# Patient Record
Sex: FEMALE | Race: WHITE | NOT HISPANIC OR LATINO | URBAN - METROPOLITAN AREA
[De-identification: names, ages, dates, MRNs, and addresses within clinical notes are randomized per-mention and may not be internally consistent; named-entity substitution may affect disease eponyms.]

---

## 2017-06-07 ENCOUNTER — EMERGENCY (EMERGENCY)
Facility: HOSPITAL | Age: 37
LOS: 0 days | Discharge: LEFT BEFORE DISCHARGE | End: 2017-06-08

## 2017-06-07 DIAGNOSIS — E05.00 THYROTOXICOSIS WITH DIFFUSE GOITER WITHOUT THYROTOXIC CRISIS OR STORM: ICD-10-CM

## 2017-06-07 DIAGNOSIS — F93.0 SEPARATION ANXIETY DISORDER OF CHILDHOOD: ICD-10-CM

## 2017-06-07 DIAGNOSIS — F39 UNSPECIFIED MOOD [AFFECTIVE] DISORDER: ICD-10-CM

## 2017-06-07 DIAGNOSIS — F41.9 ANXIETY DISORDER, UNSPECIFIED: ICD-10-CM

## 2017-06-07 DIAGNOSIS — F10.20 ALCOHOL DEPENDENCE, UNCOMPLICATED: ICD-10-CM

## 2017-06-28 DIAGNOSIS — Z98.890 OTHER SPECIFIED POSTPROCEDURAL STATES: ICD-10-CM

## 2017-06-28 DIAGNOSIS — F41.8 OTHER SPECIFIED ANXIETY DISORDERS: ICD-10-CM
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
## 2017-06-28 DIAGNOSIS — Z79.899 OTHER LONG TERM (CURRENT) DRUG THERAPY: ICD-10-CM

## 2017-06-28 DIAGNOSIS — F10.10 ALCOHOL ABUSE, UNCOMPLICATED: ICD-10-CM

## 2017-09-17 ENCOUNTER — INPATIENT (INPATIENT)
Facility: HOSPITAL | Age: 37
LOS: 2 days | Discharge: HOME | End: 2017-09-20
Attending: INTERNAL MEDICINE

## 2017-09-17 DIAGNOSIS — F39 UNSPECIFIED MOOD [AFFECTIVE] DISORDER: ICD-10-CM

## 2017-09-17 DIAGNOSIS — F93.0 SEPARATION ANXIETY DISORDER OF CHILDHOOD: ICD-10-CM

## 2017-09-17 DIAGNOSIS — F41.9 ANXIETY DISORDER, UNSPECIFIED: ICD-10-CM

## 2017-09-17 DIAGNOSIS — E05.00 THYROTOXICOSIS WITH DIFFUSE GOITER WITHOUT THYROTOXIC CRISIS OR STORM: ICD-10-CM

## 2017-09-17 DIAGNOSIS — F10.20 ALCOHOL DEPENDENCE, UNCOMPLICATED: ICD-10-CM
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
## 2017-09-24 DIAGNOSIS — F32.9 MAJOR DEPRESSIVE DISORDER, SINGLE EPISODE, UNSPECIFIED: ICD-10-CM

## 2017-09-24 DIAGNOSIS — F10.20 ALCOHOL DEPENDENCE, UNCOMPLICATED: ICD-10-CM
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
## 2017-09-24 DIAGNOSIS — E05.00 THYROTOXICOSIS WITH DIFFUSE GOITER WITHOUT THYROTOXIC CRISIS OR STORM: ICD-10-CM

## 2017-09-24 DIAGNOSIS — F41.1 GENERALIZED ANXIETY DISORDER: ICD-10-CM

## 2017-09-24 DIAGNOSIS — F17.210 NICOTINE DEPENDENCE, CIGARETTES, UNCOMPLICATED: ICD-10-CM

## 2017-10-05 ENCOUNTER — TRANSCRIPTION ENCOUNTER (OUTPATIENT)
Age: 37
End: 2017-10-05

## 2017-10-05 PROBLEM — Z00.00 ENCOUNTER FOR PREVENTIVE HEALTH EXAMINATION: Status: ACTIVE | Noted: 2017-10-05

## 2017-11-16 ENCOUNTER — APPOINTMENT (OUTPATIENT)
Dept: CARDIOLOGY | Facility: CLINIC | Age: 37
End: 2017-11-16

## 2017-11-28 ENCOUNTER — INPATIENT (INPATIENT)
Facility: HOSPITAL | Age: 37
LOS: 1 days | Discharge: LEFT BEFORE DISCHARGE | End: 2017-11-30
Attending: INTERNAL MEDICINE | Admitting: INTERNAL MEDICINE

## 2017-11-28 DIAGNOSIS — F41.9 ANXIETY DISORDER, UNSPECIFIED: ICD-10-CM

## 2017-11-28 DIAGNOSIS — F10.20 ALCOHOL DEPENDENCE, UNCOMPLICATED: ICD-10-CM

## 2017-11-28 DIAGNOSIS — F93.0 SEPARATION ANXIETY DISORDER OF CHILDHOOD: ICD-10-CM

## 2017-11-28 DIAGNOSIS — F39 UNSPECIFIED MOOD [AFFECTIVE] DISORDER: ICD-10-CM

## 2017-11-28 DIAGNOSIS — E05.00 THYROTOXICOSIS WITH DIFFUSE GOITER WITHOUT THYROTOXIC CRISIS OR STORM: ICD-10-CM

## 2017-12-04 DIAGNOSIS — F17.210 NICOTINE DEPENDENCE, CIGARETTES, UNCOMPLICATED: ICD-10-CM

## 2017-12-04 DIAGNOSIS — F32.9 MAJOR DEPRESSIVE DISORDER, SINGLE EPISODE, UNSPECIFIED: ICD-10-CM

## 2017-12-04 DIAGNOSIS — F41.1 GENERALIZED ANXIETY DISORDER: ICD-10-CM

## 2017-12-04 DIAGNOSIS — F10.20 ALCOHOL DEPENDENCE, UNCOMPLICATED: ICD-10-CM

## 2017-12-19 ENCOUNTER — EMERGENCY (EMERGENCY)
Facility: HOSPITAL | Age: 37
LOS: 0 days | Discharge: LEFT BEFORE DISCHARGE | End: 2017-12-20

## 2017-12-19 DIAGNOSIS — Z79.899 OTHER LONG TERM (CURRENT) DRUG THERAPY: ICD-10-CM

## 2017-12-19 DIAGNOSIS — F41.9 ANXIETY DISORDER, UNSPECIFIED: ICD-10-CM

## 2017-12-19 DIAGNOSIS — E05.00 THYROTOXICOSIS WITH DIFFUSE GOITER WITHOUT THYROTOXIC CRISIS OR STORM: ICD-10-CM

## 2017-12-19 DIAGNOSIS — F10.20 ALCOHOL DEPENDENCE, UNCOMPLICATED: ICD-10-CM

## 2017-12-19 DIAGNOSIS — F93.0 SEPARATION ANXIETY DISORDER OF CHILDHOOD: ICD-10-CM

## 2017-12-19 DIAGNOSIS — F39 UNSPECIFIED MOOD [AFFECTIVE] DISORDER: ICD-10-CM

## 2017-12-19 DIAGNOSIS — F10.10 ALCOHOL ABUSE, UNCOMPLICATED: ICD-10-CM

## 2017-12-19 DIAGNOSIS — Z98.890 OTHER SPECIFIED POSTPROCEDURAL STATES: ICD-10-CM

## 2017-12-26 ENCOUNTER — INPATIENT (INPATIENT)
Facility: HOSPITAL | Age: 37
LOS: 3 days | Discharge: HOME | End: 2017-12-30
Attending: INTERNAL MEDICINE

## 2017-12-26 DIAGNOSIS — F39 UNSPECIFIED MOOD [AFFECTIVE] DISORDER: ICD-10-CM

## 2017-12-26 DIAGNOSIS — F10.20 ALCOHOL DEPENDENCE, UNCOMPLICATED: ICD-10-CM

## 2017-12-26 DIAGNOSIS — F41.9 ANXIETY DISORDER, UNSPECIFIED: ICD-10-CM

## 2017-12-26 DIAGNOSIS — E05.00 THYROTOXICOSIS WITH DIFFUSE GOITER WITHOUT THYROTOXIC CRISIS OR STORM: ICD-10-CM

## 2017-12-26 DIAGNOSIS — F93.0 SEPARATION ANXIETY DISORDER OF CHILDHOOD: ICD-10-CM

## 2018-01-06 ENCOUNTER — EMERGENCY (EMERGENCY)
Facility: HOSPITAL | Age: 38
LOS: 0 days | Discharge: HOME | End: 2018-01-06
Admitting: INTERNAL MEDICINE

## 2018-01-06 DIAGNOSIS — F41.9 ANXIETY DISORDER, UNSPECIFIED: ICD-10-CM

## 2018-01-06 DIAGNOSIS — F93.0 SEPARATION ANXIETY DISORDER OF CHILDHOOD: ICD-10-CM

## 2018-01-06 DIAGNOSIS — F41.8 OTHER SPECIFIED ANXIETY DISORDERS: ICD-10-CM

## 2018-01-06 DIAGNOSIS — Z98.890 OTHER SPECIFIED POSTPROCEDURAL STATES: ICD-10-CM
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
## 2018-01-06 DIAGNOSIS — Z87.891 PERSONAL HISTORY OF NICOTINE DEPENDENCE: ICD-10-CM

## 2018-01-06 DIAGNOSIS — F10.20 ALCOHOL DEPENDENCE, UNCOMPLICATED: ICD-10-CM

## 2018-01-06 DIAGNOSIS — E05.00 THYROTOXICOSIS WITH DIFFUSE GOITER WITHOUT THYROTOXIC CRISIS OR STORM: ICD-10-CM

## 2018-01-06 DIAGNOSIS — Z79.899 OTHER LONG TERM (CURRENT) DRUG THERAPY: ICD-10-CM
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
## 2018-01-06 DIAGNOSIS — F39 UNSPECIFIED MOOD [AFFECTIVE] DISORDER: ICD-10-CM

## 2018-01-06 DIAGNOSIS — S69.91XD UNSPECIFIED INJURY OF RIGHT WRIST, HAND AND FINGER(S), SUBSEQUENT ENCOUNTER: ICD-10-CM

## 2018-01-06 DIAGNOSIS — S60.211D CONTUSION OF RIGHT WRIST, SUBSEQUENT ENCOUNTER: ICD-10-CM
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
## 2018-01-07 DIAGNOSIS — F41.9 ANXIETY DISORDER, UNSPECIFIED: ICD-10-CM

## 2018-01-07 DIAGNOSIS — F17.210 NICOTINE DEPENDENCE, CIGARETTES, UNCOMPLICATED: ICD-10-CM

## 2018-01-07 DIAGNOSIS — F10.20 ALCOHOL DEPENDENCE, UNCOMPLICATED: ICD-10-CM

## 2018-01-07 DIAGNOSIS — E05.00 THYROTOXICOSIS WITH DIFFUSE GOITER WITHOUT THYROTOXIC CRISIS OR STORM: ICD-10-CM

## 2018-01-07 DIAGNOSIS — F32.9 MAJOR DEPRESSIVE DISORDER, SINGLE EPISODE, UNSPECIFIED: ICD-10-CM

## 2018-01-11 ENCOUNTER — APPOINTMENT (OUTPATIENT)
Dept: CARDIOLOGY | Facility: CLINIC | Age: 38
End: 2018-01-11

## 2018-03-22 ENCOUNTER — APPOINTMENT (OUTPATIENT)
Dept: CARDIOLOGY | Facility: CLINIC | Age: 38
End: 2018-03-22

## 2018-03-22 VITALS — RESPIRATION RATE: 18 BRPM | HEART RATE: 84 BPM

## 2018-03-22 VITALS — SYSTOLIC BLOOD PRESSURE: 110 MMHG | DIASTOLIC BLOOD PRESSURE: 70 MMHG

## 2018-03-22 VITALS — WEIGHT: 127 LBS | BODY MASS INDEX: 23.98 KG/M2 | HEIGHT: 61 IN

## 2018-03-22 DIAGNOSIS — F17.200 NICOTINE DEPENDENCE, UNSPECIFIED, UNCOMPLICATED: ICD-10-CM

## 2018-03-22 DIAGNOSIS — Z82.49 FAMILY HISTORY OF ISCHEMIC HEART DISEASE AND OTHER DISEASES OF THE CIRCULATORY SYSTEM: ICD-10-CM

## 2018-03-22 RX ORDER — CLONAZEPAM 1 MG/1
1 TABLET ORAL
Qty: 90 | Refills: 0 | Status: ACTIVE | COMMUNITY
Start: 2018-03-03

## 2018-03-22 RX ORDER — MIRTAZAPINE 30 MG/1
30 TABLET, FILM COATED ORAL
Qty: 30 | Refills: 0 | Status: ACTIVE | COMMUNITY
Start: 2018-03-03

## 2018-03-28 ENCOUNTER — APPOINTMENT (OUTPATIENT)
Dept: CARDIOLOGY | Facility: CLINIC | Age: 38
End: 2018-03-28

## 2018-04-01 ENCOUNTER — TRANSCRIPTION ENCOUNTER (OUTPATIENT)
Age: 38
End: 2018-04-01

## 2018-04-04 ENCOUNTER — APPOINTMENT (OUTPATIENT)
Dept: CARDIOLOGY | Facility: CLINIC | Age: 38
End: 2018-04-04

## 2018-04-11 ENCOUNTER — APPOINTMENT (OUTPATIENT)
Dept: CARDIOLOGY | Facility: CLINIC | Age: 38
End: 2018-04-11

## 2018-04-26 ENCOUNTER — APPOINTMENT (OUTPATIENT)
Dept: CARDIOLOGY | Facility: CLINIC | Age: 38
End: 2018-04-26

## 2018-05-02 ENCOUNTER — APPOINTMENT (OUTPATIENT)
Dept: CARDIOLOGY | Facility: CLINIC | Age: 38
End: 2018-05-02

## 2018-05-06 ENCOUNTER — TRANSCRIPTION ENCOUNTER (OUTPATIENT)
Age: 38
End: 2018-05-06

## 2018-05-15 ENCOUNTER — TRANSCRIPTION ENCOUNTER (OUTPATIENT)
Age: 38
End: 2018-05-15

## 2018-06-03 ENCOUNTER — TRANSCRIPTION ENCOUNTER (OUTPATIENT)
Age: 38
End: 2018-06-03

## 2018-06-27 ENCOUNTER — APPOINTMENT (OUTPATIENT)
Dept: CARDIOLOGY | Facility: CLINIC | Age: 38
End: 2018-06-27

## 2018-06-28 ENCOUNTER — APPOINTMENT (OUTPATIENT)
Dept: CARDIOLOGY | Facility: CLINIC | Age: 38
End: 2018-06-28

## 2018-06-29 ENCOUNTER — EMERGENCY (EMERGENCY)
Facility: HOSPITAL | Age: 38
LOS: 0 days | Discharge: HOME | End: 2018-06-30
Attending: EMERGENCY MEDICINE | Admitting: EMERGENCY MEDICINE

## 2018-06-29 VITALS
HEART RATE: 119 BPM | SYSTOLIC BLOOD PRESSURE: 165 MMHG | TEMPERATURE: 97 F | RESPIRATION RATE: 20 BRPM | DIASTOLIC BLOOD PRESSURE: 108 MMHG | OXYGEN SATURATION: 97 %

## 2018-06-29 DIAGNOSIS — F41.9 ANXIETY DISORDER, UNSPECIFIED: ICD-10-CM

## 2018-06-29 DIAGNOSIS — F17.200 NICOTINE DEPENDENCE, UNSPECIFIED, UNCOMPLICATED: ICD-10-CM

## 2018-06-29 DIAGNOSIS — R79.9 ABNORMAL FINDING OF BLOOD CHEMISTRY, UNSPECIFIED: ICD-10-CM

## 2018-06-29 DIAGNOSIS — R10.13 EPIGASTRIC PAIN: ICD-10-CM

## 2018-06-29 LAB
ALBUMIN SERPL ELPH-MCNC: 5.1 G/DL — SIGNIFICANT CHANGE UP (ref 3.5–5.2)
ALP SERPL-CCNC: 66 U/L — SIGNIFICANT CHANGE UP (ref 30–115)
ALT FLD-CCNC: 35 U/L — SIGNIFICANT CHANGE UP (ref 0–41)
ANION GAP SERPL CALC-SCNC: 20 MMOL/L — HIGH (ref 7–14)
APPEARANCE UR: CLEAR — SIGNIFICANT CHANGE UP
AST SERPL-CCNC: 88 U/L — HIGH (ref 0–41)
BASOPHILS # BLD AUTO: 0.06 K/UL — SIGNIFICANT CHANGE UP (ref 0–0.2)
BASOPHILS NFR BLD AUTO: 0.5 % — SIGNIFICANT CHANGE UP (ref 0–1)
BILIRUB SERPL-MCNC: 0.5 MG/DL — SIGNIFICANT CHANGE UP (ref 0.2–1.2)
BILIRUB UR-MCNC: NEGATIVE — SIGNIFICANT CHANGE UP
BUN SERPL-MCNC: 14 MG/DL — SIGNIFICANT CHANGE UP (ref 10–20)
CALCIUM SERPL-MCNC: 9.7 MG/DL — SIGNIFICANT CHANGE UP (ref 8.5–10.1)
CHLORIDE SERPL-SCNC: 99 MMOL/L — SIGNIFICANT CHANGE UP (ref 98–110)
CO2 SERPL-SCNC: 20 MMOL/L — SIGNIFICANT CHANGE UP (ref 17–32)
COLOR SPEC: YELLOW — SIGNIFICANT CHANGE UP
CREAT SERPL-MCNC: 0.8 MG/DL — SIGNIFICANT CHANGE UP (ref 0.7–1.5)
DIFF PNL FLD: NEGATIVE — SIGNIFICANT CHANGE UP
EOSINOPHIL # BLD AUTO: 0.07 K/UL — SIGNIFICANT CHANGE UP (ref 0–0.7)
EOSINOPHIL NFR BLD AUTO: 0.6 % — SIGNIFICANT CHANGE UP (ref 0–8)
GLUCOSE SERPL-MCNC: 72 MG/DL — SIGNIFICANT CHANGE UP (ref 70–99)
GLUCOSE UR QL: NEGATIVE MG/DL — SIGNIFICANT CHANGE UP
HCT VFR BLD CALC: 40.8 % — SIGNIFICANT CHANGE UP (ref 37–47)
HGB BLD-MCNC: 14 G/DL — SIGNIFICANT CHANGE UP (ref 12–16)
IMM GRANULOCYTES NFR BLD AUTO: 0.6 % — HIGH (ref 0.1–0.3)
KETONES UR-MCNC: 15
LEUKOCYTE ESTERASE UR-ACNC: NEGATIVE — SIGNIFICANT CHANGE UP
LIDOCAIN IGE QN: 23 U/L — SIGNIFICANT CHANGE UP (ref 7–60)
LYMPHOCYTES # BLD AUTO: 1.92 K/UL — SIGNIFICANT CHANGE UP (ref 1.2–3.4)
LYMPHOCYTES # BLD AUTO: 17.6 % — LOW (ref 20.5–51.1)
MCHC RBC-ENTMCNC: 32 PG — HIGH (ref 27–31)
MCHC RBC-ENTMCNC: 34.3 G/DL — SIGNIFICANT CHANGE UP (ref 32–37)
MCV RBC AUTO: 93.2 FL — SIGNIFICANT CHANGE UP (ref 81–99)
MONOCYTES # BLD AUTO: 1 K/UL — HIGH (ref 0.1–0.6)
MONOCYTES NFR BLD AUTO: 9.2 % — SIGNIFICANT CHANGE UP (ref 1.7–9.3)
NEUTROPHILS # BLD AUTO: 7.8 K/UL — HIGH (ref 1.4–6.5)
NEUTROPHILS NFR BLD AUTO: 71.5 % — SIGNIFICANT CHANGE UP (ref 42.2–75.2)
NITRITE UR-MCNC: NEGATIVE — SIGNIFICANT CHANGE UP
NRBC # BLD: 0 /100 WBCS — SIGNIFICANT CHANGE UP (ref 0–0)
PH UR: 6.5 — SIGNIFICANT CHANGE UP (ref 5–8)
PLATELET # BLD AUTO: 309 K/UL — SIGNIFICANT CHANGE UP (ref 130–400)
POTASSIUM SERPL-MCNC: 4.1 MMOL/L — SIGNIFICANT CHANGE UP (ref 3.5–5)
POTASSIUM SERPL-SCNC: 4.1 MMOL/L — SIGNIFICANT CHANGE UP (ref 3.5–5)
PROT SERPL-MCNC: 8.2 G/DL — HIGH (ref 6–8)
PROT UR-MCNC: NEGATIVE MG/DL — SIGNIFICANT CHANGE UP
RBC # BLD: 4.38 M/UL — SIGNIFICANT CHANGE UP (ref 4.2–5.4)
RBC # FLD: 14.7 % — HIGH (ref 11.5–14.5)
SODIUM SERPL-SCNC: 139 MMOL/L — SIGNIFICANT CHANGE UP (ref 135–146)
SP GR SPEC: <=1.005 — SIGNIFICANT CHANGE UP (ref 1.01–1.03)
UROBILINOGEN FLD QL: 0.2 MG/DL — SIGNIFICANT CHANGE UP (ref 0.2–0.2)
WBC # BLD: 10.92 K/UL — HIGH (ref 4.8–10.8)
WBC # FLD AUTO: 10.92 K/UL — HIGH (ref 4.8–10.8)

## 2018-06-29 RX ORDER — FAMOTIDINE 10 MG/ML
20 INJECTION INTRAVENOUS ONCE
Qty: 0 | Refills: 0 | Status: COMPLETED | OUTPATIENT
Start: 2018-06-29 | End: 2018-06-29

## 2018-06-29 NOTE — ED PROVIDER NOTE - NS ED ROS FT
Constitutional:  See HPI.  Eyes:  No visual changes, eye pain or discharge.  ENMT:  No hearing changes, pain, discharge or infections. No neck pain or stiffness.  Cardiac:  No chest pain, SOB or edema. No chest pain with exertion.  Respiratory:  No cough or respiratory distress. No hemoptysis. No history of asthma or RAD.  GI:  No nausea, vomiting, diarrhea.  :  No dysuria, frequency or burning.  MS:  No myalgia, muscle weakness, joint pain or back pain.  Neuro:  No headache or weakness.  No LOC.  Skin:  No skin rash.   Endocrine: No history of diabetes.  Except as documented in the HPI,  all other systems are negative.

## 2018-06-29 NOTE — ED PROVIDER NOTE - OBJECTIVE STATEMENT
37 Y/O F H/O GRAVES DISEASE DURING HER PREGNANCY (NOT CURRENTLY ON THYROID MEDICATION, LAST THYROID TESTS 6 MONTHS AGO WERE NORMAL), H/O ALCOHOLISM (LAST DRINK 2/2018) ANXIETY, C/O 6 WEEKS OF WEIGHT GAIN AND 1 DAYS OF EPIGASTRIC PAIN AND ABDOMINAL DISTENTION. PT REPORTS EPIGASTRIC PRESSURE WORSENED TODAY AND PT IS UNABLE TO LIE FLAT OR BEND DOWN TODAY. NO N/V. NORMAL BMS. NORMAL URINATION. NO FEVER, CHILLS. NO COUGH, SOB, CP. NO BACK PAIN. NO HAIR LOSS, CHANGE IN BOWEL HABITS. PT HAD RECENT BVLOOD WORK WITH ELEVATED ESR WHICH SHE IS CONCERNED ABOUT AND WOULD LIKE INVESTIGATED. NO HA, DIZZINESS. LMP 5 DAYS AGO. PT DENIES DRUG USE. PT HAD ECHO AND HOLTER WITH DR. WETZEL EARLIER THIS WEEK TO EVALUATE PALPITATIONS.

## 2018-06-29 NOTE — ED ADULT NURSE NOTE - CHPI ED SYMPTOMS NEG
no fever/no blood in stool/no vomiting/no dysuria/no diarrhea/no nausea/no burning urination/no chills/no hematuria

## 2018-06-29 NOTE — ED PROVIDER NOTE - PROGRESS NOTE DETAILS
PT INSISTENT ON HAVONG A TSH DRAWN IN THE ED. PT AWARE RESULTS NOT AVAILABLE FOR AT LEAST 3 DAYS. PT REPORTS SHE WILL GET HER RECODS FROM MEDICAL RECORDS AND FOLLOW UP WITH HER RHEUMATOLOGIST. PT INSISTENT ON HAVING A TSH DRAWN IN THE ED. PT AWARE RESULTS NOT AVAILABLE FOR AT LEAST 3 DAYS. PT REPORTS SHE WILL GET HER RECODS FROM MEDICAL RECORDS AND FOLLOW UP WITH HER RHEUMATOLOGIST. PT SIGNED OUT TO DR. PENDLETON, FOLLOW UP CT SCAN, REASSESS AND DISPO ct neg, labs reassuring.  pt says feels crampy, but no relief with bentyl.  now says feels like gas moving around.  will give simethicone.  pt has f/u with dr. tolbert in 2 days.  will give copy of results to bring to dr. tolbert

## 2018-06-30 VITALS
DIASTOLIC BLOOD PRESSURE: 65 MMHG | RESPIRATION RATE: 18 BRPM | TEMPERATURE: 96 F | HEART RATE: 82 BPM | SYSTOLIC BLOOD PRESSURE: 120 MMHG | OXYGEN SATURATION: 100 %

## 2018-06-30 LAB — TSH SERPL-MCNC: 1.45 UIU/ML — SIGNIFICANT CHANGE UP (ref 0.27–4.2)

## 2018-06-30 RX ORDER — SIMETHICONE 80 MG/1
80 TABLET, CHEWABLE ORAL ONCE
Qty: 0 | Refills: 0 | Status: COMPLETED | OUTPATIENT
Start: 2018-06-30 | End: 2018-06-30

## 2018-06-30 RX ADMIN — FAMOTIDINE 20 MILLIGRAM(S): 10 INJECTION INTRAVENOUS at 00:04

## 2018-06-30 RX ADMIN — SIMETHICONE 80 MILLIGRAM(S): 80 TABLET, CHEWABLE ORAL at 03:12

## 2018-06-30 RX ADMIN — Medication 20 MILLIGRAM(S): at 01:31

## 2018-07-11 ENCOUNTER — APPOINTMENT (OUTPATIENT)
Dept: CARDIOLOGY | Facility: CLINIC | Age: 38
End: 2018-07-11

## 2018-07-11 VITALS — SYSTOLIC BLOOD PRESSURE: 120 MMHG | RESPIRATION RATE: 18 BRPM | HEART RATE: 72 BPM | DIASTOLIC BLOOD PRESSURE: 80 MMHG

## 2018-07-11 VITALS — BODY MASS INDEX: 27.19 KG/M2 | HEIGHT: 61 IN | WEIGHT: 144 LBS

## 2018-07-11 DIAGNOSIS — F41.9 ANXIETY DISORDER, UNSPECIFIED: ICD-10-CM

## 2018-07-11 DIAGNOSIS — I34.1 NONRHEUMATIC MITRAL (VALVE) PROLAPSE: ICD-10-CM

## 2018-07-11 DIAGNOSIS — R07.89 OTHER CHEST PAIN: ICD-10-CM

## 2018-07-11 DIAGNOSIS — R00.2 PALPITATIONS: ICD-10-CM

## 2018-07-11 RX ORDER — GABAPENTIN 600 MG/1
600 TABLET, COATED ORAL
Qty: 90 | Refills: 0 | Status: DISCONTINUED | COMMUNITY
Start: 2017-11-14 | End: 2018-07-11

## 2018-07-27 ENCOUNTER — EMERGENCY (EMERGENCY)
Facility: HOSPITAL | Age: 38
LOS: 0 days | Discharge: LEFT BEFORE DISCHARGE | End: 2018-07-27
Attending: EMERGENCY MEDICINE | Admitting: EMERGENCY MEDICINE

## 2018-07-27 VITALS
SYSTOLIC BLOOD PRESSURE: 142 MMHG | RESPIRATION RATE: 18 BRPM | HEIGHT: 66 IN | HEART RATE: 93 BPM | TEMPERATURE: 97 F | DIASTOLIC BLOOD PRESSURE: 64 MMHG | OXYGEN SATURATION: 97 % | WEIGHT: 149.91 LBS

## 2018-07-27 DIAGNOSIS — Z03.89 ENCOUNTER FOR OBSERVATION FOR OTHER SUSPECTED DISEASES AND CONDITIONS RULED OUT: ICD-10-CM

## 2018-07-27 PROBLEM — E05.00 THYROTOXICOSIS WITH DIFFUSE GOITER WITHOUT THYROTOXIC CRISIS OR STORM: Chronic | Status: ACTIVE | Noted: 2018-06-29

## 2018-07-27 PROBLEM — F10.20 ALCOHOL DEPENDENCE, UNCOMPLICATED: Chronic | Status: ACTIVE | Noted: 2018-06-29

## 2018-07-27 PROBLEM — F41.9 ANXIETY DISORDER, UNSPECIFIED: Chronic | Status: ACTIVE | Noted: 2018-06-29

## 2018-07-27 NOTE — ED ADULT NURSE NOTE - EXPLANATION OF PATIENT'S REASON FOR LEAVING
Patient was expletive she did not want to wait, patient steady on feet and stormed out. Dr Wilkins aware.

## 2018-08-01 ENCOUNTER — EMERGENCY (EMERGENCY)
Facility: HOSPITAL | Age: 38
LOS: 0 days | Discharge: LEFT BEFORE DISCHARGE | End: 2018-08-01
Attending: EMERGENCY MEDICINE | Admitting: EMERGENCY MEDICINE

## 2018-08-01 VITALS
DIASTOLIC BLOOD PRESSURE: 74 MMHG | OXYGEN SATURATION: 96 % | TEMPERATURE: 98 F | SYSTOLIC BLOOD PRESSURE: 119 MMHG | HEART RATE: 105 BPM | RESPIRATION RATE: 18 BRPM

## 2018-08-01 VITALS
HEIGHT: 61 IN | OXYGEN SATURATION: 96 % | DIASTOLIC BLOOD PRESSURE: 85 MMHG | RESPIRATION RATE: 18 BRPM | SYSTOLIC BLOOD PRESSURE: 105 MMHG | TEMPERATURE: 97 F | HEART RATE: 108 BPM | WEIGHT: 145.06 LBS

## 2018-08-01 DIAGNOSIS — R05 COUGH: ICD-10-CM

## 2018-08-01 DIAGNOSIS — F17.200 NICOTINE DEPENDENCE, UNSPECIFIED, UNCOMPLICATED: ICD-10-CM

## 2018-08-01 DIAGNOSIS — R07.89 OTHER CHEST PAIN: ICD-10-CM

## 2018-08-01 DIAGNOSIS — F10.20 ALCOHOL DEPENDENCE, UNCOMPLICATED: ICD-10-CM

## 2018-08-01 RX ORDER — SODIUM CHLORIDE 9 MG/ML
1000 INJECTION INTRAMUSCULAR; INTRAVENOUS; SUBCUTANEOUS ONCE
Qty: 0 | Refills: 0 | Status: DISCONTINUED | OUTPATIENT
Start: 2018-08-01 | End: 2018-08-01

## 2018-08-01 RX ADMIN — Medication 50 MILLIGRAM(S): at 22:43

## 2018-08-01 NOTE — ED ADULT NURSE REASSESSMENT NOTE - NS ED NURSE REASSESS COMMENT FT1
Pt states" My main concern is being the first pt across the street at detox, I am not waiting here till 5am for the blood work" MD Hart made aware will speak to pt.

## 2018-08-01 NOTE — ED ADULT TRIAGE NOTE - CHIEF COMPLAINT QUOTE
"I want Detox. I am withdrawing from alcohol. I am crawling out of my skin and it feel like there is weight on my chest"

## 2018-08-01 NOTE — ED ADULT NURSE NOTE - NSIMPLEMENTINTERV_GEN_ALL_ED
Implemented All Fall Risk Interventions:  [place] to call system. Call bell, personal items and telephone within reach. Instruct patient to call for assistance. Room bathroom lighting operational. Non-slip footwear when patient is off stretcher. Physically safe environment: no spills, clutter or unnecessary equipment. Stretcher in lowest position, wheels locked, appropriate side rails in place. Provide visual cue, wrist band, yellow gown, etc. Monitor gait and stability. Monitor for mental status changes and reorient to person, place, and time. Review medications for side effects contributing to fall risk. Reinforce activity limits and safety measures with patient and family.

## 2018-08-01 NOTE — ED ADULT NURSE NOTE - NS ED NURSE ELOPE COMMENTS
pt did not want to stay refusing blood work and did not want to wait for paper work, despite education regarding both.

## 2018-08-02 ENCOUNTER — INPATIENT (INPATIENT)
Facility: HOSPITAL | Age: 38
LOS: 3 days | Discharge: HOME | End: 2018-08-06
Attending: INTERNAL MEDICINE | Admitting: INTERNAL MEDICINE

## 2018-08-02 VITALS
RESPIRATION RATE: 14 BRPM | HEART RATE: 107 BPM | TEMPERATURE: 97 F | HEIGHT: 61 IN | DIASTOLIC BLOOD PRESSURE: 68 MMHG | SYSTOLIC BLOOD PRESSURE: 118 MMHG | WEIGHT: 141.98 LBS

## 2018-08-02 DIAGNOSIS — Z98.891 HISTORY OF UTERINE SCAR FROM PREVIOUS SURGERY: Chronic | ICD-10-CM

## 2018-08-02 DIAGNOSIS — F41.9 ANXIETY DISORDER, UNSPECIFIED: ICD-10-CM

## 2018-08-02 DIAGNOSIS — F10.20 ALCOHOL DEPENDENCE, UNCOMPLICATED: ICD-10-CM

## 2018-08-02 DIAGNOSIS — F41.1 GENERALIZED ANXIETY DISORDER: ICD-10-CM

## 2018-08-02 DIAGNOSIS — F33.2 MAJOR DEPRESSIVE DISORDER, RECURRENT SEVERE WITHOUT PSYCHOTIC FEATURES: ICD-10-CM

## 2018-08-02 DIAGNOSIS — Z86.39 PERSONAL HISTORY OF OTHER ENDOCRINE, NUTRITIONAL AND METABOLIC DISEASE: ICD-10-CM

## 2018-08-02 DIAGNOSIS — F13.10 SEDATIVE, HYPNOTIC OR ANXIOLYTIC ABUSE, UNCOMPLICATED: ICD-10-CM

## 2018-08-02 DIAGNOSIS — F17.200 NICOTINE DEPENDENCE, UNSPECIFIED, UNCOMPLICATED: ICD-10-CM

## 2018-08-02 DIAGNOSIS — K21.9 GASTRO-ESOPHAGEAL REFLUX DISEASE WITHOUT ESOPHAGITIS: ICD-10-CM

## 2018-08-02 DIAGNOSIS — I34.1 NONRHEUMATIC MITRAL (VALVE) PROLAPSE: ICD-10-CM

## 2018-08-02 LAB
ALBUMIN SERPL ELPH-MCNC: 4.7 G/DL — SIGNIFICANT CHANGE UP (ref 3.5–5.2)
ALP SERPL-CCNC: 62 U/L — SIGNIFICANT CHANGE UP (ref 30–115)
ALT FLD-CCNC: 28 U/L — SIGNIFICANT CHANGE UP (ref 0–41)
AMMONIA BLD-MCNC: 22 UMOL/L — SIGNIFICANT CHANGE UP (ref 11–55)
AMYLASE P1 CFR SERPL: 55 U/L — SIGNIFICANT CHANGE UP (ref 25–115)
ANION GAP SERPL CALC-SCNC: 14 MMOL/L — SIGNIFICANT CHANGE UP (ref 7–14)
APPEARANCE UR: CLEAR — SIGNIFICANT CHANGE UP
APTT BLD: 29.1 SEC — SIGNIFICANT CHANGE UP (ref 27–39.2)
AST SERPL-CCNC: 37 U/L — SIGNIFICANT CHANGE UP (ref 0–41)
BASOPHILS # BLD AUTO: 0.04 K/UL — SIGNIFICANT CHANGE UP (ref 0–0.2)
BASOPHILS NFR BLD AUTO: 0.7 % — SIGNIFICANT CHANGE UP (ref 0–1)
BILIRUB SERPL-MCNC: 0.5 MG/DL — SIGNIFICANT CHANGE UP (ref 0.2–1.2)
BILIRUB UR-MCNC: NEGATIVE — SIGNIFICANT CHANGE UP
BUN SERPL-MCNC: 10 MG/DL — SIGNIFICANT CHANGE UP (ref 10–20)
CALCIUM SERPL-MCNC: 9.1 MG/DL — SIGNIFICANT CHANGE UP (ref 8.5–10.1)
CHLORIDE SERPL-SCNC: 102 MMOL/L — SIGNIFICANT CHANGE UP (ref 98–110)
CHOLEST SERPL-MCNC: 248 MG/DL — HIGH (ref 100–200)
CO2 SERPL-SCNC: 26 MMOL/L — SIGNIFICANT CHANGE UP (ref 17–32)
COLOR SPEC: YELLOW — SIGNIFICANT CHANGE UP
CREAT SERPL-MCNC: 0.5 MG/DL — LOW (ref 0.7–1.5)
DIFF PNL FLD: NEGATIVE — SIGNIFICANT CHANGE UP
EOSINOPHIL # BLD AUTO: 0.14 K/UL — SIGNIFICANT CHANGE UP (ref 0–0.7)
EOSINOPHIL NFR BLD AUTO: 2.6 % — SIGNIFICANT CHANGE UP (ref 0–8)
ETHANOL SERPL-MCNC: 161 MG/DL — HIGH
GGT SERPL-CCNC: 55 U/L — HIGH (ref 1–40)
GLUCOSE SERPL-MCNC: 100 MG/DL — HIGH (ref 70–99)
GLUCOSE UR QL: NEGATIVE MG/DL — SIGNIFICANT CHANGE UP
HCG UR QL: NEGATIVE — SIGNIFICANT CHANGE UP
HCT VFR BLD CALC: 42.4 % — SIGNIFICANT CHANGE UP (ref 37–47)
HDLC SERPL-MCNC: 70 MG/DL — SIGNIFICANT CHANGE UP (ref 40–125)
HGB BLD-MCNC: 14.4 G/DL — SIGNIFICANT CHANGE UP (ref 12–16)
IMM GRANULOCYTES NFR BLD AUTO: 0.2 % — SIGNIFICANT CHANGE UP (ref 0.1–0.3)
INR BLD: 1.08 RATIO — SIGNIFICANT CHANGE UP (ref 0.65–1.3)
KETONES UR-MCNC: NEGATIVE — SIGNIFICANT CHANGE UP
LEUKOCYTE ESTERASE UR-ACNC: NEGATIVE — SIGNIFICANT CHANGE UP
LIPID PNL WITH DIRECT LDL SERPL: 157 MG/DL — HIGH (ref 4–129)
LYMPHOCYTES # BLD AUTO: 2.31 K/UL — SIGNIFICANT CHANGE UP (ref 1.2–3.4)
LYMPHOCYTES # BLD AUTO: 42.5 % — SIGNIFICANT CHANGE UP (ref 20.5–51.1)
MAGNESIUM SERPL-MCNC: 2.2 MG/DL — SIGNIFICANT CHANGE UP (ref 1.8–2.4)
MCHC RBC-ENTMCNC: 31.9 PG — HIGH (ref 27–31)
MCHC RBC-ENTMCNC: 34 G/DL — SIGNIFICANT CHANGE UP (ref 32–37)
MCV RBC AUTO: 94 FL — SIGNIFICANT CHANGE UP (ref 81–99)
MONOCYTES # BLD AUTO: 0.51 K/UL — SIGNIFICANT CHANGE UP (ref 0.1–0.6)
MONOCYTES NFR BLD AUTO: 9.4 % — HIGH (ref 1.7–9.3)
NEUTROPHILS # BLD AUTO: 2.42 K/UL — SIGNIFICANT CHANGE UP (ref 1.4–6.5)
NEUTROPHILS NFR BLD AUTO: 44.6 % — SIGNIFICANT CHANGE UP (ref 42.2–75.2)
NITRITE UR-MCNC: NEGATIVE — SIGNIFICANT CHANGE UP
NRBC # BLD: 0 /100 WBCS — SIGNIFICANT CHANGE UP (ref 0–0)
PH UR: 6.5 — SIGNIFICANT CHANGE UP (ref 5–8)
PLATELET # BLD AUTO: 216 K/UL — SIGNIFICANT CHANGE UP (ref 130–400)
POTASSIUM SERPL-MCNC: 4.5 MMOL/L — SIGNIFICANT CHANGE UP (ref 3.5–5)
POTASSIUM SERPL-SCNC: 4.5 MMOL/L — SIGNIFICANT CHANGE UP (ref 3.5–5)
PROT SERPL-MCNC: 7.6 G/DL — SIGNIFICANT CHANGE UP (ref 6–8)
PROT UR-MCNC: NEGATIVE MG/DL — SIGNIFICANT CHANGE UP
PROTHROM AB SERPL-ACNC: 11.7 SEC — SIGNIFICANT CHANGE UP (ref 9.95–12.87)
RBC # BLD: 4.51 M/UL — SIGNIFICANT CHANGE UP (ref 4.2–5.4)
RBC # FLD: 14.2 % — SIGNIFICANT CHANGE UP (ref 11.5–14.5)
SODIUM SERPL-SCNC: 142 MMOL/L — SIGNIFICANT CHANGE UP (ref 135–146)
SP GR SPEC: <=1.005 — SIGNIFICANT CHANGE UP (ref 1.01–1.03)
TOTAL CHOLESTEROL/HDL RATIO MEASUREMENT: 3.5 RATIO — LOW (ref 4–5.5)
TRIGL SERPL-MCNC: 271 MG/DL — HIGH (ref 10–149)
UROBILINOGEN FLD QL: 0.2 MG/DL — SIGNIFICANT CHANGE UP (ref 0.2–0.2)
WBC # BLD: 5.43 K/UL — SIGNIFICANT CHANGE UP (ref 4.8–10.8)
WBC # FLD AUTO: 5.43 K/UL — SIGNIFICANT CHANGE UP (ref 4.8–10.8)

## 2018-08-02 RX ORDER — THIAMINE MONONITRATE (VIT B1) 100 MG
100 TABLET ORAL DAILY
Qty: 0 | Refills: 0 | Status: COMPLETED | OUTPATIENT
Start: 2018-08-02 | End: 2018-08-04

## 2018-08-02 RX ORDER — LOPERAMIDE HCL 2 MG
4 TABLET ORAL ONCE
Qty: 0 | Refills: 0 | Status: COMPLETED | OUTPATIENT
Start: 2018-08-02 | End: 2018-08-02

## 2018-08-02 RX ORDER — FOLIC ACID 0.8 MG
1 TABLET ORAL DAILY
Qty: 0 | Refills: 0 | Status: DISCONTINUED | OUTPATIENT
Start: 2018-08-02 | End: 2018-08-06

## 2018-08-02 RX ORDER — HYDROXYZINE HCL 10 MG
50 TABLET ORAL EVERY 6 HOURS
Qty: 0 | Refills: 0 | Status: DISCONTINUED | OUTPATIENT
Start: 2018-08-02 | End: 2018-08-06

## 2018-08-02 RX ORDER — PSEUDOEPHEDRINE HCL 30 MG
60 TABLET ORAL EVERY 6 HOURS
Qty: 0 | Refills: 0 | Status: DISCONTINUED | OUTPATIENT
Start: 2018-08-02 | End: 2018-08-06

## 2018-08-02 RX ORDER — LOPERAMIDE HCL 2 MG
2 TABLET ORAL EVERY 6 HOURS
Qty: 0 | Refills: 0 | Status: DISCONTINUED | OUTPATIENT
Start: 2018-08-02 | End: 2018-08-06

## 2018-08-02 RX ORDER — IBUPROFEN 200 MG
600 TABLET ORAL EVERY 8 HOURS
Qty: 0 | Refills: 0 | Status: DISCONTINUED | OUTPATIENT
Start: 2018-08-02 | End: 2018-08-06

## 2018-08-02 RX ORDER — PANTOPRAZOLE SODIUM 20 MG/1
40 TABLET, DELAYED RELEASE ORAL DAILY
Qty: 0 | Refills: 0 | Status: DISCONTINUED | OUTPATIENT
Start: 2018-08-02 | End: 2018-08-06

## 2018-08-02 RX ORDER — METHOCARBAMOL 500 MG/1
500 TABLET, FILM COATED ORAL EVERY 6 HOURS
Qty: 0 | Refills: 0 | Status: DISCONTINUED | OUTPATIENT
Start: 2018-08-02 | End: 2018-08-06

## 2018-08-02 RX ORDER — MULTIVIT-MIN/FERROUS GLUCONATE 9 MG/15 ML
1 LIQUID (ML) ORAL DAILY
Qty: 0 | Refills: 0 | Status: DISCONTINUED | OUTPATIENT
Start: 2018-08-02 | End: 2018-08-06

## 2018-08-02 RX ORDER — HYDROXYZINE HCL 10 MG
100 TABLET ORAL AT BEDTIME
Qty: 0 | Refills: 0 | Status: DISCONTINUED | OUTPATIENT
Start: 2018-08-02 | End: 2018-08-06

## 2018-08-02 RX ORDER — ACETAMINOPHEN 500 MG
650 TABLET ORAL EVERY 4 HOURS
Qty: 0 | Refills: 0 | Status: DISCONTINUED | OUTPATIENT
Start: 2018-08-02 | End: 2018-08-06

## 2018-08-02 RX ORDER — MIRTAZAPINE 45 MG/1
30 TABLET, ORALLY DISINTEGRATING ORAL AT BEDTIME
Qty: 0 | Refills: 0 | Status: DISCONTINUED | OUTPATIENT
Start: 2018-08-02 | End: 2018-08-06

## 2018-08-02 RX ORDER — IBUPROFEN 200 MG
600 TABLET ORAL EVERY 4 HOURS
Qty: 0 | Refills: 0 | Status: DISCONTINUED | OUTPATIENT
Start: 2018-08-02 | End: 2018-08-02

## 2018-08-02 RX ORDER — GABAPENTIN 400 MG/1
600 CAPSULE ORAL THREE TIMES A DAY
Qty: 0 | Refills: 0 | Status: DISCONTINUED | OUTPATIENT
Start: 2018-08-02 | End: 2018-08-06

## 2018-08-02 RX ORDER — MAGNESIUM HYDROXIDE 400 MG/1
30 TABLET, CHEWABLE ORAL ONCE
Qty: 0 | Refills: 0 | Status: DISCONTINUED | OUTPATIENT
Start: 2018-08-02 | End: 2018-08-06

## 2018-08-02 RX ADMIN — Medication 1 MILLIGRAM(S): at 17:41

## 2018-08-02 RX ADMIN — Medication 2 MILLIGRAM(S): at 14:43

## 2018-08-02 RX ADMIN — Medication 100 MILLIGRAM(S): at 17:40

## 2018-08-02 RX ADMIN — PANTOPRAZOLE SODIUM 40 MILLIGRAM(S): 20 TABLET, DELAYED RELEASE ORAL at 17:40

## 2018-08-02 RX ADMIN — Medication 25 MILLIGRAM(S): at 17:41

## 2018-08-02 RX ADMIN — Medication 25 MILLIGRAM(S): at 21:39

## 2018-08-02 RX ADMIN — Medication 0.1 MILLIGRAM(S): at 20:30

## 2018-08-02 RX ADMIN — Medication 25 MILLIGRAM(S): at 15:11

## 2018-08-02 RX ADMIN — MIRTAZAPINE 30 MILLIGRAM(S): 45 TABLET, ORALLY DISINTEGRATING ORAL at 21:40

## 2018-08-02 RX ADMIN — GABAPENTIN 600 MILLIGRAM(S): 400 CAPSULE ORAL at 21:39

## 2018-08-02 RX ADMIN — Medication 200 MILLIGRAM(S): at 14:42

## 2018-08-02 RX ADMIN — Medication 100 MILLIGRAM(S): at 21:55

## 2018-08-02 RX ADMIN — Medication 4 MILLIGRAM(S): at 15:10

## 2018-08-02 RX ADMIN — GABAPENTIN 600 MILLIGRAM(S): 400 CAPSULE ORAL at 15:10

## 2018-08-02 RX ADMIN — Medication 1 TABLET(S): at 17:41

## 2018-08-02 NOTE — H&P ADULT - PMH
Alcoholism    Anxiety    Gastritis    GERD (gastroesophageal reflux disease)    Graves' disease    MVP (mitral valve prolapse)    Nicotine dependence

## 2018-08-02 NOTE — ED PROVIDER NOTE - NS ED ROS FT
Constitutional: See HPI.  Eyes: No visual changes, eye pain or discharge.  ENMT: No hearing changes, pain, discharge or infections. No neck pain or stiffness.  Cardiac: +chest pressure; No SOB or edema. No chest pain with exertion.  Respiratory: No cough or respiratory distress.   GI: No nausea, vomiting, diarrhea or abdominal pain.  : No dysuria, frequency or burning.  MS: No myalgia, muscle weakness, joint pain or back pain.  Neuro: No headache or weakness. No LOC.  Skin: No skin rash.

## 2018-08-02 NOTE — H&P ADULT - ASSESSMENT
38y  Female presents for detox with continuous Alcohol use disorder. Pt reports she has been drinking heavily in the past 3 weeks. Last detox was 7 months ago at [facility]. Pt states she went to ED requested for detox yesterday and she eloped after CXR was done.Pt reports c/o feeling anxious, palpitation, insomnia, nausea, vomiting, poor appetite, tremors and something crawling on her skin.

## 2018-08-02 NOTE — H&P ADULT - ATTENDING COMMENTS
Patient interviewed and examined.    Chart reviewed.    PA's H&P noted and modified, as appropriate.    Case discussed on team rounds    Following is my summary of the case.    Admitted for detox: from ____ED, _x__Intake, ____Med/Surg Floor    Alcohol__x__   Opioid_____  Benzo___ Other_____    Substance amount, duration of use, last usage, and prior attempts at detox or rehabs, are outlined above in the H&P and discussed with patient.    Associated withdrawal symptoms presents.  Comorbid conditions noted. Chronic and Stable.    Past Medical Hx, Psych Hx, family Hx, Social Hx from H&P reviewed and NO changes.    Old medical record and medication Hx. Reviewed    Following items reviewed and addressed:  1. labs  2. EKG  3. Imaging from PACs module    Examination: no change from PA's exam.    Place on following protocol  _____Medically Managed  __X__Medically Supervised    [place]wa_____Librium taper_x___Ativan taper___Methadone taper___ Phenobarb taper____ Suboxone Induction____MMTP____    Narcan Kit Offered    Psych Consult __X__N/A  ___Ordered    Physical Therapy  ___X N/A   ___  Ordered    Aftercare disposition to be addressed by counselors.    Estimated length of stay 3-5 days.

## 2018-08-02 NOTE — H&P ADULT - HISTORY OF PRESENT ILLNESS
38y  Female presents for detox with continuous Alcohol use disorder. Pt reports she has been drinking heavily in the past 3 weeks. Last detox was 7 months ago at [facility]. Pt states she went to ED requested for detox yesterday and she eloped after CXR was done.Pt reports c/o feeling anxious, palpitation, insomnia, nausea, vomiting, poor appetite, tremors and something crawling on her skin. Denies other drugs abuse.  Patient A&Ox3, denies cp, sob, headache, dizziness, bleeding and dysuria. Denies pregnancy, LMP: 7/26/18, last PAP smear normal was done couple months ago.   Patient reports H/O withdrawal:  +MEDARDO  +Tremors  + Tactile Hallucination  - a/v Hallucination  - seizure  - DTs  Patient admits to abusing current substances as follows:  DRUG	AGE OF ONSET	ROUTE	FREQ	AMOUNT	LAST USE	LENGTH OF CURRENT USE	  ETOH	27 yo	Drinking	Daily	5 liter of wine (??)	8/2/18 3 cups	3 weeks	  Denies other drugs abuse							  Klonopin Rx 1mg PO q8h	Since 11/2017	PO	Reports PRN only	1 mg PRN	8/1/18 1mg	On and off PRN, denies abusing	  							  							  Last Detox:	12/2017  Hx of Withdrawal Seizures: No          Psyhx: Anxiety and depression, Denies any S/H Ideation or A/V Hallucination  Pt reports currently taking psy meds for anxiety and insomnia: Klonopin, Neurontin and Remeron   Is patient currently receiving methadone from an MMTP: No  Screening history	Last tested	Result	History of treatment	  HIV	2017	NEG	N/A	  Hepatitis C	2017	NEG	N/A	  PPD test	2017	NEG	N/A	  				    Immunization	Not Received	Unknown	Received	Date Received 	  Influenza		v			  Pneumococcus		v			  Tetanus			v	2012	  Others					  					  I-Stop:   Patient Name:	Dipti Barry	YOB: 1980	  Address:	[address] [place] [address]	Sex:	Female	  Rx Written	Rx Dispensed	Drug	Quantity	Days Supply	Prescriber Name	  07/05/2018	07/24/2018	clonazepam 1 mg tablet	90	30	Luis Acevedo MD	  06/25/2018	06/25/2018	clonazepam 1 mg tablet	75	25	Luis Acevedo MD	  04/03/2018	04/03/2018	clonazepam 1 mg tablet	90	30	Tamiko Dodson NP 38y  Female presents for detox with continuous Alcohol use disorder. Pt reports she has been drinking heavily in the past 3 weeks. Last detox was 7 months ago at [facility]. Pt states she went to ED requested for detox yesterday and she eloped after CXR was done.Pt reports c/o feeling anxious, palpitation, insomnia, nausea, vomiting, diarrhea, poor appetite, tremors and something crawling on her skin. Denies other drugs abuse.  Patient A&Ox3, denies cp, sob, headache, dizziness, bleeding and dysuria. Denies pregnancy, LMP: 7/26/18, last PAP smear normal was done couple months ago.   Patient reports H/O withdrawal:  +MEDARDO  +Tremors  + Tactile Hallucination  - a/v Hallucination  - seizure  - DTs  Patient admits to abusing current substances as follows:  DRUG	AGE OF ONSET	ROUTE	FREQ	AMOUNT	LAST USE	LENGTH OF CURRENT USE	  ETOH	29 yo	Drinking	Daily	5 liter of wine (??)	8/2/18 3 cups	3 weeks	  Denies other drugs abuse							  Klonopin Rx 1mg PO q8h	Since 11/2017	PO	Reports PRN only	1 mg PRN	8/1/18 1mg	On and off PRN, denies abusing	  							  							  Last Detox:	12/2017  Hx of Withdrawal Seizures: No          Psyhx: Anxiety and depression, Denies any S/H Ideation or A/V Hallucination  Pt reports currently taking psy meds for anxiety and insomnia: Klonopin, Neurontin and Remeron   Is patient currently receiving methadone from an MMTP: No  Screening history	Last tested	Result	History of treatment	  HIV	2017	NEG	N/A	  Hepatitis C	2017	NEG	N/A	  PPD test	2017	NEG	N/A	  				    Immunization	Not Received	Unknown	Received	Date Received 	  Influenza		v			  Pneumococcus		v			  Tetanus			v	2012	  Others					  					  I-Stop:   Patient Name:	Dipti Barry	YOB: 1980	  Address:	[address] [place] [address]	Sex:	Female	  Rx Written	Rx Dispensed	Drug	Quantity	Days Supply	Prescriber Name	  07/05/2018	07/24/2018	clonazepam 1 mg tablet	90	30	Luis Acevedo MD	  06/25/2018	06/25/2018	clonazepam 1 mg tablet	75	25	Luis Acevedo MD	  04/03/2018	04/03/2018	clonazepam 1 mg tablet	90	30	Tamiko Dodson NP

## 2018-08-02 NOTE — H&P ADULT - NSHPPHYSICALEXAM_GEN_ALL_CORE
-  Vital Signs:      Temp:97.9       Pulse: 108        RR:14       BP:118/76  Physical Exam:              Constitutional: +Anxious A&Ox3, W/N and W/D.  HEENT: NC/AT, PERRLA, EOM Intact, Nares normal, No Sinus tenderness.  Lips, mucosa and tongue normal; Neck supple, No adenopathy  Respiratory: CTAB, no rales, no rhonchi, no wheezes  Cardiovascular: +S1S2, No M/R/G  Gastrointestinal: +BS, soft, non-tender, not distended, No CVAT  Extremities: Atraumatic, no cyanosis, no edema, no calf tenderness,  Vascular: +dorsal pedis and radial pulses, no extremity cyanosis  Neurological: sensation intact, ROM equal B/L, CN II-XII intact, Gait: steady  Skin: no rashes, no lesions, normal turgor, No track marks  No Decubiti present  No IV lines present  Rectal/Breasts Exam: Deferred

## 2018-08-02 NOTE — ED PROVIDER NOTE - MEDICAL DECISION MAKING DETAILS
pt here for detox from alcohol mentions  chest pain no cocaine use -  cvs tachycardic resp   cta   abd soft nontender no le edema - Plan: cardiac workup  ddimer - no detox beds avaialble

## 2018-08-02 NOTE — ED PROVIDER NOTE - OBJECTIVE STATEMENT
39 y/o female with pmhx of Grave's disease, ETOH abuse, anxiety presents for detox. Patient states she needs detox, last drink was around 4 pm. Patient also states she is feeling chest pressure similar to the symptoms she always feels; denies any new symptoms or changes in quality/severity. Patient has been to Dr. hernandez for stress test 3 months ago, which was negative, holter monitor, and echo which found mitral valve prolapse. Denies fevers/chills, SOB, abdominal pain, calf tenderness/swelling, recent travel, recent surgeries, OCP use. LMP 07/26. Denies other substance abuse

## 2018-08-02 NOTE — H&P ADULT - PROBLEM SELECTOR PLAN 1
Check Urine Toxicology  librium Protocol  Thiamine 100mg PO daily  Folic Acid 1mg PO daily  MVI 1 tablet PO daily  Monitor VS and withdrawal symptoms  PRN Medications

## 2018-08-02 NOTE — ED PROVIDER NOTE - PHYSICAL EXAMINATION
CONSTITUTIONAL: Well-developed; well-nourished; anxious appearing female, not tremulous or diaphoretic; A&ox3  SKIN: warm, dry  HEAD: Normocephalic; atraumatic.  EYES: no conj injection: PERRLA  ENT: No nasal discharge; airway clear.  NECK: Supple; non tender.  CARD: S1, S2 normal; + murmurs; No gallops, or rubs. Regular rate and rhythm.   RESP: +rales b/l upper lobes   ABD: soft ntnd  EXT: Normal ROM.  No clubbing, cyanosis or edema.   NEURO: Alert, oriented, grossly unremarkable  PSYCH: Cooperative, appropriate.

## 2018-08-02 NOTE — H&P ADULT - NSHPLABSRESULTS_GEN_ALL_CORE
pending  < from: Xray Chest 2 Views PA/Lat (18 @ 22:25) >    EXAM:  XR CHEST PA LAT 2V            PROCEDURE DATE:  2018            INTERPRETATION:  Clinical History / Reason for exam: Dyspnea    Comparison : Chest radiograph 7/10/2015.    Technique/Positionin images.    Findings:    Support devices: None.    Cardiac/mediastinum/hilum: Unremarkable.    Lung parenchyma/Pleura: Within normal limits.    Skeleton/soft tissues: Unremarkable.    Impression:      No radiographic evidence of acute cardiopulmonary disease.      < end of copied text >

## 2018-08-02 NOTE — H&P ADULT - PROBLEM SELECTOR PLAN 4
c/w   Home Medications:  Neurontin 600 mg oral tablet: 1 tab(s) orally 3 times a day (02 Aug 2018 12:13)  Remeron 30 mg oral tablet: 1 tab(s) orally once a day (at bedtime) (02 Aug 2018 12:13)  discontinued clonazePAM      observation  Atarax 50mg PO Q6H PRN for anxiety  Atarax 100mg PO QHS PRN for insomnia  psych consult PRN

## 2018-08-02 NOTE — ED PROVIDER NOTE - PROGRESS NOTE DETAILS
ATTENDING NOTE:  I personally evaluated the patient. I reviewed the Resident’s or Physician Assistant’s note (as assigned above), and agree with the findings and plan except as documented in my note.  38yF with hx of ETOH abuse, mitral valve prolapse, (+)smoker presents to ED for detox from ETOH.  Pt having chest pressure in ED, states that she has pain intermittently every day.  Last drink was at 6PM. (+)cough.  No n/v/d.  No fever.  No abd pain.  No SOB.  No LOC.  ON EXAM:  Pt is awake and alert, non toxic.  CVS RRR.  Lungs CTA b/l.  No external signs of trauma.   PLAN:  Will give Librium, check EKG, CXray, labs, and re eval. Patient made aware that there are no female detox beds available; refused blood work for evaluation of chest pressure, aware of risks involved. Patient eloped ED without being reassessed for chest pressure.

## 2018-08-03 LAB
ESTIMATED AVERAGE GLUCOSE: 108 MG/DL — SIGNIFICANT CHANGE UP (ref 68–114)
HAV IGM SER-ACNC: SIGNIFICANT CHANGE UP
HBA1C BLD-MCNC: 5.4 % — SIGNIFICANT CHANGE UP (ref 4–5.6)
HBV CORE IGM SER-ACNC: SIGNIFICANT CHANGE UP
HBV SURFACE AG SER-ACNC: SIGNIFICANT CHANGE UP
HCV AB S/CO SERPL IA: 0.17 S/CO — SIGNIFICANT CHANGE UP
HCV AB SERPL-IMP: SIGNIFICANT CHANGE UP
HIV 1+2 AB+HIV1 P24 AG SERPL QL IA: SIGNIFICANT CHANGE UP
T PALLIDUM AB TITR SER: NEGATIVE — SIGNIFICANT CHANGE UP
TSH SERPL-MCNC: 2.04 UIU/ML — SIGNIFICANT CHANGE UP (ref 0.27–4.2)

## 2018-08-03 RX ADMIN — Medication 50 MILLIGRAM(S): at 21:27

## 2018-08-03 RX ADMIN — Medication 100 MILLIGRAM(S): at 09:22

## 2018-08-03 RX ADMIN — Medication 1 MILLIGRAM(S): at 09:22

## 2018-08-03 RX ADMIN — MIRTAZAPINE 30 MILLIGRAM(S): 45 TABLET, ORALLY DISINTEGRATING ORAL at 23:33

## 2018-08-03 RX ADMIN — GABAPENTIN 600 MILLIGRAM(S): 400 CAPSULE ORAL at 09:22

## 2018-08-03 RX ADMIN — GABAPENTIN 600 MILLIGRAM(S): 400 CAPSULE ORAL at 13:07

## 2018-08-03 RX ADMIN — GABAPENTIN 600 MILLIGRAM(S): 400 CAPSULE ORAL at 21:27

## 2018-08-03 RX ADMIN — Medication 50 MILLIGRAM(S): at 14:44

## 2018-08-03 RX ADMIN — Medication 20 MILLIGRAM(S): at 21:27

## 2018-08-03 RX ADMIN — Medication 0.1 MILLIGRAM(S): at 22:42

## 2018-08-03 RX ADMIN — Medication 1 TABLET(S): at 09:22

## 2018-08-03 RX ADMIN — Medication 25 MILLIGRAM(S): at 06:32

## 2018-08-03 RX ADMIN — Medication 50 MILLIGRAM(S): at 09:24

## 2018-08-03 RX ADMIN — Medication 20 MILLIGRAM(S): at 17:42

## 2018-08-03 RX ADMIN — Medication 20 MILLIGRAM(S): at 13:07

## 2018-08-03 RX ADMIN — PANTOPRAZOLE SODIUM 40 MILLIGRAM(S): 20 TABLET, DELAYED RELEASE ORAL at 09:21

## 2018-08-03 RX ADMIN — Medication 25 MILLIGRAM(S): at 09:23

## 2018-08-03 RX ADMIN — Medication 0.1 MILLIGRAM(S): at 14:44

## 2018-08-03 RX ADMIN — METHOCARBAMOL 500 MILLIGRAM(S): 500 TABLET, FILM COATED ORAL at 06:31

## 2018-08-04 LAB
GAMMA INTERFERON BACKGROUND BLD IA-ACNC: 0.04 IU/ML — SIGNIFICANT CHANGE UP
M TB IFN-G BLD-IMP: NEGATIVE — SIGNIFICANT CHANGE UP
M TB IFN-G CD4+ BCKGRND COR BLD-ACNC: 0.01 IU/ML — SIGNIFICANT CHANGE UP
M TB IFN-G CD4+CD8+ BCKGRND COR BLD-ACNC: 0 IU/ML — SIGNIFICANT CHANGE UP
QUANT TB PLUS MITOGEN MINUS NIL: 9.39 IU/ML — SIGNIFICANT CHANGE UP

## 2018-08-04 RX ADMIN — PANTOPRAZOLE SODIUM 40 MILLIGRAM(S): 20 TABLET, DELAYED RELEASE ORAL at 09:16

## 2018-08-04 RX ADMIN — Medication 1 TABLET(S): at 09:16

## 2018-08-04 RX ADMIN — Medication 1 MILLIGRAM(S): at 09:16

## 2018-08-04 RX ADMIN — MIRTAZAPINE 30 MILLIGRAM(S): 45 TABLET, ORALLY DISINTEGRATING ORAL at 20:45

## 2018-08-04 RX ADMIN — Medication 30 MILLILITER(S): at 14:18

## 2018-08-04 RX ADMIN — GABAPENTIN 600 MILLIGRAM(S): 400 CAPSULE ORAL at 09:16

## 2018-08-04 RX ADMIN — METHOCARBAMOL 500 MILLIGRAM(S): 500 TABLET, FILM COATED ORAL at 19:24

## 2018-08-04 RX ADMIN — Medication 300 MILLIGRAM(S): at 19:24

## 2018-08-04 RX ADMIN — Medication 600 MILLIGRAM(S): at 12:39

## 2018-08-04 RX ADMIN — GABAPENTIN 600 MILLIGRAM(S): 400 CAPSULE ORAL at 20:45

## 2018-08-04 RX ADMIN — GABAPENTIN 600 MILLIGRAM(S): 400 CAPSULE ORAL at 12:38

## 2018-08-04 RX ADMIN — Medication 50 MILLIGRAM(S): at 12:38

## 2018-08-04 RX ADMIN — Medication 50 MILLIGRAM(S): at 19:24

## 2018-08-04 RX ADMIN — METHOCARBAMOL 500 MILLIGRAM(S): 500 TABLET, FILM COATED ORAL at 12:38

## 2018-08-04 RX ADMIN — Medication 100 MILLIGRAM(S): at 21:52

## 2018-08-04 RX ADMIN — Medication 20 MILLIGRAM(S): at 06:40

## 2018-08-04 RX ADMIN — Medication 50 MILLIGRAM(S): at 06:40

## 2018-08-04 RX ADMIN — Medication 100 MILLIGRAM(S): at 09:16

## 2018-08-04 NOTE — CHART NOTE - NSCHARTNOTEFT_GEN_A_CORE
Subsequent Inpatient Encounter                                       Detox Unit    AURELIA BLANCAS   38y   Female      Chief Complaint:    Follow up for Alcohol  Dependency    HPI:     I reviewed previous notes. No Change, except if noted below.             Detail:_    ROS:   I reviewed with patient.  No changes from previous notes except if noted below.             Detail: _    PFSH I reviewed with patient. No changes from previous notes except if noted below.             Detail_    Medication reconciliation performed.    MEDICATIONS  (STANDING):  chlordiazePOXIDE 20 milliGRAM(s) Oral every 4 hours  chlordiazePOXIDE 15 milliGRAM(s) Oral every 4 hours  chlordiazePOXIDE   Oral   folic acid 1 milliGRAM(s) Oral daily  gabapentin 600 milliGRAM(s) Oral three times a day  mirtazapine 30 milliGRAM(s) Oral at bedtime  multivitamin/minerals 1 Tablet(s) Oral daily  pantoprazole    Tablet 40 milliGRAM(s) Oral daily  thiamine 100 milliGRAM(s) Oral daily      MEDICATIONS  (PRN):  acetaminophen   Tablet 650 milliGRAM(s) Oral every 4 hours PRN For Temp greater than 38.5 C (101.3 F)  aluminum hydroxide/magnesium hydroxide/simethicone Suspension 30 milliLiter(s) Oral every 6 hours PRN Heartburn  bismuth subsalicylate Liquid 30 milliLiter(s) Oral every 6 hours PRN Diarrhea  chlordiazePOXIDE 25 milliGRAM(s) Oral every 4 hours PRN Withdrawal  cloNIDine 0.1 milliGRAM(s) Oral every 8 hours PRN Blood Pressure GREATER THAN 140/90 mmHG  guaiFENesin/dextromethorphan  Syrup 5 milliLiter(s) Oral every 4 hours PRN Cough  hydrOXYzine hydrochloride 50 milliGRAM(s) Oral every 6 hours PRN Anxiety  hydrOXYzine hydrochloride 100 milliGRAM(s) Oral at bedtime PRN insomnia  ibuprofen  Tablet 600 milliGRAM(s) Oral every 8 hours PRN pain  loperamide 2 milliGRAM(s) Oral every 6 hours PRN Diarrhea  magnesium hydroxide Suspension 30 milliLiter(s) Oral once PRN Constipation  methocarbamol 500 milliGRAM(s) Oral every 6 hours PRN muscle pain  pseudoephedrine 60 milliGRAM(s) Oral every 6 hours PRN Rhinitis  trimethobenzamide 300 milliGRAM(s) Oral every 6 hours PRN Nausea and/or Vomiting  trimethobenzamide Injectable 200 milliGRAM(s) IntraMuscular every 6 hours PRN Nausea and/or Vomiting      T(C): 35.7 (08-04-18 @ 06:00), Max: 36.5 (08-03-18 @ 18:02)  HR: 68 (08-04-18 @ 06:00) (68 - 85)  BP: 110/61 (08-04-18 @ 06:00) (104/61 - 112/71)  RR: 18 (08-04-18 @ 06:00) (16 - 18)  SpO2: --    PHYSICAL EXAM:      Constitutional: NAD, A&O x3    Eyes: PERRLA, no conjuctivitis    Neck: no lymphadenopathy    Respiratory: +air entry, no rales, no rhonchi, no wheezes    Cardiovascular: +S1 and S2, regular rate and rhythm    Gastrointestinal: +BS, soft, non-tender, not distended    Extremities:  no edema, no calf tenderness    Skin: no rashes, normal turgor                            14.4   5.43  )-----------( 216      ( 02 Aug 2018 20:21 )             42.4   08-02    142  |  102  |  10  ----------------------------<  100<H>  4.5   |  26  |  0.5<L>    Ca    9.1      02 Aug 2018 20:21  Mg     2.2     08-02    TPro  7.6  /  Alb  4.7  /  TBili  0.5  /  DBili  x   /  AST  37  /  ALT  28  /  AlkPhos  62  08-02  PT/INR - ( 02 Aug 2018 20:21 )   PT: 11.70 sec;   INR: 1.08 ratio         PTT - ( 02 Aug 2018 20:21 )  PTT:29.1 sec  Magnesium, Serum: 2.2 mg/dL (08-02-18 @ 20:21)  Ammonia, Serum: 22 umol/L (08-02-18 @ 20:21)  Amylase, Serum Total: 55 U/L (08-02-18 @ 20:21)  Hemoglobin A1C, Whole Blood: 5.4 % (08-02-18 @ 20:21)  Treponema Pallidum Antibody Interpretation: Negative (08-02-18 @ 20:21)  Hepatitis B Surface Antigen: Nonreact (08-02-18 @ 20:21)  Hepatitis C Virus S/CO Ratio: 0.17 S/CO (08-02-18 @ 20:21)    Hepatitis C Virus Interpretation: Nonreact (08-02-18 @ 20:21)      Urinalysis Basic - ( 02 Aug 2018 14:11 )    Color: Yellow / Appearance: Clear / SG: <=1.005 / pH: x  Gluc: x / Ketone: Negative  / Bili: Negative / Urobili: 0.2 mg/dL   Blood: x / Protein: Negative mg/dL / Nitrite: Negative   Leuk Esterase: Negative / RBC: x / WBC x   Sq Epi: x / Non Sq Epi: x / Bacteria: x          Impression and Plan:    Primary Diagnosis:  Alcohol Dependency                                Medication: Librium Protocol    Secondary Diagnosis: Anxiety                                                 Medication: c/w Gabapentin    Tertiary Diagnosis:                                                                       Medication      Continue Detox Protocols. Use of PRNS as needed for withdrawal and comfort.    Adjustments to protocols:    Labs/ Tests reviewed.    Tests ordered:     Likely Disposition: _x__Home       ___Rehab       ___Outpatient Program    ___Self Help     _____Other    Estimated Length of stay:__4__

## 2018-08-05 DIAGNOSIS — F41.9 ANXIETY DISORDER, UNSPECIFIED: ICD-10-CM

## 2018-08-05 RX ORDER — CLONAZEPAM 1 MG
1 TABLET ORAL
Qty: 0 | Refills: 0 | COMMUNITY

## 2018-08-05 RX ORDER — SUCRALFATE 1 G
1 TABLET ORAL
Qty: 0 | Refills: 0 | Status: DISCONTINUED | OUTPATIENT
Start: 2018-08-05 | End: 2018-08-06

## 2018-08-05 RX ADMIN — Medication 1 TABLET(S): at 09:53

## 2018-08-05 RX ADMIN — METHOCARBAMOL 500 MILLIGRAM(S): 500 TABLET, FILM COATED ORAL at 11:59

## 2018-08-05 RX ADMIN — Medication 1 GRAM(S): at 10:13

## 2018-08-05 RX ADMIN — Medication 1 MILLIGRAM(S): at 09:52

## 2018-08-05 RX ADMIN — Medication 600 MILLIGRAM(S): at 15:47

## 2018-08-05 RX ADMIN — Medication 100 MILLIGRAM(S): at 23:20

## 2018-08-05 RX ADMIN — Medication 30 MILLILITER(S): at 06:23

## 2018-08-05 RX ADMIN — GABAPENTIN 600 MILLIGRAM(S): 400 CAPSULE ORAL at 09:53

## 2018-08-05 RX ADMIN — PANTOPRAZOLE SODIUM 40 MILLIGRAM(S): 20 TABLET, DELAYED RELEASE ORAL at 06:23

## 2018-08-05 RX ADMIN — METHOCARBAMOL 500 MILLIGRAM(S): 500 TABLET, FILM COATED ORAL at 06:23

## 2018-08-05 RX ADMIN — MIRTAZAPINE 30 MILLIGRAM(S): 45 TABLET, ORALLY DISINTEGRATING ORAL at 23:36

## 2018-08-05 RX ADMIN — Medication 50 MILLIGRAM(S): at 06:23

## 2018-08-05 RX ADMIN — Medication 50 MILLIGRAM(S): at 17:40

## 2018-08-05 RX ADMIN — METHOCARBAMOL 500 MILLIGRAM(S): 500 TABLET, FILM COATED ORAL at 17:40

## 2018-08-05 RX ADMIN — Medication 1 GRAM(S): at 15:47

## 2018-08-05 RX ADMIN — METHOCARBAMOL 500 MILLIGRAM(S): 500 TABLET, FILM COATED ORAL at 23:54

## 2018-08-05 RX ADMIN — Medication 50 MILLIGRAM(S): at 11:59

## 2018-08-05 RX ADMIN — GABAPENTIN 600 MILLIGRAM(S): 400 CAPSULE ORAL at 12:00

## 2018-08-05 RX ADMIN — GABAPENTIN 600 MILLIGRAM(S): 400 CAPSULE ORAL at 21:34

## 2018-08-05 NOTE — CONSULT NOTE ADULT - PROBLEM SELECTOR RECOMMENDATION 9
Continue current tx. Recommend to d/c Pt with Rx for Vistaril in current dose, up to 2 weeks supply. Recommend psych follow up after the d/c - pt states she will find psychiatrist on her own.
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
## 2018-08-06 ENCOUNTER — TRANSCRIPTION ENCOUNTER (OUTPATIENT)
Age: 38
End: 2018-08-06

## 2018-08-06 VITALS
HEART RATE: 69 BPM | SYSTOLIC BLOOD PRESSURE: 116 MMHG | RESPIRATION RATE: 16 BRPM | DIASTOLIC BLOOD PRESSURE: 57 MMHG | TEMPERATURE: 97 F

## 2018-08-06 RX ADMIN — METHOCARBAMOL 500 MILLIGRAM(S): 500 TABLET, FILM COATED ORAL at 09:23

## 2018-08-06 RX ADMIN — Medication 1 TABLET(S): at 09:23

## 2018-08-06 RX ADMIN — Medication 1 MILLIGRAM(S): at 09:23

## 2018-08-06 RX ADMIN — Medication 600 MILLIGRAM(S): at 06:49

## 2018-08-06 RX ADMIN — PANTOPRAZOLE SODIUM 40 MILLIGRAM(S): 20 TABLET, DELAYED RELEASE ORAL at 06:48

## 2018-08-06 RX ADMIN — GABAPENTIN 600 MILLIGRAM(S): 400 CAPSULE ORAL at 09:23

## 2018-08-06 RX ADMIN — Medication 1 GRAM(S): at 06:48

## 2018-08-06 RX ADMIN — Medication 50 MILLIGRAM(S): at 09:27

## 2018-08-06 NOTE — CHART NOTE - NSCHARTNOTEFT_GEN_A_CORE
The patient was admitted to the inpt detox unit CDU, for   ETOH__x__ Opioid___  Benzo____Polysubstance _____ Dependency.    Pt was admitted from ED____, Intake_x___, Med/surg Floor_______.    Details are present in the preceding History & Physical section and follow up chart notes.  patient was evaluated on daily detox team  rounds.  Withdrawal symptoms and signs were reviewed on a daily basis, and the protocols were adjusted accordingly.    Labs and imaging results were reviewed and discussed with the patient.    All questions from the patient were addressed.  The patient was seen by the Chemical dependency counselors, and different options for after care were discussed.  The patient attended groups, meetings and 1:1 sessions with the counselors.  Narcane Kit was offered and instructions given prior to discharge.    Psychiatry consultation reviewed______, N/A__x____    Physical therapy evaluation reviewed_____, N/A_x___    Pt was given copies of labs and imaging reports, if applicable.    Prescriptions if needed, were sent through [facility] system to the pharmacy amnd are noted in the discharge instruction sheet.    After care was arranged by counselors and pt was discharged to:    Home___, Outpt. Program_x_, Rehab ___, Long term____ [facility] ____ IPP____ SNF____, AMA___, Admin Discharge____    Principal Diagnosis: Alcohol Dependency__x__ Opioid Dependency___ Benzo Dependency____ Polysubstance Dependency____

## 2018-08-10 DIAGNOSIS — F10.20 ALCOHOL DEPENDENCE, UNCOMPLICATED: ICD-10-CM

## 2018-08-10 DIAGNOSIS — K29.70 GASTRITIS, UNSPECIFIED, WITHOUT BLEEDING: ICD-10-CM

## 2018-08-10 DIAGNOSIS — Z81.8 FAMILY HISTORY OF OTHER MENTAL AND BEHAVIORAL DISORDERS: ICD-10-CM

## 2018-08-10 DIAGNOSIS — K21.9 GASTRO-ESOPHAGEAL REFLUX DISEASE WITHOUT ESOPHAGITIS: ICD-10-CM

## 2018-08-10 DIAGNOSIS — E05.00 THYROTOXICOSIS WITH DIFFUSE GOITER WITHOUT THYROTOXIC CRISIS OR STORM: ICD-10-CM

## 2018-08-10 DIAGNOSIS — F17.210 NICOTINE DEPENDENCE, CIGARETTES, UNCOMPLICATED: ICD-10-CM

## 2018-08-10 DIAGNOSIS — G47.00 INSOMNIA, UNSPECIFIED: ICD-10-CM

## 2018-08-10 DIAGNOSIS — I34.1 NONRHEUMATIC MITRAL (VALVE) PROLAPSE: ICD-10-CM

## 2018-08-10 DIAGNOSIS — Z81.1 FAMILY HISTORY OF ALCOHOL ABUSE AND DEPENDENCE: ICD-10-CM
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
## 2018-08-10 DIAGNOSIS — F43.22 ADJUSTMENT DISORDER WITH ANXIETY: ICD-10-CM

## 2018-08-11 ENCOUNTER — TRANSCRIPTION ENCOUNTER (OUTPATIENT)
Age: 38
End: 2018-08-11

## 2018-08-15 ENCOUNTER — EMERGENCY (EMERGENCY)
Facility: HOSPITAL | Age: 38
LOS: 0 days | Discharge: HOME | End: 2018-08-16
Attending: EMERGENCY MEDICINE | Admitting: EMERGENCY MEDICINE

## 2018-08-15 VITALS
OXYGEN SATURATION: 95 % | RESPIRATION RATE: 20 BRPM | TEMPERATURE: 97 F | HEART RATE: 131 BPM | WEIGHT: 147.93 LBS | DIASTOLIC BLOOD PRESSURE: 100 MMHG | SYSTOLIC BLOOD PRESSURE: 135 MMHG

## 2018-08-15 VITALS
SYSTOLIC BLOOD PRESSURE: 123 MMHG | DIASTOLIC BLOOD PRESSURE: 70 MMHG | HEART RATE: 113 BPM | RESPIRATION RATE: 20 BRPM | OXYGEN SATURATION: 95 %

## 2018-08-15 DIAGNOSIS — R41.82 ALTERED MENTAL STATUS, UNSPECIFIED: ICD-10-CM

## 2018-08-15 DIAGNOSIS — F10.20 ALCOHOL DEPENDENCE, UNCOMPLICATED: ICD-10-CM

## 2018-08-15 DIAGNOSIS — K21.9 GASTRO-ESOPHAGEAL REFLUX DISEASE WITHOUT ESOPHAGITIS: ICD-10-CM

## 2018-08-15 DIAGNOSIS — Z88.8 ALLERGY STATUS TO OTHER DRUGS, MEDICAMENTS AND BIOLOGICAL SUBSTANCES: ICD-10-CM

## 2018-08-15 DIAGNOSIS — R00.0 TACHYCARDIA, UNSPECIFIED: ICD-10-CM

## 2018-08-15 DIAGNOSIS — Z98.891 HISTORY OF UTERINE SCAR FROM PREVIOUS SURGERY: Chronic | ICD-10-CM

## 2018-08-15 NOTE — ED ADULT NURSE NOTE - NSIMPLEMENTINTERV_GEN_ALL_ED
Implemented All Fall with Harm Risk Interventions:  [place] to call system. Call bell, personal items and telephone within reach. Instruct patient to call for assistance. Room bathroom lighting operational. Non-slip footwear when patient is off stretcher. Physically safe environment: no spills, clutter or unnecessary equipment. Stretcher in lowest position, wheels locked, appropriate side rails in place. Provide visual cue, wrist band, yellow gown, etc. Monitor gait and stability. Monitor for mental status changes and reorient to person, place, and time. Review medications for side effects contributing to fall risk. Reinforce activity limits and safety measures with patient and family. Provide visual clues: red socks.

## 2018-08-16 PROBLEM — I34.1 NONRHEUMATIC MITRAL (VALVE) PROLAPSE: Chronic | Status: ACTIVE | Noted: 2018-08-02

## 2018-08-16 PROBLEM — K21.9 GASTRO-ESOPHAGEAL REFLUX DISEASE WITHOUT ESOPHAGITIS: Chronic | Status: ACTIVE | Noted: 2018-08-02

## 2018-08-16 PROBLEM — K29.70 GASTRITIS, UNSPECIFIED, WITHOUT BLEEDING: Chronic | Status: ACTIVE | Noted: 2018-08-02

## 2018-08-16 PROBLEM — F17.200 NICOTINE DEPENDENCE, UNSPECIFIED, UNCOMPLICATED: Chronic | Status: ACTIVE | Noted: 2018-08-02

## 2018-08-16 NOTE — ED PROVIDER NOTE - OBJECTIVE STATEMENT
Pt is a 37yo female who is requesting detox admission.  Reports she drank wine a few hours ago.  Typically drinks 2L wine daily.  Was just admitted to detox two weeks ago.  No acute medical complaints.

## 2018-08-16 NOTE — ED PROVIDER NOTE - PHYSICAL EXAMINATION
Vital Signs: I have reviewed the initial vital signs.  Constitutional: well-nourished, appears stated age, no acute distress  Cardiovascular: tachycardia, well-perfused extremities  Respiratory: unlabored respiratory effort, clear to auscultation bilaterally  Neuro: speech slightly slurred, steady gait, no tremor  Psychiatric: appropriate mood, appropriate affect

## 2018-08-27 ENCOUNTER — TRANSCRIPTION ENCOUNTER (OUTPATIENT)
Age: 38
End: 2018-08-27

## 2018-08-30 ENCOUNTER — TRANSCRIPTION ENCOUNTER (OUTPATIENT)
Age: 38
End: 2018-08-30

## 2018-09-01 ENCOUNTER — EMERGENCY (EMERGENCY)
Facility: HOSPITAL | Age: 38
LOS: 0 days | Discharge: HOME | End: 2018-09-01
Attending: EMERGENCY MEDICINE | Admitting: EMERGENCY MEDICINE

## 2018-09-01 VITALS
WEIGHT: 153 LBS | RESPIRATION RATE: 18 BRPM | HEART RATE: 100 BPM | HEIGHT: 61 IN | OXYGEN SATURATION: 96 % | TEMPERATURE: 97 F | DIASTOLIC BLOOD PRESSURE: 79 MMHG | SYSTOLIC BLOOD PRESSURE: 111 MMHG

## 2018-09-01 DIAGNOSIS — Z98.890 OTHER SPECIFIED POSTPROCEDURAL STATES: ICD-10-CM

## 2018-09-01 DIAGNOSIS — Z79.899 OTHER LONG TERM (CURRENT) DRUG THERAPY: ICD-10-CM

## 2018-09-01 DIAGNOSIS — K21.9 GASTRO-ESOPHAGEAL REFLUX DISEASE WITHOUT ESOPHAGITIS: ICD-10-CM

## 2018-09-01 DIAGNOSIS — F10.20 ALCOHOL DEPENDENCE, UNCOMPLICATED: ICD-10-CM

## 2018-09-01 DIAGNOSIS — Z98.891 HISTORY OF UTERINE SCAR FROM PREVIOUS SURGERY: Chronic | ICD-10-CM

## 2018-09-01 DIAGNOSIS — R25.8 OTHER ABNORMAL INVOLUNTARY MOVEMENTS: ICD-10-CM

## 2018-09-01 DIAGNOSIS — Z88.8 ALLERGY STATUS TO OTHER DRUGS, MEDICAMENTS AND BIOLOGICAL SUBSTANCES STATUS: ICD-10-CM

## 2018-09-01 RX ADMIN — Medication 25 MILLIGRAM(S): at 18:28

## 2018-09-01 NOTE — ED PROVIDER NOTE - OBJECTIVE STATEMENT
39 yo F w/pmhx of ETOh abuse reports for withdrawal symptom evaluation, pt want rehab but has to arrange schedule in personal life. As per pt. I will come to rehab tomorrow. Currently denies CP, SOB, palpitations, diaphoresis, and LOC.     I have reviewed available current nursing and previous documentation of past medical, surgical, family, and/or social history.
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
## 2018-09-01 NOTE — ED PROVIDER NOTE - PHYSICAL EXAMINATION
Physical Exam    Vital Signs: I have reviewed the initial vital signs.  Constitutional: well-nourished, appears stated age, no acute distress  ENT: Neck supple with no adenopathy, moist MM, no tongue fasciculations  Cardiovascular: regular rate, regular rhythm, well-perfused extremities  Respiratory: unlabored respiratory effort, clear to auscultation bilaterally  Gastrointestinal: soft, non-tender abdomen, no pulsatile mass  Neurologic: awake, alert, cranial nerves II-XII grossly intact, extremities’ motor and sensory functions grossly intact  Psychiatric: A&Ox3, appropriate mood, appropriate affect

## 2018-09-01 NOTE — ED ADULT NURSE NOTE - NSIMPLEMENTINTERV_GEN_ALL_ED
Implemented All Universal Safety Interventions:  [place] to call system. Call bell, personal items and telephone within reach. Instruct patient to call for assistance. Room bathroom lighting operational. Non-slip footwear when patient is off stretcher. Physically safe environment: no spills, clutter or unnecessary equipment. Stretcher in lowest position, wheels locked, appropriate side rails in place.

## 2018-09-01 NOTE — ED PROVIDER NOTE - NS ED ROS FT
Review of Systems    Constitutional: (-) fever (-) diaphoresis   Eyes: (-) change in vision (-) photophobia (-) eye pain  Cardiovascular: (-) chest pain  (-) palpitations  Respiratory: (-) SOB (-) cough   GI: (-) abdominal pain (-) N/V (-) diarrhea  Neurological:  (-) focal deficit (-) altered mental status

## 2018-09-08 ENCOUNTER — EMERGENCY (EMERGENCY)
Facility: HOSPITAL | Age: 38
LOS: 0 days | Discharge: LEFT BEFORE DISCHARGE | End: 2018-09-08
Attending: EMERGENCY MEDICINE | Admitting: EMERGENCY MEDICINE

## 2018-09-08 VITALS
RESPIRATION RATE: 20 BRPM | TEMPERATURE: 97 F | HEART RATE: 115 BPM | DIASTOLIC BLOOD PRESSURE: 88 MMHG | OXYGEN SATURATION: 99 % | SYSTOLIC BLOOD PRESSURE: 132 MMHG

## 2018-09-08 DIAGNOSIS — Z79.899 OTHER LONG TERM (CURRENT) DRUG THERAPY: ICD-10-CM

## 2018-09-08 DIAGNOSIS — Z98.890 OTHER SPECIFIED POSTPROCEDURAL STATES: ICD-10-CM

## 2018-09-08 DIAGNOSIS — Z09 ENCOUNTER FOR FOLLOW-UP EXAMINATION AFTER COMPLETED TREATMENT FOR CONDITIONS OTHER THAN MALIGNANT NEOPLASM: ICD-10-CM

## 2018-09-08 DIAGNOSIS — Z98.891 HISTORY OF UTERINE SCAR FROM PREVIOUS SURGERY: Chronic | ICD-10-CM

## 2018-09-08 DIAGNOSIS — K21.9 GASTRO-ESOPHAGEAL REFLUX DISEASE WITHOUT ESOPHAGITIS: ICD-10-CM

## 2018-09-08 DIAGNOSIS — Z88.8 ALLERGY STATUS TO OTHER DRUGS, MEDICAMENTS AND BIOLOGICAL SUBSTANCES: ICD-10-CM

## 2018-09-08 NOTE — ED ADULT NURSE REASSESSMENT NOTE - NS ED NURSE REASSESS COMMENT FT1
Patient very combative, speech clear, steady gait screaming and cursing at staff members. Security called. Patient combative with security. Left ED.

## 2018-09-08 NOTE — ED ADULT NURSE NOTE - NSIMPLEMENTINTERV_GEN_ALL_ED
Implemented All Universal Safety Interventions:  [place] to call system. Call bell, personal items and telephone within reach. Instruct patient to call for assistance. Room bathroom lighting operational. Non-slip footwear when patient is off stretcher. Physically safe environment: no spills, clutter or unnecessary equipment. Stretcher in lowest position, wheels locked, appropriate side rails in place.

## 2018-09-10 ENCOUNTER — INPATIENT (INPATIENT)
Facility: HOSPITAL | Age: 38
LOS: 2 days | Discharge: HOME | End: 2018-09-13
Attending: INTERNAL MEDICINE | Admitting: INTERNAL MEDICINE

## 2018-09-10 VITALS
TEMPERATURE: 97 F | WEIGHT: 154.98 LBS | DIASTOLIC BLOOD PRESSURE: 82 MMHG | SYSTOLIC BLOOD PRESSURE: 131 MMHG | HEART RATE: 118 BPM | HEIGHT: 61 IN

## 2018-09-10 DIAGNOSIS — R01.1 CARDIAC MURMUR, UNSPECIFIED: ICD-10-CM

## 2018-09-10 DIAGNOSIS — F17.200 NICOTINE DEPENDENCE, UNSPECIFIED, UNCOMPLICATED: ICD-10-CM

## 2018-09-10 DIAGNOSIS — Z98.891 HISTORY OF UTERINE SCAR FROM PREVIOUS SURGERY: Chronic | ICD-10-CM

## 2018-09-10 DIAGNOSIS — F13.10 SEDATIVE, HYPNOTIC OR ANXIOLYTIC ABUSE, UNCOMPLICATED: ICD-10-CM

## 2018-09-10 DIAGNOSIS — F13.20 SEDATIVE, HYPNOTIC OR ANXIOLYTIC DEPENDENCE, UNCOMPLICATED: ICD-10-CM

## 2018-09-10 DIAGNOSIS — E78.00 PURE HYPERCHOLESTEROLEMIA, UNSPECIFIED: ICD-10-CM

## 2018-09-10 DIAGNOSIS — I34.1 NONRHEUMATIC MITRAL (VALVE) PROLAPSE: ICD-10-CM

## 2018-09-10 DIAGNOSIS — K29.80 DUODENITIS WITHOUT BLEEDING: ICD-10-CM

## 2018-09-10 DIAGNOSIS — K21.9 GASTRO-ESOPHAGEAL REFLUX DISEASE WITHOUT ESOPHAGITIS: ICD-10-CM

## 2018-09-10 DIAGNOSIS — F32.9 MAJOR DEPRESSIVE DISORDER, SINGLE EPISODE, UNSPECIFIED: ICD-10-CM

## 2018-09-10 DIAGNOSIS — F10.20 ALCOHOL DEPENDENCE, UNCOMPLICATED: ICD-10-CM

## 2018-09-10 DIAGNOSIS — K20.9 ESOPHAGITIS, UNSPECIFIED: ICD-10-CM

## 2018-09-10 LAB
ALBUMIN SERPL ELPH-MCNC: 4.2 G/DL — SIGNIFICANT CHANGE UP (ref 3.5–5.2)
ALP SERPL-CCNC: 52 U/L — SIGNIFICANT CHANGE UP (ref 30–115)
ALT FLD-CCNC: 33 U/L — SIGNIFICANT CHANGE UP (ref 0–41)
AMMONIA BLD-MCNC: 21 UMOL/L — SIGNIFICANT CHANGE UP (ref 11–55)
AMYLASE P1 CFR SERPL: 54 U/L — SIGNIFICANT CHANGE UP (ref 25–115)
ANION GAP SERPL CALC-SCNC: 13 MMOL/L — SIGNIFICANT CHANGE UP (ref 7–14)
APPEARANCE UR: CLEAR — SIGNIFICANT CHANGE UP
AST SERPL-CCNC: 43 U/L — HIGH (ref 0–41)
BASOPHILS # BLD AUTO: 0.03 K/UL — SIGNIFICANT CHANGE UP (ref 0–0.2)
BASOPHILS NFR BLD AUTO: 0.5 % — SIGNIFICANT CHANGE UP (ref 0–1)
BILIRUB SERPL-MCNC: <0.2 MG/DL — SIGNIFICANT CHANGE UP (ref 0.2–1.2)
BILIRUB UR-MCNC: NEGATIVE — SIGNIFICANT CHANGE UP
BUN SERPL-MCNC: 10 MG/DL — SIGNIFICANT CHANGE UP (ref 10–20)
CALCIUM SERPL-MCNC: 8.5 MG/DL — SIGNIFICANT CHANGE UP (ref 8.5–10.1)
CHLORIDE SERPL-SCNC: 101 MMOL/L — SIGNIFICANT CHANGE UP (ref 98–110)
CO2 SERPL-SCNC: 26 MMOL/L — SIGNIFICANT CHANGE UP (ref 17–32)
COLOR SPEC: YELLOW — SIGNIFICANT CHANGE UP
CREAT SERPL-MCNC: 0.6 MG/DL — LOW (ref 0.7–1.5)
DIFF PNL FLD: ABNORMAL
EOSINOPHIL # BLD AUTO: 0.11 K/UL — SIGNIFICANT CHANGE UP (ref 0–0.7)
EOSINOPHIL NFR BLD AUTO: 1.9 % — SIGNIFICANT CHANGE UP (ref 0–8)
EPI CELLS # UR: ABNORMAL /HPF
ETHANOL SERPL-MCNC: 145 MG/DL — HIGH
GGT SERPL-CCNC: 64 U/L — HIGH (ref 1–40)
GLUCOSE SERPL-MCNC: 91 MG/DL — SIGNIFICANT CHANGE UP (ref 70–99)
GLUCOSE UR QL: NEGATIVE MG/DL — SIGNIFICANT CHANGE UP
HCG UR QL: NEGATIVE — SIGNIFICANT CHANGE UP
HCT VFR BLD CALC: 35.8 % — LOW (ref 37–47)
HGB BLD-MCNC: 12.2 G/DL — SIGNIFICANT CHANGE UP (ref 12–16)
IMM GRANULOCYTES NFR BLD AUTO: 0.2 % — SIGNIFICANT CHANGE UP (ref 0.1–0.3)
INR BLD: 0.97 RATIO — SIGNIFICANT CHANGE UP (ref 0.65–1.3)
KETONES UR-MCNC: NEGATIVE — SIGNIFICANT CHANGE UP
LEUKOCYTE ESTERASE UR-ACNC: NEGATIVE — SIGNIFICANT CHANGE UP
LYMPHOCYTES # BLD AUTO: 2.05 K/UL — SIGNIFICANT CHANGE UP (ref 1.2–3.4)
LYMPHOCYTES # BLD AUTO: 35.5 % — SIGNIFICANT CHANGE UP (ref 20.5–51.1)
MAGNESIUM SERPL-MCNC: 1.9 MG/DL — SIGNIFICANT CHANGE UP (ref 1.8–2.4)
MCHC RBC-ENTMCNC: 33.1 PG — HIGH (ref 27–31)
MCHC RBC-ENTMCNC: 34.1 G/DL — SIGNIFICANT CHANGE UP (ref 32–37)
MCV RBC AUTO: 97 FL — SIGNIFICANT CHANGE UP (ref 81–99)
MONOCYTES # BLD AUTO: 0.59 K/UL — SIGNIFICANT CHANGE UP (ref 0.1–0.6)
MONOCYTES NFR BLD AUTO: 10.2 % — HIGH (ref 1.7–9.3)
NEUTROPHILS # BLD AUTO: 2.99 K/UL — SIGNIFICANT CHANGE UP (ref 1.4–6.5)
NEUTROPHILS NFR BLD AUTO: 51.7 % — SIGNIFICANT CHANGE UP (ref 42.2–75.2)
NITRITE UR-MCNC: NEGATIVE — SIGNIFICANT CHANGE UP
NRBC # BLD: 0 /100 WBCS — SIGNIFICANT CHANGE UP (ref 0–0)
PH UR: 6 — SIGNIFICANT CHANGE UP (ref 5–8)
PLATELET # BLD AUTO: 206 K/UL — SIGNIFICANT CHANGE UP (ref 130–400)
POTASSIUM SERPL-MCNC: 4.4 MMOL/L — SIGNIFICANT CHANGE UP (ref 3.5–5)
POTASSIUM SERPL-SCNC: 4.4 MMOL/L — SIGNIFICANT CHANGE UP (ref 3.5–5)
PROT SERPL-MCNC: 6.6 G/DL — SIGNIFICANT CHANGE UP (ref 6–8)
PROT UR-MCNC: NEGATIVE MG/DL — SIGNIFICANT CHANGE UP
PROTHROM AB SERPL-ACNC: 10.5 SEC — SIGNIFICANT CHANGE UP (ref 9.95–12.87)
RBC # BLD: 3.69 M/UL — LOW (ref 4.2–5.4)
RBC # FLD: 14.9 % — HIGH (ref 11.5–14.5)
SODIUM SERPL-SCNC: 140 MMOL/L — SIGNIFICANT CHANGE UP (ref 135–146)
SP GR SPEC: 1.01 — SIGNIFICANT CHANGE UP (ref 1.01–1.03)
UROBILINOGEN FLD QL: 0.2 MG/DL — SIGNIFICANT CHANGE UP (ref 0.2–0.2)
WBC # BLD: 5.78 K/UL — SIGNIFICANT CHANGE UP (ref 4.8–10.8)
WBC # FLD AUTO: 5.78 K/UL — SIGNIFICANT CHANGE UP (ref 4.8–10.8)
WBC UR QL: SIGNIFICANT CHANGE UP /HPF

## 2018-09-10 RX ORDER — PANTOPRAZOLE SODIUM 20 MG/1
40 TABLET, DELAYED RELEASE ORAL
Qty: 0 | Refills: 0 | Status: DISCONTINUED | OUTPATIENT
Start: 2018-09-10 | End: 2018-09-13

## 2018-09-10 RX ORDER — HYDROXYZINE HCL 10 MG
50 TABLET ORAL EVERY 6 HOURS
Qty: 0 | Refills: 0 | Status: DISCONTINUED | OUTPATIENT
Start: 2018-09-10 | End: 2018-09-13

## 2018-09-10 RX ORDER — MULTIVIT-MIN/FERROUS GLUCONATE 9 MG/15 ML
1 LIQUID (ML) ORAL DAILY
Qty: 0 | Refills: 0 | Status: DISCONTINUED | OUTPATIENT
Start: 2018-09-10 | End: 2018-09-13

## 2018-09-10 RX ORDER — GABAPENTIN 400 MG/1
600 CAPSULE ORAL THREE TIMES A DAY
Qty: 0 | Refills: 0 | Status: DISCONTINUED | OUTPATIENT
Start: 2018-09-10 | End: 2018-09-13

## 2018-09-10 RX ORDER — THIAMINE MONONITRATE (VIT B1) 100 MG
100 TABLET ORAL DAILY
Qty: 0 | Refills: 0 | Status: COMPLETED | OUTPATIENT
Start: 2018-09-10 | End: 2018-09-13

## 2018-09-10 RX ORDER — IBUPROFEN 200 MG
600 TABLET ORAL EVERY 6 HOURS
Qty: 0 | Refills: 0 | Status: DISCONTINUED | OUTPATIENT
Start: 2018-09-10 | End: 2018-09-11

## 2018-09-10 RX ORDER — FOLIC ACID 0.8 MG
1 TABLET ORAL DAILY
Qty: 0 | Refills: 0 | Status: DISCONTINUED | OUTPATIENT
Start: 2018-09-10 | End: 2018-09-13

## 2018-09-10 RX ORDER — MAGNESIUM HYDROXIDE 400 MG/1
30 TABLET, CHEWABLE ORAL ONCE
Qty: 0 | Refills: 0 | Status: DISCONTINUED | OUTPATIENT
Start: 2018-09-10 | End: 2018-09-13

## 2018-09-10 RX ORDER — BACITRACIN ZINC 500 UNIT/G
1 OINTMENT IN PACKET (EA) TOPICAL
Qty: 0 | Refills: 0 | Status: DISCONTINUED | OUTPATIENT
Start: 2018-09-10 | End: 2018-09-13

## 2018-09-10 RX ORDER — SUCRALFATE 1 G
1 TABLET ORAL EVERY 6 HOURS
Qty: 0 | Refills: 0 | Status: DISCONTINUED | OUTPATIENT
Start: 2018-09-10 | End: 2018-09-13

## 2018-09-10 RX ORDER — MIRTAZAPINE 45 MG/1
45 TABLET, ORALLY DISINTEGRATING ORAL AT BEDTIME
Qty: 0 | Refills: 0 | Status: DISCONTINUED | OUTPATIENT
Start: 2018-09-10 | End: 2018-09-13

## 2018-09-10 RX ORDER — METHOCARBAMOL 500 MG/1
500 TABLET, FILM COATED ORAL EVERY 6 HOURS
Qty: 0 | Refills: 0 | Status: DISCONTINUED | OUTPATIENT
Start: 2018-09-10 | End: 2018-09-13

## 2018-09-10 RX ORDER — PSEUDOEPHEDRINE HCL 30 MG
60 TABLET ORAL EVERY 6 HOURS
Qty: 0 | Refills: 0 | Status: DISCONTINUED | OUTPATIENT
Start: 2018-09-10 | End: 2018-09-13

## 2018-09-10 RX ORDER — ACETAMINOPHEN 500 MG
650 TABLET ORAL EVERY 4 HOURS
Qty: 0 | Refills: 0 | Status: DISCONTINUED | OUTPATIENT
Start: 2018-09-10 | End: 2018-09-13

## 2018-09-10 RX ORDER — HYDROXYZINE HCL 10 MG
100 TABLET ORAL AT BEDTIME
Qty: 0 | Refills: 0 | Status: DISCONTINUED | OUTPATIENT
Start: 2018-09-10 | End: 2018-09-13

## 2018-09-10 RX ORDER — NICOTINE POLACRILEX 2 MG
1 GUM BUCCAL DAILY
Qty: 0 | Refills: 0 | Status: DISCONTINUED | OUTPATIENT
Start: 2018-09-10 | End: 2018-09-13

## 2018-09-10 RX ADMIN — Medication 100 MILLIGRAM(S): at 21:31

## 2018-09-10 RX ADMIN — Medication 1 APPLICATION(S): at 22:38

## 2018-09-10 RX ADMIN — Medication 2 MILLIGRAM(S): at 17:57

## 2018-09-10 RX ADMIN — Medication 1 GRAM(S): at 21:29

## 2018-09-10 RX ADMIN — GABAPENTIN 600 MILLIGRAM(S): 400 CAPSULE ORAL at 21:29

## 2018-09-10 RX ADMIN — Medication 2 MILLIGRAM(S): at 21:29

## 2018-09-10 RX ADMIN — MIRTAZAPINE 45 MILLIGRAM(S): 45 TABLET, ORALLY DISINTEGRATING ORAL at 22:37

## 2018-09-10 NOTE — H&P ADULT - NSHPPHYSICALEXAM_GEN_ALL_CORE
-  Vital Signs:      Temp:   98.0   Pulse:  110     RR:  16     BP:  108/72          BTZ:     0.227           Constitutional: anxious A&Ox3, WD/WN,Intoxicated  Eyes: PERRLA  Respiratory: +air entry, no rales, no rhonchi, no wheezes  Cardiovascular: +S1 and S2,RRR  Gastrointestinal: +BS, soft, non-tender, not distended, No CVAT  Extremities: no cyanosis, no edema, no calf tenderness,   Vascular: +dorsal pedis and radial pulses, no extremity cyanosis  Neurological: sensation intact, ROM equal B/L, CN II-XII intact, Gait: steady  Skin: no rashes, normal turgor, No track marks   No Decubiti present  No IV lines present  Rectal/Breasts Exam: Deferred -  Vital Signs:      Temp:   98.0   Pulse:  110     RR:  16     BP:  108/72          BTZ:     0.227           Constitutional: anxious A&Ox3, WD/WN,Intoxicated  Eyes: PERRLA  Respiratory: +air entry, no rales, no rhonchi, no wheezes  Cardiovascular: +S1 and S2,RRR  Gastrointestinal: +BS, soft, non-tender, not distended, No CVAT  Extremities: no cyanosis, no edema, no calf tenderness,   Vascular: +dorsal pedis and radial pulses, no extremity cyanosis  Neurological: sensation intact, ROM equal B/L, CN II-XII intact, Gait: steady  Skin: no rashes, normal turgor, (+) B/L UE Healing Abrasion  No Decubiti present  No IV lines present  Rectal/Breasts Exam: Deferred

## 2018-09-10 NOTE — H&P ADULT - PROBLEM SELECTOR PLAN 2
Check Urine Toxicology  Ativan taper Protocol  Thiamine 100mg PO daily  Folic Acid 1mg PO daily  MVI 1 tablet PO daily  Monitor VS and withdrawal symptoms  PRN Medications

## 2018-09-10 NOTE — H&P ADULT - ASSESSMENT
39 Y/O White Female with Hx of Continous Alcohol Dependency, on Prescribed Klonopin 1 mg po  tid by Dr. Acevedo, pt is not taking her  Klonopin  as prescribed because she never stopped drinking, pt is undertaking her Prsecribed   Klonopin as 1 mg PRN Occasionally. Hx of Several Detoxes in the past, Last Detox at [facility] 8/02/2018-8/06/2018 , 2 days clean  time after D/C, then relapsed.

## 2018-09-10 NOTE — H&P ADULT - PROBLEM SELECTOR PLAN 10
Observation  Atarax 50 mg po Q6H  PRN anxiety  Atarax 100 mg po QHS PRN Insomnia  Psych. Consult Prn  Continue:  Neurontin 600 mg p TID  Remeron 45 mg po Q Hs  D/C Klonopin 1 mg po TID PRN

## 2018-09-10 NOTE — H&P ADULT - PROBLEM SELECTOR PLAN 1
Check Alcohol Level, Check Urine Toxicology  Ativan taper Protocol  Thiamine 100mg PO daily  Folic Acid 1mg PO daily  MVI 1 tablet PO daily  Monitor VS and withdrawal symptoms  PRN Medications

## 2018-09-10 NOTE — H&P ADULT - PMH
Alcoholism    Anxiety    Benzodiazepine misuse    Duodenitis    Esophagitis    Gastritis    GERD (gastroesophageal reflux disease)    Graves' disease    Heart murmur    History of Graves' disease    Hypercholesterolemia    MVP (mitral valve prolapse)    Nicotine dependence    Obesity

## 2018-09-10 NOTE — H&P ADULT - HISTORY OF PRESENT ILLNESS
37 Y/O White Female with Hx of Continous Alcohol Dependency, on Prescribed Klonopin 1 mg po  tid by Dr. Acevedo, pt is not taking her  Klonopin  as prescribed because she never stopped drinking, pt is undertaking her Prsecribed   Klonopin as 1 mg PRN Occasionally. Hx of Several Detoxes in the past, Last Detox at [facility] 2018-2018 , 2 days clean  time after D/C, then relapsed.    DRUG	AGE OF ONSET	ROUTE	FREQ	AMOUNT	LAST USE	LENGTH OF CURRENT USE	  ALCOHOL	17 Y/O	Po	daily	2 ½  Liter of Wine	9/10/2018 (48-Oz)	1 Month	  KLONOPIN	37 Y/O 	Po	PRN Occasionally	1 mg 	2018 1mg	1 Month	  Pt denied any drugs abuse							  							  							    GYN HX:  LMP: 2018                         G: 3     P: 1       S/P  X 1, and Miscarrage X 1  Last Pap-Smear 2018             Last Mammography : Never Had one    Alcohol W/D:   (+)Tremors,(+)Blackout,(+)Anxiety   (-) DT’s   (-) Hallucination    (-) Seizure  Patient A&Ox3, denies CP, sob,Abdominal pain,blurry Vision, headache, dizziness, bleeding or  dysuria.    Hx x of Withdrawal Seizures: No   Psyhx:  Depression & MAD             Denies any S/H Ideation or A/V Hallucination  Complaint with Neurontin and Remeron, Under taking her Prescribed Klonopin                          Screening history	Last tested	Result	History of treatment	  HIV	2018	NEG	N/A	  Hepatitis C	2018	NEG	N/A	  Quantiferon GOLD TB test	2018	NEG	N/A	    Immunization	Not Received	Unknown	Received	Date Received 	  Influenza		2016			  Pneumococcus		v			  Tetanus				2018	  Others

## 2018-09-10 NOTE — H&P ADULT - ATTENDING COMMENTS
Patient interviewed and examined.    Chart reviewed.    PA's H&P noted and modified, as appropriate.    Case discussed on team rounds    Following is my summary of the case.    Admitted for detox: from ____ED, x___Intake, ____Med/Surg Floor    Alcohol__x__   Opioid_____  Benzo___ Other_____    Substance amount, duration of use, last usage, and prior attempts at detox or rehabs, are outlined above in the H&P and discussed with patient.    Associated withdrawal symptoms presents.  Comorbid conditions noted. Chronic and Stable.    Past Medical Hx, Psych Hx, family Hx, Social Hx from H&P reviewed and NO changes.    Old medical record and medication Hx. Reviewed    Following items reviewed and addressed:  1. labs  2. EKG  3. Imaging from PACs module    Examination: no change from PA's exam.    Place on following protocol  _____Medically Managed  __X__Medically Supervised    Ciwa__x___Librium taper____Ativan taper___Methadone taper___ Phenobarb taper____ Suboxone Induction____MMTP____    Narcan Kit Offered    Psych Consult __X__N/A  ___Ordered    Physical Therapy  ___X N/A   ___  Ordered    Aftercare disposition to be addressed by counselors.    Estimated length of stay 3-5 days.

## 2018-09-11 LAB
AMPHET UR-MCNC: NEGATIVE — SIGNIFICANT CHANGE UP
BARBITURATES UR SCN-MCNC: NEGATIVE — SIGNIFICANT CHANGE UP
BENZODIAZ UR-MCNC: POSITIVE
COCAINE METAB.OTHER UR-MCNC: NEGATIVE — SIGNIFICANT CHANGE UP
METHADONE UR-MCNC: NEGATIVE — SIGNIFICANT CHANGE UP
OPIATES UR-MCNC: NEGATIVE — SIGNIFICANT CHANGE UP
PCP SPEC-MCNC: SIGNIFICANT CHANGE UP
PROPOXYPHENE QUALITATIVE URINE RESULT: NEGATIVE — SIGNIFICANT CHANGE UP
T PALLIDUM AB TITR SER: NEGATIVE — SIGNIFICANT CHANGE UP

## 2018-09-11 RX ADMIN — Medication 1 GRAM(S): at 12:25

## 2018-09-11 RX ADMIN — Medication 2 MILLIGRAM(S): at 06:52

## 2018-09-11 RX ADMIN — Medication 50 MILLIGRAM(S): at 00:28

## 2018-09-11 RX ADMIN — GABAPENTIN 600 MILLIGRAM(S): 400 CAPSULE ORAL at 21:27

## 2018-09-11 RX ADMIN — Medication 25 MILLIGRAM(S): at 20:01

## 2018-09-11 RX ADMIN — Medication 1 MILLIGRAM(S): at 09:38

## 2018-09-11 RX ADMIN — Medication 1 APPLICATION(S): at 21:28

## 2018-09-11 RX ADMIN — GABAPENTIN 600 MILLIGRAM(S): 400 CAPSULE ORAL at 12:25

## 2018-09-11 RX ADMIN — Medication 50 MILLIGRAM(S): at 09:42

## 2018-09-11 RX ADMIN — Medication 100 MILLIGRAM(S): at 22:58

## 2018-09-11 RX ADMIN — PANTOPRAZOLE SODIUM 40 MILLIGRAM(S): 20 TABLET, DELAYED RELEASE ORAL at 06:52

## 2018-09-11 RX ADMIN — Medication 25 MILLIGRAM(S): at 16:39

## 2018-09-11 RX ADMIN — Medication 100 MILLIGRAM(S): at 09:38

## 2018-09-11 RX ADMIN — METHOCARBAMOL 500 MILLIGRAM(S): 500 TABLET, FILM COATED ORAL at 16:39

## 2018-09-11 RX ADMIN — Medication 1 GRAM(S): at 00:27

## 2018-09-11 RX ADMIN — Medication 1 GRAM(S): at 06:52

## 2018-09-11 RX ADMIN — Medication 1 GRAM(S): at 17:18

## 2018-09-11 RX ADMIN — GABAPENTIN 600 MILLIGRAM(S): 400 CAPSULE ORAL at 09:38

## 2018-09-11 RX ADMIN — Medication 30 MILLILITER(S): at 00:27

## 2018-09-11 RX ADMIN — METHOCARBAMOL 500 MILLIGRAM(S): 500 TABLET, FILM COATED ORAL at 22:57

## 2018-09-11 RX ADMIN — Medication 1 APPLICATION(S): at 09:37

## 2018-09-11 RX ADMIN — METHOCARBAMOL 500 MILLIGRAM(S): 500 TABLET, FILM COATED ORAL at 09:42

## 2018-09-11 RX ADMIN — Medication 2 MILLIGRAM(S): at 01:40

## 2018-09-11 RX ADMIN — Medication 1 TABLET(S): at 09:39

## 2018-09-11 RX ADMIN — METHOCARBAMOL 500 MILLIGRAM(S): 500 TABLET, FILM COATED ORAL at 00:28

## 2018-09-12 RX ORDER — NALTREXONE HYDROCHLORIDE 50 MG/1
50 TABLET, FILM COATED ORAL DAILY
Qty: 0 | Refills: 0 | Status: DISCONTINUED | OUTPATIENT
Start: 2018-09-12 | End: 2018-09-13

## 2018-09-12 RX ADMIN — METHOCARBAMOL 500 MILLIGRAM(S): 500 TABLET, FILM COATED ORAL at 23:46

## 2018-09-12 RX ADMIN — Medication 1 APPLICATION(S): at 09:25

## 2018-09-12 RX ADMIN — Medication 1 MILLIGRAM(S): at 09:23

## 2018-09-12 RX ADMIN — GABAPENTIN 600 MILLIGRAM(S): 400 CAPSULE ORAL at 09:23

## 2018-09-12 RX ADMIN — MIRTAZAPINE 45 MILLIGRAM(S): 45 TABLET, ORALLY DISINTEGRATING ORAL at 23:24

## 2018-09-12 RX ADMIN — METHOCARBAMOL 500 MILLIGRAM(S): 500 TABLET, FILM COATED ORAL at 17:30

## 2018-09-12 RX ADMIN — METHOCARBAMOL 500 MILLIGRAM(S): 500 TABLET, FILM COATED ORAL at 06:42

## 2018-09-12 RX ADMIN — GABAPENTIN 600 MILLIGRAM(S): 400 CAPSULE ORAL at 20:53

## 2018-09-12 RX ADMIN — PANTOPRAZOLE SODIUM 40 MILLIGRAM(S): 20 TABLET, DELAYED RELEASE ORAL at 06:38

## 2018-09-12 RX ADMIN — Medication 50 MILLIGRAM(S): at 17:29

## 2018-09-12 RX ADMIN — Medication 100 MILLIGRAM(S): at 09:23

## 2018-09-12 RX ADMIN — MIRTAZAPINE 45 MILLIGRAM(S): 45 TABLET, ORALLY DISINTEGRATING ORAL at 00:07

## 2018-09-12 RX ADMIN — Medication 1 GRAM(S): at 00:07

## 2018-09-12 RX ADMIN — Medication 50 MILLIGRAM(S): at 23:46

## 2018-09-12 RX ADMIN — Medication 50 MILLIGRAM(S): at 12:07

## 2018-09-12 RX ADMIN — Medication 1 APPLICATION(S): at 20:53

## 2018-09-12 RX ADMIN — Medication 1 GRAM(S): at 16:33

## 2018-09-12 RX ADMIN — Medication 1 GRAM(S): at 12:06

## 2018-09-12 RX ADMIN — Medication 50 MILLIGRAM(S): at 06:38

## 2018-09-12 RX ADMIN — GABAPENTIN 600 MILLIGRAM(S): 400 CAPSULE ORAL at 12:06

## 2018-09-12 RX ADMIN — Medication 1 GRAM(S): at 06:38

## 2018-09-12 RX ADMIN — NALTREXONE HYDROCHLORIDE 50 MILLIGRAM(S): 50 TABLET, FILM COATED ORAL at 12:06

## 2018-09-12 RX ADMIN — Medication 1 TABLET(S): at 09:23

## 2018-09-12 NOTE — CHART NOTE - NSCHARTNOTEFT_GEN_A_CORE
Subsequent Inpatient Encounter                                       Detox Unit    AURELIA BLANCAS   38y   Female      Chief Complaint:    Follow up for Alcohol  Dependency    HPI:     I reviewed previous notes. No Change, except if noted below.             Detail:_    ROS:   I reviewed with patient.  No changes from previous notes except if noted below.             Detail: _    PFSH I reviewed with patient. No changes from previous notes except if noted below.             Detail_    Medication reconciliation performed.    MEDICATIONS  (STANDING):  BACItracin   Ointment 1 Application(s) Topical two times a day  folic acid 1 milliGRAM(s) Oral daily  gabapentin 600 milliGRAM(s) Oral three times a day  mirtazapine 45 milliGRAM(s) Oral at bedtime  multivitamin/minerals 1 Tablet(s) Oral daily  nicotine -  14 mG/24Hr(s) Patch 1 Patch Transdermal daily  pantoprazole    Tablet 40 milliGRAM(s) Oral before breakfast  sucralfate 1 Gram(s) Oral every 6 hours  thiamine 100 milliGRAM(s) Oral daily      MEDICATIONS  (PRN):  acetaminophen   Tablet .. 650 milliGRAM(s) Oral every 4 hours PRN Temp greater or equal to 38.5C (101.3F)  aluminum hydroxide/magnesium hydroxide/simethicone Suspension 30 milliLiter(s) Oral every 6 hours PRN Heartburn  bismuth subsalicylate Liquid 30 milliLiter(s) Oral every 6 hours PRN Diarrhea  chlordiazePOXIDE 25 milliGRAM(s) Oral every 2 hours PRN Alcohol Withdrawal Symptoms  chlordiazePOXIDE 50 milliGRAM(s) Oral every 1 hour PRN Alcohol Withdrawal Symptoms  cloNIDine 0.1 milliGRAM(s) Oral every 8 hours PRN Blood Pressure GREATER THAN 140/90 mmHG  guaiFENesin/dextromethorphan  Syrup 5 milliLiter(s) Oral every 4 hours PRN Cough  hydrOXYzine hydrochloride 50 milliGRAM(s) Oral every 6 hours PRN Anxiety  hydrOXYzine hydrochloride 100 milliGRAM(s) Oral at bedtime PRN insomnia  LORazepam     Tablet 2 milliGRAM(s) Oral every 4 hours PRN Alcohol withdrawal symptoms  magnesium hydroxide Suspension 30 milliLiter(s) Oral once PRN Constipation  methocarbamol 500 milliGRAM(s) Oral every 6 hours PRN muscle pain  pseudoephedrine 60 milliGRAM(s) Oral every 6 hours PRN Rhinitis  trimethobenzamide 300 milliGRAM(s) Oral every 6 hours PRN Nausea and/or Vomiting  trimethobenzamide Injectable 200 milliGRAM(s) IntraMuscular every 6 hours PRN Nausea and/or Vomiting      T(C): 36.2 (18 @ 06:00), Max: 36.6 (18 @ 18:00)  HR: 80 (18 @ 06:00) (71 - 85)  BP: 120/74 (18 @ 06:00) (111/64 - 133/74)  RR: 16 (18 @ 06:00) (14 - 16)  SpO2: --    PHYSICAL EXAM:      Constitutional: NAD, A&O x3    Eyes: PERRLA, no conjuctivitis    Neck: no lymphadenopathy    Respiratory: +air entry, no rales, no rhonchi, no wheezes    Cardiovascular: +S1 and S2, regular rate and rhythm    Gastrointestinal: +BS, soft, non-tender, not distended    Extremities:  no edema, no calf tenderness    Skin: no rashes, normal turgor                            12.2   5.78  )-----------( 206      ( 10 Sep 2018 19:36 )             35.8   09-10    140  |  101  |  10  ----------------------------<  91  4.4   |  26  |  0.6<L>    Ca    8.5      10 Sep 2018 19:36  Mg     1.9     09-10    TPro  6.6  /  Alb  4.2  /  TBili  <0.2  /  DBili  x   /  AST  43<H>  /  ALT  33  /  AlkPhos  52  09-10  PT/INR - ( 10 Sep 2018 19:36 )   PT: 10.50 sec;   INR: 0.97 ratio           Magnesium, Serum: 1.9 mg/dL (09-10-18 @ 19:36)  Ammonia, Serum: 21 umol/L (09-10-18 @ 19:36)  Amylase, Serum Total: 54 U/L (09-10-18 @ 19:36)  Treponema Pallidum Antibody Interpretation: Negative (09-10-18 @ 19:36)        Urinalysis Basic - ( 10 Sep 2018 17:10 )    Color: Yellow / Appearance: Clear / S.015 / pH: x  Gluc: x / Ketone: Negative  / Bili: Negative / Urobili: 0.2 mg/dL   Blood: x / Protein: Negative mg/dL / Nitrite: Negative   Leuk Esterase: Negative / RBC: x / WBC 1-2 /HPF   Sq Epi: x / Non Sq Epi: Moderate /HPF / Bacteria: x          Impression and Plan:    Primary Diagnosis:  Alcohol Dependency                                Medication: Librium Protocol/ CIWA Protocol    Secondary Diagnosis: Anxiety                                                   Medication: on meds    Tertiary Diagnosis:     Gastritis                                                    Medication on meds      Continue Detox Protocols. Use of PRNS as needed for withdrawal and comfort.    Adjustments to protocols:    Labs/ Tests reviewed.    Tests ordered:     Likely Disposition: _X__Home       ___Rehab       ___Outpatient Program    ___Self Help     _____Other    Estimated Length of stay:__3__

## 2018-09-12 NOTE — CHART NOTE - NSCHARTNOTEFT_GEN_A_CORE
Allergies:  Risperdal (Urticaria)  Topamax (Urticaria)      Diet: Regular    Activity: as tolerated    Follow up with    1. PMD in 2 weeks    2. Psych in 2 weeks    3.    Follow up for abnormal labs/tests    1.    Extra Instructions:      Flu Vaccine given  Yes_____         No______      Diagnosis:  Chemical Dependency   Maintain sobriety  refrain from all use      Patient Signature___________________________________________  Date_________________      Nurse Signature_____________________________________________Date_________________

## 2018-09-13 VITALS
HEART RATE: 61 BPM | DIASTOLIC BLOOD PRESSURE: 50 MMHG | RESPIRATION RATE: 14 BRPM | TEMPERATURE: 97 F | SYSTOLIC BLOOD PRESSURE: 99 MMHG

## 2018-09-13 RX ORDER — GABAPENTIN 400 MG/1
1 CAPSULE ORAL
Qty: 90 | Refills: 0 | OUTPATIENT
Start: 2018-09-13 | End: 2018-10-12

## 2018-09-13 RX ORDER — NALTREXONE HYDROCHLORIDE 50 MG/1
1 TABLET, FILM COATED ORAL
Qty: 30 | Refills: 0 | OUTPATIENT
Start: 2018-09-13 | End: 2018-10-12

## 2018-09-13 RX ORDER — GABAPENTIN 400 MG/1
1 CAPSULE ORAL
Qty: 0 | Refills: 0 | COMMUNITY

## 2018-09-13 RX ADMIN — Medication 100 MILLIGRAM(S): at 09:16

## 2018-09-13 RX ADMIN — Medication 50 MILLIGRAM(S): at 09:22

## 2018-09-13 RX ADMIN — Medication 1 APPLICATION(S): at 09:21

## 2018-09-13 RX ADMIN — Medication 1 MILLIGRAM(S): at 09:15

## 2018-09-13 RX ADMIN — METHOCARBAMOL 500 MILLIGRAM(S): 500 TABLET, FILM COATED ORAL at 09:22

## 2018-09-13 RX ADMIN — GABAPENTIN 600 MILLIGRAM(S): 400 CAPSULE ORAL at 09:15

## 2018-09-13 RX ADMIN — PANTOPRAZOLE SODIUM 40 MILLIGRAM(S): 20 TABLET, DELAYED RELEASE ORAL at 05:55

## 2018-09-13 RX ADMIN — NALTREXONE HYDROCHLORIDE 50 MILLIGRAM(S): 50 TABLET, FILM COATED ORAL at 09:45

## 2018-09-13 RX ADMIN — Medication 1 GRAM(S): at 05:55

## 2018-09-13 RX ADMIN — Medication 1 TABLET(S): at 09:15

## 2018-09-13 NOTE — CHART NOTE - NSCHARTNOTEFT_GEN_A_CORE
The patient was admitted to the inpt detox unit CDU, for   ETOH__x__ Opioid___  Benzo____Polysubstance _____ Dependency.    Pt was admitted from ED____, Intake_x___, Med/surg Floor_______.    Details are present in the preceding History & Physical section and follow up chart notes.  patient was evaluated on daily detox team  rounds.  Withdrawal symptoms and signs were reviewed on a daily basis, and the protocols were adjusted accordingly.    Labs and imaging results were reviewed and discussed with the patient.    All questions from the patient were addressed.  The patient was seen by the Chemical dependency counselors, and different options for after care were discussed.  The patient attended groups, meetings and 1:1 sessions with the counselors.  Narcane Kit was offered and instructions given prior to discharge.    Psychiatry consultation reviewed______, N/A__x____    Physical therapy evaluation reviewed_____, N/A_x___    Pt was given copies of labs and imaging reports, if applicable.    Prescriptions if needed, were sent through [employer] system to the pharmacy amnd are noted in the discharge instruction sheet.    After care was arranged by counselors and pt was discharged to:    Home___, Outpt. Program_x__, Rehab ___, Long term____ [facility] ____ IPP____ SNF____, AMA___, Admin Discharge____    Principal Diagnosis: Alcohol Dependency__x__ Opioid Dependency___ Benzo Dependency____ Polysubstance Dependency____

## 2018-09-13 NOTE — CHART NOTE - NSCHARTNOTEFT_GEN_A_CORE
Subsequent Inpatient Encounter                                       Detox Unit    AURELIA BLANCAS   38y   Female      Chief Complaint:    Follow up for Alcohol  Dependency    HPI:     I reviewed previous notes. No Change, except if noted below.             Detail:_    ROS:   I reviewed with patient.  No changes from previous notes except if noted below.             Detail: _    PFSH I reviewed with patient. No changes from previous notes except if noted below.             Detail_    Medication reconciliation performed.    MEDICATIONS  (STANDING):  BACItracin   Ointment 1 Application(s) Topical two times a day  folic acid 1 milliGRAM(s) Oral daily  gabapentin 600 milliGRAM(s) Oral three times a day  mirtazapine 45 milliGRAM(s) Oral at bedtime  multivitamin/minerals 1 Tablet(s) Oral daily  naltrexone 50 milliGRAM(s) Oral daily  nicotine -  14 mG/24Hr(s) Patch 1 Patch Transdermal daily  pantoprazole    Tablet 40 milliGRAM(s) Oral before breakfast  sucralfate 1 Gram(s) Oral every 6 hours  thiamine 100 milliGRAM(s) Oral daily      MEDICATIONS  (PRN):  acetaminophen   Tablet .. 650 milliGRAM(s) Oral every 4 hours PRN Temp greater or equal to 38.5C (101.3F)  aluminum hydroxide/magnesium hydroxide/simethicone Suspension 30 milliLiter(s) Oral every 6 hours PRN Heartburn  bismuth subsalicylate Liquid 30 milliLiter(s) Oral every 6 hours PRN Diarrhea  chlordiazePOXIDE 25 milliGRAM(s) Oral every 2 hours PRN Alcohol Withdrawal Symptoms  chlordiazePOXIDE 50 milliGRAM(s) Oral every 1 hour PRN Alcohol Withdrawal Symptoms  cloNIDine 0.1 milliGRAM(s) Oral every 8 hours PRN Blood Pressure GREATER THAN 140/90 mmHG  guaiFENesin/dextromethorphan  Syrup 5 milliLiter(s) Oral every 4 hours PRN Cough  hydrOXYzine hydrochloride 50 milliGRAM(s) Oral every 6 hours PRN Anxiety  hydrOXYzine hydrochloride 100 milliGRAM(s) Oral at bedtime PRN insomnia  LORazepam     Tablet 2 milliGRAM(s) Oral every 4 hours PRN Alcohol withdrawal symptoms  magnesium hydroxide Suspension 30 milliLiter(s) Oral once PRN Constipation  methocarbamol 500 milliGRAM(s) Oral every 6 hours PRN muscle pain  pseudoephedrine 60 milliGRAM(s) Oral every 6 hours PRN Rhinitis  trimethobenzamide 300 milliGRAM(s) Oral every 6 hours PRN Nausea and/or Vomiting  trimethobenzamide Injectable 200 milliGRAM(s) IntraMuscular every 6 hours PRN Nausea and/or Vomiting      T(C): 36.1 (09-13-18 @ 06:04), Max: 36.3 (09-13-18 @ 00:05)  HR: 61 (09-13-18 @ 06:04) (61 - 84)  BP: 99/50 (09-13-18 @ 06:04) (99/50 - 132/89)  RR: 14 (09-13-18 @ 06:04) (14 - 16)  SpO2: --    PHYSICAL EXAM:      Constitutional: NAD, A&O x3    Eyes: PERRLA, no conjuctivitis    Neck: no lymphadenopathy    Respiratory: +air entry, no rales, no rhonchi, no wheezes    Cardiovascular: +S1 and S2, regular rate and rhythm    Gastrointestinal: +BS, soft, non-tender, not distended    Extremities:  no edema, no calf tenderness    Skin: no rashes, normal turgor      Magnesium, Serum: 1.9 mg/dL (09-10-18 @ 19:36)  Ammonia, Serum: 21 umol/L (09-10-18 @ 19:36)  Amylase, Serum Total: 54 U/L (09-10-18 @ 19:36)  Treponema Pallidum Antibody Interpretation: Negative (09-10-18 @ 19:36)      Impression and Plan:    Primary Diagnosis:  Alcohol Dependency                                Medication: Librium CIWA Protocol          Continue Detox Protocols. Use of PRNS as needed for withdrawal and comfort.    Adjustments to protocols: D/C today    Labs/ Tests reviewed.    Tests ordered: none    Likely Disposition: _X__Home       ___Rehab       ___Outpatient Program    ___Self Help     _____Other    Estimated Length of stay:____0 days

## 2018-09-16 DIAGNOSIS — F10.20 ALCOHOL DEPENDENCE, UNCOMPLICATED: ICD-10-CM

## 2018-09-16 DIAGNOSIS — K21.0 GASTRO-ESOPHAGEAL REFLUX DISEASE WITH ESOPHAGITIS: ICD-10-CM

## 2018-09-16 DIAGNOSIS — E66.9 OBESITY, UNSPECIFIED: ICD-10-CM

## 2018-09-16 DIAGNOSIS — F17.210 NICOTINE DEPENDENCE, CIGARETTES, UNCOMPLICATED: ICD-10-CM

## 2018-09-16 DIAGNOSIS — F41.9 ANXIETY DISORDER, UNSPECIFIED: ICD-10-CM

## 2018-09-16 DIAGNOSIS — K29.70 GASTRITIS, UNSPECIFIED, WITHOUT BLEEDING: ICD-10-CM

## 2018-09-16 DIAGNOSIS — K29.80 DUODENITIS WITHOUT BLEEDING: ICD-10-CM

## 2018-09-16 DIAGNOSIS — E05.00 THYROTOXICOSIS WITH DIFFUSE GOITER WITHOUT THYROTOXIC CRISIS OR STORM: ICD-10-CM

## 2018-09-16 DIAGNOSIS — F32.9 MAJOR DEPRESSIVE DISORDER, SINGLE EPISODE, UNSPECIFIED: ICD-10-CM

## 2018-09-16 DIAGNOSIS — F13.20 SEDATIVE, HYPNOTIC OR ANXIOLYTIC DEPENDENCE, UNCOMPLICATED: ICD-10-CM

## 2018-09-16 DIAGNOSIS — I34.1 NONRHEUMATIC MITRAL (VALVE) PROLAPSE: ICD-10-CM

## 2018-11-10 ENCOUNTER — TRANSCRIPTION ENCOUNTER (OUTPATIENT)
Age: 38
End: 2018-11-10

## 2018-11-19 ENCOUNTER — EMERGENCY (EMERGENCY)
Facility: HOSPITAL | Age: 38
LOS: 0 days | Discharge: HOME | End: 2018-11-20
Attending: EMERGENCY MEDICINE | Admitting: EMERGENCY MEDICINE

## 2018-11-19 ENCOUNTER — APPOINTMENT (OUTPATIENT)
Dept: CARDIOLOGY | Facility: CLINIC | Age: 38
End: 2018-11-19

## 2018-11-19 VITALS
HEART RATE: 115 BPM | DIASTOLIC BLOOD PRESSURE: 79 MMHG | OXYGEN SATURATION: 96 % | RESPIRATION RATE: 20 BRPM | WEIGHT: 160.06 LBS | HEIGHT: 61 IN | SYSTOLIC BLOOD PRESSURE: 124 MMHG | TEMPERATURE: 97 F

## 2018-11-19 DIAGNOSIS — F41.9 ANXIETY DISORDER, UNSPECIFIED: ICD-10-CM

## 2018-11-19 DIAGNOSIS — Z87.19 PERSONAL HISTORY OF OTHER DISEASES OF THE DIGESTIVE SYSTEM: ICD-10-CM

## 2018-11-19 DIAGNOSIS — K21.9 GASTRO-ESOPHAGEAL REFLUX DISEASE WITHOUT ESOPHAGITIS: ICD-10-CM

## 2018-11-19 DIAGNOSIS — F10.20 ALCOHOL DEPENDENCE, UNCOMPLICATED: ICD-10-CM

## 2018-11-19 DIAGNOSIS — Z88.8 ALLERGY STATUS TO OTHER DRUGS, MEDICAMENTS AND BIOLOGICAL SUBSTANCES: ICD-10-CM

## 2018-11-19 DIAGNOSIS — E78.00 PURE HYPERCHOLESTEROLEMIA, UNSPECIFIED: ICD-10-CM

## 2018-11-19 DIAGNOSIS — Z79.891 LONG TERM (CURRENT) USE OF OPIATE ANALGESIC: ICD-10-CM

## 2018-11-19 DIAGNOSIS — Z79.899 OTHER LONG TERM (CURRENT) DRUG THERAPY: ICD-10-CM

## 2018-11-19 DIAGNOSIS — Z98.891 HISTORY OF UTERINE SCAR FROM PREVIOUS SURGERY: Chronic | ICD-10-CM

## 2018-11-19 DIAGNOSIS — Z98.890 OTHER SPECIFIED POSTPROCEDURAL STATES: ICD-10-CM

## 2018-11-19 NOTE — ED ADULT NURSE NOTE - PMH
Alcoholism    Anxiety    Benzodiazepine misuse    Depression    Duodenitis    Esophagitis    Gastritis    GERD (gastroesophageal reflux disease)    Graves' disease    Heart murmur    History of Graves' disease    Hypercholesterolemia    MVP (mitral valve prolapse)    Nicotine dependence    Obesity

## 2018-11-19 NOTE — ED ADULT NURSE NOTE - NSIMPLEMENTINTERV_GEN_ALL_ED
Implemented All Fall Risk Interventions:  [place] to call system. Call bell, personal items and telephone within reach. Instruct patient to call for assistance. Room bathroom lighting operational. Non-slip footwear when patient is off stretcher. Physically safe environment: no spills, clutter or unnecessary equipment. Stretcher in lowest position, wheels locked, appropriate side rails in place. Provide visual cue, wrist band, yellow gown, etc. Monitor gait and stability. Monitor for mental status changes and reorient to person, place, and time. Review medications for side effects contributing to fall risk. Reinforce activity limits and safety measures with patient and family.

## 2018-11-19 NOTE — ED PROVIDER NOTE - ATTENDING CONTRIBUTION TO CARE
I was present for and supervised the key and critical aspects of the procedures performed during the care of the patient.  Patient is a 39 yo f who presents for evaluation for detox at this time from alcohol as well as suboxone.  She is not homicidal or suicidal at this time she denies any chest pain, shortness of breath, fevers, chills or vomiting.  she is not tremulous. she last drank just prior to arrival.  On physical exam the patient is nc/at perrla eomi oropharynx clear cta b/l, rrr s1s2 noted abd-soft nt nd bs+ ext from ext from.    A/P- patient presents for detox currently no female deotx beds available she was given outatient detox information.  I will discharge at this time

## 2018-11-20 ENCOUNTER — EMERGENCY (EMERGENCY)
Facility: HOSPITAL | Age: 38
LOS: 1 days | Discharge: HOME | End: 2018-11-20
Attending: EMERGENCY MEDICINE | Admitting: EMERGENCY MEDICINE

## 2018-11-20 VITALS
HEIGHT: 61 IN | DIASTOLIC BLOOD PRESSURE: 81 MMHG | SYSTOLIC BLOOD PRESSURE: 155 MMHG | HEART RATE: 157 BPM | OXYGEN SATURATION: 92 % | WEIGHT: 160.06 LBS | TEMPERATURE: 100 F | RESPIRATION RATE: 20 BRPM

## 2018-11-20 DIAGNOSIS — Z79.899 OTHER LONG TERM (CURRENT) DRUG THERAPY: ICD-10-CM

## 2018-11-20 DIAGNOSIS — Z53.21 PROCEDURE AND TREATMENT NOT CARRIED OUT DUE TO PATIENT LEAVING PRIOR TO BEING SEEN BY HEALTH CARE PROVIDER: ICD-10-CM

## 2018-11-20 DIAGNOSIS — Z88.8 ALLERGY STATUS TO OTHER DRUGS, MEDICAMENTS AND BIOLOGICAL SUBSTANCES: ICD-10-CM

## 2018-11-20 DIAGNOSIS — Z98.891 HISTORY OF UTERINE SCAR FROM PREVIOUS SURGERY: Chronic | ICD-10-CM

## 2018-11-20 PROBLEM — R01.1 CARDIAC MURMUR, UNSPECIFIED: Chronic | Status: ACTIVE | Noted: 2018-09-10

## 2018-11-20 PROBLEM — F13.10 SEDATIVE, HYPNOTIC OR ANXIOLYTIC ABUSE, UNCOMPLICATED: Chronic | Status: ACTIVE | Noted: 2018-09-10

## 2018-11-20 PROBLEM — K20.9 ESOPHAGITIS, UNSPECIFIED: Chronic | Status: ACTIVE | Noted: 2018-09-10

## 2018-11-20 PROBLEM — E78.00 PURE HYPERCHOLESTEROLEMIA, UNSPECIFIED: Chronic | Status: ACTIVE | Noted: 2018-09-10

## 2018-11-20 PROBLEM — Z86.39 PERSONAL HISTORY OF OTHER ENDOCRINE, NUTRITIONAL AND METABOLIC DISEASE: Chronic | Status: ACTIVE | Noted: 2018-09-10

## 2018-11-20 PROBLEM — E66.9 OBESITY, UNSPECIFIED: Chronic | Status: ACTIVE | Noted: 2018-09-10

## 2018-12-09 ENCOUNTER — INPATIENT (INPATIENT)
Facility: HOSPITAL | Age: 38
LOS: 2 days | Discharge: HOME | End: 2018-12-12
Attending: INTERNAL MEDICINE | Admitting: INTERNAL MEDICINE

## 2018-12-09 VITALS
DIASTOLIC BLOOD PRESSURE: 86 MMHG | WEIGHT: 160.06 LBS | HEIGHT: 62 IN | TEMPERATURE: 96 F | HEART RATE: 125 BPM | SYSTOLIC BLOOD PRESSURE: 122 MMHG | RESPIRATION RATE: 18 BRPM

## 2018-12-09 DIAGNOSIS — I34.1 NONRHEUMATIC MITRAL (VALVE) PROLAPSE: ICD-10-CM

## 2018-12-09 DIAGNOSIS — E78.00 PURE HYPERCHOLESTEROLEMIA, UNSPECIFIED: ICD-10-CM

## 2018-12-09 DIAGNOSIS — E05.00 THYROTOXICOSIS WITH DIFFUSE GOITER WITHOUT THYROTOXIC CRISIS OR STORM: ICD-10-CM

## 2018-12-09 DIAGNOSIS — Z98.891 HISTORY OF UTERINE SCAR FROM PREVIOUS SURGERY: Chronic | ICD-10-CM

## 2018-12-09 DIAGNOSIS — Z78.9 OTHER SPECIFIED HEALTH STATUS: ICD-10-CM

## 2018-12-09 DIAGNOSIS — F32.9 MAJOR DEPRESSIVE DISORDER, SINGLE EPISODE, UNSPECIFIED: ICD-10-CM

## 2018-12-09 DIAGNOSIS — K21.9 GASTRO-ESOPHAGEAL REFLUX DISEASE WITHOUT ESOPHAGITIS: ICD-10-CM

## 2018-12-09 LAB
ALBUMIN SERPL ELPH-MCNC: 4.8 G/DL — SIGNIFICANT CHANGE UP (ref 3.5–5.2)
ALP SERPL-CCNC: 84 U/L — SIGNIFICANT CHANGE UP (ref 30–115)
ALT FLD-CCNC: 85 U/L — HIGH (ref 0–41)
AMMONIA BLD-MCNC: 41 UMOL/L — SIGNIFICANT CHANGE UP (ref 11–55)
AMYLASE P1 CFR SERPL: 65 U/L — SIGNIFICANT CHANGE UP (ref 25–115)
ANION GAP SERPL CALC-SCNC: 17 MMOL/L — HIGH (ref 7–14)
APAP SERPL-MCNC: <5 UG/ML — LOW (ref 10–30)
APPEARANCE UR: CLEAR — SIGNIFICANT CHANGE UP
APPEARANCE UR: CLEAR — SIGNIFICANT CHANGE UP
AST SERPL-CCNC: 89 U/L — HIGH (ref 0–41)
BASOPHILS # BLD AUTO: 0.07 K/UL — SIGNIFICANT CHANGE UP (ref 0–0.2)
BASOPHILS NFR BLD AUTO: 1 % — SIGNIFICANT CHANGE UP (ref 0–1)
BILIRUB SERPL-MCNC: 0.2 MG/DL — SIGNIFICANT CHANGE UP (ref 0.2–1.2)
BILIRUB UR-MCNC: NEGATIVE — SIGNIFICANT CHANGE UP
BILIRUB UR-MCNC: NEGATIVE — SIGNIFICANT CHANGE UP
BUN SERPL-MCNC: 9 MG/DL — LOW (ref 10–20)
CALCIUM SERPL-MCNC: 9.1 MG/DL — SIGNIFICANT CHANGE UP (ref 8.5–10.1)
CHLORIDE SERPL-SCNC: 96 MMOL/L — LOW (ref 98–110)
CO2 SERPL-SCNC: 19 MMOL/L — SIGNIFICANT CHANGE UP (ref 17–32)
COLOR SPEC: YELLOW — SIGNIFICANT CHANGE UP
COLOR SPEC: YELLOW — SIGNIFICANT CHANGE UP
CREAT SERPL-MCNC: 0.5 MG/DL — LOW (ref 0.7–1.5)
DIFF PNL FLD: NEGATIVE — SIGNIFICANT CHANGE UP
DIFF PNL FLD: NEGATIVE — SIGNIFICANT CHANGE UP
EOSINOPHIL # BLD AUTO: 0.07 K/UL — SIGNIFICANT CHANGE UP (ref 0–0.7)
EOSINOPHIL NFR BLD AUTO: 1 % — SIGNIFICANT CHANGE UP (ref 0–8)
ETHANOL SERPL-MCNC: 371 MG/DL — HIGH
GLUCOSE SERPL-MCNC: 96 MG/DL — SIGNIFICANT CHANGE UP (ref 70–99)
GLUCOSE UR QL: NEGATIVE MG/DL — SIGNIFICANT CHANGE UP
GLUCOSE UR QL: NEGATIVE MG/DL — SIGNIFICANT CHANGE UP
HCT VFR BLD CALC: 42.3 % — SIGNIFICANT CHANGE UP (ref 37–47)
HGB BLD-MCNC: 14.9 G/DL — SIGNIFICANT CHANGE UP (ref 12–16)
IMM GRANULOCYTES NFR BLD AUTO: 1 % — HIGH (ref 0.1–0.3)
KETONES UR-MCNC: NEGATIVE — SIGNIFICANT CHANGE UP
KETONES UR-MCNC: NEGATIVE — SIGNIFICANT CHANGE UP
LEUKOCYTE ESTERASE UR-ACNC: NEGATIVE — SIGNIFICANT CHANGE UP
LEUKOCYTE ESTERASE UR-ACNC: NEGATIVE — SIGNIFICANT CHANGE UP
LYMPHOCYTES # BLD AUTO: 2.8 K/UL — SIGNIFICANT CHANGE UP (ref 1.2–3.4)
LYMPHOCYTES # BLD AUTO: 39.4 % — SIGNIFICANT CHANGE UP (ref 20.5–51.1)
MCHC RBC-ENTMCNC: 34.2 PG — HIGH (ref 27–31)
MCHC RBC-ENTMCNC: 35.2 G/DL — SIGNIFICANT CHANGE UP (ref 32–37)
MCV RBC AUTO: 97 FL — SIGNIFICANT CHANGE UP (ref 81–99)
MONOCYTES # BLD AUTO: 0.71 K/UL — HIGH (ref 0.1–0.6)
MONOCYTES NFR BLD AUTO: 10 % — HIGH (ref 1.7–9.3)
NEUTROPHILS # BLD AUTO: 3.38 K/UL — SIGNIFICANT CHANGE UP (ref 1.4–6.5)
NEUTROPHILS NFR BLD AUTO: 47.6 % — SIGNIFICANT CHANGE UP (ref 42.2–75.2)
NITRITE UR-MCNC: NEGATIVE — SIGNIFICANT CHANGE UP
NITRITE UR-MCNC: NEGATIVE — SIGNIFICANT CHANGE UP
NRBC # BLD: 0 /100 WBCS — SIGNIFICANT CHANGE UP (ref 0–0)
PH UR: 6 — SIGNIFICANT CHANGE UP (ref 5–8)
PH UR: 6.5 — SIGNIFICANT CHANGE UP (ref 5–8)
PLATELET # BLD AUTO: 248 K/UL — SIGNIFICANT CHANGE UP (ref 130–400)
POTASSIUM SERPL-MCNC: 4.2 MMOL/L — SIGNIFICANT CHANGE UP (ref 3.5–5)
POTASSIUM SERPL-SCNC: 4.2 MMOL/L — SIGNIFICANT CHANGE UP (ref 3.5–5)
PROT SERPL-MCNC: 8.3 G/DL — HIGH (ref 6–8)
PROT UR-MCNC: NEGATIVE MG/DL — SIGNIFICANT CHANGE UP
PROT UR-MCNC: NEGATIVE MG/DL — SIGNIFICANT CHANGE UP
RBC # BLD: 4.36 M/UL — SIGNIFICANT CHANGE UP (ref 4.2–5.4)
RBC # FLD: 13.6 % — SIGNIFICANT CHANGE UP (ref 11.5–14.5)
SALICYLATES SERPL-MCNC: <0.3 MG/DL — LOW (ref 4–30)
SODIUM SERPL-SCNC: 132 MMOL/L — LOW (ref 135–146)
SP GR SPEC: 1.01 — SIGNIFICANT CHANGE UP (ref 1.01–1.03)
SP GR SPEC: <=1.005 — SIGNIFICANT CHANGE UP (ref 1.01–1.03)
UROBILINOGEN FLD QL: 0.2 MG/DL — SIGNIFICANT CHANGE UP (ref 0.2–0.2)
UROBILINOGEN FLD QL: 0.2 MG/DL — SIGNIFICANT CHANGE UP (ref 0.2–0.2)
WBC # BLD: 7.1 K/UL — SIGNIFICANT CHANGE UP (ref 4.8–10.8)
WBC # FLD AUTO: 7.1 K/UL — SIGNIFICANT CHANGE UP (ref 4.8–10.8)

## 2018-12-09 RX ORDER — TUBERCULIN PURIFIED PROTEIN DERIVATIVE 5 [IU]/.1ML
5 INJECTION, SOLUTION INTRADERMAL ONCE
Qty: 0 | Refills: 0 | Status: COMPLETED | OUTPATIENT
Start: 2018-12-09 | End: 2018-12-09

## 2018-12-09 RX ORDER — PSEUDOEPHEDRINE HCL 30 MG
60 TABLET ORAL EVERY 6 HOURS
Qty: 0 | Refills: 0 | Status: DISCONTINUED | OUTPATIENT
Start: 2018-12-09 | End: 2018-12-12

## 2018-12-09 RX ORDER — MULTIVIT-MIN/FERROUS GLUCONATE 9 MG/15 ML
1 LIQUID (ML) ORAL DAILY
Qty: 0 | Refills: 0 | Status: DISCONTINUED | OUTPATIENT
Start: 2018-12-09 | End: 2018-12-12

## 2018-12-09 RX ORDER — HYDROXYZINE HCL 10 MG
50 TABLET ORAL EVERY 6 HOURS
Qty: 0 | Refills: 0 | Status: DISCONTINUED | OUTPATIENT
Start: 2018-12-09 | End: 2018-12-12

## 2018-12-09 RX ORDER — HYDROXYZINE HCL 10 MG
100 TABLET ORAL AT BEDTIME
Qty: 0 | Refills: 0 | Status: DISCONTINUED | OUTPATIENT
Start: 2018-12-09 | End: 2018-12-12

## 2018-12-09 RX ORDER — FOLIC ACID 0.8 MG
1 TABLET ORAL DAILY
Qty: 0 | Refills: 0 | Status: DISCONTINUED | OUTPATIENT
Start: 2018-12-09 | End: 2018-12-12

## 2018-12-09 RX ORDER — NICOTINE POLACRILEX 2 MG
1 GUM BUCCAL DAILY
Qty: 0 | Refills: 0 | Status: DISCONTINUED | OUTPATIENT
Start: 2018-12-09 | End: 2018-12-12

## 2018-12-09 RX ORDER — IBUPROFEN 200 MG
400 TABLET ORAL EVERY 4 HOURS
Qty: 0 | Refills: 0 | Status: DISCONTINUED | OUTPATIENT
Start: 2018-12-09 | End: 2018-12-12

## 2018-12-09 RX ORDER — THIAMINE MONONITRATE (VIT B1) 100 MG
100 TABLET ORAL DAILY
Qty: 0 | Refills: 0 | Status: COMPLETED | OUTPATIENT
Start: 2018-12-09 | End: 2018-12-12

## 2018-12-09 RX ORDER — ACETAMINOPHEN 500 MG
650 TABLET ORAL EVERY 4 HOURS
Qty: 0 | Refills: 0 | Status: DISCONTINUED | OUTPATIENT
Start: 2018-12-09 | End: 2018-12-12

## 2018-12-09 RX ORDER — METHOCARBAMOL 500 MG/1
500 TABLET, FILM COATED ORAL EVERY 6 HOURS
Qty: 0 | Refills: 0 | Status: DISCONTINUED | OUTPATIENT
Start: 2018-12-09 | End: 2018-12-12

## 2018-12-09 RX ORDER — MAGNESIUM HYDROXIDE 400 MG/1
30 TABLET, CHEWABLE ORAL ONCE
Qty: 0 | Refills: 0 | Status: DISCONTINUED | OUTPATIENT
Start: 2018-12-09 | End: 2018-12-12

## 2018-12-09 RX ADMIN — Medication 50 MILLIGRAM(S): at 20:06

## 2018-12-09 RX ADMIN — METHOCARBAMOL 500 MILLIGRAM(S): 500 TABLET, FILM COATED ORAL at 22:23

## 2018-12-09 RX ADMIN — Medication 25 MILLIGRAM(S): at 21:16

## 2018-12-09 RX ADMIN — Medication 25 MILLIGRAM(S): at 22:22

## 2018-12-09 RX ADMIN — Medication 400 MILLIGRAM(S): at 21:55

## 2018-12-09 RX ADMIN — Medication 1 APPLICATION(S): at 22:22

## 2018-12-09 RX ADMIN — Medication 400 MILLIGRAM(S): at 20:07

## 2018-12-09 RX ADMIN — Medication 50 MILLIGRAM(S): at 20:07

## 2018-12-09 NOTE — ED PROVIDER NOTE - NS ED ROS FT
Review of Systems:  	•	CONSTITUTIONAL - no fever, no diaphoresis, no chills  	•	SKIN - no rash  	•	EYES - no eye pain, no blurry vision  	•	ENT - no change in hearing, no sore throat, no ear pain or tinnitus  	•	RESPIRATORY - no shortness of breath, no cough  	•	CARDIAC - no chest pain, no palpitations  	•	GI - no abd pain, no nausea, no vomiting, no diarrhea, no constipation  	•	GENITO-URINARY - no discharge, no dysuria; no hematuria, no increased urinary frequency  	•	MUSCULOSKELETAL - no joint paint, no swelling, no redness  	•	NEUROLOGIC - no weakness, no headache, no paresthesias, no LOC  	•	PSYCH - no anxiety, non suicidal, non homicidal, no hallucination, no depression

## 2018-12-09 NOTE — ED PROVIDER NOTE - PHYSICAL EXAMINATION
CONST: Well appearing in NAD, + AOB  EYES: PERRL, EOMI, Sclera and conjunctiva clear.   ENT: No nasal discharge. TM's clear B/L without drainage. Oropharynx normal appearing, no erythema or exudates. No abscess or swelling. Uvula midline. No temporal artery or mastoid tenderness.  CARD: Normal S1 S2; Normal rate and rhythm  RESP: Equal BS B/L, No wheezes, rhonchi or rales. No distress  GI: Soft, non-tender, non-distended. no cva tenderness. normal BS  MS: Normal ROM in all extremities. No midline spinal tenderness. pulses 2 +. no calf tenderness or swelling  SKIN: Warm, dry, no acute rashes. Good turgor  NEURO: A&Ox3, No focal deficits. Strength 5/5 with no sensory deficits. Steady gait.   PSYCH: Cooperative, appropriate

## 2018-12-09 NOTE — H&P ADULT - ATTENDING COMMENTS
Patient interviewed and examined.    Chart reviewed.    PA's H&P noted and modified, as appropriate.    Case discussed on team rounds    Following is my summary of the case.    Admitted for detox: from __x__ED, ___Intake, ____Med/Surg Floor    Alcohol__x__   Opioid_____  Benzo___ Other_____    Substance amount, duration of use, last usage, and prior attempts at detox or rehabs, are outlined above in the H&P and discussed with patient.    Associated withdrawal symptoms presents.  Comorbid conditions noted. Chronic and Stable.    Past Medical Hx, Psych Hx, family Hx, Social Hx from H&P reviewed and NO changes.    Old medical record and medication Hx. Reviewed    Following items reviewed and addressed:  1. labs  2. EKG  3. Imaging from PACs module    Examination: no change from PA's exam.    Place on following protocol  _____Medically Managed  __X__Medically Supervised    Ciwa__x___Librium taper____Ativan taper___Methadone taper___ Phenobarb taper____ Suboxone Induction____MMTP____    Narcan Kit Offered    Psych Consult __X__N/A  ___Ordered    Physical Therapy  ___X N/A   ___  Ordered    Aftercare disposition to be addressed by counselors.    Estimated length of stay 3-5 days.

## 2018-12-09 NOTE — ED PROVIDER NOTE - ATTENDING CONTRIBUTION TO CARE
38 year old female, requesting detox, patient states she had her last drink 1 hour prior to arrival, no cp/sob, no n/v/d, no loc. NO si or hi, no a.v hallucinations    CONSTITUTIONAL: Well-developed; well-nourished; in no acute distress. Sitting up and providing appropriate history and physical examination  SKIN: skin exam is warm and dry, no acute rash.  HEAD: Normocephalic; atraumatic.  EYES: PERRL, 3 mm bilateral, no nystagmus, EOM intact; conjunctiva and sclera clear.  ENT: No nasal discharge; airway clear.  NECK: Supple; non tender. + full passive ROM in all directions. No JVD  CARD: S1, S2 normal; no murmurs, gallops, or rubs. Regular rate and rhythm. + Symmetric Strong Pulses  RESP: No wheezes, rales or rhonchi. Good air movement bilaterally  ABD: soft; non-distended; non-tender. No Rebound, No Guarding, No signs of peritonitis, No CVA tenderness. No pulsatile abdominal mass. + Strong and Symmetric Pulses  EXT: Normal ROM. No clubbing, cyanosis or edema. Dp and Pt Pulses intact. Cap refill less than 3 seconds  NEURO: CN 2-12 intact, normal finger to nose, normal romberg, stable gait, no sensory or motor deficits, Alert, oriented, grossly unremarkable. No Focal deficits. GCS 15. NIH 0  PSYCH: Cooperative, appropriate.

## 2018-12-09 NOTE — ED ADULT NURSE REASSESSMENT NOTE - NS ED NURSE REASSESS COMMENT FT1
explained to pt she can not go outside to smoke. Offered pt a nicotine patch. Pt refused. Smoking cessation provided. Pt states " I am going outside I have been doing this for 12 years and I have not lost my bed "

## 2018-12-09 NOTE — H&P ADULT - NSHPREVIEWOFSYSTEMS_GEN_ALL_CORE
REVIEW OF SYSTEMS:    Review of Systems:  · Review of Systems: Review of Systems:  		•	CONSTITUTIONAL - no fever, no diaphoresis, no chills  		•	SKIN - no rash  		•	EYES - no eye pain, no blurry vision  		•	ENT - no change in hearing, no sore throat, no ear pain or tinnitus  		•	RESPIRATORY - no shortness of breath, no cough  		•	CARDIAC - no chest pain, no palpitations  		•	GI - no abd pain, no nausea, no vomiting, no diarrhea, no constipation  		•	GENITO-URINARY - no discharge, no dysuria; no hematuria, no increased urinary frequency  		•	MUSCULOSKELETAL - no joint paint, no swelling, no redness  		•	NEUROLOGIC - no weakness, no headache, no paresthesias, no LOC  	•	PSYCH - no anxiety, non suicidal, non homicidal, no hallucination, no depression
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
## 2018-12-09 NOTE — ED ADULT NURSE NOTE - NSIMPLEMENTINTERV_GEN_ALL_ED
Implemented All Universal Safety Interventions:  [place] to call system. Call bell, personal items and telephone within reach. Instruct patient to call for assistance. Room bathroom lighting operational. Non-slip footwear when patient is off stretcher. Physically safe environment: no spills, clutter or unnecessary equipment. Stretcher in lowest position, wheels locked, appropriate side rails in place.

## 2018-12-09 NOTE — ED PROVIDER NOTE - OBJECTIVE STATEMENT
38 year old female with pmhx of ETOH abuse, presents for detox evaluation. Pt admits drinks wine daily, last drink was earlier today. Pt denies SI or HI, drug abuse, or complaints at this time.

## 2018-12-09 NOTE — H&P ADULT - HISTORY OF PRESENT ILLNESS
· HPI Objective Statement: 38 year old female with pmhx of ETOH abuse, presents for detox evaluation. Pt admits drinks  2 liters wine daily, last drink was earlier today. Pt denies SI or HI, drug abuse, or complaints at this time.	    HIV:    HIV Status:  · Offered: Declined	    PAST MEDICAL/SURGICAL/FAMILY/SOCIAL HISTORY:    Tobacco Usage:  · Tobacco Usage	Unknown if ever smoked	    ALLERGIES AND HOME MEDICATIONS:   Allergies:        Allergies:  	Topamax: Drug, Urticaria  	Risperdal: Drug, Urticaria    Home Medications:   * Patient Currently Takes Medications as of 13-Sep-2018 09:53 documented in Structured Notes  · 	naltrexone 50 mg oral tablet: 1 tab(s) orally once a day  · 	Neurontin 600 mg oral tablet: 1 tab(s) orally 3 times a day  · 	sucralfate 1 g oral tablet: 1 tab(s) orally 4 times a day (before meals and at bedtime)  · 	Protonix 40 mg oral delayed release tablet: 1 tab(s) orally once a day  · 	Remeron 30 mg oral tablet: 1 tab(s) orally once a day (at bedtime)      PHYSICAL EXAM:   · Physical Examination: CONST: Well appearing in NAD, + AOB  EYES: PERRL, EOMI, Sclera and conjunctiva clear.   ENT: No nasal discharge. TM's clear B/L without drainage. Oropharynx normal appearing, no erythema or exudates. No abscess or swelling. Uvula midline. No temporal artery or mastoid tenderness.  CARD: Normal S1 S2; Normal rate and rhythm  RESP: Equal BS B/L, No wheezes, rhonchi or rales. No distress  GI: Soft, non-tender, non-distended. no cva tenderness. normal BS  MS: Normal ROM in all extremities. No midline spinal tenderness. pulses 2 +. no calf tenderness or swelling  SKIN: Warm, dry, no acute rashes. Good turgor  NEURO: A&Ox3, No focal deficits. Strength 5/5 with no sensory deficits. Steady gait.  PSYCH: Cooperative, appropriate

## 2018-12-10 LAB
HAV IGM SER-ACNC: SIGNIFICANT CHANGE UP
HBV CORE IGM SER-ACNC: SIGNIFICANT CHANGE UP
HBV SURFACE AG SER-ACNC: SIGNIFICANT CHANGE UP
HCV AB S/CO SERPL IA: 0.1 S/CO — SIGNIFICANT CHANGE UP
HCV AB SERPL-IMP: SIGNIFICANT CHANGE UP
T PALLIDUM AB TITR SER: NEGATIVE — SIGNIFICANT CHANGE UP

## 2018-12-10 RX ORDER — TRAZODONE HCL 50 MG
100 TABLET ORAL AT BEDTIME
Qty: 0 | Refills: 0 | Status: DISCONTINUED | OUTPATIENT
Start: 2018-12-10 | End: 2018-12-12

## 2018-12-10 RX ORDER — GABAPENTIN 400 MG/1
300 CAPSULE ORAL THREE TIMES A DAY
Qty: 0 | Refills: 0 | Status: DISCONTINUED | OUTPATIENT
Start: 2018-12-10 | End: 2018-12-11

## 2018-12-10 RX ADMIN — Medication 1 TABLET(S): at 09:10

## 2018-12-10 RX ADMIN — Medication 1 APPLICATION(S): at 09:10

## 2018-12-10 RX ADMIN — Medication 100 MILLIGRAM(S): at 00:01

## 2018-12-10 RX ADMIN — Medication 50 MILLIGRAM(S): at 12:37

## 2018-12-10 RX ADMIN — Medication 1 MILLIGRAM(S): at 09:10

## 2018-12-10 RX ADMIN — Medication 25 MILLIGRAM(S): at 03:03

## 2018-12-10 RX ADMIN — Medication 100 MILLIGRAM(S): at 09:10

## 2018-12-10 RX ADMIN — Medication 25 MILLIGRAM(S): at 05:53

## 2018-12-10 RX ADMIN — GABAPENTIN 300 MILLIGRAM(S): 400 CAPSULE ORAL at 21:13

## 2018-12-10 RX ADMIN — Medication 50 MILLIGRAM(S): at 18:41

## 2018-12-10 RX ADMIN — METHOCARBAMOL 500 MILLIGRAM(S): 500 TABLET, FILM COATED ORAL at 05:53

## 2018-12-10 RX ADMIN — Medication 25 MILLIGRAM(S): at 12:37

## 2018-12-10 RX ADMIN — Medication 50 MILLIGRAM(S): at 03:04

## 2018-12-10 RX ADMIN — METHOCARBAMOL 500 MILLIGRAM(S): 500 TABLET, FILM COATED ORAL at 12:37

## 2018-12-10 RX ADMIN — METHOCARBAMOL 500 MILLIGRAM(S): 500 TABLET, FILM COATED ORAL at 18:41

## 2018-12-10 RX ADMIN — Medication 50 MILLIGRAM(S): at 00:01

## 2018-12-10 RX ADMIN — GABAPENTIN 300 MILLIGRAM(S): 400 CAPSULE ORAL at 12:38

## 2018-12-10 RX ADMIN — Medication 25 MILLIGRAM(S): at 09:10

## 2018-12-10 RX ADMIN — Medication 25 MILLIGRAM(S): at 18:12

## 2018-12-11 LAB
ANION GAP SERPL CALC-SCNC: 11 MMOL/L — SIGNIFICANT CHANGE UP (ref 7–14)
BUN SERPL-MCNC: 11 MG/DL — SIGNIFICANT CHANGE UP (ref 10–20)
CALCIUM SERPL-MCNC: 9.4 MG/DL — SIGNIFICANT CHANGE UP (ref 8.5–10.1)
CHLORIDE SERPL-SCNC: 101 MMOL/L — SIGNIFICANT CHANGE UP (ref 98–110)
CO2 SERPL-SCNC: 27 MMOL/L — SIGNIFICANT CHANGE UP (ref 17–32)
CREAT SERPL-MCNC: 0.8 MG/DL — SIGNIFICANT CHANGE UP (ref 0.7–1.5)
GLUCOSE SERPL-MCNC: 105 MG/DL — HIGH (ref 70–99)
POTASSIUM SERPL-MCNC: 4 MMOL/L — SIGNIFICANT CHANGE UP (ref 3.5–5)
POTASSIUM SERPL-SCNC: 4 MMOL/L — SIGNIFICANT CHANGE UP (ref 3.5–5)
SODIUM SERPL-SCNC: 139 MMOL/L — SIGNIFICANT CHANGE UP (ref 135–146)

## 2018-12-11 RX ORDER — PANTOPRAZOLE SODIUM 20 MG/1
40 TABLET, DELAYED RELEASE ORAL
Qty: 0 | Refills: 0 | Status: DISCONTINUED | OUTPATIENT
Start: 2018-12-11 | End: 2018-12-12

## 2018-12-11 RX ORDER — GABAPENTIN 400 MG/1
1 CAPSULE ORAL
Qty: 0 | Refills: 0 | COMMUNITY
Start: 2018-12-11

## 2018-12-11 RX ORDER — GABAPENTIN 400 MG/1
600 CAPSULE ORAL THREE TIMES A DAY
Qty: 0 | Refills: 0 | Status: DISCONTINUED | OUTPATIENT
Start: 2018-12-11 | End: 2018-12-12

## 2018-12-11 RX ORDER — TRAZODONE HCL 50 MG
100 TABLET ORAL ONCE
Qty: 0 | Refills: 0 | Status: COMPLETED | OUTPATIENT
Start: 2018-12-11 | End: 2018-12-11

## 2018-12-11 RX ADMIN — Medication 100 MILLIGRAM(S): at 09:04

## 2018-12-11 RX ADMIN — Medication 100 MILLIGRAM(S): at 06:14

## 2018-12-11 RX ADMIN — METHOCARBAMOL 500 MILLIGRAM(S): 500 TABLET, FILM COATED ORAL at 22:19

## 2018-12-11 RX ADMIN — PANTOPRAZOLE SODIUM 40 MILLIGRAM(S): 20 TABLET, DELAYED RELEASE ORAL at 10:14

## 2018-12-11 RX ADMIN — Medication 1 APPLICATION(S): at 09:05

## 2018-12-11 RX ADMIN — Medication 1 TABLET(S): at 09:05

## 2018-12-11 RX ADMIN — GABAPENTIN 600 MILLIGRAM(S): 400 CAPSULE ORAL at 13:51

## 2018-12-11 RX ADMIN — Medication 50 MILLIGRAM(S): at 22:19

## 2018-12-11 RX ADMIN — Medication 25 MILLIGRAM(S): at 02:23

## 2018-12-11 RX ADMIN — GABAPENTIN 600 MILLIGRAM(S): 400 CAPSULE ORAL at 20:37

## 2018-12-11 RX ADMIN — Medication 1 MILLIGRAM(S): at 09:04

## 2018-12-11 RX ADMIN — Medication 50 MILLIGRAM(S): at 09:09

## 2018-12-11 RX ADMIN — METHOCARBAMOL 500 MILLIGRAM(S): 500 TABLET, FILM COATED ORAL at 15:29

## 2018-12-11 RX ADMIN — METHOCARBAMOL 500 MILLIGRAM(S): 500 TABLET, FILM COATED ORAL at 09:08

## 2018-12-11 RX ADMIN — Medication 50 MILLIGRAM(S): at 15:29

## 2018-12-11 RX ADMIN — Medication 25 MILLIGRAM(S): at 00:04

## 2018-12-11 RX ADMIN — Medication 400 MILLIGRAM(S): at 09:08

## 2018-12-11 RX ADMIN — Medication 25 MILLIGRAM(S): at 10:14

## 2018-12-11 RX ADMIN — GABAPENTIN 600 MILLIGRAM(S): 400 CAPSULE ORAL at 09:07

## 2018-12-11 NOTE — CHART NOTE - NSCHARTNOTEFT_GEN_A_CORE
Subsequent Inpatient Encounter                                       Detox Unit    AURELIA BLANCAS   38y   Female      Chief Complaint:    Follow up for Alcohol  Dependency    HPI:     I reviewed previous notes. No Change, except if noted below.             Detail:_    ROS:   I reviewed with patient.  No changes from previous notes except if noted below.             Detail: _    PFSH I reviewed with patient. No changes from previous notes except if noted below.             Detail_    Medication reconciliation performed.    MEDICATIONS  (STANDING):  clotrimazole 1% Cream 1 Application(s) Topical every 12 hours  folic acid 1 milliGRAM(s) Oral daily  gabapentin 300 milliGRAM(s) Oral three times a day  multivitamin/minerals 1 Tablet(s) Oral daily  nicotine - 21 mG/24Hr(s) Patch 1 Patch Transdermal daily  thiamine 100 milliGRAM(s) Oral daily  traZODone 100 milliGRAM(s) Oral at bedtime      MEDICATIONS  (PRN):  acetaminophen   Tablet .. 650 milliGRAM(s) Oral every 4 hours PRN Temp greater or equal to 38.5C (101.3F), Moderate Pain (4 - 6)  aluminum hydroxide/magnesium hydroxide/simethicone Suspension 30 milliLiter(s) Oral every 6 hours PRN Heartburn  bismuth subsalicylate Liquid 30 milliLiter(s) Oral every 6 hours PRN Diarrhea  chlordiazePOXIDE 25 milliGRAM(s) Oral every 2 hours PRN CIWA-Ar score  8 - 15  chlordiazePOXIDE 50 milliGRAM(s) Oral every 1 hour PRN CIWA-Ar score GREATER THAN 15  cloNIDine 0.1 milliGRAM(s) Oral every 8 hours PRN Blood Pressure GREATER THAN 140/90 mmHG  guaiFENesin/dextromethorphan  Syrup 5 milliLiter(s) Oral every 4 hours PRN Cough  hydrOXYzine hydrochloride 50 milliGRAM(s) Oral every 6 hours PRN Anxiety  hydrOXYzine hydrochloride 100 milliGRAM(s) Oral at bedtime PRN insomnia  ibuprofen  Tablet. 400 milliGRAM(s) Oral every 4 hours PRN Mild Pain (1 - 3)  magnesium hydroxide Suspension 30 milliLiter(s) Oral once PRN Constipation  methocarbamol 500 milliGRAM(s) Oral every 6 hours PRN muscle pain  pseudoephedrine 60 milliGRAM(s) Oral every 6 hours PRN Rhinitis  trimethobenzamide 300 milliGRAM(s) Oral every 6 hours PRN Nausea and/or Vomiting  trimethobenzamide Injectable 200 milliGRAM(s) IntraMuscular every 6 hours PRN Nausea and/or Vomiting      T(C): 35.6 (12-11-18 @ 06:00), Max: 36.9 (12-11-18 @ 00:00)  HR: 73 (12-11-18 @ 06:00) (71 - 111)  BP: 116/57 (12-11-18 @ 06:00) (116/57 - 145/85)  RR: 16 (12-11-18 @ 06:00) (14 - 17)  SpO2: --    PHYSICAL EXAM:      Constitutional: NAD, A&O x3    Eyes: PERRLA, no conjuctivitis    Neck: no lymphadenopathy    Respiratory: +air entry, no rales, no rhonchi, no wheezes    Cardiovascular: +S1 and S2, regular rate and rhythm    Gastrointestinal: +BS, soft, non-tender, not distended    Extremities:  no edema, no calf tenderness    Skin: no rashes, normal turgor                            14.9   7.10  )-----------( 248      ( 09 Dec 2018 18:30 )             42.3   12-09    132<L>  |  96<L>  |  9<L>  ----------------------------<  96  4.2   |  19  |  0.5<L>    Ca    9.1      09 Dec 2018 18:30    TPro  8.3<H>  /  Alb  4.8  /  TBili  0.2  /  DBili  x   /  AST  89<H>  /  ALT  85<H>  /  AlkPhos  84  12-09    Ammonia, Serum: 41 umol/L (12-09-18 @ 18:30)  Amylase, Serum Total: 65 U/L (12-09-18 @ 18:30)  Treponema Pallidum Antibody Interpretation: Negative (12-09-18 @ 18:30)  Hepatitis B Surface Antigen: Nonreact (12-09-18 @ 18:30)  Hepatitis C Virus S/CO Ratio: 0.10 S/CO (12-09-18 @ 18:30)    Hepatitis C Virus Interpretation: Nonreact (12-09-18 @ 18:30)      Urinalysis Basic - ( 09 Dec 2018 21:23 )    Color: Yellow / Appearance: Clear / SG: <=1.005 / pH: x  Gluc: x / Ketone: Negative  / Bili: Negative / Urobili: 0.2 mg/dL   Blood: x / Protein: Negative mg/dL / Nitrite: Negative   Leuk Esterase: Negative / RBC: x / WBC x   Sq Epi: x / Non Sq Epi: x / Bacteria: x    Drug Screen 1, Urine Result: Done (12-09-18 @ 21:23)  Drug Screen 1, Urine Result: Done (12-09-18 @ 18:18)        Impression and Plan:    Primary Diagnosis:  Alcohol Dependency                                Medication: Librium Protocol/ CIWA Protocol    Secondary Diagnosis:    Anxiety                                                              Medication: on gabino    Tertiary Diagnosis:                                                                       Medication      Continue Detox Protocols. Use of PRNS as needed for withdrawal and comfort.    Adjustments to protocols:    Labs/ Tests reviewed.    Tests ordered:     Likely Disposition: X___Home       ___Rehab       ___Outpatient Program    ___Self Help     _____Other    Estimated Length of stay:_4___

## 2018-12-12 VITALS
SYSTOLIC BLOOD PRESSURE: 115 MMHG | RESPIRATION RATE: 16 BRPM | TEMPERATURE: 97 F | HEART RATE: 79 BPM | DIASTOLIC BLOOD PRESSURE: 52 MMHG

## 2018-12-12 RX ORDER — NALTREXONE HYDROCHLORIDE 50 MG/1
1 TABLET, FILM COATED ORAL
Qty: 14 | Refills: 0 | OUTPATIENT
Start: 2018-12-12 | End: 2018-12-25

## 2018-12-12 RX ORDER — GABAPENTIN 400 MG/1
2 CAPSULE ORAL
Qty: 180 | Refills: 0 | OUTPATIENT
Start: 2018-12-12 | End: 2019-01-10

## 2018-12-12 RX ADMIN — PANTOPRAZOLE SODIUM 40 MILLIGRAM(S): 20 TABLET, DELAYED RELEASE ORAL at 08:54

## 2018-12-12 RX ADMIN — Medication 100 MILLIGRAM(S): at 00:18

## 2018-12-12 RX ADMIN — Medication 1 APPLICATION(S): at 08:55

## 2018-12-12 RX ADMIN — Medication 100 MILLIGRAM(S): at 08:54

## 2018-12-12 RX ADMIN — Medication 1 TABLET(S): at 08:54

## 2018-12-12 RX ADMIN — Medication 50 MILLIGRAM(S): at 08:54

## 2018-12-12 RX ADMIN — GABAPENTIN 600 MILLIGRAM(S): 400 CAPSULE ORAL at 08:54

## 2018-12-12 RX ADMIN — Medication 1 MILLIGRAM(S): at 08:54

## 2018-12-12 NOTE — CHART NOTE - NSCHARTNOTEFT_GEN_A_CORE
The patient was admitted to the inpt detox unit CDU, for   ETOH__x__ Opioid___  Benzo____Polysubstance _____ Dependency.    Pt was admitted from ED__x__, Intake____, Med/surg Floor_______.    Details are present in the preceding History & Physical section and follow up chart notes.  patient was evaluated on daily detox team  rounds.  Withdrawal symptoms and signs were reviewed on a daily basis, and the protocols were adjusted accordingly.    Labs and imaging results were reviewed and discussed with the patient.    All questions from the patient were addressed.  The patient was seen by the Chemical dependency counselors, and different options for after care were discussed.  The patient attended groups, meetings and 1:1 sessions with the counselors.  Narcane Kit was offered and instructions given prior to discharge.    Psychiatry consultation reviewed______, N/A__x__    Physical therapy evaluation reviewed_____, N/A__x__    Pt was given copies of labs and imaging reports, if applicable.    Prescriptions if needed, were sent through [email or web address] system to the pharmacy amnd are noted in the discharge instruction sheet.    After care was arranged by counselors and pt was discharged to:    Home_x__, Outpt. Program_x__, Rehab ___, Long term____ [facility] ____ I[facility]____ SNF____, AMA___, Admin Discharge____    Principal Diagnosis: Alcohol Dependency__x__ Opioid Dependency___ Benzo Dependency____ Polysubstance Dependency____
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
## 2018-12-14 DIAGNOSIS — F32.9 MAJOR DEPRESSIVE DISORDER, SINGLE EPISODE, UNSPECIFIED: ICD-10-CM
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
## 2018-12-14 DIAGNOSIS — E05.00 THYROTOXICOSIS WITH DIFFUSE GOITER WITHOUT THYROTOXIC CRISIS OR STORM: ICD-10-CM

## 2018-12-14 DIAGNOSIS — F17.210 NICOTINE DEPENDENCE, CIGARETTES, UNCOMPLICATED: ICD-10-CM

## 2018-12-14 DIAGNOSIS — I34.1 NONRHEUMATIC MITRAL (VALVE) PROLAPSE: ICD-10-CM

## 2018-12-14 DIAGNOSIS — E78.5 HYPERLIPIDEMIA, UNSPECIFIED: ICD-10-CM

## 2018-12-14 DIAGNOSIS — F10.20 ALCOHOL DEPENDENCE, UNCOMPLICATED: ICD-10-CM

## 2018-12-14 DIAGNOSIS — F41.9 ANXIETY DISORDER, UNSPECIFIED: ICD-10-CM

## 2019-02-01 ENCOUNTER — OUTPATIENT (OUTPATIENT)
Dept: OUTPATIENT SERVICES | Facility: HOSPITAL | Age: 39
LOS: 1 days | End: 2019-02-01
Payer: MEDICAID

## 2019-02-01 DIAGNOSIS — Z98.891 HISTORY OF UTERINE SCAR FROM PREVIOUS SURGERY: Chronic | ICD-10-CM

## 2019-02-10 ENCOUNTER — EMERGENCY (EMERGENCY)
Facility: HOSPITAL | Age: 39
LOS: 0 days | Discharge: HOME | End: 2019-02-11
Attending: EMERGENCY MEDICINE | Admitting: EMERGENCY MEDICINE

## 2019-02-10 VITALS
SYSTOLIC BLOOD PRESSURE: 160 MMHG | DIASTOLIC BLOOD PRESSURE: 100 MMHG | TEMPERATURE: 97 F | OXYGEN SATURATION: 95 % | HEART RATE: 150 BPM | RESPIRATION RATE: 16 BRPM

## 2019-02-10 DIAGNOSIS — R45.1 RESTLESSNESS AND AGITATION: ICD-10-CM

## 2019-02-10 DIAGNOSIS — Z88.8 ALLERGY STATUS TO OTHER DRUGS, MEDICAMENTS AND BIOLOGICAL SUBSTANCES: ICD-10-CM

## 2019-02-10 DIAGNOSIS — Z98.891 HISTORY OF UTERINE SCAR FROM PREVIOUS SURGERY: Chronic | ICD-10-CM

## 2019-02-10 DIAGNOSIS — R45.6 VIOLENT BEHAVIOR: ICD-10-CM

## 2019-02-10 DIAGNOSIS — F17.200 NICOTINE DEPENDENCE, UNSPECIFIED, UNCOMPLICATED: ICD-10-CM

## 2019-02-10 DIAGNOSIS — F41.8 OTHER SPECIFIED ANXIETY DISORDERS: ICD-10-CM

## 2019-02-10 DIAGNOSIS — E78.00 PURE HYPERCHOLESTEROLEMIA, UNSPECIFIED: ICD-10-CM

## 2019-02-10 DIAGNOSIS — Z79.891 LONG TERM (CURRENT) USE OF OPIATE ANALGESIC: ICD-10-CM

## 2019-02-10 DIAGNOSIS — F10.20 ALCOHOL DEPENDENCE, UNCOMPLICATED: ICD-10-CM

## 2019-02-10 DIAGNOSIS — R00.0 TACHYCARDIA, UNSPECIFIED: ICD-10-CM

## 2019-02-10 DIAGNOSIS — K21.9 GASTRO-ESOPHAGEAL REFLUX DISEASE WITHOUT ESOPHAGITIS: ICD-10-CM

## 2019-02-10 DIAGNOSIS — Z79.899 OTHER LONG TERM (CURRENT) DRUG THERAPY: ICD-10-CM

## 2019-02-10 DIAGNOSIS — Z87.19 PERSONAL HISTORY OF OTHER DISEASES OF THE DIGESTIVE SYSTEM: ICD-10-CM

## 2019-02-10 RX ORDER — SODIUM CHLORIDE 9 MG/ML
2000 INJECTION, SOLUTION INTRAVENOUS ONCE
Qty: 0 | Refills: 0 | Status: COMPLETED | OUTPATIENT
Start: 2019-02-10 | End: 2019-02-10

## 2019-02-10 NOTE — ED ADULT NURSE NOTE - NSIMPLEMENTINTERV_GEN_ALL_ED
Implemented All Universal Safety Interventions:  [place] to call system. Call bell, personal items and telephone within reach. Instruct patient to call for assistance. Room bathroom lighting operational. Non-slip footwear when patient is off stretcher. Physically safe environment: no spills, clutter or unnecessary equipment. Stretcher in lowest position, wheels locked, appropriate side rails in place.

## 2019-02-10 NOTE — ED PROVIDER NOTE - MEDICAL DECISION MAKING DETAILS
Labs reviewed. HR improved with Ativan and fluids. Will clear for detox. Patient extremely agitated in ER, threatened staff. Psych consulted and cleared for discharge. Patient later refused detox. Will D/C home and refer to outpatient detox. Patient extremely agitated in ER, threatened staff. Psych consulted and cleared for discharge. Patient later changed her mind and refused detox. Will D/C home and refer to outpatient detox.

## 2019-02-10 NOTE — ED PROVIDER NOTE - NSFOLLOWUPCLINICS_GEN_ALL_ED_FT
[facility]  Detox Mgmt  [address] [place] [address]  Phone: [phone]  Fax:   Follow Up Time:

## 2019-02-10 NOTE — ED PROVIDER NOTE - ATTENDING CONTRIBUTION TO CARE
I personally evaluated the patient. I reviewed the Resident’s or Physician Assistant’s note (as assigned above), and agree with the findings and plan except as documented in my note.  Chart reviewed. Requesting detox from alcohol. Denies SI/HI. Exam unremarkable except for tachycardia. Will order detox protocol, IVF and Ativan.
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
## 2019-02-10 NOTE — ED PROVIDER NOTE - PHYSICAL EXAMINATION
VITAL SIGNS: I have reviewed nursing notes and confirm.  CONSTITUTIONAL: Well-developed; well-nourished; in no acute distress.  SKIN: Skin exam is warm and dry, no acute rash.  HEAD: Normocephalic; atraumatic.  EYES: PERRL, EOM intact; conjunctiva and sclera clear.  ENT: No nasal discharge; airway clear. TMs clear.  NECK: Supple; non tender.  CARD: S1, S2 normal; no murmurs, gallops, or rubs. Regular rate and rhythm.  RESP: No wheezes, rales or rhonchi.  ABD: Normal bowel sounds; soft; non-distended; non-tender; no hepatosplenomegaly.  EXT: Normal ROM. No clubbing, cyanosis or edema.  LYMPH: No acute cervical adenopathy.  NEURO: Alert, oriented. Grossly unremarkable. No focal deficits.  PSYCH: Cooperative, appropriate. VITAL SIGNS: I have reviewed nursing notes and confirm.  CONSTITUTIONAL: Well-developed; well-nourished; in no acute distress.  SKIN: Skin exam is warm and dry, no acute rash.  HEAD: Normocephalic; atraumatic.  EYES: PERRL, EOM intact; conjunctiva and sclera clear.  ENT: No nasal discharge; airway clear.   NECK: Supple; non tender.  CARD: S1, S2 normal; no murmurs, gallops, or rubs. Tachycardic, regular.   RESP: No wheezes, rales or rhonchi.  ABD: Normal bowel sounds; soft; non-distended; non-tender; no hepatosplenomegaly.  EXT: Normal ROM. No clubbing, cyanosis or edema.  NEURO: Alert, oriented. Grossly unremarkable. No focal deficits.

## 2019-02-10 NOTE — ED PROVIDER NOTE - OBJECTIVE STATEMENT
37yo F with ah/o EToH abuse, anxiety and depression, on Klonopin presents requesting detox from alcohol. Pt states she drinks a 5liter box of wine daily and her last drink was today. Pt denies any CP, SOB, fever, chills, nausea, vomiting, SI/HI/AVH.

## 2019-02-11 VITALS
RESPIRATION RATE: 18 BRPM | HEART RATE: 118 BPM | OXYGEN SATURATION: 95 % | SYSTOLIC BLOOD PRESSURE: 125 MMHG | DIASTOLIC BLOOD PRESSURE: 80 MMHG

## 2019-02-11 DIAGNOSIS — F10.220 ALCOHOL DEPENDENCE WITH INTOXICATION, UNCOMPLICATED: ICD-10-CM

## 2019-02-11 DIAGNOSIS — F10.920 ALCOHOL USE, UNSPECIFIED WITH INTOXICATION, UNCOMPLICATED: ICD-10-CM

## 2019-02-11 LAB
ALBUMIN SERPL ELPH-MCNC: 4.3 G/DL — SIGNIFICANT CHANGE UP (ref 3.5–5.2)
ALP SERPL-CCNC: 98 U/L — SIGNIFICANT CHANGE UP (ref 30–115)
ALT FLD-CCNC: 106 U/L — HIGH (ref 0–41)
AMMONIA BLD-MCNC: 37 UMOL/L — SIGNIFICANT CHANGE UP (ref 11–55)
ANION GAP SERPL CALC-SCNC: 16 MMOL/L — HIGH (ref 7–14)
APAP SERPL-MCNC: <5 UG/ML — LOW (ref 10–30)
APPEARANCE UR: CLEAR — SIGNIFICANT CHANGE UP
AST SERPL-CCNC: 116 U/L — HIGH (ref 0–41)
BASOPHILS # BLD AUTO: 0.06 K/UL — SIGNIFICANT CHANGE UP (ref 0–0.2)
BASOPHILS NFR BLD AUTO: 0.8 % — SIGNIFICANT CHANGE UP (ref 0–1)
BILIRUB DIRECT SERPL-MCNC: <0.2 MG/DL — SIGNIFICANT CHANGE UP (ref 0–0.2)
BILIRUB INDIRECT FLD-MCNC: >0.3 MG/DL — SIGNIFICANT CHANGE UP (ref 0.2–1.2)
BILIRUB SERPL-MCNC: 0.5 MG/DL — SIGNIFICANT CHANGE UP (ref 0.2–1.2)
BILIRUB UR-MCNC: NEGATIVE — SIGNIFICANT CHANGE UP
BUN SERPL-MCNC: 14 MG/DL — SIGNIFICANT CHANGE UP (ref 10–20)
CALCIUM SERPL-MCNC: 9.1 MG/DL — SIGNIFICANT CHANGE UP (ref 8.5–10.1)
CHLORIDE SERPL-SCNC: 100 MMOL/L — SIGNIFICANT CHANGE UP (ref 98–110)
CO2 SERPL-SCNC: 22 MMOL/L — SIGNIFICANT CHANGE UP (ref 17–32)
COLOR SPEC: YELLOW — SIGNIFICANT CHANGE UP
CREAT SERPL-MCNC: 0.6 MG/DL — LOW (ref 0.7–1.5)
DIFF PNL FLD: NEGATIVE — SIGNIFICANT CHANGE UP
EOSINOPHIL # BLD AUTO: 0.08 K/UL — SIGNIFICANT CHANGE UP (ref 0–0.7)
EOSINOPHIL NFR BLD AUTO: 1 % — SIGNIFICANT CHANGE UP (ref 0–8)
ETHANOL SERPL-MCNC: 237 MG/DL — HIGH
GLUCOSE SERPL-MCNC: 117 MG/DL — HIGH (ref 70–99)
GLUCOSE UR QL: NEGATIVE MG/DL — SIGNIFICANT CHANGE UP
HCT VFR BLD CALC: 42.4 % — SIGNIFICANT CHANGE UP (ref 37–47)
HGB BLD-MCNC: 14.2 G/DL — SIGNIFICANT CHANGE UP (ref 12–16)
HIV 1 & 2 AB SERPL IA.RAPID: SIGNIFICANT CHANGE UP
IMM GRANULOCYTES NFR BLD AUTO: 0.7 % — HIGH (ref 0.1–0.3)
KETONES UR-MCNC: NEGATIVE — SIGNIFICANT CHANGE UP
LEUKOCYTE ESTERASE UR-ACNC: NEGATIVE — SIGNIFICANT CHANGE UP
LIDOCAIN IGE QN: 38 U/L — SIGNIFICANT CHANGE UP (ref 7–60)
LYMPHOCYTES # BLD AUTO: 3.67 K/UL — HIGH (ref 1.2–3.4)
LYMPHOCYTES # BLD AUTO: 48 % — SIGNIFICANT CHANGE UP (ref 20.5–51.1)
MCHC RBC-ENTMCNC: 33.5 G/DL — SIGNIFICANT CHANGE UP (ref 32–37)
MCHC RBC-ENTMCNC: 33.9 PG — HIGH (ref 27–31)
MCV RBC AUTO: 101.2 FL — HIGH (ref 81–99)
MONOCYTES # BLD AUTO: 0.86 K/UL — HIGH (ref 0.1–0.6)
MONOCYTES NFR BLD AUTO: 11.3 % — HIGH (ref 1.7–9.3)
NEUTROPHILS # BLD AUTO: 2.92 K/UL — SIGNIFICANT CHANGE UP (ref 1.4–6.5)
NEUTROPHILS NFR BLD AUTO: 38.2 % — LOW (ref 42.2–75.2)
NITRITE UR-MCNC: NEGATIVE — SIGNIFICANT CHANGE UP
NRBC # BLD: 0 /100 WBCS — SIGNIFICANT CHANGE UP (ref 0–0)
PH UR: 7 — SIGNIFICANT CHANGE UP (ref 5–8)
PLATELET # BLD AUTO: 239 K/UL — SIGNIFICANT CHANGE UP (ref 130–400)
POTASSIUM SERPL-MCNC: 4.4 MMOL/L — SIGNIFICANT CHANGE UP (ref 3.5–5)
POTASSIUM SERPL-SCNC: 4.4 MMOL/L — SIGNIFICANT CHANGE UP (ref 3.5–5)
PROT SERPL-MCNC: 8.1 G/DL — HIGH (ref 6–8)
PROT UR-MCNC: NEGATIVE MG/DL — SIGNIFICANT CHANGE UP
RBC # BLD: 4.19 M/UL — LOW (ref 4.2–5.4)
RBC # FLD: 14.1 % — SIGNIFICANT CHANGE UP (ref 11.5–14.5)
SALICYLATES SERPL-MCNC: <0.3 MG/DL — LOW (ref 4–30)
SODIUM SERPL-SCNC: 138 MMOL/L — SIGNIFICANT CHANGE UP (ref 135–146)
SP GR SPEC: 1.01 — SIGNIFICANT CHANGE UP (ref 1.01–1.03)
UROBILINOGEN FLD QL: 0.2 MG/DL — SIGNIFICANT CHANGE UP (ref 0.2–0.2)
WBC # BLD: 7.64 K/UL — SIGNIFICANT CHANGE UP (ref 4.8–10.8)
WBC # FLD AUTO: 7.64 K/UL — SIGNIFICANT CHANGE UP (ref 4.8–10.8)

## 2019-02-11 RX ORDER — HALOPERIDOL DECANOATE 100 MG/ML
5 INJECTION INTRAMUSCULAR ONCE
Qty: 0 | Refills: 0 | Status: COMPLETED | OUTPATIENT
Start: 2019-02-11 | End: 2019-02-11

## 2019-02-11 RX ADMIN — Medication 2 MILLIGRAM(S): at 03:00

## 2019-02-11 RX ADMIN — Medication 25 MILLIGRAM(S): at 01:50

## 2019-02-11 RX ADMIN — Medication 2 MILLIGRAM(S): at 00:23

## 2019-02-11 RX ADMIN — HALOPERIDOL DECANOATE 5 MILLIGRAM(S): 100 INJECTION INTRAMUSCULAR at 03:00

## 2019-02-11 RX ADMIN — SODIUM CHLORIDE 2000 MILLILITER(S): 9 INJECTION, SOLUTION INTRAVENOUS at 00:24

## 2019-02-11 RX ADMIN — Medication 50 MILLIGRAM(S): at 01:15

## 2019-02-11 NOTE — ED ADULT NURSE REASSESSMENT NOTE - NS ED NURSE REASSESS COMMENT FT1
Pt was cleared for detox. HR decreased to 98 and report was given. Pt HR increased to 139.  MD and PA made aware and 50mg librium rx'd and given as ordered.  HR did not decrease after 30min and 25mg librium ordered and given.  Will continue to monitor.
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
## 2019-02-11 NOTE — ED ADULT NURSE REASSESSMENT NOTE - NS ED NURSE REASSESS COMMENT FT1
Pt medicated with Ativan at 0011. At 0020, pt became irate that ativan IV wasn't working for her.  Pt demanded Trazadone to sleep.  Pt was informed that we will not give her any medication in the ed while being evaluated.  Pt pulled out her IV and began yelling at the staff.  RN and PA tried to calm the patient and inform her that she would be admitted as soon as the blood tests were resulted.  Will continue to monitor

## 2019-02-11 NOTE — BEHAVIORAL HEALTH ASSESSMENT NOTE - HPI (INCLUDE ILLNESS QUALITY, SEVERITY, DURATION, TIMING, CONTEXT, MODIFYING FACTORS, ASSOCIATED SIGNS AND SYMPTOMS)
Patient is a 39 y/o White female single never  with one 3 y/o son who lives with the father, currently unemployed and lives alone by herself who reported that she called the ambulance to come to ER due to "I wanted to go to detox."  Patient reported that she has history of anxiety treated by her PCP with Gabapentin and denies any history of psychiatric hospitalization as well as denies any history of any suicidal or homicidal risk behavior.  Pt reported that she has been drinking alcohol since age 28 y/o, a bottle of wine almost everyday until about  2 months ago she went to detox and inpatient rehab but immediately relapsed upon discharged and has been drinking since then which reported to have increased within the last 2 weeks.  Pt reported that she was drinking wine all day yesterday and decided to come to ER while intoxicated for detox late last night and while being evaluated became severely agitated,, aggressive, "Threatening physical harm towards staff/others" required restraints for safety and Haloperidol 5 mg IM STAT together with lorazepam 2 mg IM STAT for control of dangerous behavior.  Pt 's alcohol level revealed a value of 237.  Pt became sober and upon medical clearance was referred for psychiatric consultation.  On examination, pt was seen and evaluated sitting-up on bed wearing hospital gown with ER nurse present currently sober, awake, alert, conscious and cooperative.  Speech is coherent normal rate and tone.  Affect appears appropriate to situation and ideation.  Mood currently calm, denies any depression.  Pt states that she does not remember about what happened and is "Ashamed." thinking is relevant with no overt thought disorder.  No overt delusion. Denies any hallucination, Pt denies any suicidal or homicidal ideation, plan or intent to die at present time. She states that she will follow-up with her PCP and go to [facility] for detox and rehab.  Pt does not pose any substantial risk of harm to self/others at this time and is psychiatrically cleared for discharge with recommendation to continue follow-up care  with PCP and to go for alcohol detox and rehab.

## 2019-02-11 NOTE — BEHAVIORAL HEALTH ASSESSMENT NOTE - NSBHREFERDETAILS_PSY_A_CORE_FT
Patient came to ER intoxicated with alcohol requesting detox and became "Agitated, aggressive and threatening physical harm towards staff."

## 2019-02-11 NOTE — BEHAVIORAL HEALTH ASSESSMENT NOTE - SUMMARY
Patient is a 39 y/o White female single never  with one 5 y/o son who lives with the father, currently unemployed and lives alone by herself who reported that she called the ambulance to come to ER due to "I wanted to go to detox."  Patient presents with history consistent with anxiety and alcohol use disorder with no history of any psychiatric hospitalization and no history of any suicidal or homicidal risk behavior currently complaint with follow-up care with PCP.   Pt presented to ER with a clinical picture consisting of symptoms manifested by agitation, verbally aggressive behavior, mood lability, impaired judgment with impairment in attention and memory that developed after recent alcohol ingestion with blood alcohol level 237,  Pt has history of alcohol cravings and tolerance unable to stop and relapsed from drinking with recurrent alcohol use resulting in failure to fulfill major role obligation. Pt meets the criteria for diagnosis of Alcohol Intoxication and Alcohol; Use Disorder.  Non-compliant with alcohol use disorder treatment, continuous alcohol use and poor support system significantly has contributed to pt's current illness. Pt does not pose any substantial risk of harm to self/others at this time and is psychiatrically cleared for discharge with recommendation to continue follow-up care  with PCP and to go for alcohol detox and rehab.

## 2019-02-11 NOTE — BEHAVIORAL HEALTH ASSESSMENT NOTE - NSBHSUICPROTECTFACT_PSY_A_CORE
Identifies reasons for living/Other/Supportive social network or family/Responsibility to family and others/Future oriented
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
## 2019-02-12 LAB
HAV IGM SER-ACNC: SIGNIFICANT CHANGE UP
HBV CORE IGM SER-ACNC: SIGNIFICANT CHANGE UP
HBV SURFACE AG SER-ACNC: SIGNIFICANT CHANGE UP
HCV AB S/CO SERPL IA: 0.09 S/CO — SIGNIFICANT CHANGE UP
HCV AB SERPL-IMP: SIGNIFICANT CHANGE UP
T PALLIDUM AB TITR SER: NEGATIVE — SIGNIFICANT CHANGE UP

## 2019-02-15 ENCOUNTER — INPATIENT (INPATIENT)
Facility: HOSPITAL | Age: 39
LOS: 3 days | Discharge: NURSING, REHAB OR LONG-TERM CARE | End: 2019-02-19
Attending: INTERNAL MEDICINE | Admitting: INTERNAL MEDICINE

## 2019-02-15 VITALS
HEART RATE: 136 BPM | DIASTOLIC BLOOD PRESSURE: 82 MMHG | TEMPERATURE: 96 F | RESPIRATION RATE: 20 BRPM | OXYGEN SATURATION: 97 % | SYSTOLIC BLOOD PRESSURE: 138 MMHG | WEIGHT: 190.04 LBS

## 2019-02-15 DIAGNOSIS — F41.9 ANXIETY DISORDER, UNSPECIFIED: ICD-10-CM

## 2019-02-15 DIAGNOSIS — Z98.891 HISTORY OF UTERINE SCAR FROM PREVIOUS SURGERY: Chronic | ICD-10-CM

## 2019-02-15 DIAGNOSIS — Z98.890 OTHER SPECIFIED POSTPROCEDURAL STATES: Chronic | ICD-10-CM

## 2019-02-15 DIAGNOSIS — F10.20 ALCOHOL DEPENDENCE, UNCOMPLICATED: ICD-10-CM

## 2019-02-15 DIAGNOSIS — Z71.89 OTHER SPECIFIED COUNSELING: ICD-10-CM

## 2019-02-15 LAB
ALBUMIN SERPL ELPH-MCNC: 4.1 G/DL — SIGNIFICANT CHANGE UP (ref 3.5–5.2)
ALP SERPL-CCNC: 89 U/L — SIGNIFICANT CHANGE UP (ref 30–115)
ALT FLD-CCNC: 105 U/L — HIGH (ref 0–41)
AMMONIA BLD-MCNC: 47 UMOL/L — SIGNIFICANT CHANGE UP (ref 11–55)
AMPHET UR-MCNC: NEGATIVE — SIGNIFICANT CHANGE UP
AMYLASE P1 CFR SERPL: 55 U/L — SIGNIFICANT CHANGE UP (ref 25–115)
ANION GAP SERPL CALC-SCNC: 16 MMOL/L — HIGH (ref 7–14)
APAP SERPL-MCNC: <5 UG/ML — LOW (ref 10–30)
APPEARANCE UR: CLEAR — SIGNIFICANT CHANGE UP
AST SERPL-CCNC: 136 U/L — HIGH (ref 0–41)
BARBITURATES UR SCN-MCNC: NEGATIVE — SIGNIFICANT CHANGE UP
BASOPHILS # BLD AUTO: 0.04 K/UL — SIGNIFICANT CHANGE UP (ref 0–0.2)
BASOPHILS NFR BLD AUTO: 0.6 % — SIGNIFICANT CHANGE UP (ref 0–1)
BENZODIAZ UR-MCNC: POSITIVE
BILIRUB SERPL-MCNC: 0.4 MG/DL — SIGNIFICANT CHANGE UP (ref 0.2–1.2)
BILIRUB UR-MCNC: NEGATIVE — SIGNIFICANT CHANGE UP
BUN SERPL-MCNC: 14 MG/DL — SIGNIFICANT CHANGE UP (ref 10–20)
CALCIUM SERPL-MCNC: 9 MG/DL — SIGNIFICANT CHANGE UP (ref 8.5–10.1)
CHLORIDE SERPL-SCNC: 102 MMOL/L — SIGNIFICANT CHANGE UP (ref 98–110)
CO2 SERPL-SCNC: 21 MMOL/L — SIGNIFICANT CHANGE UP (ref 17–32)
COCAINE METAB.OTHER UR-MCNC: NEGATIVE — SIGNIFICANT CHANGE UP
COLOR SPEC: YELLOW — SIGNIFICANT CHANGE UP
CREAT SERPL-MCNC: <0.5 MG/DL — LOW (ref 0.7–1.5)
DIFF PNL FLD: NEGATIVE — SIGNIFICANT CHANGE UP
EOSINOPHIL # BLD AUTO: 0.06 K/UL — SIGNIFICANT CHANGE UP (ref 0–0.7)
EOSINOPHIL NFR BLD AUTO: 0.8 % — SIGNIFICANT CHANGE UP (ref 0–8)
ETHANOL SERPL-MCNC: 130 MG/DL — HIGH
GLUCOSE SERPL-MCNC: 115 MG/DL — HIGH (ref 70–99)
GLUCOSE UR QL: 100 MG/DL
HCT VFR BLD CALC: 38 % — SIGNIFICANT CHANGE UP (ref 37–47)
HGB BLD-MCNC: 12.7 G/DL — SIGNIFICANT CHANGE UP (ref 12–16)
IMM GRANULOCYTES NFR BLD AUTO: 1.3 % — HIGH (ref 0.1–0.3)
KETONES UR-MCNC: NEGATIVE — SIGNIFICANT CHANGE UP
LEUKOCYTE ESTERASE UR-ACNC: NEGATIVE — SIGNIFICANT CHANGE UP
LYMPHOCYTES # BLD AUTO: 2.35 K/UL — SIGNIFICANT CHANGE UP (ref 1.2–3.4)
LYMPHOCYTES # BLD AUTO: 32.8 % — SIGNIFICANT CHANGE UP (ref 20.5–51.1)
MCHC RBC-ENTMCNC: 33.4 G/DL — SIGNIFICANT CHANGE UP (ref 32–37)
MCHC RBC-ENTMCNC: 33.4 PG — HIGH (ref 27–31)
MCV RBC AUTO: 100 FL — HIGH (ref 81–99)
METHADONE UR-MCNC: NEGATIVE — SIGNIFICANT CHANGE UP
MONOCYTES # BLD AUTO: 0.67 K/UL — HIGH (ref 0.1–0.6)
MONOCYTES NFR BLD AUTO: 9.4 % — HIGH (ref 1.7–9.3)
NEUTROPHILS # BLD AUTO: 3.95 K/UL — SIGNIFICANT CHANGE UP (ref 1.4–6.5)
NEUTROPHILS NFR BLD AUTO: 55.1 % — SIGNIFICANT CHANGE UP (ref 42.2–75.2)
NITRITE UR-MCNC: NEGATIVE — SIGNIFICANT CHANGE UP
NRBC # BLD: 0 /100 WBCS — SIGNIFICANT CHANGE UP (ref 0–0)
OPIATES UR-MCNC: NEGATIVE — SIGNIFICANT CHANGE UP
PCP SPEC-MCNC: SIGNIFICANT CHANGE UP
PH UR: 5.5 — SIGNIFICANT CHANGE UP (ref 5–8)
PLATELET # BLD AUTO: 216 K/UL — SIGNIFICANT CHANGE UP (ref 130–400)
POTASSIUM SERPL-MCNC: 4.4 MMOL/L — SIGNIFICANT CHANGE UP (ref 3.5–5)
POTASSIUM SERPL-SCNC: 4.4 MMOL/L — SIGNIFICANT CHANGE UP (ref 3.5–5)
PROPOXYPHENE QUALITATIVE URINE RESULT: NEGATIVE — SIGNIFICANT CHANGE UP
PROT SERPL-MCNC: 7 G/DL — SIGNIFICANT CHANGE UP (ref 6–8)
PROT UR-MCNC: NEGATIVE MG/DL — SIGNIFICANT CHANGE UP
RBC # BLD: 3.8 M/UL — LOW (ref 4.2–5.4)
RBC # FLD: 14.3 % — SIGNIFICANT CHANGE UP (ref 11.5–14.5)
SALICYLATES SERPL-MCNC: <0.3 MG/DL — LOW (ref 4–30)
SODIUM SERPL-SCNC: 139 MMOL/L — SIGNIFICANT CHANGE UP (ref 135–146)
SP GR SPEC: 1.02 — SIGNIFICANT CHANGE UP (ref 1.01–1.03)
TROPONIN T SERPL-MCNC: <0.01 NG/ML — SIGNIFICANT CHANGE UP
UROBILINOGEN FLD QL: 0.2 MG/DL — SIGNIFICANT CHANGE UP (ref 0.2–0.2)
WBC # BLD: 7.16 K/UL — SIGNIFICANT CHANGE UP (ref 4.8–10.8)
WBC # FLD AUTO: 7.16 K/UL — SIGNIFICANT CHANGE UP (ref 4.8–10.8)

## 2019-02-15 RX ORDER — SODIUM CHLORIDE 9 MG/ML
2000 INJECTION INTRAMUSCULAR; INTRAVENOUS; SUBCUTANEOUS ONCE
Qty: 0 | Refills: 0 | Status: COMPLETED | OUTPATIENT
Start: 2019-02-15 | End: 2019-02-15

## 2019-02-15 RX ORDER — PANTOPRAZOLE SODIUM 20 MG/1
40 TABLET, DELAYED RELEASE ORAL
Qty: 0 | Refills: 0 | Status: DISCONTINUED | OUTPATIENT
Start: 2019-02-15 | End: 2019-02-19

## 2019-02-15 RX ORDER — PHENOBARBITAL 60 MG
32.4 TABLET ORAL EVERY 6 HOURS
Qty: 0 | Refills: 0 | Status: DISCONTINUED | OUTPATIENT
Start: 2019-02-17 | End: 2019-02-17

## 2019-02-15 RX ORDER — PANTOPRAZOLE SODIUM 20 MG/1
40 TABLET, DELAYED RELEASE ORAL ONCE
Qty: 0 | Refills: 0 | Status: COMPLETED | OUTPATIENT
Start: 2019-02-15 | End: 2019-02-16

## 2019-02-15 RX ORDER — IBUPROFEN 200 MG
400 TABLET ORAL EVERY 8 HOURS
Qty: 0 | Refills: 0 | Status: DISCONTINUED | OUTPATIENT
Start: 2019-02-15 | End: 2019-02-19

## 2019-02-15 RX ORDER — HYDROXYZINE HCL 10 MG
50 TABLET ORAL EVERY 6 HOURS
Qty: 0 | Refills: 0 | Status: DISCONTINUED | OUTPATIENT
Start: 2019-02-15 | End: 2019-02-19

## 2019-02-15 RX ORDER — TUBERCULIN PURIFIED PROTEIN DERIVATIVE 5 [IU]/.1ML
5 INJECTION, SOLUTION INTRADERMAL ONCE
Qty: 0 | Refills: 0 | Status: COMPLETED | OUTPATIENT
Start: 2019-02-15 | End: 2019-02-16

## 2019-02-15 RX ORDER — PHENOBARBITAL 60 MG
TABLET ORAL
Qty: 0 | Refills: 0 | Status: COMPLETED | OUTPATIENT
Start: 2019-02-15 | End: 2019-02-19

## 2019-02-15 RX ORDER — MULTIVIT-MIN/FERROUS GLUCONATE 9 MG/15 ML
1 LIQUID (ML) ORAL DAILY
Qty: 0 | Refills: 0 | Status: DISCONTINUED | OUTPATIENT
Start: 2019-02-15 | End: 2019-02-19

## 2019-02-15 RX ORDER — PHENOBARBITAL 60 MG
32.4 TABLET ORAL EVERY 12 HOURS
Qty: 0 | Refills: 0 | Status: DISCONTINUED | OUTPATIENT
Start: 2019-02-18 | End: 2019-02-19

## 2019-02-15 RX ORDER — GABAPENTIN 400 MG/1
800 CAPSULE ORAL THREE TIMES A DAY
Qty: 0 | Refills: 0 | Status: DISCONTINUED | OUTPATIENT
Start: 2019-02-15 | End: 2019-02-19

## 2019-02-15 RX ORDER — THIAMINE MONONITRATE (VIT B1) 100 MG
100 TABLET ORAL DAILY
Qty: 0 | Refills: 0 | Status: COMPLETED | OUTPATIENT
Start: 2019-02-15 | End: 2019-02-18

## 2019-02-15 RX ORDER — PHENOBARBITAL 60 MG
32.4 TABLET ORAL EVERY 6 HOURS
Qty: 0 | Refills: 0 | Status: DISCONTINUED | OUTPATIENT
Start: 2019-02-15 | End: 2019-02-17

## 2019-02-15 RX ORDER — HYDROXYZINE HCL 10 MG
100 TABLET ORAL AT BEDTIME
Qty: 0 | Refills: 0 | Status: DISCONTINUED | OUTPATIENT
Start: 2019-02-15 | End: 2019-02-16

## 2019-02-15 RX ORDER — MAGNESIUM HYDROXIDE 400 MG/1
30 TABLET, CHEWABLE ORAL ONCE
Qty: 0 | Refills: 0 | Status: DISCONTINUED | OUTPATIENT
Start: 2019-02-15 | End: 2019-02-19

## 2019-02-15 RX ORDER — TRAZODONE HCL 50 MG
0 TABLET ORAL
Qty: 0 | Refills: 0 | COMMUNITY

## 2019-02-15 RX ORDER — TRAZODONE HCL 50 MG
100 TABLET ORAL AT BEDTIME
Qty: 0 | Refills: 0 | Status: DISCONTINUED | OUTPATIENT
Start: 2019-02-15 | End: 2019-02-19

## 2019-02-15 RX ORDER — METHOCARBAMOL 500 MG/1
500 TABLET, FILM COATED ORAL EVERY 6 HOURS
Qty: 0 | Refills: 0 | Status: DISCONTINUED | OUTPATIENT
Start: 2019-02-15 | End: 2019-02-19

## 2019-02-15 RX ADMIN — SODIUM CHLORIDE 2000 MILLILITER(S): 9 INJECTION INTRAMUSCULAR; INTRAVENOUS; SUBCUTANEOUS at 11:22

## 2019-02-15 RX ADMIN — Medication 100 MILLIGRAM(S): at 23:54

## 2019-02-15 RX ADMIN — Medication 0.1 MILLIGRAM(S): at 17:56

## 2019-02-15 RX ADMIN — METHOCARBAMOL 500 MILLIGRAM(S): 500 TABLET, FILM COATED ORAL at 17:56

## 2019-02-15 RX ADMIN — GABAPENTIN 800 MILLIGRAM(S): 400 CAPSULE ORAL at 21:54

## 2019-02-15 RX ADMIN — Medication 32.4 MILLIGRAM(S): at 17:57

## 2019-02-15 RX ADMIN — Medication 400 MILLIGRAM(S): at 17:56

## 2019-02-15 RX ADMIN — Medication 1 MILLIGRAM(S): at 11:21

## 2019-02-15 RX ADMIN — Medication 32.4 MILLIGRAM(S): at 23:54

## 2019-02-15 RX ADMIN — Medication 25 MILLIGRAM(S): at 11:21

## 2019-02-15 NOTE — H&P ADULT - NSHPLABSRESULTS_GEN_ALL_CORE
12.7   7.16  )-----------( 216      ( 15 Feb 2019 11:20 )             38.0       02-15    139  |  102  |  14  ----------------------------<  115<H>  4.4   |  21  |  <0.5<L>    Ca    9.0      15 Feb 2019 11:20    TPro  7.0  /  Alb  4.1  /  TBili  0.4  /  DBili  x   /  AST  136<H>  /  ALT  105<H>  /  AlkPhos  89  02-15              Urinalysis Basic - ( 15 Feb 2019 11:20 )    Color: Yellow / Appearance: Clear / S.020 / pH: x  Gluc: x / Ketone: Negative  / Bili: Negative / Urobili: 0.2 mg/dL   Blood: x / Protein: Negative mg/dL / Nitrite: Negative   Leuk Esterase: Negative / RBC: x / WBC x   Sq Epi: x / Non Sq Epi: x / Bacteria: x    CARDIAC MARKERS ( 15 Feb 2019 11:20 )  x     / <0.01 ng/mL / x     / x     / x      < from: CT Abdomen and Pelvis w/ IV Cont (02.15.19 @ 12:31) >    IMPRESSION: Unremarkable CT of the abdomen and pelvis. No significant   changes.     Xray Chest 2 Views PA/Lat (02.15.19 @ 10:12) >      Impression:      No radiographic evidence of acute cardiopulmonary disease.

## 2019-02-15 NOTE — ED PROVIDER NOTE - PROGRESS NOTE DETAILS
patient resting comfortably in ED.  on the monitor. Baseline tachycardia as per patient, will admit to detox

## 2019-02-15 NOTE — H&P ADULT - NSHPPHYSICALEXAM_GEN_ALL_CORE
PHYSICAL EXAM:    Vital Signs Last 24 Hrs    T(F): 96.4 (02-15-19 @ 15:45), Max: 96.4 (02-15-19 @ 15:45)  HR: 111 (02-15-19 @ 15:45) (110 - 136)  BP: 137/82 (02-15-19 @ 15:45)  RR: 16 (02-15-19 @ 15:45) (16 - 20)  SpO2: 97% (02-15-19 @ 13:47) (97% - 97%)    Constitutional: NAD, A&O x3    Eyes: PERRLA    Respiratory: +air entry, no rales, no rhonchi, no wheezes    Cardiovascular: +S1 and S2, regular rate and rhythm    Gastrointestinal: ++ Protuberant abdomen, +BS, firm but not tense, non-tender    Extremities:  mild pedal  edema, no calf tenderness    Vascular: +dorsal pedis and radial pulses, no extremity cyanosis    Neurological: sensation intact, ROM equal B/L, CN II-XII intact    Skin: no rashes, normal turgor

## 2019-02-15 NOTE — H&P ADULT - ATTENDING COMMENTS
Patient interviewed and examined.    Chart reviewed.    PA's H&P noted and modified, as appropriate.    Case discussed on team rounds    Following is my summary of the case.    Admitted for detox: from ____ED, _x__Intake, ____Med/Surg Floor    Alcohol__x__   Opioid_____  Benzo___ Other_____    Substance amount, duration of use, last usage, and prior attempts at detox or rehabs, are outlined above in the H&P and discussed with patient.    Associated withdrawal symptoms presents.  Comorbid conditions noted. Chronic and Stable.    Past Medical Hx, Psych Hx, family Hx, Social Hx from H&P reviewed and NO changes.    Old medical record and medication Hx. Reviewed    Following items reviewed and addressed:  1. labs  2. EKG  3. Imaging from PACs module    Examination: no change from PA's exam.  Obesity (++)  Place on following protocol  _____Medically Managed  __X__Medically Supervised    Ciwa__x___Librium taper____Ativan taper___Methadone taper___ Phenobarb taper____ Suboxone Induction____MMTP____    Narcan Kit Offered    Psych Consult ____N/A  _x__Ordered    Physical Therapy  ___X n/a   ___  Ordered    Aftercare disposition to be addressed by counselors.    Estimated length of stay 3-5 days.

## 2019-02-15 NOTE — ED PROVIDER NOTE - PHYSICAL EXAMINATION
VITALS:  I have reviewed the initial vital signs.  GENERAL: Well-developed, well-nourished, in no acute distress.   HEENT: Sclera clear. EOMI, PERRLA. Mucous membranes moist, oropharynx nonerythematous without exudate.  NECK: supple w FROM. No cervical adenopathy.  CARDIO: tachycardic, nl S1 and S2. No murmurs, rubs, or gallops. No edema. 2+ radial and DP pulses bilaterally. No chest wall tenderness.  PULM: Normal effort. CTA b/l without wheezes, rales, or rhonchi.  MSK: Steady, normal gait.  GI: Normal bowel sounds. Abdomen soft and non-distended. Nontender in all four quadrants without rebound or guarding.  : No CVA tenderness b/l.  SKIN: Warm, dry. No pallor or rashes. Capillary refill <2 seconds.  NEURO: A&Ox3. Speech clear. Moving all four extremities equally throughout. No tremor. No tongue fasciculations.   PSYCH: normal affect. answering questions appropriately, follows commands. good eye contact.

## 2019-02-15 NOTE — H&P ADULT - HISTORY OF PRESENT ILLNESS
39 y/o female admitted for Alcohol Dependency.  -drinking X 18 yrs, drinks all day long  -AVG intake  4: liters wine/day  Tremors: no,  Blackouts: +,  Hallucinations: No, Withdrawal Seizures: No    Drugs: No  Tobacco Yes: X 5 yrs, 4 cigarettes/day  Many attempts at Detox

## 2019-02-15 NOTE — ED PROVIDER NOTE - NS ED ROS FT
CONSTITUTIONAL: (-) fevers, (-) chills, (-) fatigue, (-) diaphoresis  EYES: (-) vision changes, (-) blurry vision, (-) double vision  ENT: (-) congestion, (-) rhinorrhea  NECK: (-) neck pain, (-) neck stiffness  CARDIO: see HPI  PULM: (-) cough, (-) sputum, (+) shortness of breath, (-) wheezing, (-) hemoptysis  GI: (-) nausea, (-) vomiting, (-) diarrhea, (-) abdominal pain, (-) melena, (-) hematochezia  : (-) dysuria, (-) hematuria, (-) frequency, (-) urgency, (-) urinary retention, (-) flank pain  MSK: (-) back pain, (-) myalgias, (-) gait difficulty  SKIN: (-) rashes, (-) pallor, (-) ecchymosis  NEURO: (-) headache, (-) dizziness, (-) lightheadedness, (-) syncope, (-) weakness

## 2019-02-15 NOTE — ED PROVIDER NOTE - ATTENDING CONTRIBUTION TO CARE
I was present for and supervised the key and critical aspects of the procedures performed during the care of the patient. Patient presents for evaluation of palpitations after drinking she drank several hours prior to evaluation and notes that this is typical for her withdrawal symptoms she is not homicidal or suicidal at this time she denies any chest pain but expresses concerns for worsening abdominal pain and bloating that is chronic over a period of months. She denies any dysuria, hesitancy or frequency she denies any other back pain.   On physical exam she is nc/at perrla eomi oropharynx clear cta b/l, rrr s1s2 sinus tachycardia, cta b/l, abd- nt nd bs + no guarding no rebound ext from with no focal deficits   A/P- we obtained iv fluids, given iv benzos her rate improved we obtained labs as well as ct which are negative at this time I will admit to detox for further management

## 2019-02-15 NOTE — ED PROVIDER NOTE - CLINICAL SUMMARY MEDICAL DECISION MAKING FREE TEXT BOX
Patient presents for evaluation of potential withdrawal from alcohol he denies any other co-ingestions at this time she is not homicidal or suicidal, patient presents for palpitations she notes that theses are typical for her withdrawal symptoms.

## 2019-02-15 NOTE — ED PROVIDER NOTE - CARE PLAN
Principal Discharge DX:	Continuous uncomplicated alcohol intoxication  Secondary Diagnosis:	Generalized abdominal pain

## 2019-02-15 NOTE — H&P ADULT - PMH
Alcoholism    Anxiety    Benzodiazepine misuse    Depression    Duodenitis    Esophagitis    Gastritis    GERD (gastroesophageal reflux disease)    Graves disease  now in remission  Graves' disease    Heart murmur    History of Graves' disease    Hypercholesterolemia    Hyperlipidemia, unspecified hyperlipidemia type    MVP (mitral valve prolapse)    MVP (mitral valve prolapse)    Nicotine dependence    Obesity

## 2019-02-16 LAB
HAV IGM SER-ACNC: SIGNIFICANT CHANGE UP
HBV CORE IGM SER-ACNC: SIGNIFICANT CHANGE UP
HBV SURFACE AG SER-ACNC: SIGNIFICANT CHANGE UP
HCV AB S/CO SERPL IA: 0.13 S/CO — SIGNIFICANT CHANGE UP
HCV AB SERPL-IMP: SIGNIFICANT CHANGE UP
T PALLIDUM AB TITR SER: NEGATIVE — SIGNIFICANT CHANGE UP

## 2019-02-16 RX ADMIN — PANTOPRAZOLE SODIUM 40 MILLIGRAM(S): 20 TABLET, DELAYED RELEASE ORAL at 09:34

## 2019-02-16 RX ADMIN — Medication 400 MILLIGRAM(S): at 11:05

## 2019-02-16 RX ADMIN — Medication 1 TABLET(S): at 09:35

## 2019-02-16 RX ADMIN — Medication 32.4 MILLIGRAM(S): at 18:19

## 2019-02-16 RX ADMIN — METHOCARBAMOL 500 MILLIGRAM(S): 500 TABLET, FILM COATED ORAL at 16:08

## 2019-02-16 RX ADMIN — Medication 50 MILLIGRAM(S): at 16:08

## 2019-02-16 RX ADMIN — GABAPENTIN 800 MILLIGRAM(S): 400 CAPSULE ORAL at 21:07

## 2019-02-16 RX ADMIN — Medication 32.4 MILLIGRAM(S): at 12:26

## 2019-02-16 RX ADMIN — Medication 50 MILLIGRAM(S): at 22:06

## 2019-02-16 RX ADMIN — GABAPENTIN 800 MILLIGRAM(S): 400 CAPSULE ORAL at 12:26

## 2019-02-16 RX ADMIN — Medication 400 MILLIGRAM(S): at 22:01

## 2019-02-16 RX ADMIN — METHOCARBAMOL 500 MILLIGRAM(S): 500 TABLET, FILM COATED ORAL at 09:41

## 2019-02-16 RX ADMIN — METHOCARBAMOL 500 MILLIGRAM(S): 500 TABLET, FILM COATED ORAL at 22:02

## 2019-02-16 RX ADMIN — TUBERCULIN PURIFIED PROTEIN DERIVATIVE 5 UNIT(S): 5 INJECTION, SOLUTION INTRADERMAL at 09:22

## 2019-02-16 RX ADMIN — Medication 32.4 MILLIGRAM(S): at 06:22

## 2019-02-16 RX ADMIN — Medication 50 MILLIGRAM(S): at 09:41

## 2019-02-16 RX ADMIN — PANTOPRAZOLE SODIUM 40 MILLIGRAM(S): 20 TABLET, DELAYED RELEASE ORAL at 06:23

## 2019-02-16 RX ADMIN — Medication 30 MILLILITER(S): at 19:45

## 2019-02-16 RX ADMIN — GABAPENTIN 800 MILLIGRAM(S): 400 CAPSULE ORAL at 09:35

## 2019-02-16 RX ADMIN — Medication 400 MILLIGRAM(S): at 10:05

## 2019-02-16 RX ADMIN — Medication 100 MILLIGRAM(S): at 09:36

## 2019-02-17 RX ADMIN — GABAPENTIN 800 MILLIGRAM(S): 400 CAPSULE ORAL at 12:21

## 2019-02-17 RX ADMIN — Medication 400 MILLIGRAM(S): at 12:48

## 2019-02-17 RX ADMIN — Medication 100 MILLIGRAM(S): at 21:12

## 2019-02-17 RX ADMIN — Medication 32.4 MILLIGRAM(S): at 00:13

## 2019-02-17 RX ADMIN — Medication 32.4 MILLIGRAM(S): at 05:41

## 2019-02-17 RX ADMIN — GABAPENTIN 800 MILLIGRAM(S): 400 CAPSULE ORAL at 21:12

## 2019-02-17 RX ADMIN — METHOCARBAMOL 500 MILLIGRAM(S): 500 TABLET, FILM COATED ORAL at 11:33

## 2019-02-17 RX ADMIN — Medication 32.4 MILLIGRAM(S): at 12:21

## 2019-02-17 RX ADMIN — Medication 32.4 MILLIGRAM(S): at 18:24

## 2019-02-17 RX ADMIN — Medication 50 MILLIGRAM(S): at 11:34

## 2019-02-17 RX ADMIN — Medication 400 MILLIGRAM(S): at 11:33

## 2019-02-17 RX ADMIN — Medication 1 TABLET(S): at 09:55

## 2019-02-17 RX ADMIN — Medication 30 MILLILITER(S): at 04:49

## 2019-02-17 RX ADMIN — Medication 50 MILLIGRAM(S): at 17:40

## 2019-02-17 RX ADMIN — METHOCARBAMOL 500 MILLIGRAM(S): 500 TABLET, FILM COATED ORAL at 17:40

## 2019-02-17 RX ADMIN — Medication 100 MILLIGRAM(S): at 09:55

## 2019-02-17 RX ADMIN — Medication 100 MILLIGRAM(S): at 00:13

## 2019-02-17 RX ADMIN — PANTOPRAZOLE SODIUM 40 MILLIGRAM(S): 20 TABLET, DELAYED RELEASE ORAL at 05:41

## 2019-02-17 RX ADMIN — GABAPENTIN 800 MILLIGRAM(S): 400 CAPSULE ORAL at 09:55

## 2019-02-17 NOTE — CHART NOTE - NSCHARTNOTEFT_GEN_A_CORE
Subsequent Inpatient Encounter                                       Detox Unit    AURELIA NUR   38y   Female      Chief Complaint:    Follow up for Alcohol  Dependency    HPI:     I reviewed previous notes. No Change, except if noted below.             Detail:_    ROS:   I reviewed with patient.  No changes from previous notes except if noted below.             Detail: _    PFSH I reviewed with patient. No changes from previous notes except if noted below.             Detail_    Medication reconciliation performed.    MEDICATIONS  (STANDING):  gabapentin 800 milliGRAM(s) Oral three times a day  multivitamin/minerals 1 Tablet(s) Oral daily  pantoprazole    Tablet 40 milliGRAM(s) Oral before breakfast  PHENobarbital   Oral   PHENobarbital 32.4 milliGRAM(s) Oral every 6 hours  thiamine 100 milliGRAM(s) Oral daily  traZODone 100 milliGRAM(s) Oral at bedtime      MEDICATIONS  (PRN):  aluminum hydroxide/magnesium hydroxide/simethicone Suspension 30 milliLiter(s) Oral every 6 hours PRN Heartburn  bismuth subsalicylate Liquid 30 milliLiter(s) Oral every 6 hours PRN Diarrhea  cloNIDine 0.1 milliGRAM(s) Oral every 8 hours PRN Blood Pressure GREATER THAN 140/90 mmHG  guaiFENesin/dextromethorphan  Syrup 5 milliLiter(s) Oral every 4 hours PRN Cough  hydrOXYzine hydrochloride 50 milliGRAM(s) Oral every 6 hours PRN Anxiety  ibuprofen  Tablet. 400 milliGRAM(s) Oral every 8 hours PRN Mild Pain (1 - 3)  magnesium hydroxide Suspension 30 milliLiter(s) Oral once PRN Constipation  methocarbamol 500 milliGRAM(s) Oral every 6 hours PRN muscle pain  trimethobenzamide 300 milliGRAM(s) Oral every 6 hours PRN Nausea and/or Vomiting  trimethobenzamide Injectable 200 milliGRAM(s) IntraMuscular every 6 hours PRN Nausea and/or Vomiting      T(C): 35.9 (19 @ 06:16), Max: 35.9 (19 @ 06:16)  HR: 112 (19 @ 06:16) (92 - 124)  BP: 106/58 (19 @ 06:16) (103/49 - 140/73)  RR: 16 (19 @ 06:16) (16 - 17)  SpO2: --    PHYSICAL EXAM:      Constitutional: NAD, A&O x3    Eyes: PERRLA, no conjuctivitis    Neck: no lymphadenopathy    Respiratory: +air entry, no rales, no rhonchi, no wheezes    Cardiovascular: +S1 and S2, regular rate and rhythm    Gastrointestinal: +BS, soft, non-tender, not distended    Extremities:  no edema, no calf tenderness    Skin: no rashes, normal turgor                            12.7   7.16  )-----------( 216      ( 15 Feb 2019 11:20 )             38.0   02-15    139  |  102  |  14  ----------------------------<  115<H>  4.4   |  21  |  <0.5<L>    Ca    9.0      15 Feb 2019 11:20    TPro  7.0  /  Alb  4.1  /  TBili  0.4  /  DBili  x   /  AST  136<H>  /  ALT  105<H>  /  AlkPhos  89  02-15    Ammonia, Serum: 47 umol/L (02-15-19 @ 11:20)  Amylase, Serum Total: 55 U/L (02-15-19 @ 11:20)  Treponema Pallidum Antibody Interpretation: Negative (02-15-19 @ 11:20)  Hepatitis B Surface Antigen: Nonreact (02-15-19 @ 11:20)  Hepatitis C Virus S/CO Ratio: 0.13 S/CO (02-15-19 @ 11:20)    Hepatitis C Virus Interpretation: Nonreact (02-15-19 @ 11:20)      Urinalysis Basic - ( 15 Feb 2019 11:20 )    Color: Yellow / Appearance: Clear / S.020 / pH: x  Gluc: x / Ketone: Negative  / Bili: Negative / Urobili: 0.2 mg/dL   Blood: x / Protein: Negative mg/dL / Nitrite: Negative   Leuk Esterase: Negative / RBC: x / WBC x   Sq Epi: x / Non Sq Epi: x / Bacteria: x          Impression and Plan:    Primary Diagnosis:  Alcohol Dependency/Benzo use                                Medication: Pheno Protocol    Secondary Diagnosis: TAI                                                                  Medication: cont gabapentin, Trazadone    Tertiary Diagnosis:                                                                       Medication      Continue Detox Protocols. Use of PRNS as needed for withdrawal and comfort.    Adjustments to protocols:    Labs/ Tests reviewed.    Tests ordered:     Likely Disposition: _x__Home       ___Rehab       ___Outpatient Program    ___Self Help     _____Other    Estimated Length of stay:__4__

## 2019-02-18 RX ORDER — DIPHENHYDRAMINE HCL 50 MG
100 CAPSULE ORAL
Qty: 0 | Refills: 0 | COMMUNITY

## 2019-02-18 RX ORDER — TRAZODONE HCL 50 MG
1 TABLET ORAL
Qty: 0 | Refills: 0 | COMMUNITY
Start: 2019-02-18

## 2019-02-18 RX ORDER — GABAPENTIN 400 MG/1
1 CAPSULE ORAL
Qty: 0 | Refills: 0 | COMMUNITY

## 2019-02-18 RX ORDER — GABAPENTIN 400 MG/1
1 CAPSULE ORAL
Qty: 0 | Refills: 0 | COMMUNITY
Start: 2019-02-18

## 2019-02-18 RX ORDER — TRAZODONE HCL 50 MG
1 TABLET ORAL
Qty: 0 | Refills: 0 | COMMUNITY

## 2019-02-18 RX ORDER — CLONAZEPAM 1 MG
0 TABLET ORAL
Qty: 0 | Refills: 0 | COMMUNITY

## 2019-02-18 RX ADMIN — METHOCARBAMOL 500 MILLIGRAM(S): 500 TABLET, FILM COATED ORAL at 17:23

## 2019-02-18 RX ADMIN — Medication 50 MILLIGRAM(S): at 10:19

## 2019-02-18 RX ADMIN — Medication 400 MILLIGRAM(S): at 00:18

## 2019-02-18 RX ADMIN — GABAPENTIN 800 MILLIGRAM(S): 400 CAPSULE ORAL at 12:50

## 2019-02-18 RX ADMIN — Medication 32.4 MILLIGRAM(S): at 06:26

## 2019-02-18 RX ADMIN — PANTOPRAZOLE SODIUM 40 MILLIGRAM(S): 20 TABLET, DELAYED RELEASE ORAL at 06:26

## 2019-02-18 RX ADMIN — Medication 50 MILLIGRAM(S): at 17:23

## 2019-02-18 RX ADMIN — Medication 1 TABLET(S): at 09:48

## 2019-02-18 RX ADMIN — GABAPENTIN 800 MILLIGRAM(S): 400 CAPSULE ORAL at 20:38

## 2019-02-18 RX ADMIN — METHOCARBAMOL 500 MILLIGRAM(S): 500 TABLET, FILM COATED ORAL at 10:18

## 2019-02-18 RX ADMIN — Medication 400 MILLIGRAM(S): at 01:20

## 2019-02-18 RX ADMIN — METHOCARBAMOL 500 MILLIGRAM(S): 500 TABLET, FILM COATED ORAL at 00:18

## 2019-02-18 RX ADMIN — Medication 400 MILLIGRAM(S): at 11:04

## 2019-02-18 RX ADMIN — Medication 32.4 MILLIGRAM(S): at 20:23

## 2019-02-18 RX ADMIN — Medication 100 MILLIGRAM(S): at 09:48

## 2019-02-18 RX ADMIN — Medication 50 MILLIGRAM(S): at 00:18

## 2019-02-18 RX ADMIN — GABAPENTIN 800 MILLIGRAM(S): 400 CAPSULE ORAL at 09:48

## 2019-02-18 RX ADMIN — Medication 30 MILLILITER(S): at 00:18

## 2019-02-18 RX ADMIN — Medication 400 MILLIGRAM(S): at 10:19

## 2019-02-18 NOTE — CHART NOTE - NSCHARTNOTEFT_GEN_A_CORE
Allergies:      Diet: Regular    Activity: as tolerated    Follow up with    1. PMD in 2 weeks    2. Psych in 2 weeks    3.    Follow up for abnormal labs/tests    1.    Extra Instructions:      Flu Vaccine given  Yes_____         No______      Diagnosis:  Chemical Dependency   Maintain sobriety  refrain from all use      Patient Signature___________________________________________  Date_________________      Nurse Signature_____________________________________________Date_________________

## 2019-02-19 VITALS
DIASTOLIC BLOOD PRESSURE: 59 MMHG | HEART RATE: 97 BPM | RESPIRATION RATE: 14 BRPM | TEMPERATURE: 98 F | SYSTOLIC BLOOD PRESSURE: 105 MMHG

## 2019-02-19 RX ADMIN — METHOCARBAMOL 500 MILLIGRAM(S): 500 TABLET, FILM COATED ORAL at 05:54

## 2019-02-19 RX ADMIN — Medication 50 MILLIGRAM(S): at 00:00

## 2019-02-19 RX ADMIN — Medication 50 MILLIGRAM(S): at 08:53

## 2019-02-19 RX ADMIN — PANTOPRAZOLE SODIUM 40 MILLIGRAM(S): 20 TABLET, DELAYED RELEASE ORAL at 05:53

## 2019-02-19 RX ADMIN — METHOCARBAMOL 500 MILLIGRAM(S): 500 TABLET, FILM COATED ORAL at 00:02

## 2019-02-19 RX ADMIN — Medication 100 MILLIGRAM(S): at 00:00

## 2019-02-19 RX ADMIN — Medication 32.4 MILLIGRAM(S): at 05:53

## 2019-02-19 RX ADMIN — GABAPENTIN 800 MILLIGRAM(S): 400 CAPSULE ORAL at 08:52

## 2019-02-19 RX ADMIN — Medication 1 TABLET(S): at 08:52

## 2019-02-19 NOTE — CHART NOTE - NSCHARTNOTEFT_GEN_A_CORE
Subsequent Inpatient Encounter                                       Detox Unit    AURELIA NUR   38y   Female      Chief Complaint:    Follow up for Polysubstance  Dependency    HPI:     I reviewed previous notes. No Change, except if noted below.             Detail:_    ROS:   I reviewed with patient.  No changes from previous notes except if noted below.             Detail: _    PFSH I reviewed with patient. No changes from previous notes except if noted below.             Detail_    Medication reconciliation performed.    MEDICATIONS  (STANDING):  gabapentin 800 milliGRAM(s) Oral three times a day  multivitamin/minerals 1 Tablet(s) Oral daily  pantoprazole    Tablet 40 milliGRAM(s) Oral before breakfast  PHENobarbital   Oral   PHENobarbital 32.4 milliGRAM(s) Oral every 12 hours  thiamine 100 milliGRAM(s) Oral daily  traZODone 100 milliGRAM(s) Oral at bedtime      MEDICATIONS  (PRN):  aluminum hydroxide/magnesium hydroxide/simethicone Suspension 30 milliLiter(s) Oral every 6 hours PRN Heartburn  bismuth subsalicylate Liquid 30 milliLiter(s) Oral every 6 hours PRN Diarrhea  cloNIDine 0.1 milliGRAM(s) Oral every 8 hours PRN Blood Pressure GREATER THAN 140/90 mmHG  guaiFENesin/dextromethorphan  Syrup 5 milliLiter(s) Oral every 4 hours PRN Cough  hydrOXYzine hydrochloride 50 milliGRAM(s) Oral every 6 hours PRN Anxiety  ibuprofen  Tablet. 400 milliGRAM(s) Oral every 8 hours PRN Mild Pain (1 - 3)  magnesium hydroxide Suspension 30 milliLiter(s) Oral once PRN Constipation  methocarbamol 500 milliGRAM(s) Oral every 6 hours PRN muscle pain  trimethobenzamide 300 milliGRAM(s) Oral every 6 hours PRN Nausea and/or Vomiting  trimethobenzamide Injectable 200 milliGRAM(s) IntraMuscular every 6 hours PRN Nausea and/or Vomiting      T(C): 35.8 (02-18-19 @ 06:15), Max: 36.7 (02-18-19 @ 00:08)  HR: 99 (02-18-19 @ 06:15) (99 - 112)  BP: 118/60 (02-18-19 @ 06:15) (113/57 - 123/65)  RR: 16 (02-18-19 @ 06:15) (16 - 16)  SpO2: --    PHYSICAL EXAM:      Constitutional: NAD, A&O x3    Eyes: PERRLA, no conjuctivitis    Neck: no lymphadenopathy    Respiratory: +air entry, no rales, no rhonchi, no wheezes    Cardiovascular: +S1 and S2, regular rate and rhythm    Gastrointestinal: +BS, soft, non-tender, not distended    Extremities:  no edema, no calf tenderness    Skin: no rashes, normal turgor            Ammonia, Serum: 47 umol/L (02-15-19 @ 11:20)  Amylase, Serum Total: 55 U/L (02-15-19 @ 11:20)  Treponema Pallidum Antibody Interpretation: Negative (02-15-19 @ 11:20)  Hepatitis B Surface Antigen: Nonreact (02-15-19 @ 11:20)  Hepatitis C Virus S/CO Ratio: 0.13 S/CO (02-15-19 @ 11:20)    Hepatitis C Virus Interpretation: Nonreact (02-15-19 @ 11:20)            Impression and Plan:      Primary Diagnosis:  Alcohol Dependency/Benzo use                                Medication: Pheno Protocol    Secondary Diagnosis: TAI                                                                  Medication: cont gabapentin, Trazadone    Tertiary Diagnosis:                                                                       Medication      Continue Detox Protocols. Use of PRNS as needed for withdrawal and comfort.    Adjustments to protocols: none    Labs/ Tests reviewed. yes    Tests ordered: ppd pending    Likely Disposition: _x__Home       ___Rehab       ___Outpatient Program    ___Self Help     _____Other    Estimated Length of stay:___4 day_

## 2019-02-20 ENCOUNTER — INPATIENT (INPATIENT)
Facility: HOSPITAL | Age: 39
LOS: 13 days | Discharge: HOME | End: 2019-03-06
Attending: INTERNAL MEDICINE | Admitting: INTERNAL MEDICINE

## 2019-02-20 VITALS
WEIGHT: 207.9 LBS | RESPIRATION RATE: 18 BRPM | HEIGHT: 61 IN | DIASTOLIC BLOOD PRESSURE: 65 MMHG | SYSTOLIC BLOOD PRESSURE: 141 MMHG | TEMPERATURE: 96 F | HEART RATE: 113 BPM

## 2019-02-20 DIAGNOSIS — F41.9 ANXIETY DISORDER, UNSPECIFIED: ICD-10-CM

## 2019-02-20 DIAGNOSIS — K20.9 ESOPHAGITIS, UNSPECIFIED: ICD-10-CM

## 2019-02-20 DIAGNOSIS — Z98.891 HISTORY OF UTERINE SCAR FROM PREVIOUS SURGERY: Chronic | ICD-10-CM

## 2019-02-20 DIAGNOSIS — F13.20 SEDATIVE, HYPNOTIC OR ANXIOLYTIC DEPENDENCE, UNCOMPLICATED: ICD-10-CM

## 2019-02-20 DIAGNOSIS — F10.20 ALCOHOL DEPENDENCE, UNCOMPLICATED: ICD-10-CM

## 2019-02-20 DIAGNOSIS — Z98.890 OTHER SPECIFIED POSTPROCEDURAL STATES: Chronic | ICD-10-CM

## 2019-02-20 DIAGNOSIS — I34.1 NONRHEUMATIC MITRAL (VALVE) PROLAPSE: ICD-10-CM

## 2019-02-20 DIAGNOSIS — E78.00 PURE HYPERCHOLESTEROLEMIA, UNSPECIFIED: ICD-10-CM

## 2019-02-20 DIAGNOSIS — Z86.39 PERSONAL HISTORY OF OTHER ENDOCRINE, NUTRITIONAL AND METABOLIC DISEASE: ICD-10-CM

## 2019-02-20 DIAGNOSIS — F17.200 NICOTINE DEPENDENCE, UNSPECIFIED, UNCOMPLICATED: ICD-10-CM

## 2019-02-20 PROBLEM — E78.5 HYPERLIPIDEMIA, UNSPECIFIED: Chronic | Status: ACTIVE | Noted: 2019-02-15

## 2019-02-20 RX ORDER — HYDROXYZINE HCL 10 MG
50 TABLET ORAL EVERY 6 HOURS
Qty: 0 | Refills: 0 | Status: DISCONTINUED | OUTPATIENT
Start: 2019-02-20 | End: 2019-02-26

## 2019-02-20 RX ORDER — METHOCARBAMOL 500 MG/1
500 TABLET, FILM COATED ORAL EVERY 6 HOURS
Qty: 0 | Refills: 0 | Status: DISCONTINUED | OUTPATIENT
Start: 2019-02-20 | End: 2019-03-06

## 2019-02-20 RX ORDER — IBUPROFEN 200 MG
400 TABLET ORAL EVERY 6 HOURS
Qty: 0 | Refills: 0 | Status: DISCONTINUED | OUTPATIENT
Start: 2019-02-20 | End: 2019-03-06

## 2019-02-20 RX ORDER — TRAZODONE HCL 50 MG
100 TABLET ORAL AT BEDTIME
Qty: 0 | Refills: 0 | Status: DISCONTINUED | OUTPATIENT
Start: 2019-02-20 | End: 2019-02-25

## 2019-02-20 RX ORDER — MAGNESIUM HYDROXIDE 400 MG/1
30 TABLET, CHEWABLE ORAL ONCE
Qty: 0 | Refills: 0 | Status: DISCONTINUED | OUTPATIENT
Start: 2019-02-20 | End: 2019-03-06

## 2019-02-20 RX ORDER — PSEUDOEPHEDRINE HCL 30 MG
60 TABLET ORAL EVERY 6 HOURS
Qty: 0 | Refills: 0 | Status: DISCONTINUED | OUTPATIENT
Start: 2019-02-20 | End: 2019-03-06

## 2019-02-20 RX ORDER — ACETAMINOPHEN 500 MG
650 TABLET ORAL EVERY 4 HOURS
Qty: 0 | Refills: 0 | Status: DISCONTINUED | OUTPATIENT
Start: 2019-02-20 | End: 2019-03-06

## 2019-02-20 RX ORDER — MULTIVIT-MIN/FERROUS GLUCONATE 9 MG/15 ML
1 LIQUID (ML) ORAL DAILY
Qty: 0 | Refills: 0 | Status: DISCONTINUED | OUTPATIENT
Start: 2019-02-20 | End: 2019-03-06

## 2019-02-20 RX ORDER — PANTOPRAZOLE SODIUM 20 MG/1
40 TABLET, DELAYED RELEASE ORAL
Qty: 0 | Refills: 0 | Status: DISCONTINUED | OUTPATIENT
Start: 2019-02-20 | End: 2019-03-06

## 2019-02-20 RX ORDER — GABAPENTIN 400 MG/1
800 CAPSULE ORAL THREE TIMES A DAY
Qty: 0 | Refills: 0 | Status: DISCONTINUED | OUTPATIENT
Start: 2019-02-20 | End: 2019-03-06

## 2019-02-20 RX ADMIN — GABAPENTIN 800 MILLIGRAM(S): 400 CAPSULE ORAL at 16:26

## 2019-02-20 RX ADMIN — Medication 50 MILLIGRAM(S): at 22:49

## 2019-02-20 RX ADMIN — Medication 50 MILLIGRAM(S): at 16:17

## 2019-02-20 RX ADMIN — Medication 400 MILLIGRAM(S): at 16:28

## 2019-02-20 RX ADMIN — Medication 100 MILLIGRAM(S): at 21:02

## 2019-02-20 RX ADMIN — GABAPENTIN 800 MILLIGRAM(S): 400 CAPSULE ORAL at 21:01

## 2019-02-20 NOTE — H&P ADULT - PROBLEM SELECTOR PLAN 7
observation  c/w: Home Medications:  gabapentin 800 mg oral tablet: 1 tab(s) orally 3 times a day (20 Feb 2019 12:11)  traZODone 100 mg oral tablet: 1 tab(s) orally once a day (at bedtime) (20 Feb 2019 12:11)  Atarax 50mg PO Q6H PRN for anxiety  psych consult PRN

## 2019-02-20 NOTE — H&P ADULT - NSHPPHYSICALEXAM_GEN_ALL_CORE
-  Vital Signs:      Temp: 98.5      Pulse:  99       RR: 14-16       BP: 122/90     Physical Exam:              Constitutional: +Anxious A&Ox3, W/N and W/D.  HEENT: NC/AT, PERRLA, EOM Intact, Nares normal, No Sinus tenderness.  Lips, mucosa and tongue normal; Neck supple, No adenopathy  Respiratory: CTAB, no rales, no rhonchi, no wheezes  Cardiovascular: +S1S2, No M/R/G  Gastrointestinal: +BS, soft, non-tender, not distended, No CVAT  Extremities: Atraumatic, no cyanosis, no edema, no calf tenderness,  Vascular: +dorsal pedis and radial pulses, no extremity cyanosis  Neurological: sensation intact, ROM equal B/L, CN II-XII intact, Gait: steady  Skin: no rashes, no lesions, normal turgor, No track marks  No Decubiti present  No IV lines present  Rectal/Breasts Exam: Deferred

## 2019-02-20 NOTE — H&P ADULT - NSHPLABSRESULTS_GEN_ALL_CORE
12.7   7.16  )-----------( 216      ( 15 Feb 2019 11:20 )             38.0   02-15    139  |  102  |  14  ----------------------------<  115<H>  4.4   |  21  |  <0.5<L>    Ca    9.0      15 Feb 2019 11:20    TPro  7.0  /  Alb  4.1  /  TBili  0.4  /  DBili  x   /  AST  136<H>  /  ALT  105<H>  /  AlkPhos  89  02-15    < from: Xray Chest 2 Views PA/Lat (02.15.19 @ 10:12) >      EXAM:  XR CHEST PA LAT 2V            PROCEDURE DATE:  02/15/2019      INTERPRETATION:  Clinical History / Reason for exam: Pain    Comparison : Chest radiograph 2018.    Technique/Positioning: PA and lateral.    Findings:    Supportdevices: Telemetry leads.    Cardiac/mediastinum/hilum: Unremarkable.    Lung parenchyma/Pleura: Within normal limits.    Skeleton/soft tissues: Unremarkable.    Impression:      No radiographic evidence of acute cardiopulmonary disease.    < end of copied text >    Acute Hepatitis Panel (02.15.19 @ 11:20)    Hepatitis C Virus Interpretation: Nonreact: Hepatitis C AB    Hepatitis C Virus S/CO Ratio: 0.13 S/CO    Hepatitis B Core IgM Antibody: Nonreact    Hepatitis B Surface Antigen: Nonreact    Hepatitis A IgM Antibody: Nonreact    Urinalysis Basic - ( 15 Feb 2019 11:20 )    Color: Yellow / Appearance: Clear / S.020 / pH: x  Gluc: x / Ketone: Negative  / Bili: Negative / Urobili: 0.2 mg/dL   Blood: x / Protein: Negative mg/dL / Nitrite: Negative   Leuk Esterase: Negative / RBC: x / WBC x   Sq Epi: x / Non Sq Epi: x / Bacteria: x

## 2019-02-20 NOTE — H&P ADULT - PMH
Alcoholism    Anxiety    Benzodiazepine misuse    Esophagitis    Gastritis    GERD (gastroesophageal reflux disease)    Graves' disease    Heart murmur    History of Graves' disease    Hypercholesterolemia    Hyperlipidemia, unspecified hyperlipidemia type    MVP (mitral valve prolapse)    Nicotine dependence    Obesity

## 2019-02-20 NOTE — H&P ADULT - ASSESSMENT
38y Female presents for rehab with continuous Alcohol use disorder, s/p detox at [facility] 2/15/19-2/19/19.  Pt states she looks forward to getting into rehab. Patient denies feeling any withdrawal except chronic anxiety.   Denies H/O seizure or AVH. Patient A&Ox3, denies cp, sob, headache, dizziness, bleeding and dysuria.

## 2019-02-20 NOTE — H&P ADULT - HISTORY OF PRESENT ILLNESS
38y Female presents for rehab with continuous Alcohol use disorder, s/p detox at [facility] 2/15/19-19.  Pt states she looks forward to getting into rehab. Patient denies feeling any withdrawal except chronic anxiety.   Denies H/O seizure or AVH. Patient A&Ox3, denies cp, sob, headache, dizziness, bleeding and dysuria.  LMP: 19,   x 1, Miscarriage x 1. Last Papsmear: 2018. Mammography: Never  Patient admits to abusing current substances as follows:    DRUG	AGE OF ONSET	ROUTE	FREQ	AMOUNT	LAST USE	LENGTH OF CURRENT USE	   ETOH	27	PO	Daily	3-4 liters of wine	2/15/19	2 months	  Denies other drugs abuse	 	 	 	 	 	 	   Rx Klonopin	37	PO	PRN	1 mg	19	1 year	    							  							  I-Stop:       Was prescriber informed by Intake Clinician? N/A    Patient Name:	Dipti Barry	YOB: 1980	  Address:	[address] [place] [address]	Sex:	Female	  Rx Written	Rx Dispensed	Drug	Quantity	Days Supply	Prescriber Name	  2019	clonazepam 1 mg tablet	60	30	Julio Cesar Puckett DO	  2018	clonazepam 1 mg tablet	60	30	Luis Acevedo MD	  Last Detox: 2/15/19-19 at [facility]  Last Rehab: 2 years ago at [facility]  Hx of Withdrawal Seizures: No   Psyhx: Anxiety  Denies any S/H Ideation or A/V Hallucination  Is patient currently receiving methadone from an MMTP: No  Screening history	Last tested	Result	History of treatment	  HIV	 19	NEG	N/A	  Hepatitis C	2/15/19	NEG	N/A	  Quantiferon GOLD TB test	 18	NEG	N/A	    Immunization	Not Received	Unknown	Received	Date Received 	  Influenza		v			  Pneumococcus		v			  Tetanus			v	2018	  Others

## 2019-02-20 NOTE — H&P ADULT - PROBLEM SELECTOR PLAN 1
Admit to rehab  supportive care with prn meds  Check Urine Toxicology  Learning coping skill and encouraging long term sobriety

## 2019-02-21 PROBLEM — I34.1 NONRHEUMATIC MITRAL (VALVE) PROLAPSE: Chronic | Status: INACTIVE | Noted: 2019-02-15 | Resolved: 2019-02-20

## 2019-02-21 PROBLEM — K29.80 DUODENITIS WITHOUT BLEEDING: Chronic | Status: INACTIVE | Noted: 2018-09-10 | Resolved: 2019-02-20

## 2019-02-21 PROBLEM — F32.9 MAJOR DEPRESSIVE DISORDER, SINGLE EPISODE, UNSPECIFIED: Chronic | Status: INACTIVE | Noted: 2018-09-10 | Resolved: 2019-02-20

## 2019-02-21 PROBLEM — E05.00 THYROTOXICOSIS WITH DIFFUSE GOITER WITHOUT THYROTOXIC CRISIS OR STORM: Chronic | Status: INACTIVE | Noted: 2019-02-15 | Resolved: 2019-02-20

## 2019-02-21 RX ORDER — HYDROCHLOROTHIAZIDE 25 MG
25 TABLET ORAL DAILY
Qty: 0 | Refills: 0 | Status: DISCONTINUED | OUTPATIENT
Start: 2019-02-21 | End: 2019-02-26

## 2019-02-21 RX ADMIN — Medication 50 MILLIGRAM(S): at 16:13

## 2019-02-21 RX ADMIN — GABAPENTIN 800 MILLIGRAM(S): 400 CAPSULE ORAL at 13:02

## 2019-02-21 RX ADMIN — Medication 400 MILLIGRAM(S): at 08:38

## 2019-02-21 RX ADMIN — Medication 100 MILLIGRAM(S): at 20:41

## 2019-02-21 RX ADMIN — Medication 50 MILLIGRAM(S): at 22:21

## 2019-02-21 RX ADMIN — GABAPENTIN 800 MILLIGRAM(S): 400 CAPSULE ORAL at 08:36

## 2019-02-21 RX ADMIN — Medication 1 TABLET(S): at 08:37

## 2019-02-21 RX ADMIN — PANTOPRAZOLE SODIUM 40 MILLIGRAM(S): 20 TABLET, DELAYED RELEASE ORAL at 08:36

## 2019-02-21 RX ADMIN — Medication 25 MILLIGRAM(S): at 14:38

## 2019-02-21 RX ADMIN — Medication 400 MILLIGRAM(S): at 22:21

## 2019-02-21 RX ADMIN — GABAPENTIN 800 MILLIGRAM(S): 400 CAPSULE ORAL at 20:40

## 2019-02-21 RX ADMIN — Medication 50 MILLIGRAM(S): at 08:38

## 2019-02-22 DIAGNOSIS — F13.99 SEDATIVE, HYPNOTIC OR ANXIOLYTIC USE, UNSPECIFIED WITH UNSPECIFIED SEDATIVE, HYPNOTIC OR ANXIOLYTIC-INDUCED DISORDER: ICD-10-CM

## 2019-02-22 DIAGNOSIS — F10.20 ALCOHOL DEPENDENCE, UNCOMPLICATED: ICD-10-CM
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
## 2019-02-22 DIAGNOSIS — F41.1 GENERALIZED ANXIETY DISORDER: ICD-10-CM
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
## 2019-02-22 LAB
AMPHET UR-MCNC: NEGATIVE — SIGNIFICANT CHANGE UP
BARBITURATES UR SCN-MCNC: POSITIVE
BENZODIAZ UR-MCNC: NEGATIVE — SIGNIFICANT CHANGE UP
COCAINE METAB.OTHER UR-MCNC: NEGATIVE — SIGNIFICANT CHANGE UP
DRUG SCREEN 1, URINE RESULT: SIGNIFICANT CHANGE UP
HCG UR QL: NEGATIVE — SIGNIFICANT CHANGE UP
METHADONE UR-MCNC: NEGATIVE — SIGNIFICANT CHANGE UP
OPIATES UR-MCNC: NEGATIVE — SIGNIFICANT CHANGE UP
PCP UR-MCNC: NEGATIVE — SIGNIFICANT CHANGE UP
PROPOXYPHENE QUALITATIVE URINE RESULT: NEGATIVE — SIGNIFICANT CHANGE UP
THC UR QL: NEGATIVE — SIGNIFICANT CHANGE UP

## 2019-02-22 RX ADMIN — Medication 1 TABLET(S): at 09:04

## 2019-02-22 RX ADMIN — METHOCARBAMOL 500 MILLIGRAM(S): 500 TABLET, FILM COATED ORAL at 21:22

## 2019-02-22 RX ADMIN — GABAPENTIN 800 MILLIGRAM(S): 400 CAPSULE ORAL at 13:17

## 2019-02-22 RX ADMIN — Medication 50 MILLIGRAM(S): at 15:17

## 2019-02-22 RX ADMIN — Medication 50 MILLIGRAM(S): at 09:06

## 2019-02-22 RX ADMIN — Medication 50 MILLIGRAM(S): at 22:41

## 2019-02-22 RX ADMIN — Medication 400 MILLIGRAM(S): at 21:23

## 2019-02-22 RX ADMIN — PANTOPRAZOLE SODIUM 40 MILLIGRAM(S): 20 TABLET, DELAYED RELEASE ORAL at 09:05

## 2019-02-22 RX ADMIN — Medication 300 MILLIGRAM(S): at 16:14

## 2019-02-22 RX ADMIN — GABAPENTIN 800 MILLIGRAM(S): 400 CAPSULE ORAL at 09:05

## 2019-02-22 RX ADMIN — Medication 25 MILLIGRAM(S): at 09:05

## 2019-02-22 RX ADMIN — GABAPENTIN 800 MILLIGRAM(S): 400 CAPSULE ORAL at 21:23

## 2019-02-22 RX ADMIN — METHOCARBAMOL 500 MILLIGRAM(S): 500 TABLET, FILM COATED ORAL at 14:32

## 2019-02-22 RX ADMIN — Medication 100 MILLIGRAM(S): at 21:24

## 2019-02-23 RX ORDER — SALICYLIC ACID 0.5 %
1 CLEANSER (GRAM) TOPICAL THREE TIMES A DAY
Qty: 0 | Refills: 0 | Status: DISCONTINUED | OUTPATIENT
Start: 2019-02-23 | End: 2019-03-06

## 2019-02-23 RX ADMIN — Medication 50 MILLIGRAM(S): at 14:19

## 2019-02-23 RX ADMIN — Medication 1 TABLET(S): at 08:52

## 2019-02-23 RX ADMIN — Medication 400 MILLIGRAM(S): at 08:52

## 2019-02-23 RX ADMIN — Medication 50 MILLIGRAM(S): at 22:36

## 2019-02-23 RX ADMIN — METHOCARBAMOL 500 MILLIGRAM(S): 500 TABLET, FILM COATED ORAL at 14:18

## 2019-02-23 RX ADMIN — METHOCARBAMOL 500 MILLIGRAM(S): 500 TABLET, FILM COATED ORAL at 20:47

## 2019-02-23 RX ADMIN — Medication 650 MILLIGRAM(S): at 23:16

## 2019-02-23 RX ADMIN — PANTOPRAZOLE SODIUM 40 MILLIGRAM(S): 20 TABLET, DELAYED RELEASE ORAL at 08:52

## 2019-02-23 RX ADMIN — METHOCARBAMOL 500 MILLIGRAM(S): 500 TABLET, FILM COATED ORAL at 08:53

## 2019-02-23 RX ADMIN — Medication 100 MILLIGRAM(S): at 22:36

## 2019-02-23 RX ADMIN — GABAPENTIN 800 MILLIGRAM(S): 400 CAPSULE ORAL at 08:52

## 2019-02-23 RX ADMIN — Medication 25 MILLIGRAM(S): at 08:52

## 2019-02-23 RX ADMIN — GABAPENTIN 800 MILLIGRAM(S): 400 CAPSULE ORAL at 14:17

## 2019-02-23 RX ADMIN — GABAPENTIN 800 MILLIGRAM(S): 400 CAPSULE ORAL at 20:45

## 2019-02-24 RX ADMIN — Medication 50 MILLIGRAM(S): at 14:21

## 2019-02-24 RX ADMIN — GABAPENTIN 800 MILLIGRAM(S): 400 CAPSULE ORAL at 14:20

## 2019-02-24 RX ADMIN — PANTOPRAZOLE SODIUM 40 MILLIGRAM(S): 20 TABLET, DELAYED RELEASE ORAL at 08:27

## 2019-02-24 RX ADMIN — Medication 30 MILLILITER(S): at 20:51

## 2019-02-24 RX ADMIN — Medication 50 MILLIGRAM(S): at 22:37

## 2019-02-24 RX ADMIN — METHOCARBAMOL 500 MILLIGRAM(S): 500 TABLET, FILM COATED ORAL at 08:27

## 2019-02-24 RX ADMIN — GABAPENTIN 800 MILLIGRAM(S): 400 CAPSULE ORAL at 20:50

## 2019-02-24 RX ADMIN — GABAPENTIN 800 MILLIGRAM(S): 400 CAPSULE ORAL at 08:28

## 2019-02-24 RX ADMIN — Medication 25 MILLIGRAM(S): at 08:27

## 2019-02-24 RX ADMIN — Medication 1 TABLET(S): at 08:27

## 2019-02-24 RX ADMIN — METHOCARBAMOL 500 MILLIGRAM(S): 500 TABLET, FILM COATED ORAL at 20:59

## 2019-02-24 RX ADMIN — Medication 100 MILLIGRAM(S): at 22:36

## 2019-02-24 RX ADMIN — Medication 50 MILLIGRAM(S): at 08:27

## 2019-02-25 RX ORDER — MIRTAZAPINE 45 MG/1
30 TABLET, ORALLY DISINTEGRATING ORAL AT BEDTIME
Qty: 0 | Refills: 0 | Status: DISCONTINUED | OUTPATIENT
Start: 2019-03-01 | End: 2019-03-06

## 2019-02-25 RX ORDER — MIRTAZAPINE 45 MG/1
15 TABLET, ORALLY DISINTEGRATING ORAL AT BEDTIME
Qty: 0 | Refills: 0 | Status: COMPLETED | OUTPATIENT
Start: 2019-02-25 | End: 2019-02-28

## 2019-02-25 RX ORDER — TRAZODONE HCL 50 MG
50 TABLET ORAL AT BEDTIME
Qty: 0 | Refills: 0 | Status: DISCONTINUED | OUTPATIENT
Start: 2019-02-25 | End: 2019-03-06

## 2019-02-25 RX ORDER — LANOLIN ALCOHOL/MO/W.PET/CERES
10 CREAM (GRAM) TOPICAL AT BEDTIME
Qty: 0 | Refills: 0 | Status: DISCONTINUED | OUTPATIENT
Start: 2019-02-25 | End: 2019-03-06

## 2019-02-25 RX ADMIN — METHOCARBAMOL 500 MILLIGRAM(S): 500 TABLET, FILM COATED ORAL at 08:42

## 2019-02-25 RX ADMIN — MIRTAZAPINE 15 MILLIGRAM(S): 45 TABLET, ORALLY DISINTEGRATING ORAL at 21:15

## 2019-02-25 RX ADMIN — Medication 1 TABLET(S): at 08:41

## 2019-02-25 RX ADMIN — Medication 50 MILLIGRAM(S): at 16:15

## 2019-02-25 RX ADMIN — GABAPENTIN 800 MILLIGRAM(S): 400 CAPSULE ORAL at 21:11

## 2019-02-25 RX ADMIN — GABAPENTIN 800 MILLIGRAM(S): 400 CAPSULE ORAL at 12:50

## 2019-02-25 RX ADMIN — Medication 50 MILLIGRAM(S): at 22:01

## 2019-02-25 RX ADMIN — Medication 25 MILLIGRAM(S): at 08:41

## 2019-02-25 RX ADMIN — Medication 50 MILLIGRAM(S): at 08:43

## 2019-02-25 RX ADMIN — GABAPENTIN 800 MILLIGRAM(S): 400 CAPSULE ORAL at 08:41

## 2019-02-25 RX ADMIN — Medication 10 MILLIGRAM(S): at 21:15

## 2019-02-25 RX ADMIN — PANTOPRAZOLE SODIUM 40 MILLIGRAM(S): 20 TABLET, DELAYED RELEASE ORAL at 08:41

## 2019-02-25 RX ADMIN — METHOCARBAMOL 500 MILLIGRAM(S): 500 TABLET, FILM COATED ORAL at 16:15

## 2019-02-26 LAB
ALBUMIN SERPL ELPH-MCNC: 4.4 G/DL — SIGNIFICANT CHANGE UP (ref 3.5–5.2)
ALP SERPL-CCNC: 86 U/L — SIGNIFICANT CHANGE UP (ref 30–115)
ALT FLD-CCNC: 89 U/L — HIGH (ref 0–41)
ANION GAP SERPL CALC-SCNC: 13 MMOL/L — SIGNIFICANT CHANGE UP (ref 7–14)
AST SERPL-CCNC: 64 U/L — HIGH (ref 0–41)
BILIRUB SERPL-MCNC: 0.4 MG/DL — SIGNIFICANT CHANGE UP (ref 0.2–1.2)
BUN SERPL-MCNC: 14 MG/DL — SIGNIFICANT CHANGE UP (ref 10–20)
CALCIUM SERPL-MCNC: 9.3 MG/DL — SIGNIFICANT CHANGE UP (ref 8.5–10.1)
CHLORIDE SERPL-SCNC: 97 MMOL/L — LOW (ref 98–110)
CHOLEST SERPL-MCNC: 348 MG/DL — HIGH (ref 100–200)
CO2 SERPL-SCNC: 27 MMOL/L — SIGNIFICANT CHANGE UP (ref 17–32)
CREAT SERPL-MCNC: 0.8 MG/DL — SIGNIFICANT CHANGE UP (ref 0.7–1.5)
GLUCOSE SERPL-MCNC: 159 MG/DL — HIGH (ref 70–99)
HDLC SERPL-MCNC: 54 MG/DL — SIGNIFICANT CHANGE UP
LIPID PNL WITH DIRECT LDL SERPL: 281 MG/DL — HIGH (ref 4–129)
POTASSIUM SERPL-MCNC: 3.2 MMOL/L — LOW (ref 3.5–5)
POTASSIUM SERPL-SCNC: 3.2 MMOL/L — LOW (ref 3.5–5)
PROT SERPL-MCNC: 7.4 G/DL — SIGNIFICANT CHANGE UP (ref 6–8)
SODIUM SERPL-SCNC: 137 MMOL/L — SIGNIFICANT CHANGE UP (ref 135–146)
TOTAL CHOLESTEROL/HDL RATIO MEASUREMENT: 6.4 RATIO — HIGH (ref 4–5.5)
TRIGL SERPL-MCNC: 120 MG/DL — SIGNIFICANT CHANGE UP (ref 10–149)

## 2019-02-26 RX ORDER — HYDROCORTISONE 1 %
1 OINTMENT (GRAM) TOPICAL
Qty: 0 | Refills: 0 | Status: DISCONTINUED | OUTPATIENT
Start: 2019-02-26 | End: 2019-03-06

## 2019-02-26 RX ORDER — POTASSIUM CHLORIDE 20 MEQ
20 PACKET (EA) ORAL
Qty: 0 | Refills: 0 | Status: COMPLETED | OUTPATIENT
Start: 2019-02-26 | End: 2019-02-26

## 2019-02-26 RX ADMIN — PANTOPRAZOLE SODIUM 40 MILLIGRAM(S): 20 TABLET, DELAYED RELEASE ORAL at 08:54

## 2019-02-26 RX ADMIN — MIRTAZAPINE 15 MILLIGRAM(S): 45 TABLET, ORALLY DISINTEGRATING ORAL at 21:11

## 2019-02-26 RX ADMIN — GABAPENTIN 800 MILLIGRAM(S): 400 CAPSULE ORAL at 08:54

## 2019-02-26 RX ADMIN — Medication 25 MILLIGRAM(S): at 08:54

## 2019-02-26 RX ADMIN — Medication 20 MILLIEQUIVALENT(S): at 17:14

## 2019-02-26 RX ADMIN — GABAPENTIN 800 MILLIGRAM(S): 400 CAPSULE ORAL at 21:08

## 2019-02-26 RX ADMIN — Medication 1 TABLET(S): at 08:54

## 2019-02-26 RX ADMIN — Medication 20 MILLIEQUIVALENT(S): at 19:15

## 2019-02-26 RX ADMIN — Medication 10 MILLIGRAM(S): at 21:11

## 2019-02-26 RX ADMIN — Medication 1 APPLICATION(S): at 21:08

## 2019-02-26 NOTE — CHART NOTE - NSCHARTNOTEFT_GEN_A_CORE
Yuri called by RN regarding patient complaining about b/l hand rash.     Erythematous rash to b/l dorsal surface of hands to the wrist. Does not include palmar surface. Pt states rash began 3 days ago after being admitted her. Attributes it to the hand soap she uses here.   Denies pruritus, pain.   Rash is blanchable.     Will prescribe Hydrocortisone cream. Was called by RN regarding patient complaining about b/l hand rash.     Erythematous rash to b/l dorsal surface of hands to the wrist. Does not include palmar surface. Pt states rash began 3 days ago after being admitted her. Attributes it to the hand soap she uses here.   Denies pruritus, pain.   Rash is blanchable.     Will prescribe Hydrocortisone cream.

## 2019-02-27 LAB
ALBUMIN SERPL ELPH-MCNC: 4.4 G/DL — SIGNIFICANT CHANGE UP (ref 3.5–5.2)
ALP SERPL-CCNC: 81 U/L — SIGNIFICANT CHANGE UP (ref 30–115)
ALT FLD-CCNC: 86 U/L — HIGH (ref 0–41)
ANION GAP SERPL CALC-SCNC: 13 MMOL/L — SIGNIFICANT CHANGE UP (ref 7–14)
AST SERPL-CCNC: 64 U/L — HIGH (ref 0–41)
BILIRUB SERPL-MCNC: 0.3 MG/DL — SIGNIFICANT CHANGE UP (ref 0.2–1.2)
BUN SERPL-MCNC: 12 MG/DL — SIGNIFICANT CHANGE UP (ref 10–20)
CALCIUM SERPL-MCNC: 9.4 MG/DL — SIGNIFICANT CHANGE UP (ref 8.5–10.1)
CHLORIDE SERPL-SCNC: 98 MMOL/L — SIGNIFICANT CHANGE UP (ref 98–110)
CO2 SERPL-SCNC: 27 MMOL/L — SIGNIFICANT CHANGE UP (ref 17–32)
CREAT SERPL-MCNC: 0.8 MG/DL — SIGNIFICANT CHANGE UP (ref 0.7–1.5)
GLUCOSE SERPL-MCNC: 146 MG/DL — HIGH (ref 70–99)
POTASSIUM SERPL-MCNC: 3.7 MMOL/L — SIGNIFICANT CHANGE UP (ref 3.5–5)
POTASSIUM SERPL-SCNC: 3.7 MMOL/L — SIGNIFICANT CHANGE UP (ref 3.5–5)
PROT SERPL-MCNC: 7 G/DL — SIGNIFICANT CHANGE UP (ref 6–8)
SODIUM SERPL-SCNC: 138 MMOL/L — SIGNIFICANT CHANGE UP (ref 135–146)

## 2019-02-27 RX ADMIN — PANTOPRAZOLE SODIUM 40 MILLIGRAM(S): 20 TABLET, DELAYED RELEASE ORAL at 08:20

## 2019-02-27 RX ADMIN — GABAPENTIN 800 MILLIGRAM(S): 400 CAPSULE ORAL at 08:20

## 2019-02-27 RX ADMIN — GABAPENTIN 800 MILLIGRAM(S): 400 CAPSULE ORAL at 14:48

## 2019-02-27 RX ADMIN — Medication 10 MILLIGRAM(S): at 21:10

## 2019-02-27 RX ADMIN — GABAPENTIN 800 MILLIGRAM(S): 400 CAPSULE ORAL at 21:10

## 2019-02-27 RX ADMIN — Medication 400 MILLIGRAM(S): at 16:49

## 2019-02-27 RX ADMIN — Medication 30 MILLILITER(S): at 14:49

## 2019-02-27 RX ADMIN — MIRTAZAPINE 15 MILLIGRAM(S): 45 TABLET, ORALLY DISINTEGRATING ORAL at 21:10

## 2019-02-27 RX ADMIN — Medication 1 APPLICATION(S): at 08:19

## 2019-02-27 RX ADMIN — Medication 1 TABLET(S): at 08:20

## 2019-02-27 NOTE — CHART NOTE - NSCHARTNOTEFT_GEN_A_CORE
Patient is a 39yo F c/o above. Patient reports similar sx for whole life. Patient describes sx as an intermittent spasm to left chest wall and occasional palpitation. patient reports having a dry cough. Patient denies any wheezing or SOB.     Vital Signs Last 24 Hrs  T(C): 35.6 (27 Feb 2019 15:40), Max: 35.6 (27 Feb 2019 15:40)  T(F): 96.1 (27 Feb 2019 15:40), Max: 96.1 (27 Feb 2019 15:40)  HR: 79 (27 Feb 2019 15:40) (79 - 99)  BP: 119/59 (27 Feb 2019 15:40) (119/59 - 135/71)    PHYSICAL EXAM:      Constitutional: A&Ox4  Respiratory: cta b/l  Cardiovascular: s1 s2 rrr  Gastrointestinal: soft nt  nd + bs no rebound or guarding  Genitourinary: no cva tenderness  Extremities: normal rom, no edema, calf tenderness  Neurological:no focal deficits  Skin: no rash  chest wall is nontender    1.chest pain which is chronic and not cardiac In nature, possible pleuritic considering cough recently vs musculoskeletal vs anxiety  suggest ibuprofen prn  no further inpt workup  c/w anxiety meds  recall prn Patient is a 39yo F c/o above. Patient reports similar sx for whole life. Patient describes sx as an intermittent spasm to left chest wall and occasional palpitation. patient reports having a dry cough. Patient denies any wheezing or SOB. Pt has had 2 normal cxr and ct scans of abd since June. Also multiple ekgs with no evidence of ischemia.     Vital Signs Last 24 Hrs  T(C): 35.6 (27 Feb 2019 15:40), Max: 35.6 (27 Feb 2019 15:40)  T(F): 96.1 (27 Feb 2019 15:40), Max: 96.1 (27 Feb 2019 15:40)  HR: 79 (27 Feb 2019 15:40) (79 - 99)  BP: 119/59 (27 Feb 2019 15:40) (119/59 - 135/71)    PHYSICAL EXAM:      Constitutional: A&Ox4  Respiratory: cta b/l  Cardiovascular: s1 s2 rrr  Gastrointestinal: soft nt  nd + bs no rebound or guarding  Genitourinary: no cva tenderness  Extremities: normal rom, no edema, calf tenderness  Neurological:no focal deficits  Skin: no rash  chest wall is nontender    1.chest pain which is chronic and not cardiac In nature, possible pleuritic considering cough recently vs musculoskeletal vs more likely anxiety related.  suggest ibuprofen prn  no further inpt workup  c/w anxiety meds  recall prn

## 2019-02-28 RX ORDER — KETOCONAZOLE 20 MG/G
1 AEROSOL, FOAM TOPICAL
Qty: 0 | Refills: 0 | Status: DISCONTINUED | OUTPATIENT
Start: 2019-02-28 | End: 2019-03-06

## 2019-02-28 RX ADMIN — GABAPENTIN 800 MILLIGRAM(S): 400 CAPSULE ORAL at 09:55

## 2019-02-28 RX ADMIN — GABAPENTIN 800 MILLIGRAM(S): 400 CAPSULE ORAL at 21:26

## 2019-02-28 RX ADMIN — GABAPENTIN 800 MILLIGRAM(S): 400 CAPSULE ORAL at 14:26

## 2019-02-28 RX ADMIN — Medication 30 MILLILITER(S): at 17:04

## 2019-02-28 RX ADMIN — Medication 1 TABLET(S): at 09:55

## 2019-02-28 RX ADMIN — Medication 10 MILLIGRAM(S): at 21:27

## 2019-02-28 RX ADMIN — PANTOPRAZOLE SODIUM 40 MILLIGRAM(S): 20 TABLET, DELAYED RELEASE ORAL at 09:55

## 2019-02-28 RX ADMIN — MIRTAZAPINE 15 MILLIGRAM(S): 45 TABLET, ORALLY DISINTEGRATING ORAL at 21:27

## 2019-02-28 RX ADMIN — Medication 400 MILLIGRAM(S): at 17:04

## 2019-03-01 DIAGNOSIS — F33.2 MAJOR DEPRESSIVE DISORDER, RECURRENT SEVERE WITHOUT PSYCHOTIC FEATURES: ICD-10-CM

## 2019-03-01 DIAGNOSIS — F10.20 ALCOHOL DEPENDENCE, UNCOMPLICATED: ICD-10-CM

## 2019-03-01 RX ADMIN — GABAPENTIN 800 MILLIGRAM(S): 400 CAPSULE ORAL at 08:56

## 2019-03-01 RX ADMIN — MIRTAZAPINE 30 MILLIGRAM(S): 45 TABLET, ORALLY DISINTEGRATING ORAL at 22:38

## 2019-03-01 RX ADMIN — GABAPENTIN 800 MILLIGRAM(S): 400 CAPSULE ORAL at 20:59

## 2019-03-01 RX ADMIN — Medication 10 MILLIGRAM(S): at 22:38

## 2019-03-01 RX ADMIN — GABAPENTIN 800 MILLIGRAM(S): 400 CAPSULE ORAL at 14:09

## 2019-03-01 RX ADMIN — PANTOPRAZOLE SODIUM 40 MILLIGRAM(S): 20 TABLET, DELAYED RELEASE ORAL at 08:56

## 2019-03-01 RX ADMIN — Medication 1 TABLET(S): at 08:56

## 2019-03-02 RX ADMIN — MIRTAZAPINE 30 MILLIGRAM(S): 45 TABLET, ORALLY DISINTEGRATING ORAL at 20:51

## 2019-03-02 RX ADMIN — PANTOPRAZOLE SODIUM 40 MILLIGRAM(S): 20 TABLET, DELAYED RELEASE ORAL at 09:57

## 2019-03-02 RX ADMIN — GABAPENTIN 800 MILLIGRAM(S): 400 CAPSULE ORAL at 12:57

## 2019-03-02 RX ADMIN — GABAPENTIN 800 MILLIGRAM(S): 400 CAPSULE ORAL at 09:56

## 2019-03-02 RX ADMIN — GABAPENTIN 800 MILLIGRAM(S): 400 CAPSULE ORAL at 20:51

## 2019-03-02 RX ADMIN — Medication 1 TABLET(S): at 09:57

## 2019-03-02 RX ADMIN — Medication 10 MILLIGRAM(S): at 20:51

## 2019-03-03 RX ADMIN — GABAPENTIN 800 MILLIGRAM(S): 400 CAPSULE ORAL at 08:13

## 2019-03-03 RX ADMIN — Medication 1 TABLET(S): at 08:13

## 2019-03-03 RX ADMIN — MIRTAZAPINE 30 MILLIGRAM(S): 45 TABLET, ORALLY DISINTEGRATING ORAL at 21:08

## 2019-03-03 RX ADMIN — GABAPENTIN 800 MILLIGRAM(S): 400 CAPSULE ORAL at 21:08

## 2019-03-03 RX ADMIN — PANTOPRAZOLE SODIUM 40 MILLIGRAM(S): 20 TABLET, DELAYED RELEASE ORAL at 08:13

## 2019-03-03 RX ADMIN — GABAPENTIN 800 MILLIGRAM(S): 400 CAPSULE ORAL at 12:33

## 2019-03-03 RX ADMIN — Medication 50 MILLIGRAM(S): at 21:46

## 2019-03-03 RX ADMIN — Medication 10 MILLIGRAM(S): at 21:08

## 2019-03-04 RX ADMIN — GABAPENTIN 800 MILLIGRAM(S): 400 CAPSULE ORAL at 22:09

## 2019-03-04 RX ADMIN — GABAPENTIN 800 MILLIGRAM(S): 400 CAPSULE ORAL at 09:48

## 2019-03-04 RX ADMIN — MIRTAZAPINE 30 MILLIGRAM(S): 45 TABLET, ORALLY DISINTEGRATING ORAL at 22:09

## 2019-03-04 RX ADMIN — Medication 50 MILLIGRAM(S): at 22:09

## 2019-03-04 RX ADMIN — Medication 10 MILLIGRAM(S): at 22:09

## 2019-03-04 RX ADMIN — GABAPENTIN 800 MILLIGRAM(S): 400 CAPSULE ORAL at 14:21

## 2019-03-04 RX ADMIN — KETOCONAZOLE 1 APPLICATION(S): 20 AEROSOL, FOAM TOPICAL at 09:51

## 2019-03-04 RX ADMIN — Medication 1 TABLET(S): at 09:47

## 2019-03-04 RX ADMIN — PANTOPRAZOLE SODIUM 40 MILLIGRAM(S): 20 TABLET, DELAYED RELEASE ORAL at 09:47

## 2019-03-05 VITALS
HEART RATE: 86 BPM | TEMPERATURE: 97 F | RESPIRATION RATE: 20 BRPM | DIASTOLIC BLOOD PRESSURE: 72 MMHG | SYSTOLIC BLOOD PRESSURE: 119 MMHG

## 2019-03-05 RX ORDER — TRAZODONE HCL 50 MG
1 TABLET ORAL
Qty: 14 | Refills: 0 | OUTPATIENT
Start: 2019-03-05 | End: 2019-03-18

## 2019-03-05 RX ORDER — GABAPENTIN 400 MG/1
1 CAPSULE ORAL
Qty: 42 | Refills: 0 | OUTPATIENT
Start: 2019-03-05 | End: 2019-03-18

## 2019-03-05 RX ORDER — MIRTAZAPINE 45 MG/1
1 TABLET, ORALLY DISINTEGRATING ORAL
Qty: 0 | Refills: 0 | COMMUNITY

## 2019-03-05 RX ORDER — MIRTAZAPINE 45 MG/1
1 TABLET, ORALLY DISINTEGRATING ORAL
Qty: 14 | Refills: 0 | OUTPATIENT
Start: 2019-03-05 | End: 2019-03-18

## 2019-03-05 RX ADMIN — Medication 1 TABLET(S): at 10:29

## 2019-03-05 RX ADMIN — GABAPENTIN 800 MILLIGRAM(S): 400 CAPSULE ORAL at 10:30

## 2019-03-05 RX ADMIN — GABAPENTIN 800 MILLIGRAM(S): 400 CAPSULE ORAL at 12:30

## 2019-03-05 RX ADMIN — MIRTAZAPINE 30 MILLIGRAM(S): 45 TABLET, ORALLY DISINTEGRATING ORAL at 21:58

## 2019-03-05 RX ADMIN — Medication 10 MILLIGRAM(S): at 21:58

## 2019-03-05 RX ADMIN — Medication 50 MILLIGRAM(S): at 21:58

## 2019-03-05 RX ADMIN — GABAPENTIN 800 MILLIGRAM(S): 400 CAPSULE ORAL at 21:58

## 2019-03-05 RX ADMIN — PANTOPRAZOLE SODIUM 40 MILLIGRAM(S): 20 TABLET, DELAYED RELEASE ORAL at 10:30

## 2019-03-06 ENCOUNTER — OUTPATIENT (OUTPATIENT)
Dept: OUTPATIENT SERVICES | Facility: HOSPITAL | Age: 39
LOS: 1 days | Discharge: HOME | End: 2019-03-06

## 2019-03-06 DIAGNOSIS — F10.10 ALCOHOL ABUSE, UNCOMPLICATED: ICD-10-CM

## 2019-03-06 DIAGNOSIS — Z98.891 HISTORY OF UTERINE SCAR FROM PREVIOUS SURGERY: Chronic | ICD-10-CM

## 2019-03-06 DIAGNOSIS — Z98.890 OTHER SPECIFIED POSTPROCEDURAL STATES: Chronic | ICD-10-CM

## 2019-03-06 RX ORDER — GABAPENTIN 400 MG/1
1 CAPSULE ORAL
Qty: 0 | Refills: 0 | COMMUNITY
Start: 2019-03-06

## 2019-03-06 RX ORDER — MIRTAZAPINE 45 MG/1
1 TABLET, ORALLY DISINTEGRATING ORAL
Qty: 0 | Refills: 0 | COMMUNITY
Start: 2019-03-06

## 2019-03-06 RX ORDER — GABAPENTIN 400 MG/1
1 CAPSULE ORAL
Qty: 0 | Refills: 0 | DISCHARGE
Start: 2019-03-06

## 2019-03-06 RX ORDER — TRAZODONE HCL 50 MG
1 TABLET ORAL
Qty: 0 | Refills: 0 | DISCHARGE
Start: 2019-03-06

## 2019-03-06 RX ORDER — TRAZODONE HCL 50 MG
1 TABLET ORAL
Qty: 0 | Refills: 0 | COMMUNITY
Start: 2019-03-06

## 2019-03-06 RX ADMIN — Medication 1 TABLET(S): at 10:34

## 2019-03-06 RX ADMIN — GABAPENTIN 800 MILLIGRAM(S): 400 CAPSULE ORAL at 10:34

## 2019-03-06 RX ADMIN — PANTOPRAZOLE SODIUM 40 MILLIGRAM(S): 20 TABLET, DELAYED RELEASE ORAL at 10:34

## 2019-03-12 DIAGNOSIS — I34.1 NONRHEUMATIC MITRAL (VALVE) PROLAPSE: ICD-10-CM

## 2019-03-12 DIAGNOSIS — F41.9 ANXIETY DISORDER, UNSPECIFIED: ICD-10-CM

## 2019-03-12 DIAGNOSIS — F33.2 MAJOR DEPRESSIVE DISORDER, RECURRENT SEVERE WITHOUT PSYCHOTIC FEATURES: ICD-10-CM

## 2019-03-12 DIAGNOSIS — F10.20 ALCOHOL DEPENDENCE, UNCOMPLICATED: ICD-10-CM

## 2019-03-12 DIAGNOSIS — L21.9 SEBORRHEIC DERMATITIS, UNSPECIFIED: ICD-10-CM

## 2019-03-12 DIAGNOSIS — E78.5 HYPERLIPIDEMIA, UNSPECIFIED: ICD-10-CM

## 2019-03-12 DIAGNOSIS — F17.210 NICOTINE DEPENDENCE, CIGARETTES, UNCOMPLICATED: ICD-10-CM

## 2019-03-12 DIAGNOSIS — E66.9 OBESITY, UNSPECIFIED: ICD-10-CM

## 2019-03-26 ENCOUNTER — EMERGENCY (EMERGENCY)
Facility: HOSPITAL | Age: 39
LOS: 0 days | Discharge: HOME | End: 2019-03-27
Attending: EMERGENCY MEDICINE | Admitting: EMERGENCY MEDICINE

## 2019-03-26 VITALS
OXYGEN SATURATION: 99 % | RESPIRATION RATE: 20 BRPM | SYSTOLIC BLOOD PRESSURE: 131 MMHG | HEART RATE: 145 BPM | WEIGHT: 199.96 LBS | TEMPERATURE: 98 F | DIASTOLIC BLOOD PRESSURE: 80 MMHG | HEIGHT: 62 IN

## 2019-03-26 DIAGNOSIS — Z98.891 HISTORY OF UTERINE SCAR FROM PREVIOUS SURGERY: Chronic | ICD-10-CM

## 2019-03-26 DIAGNOSIS — Z79.899 OTHER LONG TERM (CURRENT) DRUG THERAPY: ICD-10-CM

## 2019-03-26 DIAGNOSIS — K21.9 GASTRO-ESOPHAGEAL REFLUX DISEASE WITHOUT ESOPHAGITIS: ICD-10-CM

## 2019-03-26 DIAGNOSIS — Z98.890 OTHER SPECIFIED POSTPROCEDURAL STATES: ICD-10-CM

## 2019-03-26 DIAGNOSIS — E78.00 PURE HYPERCHOLESTEROLEMIA, UNSPECIFIED: ICD-10-CM

## 2019-03-26 DIAGNOSIS — F41.9 ANXIETY DISORDER, UNSPECIFIED: ICD-10-CM

## 2019-03-26 DIAGNOSIS — E78.5 HYPERLIPIDEMIA, UNSPECIFIED: ICD-10-CM

## 2019-03-26 DIAGNOSIS — F10.10 ALCOHOL ABUSE, UNCOMPLICATED: ICD-10-CM

## 2019-03-26 DIAGNOSIS — F10.20 ALCOHOL DEPENDENCE, UNCOMPLICATED: ICD-10-CM

## 2019-03-26 DIAGNOSIS — Z98.890 OTHER SPECIFIED POSTPROCEDURAL STATES: Chronic | ICD-10-CM

## 2019-03-26 DIAGNOSIS — Z87.19 PERSONAL HISTORY OF OTHER DISEASES OF THE DIGESTIVE SYSTEM: ICD-10-CM

## 2019-03-26 DIAGNOSIS — Z88.8 ALLERGY STATUS TO OTHER DRUGS, MEDICAMENTS AND BIOLOGICAL SUBSTANCES: ICD-10-CM

## 2019-03-26 DIAGNOSIS — Z98.891 HISTORY OF UTERINE SCAR FROM PREVIOUS SURGERY: ICD-10-CM

## 2019-03-27 RX ORDER — SODIUM CHLORIDE 9 MG/ML
2000 INJECTION INTRAMUSCULAR; INTRAVENOUS; SUBCUTANEOUS ONCE
Qty: 0 | Refills: 0 | Status: DISCONTINUED | OUTPATIENT
Start: 2019-03-27 | End: 2019-03-27

## 2019-04-01 ENCOUNTER — OUTPATIENT (OUTPATIENT)
Dept: OUTPATIENT SERVICES | Facility: HOSPITAL | Age: 39
LOS: 1 days | End: 2019-04-01

## 2019-04-01 DIAGNOSIS — Z98.891 HISTORY OF UTERINE SCAR FROM PREVIOUS SURGERY: Chronic | ICD-10-CM

## 2019-04-01 DIAGNOSIS — Z98.890 OTHER SPECIFIED POSTPROCEDURAL STATES: Chronic | ICD-10-CM

## 2019-04-26 ENCOUNTER — INPATIENT (INPATIENT)
Facility: HOSPITAL | Age: 39
LOS: 4 days | Discharge: HOME | End: 2019-05-01
Attending: INTERNAL MEDICINE | Admitting: INTERNAL MEDICINE
Payer: MEDICAID

## 2019-04-26 VITALS
TEMPERATURE: 99 F | SYSTOLIC BLOOD PRESSURE: 137 MMHG | HEART RATE: 148 BPM | DIASTOLIC BLOOD PRESSURE: 85 MMHG | OXYGEN SATURATION: 98 % | RESPIRATION RATE: 18 BRPM

## 2019-04-26 DIAGNOSIS — Z98.891 HISTORY OF UTERINE SCAR FROM PREVIOUS SURGERY: Chronic | ICD-10-CM

## 2019-04-26 DIAGNOSIS — Z98.890 OTHER SPECIFIED POSTPROCEDURAL STATES: Chronic | ICD-10-CM

## 2019-04-26 LAB
ALBUMIN SERPL ELPH-MCNC: 4.5 G/DL — SIGNIFICANT CHANGE UP (ref 3.5–5.2)
ALP SERPL-CCNC: 148 U/L — HIGH (ref 30–115)
ALT FLD-CCNC: 244 U/L — HIGH (ref 0–41)
ANION GAP SERPL CALC-SCNC: 20 MMOL/L — HIGH (ref 7–14)
APAP SERPL-MCNC: <5 UG/ML — LOW (ref 10–30)
AST SERPL-CCNC: 392 U/L — HIGH (ref 0–41)
BASOPHILS # BLD AUTO: 0.04 K/UL — SIGNIFICANT CHANGE UP (ref 0–0.2)
BASOPHILS NFR BLD AUTO: 1.2 % — HIGH (ref 0–1)
BILIRUB SERPL-MCNC: 0.6 MG/DL — SIGNIFICANT CHANGE UP (ref 0.2–1.2)
BUN SERPL-MCNC: 6 MG/DL — LOW (ref 10–20)
CALCIUM SERPL-MCNC: 9 MG/DL — SIGNIFICANT CHANGE UP (ref 8.5–10.1)
CHLORIDE SERPL-SCNC: 101 MMOL/L — SIGNIFICANT CHANGE UP (ref 98–110)
CHOLEST SERPL-MCNC: 327 MG/DL — HIGH (ref 100–200)
CO2 SERPL-SCNC: 17 MMOL/L — SIGNIFICANT CHANGE UP (ref 17–32)
CREAT SERPL-MCNC: 0.7 MG/DL — SIGNIFICANT CHANGE UP (ref 0.7–1.5)
EOSINOPHIL # BLD AUTO: 0.03 K/UL — SIGNIFICANT CHANGE UP (ref 0–0.7)
EOSINOPHIL NFR BLD AUTO: 0.9 % — SIGNIFICANT CHANGE UP (ref 0–8)
ETHANOL SERPL-MCNC: 322 MG/DL — HIGH
GLUCOSE SERPL-MCNC: 118 MG/DL — HIGH (ref 70–99)
HCG SERPL QL: NEGATIVE — SIGNIFICANT CHANGE UP
HCT VFR BLD CALC: 44.5 % — SIGNIFICANT CHANGE UP (ref 37–47)
HDLC SERPL-MCNC: 63 MG/DL — SIGNIFICANT CHANGE UP
HGB BLD-MCNC: 15.2 G/DL — SIGNIFICANT CHANGE UP (ref 12–16)
IMM GRANULOCYTES NFR BLD AUTO: 0.6 % — HIGH (ref 0.1–0.3)
LACTATE SERPL-SCNC: 4.3 MMOL/L — CRITICAL HIGH (ref 0.5–2.2)
LIDOCAIN IGE QN: 27 U/L — SIGNIFICANT CHANGE UP (ref 7–60)
LIPID PNL WITH DIRECT LDL SERPL: 206 MG/DL — HIGH (ref 4–129)
LYMPHOCYTES # BLD AUTO: 1.36 K/UL — SIGNIFICANT CHANGE UP (ref 1.2–3.4)
LYMPHOCYTES # BLD AUTO: 40.1 % — SIGNIFICANT CHANGE UP (ref 20.5–51.1)
MAGNESIUM SERPL-MCNC: 2.1 MG/DL — SIGNIFICANT CHANGE UP (ref 1.8–2.4)
MCHC RBC-ENTMCNC: 32.7 PG — HIGH (ref 27–31)
MCHC RBC-ENTMCNC: 34.2 G/DL — SIGNIFICANT CHANGE UP (ref 32–37)
MCV RBC AUTO: 95.7 FL — SIGNIFICANT CHANGE UP (ref 81–99)
MONOCYTES # BLD AUTO: 0.33 K/UL — SIGNIFICANT CHANGE UP (ref 0.1–0.6)
MONOCYTES NFR BLD AUTO: 9.7 % — HIGH (ref 1.7–9.3)
NEUTROPHILS # BLD AUTO: 1.61 K/UL — SIGNIFICANT CHANGE UP (ref 1.4–6.5)
NEUTROPHILS NFR BLD AUTO: 47.5 % — SIGNIFICANT CHANGE UP (ref 42.2–75.2)
NRBC # BLD: 0 /100 WBCS — SIGNIFICANT CHANGE UP (ref 0–0)
PHOSPHATE SERPL-MCNC: 4.2 MG/DL — SIGNIFICANT CHANGE UP (ref 2.1–4.9)
PLATELET # BLD AUTO: 171 K/UL — SIGNIFICANT CHANGE UP (ref 130–400)
POTASSIUM SERPL-MCNC: 5.3 MMOL/L — HIGH (ref 3.5–5)
POTASSIUM SERPL-SCNC: 5.3 MMOL/L — HIGH (ref 3.5–5)
PROT SERPL-MCNC: 8.6 G/DL — HIGH (ref 6–8)
RBC # BLD: 4.65 M/UL — SIGNIFICANT CHANGE UP (ref 4.2–5.4)
RBC # FLD: 14.1 % — SIGNIFICANT CHANGE UP (ref 11.5–14.5)
SALICYLATES SERPL-MCNC: <0.3 MG/DL — LOW (ref 4–30)
SODIUM SERPL-SCNC: 138 MMOL/L — SIGNIFICANT CHANGE UP (ref 135–146)
TOTAL CHOLESTEROL/HDL RATIO MEASUREMENT: 5.2 RATIO — SIGNIFICANT CHANGE UP (ref 4–5.5)
TRIGL SERPL-MCNC: 688 MG/DL — HIGH (ref 10–149)
WBC # BLD: 3.39 K/UL — LOW (ref 4.8–10.8)
WBC # FLD AUTO: 3.39 K/UL — LOW (ref 4.8–10.8)

## 2019-04-26 PROCEDURE — 99285 EMERGENCY DEPT VISIT HI MDM: CPT | Mod: 25

## 2019-04-26 RX ORDER — THIAMINE MONONITRATE (VIT B1) 100 MG
100 TABLET ORAL ONCE
Qty: 0 | Refills: 0 | Status: COMPLETED | OUTPATIENT
Start: 2019-04-26 | End: 2019-04-26

## 2019-04-26 RX ORDER — SODIUM CHLORIDE 9 MG/ML
2000 INJECTION INTRAMUSCULAR; INTRAVENOUS; SUBCUTANEOUS ONCE
Qty: 0 | Refills: 0 | Status: COMPLETED | OUTPATIENT
Start: 2019-04-26 | End: 2019-04-26

## 2019-04-26 RX ORDER — SODIUM CHLORIDE 9 MG/ML
1000 INJECTION, SOLUTION INTRAVENOUS
Qty: 0 | Refills: 0 | Status: DISCONTINUED | OUTPATIENT
Start: 2019-04-26 | End: 2019-04-27

## 2019-04-26 RX ORDER — ONDANSETRON 8 MG/1
4 TABLET, FILM COATED ORAL ONCE
Qty: 0 | Refills: 0 | Status: COMPLETED | OUTPATIENT
Start: 2019-04-26 | End: 2019-04-26

## 2019-04-26 RX ADMIN — Medication 2 MILLIGRAM(S): at 23:32

## 2019-04-26 RX ADMIN — Medication 100 MILLIGRAM(S): at 23:34

## 2019-04-26 RX ADMIN — SODIUM CHLORIDE 4000 MILLILITER(S): 9 INJECTION INTRAMUSCULAR; INTRAVENOUS; SUBCUTANEOUS at 23:10

## 2019-04-26 NOTE — ED ADULT NURSE NOTE - NSIMPLEMENTINTERV_GEN_ALL_ED
Implemented All Universal Safety Interventions:  [place] to call system. Call bell, personal items and telephone within reach. Instruct patient to call for assistance. Room bathroom lighting operational. Non-slip footwear when patient is off stretcher. Physically safe environment: no spills, clutter or unnecessary equipment. Stretcher in lowest position, wheels locked, appropriate side rails in place.

## 2019-04-26 NOTE — ED PROVIDER NOTE - PLAN OF CARE
38f w mild EtOH withdrawal also w vomit/diarrhea & dehydration requesting detox. nontoxic appearing, n/v intact. no trauma. --Labs, EKG, CT abd, IV fluids, thiamine/dextrose, benzodiazepines as needed for withdrawal symptoms, admit to Detox vs Medicine

## 2019-04-26 NOTE — ED PROVIDER NOTE - NS ED ROS FT
Review of Systems  Constitutional:  No fever or chills.   Eyes:  Negative.   ENMT:  No nasal congestion, discharge, or throat pain.   Cardiac:  No chest pain, palpitations, syncope, or edema.  Respiratory:  No dyspnea, wheezing, or cough.   GI:  No vomiting, diarrhea, or abdominal pain. No melena or hematochezia.  :  No dysuria or hematuria.   Musculoskeletal:  No gait abnormality, joint swelling, joint pain, or back pain.  Skin:  No skin rash, jaundice, or lesions.  Neuro:  No headache, loss of sensation, or focal weakness. +Tremors, no AV hallucinations. Review of Systems  Constitutional:  No fever or chills.   Eyes:  Negative.   ENMT:  No nasal congestion, discharge, or throat pain.   Cardiac:  No chest pain, palpitations, syncope, or edema.  Respiratory:  No dyspnea, wheezing, or cough.   GI:  See HPI  :  No dysuria or hematuria.   Musculoskeletal:  No gait abnormality, joint swelling, joint pain, or back pain.  Skin:  No skin rash, jaundice, or lesions.  Neuro:  No headache, loss of sensation, or focal weakness. +Tremors, no AV hallucinations.

## 2019-04-26 NOTE — ED PROVIDER NOTE - CARE PLAN
Principal Discharge DX:	Alcohol abuse  Assessment and plan of treatment:	38f w mild EtOH withdrawal also w vomit/diarrhea & dehydration requesting detox. nontoxic appearing, n/v intact. no trauma. --Labs, EKG, CT abd, IV fluids, thiamine/dextrose, benzodiazepines as needed for withdrawal symptoms, admit to Detox vs Medicine  Secondary Diagnosis:	Alcohol withdrawal  Secondary Diagnosis:	Dehydration  Secondary Diagnosis:	Vomiting and diarrhea Principal Discharge DX:	Alcohol abuse  Assessment and plan of treatment:	38f w mild EtOH withdrawal also w vomit/diarrhea & dehydration requesting detox. nontoxic appearing, n/v intact. no trauma. --Labs, EKG, CT abd, IV fluids, thiamine/dextrose, benzodiazepines as needed for withdrawal symptoms, admit to Detox vs Medicine  Secondary Diagnosis:	Alcohol withdrawal  Secondary Diagnosis:	Dehydration  Secondary Diagnosis:	Vomiting and diarrhea  Secondary Diagnosis:	Lactic acidosis

## 2019-04-26 NOTE — ED PROVIDER NOTE - OBJECTIVE STATEMENT
38f w a hx of HLD, anxiety, GERD, Grave's, EtOH abuse, & benzodiazepine misuse. Pt presents reporting symptoms of withdrawal and requesting detox. Pt admits to drinking heavily daily but has been having vomit/diarrhea for the past few days and not able to drink enough. Pt denies any fall/trauma. Patient denies SI/HI, denies any self-harm attempt or OD, denies any other substance abuse/misuse, +tremors, and denies AV hallucinations. Pt reports no other symptoms at this time.

## 2019-04-26 NOTE — ED PROVIDER NOTE - PROGRESS NOTE DETAILS
Detox unit called and bed reserved Signed out to Dr Hollingsworth. f/u repeat lactate and dispo. Signed out to Dr Hollingsworth. f/u repeat lactate, CXR, and dispo.

## 2019-04-26 NOTE — ED ADULT NURSE NOTE - OBJECTIVE STATEMENT
37 y/o F pt w/ pmh of HLD, grave's disease, alcoholism, anxiety, GERD, gastritis, MVP, esophagitis, to ED for alcohol and benzo' s withdrawal, pt report drinking 3L of wine everyday and takes 1mg of Klonopin twice a day, pt report nausea, vomiting and diarrhea, last drink was this morning which was a glass of wine, has not been able to take Klonopin due to nausea and vomiting, on exam fine tremors to hand, pt nauseous, neg tongue fasciculation, pt tachycardic hr 140's, abd soft, tenderness to RUQ, ND, + BS in all 4 quadrant, neg CVA tenderness, denies fever, chills, dysuria, hematuria, melena, seen and eval by yari DELONG placed, labs drawn and sent, started on IV fluids

## 2019-04-26 NOTE — ED PROVIDER NOTE - PHYSICAL EXAMINATION
Physical Exam  General: Awake, alert, NAD, WDWN, NCAT, no skull/facial tender, no step-offs, no raccoon/khan, non-toxic appearing  Eyes: PERRL, EOMI, no icterus, lids and conjunctivae are normal  ENT: External inspection normal, pink/dry membranes, pharynx normal  CV: S1S2, tachycardia, regular rhythm, no murmur/gallops/rubs, no JVD, 2+ pulses b/l, no edema/cords/homans, warm/well-perfused  Respiratory: Normal respiratory rate/effort, no respiratory distress, normal voice, speaking full sentences, lungs clear to auscultation b/l, no wheezing/rales/rhonchi, no retractions, no stridor  Abdomen: Soft abdomen, mild diffuse discomfort, no distended/guarding/rebound, no CVA tender  Musculoskeletal: FROM all 4 extremities, N/V intact, pelvis stable, no TLS spinal tender/deform/step-offs, no salma tender/deform, normal motor tone  Neck: FROM neck, supple, no meningismus, trachea midline, no JVD, no cspine tender/step-offs  Integumentary: Color normal for race, warm and dry, no rash  Neuro: Oriented x3, CN 2-12 intact, normal motor, normal sensory, normal gait, normal cerebellar, mild tremors, no clonus/rigidity/hyper-reflexia.  Psych: Oriented x3, mood normal, affect normal. No SI/HI, no AV hallucinations.

## 2019-04-26 NOTE — ED ADULT TRIAGE NOTE - CHIEF COMPLAINT QUOTE
As per patient "I have alcohol and benzo withdrawal, I have vomiting, diarrhea, and palpations". Pt states I drank today wine, I took one mg of klonopin today

## 2019-04-27 DIAGNOSIS — Z72.0 TOBACCO USE: ICD-10-CM

## 2019-04-27 DIAGNOSIS — K29.70 GASTRITIS, UNSPECIFIED, WITHOUT BLEEDING: ICD-10-CM

## 2019-04-27 DIAGNOSIS — F41.9 ANXIETY DISORDER, UNSPECIFIED: ICD-10-CM

## 2019-04-27 DIAGNOSIS — E66.9 OBESITY, UNSPECIFIED: ICD-10-CM

## 2019-04-27 DIAGNOSIS — F10.10 ALCOHOL ABUSE, UNCOMPLICATED: ICD-10-CM

## 2019-04-27 DIAGNOSIS — K21.9 GASTRO-ESOPHAGEAL REFLUX DISEASE WITHOUT ESOPHAGITIS: ICD-10-CM

## 2019-04-27 LAB
AMPHET UR-MCNC: NEGATIVE — SIGNIFICANT CHANGE UP
APPEARANCE UR: CLEAR — SIGNIFICANT CHANGE UP
APTT BLD: 31.4 SEC — SIGNIFICANT CHANGE UP (ref 27–39.2)
BACTERIA # UR AUTO: ABNORMAL
BARBITURATES UR SCN-MCNC: NEGATIVE — SIGNIFICANT CHANGE UP
BASE EXCESS BLDV CALC-SCNC: -6.4 MMOL/L — LOW (ref -2–2)
BENZODIAZ UR-MCNC: NEGATIVE — SIGNIFICANT CHANGE UP
BILIRUB UR-MCNC: NEGATIVE — SIGNIFICANT CHANGE UP
CA-I SERPL-SCNC: 1.04 MMOL/L — LOW (ref 1.12–1.3)
COCAINE METAB.OTHER UR-MCNC: NEGATIVE — SIGNIFICANT CHANGE UP
COD CRY URNS QL: NEGATIVE — SIGNIFICANT CHANGE UP
COLOR SPEC: YELLOW — SIGNIFICANT CHANGE UP
DIFF PNL FLD: ABNORMAL
DRUG SCREEN 1, URINE RESULT: SIGNIFICANT CHANGE UP
EPI CELLS # UR: ABNORMAL /HPF
GAS PNL BLDV: 143 MMOL/L — SIGNIFICANT CHANGE UP (ref 136–145)
GAS PNL BLDV: SIGNIFICANT CHANGE UP
GLUCOSE UR QL: NEGATIVE MG/DL — SIGNIFICANT CHANGE UP
GRAN CASTS # UR COMP ASSIST: ABNORMAL /LPF
HAV IGM SER-ACNC: SIGNIFICANT CHANGE UP
HBV CORE IGM SER-ACNC: SIGNIFICANT CHANGE UP
HBV SURFACE AG SER-ACNC: SIGNIFICANT CHANGE UP
HCO3 BLDV-SCNC: 20 MMOL/L — LOW (ref 22–29)
HCT VFR BLDA CALC: 33.9 % — LOW (ref 34–44)
HCV AB S/CO SERPL IA: 0.09 S/CO — SIGNIFICANT CHANGE UP (ref 0–0.99)
HCV AB SERPL-IMP: SIGNIFICANT CHANGE UP
HGB BLD CALC-MCNC: 11.1 G/DL — LOW (ref 14–18)
HOROWITZ INDEX BLDV+IHG-RTO: 21 — SIGNIFICANT CHANGE UP
HYALINE CASTS # UR AUTO: NEGATIVE — SIGNIFICANT CHANGE UP
INR BLD: 1.18 RATIO — SIGNIFICANT CHANGE UP (ref 0.65–1.3)
KETONES UR-MCNC: NEGATIVE — SIGNIFICANT CHANGE UP
LACTATE BLDV-MCNC: 2.7 MMOL/L — HIGH (ref 0.5–1.6)
LACTATE SERPL-SCNC: 2.5 MMOL/L — HIGH (ref 0.5–2.2)
LACTATE SERPL-SCNC: 3 MMOL/L — HIGH (ref 0.5–2.2)
LEUKOCYTE ESTERASE UR-ACNC: NEGATIVE — SIGNIFICANT CHANGE UP
METHADONE UR-MCNC: NEGATIVE — SIGNIFICANT CHANGE UP
NITRITE UR-MCNC: NEGATIVE — SIGNIFICANT CHANGE UP
OPIATES UR-MCNC: NEGATIVE — SIGNIFICANT CHANGE UP
PCO2 BLDV: 42 MMHG — SIGNIFICANT CHANGE UP (ref 41–51)
PCP UR-MCNC: NEGATIVE — SIGNIFICANT CHANGE UP
PH BLDV: 7.28 — SIGNIFICANT CHANGE UP (ref 7.26–7.43)
PH UR: 5.5 — SIGNIFICANT CHANGE UP (ref 5–8)
PO2 BLDV: 53 MMHG — HIGH (ref 20–40)
POTASSIUM BLDV-SCNC: 3.1 MMOL/L — LOW (ref 3.3–5.6)
PROPOXYPHENE QUALITATIVE URINE RESULT: NEGATIVE — SIGNIFICANT CHANGE UP
PROT UR-MCNC: 30 MG/DL
PROTHROM AB SERPL-ACNC: 13.6 SEC — HIGH (ref 9.95–12.87)
RBC CASTS # UR COMP ASSIST: ABNORMAL /HPF
SAO2 % BLDV: 80 % — SIGNIFICANT CHANGE UP
SP GR SPEC: 1.01 — SIGNIFICANT CHANGE UP (ref 1.01–1.03)
T PALLIDUM AB TITR SER: NEGATIVE — SIGNIFICANT CHANGE UP
THC UR QL: NEGATIVE — SIGNIFICANT CHANGE UP
TRI-PHOS CRY UR QL COMP ASSIST: NEGATIVE — SIGNIFICANT CHANGE UP
TSH SERPL-MCNC: 1.83 UIU/ML — SIGNIFICANT CHANGE UP (ref 0.27–4.2)
URATE CRY FLD QL MICRO: NEGATIVE — SIGNIFICANT CHANGE UP
UROBILINOGEN FLD QL: 0.2 MG/DL — SIGNIFICANT CHANGE UP (ref 0.2–0.2)
WBC UR QL: SIGNIFICANT CHANGE UP /HPF

## 2019-04-27 PROCEDURE — 93970 EXTREMITY STUDY: CPT | Mod: 26

## 2019-04-27 PROCEDURE — 74177 CT ABD & PELVIS W/CONTRAST: CPT | Mod: 26

## 2019-04-27 PROCEDURE — 71045 X-RAY EXAM CHEST 1 VIEW: CPT | Mod: 26

## 2019-04-27 RX ORDER — PANTOPRAZOLE SODIUM 20 MG/1
40 TABLET, DELAYED RELEASE ORAL
Qty: 0 | Refills: 0 | Status: DISCONTINUED | OUTPATIENT
Start: 2019-04-27 | End: 2019-05-01

## 2019-04-27 RX ORDER — FOLIC ACID 0.8 MG
1 TABLET ORAL DAILY
Qty: 0 | Refills: 0 | Status: DISCONTINUED | OUTPATIENT
Start: 2019-04-27 | End: 2019-04-27

## 2019-04-27 RX ORDER — MIRTAZAPINE 45 MG/1
30 TABLET, ORALLY DISINTEGRATING ORAL AT BEDTIME
Qty: 0 | Refills: 0 | Status: DISCONTINUED | OUTPATIENT
Start: 2019-04-27 | End: 2019-05-01

## 2019-04-27 RX ORDER — SODIUM CHLORIDE 9 MG/ML
1000 INJECTION, SOLUTION INTRAVENOUS
Qty: 0 | Refills: 0 | Status: DISCONTINUED | OUTPATIENT
Start: 2019-04-27 | End: 2019-04-27

## 2019-04-27 RX ORDER — ONDANSETRON 8 MG/1
4 TABLET, FILM COATED ORAL ONCE
Qty: 0 | Refills: 0 | Status: COMPLETED | OUTPATIENT
Start: 2019-04-27 | End: 2019-04-27

## 2019-04-27 RX ORDER — ATORVASTATIN CALCIUM 80 MG/1
40 TABLET, FILM COATED ORAL AT BEDTIME
Qty: 0 | Refills: 0 | Status: DISCONTINUED | OUTPATIENT
Start: 2019-04-27 | End: 2019-05-01

## 2019-04-27 RX ORDER — FOLIC ACID 0.8 MG
1 TABLET ORAL DAILY
Qty: 0 | Refills: 0 | Status: DISCONTINUED | OUTPATIENT
Start: 2019-04-27 | End: 2019-05-01

## 2019-04-27 RX ORDER — SODIUM CHLORIDE 9 MG/ML
1000 INJECTION, SOLUTION INTRAVENOUS
Qty: 0 | Refills: 0 | Status: DISCONTINUED | OUTPATIENT
Start: 2019-04-27 | End: 2019-04-29

## 2019-04-27 RX ORDER — GABAPENTIN 400 MG/1
800 CAPSULE ORAL THREE TIMES A DAY
Qty: 0 | Refills: 0 | Status: DISCONTINUED | OUTPATIENT
Start: 2019-04-27 | End: 2019-05-01

## 2019-04-27 RX ORDER — CLONAZEPAM 1 MG
1 TABLET ORAL
Qty: 0 | Refills: 0 | Status: DISCONTINUED | OUTPATIENT
Start: 2019-04-27 | End: 2019-05-01

## 2019-04-27 RX ORDER — TRAZODONE HCL 50 MG
50 TABLET ORAL AT BEDTIME
Qty: 0 | Refills: 0 | Status: DISCONTINUED | OUTPATIENT
Start: 2019-04-27 | End: 2019-05-01

## 2019-04-27 RX ORDER — THIAMINE MONONITRATE (VIT B1) 100 MG
100 TABLET ORAL DAILY
Qty: 0 | Refills: 0 | Status: DISCONTINUED | OUTPATIENT
Start: 2019-04-27 | End: 2019-05-01

## 2019-04-27 RX ORDER — SODIUM CHLORIDE 9 MG/ML
1000 INJECTION INTRAMUSCULAR; INTRAVENOUS; SUBCUTANEOUS
Qty: 0 | Refills: 0 | Status: DISCONTINUED | OUTPATIENT
Start: 2019-04-27 | End: 2019-04-27

## 2019-04-27 RX ORDER — SODIUM CHLORIDE 9 MG/ML
1000 INJECTION, SOLUTION INTRAVENOUS ONCE
Qty: 0 | Refills: 0 | Status: COMPLETED | OUTPATIENT
Start: 2019-04-27 | End: 2019-04-27

## 2019-04-27 RX ADMIN — PANTOPRAZOLE SODIUM 40 MILLIGRAM(S): 20 TABLET, DELAYED RELEASE ORAL at 10:58

## 2019-04-27 RX ADMIN — ONDANSETRON 4 MILLIGRAM(S): 8 TABLET, FILM COATED ORAL at 04:31

## 2019-04-27 RX ADMIN — Medication 2 MILLIGRAM(S): at 00:06

## 2019-04-27 RX ADMIN — SODIUM CHLORIDE 2000 MILLILITER(S): 9 INJECTION INTRAMUSCULAR; INTRAVENOUS; SUBCUTANEOUS at 01:00

## 2019-04-27 RX ADMIN — Medication 50 MILLIGRAM(S): at 03:14

## 2019-04-27 RX ADMIN — Medication 2 MILLIGRAM(S): at 03:15

## 2019-04-27 RX ADMIN — Medication 2 MILLIGRAM(S): at 14:51

## 2019-04-27 RX ADMIN — ONDANSETRON 4 MILLIGRAM(S): 8 TABLET, FILM COATED ORAL at 00:08

## 2019-04-27 RX ADMIN — Medication 50 MILLIGRAM(S): at 23:25

## 2019-04-27 RX ADMIN — Medication 2 MILLIGRAM(S): at 08:08

## 2019-04-27 RX ADMIN — Medication 1 MILLIGRAM(S): at 11:01

## 2019-04-27 RX ADMIN — SODIUM CHLORIDE 100 MILLILITER(S): 9 INJECTION, SOLUTION INTRAVENOUS at 09:43

## 2019-04-27 RX ADMIN — Medication 100 MILLIGRAM(S): at 11:01

## 2019-04-27 RX ADMIN — GABAPENTIN 800 MILLIGRAM(S): 400 CAPSULE ORAL at 14:51

## 2019-04-27 RX ADMIN — GABAPENTIN 800 MILLIGRAM(S): 400 CAPSULE ORAL at 21:19

## 2019-04-27 RX ADMIN — Medication 2 MILLIGRAM(S): at 20:17

## 2019-04-27 RX ADMIN — SODIUM CHLORIDE 1000 MILLILITER(S): 9 INJECTION, SOLUTION INTRAVENOUS at 03:04

## 2019-04-27 RX ADMIN — Medication 1 TABLET(S): at 11:01

## 2019-04-27 RX ADMIN — SODIUM CHLORIDE 1000 MILLILITER(S): 9 INJECTION, SOLUTION INTRAVENOUS at 04:31

## 2019-04-27 RX ADMIN — SODIUM CHLORIDE 2000 MILLILITER(S): 9 INJECTION, SOLUTION INTRAVENOUS at 03:03

## 2019-04-27 RX ADMIN — Medication 2 MILLIGRAM(S): at 10:59

## 2019-04-27 RX ADMIN — Medication 2 MILLIGRAM(S): at 05:45

## 2019-04-27 RX ADMIN — ATORVASTATIN CALCIUM 40 MILLIGRAM(S): 80 TABLET, FILM COATED ORAL at 22:50

## 2019-04-27 RX ADMIN — SODIUM CHLORIDE 1000 MILLILITER(S): 9 INJECTION, SOLUTION INTRAVENOUS at 02:03

## 2019-04-27 RX ADMIN — Medication 2 MILLIGRAM(S): at 04:30

## 2019-04-27 RX ADMIN — MIRTAZAPINE 30 MILLIGRAM(S): 45 TABLET, ORALLY DISINTEGRATING ORAL at 21:19

## 2019-04-27 NOTE — PROGRESS NOTE ADULT - ASSESSMENT
38f w a hx of HLD, anxiety, GERD, Grave's, EtOH abuse, active smoker & benzodiazepine abuse, initially presented to ED requesting detox. Upon further questioning, having nausea, vominiting and loose BM since easter.    Tachycardia due to dehydration due to vomiting/diarrhea, complicated by ETOH withdrawal  dc tele  start /hr, check AM lactic acid  No pathology on CT A/P  zofran PRN, advance diet as tolerated  start ativan taper, monitor CIWA  detox eval pending  monitor electrolytes    Suspected thiamine/Folate deficiency due to ETOH  c/w thiamine and folic acid supplementation    GERD  c/w Protonix    Nicotine dependence  smoking sessation counseling provided    Graves disease  check TSH    #Progress Note Handoff  Pending (specify):  Consults and clinical improvement  Family discussion: plan of care discussed with patient, agreeable  Disposition: Home versus detox, anticipate 24-48h

## 2019-04-27 NOTE — PROGRESS NOTE ADULT - SUBJECTIVE AND OBJECTIVE BOX
AURELIA BLANCAS  38y Female    CHIEF COMPLAINT:    Patient is a 38y old  Female who presents with a chief complaint of     INTERVAL HPI/OVERNIGHT EVENTS:    Patient seen and examined. No acute events overnight. Resting in bed, slightly tremulous    ROS: All other systems are negative.    Vital Signs:    T(F): 97.7 (04-27-19 @ 05:32), Max: 98.7 (04-26-19 @ 22:17)  HR: 120 (04-27-19 @ 05:32) (107 - 148)  BP: 147/93 (04-27-19 @ 05:32) (116/63 - 147/93)  RR: 20 (04-27-19 @ 05:32) (16 - 20)  SpO2: 95% (04-27-19 @ 05:32) (95% - 98%)  I&O's Summary    PHYSICAL EXAM:    GENERAL:  NAD  SKIN: No rashes or lesions  HEENT: Atraumatic. Normocephalic.   NECK: Supple, No JVD. No lymphadenopathy.  PULMONARY: CTA B/L. No wheezing. No rales  CVS: Normal S1, S2. Rate and Rhythm are regular. No murmurs.  ABDOMEN/GI: Soft, Nontender, Nondistended; BS present  MSK:  No edema B/L LE. No clubbing or cyanosis  NEUROLOGIC:  No motor or sensory deficit. Slightly tremulous  PSYCH: Alert & oriented x 3, normal affect    LABS:                        15.2   3.39  )-----------( 171      ( 26 Apr 2019 22:55 )             44.5     04-26    138  |  101  |  6<L>  ----------------------------<  118<H>  5.3<H>   |  17  |  0.7    Ca    9.0      26 Apr 2019 22:55  Phos  4.2     04-26  Mg     2.1     04-26    TPro  8.6<H>  /  Alb  4.5  /  TBili  0.6  /  DBili  x   /  AST  392<H>  /  ALT  244<H>  /  AlkPhos  148<H>  04-26    PT/INR - ( 27 Apr 2019 02:10 )   PT: 13.60 sec;   INR: 1.18 ratio    PTT - ( 27 Apr 2019 02:10 )  PTT:31.4 sec    RADIOLOGY & ADDITIONAL TESTS:      Medications:  Standing  folic acid 1 milliGRAM(s) Oral daily  gabapentin 800 milliGRAM(s) Oral three times a day  lactated ringers. 1000 milliLiter(s) IV Continuous <Continuous>  LORazepam     Tablet 2 milliGRAM(s) Oral every 4 hours  LORazepam     Tablet   Oral   mirtazapine 30 milliGRAM(s) Oral at bedtime  multivitamin 1 Tablet(s) Oral daily  pantoprazole    Tablet 40 milliGRAM(s) Oral before breakfast  thiamine 100 milliGRAM(s) Oral daily    PRN Meds  clonazePAM Tablet 1 milliGRAM(s) Oral two times a day PRN  LORazepam     Tablet 2 milliGRAM(s) Oral every 4 hours PRN  LORazepam     Tablet 2 milliGRAM(s) Oral every 4 hours PRN  traZODone 50 milliGRAM(s) Oral at bedtime PRN      Davina Ferro MD  s. [phone]

## 2019-04-27 NOTE — CHART NOTE - NSCHARTNOTEFT_GEN_A_CORE
Patient reports left sided chest pain and adds that she has been on lopressor in the past due to rapid heart beat with MVP (she is waiting to see her cardiologist to schedule a new ECHO). Pulse ox now 91% and she reports that she has calf pain. Stat EKG is totally normal with HR of 61 (will not order lopressor but will order her usual Lipitor dose) Will order venous duplex and D-Dimer. Patient reports left sided chest pain and adds that she has been on lopressor in the past due to rapid heart beat with MVP (she is waiting to see her cardiologist to schedule a new ECHO). Pulse ox now 91% and she reports that she has calf pain. Stat EKG is totally normal with HR of 61 (will not order lopressor but will order her usual Lipitor dose). Admission CXR also reported as normal. Will order venous duplex and D-Dimer. Patient reports left sided chest pain and adds that she has been on lopressor in the past due to rapid heart beat with MVP (she is waiting to see her cardiologist to schedule a new ECHO). Pulse ox now 91% and she reports that she has calf pain. Stat EKG is totally normal with HR of 61 (will not order lopressor but will order her usual Lipitor dose). Admission CXR also reported as no acute cardiopulmonary disease.. Will order venous duplex and D-Dimer.

## 2019-04-27 NOTE — H&P ADULT - NSHPREVIEWOFSYSTEMS_GEN_ALL_CORE
eview of Systems: Review of Systems  	Constitutional:  No fever or chills.   	Eyes:  Negative.   	ENMT:  No nasal congestion, discharge, or throat pain.   	Cardiac:  No chest pain, palpitations, syncope, or edema.  	Respiratory:  No dyspnea, wheezing, or cough.   	GI:  See HPI  	:  No dysuria or hematuria.   	Musculoskeletal:  No gait abnormality, joint swelling, joint pain, or back pain.  	Skin:  No skin rash, jaundice, or lesions.  Neuro:  No headache, loss of sensation, or focal weakness. +Tremors, no AV hallucinations.

## 2019-04-27 NOTE — H&P ADULT - NSHPLABSRESULTS_GEN_ALL_CORE
15.2   3.39  )-----------( 171      ( 2019 22:55 )             44.5         138  |  101  |  6<L>  ----------------------------<  118<H>  5.3<H>   |  17  |  0.7    Ca    9.0      2019 22:55  Phos  4.2       Mg     2.1         TPro  8.6<H>  /  Alb  4.5  /  TBili  0.6  /  DBili  x   /  AST  392<H>  /  ALT  244<H>  /  AlkPhos  148<H>            Urinalysis Basic - ( 2019 01:30 )    Color: Yellow / Appearance: Clear / S.010 / pH: x  Gluc: x / Ketone: Negative  / Bili: Negative / Urobili: 0.2 mg/dL   Blood: x / Protein: 30 mg/dL / Nitrite: Negative   Leuk Esterase: Negative / RBC: 3-5 /HPF / WBC 1-2 /HPF   Sq Epi: x / Non Sq Epi: Moderate /HPF / Bacteria: Few      PT/INR - ( 2019 02:10 )   PT: 13.60 sec;   INR: 1.18 ratio         PTT - ( 2019 02:10 )  PTT:31.4 sec  Lactate Trend   @ 04:10 Lactate:2.5    @ 02:10 Lactate:3.0    @ 22:55 Lactate:4.3         CAPILLARY BLOOD GLUCOSE

## 2019-04-27 NOTE — H&P ADULT - HISTORY OF PRESENT ILLNESS
· HPI Objective Statement: 38f w a hx of HLD, anxiety, GERD, Grave's, EtOH abuse, & benzodiazepine misuse. Pt presents reporting symptoms of withdrawal and requesting detox. Pt admits to drinking heavily daily but has been having vomit/diarrhea for the past few days and not able to drink enough. Pt denies any fall/trauma. Patient denies SI/HI, denies any self-harm attempt or OD, denies any other substance abuse/misuse, +tremors, and denies AV hallucinations. Pt reports no other symptoms at this time.	  · Timing: constant	  · Duration: day(s)

## 2019-04-27 NOTE — H&P ADULT - NSICDXPASTMEDICALHX_GEN_ALL_CORE_FT
PAST MEDICAL HISTORY:  Alcoholism     Anxiety     Benzodiazepine misuse     Esophagitis     Gastritis     GERD (gastroesophageal reflux disease)     Graves' disease     Heart murmur     History of Graves' disease     Hypercholesterolemia     Hyperlipidemia, unspecified hyperlipidemia type     MVP (mitral valve prolapse)     Nicotine dependence     Obesity

## 2019-04-27 NOTE — CHART NOTE - NSCHARTNOTEFT_GEN_A_CORE
Venous duplex is preliminarily negative. Await D-Dimer but suspect element of atelectasis as noted on CXR

## 2019-04-27 NOTE — ED ADULT NURSE REASSESSMENT NOTE - NS ED NURSE REASSESS COMMENT FT1
pt report chest heaviness and palpation, states she feel shaky pos hand tremor, MD Hollingsworth notified, vital done, pt tachy hr 119, EKG done and given to MD SINDI at bedside

## 2019-04-27 NOTE — H&P ADULT - ASSESSMENT
Patient is a 38y old  Female who presents with a chief complaint of  alcolism                                                                                                                                                                                                                                                                                    HEALTH ISSUES - PROBLEM Dx:alcoholism/  anxiety/depression

## 2019-04-28 LAB
ANION GAP SERPL CALC-SCNC: 12 MMOL/L — SIGNIFICANT CHANGE UP (ref 7–14)
BASOPHILS # BLD AUTO: 0.02 K/UL — SIGNIFICANT CHANGE UP (ref 0–0.2)
BASOPHILS NFR BLD AUTO: 0.7 % — SIGNIFICANT CHANGE UP (ref 0–1)
BUN SERPL-MCNC: 3 MG/DL — LOW (ref 10–20)
CALCIUM SERPL-MCNC: 8.6 MG/DL — SIGNIFICANT CHANGE UP (ref 8.5–10.1)
CHLORIDE SERPL-SCNC: 105 MMOL/L — SIGNIFICANT CHANGE UP (ref 98–110)
CO2 SERPL-SCNC: 25 MMOL/L — SIGNIFICANT CHANGE UP (ref 17–32)
CREAT SERPL-MCNC: 0.6 MG/DL — LOW (ref 0.7–1.5)
D DIMER BLD IA.RAPID-MCNC: 87 NG/ML DDU — SIGNIFICANT CHANGE UP (ref 0–230)
EOSINOPHIL # BLD AUTO: 0.08 K/UL — SIGNIFICANT CHANGE UP (ref 0–0.7)
EOSINOPHIL NFR BLD AUTO: 2.8 % — SIGNIFICANT CHANGE UP (ref 0–8)
GLUCOSE SERPL-MCNC: 84 MG/DL — SIGNIFICANT CHANGE UP (ref 70–99)
HCT VFR BLD CALC: 35.5 % — LOW (ref 37–47)
HGB BLD-MCNC: 11.9 G/DL — LOW (ref 12–16)
IMM GRANULOCYTES NFR BLD AUTO: 0.7 % — HIGH (ref 0.1–0.3)
LACTATE SERPL-SCNC: 0.6 MMOL/L — SIGNIFICANT CHANGE UP (ref 0.5–2.2)
LYMPHOCYTES # BLD AUTO: 1.24 K/UL — SIGNIFICANT CHANGE UP (ref 1.2–3.4)
LYMPHOCYTES # BLD AUTO: 44 % — SIGNIFICANT CHANGE UP (ref 20.5–51.1)
MAGNESIUM SERPL-MCNC: 1.6 MG/DL — LOW (ref 1.8–2.4)
MCHC RBC-ENTMCNC: 32.5 PG — HIGH (ref 27–31)
MCHC RBC-ENTMCNC: 33.5 G/DL — SIGNIFICANT CHANGE UP (ref 32–37)
MCV RBC AUTO: 97 FL — SIGNIFICANT CHANGE UP (ref 81–99)
MONOCYTES # BLD AUTO: 0.23 K/UL — SIGNIFICANT CHANGE UP (ref 0.1–0.6)
MONOCYTES NFR BLD AUTO: 8.2 % — SIGNIFICANT CHANGE UP (ref 1.7–9.3)
NEUTROPHILS # BLD AUTO: 1.23 K/UL — LOW (ref 1.4–6.5)
NEUTROPHILS NFR BLD AUTO: 43.6 % — SIGNIFICANT CHANGE UP (ref 42.2–75.2)
NRBC # BLD: 0 /100 WBCS — SIGNIFICANT CHANGE UP (ref 0–0)
PLATELET # BLD AUTO: 105 K/UL — LOW (ref 130–400)
POTASSIUM SERPL-MCNC: 3.6 MMOL/L — SIGNIFICANT CHANGE UP (ref 3.5–5)
POTASSIUM SERPL-SCNC: 3.6 MMOL/L — SIGNIFICANT CHANGE UP (ref 3.5–5)
RBC # BLD: 3.66 M/UL — LOW (ref 4.2–5.4)
RBC # FLD: 14 % — SIGNIFICANT CHANGE UP (ref 11.5–14.5)
SODIUM SERPL-SCNC: 142 MMOL/L — SIGNIFICANT CHANGE UP (ref 135–146)
WBC # BLD: 2.82 K/UL — LOW (ref 4.8–10.8)
WBC # FLD AUTO: 2.82 K/UL — LOW (ref 4.8–10.8)

## 2019-04-28 RX ORDER — IBUPROFEN 200 MG
400 TABLET ORAL ONCE
Qty: 0 | Refills: 0 | Status: COMPLETED | OUTPATIENT
Start: 2019-04-28 | End: 2019-04-28

## 2019-04-28 RX ORDER — MAGNESIUM SULFATE 500 MG/ML
2 VIAL (ML) INJECTION ONCE
Qty: 0 | Refills: 0 | Status: COMPLETED | OUTPATIENT
Start: 2019-04-28 | End: 2019-04-28

## 2019-04-28 RX ADMIN — Medication 50 MILLIGRAM(S): at 23:19

## 2019-04-28 RX ADMIN — Medication 1.5 MILLIGRAM(S): at 21:50

## 2019-04-28 RX ADMIN — PANTOPRAZOLE SODIUM 40 MILLIGRAM(S): 20 TABLET, DELAYED RELEASE ORAL at 05:20

## 2019-04-28 RX ADMIN — Medication 1.5 MILLIGRAM(S): at 17:24

## 2019-04-28 RX ADMIN — MIRTAZAPINE 30 MILLIGRAM(S): 45 TABLET, ORALLY DISINTEGRATING ORAL at 21:50

## 2019-04-28 RX ADMIN — Medication 100 MILLIGRAM(S): at 11:25

## 2019-04-28 RX ADMIN — Medication 2 MILLIGRAM(S): at 05:20

## 2019-04-28 RX ADMIN — Medication 1 MILLIGRAM(S): at 11:25

## 2019-04-28 RX ADMIN — ATORVASTATIN CALCIUM 40 MILLIGRAM(S): 80 TABLET, FILM COATED ORAL at 21:50

## 2019-04-28 RX ADMIN — Medication 50 GRAM(S): at 11:28

## 2019-04-28 RX ADMIN — Medication 400 MILLIGRAM(S): at 14:12

## 2019-04-28 RX ADMIN — GABAPENTIN 800 MILLIGRAM(S): 400 CAPSULE ORAL at 05:20

## 2019-04-28 RX ADMIN — Medication 400 MILLIGRAM(S): at 13:46

## 2019-04-28 RX ADMIN — Medication 1.5 MILLIGRAM(S): at 09:45

## 2019-04-28 RX ADMIN — GABAPENTIN 800 MILLIGRAM(S): 400 CAPSULE ORAL at 13:46

## 2019-04-28 RX ADMIN — GABAPENTIN 800 MILLIGRAM(S): 400 CAPSULE ORAL at 21:50

## 2019-04-28 RX ADMIN — Medication 1 TABLET(S): at 11:25

## 2019-04-28 RX ADMIN — Medication 1.5 MILLIGRAM(S): at 13:46

## 2019-04-28 NOTE — PROGRESS NOTE ADULT - SUBJECTIVE AND OBJECTIVE BOX
AURELIA BLANCAS  38y Female    CHIEF COMPLAINT:    Patient is a 38y old  Female who presents with a chief complaint of vomiting/diarrhea (27 Apr 2019 09:25)      INTERVAL HPI/OVERNIGHT EVENTS:    Patient seen and examined. No acute events overnight.     ROS: All other systems are negative.    Vital Signs:    T(F): 97.7 (04-28-19 @ 05:13), Max: 98.2 (04-27-19 @ 14:00)  HR: 72 (04-28-19 @ 07:46) (72 - 99)  BP: 106/64 (04-28-19 @ 05:13) (106/64 - 147/87)  RR: 16 (04-28-19 @ 07:46) (16 - 18)  SpO2: 96% (04-28-19 @ 07:46) (91% - 96%)    PHYSICAL EXAM:    GENERAL:  NAD  SKIN: No rashes or lesions  HEENT: Atraumatic. Normocephalic.   NECK: Supple, No JVD. No lymphadenopathy.  PULMONARY: CTA B/L. No wheezing. No rales  CVS: Normal S1, S2. Rate and Rhythm are regular. No murmurs.  ABDOMEN/GI: Soft, Nontender, Nondistended; BS present  MSK:  No edema B/L LE. No clubbing or cyanosis  NEUROLOGIC:  No motor or sensory deficit.  PSYCH: Alert & oriented x 3, normal affect      LABS:                        11.9   2.82  )-----------( 105      ( 28 Apr 2019 07:09 )             35.5     04-28    142  |  105  |  3<L>  ----------------------------<  84  3.6   |  25  |  0.6<L>    Ca    8.6      28 Apr 2019 07:09  Phos  4.2     04-26  Mg     1.6     04-28    TPro  8.6<H>  /  Alb  4.5  /  TBili  0.6  /  DBili  x   /  AST  392<H>  /  ALT  244<H>  /  AlkPhos  148<H>  04-26    PT/INR - ( 27 Apr 2019 02:10 )   PT: 13.60 sec;   INR: 1.18 ratio      PTT - ( 27 Apr 2019 02:10 )  PTT:31.4 sec    RADIOLOGY & ADDITIONAL TESTS:      Imaging or report Personally Reviewed:  [x] YES  [ ] NO  EKG reviewed: [x] YES  [ ] NO    Medications:  Standing  atorvastatin 40 milliGRAM(s) Oral at bedtime  folic acid 1 milliGRAM(s) Oral daily  gabapentin 800 milliGRAM(s) Oral three times a day  lactated ringers. 1000 milliLiter(s) IV Continuous <Continuous>  LORazepam     Tablet   Oral   LORazepam     Tablet 1.5 milliGRAM(s) Oral every 4 hours  magnesium sulfate  IVPB 2 Gram(s) IV Intermittent once  mirtazapine 30 milliGRAM(s) Oral at bedtime  multivitamin 1 Tablet(s) Oral daily  pantoprazole    Tablet 40 milliGRAM(s) Oral before breakfast  thiamine 100 milliGRAM(s) Oral daily    PRN Meds  clonazePAM Tablet 1 milliGRAM(s) Oral two times a day PRN  LORazepam     Tablet 2 milliGRAM(s) Oral every 4 hours PRN  LORazepam     Tablet 2 milliGRAM(s) Oral every 4 hours PRN  traZODone 50 milliGRAM(s) Oral at bedtime PRN      Davina Ferro MD  s. [phone]

## 2019-04-28 NOTE — PROGRESS NOTE ADULT - ASSESSMENT
38f w a hx of HLD, anxiety, GERD, Grave's, EtOH abuse, active smoker & benzodiazepine abuse, initially presented to ED requesting detox. Upon further questioning, having nausea, vominiting and loose BM since easter.    Tachycardia due to dehydration due to vomiting/diarrhea, complicated by ETOH withdrawal  resolved, likely 2/2 dehydration, lactic acidosis resolved  LE duplex negative prelim, DDimer negative  Patient previously on metoprolol at home, will hold for now as no tachycardic at this time  c/w /hr until tolerating regular diet  No pathology on CT A/P  zofran PRN, advance diet as tolerated  c/w ativan taper, monitor CIWA  detox eval pending    Hypomagnesemia  replete with 2g IVPB    Suspected thiamine/Folate deficiency due to ETOH  c/w thiamine and folic acid supplementation    GERD  c/w Protonix    Nicotine dependence  smoking sessation counseling provided    Graves disease  TSH normal    #Progress Note Handoff  Pending (specify):  Consults and clinical improvement  Family discussion: plan of care discussed with patient, agreeable  Disposition: Home versus detox, anticipate 24-48h

## 2019-04-29 DIAGNOSIS — Z71.89 OTHER SPECIFIED COUNSELING: ICD-10-CM

## 2019-04-29 RX ORDER — IPRATROPIUM/ALBUTEROL SULFATE 18-103MCG
3 AEROSOL WITH ADAPTER (GRAM) INHALATION EVERY 6 HOURS
Qty: 0 | Refills: 0 | Status: DISCONTINUED | OUTPATIENT
Start: 2019-04-29 | End: 2019-05-01

## 2019-04-29 RX ADMIN — PANTOPRAZOLE SODIUM 40 MILLIGRAM(S): 20 TABLET, DELAYED RELEASE ORAL at 06:30

## 2019-04-29 RX ADMIN — Medication 50 MILLIGRAM(S): at 21:22

## 2019-04-29 RX ADMIN — Medication 2 MILLIGRAM(S): at 22:44

## 2019-04-29 RX ADMIN — ATORVASTATIN CALCIUM 40 MILLIGRAM(S): 80 TABLET, FILM COATED ORAL at 21:22

## 2019-04-29 RX ADMIN — Medication 1 MILLIGRAM(S): at 17:41

## 2019-04-29 RX ADMIN — GABAPENTIN 800 MILLIGRAM(S): 400 CAPSULE ORAL at 21:22

## 2019-04-29 RX ADMIN — Medication 1 MILLIGRAM(S): at 21:23

## 2019-04-29 RX ADMIN — Medication 1 TABLET(S): at 11:37

## 2019-04-29 RX ADMIN — Medication 1 MILLIGRAM(S): at 11:37

## 2019-04-29 RX ADMIN — Medication 1.5 MILLIGRAM(S): at 03:40

## 2019-04-29 RX ADMIN — Medication 2 MILLIGRAM(S): at 11:37

## 2019-04-29 RX ADMIN — GABAPENTIN 800 MILLIGRAM(S): 400 CAPSULE ORAL at 06:31

## 2019-04-29 RX ADMIN — Medication 1 MILLIGRAM(S): at 15:52

## 2019-04-29 RX ADMIN — MIRTAZAPINE 30 MILLIGRAM(S): 45 TABLET, ORALLY DISINTEGRATING ORAL at 21:22

## 2019-04-29 RX ADMIN — Medication 100 MILLIGRAM(S): at 11:37

## 2019-04-29 RX ADMIN — Medication 40 MILLIGRAM(S): at 15:51

## 2019-04-29 RX ADMIN — GABAPENTIN 800 MILLIGRAM(S): 400 CAPSULE ORAL at 15:51

## 2019-04-29 RX ADMIN — Medication 1.5 MILLIGRAM(S): at 06:30

## 2019-04-29 NOTE — PROGRESS NOTE ADULT - SUBJECTIVE AND OBJECTIVE BOX
AURELIA BLANCAS  38y Female    CHIEF COMPLAINT:    Patient is a 38y old  Female who presents with a chief complaint of etoh (29 Apr 2019 09:54)      INTERVAL HPI/OVERNIGHT EVENTS:    Patient seen and examined. No acute events overnight.     ROS: All other systems are negative.    Vital Signs:    T(F): 96.4 (04-29-19 @ 05:32), Max: 97.3 (04-28-19 @ 22:07)  HR: 76 (04-29-19 @ 05:32) (73 - 77)  BP: 140/76 (04-29-19 @ 05:32) (104/60 - 143/80)  RR: 18 (04-29-19 @ 05:32) (16 - 18)  SpO2: 98% (04-28-19 @ 22:07) (98% - 98%)    PHYSICAL EXAM:    GENERAL:  NAD  SKIN: No rashes or lesions  HEENT: Atraumatic. Normocephalic. .  NECK: Supple, No JVD. No lymphadenopathy.  PULMONARY: Mild bilateral expiratory wheezing No rales  CVS: Normal S1, S2. Rate and Rhythm are regular. No murmurs.  ABDOMEN/GI: Soft, Nontender, Nondistended; BS present  MSK:  No edema B/L LE. No clubbing or cyanosis  NEUROLOGIC:  No motor or sensory deficit.  PSYCH: Alert & oriented x 3, normal affect    Consultant(s) Notes Reviewed:  [x ] YES  [ ] NO  Care Discussed with Consultants/Other Providers [ x] YES  [ ] NO    LABS:                        11.9   2.82  )-----------( 105      ( 28 Apr 2019 07:09 )             35.5     04-28    142  |  105  |  3<L>  ----------------------------<  84  3.6   |  25  |  0.6<L>    Ca    8.6      28 Apr 2019 07:09  Mg     1.6     04-28    RADIOLOGY & ADDITIONAL TESTS:  Imaging or report Personally Reviewed:  [x] YES  [ ] NO  EKG reviewed: [x] YES  [ ] NO    Medications:  Standing  atorvastatin 40 milliGRAM(s) Oral at bedtime  folic acid 1 milliGRAM(s) Oral daily  gabapentin 800 milliGRAM(s) Oral three times a day  lactated ringers. 1000 milliLiter(s) IV Continuous <Continuous>  LORazepam     Tablet   Oral   LORazepam     Tablet 1 milliGRAM(s) Oral every 4 hours  mirtazapine 30 milliGRAM(s) Oral at bedtime  multivitamin 1 Tablet(s) Oral daily  pantoprazole    Tablet 40 milliGRAM(s) Oral before breakfast  thiamine 100 milliGRAM(s) Oral daily    PRN Meds  clonazePAM Tablet 1 milliGRAM(s) Oral two times a day PRN  LORazepam     Tablet 2 milliGRAM(s) Oral every 4 hours PRN  LORazepam     Tablet 2 milliGRAM(s) Oral every 4 hours PRN  traZODone 50 milliGRAM(s) Oral at bedtime PRN      Davina Ferro MD  s. [phone]

## 2019-04-29 NOTE — PROGRESS NOTE ADULT - ASSESSMENT
38f w a hx of HLD, anxiety, GERD, Grave's, EtOH abuse, active smoker & benzodiazepine abuse, initially presented to ED requesting detox. Upon further questioning, having nausea, vominiting and loose BM since easter.    Tachycardia due to dehydration due to vomiting/diarrhea, complicated by ETOH withdrawal  resolved, likely 2/2 dehydration, lactic acidosis resolved  LE duplex negative, DDimer negative  Patient previously on metoprolol at home, will hold for now as no tachycardic at this time  dc IVF, tolerating regular diet  No pathology on CT A/P, 2d echo prelim mild AI, MI  zofran PRN, advance diet as tolerated  c/w ativan taper, monitor CIWA  patient refused inpt detox, will follow up rehab as outpatient once she finishes taper    Expiratory wheezing, suspect reactive airway disease  start prednisone, duonebs PRN  Wean off o2    Hypomagnesemia  repleted    Suspected thiamine/Folate deficiency due to ETOH  c/w thiamine and folic acid supplementation    GERD  c/w Protonix    Nicotine dependence  smoking sessation counseling provided    Graves disease  TSH normal    #Progress Note Handoff  Pending (specify): ativan taper  Family discussion: plan of care discussed with patient, agreeable to discharge plan 5/1 post ativan taper  Disposition: Home, anticipate wednesday

## 2019-04-29 NOTE — CONSULT NOTE ADULT - ASSESSMENT
Pt off floor via wheelchair with staff assistance. No s/s of distress noted.    ETOH w/d syndrome  ETOH dependency        counselling done  Inpt detox recommended, but pt declined. She recently did etoh rehab and detox  She wants to restart  the oupt program at intake

## 2019-04-30 ENCOUNTER — TRANSCRIPTION ENCOUNTER (OUTPATIENT)
Age: 39
End: 2019-04-30

## 2019-04-30 LAB
ANION GAP SERPL CALC-SCNC: 14 MMOL/L — SIGNIFICANT CHANGE UP (ref 7–14)
BUN SERPL-MCNC: 9 MG/DL — LOW (ref 10–20)
CALCIUM SERPL-MCNC: 9.7 MG/DL — SIGNIFICANT CHANGE UP (ref 8.5–10.1)
CHLORIDE SERPL-SCNC: 101 MMOL/L — SIGNIFICANT CHANGE UP (ref 98–110)
CO2 SERPL-SCNC: 26 MMOL/L — SIGNIFICANT CHANGE UP (ref 17–32)
CREAT SERPL-MCNC: 0.7 MG/DL — SIGNIFICANT CHANGE UP (ref 0.7–1.5)
GLUCOSE SERPL-MCNC: 138 MG/DL — HIGH (ref 70–99)
MAGNESIUM SERPL-MCNC: 2 MG/DL — SIGNIFICANT CHANGE UP (ref 1.8–2.4)
POTASSIUM SERPL-MCNC: 3.8 MMOL/L — SIGNIFICANT CHANGE UP (ref 3.5–5)
POTASSIUM SERPL-SCNC: 3.8 MMOL/L — SIGNIFICANT CHANGE UP (ref 3.5–5)
SODIUM SERPL-SCNC: 141 MMOL/L — SIGNIFICANT CHANGE UP (ref 135–146)

## 2019-04-30 RX ORDER — FOLIC ACID 0.8 MG
1 TABLET ORAL
Qty: 30 | Refills: 0 | OUTPATIENT
Start: 2019-04-30 | End: 2019-05-29

## 2019-04-30 RX ORDER — THIAMINE MONONITRATE (VIT B1) 100 MG
1 TABLET ORAL
Qty: 30 | Refills: 0 | OUTPATIENT
Start: 2019-04-30 | End: 2019-05-29

## 2019-04-30 RX ORDER — ATORVASTATIN CALCIUM 80 MG/1
1 TABLET, FILM COATED ORAL
Qty: 0 | Refills: 0 | DISCHARGE
Start: 2019-04-30

## 2019-04-30 RX ORDER — ATORVASTATIN CALCIUM 80 MG/1
1 TABLET, FILM COATED ORAL
Qty: 0 | Refills: 0 | COMMUNITY
Start: 2019-04-30

## 2019-04-30 RX ADMIN — MIRTAZAPINE 30 MILLIGRAM(S): 45 TABLET, ORALLY DISINTEGRATING ORAL at 21:08

## 2019-04-30 RX ADMIN — GABAPENTIN 800 MILLIGRAM(S): 400 CAPSULE ORAL at 06:46

## 2019-04-30 RX ADMIN — ATORVASTATIN CALCIUM 40 MILLIGRAM(S): 80 TABLET, FILM COATED ORAL at 21:08

## 2019-04-30 RX ADMIN — Medication 0.5 MILLIGRAM(S): at 11:10

## 2019-04-30 RX ADMIN — Medication 100 MILLIGRAM(S): at 11:11

## 2019-04-30 RX ADMIN — Medication 1 MILLIGRAM(S): at 22:05

## 2019-04-30 RX ADMIN — Medication 40 MILLIGRAM(S): at 06:47

## 2019-04-30 RX ADMIN — Medication 1 MILLIGRAM(S): at 14:05

## 2019-04-30 RX ADMIN — Medication 1 MILLIGRAM(S): at 02:20

## 2019-04-30 RX ADMIN — Medication 0.5 MILLIGRAM(S): at 13:18

## 2019-04-30 RX ADMIN — Medication 1 MILLIGRAM(S): at 11:11

## 2019-04-30 RX ADMIN — Medication 50 MILLIGRAM(S): at 23:26

## 2019-04-30 RX ADMIN — GABAPENTIN 800 MILLIGRAM(S): 400 CAPSULE ORAL at 13:18

## 2019-04-30 RX ADMIN — Medication 0.5 MILLIGRAM(S): at 21:08

## 2019-04-30 RX ADMIN — GABAPENTIN 800 MILLIGRAM(S): 400 CAPSULE ORAL at 21:08

## 2019-04-30 RX ADMIN — Medication 1 TABLET(S): at 11:11

## 2019-04-30 RX ADMIN — Medication 1 MILLIGRAM(S): at 06:46

## 2019-04-30 RX ADMIN — PANTOPRAZOLE SODIUM 40 MILLIGRAM(S): 20 TABLET, DELAYED RELEASE ORAL at 06:46

## 2019-04-30 RX ADMIN — Medication 0.5 MILLIGRAM(S): at 17:37

## 2019-04-30 NOTE — PROGRESS NOTE ADULT - ASSESSMENT
38f w a hx of HLD, anxiety, GERD, Grave's, EtOH abuse, active smoker & benzodiazepine abuse, initially presented to ED requesting detox. Upon further questioning, having nausea, vominiting and loose BM since easter.    Tachycardia due to dehydration due to vomiting/diarrhea, complicated by ETOH withdrawal  resolved, likely 2/2 dehydration, lactic acidosis resolved  LE duplex negative, DDimer negative  Patient previously on metoprolol at home, will hold for now as no tachycardic at this time  tolerating regular diet  No pathology on CT A/P, 2d echo prelim mild AI, MI  zofran PRN, advance diet as tolerated  c/w ativan taper, monitor CIWA - to finish taper tomorrow AM  patient refused inpt detox     Expiratory wheezing, suspect reactive airway disease - now resolved  c/w Prednisone for 4 more days  Patient saturating 95% on RA, uses NC for comfort    Hypomagnesemia  resolved    Suspected thiamine/Folate deficiency due to ETOH  c/w thiamine and folic acid supplementation    GERD  c/w Protonix    Nicotine dependence  smoking sessation counseling provided    Graves disease  TSH normal    #Progress Note Handoff  Pending (specify): ativan taper  Family discussion: plan of care discussed with patient, agreeable to discharge plan 5/1 post ativan taper  Disposition: Home likely, anticipate tomorrow

## 2019-04-30 NOTE — PROGRESS NOTE ADULT - SUBJECTIVE AND OBJECTIVE BOX
AURELIA BLANCAS  38y Female    CHIEF COMPLAINT:    Patient is a 38y old  Female who presents with a chief complaint of detox (29 Apr 2019 12:42)      INTERVAL HPI/OVERNIGHT EVENTS:    Patient seen and examined. No acute events overnight. Feels better today    ROS: All other systems are negative.    Vital Signs:    T(F): 96.8 (04-30-19 @ 05:29), Max: 98.9 (04-29-19 @ 13:57)  HR: 72 (04-30-19 @ 05:29) (72 - 107)  BP: 109/57 (04-30-19 @ 05:29) (109/57 - 133/86)  RR: 16 (04-30-19 @ 05:29) (16 - 16)  SpO2: 97% (04-29-19 @ 22:00) (97% - 97%)    PHYSICAL EXAM:    GENERAL:  NAD  SKIN: No rashes or lesions  HEENT: Atraumatic. Normocephalic.   NECK: Supple, No JVD.  PULMONARY: CTA B/L. No wheezing. No rales  CVS: Normal S1, S2. Rate and Rhythm are regular. No murmurs.  ABDOMEN/GI: Soft, Nontender, Nondistended; BS present  MSK:  No edema B/L LE. No clubbing or cyanosis  NEUROLOGIC:  No motor or sensory deficit.  PSYCH: Alert & oriented x 3, normal affect    LABS:    04-30    141  |  101  |  9<L>  ----------------------------<  138<H>  3.8   |  26  |  0.7    Ca    9.7      30 Apr 2019 06:10  Mg     2.0     04-30    RADIOLOGY & ADDITIONAL TESTS:    Medications:  Standing  atorvastatin 40 milliGRAM(s) Oral at bedtime  folic acid 1 milliGRAM(s) Oral daily  gabapentin 800 milliGRAM(s) Oral three times a day  LORazepam     Tablet   Oral   LORazepam     Tablet 0.5 milliGRAM(s) Oral every 4 hours  mirtazapine 30 milliGRAM(s) Oral at bedtime  multivitamin 1 Tablet(s) Oral daily  pantoprazole    Tablet 40 milliGRAM(s) Oral before breakfast  predniSONE   Tablet 40 milliGRAM(s) Oral daily  thiamine 100 milliGRAM(s) Oral daily    PRN Meds  ALBUTerol/ipratropium for Nebulization 3 milliLiter(s) Nebulizer every 6 hours PRN  clonazePAM Tablet 1 milliGRAM(s) Oral two times a day PRN  LORazepam     Tablet 2 milliGRAM(s) Oral every 4 hours PRN  LORazepam     Tablet 2 milliGRAM(s) Oral every 4 hours PRN  traZODone 50 milliGRAM(s) Oral at bedtime PRN      Davina Ferro MD  s. [phone]

## 2019-04-30 NOTE — DISCHARGE NOTE PROVIDER - CARE PROVIDER_API CALL
Julio Cesar Puckett (DO)  Internal Medicine  [address] [place] [address]  Phone: [phone]  Fax: [phone]  Follow Up Time:

## 2019-04-30 NOTE — DISCHARGE NOTE PROVIDER - NSDCCPCAREPLAN_GEN_ALL_CORE_FT
PRINCIPAL DISCHARGE DIAGNOSIS  Diagnosis: Alcohol abuse  Assessment and Plan of Treatment: please follow up with your rehab facility to abstain from alcohol      SECONDARY DISCHARGE DIAGNOSES  Diagnosis: Lactic acidosis  Assessment and Plan of Treatment: resolved    Diagnosis: Vomiting and diarrhea  Assessment and Plan of Treatment: resolved    Diagnosis: Dehydration  Assessment and Plan of Treatment: resolved    Diagnosis: Alcohol withdrawal  Assessment and Plan of Treatment:

## 2019-05-01 ENCOUNTER — EMERGENCY (EMERGENCY)
Facility: HOSPITAL | Age: 39
LOS: 0 days | Discharge: HOME | End: 2019-05-02
Attending: EMERGENCY MEDICINE | Admitting: EMERGENCY MEDICINE
Payer: MEDICAID

## 2019-05-01 ENCOUNTER — TRANSCRIPTION ENCOUNTER (OUTPATIENT)
Age: 39
End: 2019-05-01

## 2019-05-01 VITALS
HEART RATE: 85 BPM | DIASTOLIC BLOOD PRESSURE: 56 MMHG | TEMPERATURE: 97 F | SYSTOLIC BLOOD PRESSURE: 102 MMHG | RESPIRATION RATE: 16 BRPM

## 2019-05-01 VITALS
HEART RATE: 108 BPM | DIASTOLIC BLOOD PRESSURE: 89 MMHG | WEIGHT: 207.9 LBS | RESPIRATION RATE: 22 BRPM | OXYGEN SATURATION: 97 % | SYSTOLIC BLOOD PRESSURE: 168 MMHG | HEIGHT: 61 IN | TEMPERATURE: 96 F

## 2019-05-01 DIAGNOSIS — E78.5 HYPERLIPIDEMIA, UNSPECIFIED: ICD-10-CM

## 2019-05-01 DIAGNOSIS — Z98.890 OTHER SPECIFIED POSTPROCEDURAL STATES: ICD-10-CM

## 2019-05-01 DIAGNOSIS — Z88.8 ALLERGY STATUS TO OTHER DRUGS, MEDICAMENTS AND BIOLOGICAL SUBSTANCES STATUS: ICD-10-CM

## 2019-05-01 DIAGNOSIS — R06.02 SHORTNESS OF BREATH: ICD-10-CM

## 2019-05-01 DIAGNOSIS — E78.00 PURE HYPERCHOLESTEROLEMIA, UNSPECIFIED: ICD-10-CM

## 2019-05-01 DIAGNOSIS — Z87.19 PERSONAL HISTORY OF OTHER DISEASES OF THE DIGESTIVE SYSTEM: ICD-10-CM

## 2019-05-01 DIAGNOSIS — F17.200 NICOTINE DEPENDENCE, UNSPECIFIED, UNCOMPLICATED: ICD-10-CM

## 2019-05-01 DIAGNOSIS — R07.89 OTHER CHEST PAIN: ICD-10-CM

## 2019-05-01 DIAGNOSIS — Z79.899 OTHER LONG TERM (CURRENT) DRUG THERAPY: ICD-10-CM

## 2019-05-01 DIAGNOSIS — Z98.890 OTHER SPECIFIED POSTPROCEDURAL STATES: Chronic | ICD-10-CM

## 2019-05-01 DIAGNOSIS — Z98.891 HISTORY OF UTERINE SCAR FROM PREVIOUS SURGERY: Chronic | ICD-10-CM

## 2019-05-01 DIAGNOSIS — F41.9 ANXIETY DISORDER, UNSPECIFIED: ICD-10-CM

## 2019-05-01 DIAGNOSIS — K21.9 GASTRO-ESOPHAGEAL REFLUX DISEASE WITHOUT ESOPHAGITIS: ICD-10-CM

## 2019-05-01 DIAGNOSIS — E66.9 OBESITY, UNSPECIFIED: ICD-10-CM

## 2019-05-01 PROCEDURE — 99285 EMERGENCY DEPT VISIT HI MDM: CPT | Mod: 25

## 2019-05-01 RX ORDER — CLONAZEPAM 1 MG
1 TABLET ORAL
Qty: 14 | Refills: 0
Start: 2019-05-01 | End: 2019-05-07

## 2019-05-01 RX ORDER — THIAMINE MONONITRATE (VIT B1) 100 MG
1 TABLET ORAL
Qty: 30 | Refills: 0 | OUTPATIENT
Start: 2019-05-01 | End: 2019-05-30

## 2019-05-01 RX ORDER — FOLIC ACID 0.8 MG
1 TABLET ORAL
Qty: 30 | Refills: 0
Start: 2019-05-01 | End: 2019-05-30

## 2019-05-01 RX ORDER — CLONAZEPAM 1 MG
0 TABLET ORAL
Qty: 0 | Refills: 0 | COMMUNITY

## 2019-05-01 RX ORDER — MIRTAZAPINE 45 MG/1
1 TABLET, ORALLY DISINTEGRATING ORAL
Qty: 30 | Refills: 0
Start: 2019-05-01 | End: 2019-05-30

## 2019-05-01 RX ORDER — THIAMINE MONONITRATE (VIT B1) 100 MG
1 TABLET ORAL
Qty: 30 | Refills: 0
Start: 2019-05-01 | End: 2019-05-30

## 2019-05-01 RX ADMIN — Medication 40 MILLIGRAM(S): at 05:19

## 2019-05-01 RX ADMIN — Medication 1 TABLET(S): at 11:33

## 2019-05-01 RX ADMIN — Medication 0.5 MILLIGRAM(S): at 03:00

## 2019-05-01 RX ADMIN — Medication 100 MILLIGRAM(S): at 11:33

## 2019-05-01 RX ADMIN — Medication 0.5 MILLIGRAM(S): at 05:19

## 2019-05-01 RX ADMIN — PANTOPRAZOLE SODIUM 40 MILLIGRAM(S): 20 TABLET, DELAYED RELEASE ORAL at 05:19

## 2019-05-01 RX ADMIN — Medication 1 MILLIGRAM(S): at 11:33

## 2019-05-01 RX ADMIN — Medication 1 MILLIGRAM(S): at 10:05

## 2019-05-01 RX ADMIN — GABAPENTIN 800 MILLIGRAM(S): 400 CAPSULE ORAL at 05:19

## 2019-05-01 NOTE — DISCHARGE NOTE NURSING/CASE MANAGEMENT/SOCIAL WORK - NSDCDPATPORTLINK_GEN_ALL_CORE
You can access the [facility] Portal, offered by [facility], by registering with the following website: [email or web address]

## 2019-05-01 NOTE — PROGRESS NOTE ADULT - SUBJECTIVE AND OBJECTIVE BOX
AURELIA BLANCAS  38y Female    CHIEF COMPLAINT:    Patient is a 38y old  Female who presents with a chief complaint of detox (29 Apr 2019 12:42)      INTERVAL HPI/OVERNIGHT EVENTS:    NO complaints today.  Feels ready to go home.      ROS: All other systems are negative.    Vital Signs:  Vital Signs Last 24 Hrs  T(C): 36.2 (01 May 2019 05:46), Max: 36.2 (01 May 2019 05:46)  T(F): 97.1 (01 May 2019 05:46), Max: 97.1 (01 May 2019 05:46)  HR: 85 (01 May 2019 05:46) (68 - 86)  BP: 102/56 (01 May 2019 05:46) (102/56 - 131/63)  BP(mean): --  RR: 16 (01 May 2019 05:46) (16 - 16)  SpO2: --    PHYSICAL EXAM:    GENERAL:  NAD  SKIN: No rashes or lesions  HEENT: Atraumatic. Normocephalic.   NECK: Supple, No JVD.  PULMONARY: CTA B/L. No wheezing. No rales  CVS: Normal S1, S2. Rate and Rhythm are regular. No murmurs.  ABDOMEN/GI: Soft, Nontender, Nondistended; BS present  MSK:  No edema B/L LE. No clubbing or cyanosis  NEUROLOGIC:  No motor or sensory deficit.  PSYCH: Alert & oriented x 3, normal affect

## 2019-05-01 NOTE — ED ADULT TRIAGE NOTE - NSWEIGHTCALCTOOLDRUG_GEN_A_CORE
Form from [facility]-Annual Oxygen Order. Orders were signed, faxed and sent to scanning.    [facility]       used

## 2019-05-01 NOTE — ED ADULT TRIAGE NOTE - CHIEF COMPLAINT QUOTE
c/o sob that started at 10:50pm. Pt stated she took Klonopin 0.5 around 11pm. Discharged from the hospital today.

## 2019-05-02 VITALS — HEART RATE: 87 BPM | SYSTOLIC BLOOD PRESSURE: 117 MMHG | DIASTOLIC BLOOD PRESSURE: 71 MMHG

## 2019-05-02 LAB
ALBUMIN SERPL ELPH-MCNC: 4.1 G/DL — SIGNIFICANT CHANGE UP (ref 3.5–5.2)
ALP SERPL-CCNC: 90 U/L — SIGNIFICANT CHANGE UP (ref 30–115)
ALT FLD-CCNC: 108 U/L — HIGH (ref 0–41)
ANION GAP SERPL CALC-SCNC: 15 MMOL/L — HIGH (ref 7–14)
AST SERPL-CCNC: 114 U/L — HIGH (ref 0–41)
BASOPHILS # BLD AUTO: 0.06 K/UL — SIGNIFICANT CHANGE UP (ref 0–0.2)
BASOPHILS NFR BLD AUTO: 0.5 % — SIGNIFICANT CHANGE UP (ref 0–1)
BILIRUB SERPL-MCNC: 0.8 MG/DL — SIGNIFICANT CHANGE UP (ref 0.2–1.2)
BUN SERPL-MCNC: 16 MG/DL — SIGNIFICANT CHANGE UP (ref 10–20)
CALCIUM SERPL-MCNC: 9.9 MG/DL — SIGNIFICANT CHANGE UP (ref 8.5–10.1)
CHLORIDE SERPL-SCNC: 99 MMOL/L — SIGNIFICANT CHANGE UP (ref 98–110)
CO2 SERPL-SCNC: 24 MMOL/L — SIGNIFICANT CHANGE UP (ref 17–32)
CREAT SERPL-MCNC: 0.7 MG/DL — SIGNIFICANT CHANGE UP (ref 0.7–1.5)
EOSINOPHIL # BLD AUTO: 0.06 K/UL — SIGNIFICANT CHANGE UP (ref 0–0.7)
EOSINOPHIL NFR BLD AUTO: 0.5 % — SIGNIFICANT CHANGE UP (ref 0–8)
GLUCOSE SERPL-MCNC: 94 MG/DL — SIGNIFICANT CHANGE UP (ref 70–99)
HCT VFR BLD CALC: 39.7 % — SIGNIFICANT CHANGE UP (ref 37–47)
HGB BLD-MCNC: 13.1 G/DL — SIGNIFICANT CHANGE UP (ref 12–16)
IMM GRANULOCYTES NFR BLD AUTO: 0.9 % — HIGH (ref 0.1–0.3)
LYMPHOCYTES # BLD AUTO: 28.5 % — SIGNIFICANT CHANGE UP (ref 20.5–51.1)
LYMPHOCYTES # BLD AUTO: 3.14 K/UL — SIGNIFICANT CHANGE UP (ref 1.2–3.4)
MAGNESIUM SERPL-MCNC: 1.7 MG/DL — LOW (ref 1.8–2.4)
MCHC RBC-ENTMCNC: 32.4 PG — HIGH (ref 27–31)
MCHC RBC-ENTMCNC: 33 G/DL — SIGNIFICANT CHANGE UP (ref 32–37)
MCV RBC AUTO: 98.3 FL — SIGNIFICANT CHANGE UP (ref 81–99)
MONOCYTES # BLD AUTO: 1.39 K/UL — HIGH (ref 0.1–0.6)
MONOCYTES NFR BLD AUTO: 12.6 % — HIGH (ref 1.7–9.3)
NEUTROPHILS # BLD AUTO: 6.26 K/UL — SIGNIFICANT CHANGE UP (ref 1.4–6.5)
NEUTROPHILS NFR BLD AUTO: 57 % — SIGNIFICANT CHANGE UP (ref 42.2–75.2)
NRBC # BLD: 0 /100 WBCS — SIGNIFICANT CHANGE UP (ref 0–0)
NT-PROBNP SERPL-SCNC: 24 PG/ML — SIGNIFICANT CHANGE UP (ref 0–300)
PLATELET # BLD AUTO: 165 K/UL — SIGNIFICANT CHANGE UP (ref 130–400)
POTASSIUM SERPL-MCNC: 4 MMOL/L — SIGNIFICANT CHANGE UP (ref 3.5–5)
POTASSIUM SERPL-SCNC: 4 MMOL/L — SIGNIFICANT CHANGE UP (ref 3.5–5)
PROT SERPL-MCNC: 7.3 G/DL — SIGNIFICANT CHANGE UP (ref 6–8)
RBC # BLD: 4.04 M/UL — LOW (ref 4.2–5.4)
RBC # FLD: 14.4 % — SIGNIFICANT CHANGE UP (ref 11.5–14.5)
SODIUM SERPL-SCNC: 138 MMOL/L — SIGNIFICANT CHANGE UP (ref 135–146)
TROPONIN T SERPL-MCNC: <0.01 NG/ML — SIGNIFICANT CHANGE UP
WBC # BLD: 11.01 K/UL — HIGH (ref 4.8–10.8)
WBC # FLD AUTO: 11.01 K/UL — HIGH (ref 4.8–10.8)

## 2019-05-02 PROCEDURE — 71046 X-RAY EXAM CHEST 2 VIEWS: CPT | Mod: 26

## 2019-05-02 RX ORDER — MAGNESIUM SULFATE 500 MG/ML
2 VIAL (ML) INJECTION ONCE
Qty: 0 | Refills: 0 | Status: COMPLETED | OUTPATIENT
Start: 2019-05-02 | End: 2019-05-02

## 2019-05-02 RX ORDER — HYDROXYZINE HCL 10 MG
1 TABLET ORAL
Qty: 15 | Refills: 0
Start: 2019-05-02 | End: 2019-05-06

## 2019-05-02 RX ORDER — HYDROXYZINE HCL 10 MG
50 TABLET ORAL ONCE
Qty: 0 | Refills: 0 | Status: COMPLETED | OUTPATIENT
Start: 2019-05-02 | End: 2019-05-02

## 2019-05-02 RX ORDER — ASPIRIN/CALCIUM CARB/MAGNESIUM 324 MG
324 TABLET ORAL ONCE
Qty: 0 | Refills: 0 | Status: COMPLETED | OUTPATIENT
Start: 2019-05-02 | End: 2019-05-02

## 2019-05-02 RX ADMIN — Medication 324 MILLIGRAM(S): at 00:44

## 2019-05-02 RX ADMIN — Medication 1 MILLIGRAM(S): at 05:13

## 2019-05-02 RX ADMIN — Medication 202 MILLIGRAM(S): at 02:15

## 2019-05-02 RX ADMIN — Medication 2 GRAM(S): at 05:01

## 2019-05-02 RX ADMIN — Medication 50 GRAM(S): at 03:24

## 2019-05-02 RX ADMIN — Medication 50 MILLIGRAM(S): at 05:20

## 2019-05-02 NOTE — ED PROVIDER NOTE - NS ED ROS FT
Constitutional:  See HPI.   Eyes:  No visual changes, eye pain or discharge.  ENMT:  No hearing changes, pain, discharge or infections. No neck pain or stiffness.  Cardiac:  No  edema. No chest pain with exertion.  Respiratory:  No cough or respiratory distress. No hemoptysis.  GI:  No nausea, vomiting, diarrhea, abdominal pain.  :  No dysuria, frequency, hematuria  MS:  No joint pain or back pain.  Neuro:  No LOC. No headache or weakness.    Skin:  No skin rash.  Except as in HPI, all other review of systems is negative

## 2019-05-02 NOTE — ED PROVIDER NOTE - NSFOLLOWUPINSTRUCTIONS_ED_ALL_ED_FT
Shortness of breath    Shortness of breath means you have trouble breathing and could indicate a medical problem. Causes include lung diseases, heart disease, low amount of red blood cells (anemia), poor physical fitness, being overweight, smoking, etc. Your health care provider may not be able to find a cause for your shortness of breath after your exam. In this case, it is important to have a follow-up exam with your primary care physician as instructed. If medicines were prescribed, take them as directed for the full length of time directed. Refrain from tobacco products.    SEEK IMMEDIATE MEDICAL CARE IF YOU HAVE THE FOLLOWING SYMPTOMS: worsening shortness of breath, chest pain, back pain, abdominal pain, fever, coughing up blood, lightheadedness/dizziness.     Chest Pain    Chest pain can be caused by many different conditions which may or may not be dangerous. Causes include heartburn, lung infections, heart attack, blood clot in lungs, skin infections, strain or damage to muscle, cartilage, or bones, etc. Lab tests or other studies including an electrocardiogram (EKG) may have been performed to find the cause of your pain. Make sure to follow up with a cardiologist or as instructed by your health care professional.    SEEK IMMEDIATE MEDICAL CARE IF YOU HAVE THE FOLLOWING SYMPTOMS: worsening chest pain, coughing up blood, unexplained back/neck/jaw pain, severe abdominal pain, dizziness or lightheadedness, shortness of breath, sweaty or clammy skin, vomiting, or racing heart beat. These symptoms may represent a serious problem that is an emergency. Do not wait to see if the symptoms will go away. Get medical help right away. Call your local emergency services (911 in the [place] Do not drive yourself to the hospital.

## 2019-05-02 NOTE — ED PROVIDER NOTE - PHYSICAL EXAMINATION
Constitutional: Anxious appearing. Non toxic.   Eyes: PERRLA. Extraocular movements intact, no entrapment. Conjunctiva normal.   ENT: No nasal discharge. Moist mucus membranes.  Neck: Supple, non tender, full range of motion.  CV: RRR no murmurs, rubs, or gallops. +S1S2.   Pulm: Clear to auscultation bilaterally. Normal work of breathing.  Abd: soft NT ND +BS.   Ext: Warm and well perfused x4, moving all extremities, no edema.   Psy: Cooperative, appropriate.   Skin: Warm, dry, no rash  Neuro: CN2-12 grossly intact no sensory or motor deficits throughout, no drift, no ataxia

## 2019-05-02 NOTE — ED PROVIDER NOTE - OBJECTIVE STATEMENT
37yo F history of ETOH abuse, anxiety, benzodiazepine dependence, DL, MVP, gastritis, presenting with SOB/CP. Pt recently admitted for ETOH withdrawal, discharged today to home in afternoon. This evening while sitting watching television, felt sudden onset shortness of breath, chest pressure, feeling of impending doom which she says is typical of her anxiety attacks- took half a 1mg Klonopin with minimal relief, here for further eval. No history of ischemic heart disease, follows with Dr. De La Torre, last stress 1yr ago- while inpatient had b/l LE duplex, neg. Chest pressure b/l chest, non radiating dull, constant, not worse with exertion, mild, no other exacerbating or relieving factors

## 2019-05-02 NOTE — ED PROVIDER NOTE - CARE PROVIDER_API CALL
Aron De La Torre)  Cardiovascular Disease; Internal Medicine  [address], [address]  [place] [address]  Phone: [phone]  Fax: [phone]  Follow Up Time:     Chang Aguilar)  Critical Care Medicine; Internal Medicine; Pulmonary Disease; Sleep Medicine  [address]  [place] [address]  Phone: [phone]  Fax: [phone]  Follow Up Time:     Mikaela Louis)  Internal Medicine  [address] [place] [address]  Phone: [phone]  Fax: [phone]  Follow Up Time:

## 2019-05-02 NOTE — ED PROVIDER NOTE - PROGRESS NOTE DETAILS
Patient  aware of all results, given a copy of all results, comfortable with discharge and follow-up outpatient, strict return precautions given. Patient endorses understanding of all of this and aware that they can return at any time for new or concerning symptoms. No further questions or concerns at this time

## 2019-05-02 NOTE — ED PROVIDER NOTE - CARE PROVIDERS DIRECT ADDRESSES
[email or web address],DirectAddress_Unknown,DirectAddress_Unknown

## 2019-05-02 NOTE — ED PROVIDER NOTE - NSFOLLOWUPCLINICS_GEN_ALL_ED_FT
[facility]  OP [facility]  [address] [place] [id number]  Phone: [phone]  Fax:   Follow Up Time:

## 2019-05-02 NOTE — ED PROVIDER NOTE - PROVIDER TOKENS
PROVIDER:[TOKEN:[[id number]]],PROVIDER:[TOKEN:[[id number]]],PROVIDER:[TOKEN:[[id number]]]

## 2019-05-02 NOTE — ED PROVIDER NOTE - CLINICAL SUMMARY MEDICAL DECISION MAKING FREE TEXT BOX
pt  pw  cp palpitations  sense of impending soon labs  in ED wnl -  including electroltes hgb and troponin  cxr and ekg wnl  no arrythmia no   -  pt dcd in stable condition with cardiology and PMD follow up and return to ED instructions  Patient to be discharged from ED. Any available test results were discussed with and printed  for patient.  Verbal instructions given, including instructions to return to ED immediately for any new, worsening, or concerning symptoms. Patient endorsed understanding. Written discharge instructions additionally given, including follow-up plan.

## 2019-05-02 NOTE — ED ADULT TRIAGE NOTE - ESI TRIAGE ACUITY LEVEL, MLM
POST-OP VISIT    SUBJECTIVE:  The patient is 6 weeks out from right total knee replacement.  Overall, the patient is doing okay. He is progressing in physical therapy, but still complains of thigh soreness and tightness. In addition, he is having trouble sleeping at night and mentions for the past two weeks he has been waking up due to a muscle like spasm, kicking his right knee.    PHYSICAL EXAMINATION:  General:  The patient is alert and oriented times 3.  Extremities: On physical examination of the right knee, incisions are clean and dry.  Gentle passive range of motion is well-tolerated. No significant redness, mild warmth consistent with postoperative status.. Sensory and motor exams are intact. Distal pulses are palpable.  Into motion is from -10° of extension to flexion of about 80°.    DATA:  X-rays demonstrate good postoperative alignment.    IMPRESSION:  Satisfactory progress following right total knee replacement. I am a little bit concerned about his lack of progress with range of motion. It should be noted that he had a very significant restriction of range of motion prior to surgery.    PLAN:  We discussed treatment alternatives today. I recommend a closed manipulation under anesthesia. The pros and cons, risks and benefits of this procedure were discussed. He understands, accepts, and wishes to proceed with right knee manipulation and injection under anesthesia. I will see him back for follow-up after the procedure.                   3

## 2019-05-02 NOTE — ED ADULT NURSE NOTE - CARDIO WDL
Normal rate, regular rhythm, normal S1, S2 heart sounds heard. c/o chest pain x 3 hours w/ diff bretahing/sob

## 2019-05-06 ENCOUNTER — OUTPATIENT (OUTPATIENT)
Dept: OUTPATIENT SERVICES | Facility: HOSPITAL | Age: 39
LOS: 1 days | Discharge: HOME | End: 2019-05-06

## 2019-05-06 DIAGNOSIS — F10.20 ALCOHOL DEPENDENCE, UNCOMPLICATED: ICD-10-CM

## 2019-05-06 DIAGNOSIS — Z98.891 HISTORY OF UTERINE SCAR FROM PREVIOUS SURGERY: Chronic | ICD-10-CM

## 2019-05-06 DIAGNOSIS — Z98.890 OTHER SPECIFIED POSTPROCEDURAL STATES: Chronic | ICD-10-CM

## 2019-05-07 DIAGNOSIS — F17.210 NICOTINE DEPENDENCE, CIGARETTES, UNCOMPLICATED: ICD-10-CM

## 2019-05-07 DIAGNOSIS — F10.239 ALCOHOL DEPENDENCE WITH WITHDRAWAL, UNSPECIFIED: ICD-10-CM

## 2019-05-07 DIAGNOSIS — E78.5 HYPERLIPIDEMIA, UNSPECIFIED: ICD-10-CM

## 2019-05-07 DIAGNOSIS — I34.1 NONRHEUMATIC MITRAL (VALVE) PROLAPSE: ICD-10-CM

## 2019-05-07 DIAGNOSIS — E83.42 HYPOMAGNESEMIA: ICD-10-CM

## 2019-05-07 DIAGNOSIS — E05.00 THYROTOXICOSIS WITH DIFFUSE GOITER WITHOUT THYROTOXIC CRISIS OR STORM: ICD-10-CM

## 2019-05-07 DIAGNOSIS — K21.9 GASTRO-ESOPHAGEAL REFLUX DISEASE WITHOUT ESOPHAGITIS: ICD-10-CM

## 2019-05-07 DIAGNOSIS — F41.9 ANXIETY DISORDER, UNSPECIFIED: ICD-10-CM

## 2019-05-07 DIAGNOSIS — F13.10 SEDATIVE, HYPNOTIC OR ANXIOLYTIC ABUSE, UNCOMPLICATED: ICD-10-CM

## 2019-05-07 DIAGNOSIS — E53.8 DEFICIENCY OF OTHER SPECIFIED B GROUP VITAMINS: ICD-10-CM

## 2019-05-07 DIAGNOSIS — E87.2 ACIDOSIS: ICD-10-CM

## 2019-05-07 DIAGNOSIS — E66.9 OBESITY, UNSPECIFIED: ICD-10-CM

## 2019-05-07 DIAGNOSIS — Z88.8 ALLERGY STATUS TO OTHER DRUGS, MEDICAMENTS AND BIOLOGICAL SUBSTANCES: ICD-10-CM

## 2019-05-07 DIAGNOSIS — K29.70 GASTRITIS, UNSPECIFIED, WITHOUT BLEEDING: ICD-10-CM

## 2019-05-07 DIAGNOSIS — E86.0 DEHYDRATION: ICD-10-CM

## 2019-05-07 DIAGNOSIS — J45.909 UNSPECIFIED ASTHMA, UNCOMPLICATED: ICD-10-CM

## 2019-05-07 DIAGNOSIS — R00.2 PALPITATIONS: ICD-10-CM

## 2019-05-07 DIAGNOSIS — E51.9 THIAMINE DEFICIENCY, UNSPECIFIED: ICD-10-CM

## 2019-05-09 ENCOUNTER — OUTPATIENT (OUTPATIENT)
Dept: OUTPATIENT SERVICES | Facility: HOSPITAL | Age: 39
LOS: 1 days | Discharge: HOME | End: 2019-05-09

## 2019-05-09 DIAGNOSIS — F11.20 OPIOID DEPENDENCE, UNCOMPLICATED: ICD-10-CM

## 2019-05-09 DIAGNOSIS — Z98.891 HISTORY OF UTERINE SCAR FROM PREVIOUS SURGERY: Chronic | ICD-10-CM

## 2019-05-09 DIAGNOSIS — Z98.890 OTHER SPECIFIED POSTPROCEDURAL STATES: Chronic | ICD-10-CM

## 2019-05-13 ENCOUNTER — OUTPATIENT (OUTPATIENT)
Dept: OUTPATIENT SERVICES | Facility: HOSPITAL | Age: 39
LOS: 1 days | Discharge: HOME | End: 2019-05-13

## 2019-05-13 DIAGNOSIS — Z98.890 OTHER SPECIFIED POSTPROCEDURAL STATES: Chronic | ICD-10-CM

## 2019-05-13 DIAGNOSIS — F11.20 OPIOID DEPENDENCE, UNCOMPLICATED: ICD-10-CM

## 2019-05-13 DIAGNOSIS — Z98.891 HISTORY OF UTERINE SCAR FROM PREVIOUS SURGERY: Chronic | ICD-10-CM

## 2019-05-14 DIAGNOSIS — Z76.89 PERSONS ENCOUNTERING HEALTH SERVICES IN OTHER SPECIFIED CIRCUMSTANCES: ICD-10-CM

## 2019-05-16 ENCOUNTER — OUTPATIENT (OUTPATIENT)
Dept: OUTPATIENT SERVICES | Facility: HOSPITAL | Age: 39
LOS: 1 days | Discharge: HOME | End: 2019-05-16

## 2019-05-16 DIAGNOSIS — Z98.890 OTHER SPECIFIED POSTPROCEDURAL STATES: Chronic | ICD-10-CM

## 2019-05-16 DIAGNOSIS — Z98.891 HISTORY OF UTERINE SCAR FROM PREVIOUS SURGERY: Chronic | ICD-10-CM

## 2019-05-16 DIAGNOSIS — F11.20 OPIOID DEPENDENCE, UNCOMPLICATED: ICD-10-CM

## 2019-05-20 ENCOUNTER — OUTPATIENT (OUTPATIENT)
Dept: OUTPATIENT SERVICES | Facility: HOSPITAL | Age: 39
LOS: 1 days | Discharge: HOME | End: 2019-05-20

## 2019-05-20 DIAGNOSIS — Z98.890 OTHER SPECIFIED POSTPROCEDURAL STATES: Chronic | ICD-10-CM

## 2019-05-20 DIAGNOSIS — F11.20 OPIOID DEPENDENCE, UNCOMPLICATED: ICD-10-CM

## 2019-05-20 DIAGNOSIS — Z98.891 HISTORY OF UTERINE SCAR FROM PREVIOUS SURGERY: Chronic | ICD-10-CM

## 2019-05-23 ENCOUNTER — OUTPATIENT (OUTPATIENT)
Dept: OUTPATIENT SERVICES | Facility: HOSPITAL | Age: 39
LOS: 1 days | Discharge: HOME | End: 2019-05-23

## 2019-05-23 DIAGNOSIS — Z98.890 OTHER SPECIFIED POSTPROCEDURAL STATES: Chronic | ICD-10-CM

## 2019-05-23 DIAGNOSIS — Z98.891 HISTORY OF UTERINE SCAR FROM PREVIOUS SURGERY: Chronic | ICD-10-CM

## 2019-05-23 DIAGNOSIS — F11.20 OPIOID DEPENDENCE, UNCOMPLICATED: ICD-10-CM

## 2019-06-03 ENCOUNTER — OUTPATIENT (OUTPATIENT)
Dept: OUTPATIENT SERVICES | Facility: HOSPITAL | Age: 39
LOS: 1 days | Discharge: HOME | End: 2019-06-03

## 2019-06-03 DIAGNOSIS — F11.20 OPIOID DEPENDENCE, UNCOMPLICATED: ICD-10-CM

## 2019-06-03 DIAGNOSIS — Z98.891 HISTORY OF UTERINE SCAR FROM PREVIOUS SURGERY: Chronic | ICD-10-CM

## 2019-06-03 DIAGNOSIS — Z98.890 OTHER SPECIFIED POSTPROCEDURAL STATES: Chronic | ICD-10-CM

## 2019-06-06 ENCOUNTER — OUTPATIENT (OUTPATIENT)
Dept: OUTPATIENT SERVICES | Facility: HOSPITAL | Age: 39
LOS: 1 days | Discharge: HOME | End: 2019-06-06

## 2019-06-06 DIAGNOSIS — F11.20 OPIOID DEPENDENCE, UNCOMPLICATED: ICD-10-CM

## 2019-06-06 DIAGNOSIS — Z98.891 HISTORY OF UTERINE SCAR FROM PREVIOUS SURGERY: Chronic | ICD-10-CM

## 2019-06-06 DIAGNOSIS — Z98.890 OTHER SPECIFIED POSTPROCEDURAL STATES: Chronic | ICD-10-CM

## 2019-06-11 ENCOUNTER — OUTPATIENT (OUTPATIENT)
Dept: OUTPATIENT SERVICES | Facility: HOSPITAL | Age: 39
LOS: 1 days | Discharge: HOME | End: 2019-06-11

## 2019-06-11 DIAGNOSIS — F11.20 OPIOID DEPENDENCE, UNCOMPLICATED: ICD-10-CM

## 2019-06-11 DIAGNOSIS — Z98.891 HISTORY OF UTERINE SCAR FROM PREVIOUS SURGERY: Chronic | ICD-10-CM

## 2019-06-11 DIAGNOSIS — Z98.890 OTHER SPECIFIED POSTPROCEDURAL STATES: Chronic | ICD-10-CM

## 2019-06-17 ENCOUNTER — OUTPATIENT (OUTPATIENT)
Dept: OUTPATIENT SERVICES | Facility: HOSPITAL | Age: 39
LOS: 1 days | Discharge: HOME | End: 2019-06-17

## 2019-06-17 DIAGNOSIS — Z98.891 HISTORY OF UTERINE SCAR FROM PREVIOUS SURGERY: Chronic | ICD-10-CM

## 2019-06-17 DIAGNOSIS — F11.20 OPIOID DEPENDENCE, UNCOMPLICATED: ICD-10-CM

## 2019-06-17 DIAGNOSIS — Z98.890 OTHER SPECIFIED POSTPROCEDURAL STATES: Chronic | ICD-10-CM

## 2019-06-21 ENCOUNTER — OUTPATIENT (OUTPATIENT)
Dept: OUTPATIENT SERVICES | Facility: HOSPITAL | Age: 39
LOS: 1 days | Discharge: HOME | End: 2019-06-21

## 2019-06-21 DIAGNOSIS — F11.20 OPIOID DEPENDENCE, UNCOMPLICATED: ICD-10-CM

## 2019-06-21 DIAGNOSIS — Z98.890 OTHER SPECIFIED POSTPROCEDURAL STATES: Chronic | ICD-10-CM

## 2019-06-21 DIAGNOSIS — Z98.891 HISTORY OF UTERINE SCAR FROM PREVIOUS SURGERY: Chronic | ICD-10-CM

## 2019-06-27 ENCOUNTER — OUTPATIENT (OUTPATIENT)
Dept: OUTPATIENT SERVICES | Facility: HOSPITAL | Age: 39
LOS: 1 days | Discharge: HOME | End: 2019-06-27

## 2019-06-27 DIAGNOSIS — Z98.891 HISTORY OF UTERINE SCAR FROM PREVIOUS SURGERY: Chronic | ICD-10-CM

## 2019-06-27 DIAGNOSIS — F11.20 OPIOID DEPENDENCE, UNCOMPLICATED: ICD-10-CM

## 2019-06-27 DIAGNOSIS — Z98.890 OTHER SPECIFIED POSTPROCEDURAL STATES: Chronic | ICD-10-CM

## 2019-07-01 ENCOUNTER — OUTPATIENT (OUTPATIENT)
Dept: OUTPATIENT SERVICES | Facility: HOSPITAL | Age: 39
LOS: 1 days | Discharge: HOME | End: 2019-07-01

## 2019-07-01 DIAGNOSIS — F11.20 OPIOID DEPENDENCE, UNCOMPLICATED: ICD-10-CM

## 2019-07-01 DIAGNOSIS — Z98.891 HISTORY OF UTERINE SCAR FROM PREVIOUS SURGERY: Chronic | ICD-10-CM

## 2019-07-01 DIAGNOSIS — Z98.890 OTHER SPECIFIED POSTPROCEDURAL STATES: Chronic | ICD-10-CM

## 2019-07-02 ENCOUNTER — OUTPATIENT (OUTPATIENT)
Dept: OUTPATIENT SERVICES | Facility: HOSPITAL | Age: 39
LOS: 1 days | Discharge: HOME | End: 2019-07-02

## 2019-07-02 ENCOUNTER — TRANSCRIPTION ENCOUNTER (OUTPATIENT)
Age: 39
End: 2019-07-02

## 2019-07-02 ENCOUNTER — APPOINTMENT (OUTPATIENT)
Dept: HEMATOLOGY ONCOLOGY | Facility: CLINIC | Age: 39
End: 2019-07-02
Payer: MEDICAID

## 2019-07-02 VITALS
HEIGHT: 61 IN | RESPIRATION RATE: 14 BRPM | TEMPERATURE: 97.9 F | DIASTOLIC BLOOD PRESSURE: 67 MMHG | WEIGHT: 174 LBS | BODY MASS INDEX: 32.85 KG/M2 | HEART RATE: 74 BPM | SYSTOLIC BLOOD PRESSURE: 124 MMHG

## 2019-07-02 DIAGNOSIS — Z80.0 FAMILY HISTORY OF MALIGNANT NEOPLASM OF DIGESTIVE ORGANS: ICD-10-CM

## 2019-07-02 DIAGNOSIS — Z98.891 HISTORY OF UTERINE SCAR FROM PREVIOUS SURGERY: Chronic | ICD-10-CM

## 2019-07-02 DIAGNOSIS — R79.89 OTHER SPECIFIED ABNORMAL FINDINGS OF BLOOD CHEMISTRY: ICD-10-CM

## 2019-07-02 DIAGNOSIS — Z82.69 FAMILY HISTORY OF OTHER DISEASES OF THE MUSCULOSKELETAL SYSTEM AND CONNECTIVE TISSUE: ICD-10-CM

## 2019-07-02 DIAGNOSIS — Z98.890 OTHER SPECIFIED POSTPROCEDURAL STATES: Chronic | ICD-10-CM

## 2019-07-02 DIAGNOSIS — K76.0 FATTY (CHANGE OF) LIVER, NOT ELSEWHERE CLASSIFIED: ICD-10-CM

## 2019-07-02 DIAGNOSIS — Z87.891 PERSONAL HISTORY OF NICOTINE DEPENDENCE: ICD-10-CM

## 2019-07-02 DIAGNOSIS — Z78.9 OTHER SPECIFIED HEALTH STATUS: ICD-10-CM

## 2019-07-02 DIAGNOSIS — Z82.49 FAMILY HISTORY OF ISCHEMIC HEART DISEASE AND OTHER DISEASES OF THE CIRCULATORY SYSTEM: ICD-10-CM

## 2019-07-02 DIAGNOSIS — Z86.39 PERSONAL HISTORY OF OTHER ENDOCRINE, NUTRITIONAL AND METABOLIC DISEASE: ICD-10-CM

## 2019-07-02 PROCEDURE — 99204 OFFICE O/P NEW MOD 45 MIN: CPT

## 2019-07-04 PROBLEM — Z87.891 FORMER SMOKER: Status: ACTIVE | Noted: 2019-07-02

## 2019-07-04 PROBLEM — Z82.49 FAMILY HISTORY OF CORONARY ARTERY DISEASE: Status: ACTIVE | Noted: 2019-07-02

## 2019-07-04 PROBLEM — Z80.0 FAMILY HISTORY OF MALIGNANT NEOPLASM OF COLON: Status: ACTIVE | Noted: 2019-07-02

## 2019-07-04 PROBLEM — Z86.39 HISTORY OF HYPERCHOLESTEROLEMIA: Status: RESOLVED | Noted: 2019-07-02 | Resolved: 2019-07-04

## 2019-07-04 PROBLEM — Z82.69 FAMILY HISTORY OF SYSTEMIC LUPUS ERYTHEMATOSUS: Status: ACTIVE | Noted: 2019-07-02

## 2019-07-04 PROBLEM — Z78.9 CAFFEINE USE: Status: ACTIVE | Noted: 2018-03-22

## 2019-07-04 NOTE — HISTORY OF PRESENT ILLNESS
[de-identified] : CC: I have high iron.\par \par 39 y o f with pmh of h/o hypercholesterolemia and alcohol abuse presented today for initial evaluation . Pt was referred to us from her from her dermatologist for evaluation of her elevated ferritin levels that were found on blood work. Pt was seen by the dermatologist for patchy hair loss and had biopsy done . She also  had blood work done that was significant  for AST / ALT of 244/498 6/5/19 initially . Pt was referred to gastroenterologist and had repeat blood work after 1 wk to f/u on liver enzymes plus she also ordered blood work to r/u autoimmune disorders including juan miguel, ferritin level and iron studies plus  ct scan of abdomen.Repeat blood work showed AST/ALT  of 48/107 and Ferritin of 266 with iron saturation of 24% . Ct scan od abd showed fatty liver. Pt was then referred to us for evaluation elevated ferritin. She says that she stopped drinking completely and ever since she was told about the elevated liver enzymes she stopped taking Lipitor as well that was started a year ago. \par \par She now complaints that she has diffuse body aches , a rash on her body , abdominal fullness , had a repeat ct last wk by her gastroenterologist , results are pending.\par Pt also had w/u for CLD , that was unremarkable. \par In April of this year pt was admitted to alcohol detox and at that time her blood alcohol level were > 300,  she has been sober since then. She has family history of SLE and RA , mother also had colon cancer. She reports that she lost 20 lbs but that was intentional . She has regular menstrual cycles and follows up with gyn.

## 2019-07-04 NOTE — PHYSICAL EXAM
[Normal] : normoactive bowel sounds, soft and nontender, no hepatosplenomegaly or masses appreciated [Fully active, able to carry on all pre-disease performance without restriction] : Status 0 - Fully active, able to carry on all pre-disease performance without restriction

## 2019-07-04 NOTE — CONSULT LETTER
[Dear  ___] : Dear  [unfilled], [Consult Letter:] : I had the pleasure of evaluating your patient, [unfilled]. [Please see my note below.] : Please see my note below. [Consult Closing:] : Thank you very much for allowing me to participate in the care of this patient.  If you have any questions, please do not hesitate to contact me. [Sincerely,] : Sincerely, [DrGary  ___] : Dr. ESPINOZA [___] : [unfilled] [DrGary ___] : Dr. ESPINOZA [( Thank you for referring [unfilled] for consultation for _____ )] : Thank you for referring [unfilled] for consultation for [unfilled] [FreeTextEntry3] : Leonel

## 2019-07-04 NOTE — ASSESSMENT
[FreeTextEntry1] : 39 y o f with pmh of h/o hypercholesterolemia and alcohol abuse presented today for initial evaluation . Pt was referred to us from her from her dermatologist for evaluation of her elevated ferritin levels that were found on blood work.\par \par # ELEVATED FERRITIN   in a female patient  one can consider work up for HFE since  as cutoff is 200 but :\par \par -Elevated serum ferritin is a sensitive test for iron overload, but it is not very specific, because numerous conditions other than iron overload can lead to elevations in serum ferritin.  We Doubt hemochromatosis since her saturation was normal and this pints to liver inflammation, due to  alcohol abuse. But will get Hfe gene mutation. If anything pt could be a carrier for hemochromatosis.\par \par -Elevated LFTs are due to alcohol abuse and possible  Fatty liver . Pt recommended to avoid alcohol in future.\par - Pt recommended to f/u with rheumatologist for evaluation of rash and to be tested for SLE.\par  \par We will call her with HFE testing resutls if negative she does not need to follow up with use. If she is a carrier will suggest to recheck Ferritin with Iron sat in 6 months to see if abstinence from alcohol will lead to decrease inflammation and fall in her levels. She is still menstruating.  \par \par Thank You

## 2019-07-04 NOTE — REVIEW OF SYSTEMS
[Joint Pain] : joint pain [Muscle Pain] : muscle pain [Negative] : Integumentary [Joint Stiffness] : no joint stiffness [FreeTextEntry7] : c/o abdominal fullness .

## 2019-07-05 ENCOUNTER — OUTPATIENT (OUTPATIENT)
Dept: OUTPATIENT SERVICES | Facility: HOSPITAL | Age: 39
LOS: 1 days | Discharge: HOME | End: 2019-07-05

## 2019-07-05 DIAGNOSIS — Z98.891 HISTORY OF UTERINE SCAR FROM PREVIOUS SURGERY: Chronic | ICD-10-CM

## 2019-07-05 DIAGNOSIS — F11.20 OPIOID DEPENDENCE, UNCOMPLICATED: ICD-10-CM

## 2019-07-05 DIAGNOSIS — Z98.890 OTHER SPECIFIED POSTPROCEDURAL STATES: Chronic | ICD-10-CM

## 2019-07-05 DIAGNOSIS — R79.89 OTHER SPECIFIED ABNORMAL FINDINGS OF BLOOD CHEMISTRY: ICD-10-CM

## 2019-07-11 ENCOUNTER — OUTPATIENT (OUTPATIENT)
Dept: OUTPATIENT SERVICES | Facility: HOSPITAL | Age: 39
LOS: 1 days | Discharge: HOME | End: 2019-07-11

## 2019-07-11 DIAGNOSIS — F11.20 OPIOID DEPENDENCE, UNCOMPLICATED: ICD-10-CM

## 2019-07-11 DIAGNOSIS — Z98.891 HISTORY OF UTERINE SCAR FROM PREVIOUS SURGERY: Chronic | ICD-10-CM

## 2019-07-11 DIAGNOSIS — Z98.890 OTHER SPECIFIED POSTPROCEDURAL STATES: Chronic | ICD-10-CM

## 2019-07-11 LAB — TM INTERPRETATION: NORMAL

## 2019-07-12 ENCOUNTER — APPOINTMENT (OUTPATIENT)
Dept: SURGERY | Facility: CLINIC | Age: 39
End: 2019-07-12
Payer: MEDICAID

## 2019-07-12 VITALS
DIASTOLIC BLOOD PRESSURE: 74 MMHG | BODY MASS INDEX: 32.1 KG/M2 | WEIGHT: 170 LBS | SYSTOLIC BLOOD PRESSURE: 122 MMHG | HEIGHT: 61 IN

## 2019-07-12 DIAGNOSIS — Z86.39 PERSONAL HISTORY OF OTHER ENDOCRINE, NUTRITIONAL AND METABOLIC DISEASE: ICD-10-CM

## 2019-07-12 DIAGNOSIS — R10.9 UNSPECIFIED ABDOMINAL PAIN: ICD-10-CM

## 2019-07-12 DIAGNOSIS — Z14.1 CYSTIC FIBROSIS CARRIER: ICD-10-CM

## 2019-07-12 DIAGNOSIS — K21.9 GASTRO-ESOPHAGEAL REFLUX DISEASE W/OUT ESOPHAGITIS: ICD-10-CM

## 2019-07-12 DIAGNOSIS — Z14.8 GENETIC CARRIER OF OTHER DISEASE: ICD-10-CM

## 2019-07-12 PROCEDURE — 99203 OFFICE O/P NEW LOW 30 MIN: CPT

## 2019-07-13 PROBLEM — Z86.39 HISTORY OF GRAVES' DISEASE: Status: RESOLVED | Noted: 2018-03-22 | Resolved: 2019-07-13

## 2019-07-13 PROBLEM — Z14.8 HEMOCHROMATOSIS CARRIER: Status: ACTIVE | Noted: 2019-07-13

## 2019-07-13 PROBLEM — R10.9 ABDOMINAL PAIN: Status: ACTIVE | Noted: 2019-07-13

## 2019-07-13 PROBLEM — K21.9 CHRONIC GERD: Status: ACTIVE | Noted: 2019-07-13

## 2019-07-13 PROBLEM — Z14.1 CYSTIC FIBROSIS CARRIER: Status: ACTIVE | Noted: 2019-07-13

## 2019-07-16 NOTE — HISTORY OF PRESENT ILLNESS
[de-identified] : 38yo female with PMHx of GERD, fatty liver, grave's disease in remission, alcoholism in recovery referred for ventral hernia. Patient reports occasional nausea and abdominal pain around the umbilicus. She also notes upper abdominal distortion when flexing. Additionally, she recently gained 90lbs. She also has had an elevation in her LFT's and is actively being worked up for liver dysfunction. She was recently diagnosed as a carrier for hemachromatosis. She had a liver and abdominal US, consistent with fatty liver but no gallbladder disease as per patient. She also underwent CT A/P a month ago that revealed the non-obstructing umbilical hernia. She states that she underwent an EGD 1 year ago, which demonstrated GERD and she was prescribed carafate. She does not take it and has not had another EGD since 2018. Of course, she had a first-degree family member diagnosed with colon cancer at the age of 49yo. She has never had a colonoscopy.

## 2019-07-16 NOTE — PHYSICAL EXAM
[Normal Breath Sounds] : Normal breath sounds [Normal Heart Sounds] : normal heart sounds [Alert] : alert [Calm] : calm [de-identified] : no acute distress [de-identified] : soft, mild diffuse tenderness, no rebound/guarding, +small umbilical hernia, +rectus diasthasis [de-identified] : full ROM x 4

## 2019-07-16 NOTE — CONSULT LETTER
[Please see my note below.] : Please see my note below. [Sincerely,] : Sincerely, [Dear  ___] : Dear  [unfilled], [DrGary  ___] : Dr. ESPINOZA [FreeTextEntry3] : Marycarmen Lord MD\par Bariatric & Minimally Invasive Surgery\par [facility]\par [phone]\par

## 2019-07-17 ENCOUNTER — OUTPATIENT (OUTPATIENT)
Dept: OUTPATIENT SERVICES | Facility: HOSPITAL | Age: 39
LOS: 1 days | Discharge: HOME | End: 2019-07-17

## 2019-07-17 DIAGNOSIS — F11.20 OPIOID DEPENDENCE, UNCOMPLICATED: ICD-10-CM

## 2019-07-17 DIAGNOSIS — Z98.891 HISTORY OF UTERINE SCAR FROM PREVIOUS SURGERY: Chronic | ICD-10-CM

## 2019-07-17 DIAGNOSIS — Z98.890 OTHER SPECIFIED POSTPROCEDURAL STATES: Chronic | ICD-10-CM

## 2019-07-19 ENCOUNTER — OUTPATIENT (OUTPATIENT)
Dept: OUTPATIENT SERVICES | Facility: HOSPITAL | Age: 39
LOS: 1 days | Discharge: HOME | End: 2019-07-19

## 2019-07-19 DIAGNOSIS — F11.20 OPIOID DEPENDENCE, UNCOMPLICATED: ICD-10-CM

## 2019-07-19 DIAGNOSIS — Z98.890 OTHER SPECIFIED POSTPROCEDURAL STATES: Chronic | ICD-10-CM

## 2019-07-19 DIAGNOSIS — Z98.891 HISTORY OF UTERINE SCAR FROM PREVIOUS SURGERY: Chronic | ICD-10-CM

## 2019-07-23 ENCOUNTER — EMERGENCY (EMERGENCY)
Facility: HOSPITAL | Age: 39
LOS: 0 days | Discharge: HOME | End: 2019-07-23
Attending: EMERGENCY MEDICINE | Admitting: EMERGENCY MEDICINE
Payer: MEDICAID

## 2019-07-23 VITALS
SYSTOLIC BLOOD PRESSURE: 127 MMHG | HEART RATE: 104 BPM | RESPIRATION RATE: 18 BRPM | OXYGEN SATURATION: 99 % | DIASTOLIC BLOOD PRESSURE: 77 MMHG | TEMPERATURE: 97 F

## 2019-07-23 VITALS
SYSTOLIC BLOOD PRESSURE: 149 MMHG | DIASTOLIC BLOOD PRESSURE: 88 MMHG | OXYGEN SATURATION: 100 % | RESPIRATION RATE: 20 BRPM | TEMPERATURE: 97 F | HEART RATE: 110 BPM

## 2019-07-23 DIAGNOSIS — F10.10 ALCOHOL ABUSE, UNCOMPLICATED: ICD-10-CM

## 2019-07-23 DIAGNOSIS — Z98.891 HISTORY OF UTERINE SCAR FROM PREVIOUS SURGERY: Chronic | ICD-10-CM

## 2019-07-23 DIAGNOSIS — R00.2 PALPITATIONS: ICD-10-CM

## 2019-07-23 DIAGNOSIS — Z98.890 OTHER SPECIFIED POSTPROCEDURAL STATES: ICD-10-CM

## 2019-07-23 DIAGNOSIS — Z88.8 ALLERGY STATUS TO OTHER DRUGS, MEDICAMENTS AND BIOLOGICAL SUBSTANCES: ICD-10-CM

## 2019-07-23 DIAGNOSIS — Z98.890 OTHER SPECIFIED POSTPROCEDURAL STATES: Chronic | ICD-10-CM

## 2019-07-23 DIAGNOSIS — Z98.891 HISTORY OF UTERINE SCAR FROM PREVIOUS SURGERY: ICD-10-CM

## 2019-07-23 DIAGNOSIS — K21.9 GASTRO-ESOPHAGEAL REFLUX DISEASE WITHOUT ESOPHAGITIS: ICD-10-CM

## 2019-07-23 DIAGNOSIS — Z79.899 OTHER LONG TERM (CURRENT) DRUG THERAPY: ICD-10-CM

## 2019-07-23 DIAGNOSIS — F10.11 ALCOHOL ABUSE, IN REMISSION: ICD-10-CM

## 2019-07-23 LAB
ALBUMIN SERPL ELPH-MCNC: 4.6 G/DL — SIGNIFICANT CHANGE UP (ref 3.5–5.2)
ALP SERPL-CCNC: 76 U/L — SIGNIFICANT CHANGE UP (ref 30–115)
ALT FLD-CCNC: 116 U/L — HIGH (ref 0–41)
AMMONIA BLD-MCNC: 38 UMOL/L — SIGNIFICANT CHANGE UP (ref 11–55)
AMPHET UR-MCNC: NEGATIVE — SIGNIFICANT CHANGE UP
AMYLASE P1 CFR SERPL: 44 U/L — SIGNIFICANT CHANGE UP (ref 25–115)
ANION GAP SERPL CALC-SCNC: 23 MMOL/L — HIGH (ref 7–14)
APAP SERPL-MCNC: <5 UG/ML — LOW (ref 10–30)
APPEARANCE UR: CLEAR — SIGNIFICANT CHANGE UP
AST SERPL-CCNC: 121 U/L — HIGH (ref 0–41)
BARBITURATES UR SCN-MCNC: NEGATIVE — SIGNIFICANT CHANGE UP
BASOPHILS # BLD AUTO: 0.08 K/UL — SIGNIFICANT CHANGE UP (ref 0–0.2)
BASOPHILS NFR BLD AUTO: 0.9 % — SIGNIFICANT CHANGE UP (ref 0–1)
BENZODIAZ UR-MCNC: NEGATIVE — SIGNIFICANT CHANGE UP
BILIRUB SERPL-MCNC: 0.6 MG/DL — SIGNIFICANT CHANGE UP (ref 0.2–1.2)
BILIRUB UR-MCNC: NEGATIVE — SIGNIFICANT CHANGE UP
BUN SERPL-MCNC: 6 MG/DL — LOW (ref 10–20)
CALCIUM SERPL-MCNC: 8.9 MG/DL — SIGNIFICANT CHANGE UP (ref 8.5–10.1)
CHLORIDE SERPL-SCNC: 100 MMOL/L — SIGNIFICANT CHANGE UP (ref 98–110)
CO2 SERPL-SCNC: 18 MMOL/L — SIGNIFICANT CHANGE UP (ref 17–32)
COCAINE METAB.OTHER UR-MCNC: NEGATIVE — SIGNIFICANT CHANGE UP
COLOR SPEC: YELLOW — SIGNIFICANT CHANGE UP
CREAT SERPL-MCNC: 0.7 MG/DL — SIGNIFICANT CHANGE UP (ref 0.7–1.5)
DIFF PNL FLD: NEGATIVE — SIGNIFICANT CHANGE UP
DRUG SCREEN 1, URINE RESULT: SIGNIFICANT CHANGE UP
EOSINOPHIL # BLD AUTO: 0.01 K/UL — SIGNIFICANT CHANGE UP (ref 0–0.7)
EOSINOPHIL NFR BLD AUTO: 0.1 % — SIGNIFICANT CHANGE UP (ref 0–8)
ETHANOL SERPL-MCNC: 346 MG/DL — HIGH
GLUCOSE SERPL-MCNC: 65 MG/DL — LOW (ref 70–99)
GLUCOSE UR QL: NEGATIVE — SIGNIFICANT CHANGE UP
HCT VFR BLD CALC: 48.5 % — HIGH (ref 37–47)
HGB BLD-MCNC: 16 G/DL — SIGNIFICANT CHANGE UP (ref 12–16)
IMM GRANULOCYTES NFR BLD AUTO: 0.5 % — HIGH (ref 0.1–0.3)
KETONES UR-MCNC: 40
LEUKOCYTE ESTERASE UR-ACNC: NEGATIVE — SIGNIFICANT CHANGE UP
LYMPHOCYTES # BLD AUTO: 2.37 K/UL — SIGNIFICANT CHANGE UP (ref 1.2–3.4)
LYMPHOCYTES # BLD AUTO: 28.1 % — SIGNIFICANT CHANGE UP (ref 20.5–51.1)
MCHC RBC-ENTMCNC: 31.3 PG — HIGH (ref 27–31)
MCHC RBC-ENTMCNC: 33 G/DL — SIGNIFICANT CHANGE UP (ref 32–37)
MCV RBC AUTO: 94.7 FL — SIGNIFICANT CHANGE UP (ref 81–99)
METHADONE UR-MCNC: NEGATIVE — SIGNIFICANT CHANGE UP
MONOCYTES # BLD AUTO: 0.5 K/UL — SIGNIFICANT CHANGE UP (ref 0.1–0.6)
MONOCYTES NFR BLD AUTO: 5.9 % — SIGNIFICANT CHANGE UP (ref 1.7–9.3)
NEUTROPHILS # BLD AUTO: 5.44 K/UL — SIGNIFICANT CHANGE UP (ref 1.4–6.5)
NEUTROPHILS NFR BLD AUTO: 64.5 % — SIGNIFICANT CHANGE UP (ref 42.2–75.2)
NITRITE UR-MCNC: NEGATIVE — SIGNIFICANT CHANGE UP
NRBC # BLD: 0 /100 WBCS — SIGNIFICANT CHANGE UP (ref 0–0)
OPIATES UR-MCNC: NEGATIVE — SIGNIFICANT CHANGE UP
PCP UR-MCNC: NEGATIVE — SIGNIFICANT CHANGE UP
PH UR: 6 — SIGNIFICANT CHANGE UP (ref 5–8)
PLATELET # BLD AUTO: 300 K/UL — SIGNIFICANT CHANGE UP (ref 130–400)
POTASSIUM SERPL-MCNC: 3.8 MMOL/L — SIGNIFICANT CHANGE UP (ref 3.5–5)
POTASSIUM SERPL-SCNC: 3.8 MMOL/L — SIGNIFICANT CHANGE UP (ref 3.5–5)
PROPOXYPHENE QUALITATIVE URINE RESULT: NEGATIVE — SIGNIFICANT CHANGE UP
PROT SERPL-MCNC: 8.1 G/DL — HIGH (ref 6–8)
PROT UR-MCNC: NEGATIVE — SIGNIFICANT CHANGE UP
RBC # BLD: 5.12 M/UL — SIGNIFICANT CHANGE UP (ref 4.2–5.4)
RBC # FLD: 13.4 % — SIGNIFICANT CHANGE UP (ref 11.5–14.5)
SALICYLATES SERPL-MCNC: <0.3 MG/DL — LOW (ref 4–30)
SODIUM SERPL-SCNC: 141 MMOL/L — SIGNIFICANT CHANGE UP (ref 135–146)
SP GR SPEC: <=1.005 — SIGNIFICANT CHANGE UP (ref 1.01–1.03)
THC UR QL: NEGATIVE — SIGNIFICANT CHANGE UP
UROBILINOGEN FLD QL: 0.2 — SIGNIFICANT CHANGE UP (ref 0.2–0.2)
WBC # BLD: 8.44 K/UL — SIGNIFICANT CHANGE UP (ref 4.8–10.8)
WBC # FLD AUTO: 8.44 K/UL — SIGNIFICANT CHANGE UP (ref 4.8–10.8)

## 2019-07-23 PROCEDURE — 99284 EMERGENCY DEPT VISIT MOD MDM: CPT

## 2019-07-23 RX ORDER — THIAMINE MONONITRATE (VIT B1) 100 MG
100 TABLET ORAL ONCE
Refills: 0 | Status: COMPLETED | OUTPATIENT
Start: 2019-07-23 | End: 2019-07-23

## 2019-07-23 RX ADMIN — Medication 100 MILLIGRAM(S): at 15:28

## 2019-07-23 NOTE — ED PROVIDER NOTE - ATTENDING CONTRIBUTION TO CARE
40 y/o F with PMH of alcohol abuse presents for detox. Per pt, she has been clean for “awhile” but recently relapsed and now wants help quitting. Last reported drink was at 11AM today. Pt also c/o mild anxiety. Otherwise pt has no other complains including suicidal or homicidal ideation, or  AV/hallucinations. Denies all other drug use.   Constitutional: Axious appearing. No acute distress. Non toxic. Eyes: PERRLA. Extraocular movements intact, no entrapment. Conjunctiva normal. ENT: No nasal discharge. Moist mucus membranes. Neck: Supple, non tender, full range of motion.CV: RRR no murmurs, rubs, or gallops. +S1S2. Pulm: Clear to auscultation bilaterally. Normal work of breathing. Abd: soft NT ND +BS. Ext: Warm and well perfused x4, moving all extremities, no edema. Psy: Cooperative, appropriate. Skin: Warm, dry, no rash. Neuro: CN2-12 grossly intact no sensory or motor deficits throughout, no drift, no ataxia, rapid alternating within normal limits, neg romberg.  A/P alcohol abuse. Labs, EKG, and detox.
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
## 2019-07-23 NOTE — ED PROVIDER NOTE - NS ED ROS FT
Constitutional: (-) fever (-) chills   Eyes: (-) visual changes   Cardiac: (+) palpitations (-) chest pain (-) syncope  Respiratory: (-) SOB   GI: (-) nausea (-) vomiting (-) diarrhea   MS: (-) back pain  Neuro: (-) headache (-) dizziness (-) numbness/tingling to extremities B/L  (-) AMS  (-) tremors  Skin: (-) rash  Psych: (+) alcohol abuse (-) SI/HI (-) hallucinations  Except as documented in the HPI, all other systems are negative.
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
## 2019-07-23 NOTE — ED PROVIDER NOTE - NSFOLLOWUPINSTRUCTIONS_ED_ALL_ED_FT
Alcohol Use Disorder  Alcohol use disorder is when your drinking disrupts your daily life. When you have this condition, you drink too much alcohol and you cannot control your drinking.    Alcohol use disorder can cause serious problems with your physical health. It can affect your brain, heart, liver, pancreas, immune system, stomach, and intestines. Alcohol use disorder can increase your risk for certain cancers and cause problems with your mental health, such as depression, anxiety, psychosis, delirium, and dementia. People with this disorder risk hurting themselves and others.    What are the causes?  This condition is caused by drinking too much alcohol over time. It is not caused by drinking too much alcohol only one or two times. Some people with this condition drink alcohol to cope with or escape from negative life events. Others drink to relieve pain or symptoms of mental illness.    What increases the risk?  You are more likely to develop this condition if:    You have a family history of alcohol use disorder.  Your culture encourages drinking to the point of intoxication, or makes alcohol easy to get.  You had a mood or conduct disorder in childhood.  You have been a victim of abuse.  You are an adolescent and:    You have poor grades or difficulties in school.  Your caregivers do not talk to you about saying no to alcohol, or supervise your activities.  You are impulsive or you have trouble with self-control.      What are the signs or symptoms?  Symptoms of this condition include:    Drinking more than you want to.  Drinking for longer than you want to.  Trying several times to drink less or to control your drinking.  Spending a lot of time getting alcohol, drinking, or recovering from drinking.  Craving alcohol.  Having problems at work, at school, or at home due to drinking.  Having problems in relationships due to drinking.  Drinking when it is dangerous to drink, such as before driving a car.  Continuing to drink even though you know you might have a physical or mental problem related to drinking.  Needing more and more alcohol to get the same effect you want from the alcohol (building up tolerance).  Having symptoms of withdrawal when you stop drinking. Symptoms of withdrawal include:    Fatigue.  Nightmares.  Trouble sleeping.  Depression.  Anxiety.  Fever.  Seizures.  Severe confusion.  Feeling or seeing things that are not there (hallucinations).  Tremors.  Rapid heart rate.  Rapid breathing.  High blood pressure.    Drinking to avoid symptoms of withdrawal.    How is this diagnosed?  This condition is diagnosed with an assessment. Your health care provider may start the assessment by asking three or four questions about your drinking.    Your health care provider may perform a physical exam or do lab tests to see if you have physical problems resulting from alcohol use. She or he may refer you to a mental health professional for evaluation.    How is this treated?  Some people with alcohol use disorder are able to reduce their alcohol use to low-risk levels. Others need to completely quit drinking alcohol. When necessary, mental health professionals with specialized training in substance use treatment can help. Your health care provider can help you decide how severe your alcohol use disorder is and what type of treatment you need. The following forms of treatment are available:    Detoxification. Detoxification involves quitting drinking and using prescription medicines within the first week to help lessen withdrawal symptoms. This treatment is important for people who have had withdrawal symptoms before and for heavy drinkers who are likely to have withdrawal symptoms. Alcohol withdrawal can be dangerous, and in severe cases, it can cause death. Detoxification may be provided in a home, community, or primary care setting, or in a hospital or substance use treatment facility.  Counseling. This treatment is also called talk therapy. It is provided by substance use treatment counselors. A counselor can address the reasons you use alcohol and suggest ways to keep you from drinking again or to prevent problem drinking. The goals of talk therapy are to:    Find healthy activities and ways for you to cope with stress.  Identify and avoid the things that trigger your alcohol use.  Help you learn how to handle cravings.    Medicines. Medicines can help treat alcohol use disorder by:    Decreasing alcohol cravings.  Decreasing the positive feeling you have when you drink alcohol.  Causing an uncomfortable physical reaction when you drink alcohol (aversion therapy).    Support groups. Support groups are led by people who have quit drinking. They provide emotional support, advice, and guidance.    ImageThese forms of treatment are often combined. Some people with this condition benefit from a combination of treatments provided by specialized substance use treatment centers.    Follow these instructions at home:  Take over-the-counter and prescription medicines only as told by your health care provider.  Check with your health care provider before starting any new medicines.  Ask friends and family members not to offer you alcohol.  Avoid situations where alcohol is served, including gatherings where others are drinking alcohol.  Create a plan for what to do when you are tempted to use alcohol.  Find hobbies or activities that you enjoy that do not include alcohol.  Keep all follow-up visits as told by your health care provider. This is important.  How is this prevented?  If you drink, limit alcohol intake to no more than 1 drink a day for nonpregnant women and 2 drinks a day for men. One drink equals 12 oz of beer, 5 oz of wine, or 1½ oz of hard liquor.  If you have a mental health condition, get treatment and support.  Do not give alcohol to adolescents.  If you are an adolescent:    Do not drink alcohol.  Do not be afraid to say no if someone offers you alcohol. Speak up about why you do not want to drink. You can be a positive role model for your friends and set a good example for those around you by not drinking alcohol.  If your friends drink, spend time with others who do not drink alcohol. Make new friends who do not use alcohol.  Find healthy ways to manage stress and emotions, such as meditation or deep breathing, exercise, spending time in nature, listening to music, or talking with a trusted friend or family member.    Contact a health care provider if:  You are not able to take your medicines as told.  Your symptoms get worse.  You return to drinking alcohol (relapse) and your symptoms get worse.  Get help right away if:  You have thoughts about hurting yourself or others.  If you ever feel like you may hurt yourself or others, or have thoughts about taking your own life, get help right away. You can go to your nearest emergency department or call:     Your local emergency services (911 in the [place]   A suicide crisis helpline, such as the National Suicide Prevention Lifeline at 1-352.997.4194. This is open 24 hours a day.     Summary  Alcohol use disorder is when your drinking disrupts your daily life. When you have this condition, you drink too much alcohol and you cannot control your drinking.  Treatment may include detoxification, counseling, medicine, and support groups.  Ask friends and family members not to offer you alcohol. Avoid situations where alcohol is served.  Get help right away if you have thoughts about hurting yourself or others.  This information is not intended to replace advice given to you by your health care provider. Make sure you discuss any questions you have with your health care provider.

## 2019-07-23 NOTE — ED PROVIDER NOTE - NSFOLLOWUPCLINICS_GEN_ALL_ED_FT
[facility]  Detox Mgmt  [address] [place] [address]  Phone: [phone]  Fax:   Follow Up Time:

## 2019-07-23 NOTE — ED PROVIDER NOTE - PHYSICAL EXAMINATION
GENERAL: Well-nourished, Well-developed. NAD.  Head: No visible or palpable hematoma.  Eyes: PERRLA, EOMI. No asymmetry. No nystagmus. No conjunctival injection. Non-icteric sclera.  ENMT: MMM. No pharyngeal erythema or exudates. Uvula midline. No oral lesions. No tongue fasciculations.  Neck: Supple. No LAD. No cervical midline TTP. No paravertebral TTP to traps. FROM  CVS: Normal S1,S2. No murmurs appreciated on auscultation   RESP: No use of accessory muscles. Chest rise symmetrical with good expansion. Lungs clear to auscultation B/L. No wheezing, rales, or rhonchi auscultated.  GI: Normal auscultation of bowel sounds in all 4 quadrants. Soft, Nontender, Nondistended. No guarding.   MSK: No visible signs of trauma such as ecchymosis, erythema, or swelling. FROM of upper and lower extremities B/L. No midline spinal TTP.  Skin: + old bruise to left side of back above deltoid.  + old bruises to right forearm.  No marks or bruising to neck. Warm, Dry. No rashes or lesions. Good cap refill < 2 sec B/L.  EXT: Radial pulses present B/L.   Neuro: AA&O x 3. CNs II-XII grossly intact. Speaking in full sentences. No slurring of speech. No facial droop. No tremors. Sensation grossly intact. Strength 5/5 B/L. Gait within normal limits.   Psych:  Cooperative. Anxious.
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
## 2019-07-23 NOTE — ED PROVIDER NOTE - CLINICAL SUMMARY MEDICAL DECISION MAKING FREE TEXT BOX
38 y/o F with PMH of alcohol abuse presents for detox. Per pt, she has been clean for “awhile” but recently relapsed and now wants help quitting. Last reported drink was at 11AM today. Pt also c/o mild anxiety. Otherwise pt has no other complains including suicidal or homicidal ideation, or  AV/hallucinations. Denies all other drug use.   Constitutional: Axious appearing. No acute distress. Non toxic. Eyes: PERRLA. Extraocular movements intact, no entrapment. Conjunctiva normal. ENT: No nasal discharge. Moist mucus membranes. Neck: Supple, non tender, full range of motion.CV: RRR no murmurs, rubs, or gallops. +S1S2. Pulm: Clear to auscultation bilaterally. Normal work of breathing. Abd: soft NT ND +BS. Ext: Warm and well perfused x4, moving all extremities, no edema. Psy: Cooperative, appropriate. Skin: Warm, dry, no rash. Neuro: CN2-12 grossly intact no sensory or motor deficits throughout, no drift, no ataxia, rapid alternating within normal limits, neg romberg. labs reviewed. pt initially requesting detox but now changes her mind. Patient changed mind and now refusing detox and requesting discharge. Will discharge. Despite multiple attempts and discussions with case management, pt still declines. Patient ambulating in the ED. Pt awake, alert, no slurring of speech, ambulatory without difficulty, no ataxia, PERRLA, RRR no MRG, lungs CTA b/l, abd S NT ND. No complaints at this time, denies SI, HI, AV hallucinations, wishes to be discharged, pt clinically sober and displays capacity- no further questions or concerns at this time.

## 2019-07-23 NOTE — ED PROVIDER NOTE - PROGRESS NOTE DETAILS
Patient requesting detox.  Bed 211 available. ATTENDING NOTE:   40 y/o F with PMH of alcohol abuse presents for detox. Per pt, she has been clean for “awhile” but recently relapsed and now wants help quitting. Last reported drink was at 11AM today. Pt also c/o mild anxiety. Otherwise pt has no other complains including suicidal or homicidal ideation, or  AV/hallucinations. Denies all other drug use.   Constitutional: Axious appearing. No acute distress. Non toxic. Eyes: PERRLA. Extraocular movements intact, no entrapment. Conjunctiva normal. ENT: No nasal discharge. Moist mucus membranes. Neck: Supple, non tender, full range of motion.CV: RRR no murmurs, rubs, or gallops. +S1S2. Pulm: Clear to auscultation bilaterally. Normal work of breathing. Abd: soft NT ND +BS. Ext: Warm and well perfused x4, moving all extremities, no edema. Psy: Cooperative, appropriate. Skin: Warm, dry, no rash. Neuro: CN2-12 grossly intact no sensory or motor deficits throughout, no drift, no ataxia, rapid alternating within normal limits, neg romberg.  A/P alcohol abuse. Labs, EKG, and detox. Patient changed mind and now refusing detox and requesting discharge. Will discharge. Patient ambulating in the ED. Patient changed mind and now refusing detox and requesting discharge. Will discharge. Despite multiple attempts and discussions with case management, pt still declines. Patient ambulating in the ED. Pt awake, alert, no slurring of speech, ambulatory without difficulty, no ataxia, PERRLA, RRR no MRG, lungs CTA b/l, abd S NT ND. No complaints at this time, denies SI, HI, AV hallucinations, wishes to be discharged, pt clinically sober and displays capacity- no further questions or concerns at this time.

## 2019-07-23 NOTE — ED PROVIDER NOTE - PMH
Alcoholism    Anxiety    Benzodiazepine misuse    Esophagitis    Fatty liver    Gastritis    GERD (gastroesophageal reflux disease)    Graves' disease    Heart murmur    Hemochromatosis    History of Graves' disease    Hypercholesterolemia    Hyperlipidemia, unspecified hyperlipidemia type    MVP (mitral valve prolapse)    Nicotine dependence    Obesity

## 2019-07-24 LAB — T PALLIDUM AB TITR SER: NEGATIVE — SIGNIFICANT CHANGE UP

## 2019-07-25 ENCOUNTER — INPATIENT (INPATIENT)
Facility: HOSPITAL | Age: 39
LOS: 2 days | Discharge: HOME | End: 2019-07-28
Attending: INTERNAL MEDICINE | Admitting: INTERNAL MEDICINE
Payer: MEDICAID

## 2019-07-25 VITALS
SYSTOLIC BLOOD PRESSURE: 148 MMHG | OXYGEN SATURATION: 98 % | TEMPERATURE: 98 F | HEART RATE: 122 BPM | RESPIRATION RATE: 20 BRPM | DIASTOLIC BLOOD PRESSURE: 99 MMHG

## 2019-07-25 DIAGNOSIS — Z98.891 HISTORY OF UTERINE SCAR FROM PREVIOUS SURGERY: Chronic | ICD-10-CM

## 2019-07-25 DIAGNOSIS — Z98.890 OTHER SPECIFIED POSTPROCEDURAL STATES: Chronic | ICD-10-CM

## 2019-07-25 LAB
ALBUMIN SERPL ELPH-MCNC: 4.2 G/DL — SIGNIFICANT CHANGE UP (ref 3.5–5.2)
ALP SERPL-CCNC: 81 U/L — SIGNIFICANT CHANGE UP (ref 30–115)
ALT FLD-CCNC: 193 U/L — HIGH (ref 0–41)
ANION GAP SERPL CALC-SCNC: 19 MMOL/L — HIGH (ref 7–14)
AST SERPL-CCNC: 172 U/L — HIGH (ref 0–41)
BILIRUB SERPL-MCNC: 0.5 MG/DL — SIGNIFICANT CHANGE UP (ref 0.2–1.2)
BUN SERPL-MCNC: 7 MG/DL — LOW (ref 10–20)
CALCIUM SERPL-MCNC: 9.6 MG/DL — SIGNIFICANT CHANGE UP (ref 8.5–10.1)
CHLORIDE SERPL-SCNC: 104 MMOL/L — SIGNIFICANT CHANGE UP (ref 98–110)
CO2 SERPL-SCNC: 22 MMOL/L — SIGNIFICANT CHANGE UP (ref 17–32)
CREAT SERPL-MCNC: 0.5 MG/DL — LOW (ref 0.7–1.5)
GLUCOSE SERPL-MCNC: 149 MG/DL — HIGH (ref 70–99)
HAV IGM SER-ACNC: SIGNIFICANT CHANGE UP
HBV CORE IGM SER-ACNC: SIGNIFICANT CHANGE UP
HBV SURFACE AG SER-ACNC: SIGNIFICANT CHANGE UP
HCT VFR BLD CALC: 44.4 % — SIGNIFICANT CHANGE UP (ref 37–47)
HCV AB S/CO SERPL IA: 0.14 S/CO — SIGNIFICANT CHANGE UP (ref 0–0.99)
HCV AB SERPL-IMP: SIGNIFICANT CHANGE UP
HGB BLD-MCNC: 15.3 G/DL — SIGNIFICANT CHANGE UP (ref 12–16)
MCHC RBC-ENTMCNC: 32.1 PG — HIGH (ref 27–31)
MCHC RBC-ENTMCNC: 34.5 G/DL — SIGNIFICANT CHANGE UP (ref 32–37)
MCV RBC AUTO: 93.1 FL — SIGNIFICANT CHANGE UP (ref 81–99)
NRBC # BLD: 0 /100 WBCS — SIGNIFICANT CHANGE UP (ref 0–0)
NT-PROBNP SERPL-SCNC: 16 PG/ML — SIGNIFICANT CHANGE UP (ref 0–300)
PLATELET # BLD AUTO: 258 K/UL — SIGNIFICANT CHANGE UP (ref 130–400)
POTASSIUM SERPL-MCNC: 3.5 MMOL/L — SIGNIFICANT CHANGE UP (ref 3.5–5)
POTASSIUM SERPL-SCNC: 3.5 MMOL/L — SIGNIFICANT CHANGE UP (ref 3.5–5)
PROT SERPL-MCNC: 7.2 G/DL — SIGNIFICANT CHANGE UP (ref 6–8)
RBC # BLD: 4.77 M/UL — SIGNIFICANT CHANGE UP (ref 4.2–5.4)
RBC # FLD: 13.3 % — SIGNIFICANT CHANGE UP (ref 11.5–14.5)
SODIUM SERPL-SCNC: 145 MMOL/L — SIGNIFICANT CHANGE UP (ref 135–146)
TROPONIN T SERPL-MCNC: <0.01 NG/ML — SIGNIFICANT CHANGE UP
WBC # BLD: 6.05 K/UL — SIGNIFICANT CHANGE UP (ref 4.8–10.8)
WBC # FLD AUTO: 6.05 K/UL — SIGNIFICANT CHANGE UP (ref 4.8–10.8)

## 2019-07-25 PROCEDURE — 93010 ELECTROCARDIOGRAM REPORT: CPT

## 2019-07-25 PROCEDURE — 99285 EMERGENCY DEPT VISIT HI MDM: CPT

## 2019-07-25 RX ORDER — KETOROLAC TROMETHAMINE 30 MG/ML
15 SYRINGE (ML) INJECTION ONCE
Refills: 0 | Status: DISCONTINUED | OUTPATIENT
Start: 2019-07-25 | End: 2019-07-25

## 2019-07-25 RX ADMIN — Medication 15 MILLIGRAM(S): at 22:44

## 2019-07-25 RX ADMIN — Medication 15 MILLIGRAM(S): at 23:14

## 2019-07-25 NOTE — ED ADULT NURSE NOTE - NSIMPLEMENTINTERV_GEN_ALL_ED
Implemented All Fall with Harm Risk Interventions:  [place] to call system. Call bell, personal items and telephone within reach. Instruct patient to call for assistance. Room bathroom lighting operational. Non-slip footwear when patient is off stretcher. Physically safe environment: no spills, clutter or unnecessary equipment. Stretcher in lowest position, wheels locked, appropriate side rails in place. Provide visual cue, wrist band, yellow gown, etc. Monitor gait and stability. Monitor for mental status changes and reorient to person, place, and time. Review medications for side effects contributing to fall risk. Reinforce activity limits and safety measures with patient and family. Provide visual clues: red socks.

## 2019-07-25 NOTE — ED ADULT NURSE NOTE - OBJECTIVE STATEMENT
Patient is a 39 y.o. F with PMH of ETOH abuse with chest pain and anxiety since 6 pm today. Pt drank a pint of vodka after she felt uncomfortable and anxious. deneis any drug use, no nausea/vomitting, no chills, no shakes, no headache, no vision changes. Pt denies HI or SI.

## 2019-07-26 DIAGNOSIS — F10.20 ALCOHOL DEPENDENCE, UNCOMPLICATED: ICD-10-CM

## 2019-07-26 DIAGNOSIS — F10.10 ALCOHOL ABUSE, UNCOMPLICATED: ICD-10-CM

## 2019-07-26 DIAGNOSIS — F41.9 ANXIETY DISORDER, UNSPECIFIED: ICD-10-CM

## 2019-07-26 PROBLEM — E83.119 HEMOCHROMATOSIS, UNSPECIFIED: Chronic | Status: ACTIVE | Noted: 2019-07-23

## 2019-07-26 PROBLEM — K76.0 FATTY (CHANGE OF) LIVER, NOT ELSEWHERE CLASSIFIED: Chronic | Status: ACTIVE | Noted: 2019-07-23

## 2019-07-26 LAB
AMMONIA BLD-MCNC: 47 UMOL/L — SIGNIFICANT CHANGE UP (ref 11–55)
APAP SERPL-MCNC: <5 UG/ML — LOW (ref 10–30)
ETHANOL SERPL-MCNC: 221 MG/DL — HIGH
GGT SERPL-CCNC: 68 U/L — HIGH (ref 1–40)
HCG UR QL: NEGATIVE — SIGNIFICANT CHANGE UP
MAGNESIUM SERPL-MCNC: 1.9 MG/DL — SIGNIFICANT CHANGE UP (ref 1.8–2.4)
SALICYLATES SERPL-MCNC: <0.3 MG/DL — LOW (ref 4–30)

## 2019-07-26 PROCEDURE — 71046 X-RAY EXAM CHEST 2 VIEWS: CPT | Mod: 26

## 2019-07-26 RX ORDER — SODIUM CHLORIDE 9 MG/ML
1000 INJECTION, SOLUTION INTRAVENOUS ONCE
Refills: 0 | Status: COMPLETED | OUTPATIENT
Start: 2019-07-26 | End: 2019-07-26

## 2019-07-26 RX ORDER — PANTOPRAZOLE SODIUM 20 MG/1
40 TABLET, DELAYED RELEASE ORAL
Refills: 0 | Status: DISCONTINUED | OUTPATIENT
Start: 2019-07-26 | End: 2019-07-27

## 2019-07-26 RX ORDER — GUAIFENESIN/DEXTROMETHORPHAN 600MG-30MG
5 TABLET, EXTENDED RELEASE 12 HR ORAL EVERY 4 HOURS
Refills: 0 | Status: DISCONTINUED | OUTPATIENT
Start: 2019-07-26 | End: 2019-07-28

## 2019-07-26 RX ORDER — ACETAMINOPHEN 500 MG
650 TABLET ORAL EVERY 4 HOURS
Refills: 0 | Status: DISCONTINUED | OUTPATIENT
Start: 2019-07-26 | End: 2019-07-28

## 2019-07-26 RX ORDER — HYDROXYZINE HCL 10 MG
100 TABLET ORAL AT BEDTIME
Refills: 0 | Status: DISCONTINUED | OUTPATIENT
Start: 2019-07-26 | End: 2019-07-28

## 2019-07-26 RX ORDER — DIAZEPAM 5 MG
10 TABLET ORAL ONCE
Refills: 0 | Status: DISCONTINUED | OUTPATIENT
Start: 2019-07-26 | End: 2019-07-26

## 2019-07-26 RX ORDER — THIAMINE MONONITRATE (VIT B1) 100 MG
100 TABLET ORAL DAILY
Refills: 0 | Status: COMPLETED | OUTPATIENT
Start: 2019-07-26 | End: 2019-07-28

## 2019-07-26 RX ORDER — FOLIC ACID 0.8 MG
1 TABLET ORAL DAILY
Refills: 0 | Status: DISCONTINUED | OUTPATIENT
Start: 2019-07-26 | End: 2019-07-28

## 2019-07-26 RX ORDER — GABAPENTIN 400 MG/1
300 CAPSULE ORAL THREE TIMES A DAY
Refills: 0 | Status: DISCONTINUED | OUTPATIENT
Start: 2019-07-26 | End: 2019-07-28

## 2019-07-26 RX ORDER — MAGNESIUM HYDROXIDE 400 MG/1
30 TABLET, CHEWABLE ORAL ONCE
Refills: 0 | Status: DISCONTINUED | OUTPATIENT
Start: 2019-07-26 | End: 2019-07-28

## 2019-07-26 RX ORDER — HYDROXYZINE HCL 10 MG
50 TABLET ORAL EVERY 6 HOURS
Refills: 0 | Status: DISCONTINUED | OUTPATIENT
Start: 2019-07-26 | End: 2019-07-28

## 2019-07-26 RX ORDER — METHOCARBAMOL 500 MG/1
500 TABLET, FILM COATED ORAL EVERY 6 HOURS
Refills: 0 | Status: DISCONTINUED | OUTPATIENT
Start: 2019-07-26 | End: 2019-07-28

## 2019-07-26 RX ORDER — PSEUDOEPHEDRINE HCL 30 MG
60 TABLET ORAL EVERY 6 HOURS
Refills: 0 | Status: DISCONTINUED | OUTPATIENT
Start: 2019-07-26 | End: 2019-07-28

## 2019-07-26 RX ORDER — IBUPROFEN 200 MG
400 TABLET ORAL EVERY 6 HOURS
Refills: 0 | Status: DISCONTINUED | OUTPATIENT
Start: 2019-07-26 | End: 2019-07-28

## 2019-07-26 RX ORDER — GABAPENTIN 400 MG/1
300 CAPSULE ORAL THREE TIMES A DAY
Refills: 0 | Status: DISCONTINUED | OUTPATIENT
Start: 2019-07-26 | End: 2019-07-26

## 2019-07-26 RX ORDER — MULTIVIT-MIN/FERROUS GLUCONATE 9 MG/15 ML
1 LIQUID (ML) ORAL DAILY
Refills: 0 | Status: DISCONTINUED | OUTPATIENT
Start: 2019-07-26 | End: 2019-07-28

## 2019-07-26 RX ORDER — MIRTAZAPINE 45 MG/1
30 TABLET, ORALLY DISINTEGRATING ORAL AT BEDTIME
Refills: 0 | Status: DISCONTINUED | OUTPATIENT
Start: 2019-07-26 | End: 2019-07-26

## 2019-07-26 RX ORDER — ATORVASTATIN CALCIUM 80 MG/1
40 TABLET, FILM COATED ORAL AT BEDTIME
Refills: 0 | Status: DISCONTINUED | OUTPATIENT
Start: 2019-07-26 | End: 2019-07-26

## 2019-07-26 RX ORDER — NICOTINE POLACRILEX 2 MG
1 GUM BUCCAL DAILY
Refills: 0 | Status: DISCONTINUED | OUTPATIENT
Start: 2019-07-26 | End: 2019-07-28

## 2019-07-26 RX ADMIN — Medication 50 MILLIGRAM(S): at 05:55

## 2019-07-26 RX ADMIN — PANTOPRAZOLE SODIUM 40 MILLIGRAM(S): 20 TABLET, DELAYED RELEASE ORAL at 10:45

## 2019-07-26 RX ADMIN — Medication 50 MILLIGRAM(S): at 00:12

## 2019-07-26 RX ADMIN — Medication 2 MILLIGRAM(S): at 12:18

## 2019-07-26 RX ADMIN — Medication 1 TABLET(S): at 10:43

## 2019-07-26 RX ADMIN — Medication 2 MILLIGRAM(S): at 10:24

## 2019-07-26 RX ADMIN — Medication 2 MILLIGRAM(S): at 14:02

## 2019-07-26 RX ADMIN — SODIUM CHLORIDE 1000 MILLILITER(S): 9 INJECTION, SOLUTION INTRAVENOUS at 03:59

## 2019-07-26 RX ADMIN — Medication 50 MILLIGRAM(S): at 22:22

## 2019-07-26 RX ADMIN — GABAPENTIN 300 MILLIGRAM(S): 400 CAPSULE ORAL at 10:44

## 2019-07-26 RX ADMIN — Medication 0.1 MILLIGRAM(S): at 10:50

## 2019-07-26 RX ADMIN — Medication 50 MILLIGRAM(S): at 10:23

## 2019-07-26 RX ADMIN — GABAPENTIN 300 MILLIGRAM(S): 400 CAPSULE ORAL at 14:01

## 2019-07-26 RX ADMIN — Medication 1 MILLIGRAM(S): at 10:24

## 2019-07-26 RX ADMIN — Medication 400 MILLIGRAM(S): at 14:03

## 2019-07-26 RX ADMIN — Medication 2 MILLIGRAM(S): at 22:20

## 2019-07-26 RX ADMIN — Medication 10 MILLIGRAM(S): at 04:51

## 2019-07-26 RX ADMIN — SODIUM CHLORIDE 1000 MILLILITER(S): 9 INJECTION, SOLUTION INTRAVENOUS at 02:59

## 2019-07-26 RX ADMIN — Medication 10 MILLIGRAM(S): at 01:11

## 2019-07-26 RX ADMIN — Medication 100 MILLIGRAM(S): at 10:23

## 2019-07-26 NOTE — H&P ADULT - NSICDXPASTMEDICALHX_GEN_ALL_CORE_FT
PAST MEDICAL HISTORY:  Alcoholism     Anxiety     Benzodiazepine misuse     Esophagitis     Fatty liver     Gastritis     GERD (gastroesophageal reflux disease)     Graves' disease     Heart murmur     Hemochromatosis     History of Graves' disease     Hypercholesterolemia     Hyperlipidemia, unspecified hyperlipidemia type     MVP (mitral valve prolapse)     Nicotine dependence     Obesity

## 2019-07-26 NOTE — ED PROVIDER NOTE - CARE PLAN
Principal Discharge DX:	Alcohol abuse Principal Discharge DX:	Alcohol abuse  Secondary Diagnosis:	Alcohol withdrawal syndrome without complication
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
## 2019-07-26 NOTE — ED PROVIDER NOTE - PHYSICAL EXAMINATION
CONSTITUTIONAL: Well-developed; well-nourished; anxious  SKIN: warm, dry  HEAD: Normocephalic; atraumatic.  EYES: PERRL, EOMI, no conjunctival erythema  ENT: No nasal discharge; airway clear.  NECK: Supple; non tender.  CARD: S1, S2 normal; no murmurs, gallops, or rubs. Regular rate and rhythm.   RESP: No wheezes, rales or rhonchi.  ABD: soft ntnd  EXT: Normal ROM.  No clubbing, cyanosis or edema.   LYMPH: No acute cervical adenopathy.  NEURO: Alert, oriented, no focal deficit, no shaking  PSYCH: Cooperative, appropriate, anxious

## 2019-07-26 NOTE — ED PROVIDER NOTE - ATTENDING CONTRIBUTION TO CARE
I personally evaluated the patient. I reviewed the Resident’s or Physician Assistant’s note (as assigned above), and agree with the findings and plan except as documented in my note.  39 F with hx of alcoholism presents after binge drinking. Drank 1 pint of vodka. Symptoms associated with palpitations and chest discomfort, currently resolved. Presents seeking detox. No aggravating or alleviating factors. ROS otherwise neg. VS reviewed and pt tachycardic to 130s, pt well appearing, NAD. Head ncat, neck supple, no JVD, normal s1s2 without any murmurs, tachycardic, regular, Lungs CTAB with normal work of breathing. abd +BS, s/nd/nt, extremities wnl, AAO x 3, + tremors of outstretched arms, no tongue fasiculation, neuro exam otherwise normal. No acute skin rashes. Plan is management of alcohol withdrawal, labs, monitor, and dispo accordingly.

## 2019-07-26 NOTE — ED PROVIDER NOTE - CLINICAL SUMMARY MEDICAL DECISION MAKING FREE TEXT BOX
Pt with hx of alcoholism presents seeking detox. Pt in mild withdrawal. Managed with benzos and pt admitted to detox.

## 2019-07-26 NOTE — H&P ADULT - NSHPLABSRESULTS_GEN_ALL_CORE
15.3   6.05  )-----------( 258      ( 25 Jul 2019 22:29 )             44.4     07-25    145  |  104  |  7<L>  ----------------------------<  149<H>  3.5   |  22  |  0.5<L>    Ca    9.6      25 Jul 2019 22:29  Mg     1.9     07-26    TPro  7.2  /  Alb  4.2  /  TBili  0.5  /  DBili  x   /  AST  172<H>  /  ALT  193<H>  /  AlkPhos  81  07-25              CARDIAC MARKERS ( 25 Jul 2019 22:29 )  x     / <0.01 ng/mL / x     / x     / x          CAPILLARY BLOOD GLUCOSE

## 2019-07-26 NOTE — H&P ADULT - NSHPPHYSICALEXAM_GEN_ALL_CORE
Vital Signs Last 24 Hrs  T(C): 36.4 (25 Jul 2019 23:48), Max: 36.8 (25 Jul 2019 21:59)  T(F): 97.5 (25 Jul 2019 23:48), Max: 98.2 (25 Jul 2019 21:59)  HR: 113 (25 Jul 2019 23:48) (113 - 122)  BP: 108/62 (25 Jul 2019 23:48) (108/62 - 148/99)  BP(mean): --  RR: 18 (25 Jul 2019 23:48) (18 - 20)  SpO2: 96% (25 Jul 2019 23:48) (96% - 98%)      Constitutional: NAD, A&O x3    Eyes: PERRLA, no conjuctivitis    Neck: no lymphadenopathy    Respiratory: +air entry, no rales, no rhonchi, no wheezes    Cardiovascular: +S1 and S2, regular rate and rhythm    Gastrointestinal: +BS, soft, non-tender, not distended    Extremities:  no edema, no calf tenderness    Neurological: sensation intact, ROM equal B/L, CN II-XII intact    Skin: no rashes, normal turgor
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
## 2019-07-26 NOTE — ED PROVIDER NOTE - NS ED ROS FT
Eyes:  No visual changes, eye pain or discharge.  ENMT:  No hearing changes, pain, no sore throat or runny nose, no difficulty swallowing  Cardiac:  + chest pain, +SOB without edema. No chest pain with exertion.  Respiratory:  No cough or respiratory distress. No hemoptysis. No history of asthma or RAD.  GI:  No nausea, vomiting, diarrhea or abdominal pain.  :  No dysuria, frequency or burning.  MS:  No myalgia, muscle weakness, joint pain or back pain.  Neuro:  No headache or weakness.  No LOC.  Skin:  No skin rash.   Endocrine: No history of thyroid disease or diabetes.

## 2019-07-26 NOTE — ED PROVIDER NOTE - PROGRESS NOTE DETAILS
Pt tachycardic, anxious, will give valium. Will admit to Detox if medically cleared. Labs reviewed, pt , resitng comfortably, will admit to detox

## 2019-07-26 NOTE — ED PROVIDER NOTE - OBJECTIVE STATEMENT
39 y.o. F with PMH of ETOH abuse with chest pain and anxiety since 6 pm today. Pt drank a pint of vodka after she felt uncomfortable and anxious. deneis any drug use, no nausea/vomitting, no chills, no shakes, no headache, no vision changes. Pt denies HI or SI.

## 2019-07-27 LAB
HAV IGM SER-ACNC: SIGNIFICANT CHANGE UP
HBV CORE IGM SER-ACNC: SIGNIFICANT CHANGE UP
HBV SURFACE AG SER-ACNC: SIGNIFICANT CHANGE UP
HCV AB S/CO SERPL IA: 0.13 S/CO — SIGNIFICANT CHANGE UP (ref 0–0.99)
HCV AB SERPL-IMP: SIGNIFICANT CHANGE UP

## 2019-07-27 RX ORDER — TRAZODONE HCL 50 MG
100 TABLET ORAL AT BEDTIME
Refills: 0 | Status: DISCONTINUED | OUTPATIENT
Start: 2019-07-27 | End: 2019-07-28

## 2019-07-27 RX ORDER — DOLUTEGRAVIR SODIUM 25 MG/1
50 TABLET, FILM COATED ORAL DAILY
Refills: 0 | Status: DISCONTINUED | OUTPATIENT
Start: 2019-07-27 | End: 2019-07-27

## 2019-07-27 RX ORDER — RILPIVIRINE HYDROCHLORIDE 25 MG/1
25 TABLET, FILM COATED ORAL
Refills: 0 | Status: DISCONTINUED | OUTPATIENT
Start: 2019-07-27 | End: 2019-07-27

## 2019-07-27 RX ORDER — TRAZODONE HCL 50 MG
100 TABLET ORAL ONCE
Refills: 0 | Status: COMPLETED | OUTPATIENT
Start: 2019-07-27 | End: 2019-07-27

## 2019-07-27 RX ADMIN — Medication 50 MILLIGRAM(S): at 23:25

## 2019-07-27 RX ADMIN — Medication 50 MILLIGRAM(S): at 16:48

## 2019-07-27 RX ADMIN — Medication 1 TABLET(S): at 09:56

## 2019-07-27 RX ADMIN — Medication 100 MILLIGRAM(S): at 23:25

## 2019-07-27 RX ADMIN — PANTOPRAZOLE SODIUM 40 MILLIGRAM(S): 20 TABLET, DELAYED RELEASE ORAL at 05:59

## 2019-07-27 RX ADMIN — GABAPENTIN 300 MILLIGRAM(S): 400 CAPSULE ORAL at 13:49

## 2019-07-27 RX ADMIN — Medication 1 MILLIGRAM(S): at 09:56

## 2019-07-27 RX ADMIN — Medication 100 MILLIGRAM(S): at 09:55

## 2019-07-27 RX ADMIN — GABAPENTIN 300 MILLIGRAM(S): 400 CAPSULE ORAL at 09:56

## 2019-07-27 RX ADMIN — Medication 50 MILLIGRAM(S): at 09:57

## 2019-07-27 RX ADMIN — GABAPENTIN 300 MILLIGRAM(S): 400 CAPSULE ORAL at 21:08

## 2019-07-27 RX ADMIN — Medication 100 MILLIGRAM(S): at 00:05

## 2019-07-27 RX ADMIN — Medication 2 MILLIGRAM(S): at 10:10

## 2019-07-28 VITALS
SYSTOLIC BLOOD PRESSURE: 101 MMHG | HEART RATE: 78 BPM | RESPIRATION RATE: 14 BRPM | DIASTOLIC BLOOD PRESSURE: 56 MMHG | TEMPERATURE: 97 F

## 2019-07-28 RX ADMIN — Medication 50 MILLIGRAM(S): at 08:44

## 2019-07-28 RX ADMIN — Medication 400 MILLIGRAM(S): at 08:42

## 2019-07-28 RX ADMIN — Medication 1 MILLIGRAM(S): at 08:42

## 2019-07-28 RX ADMIN — Medication 1 TABLET(S): at 08:42

## 2019-07-28 RX ADMIN — GABAPENTIN 300 MILLIGRAM(S): 400 CAPSULE ORAL at 08:42

## 2019-07-28 RX ADMIN — Medication 100 MILLIGRAM(S): at 08:42

## 2019-07-28 NOTE — CHART NOTE - NSCHARTNOTEFT_GEN_A_CORE
Allergies:  Risperdal (Urticaria)  Topamax (Urticaria)      Diet: Regular    Activity: As Tolerated    Follow up with    1. PMD in 2 weeks    2. Psych in 2 weeks      Follow up for abnormal labs/tests    1.    Extra Instructions:      Flu Vaccine given  Yes_____         No______      Diagnosis:  Chemical Dependency   Maintain sobriety  refrain from all use      Patient Signature___________________________________________  Date_________________      Nurse Signature_____________________________________________Date_________________

## 2019-07-28 NOTE — CHART NOTE - NSCHARTNOTEFT_GEN_A_CORE
The patient was admitted to the inpt detox unit CDU, for   ETOH_x___ Opioid___  Benzo____Polysubstance _____ Dependency.    Pt was admitted from ED__x__, Intake____, Med/surg Floor_______.    Details are present in the preceding History & Physical section and follow up chart notes.  patient was evaluated on daily detox team  rounds.  Withdrawal symptoms and signs were reviewed on a daily basis, and the protocols were adjusted accordingly.    Labs and imaging results were reviewed and discussed with the patient.    All questions from the patient were addressed.  The patient was seen by the Chemical dependency counselors, and different options for after care were discussed.  The patient attended groups, meetings and 1:1 sessions with the counselors.  Narcane Kit was offered and instructions given prior to discharge.    Psychiatry consultation reviewed______, N/A___x___    Physical therapy evaluation reviewed_____, N/A__x__    Pt was given copies of labs and imaging reports, if applicable.    Prescriptions if needed, were sent through [employer] system to the pharmacy amnd are noted in the discharge instruction sheet.    After care was arranged by counselors and pt was discharged to:    Home___, Outpt. Program_x, Rehab ___, Long term____ [facility] ____ [facility]____ SNF____, AMA___, Admin Discharge____    Principal Diagnosis: Alcohol Dependency__x__ Opioid Dependency___ Benzo Dependency____ Polysubstance Dependency____

## 2019-07-28 NOTE — CHART NOTE - NSCHARTNOTEFT_GEN_A_CORE
Subsequent Inpatient Encounter                                       Detox Unit    AURELIA BLANCAS   39y   Female      Chief Complaint:    Follow up for Alcohol  Dependency    HPI:     I reviewed previous notes. No Change, except if noted below.             Detail:_    ROS:   I reviewed with patient.  No changes from previous notes except if noted below.             Detail: _    PFSH I reviewed with patient. No changes from previous notes except if noted below.             Detail_    Medication reconciliation performed.    MEDICATIONS  (STANDING):  folic acid 1 milliGRAM(s) Oral daily  gabapentin 300 milliGRAM(s) Oral three times a day  multivitamin/minerals 1 Tablet(s) Oral daily  nicotine - 21 mG/24Hr(s) Patch 1 Patch Transdermal daily  thiamine 100 milliGRAM(s) Oral daily  traZODone 100 milliGRAM(s) Oral at bedtime      MEDICATIONS  (PRN):  acetaminophen   Tablet .. 650 milliGRAM(s) Oral every 4 hours PRN Temp greater or equal to 38C (100.4F), Mild Pain (1 - 3)  aluminum hydroxide/magnesium hydroxide/simethicone Suspension 30 milliLiter(s) Oral every 6 hours PRN Heartburn  bismuth subsalicylate Liquid 30 milliLiter(s) Oral every 6 hours PRN Diarrhea  cloNIDine 0.1 milliGRAM(s) Oral every 8 hours PRN Blood Pressure GREATER THAN 140/90 mmHG  guaiFENesin/dextromethorphan  Syrup 5 milliLiter(s) Oral every 4 hours PRN Cough  hydrOXYzine hydrochloride 50 milliGRAM(s) Oral every 6 hours PRN Anxiety  hydrOXYzine hydrochloride 100 milliGRAM(s) Oral at bedtime PRN insomnia  ibuprofen  Tablet. 400 milliGRAM(s) Oral every 6 hours PRN Mild Pain (1 - 3)  LORazepam     Tablet 2 milliGRAM(s) Oral every 2 hours PRN CIWA-Ar score  8 - 15  LORazepam     Tablet 4 milliGRAM(s) Oral every 1 hour PRN CIWA-Ar score GREATER THAN 15  magnesium hydroxide Suspension 30 milliLiter(s) Oral once PRN Constipation  methocarbamol 500 milliGRAM(s) Oral every 6 hours PRN muscle pain  pseudoephedrine 60 milliGRAM(s) Oral every 6 hours PRN Rhinitis  trimethobenzamide 300 milliGRAM(s) Oral every 6 hours PRN Nausea and/or Vomiting  trimethobenzamide Injectable 200 milliGRAM(s) IntraMuscular every 6 hours PRN Nausea and/or Vomiting      T(C): 36.3 (07-28-19 @ 06:08), Max: 36.4 (07-27-19 @ 18:03)  HR: 78 (07-28-19 @ 06:08) (67 - 96)  BP: 101/56 (07-28-19 @ 06:08) (94/53 - 129/83)  RR: 14 (07-28-19 @ 06:08) (14 - 16)  SpO2: --    PHYSICAL EXAM:      Constitutional: NAD, A&O x3    Eyes: PERRLA, no conjuctivitis    Neck: no lymphadenopathy    Respiratory: +air entry, no rales, no rhonchi, no wheezes    Cardiovascular: +S1 and S2, regular rate and rhythm    Gastrointestinal: +BS, soft, non-tender, not distended    Extremities:  no edema, no calf tenderness    Skin: no rashes, normal turgor        Magnesium, Serum: 1.9 mg/dL (07-26-19 @ 00:20)  Ammonia, Serum: 47 umol/L (07-26-19 @ 00:20)  Hepatitis B Surface Antigen: Nonreact (07-26-19 @ 00:20)  Hepatitis C Virus S/CO Ratio: 0.13 S/CO (07-26-19 @ 00:20)    Hepatitis C Virus Interpretation: Nonreact (07-26-19 @ 00:20)        Impression and Plan:    Primary Diagnosis:  Alcohol Dependency                                Medication: Ativan CIWA Protocol    Secondary Diagnosis:   Anxiety DO                                          Medication: Gabapentin        Continue Detox Protocols. Use of PRNS as needed for withdrawal and comfort.    Adjustments to protocols: pt requesting to leave today and cleared for dc home.    Labs/ Tests reviewed.    Tests ordered: none    Likely Disposition: __x_Home       ___Rehab       ___Outpatient Program    ___Self Help     _____Other    Estimated Length of stay:____0 days

## 2019-08-01 ENCOUNTER — OUTPATIENT (OUTPATIENT)
Dept: OUTPATIENT SERVICES | Facility: HOSPITAL | Age: 39
LOS: 1 days | Discharge: HOME | End: 2019-08-01

## 2019-08-01 DIAGNOSIS — F11.20 OPIOID DEPENDENCE, UNCOMPLICATED: ICD-10-CM

## 2019-08-01 DIAGNOSIS — Z98.891 HISTORY OF UTERINE SCAR FROM PREVIOUS SURGERY: Chronic | ICD-10-CM

## 2019-08-01 DIAGNOSIS — Z98.890 OTHER SPECIFIED POSTPROCEDURAL STATES: Chronic | ICD-10-CM

## 2019-08-02 ENCOUNTER — OUTPATIENT (OUTPATIENT)
Dept: OUTPATIENT SERVICES | Facility: HOSPITAL | Age: 39
LOS: 1 days | Discharge: HOME | End: 2019-08-02

## 2019-08-02 DIAGNOSIS — F11.20 OPIOID DEPENDENCE, UNCOMPLICATED: ICD-10-CM

## 2019-08-02 DIAGNOSIS — Z98.891 HISTORY OF UTERINE SCAR FROM PREVIOUS SURGERY: Chronic | ICD-10-CM

## 2019-08-02 DIAGNOSIS — F10.230 ALCOHOL DEPENDENCE WITH WITHDRAWAL, UNCOMPLICATED: ICD-10-CM

## 2019-08-02 DIAGNOSIS — F32.9 MAJOR DEPRESSIVE DISORDER, SINGLE EPISODE, UNSPECIFIED: ICD-10-CM

## 2019-08-02 DIAGNOSIS — E78.5 HYPERLIPIDEMIA, UNSPECIFIED: ICD-10-CM

## 2019-08-02 DIAGNOSIS — Z98.890 OTHER SPECIFIED POSTPROCEDURAL STATES: Chronic | ICD-10-CM

## 2019-08-02 DIAGNOSIS — Z88.8 ALLERGY STATUS TO OTHER DRUGS, MEDICAMENTS AND BIOLOGICAL SUBSTANCES STATUS: ICD-10-CM

## 2019-08-02 DIAGNOSIS — F41.9 ANXIETY DISORDER, UNSPECIFIED: ICD-10-CM

## 2019-08-02 DIAGNOSIS — F10.20 ALCOHOL DEPENDENCE, UNCOMPLICATED: ICD-10-CM

## 2019-08-04 ENCOUNTER — TRANSCRIPTION ENCOUNTER (OUTPATIENT)
Age: 39
End: 2019-08-04

## 2019-08-08 ENCOUNTER — INPATIENT (INPATIENT)
Facility: HOSPITAL | Age: 39
LOS: 2 days | Discharge: HOME | End: 2019-08-11
Attending: INTERNAL MEDICINE | Admitting: INTERNAL MEDICINE
Payer: MEDICAID

## 2019-08-08 VITALS
OXYGEN SATURATION: 95 % | HEART RATE: 98 BPM | RESPIRATION RATE: 18 BRPM | DIASTOLIC BLOOD PRESSURE: 68 MMHG | TEMPERATURE: 98 F | SYSTOLIC BLOOD PRESSURE: 102 MMHG

## 2019-08-08 DIAGNOSIS — Z98.890 OTHER SPECIFIED POSTPROCEDURAL STATES: Chronic | ICD-10-CM

## 2019-08-08 DIAGNOSIS — Z98.891 HISTORY OF UTERINE SCAR FROM PREVIOUS SURGERY: Chronic | ICD-10-CM

## 2019-08-08 DIAGNOSIS — F10.10 ALCOHOL ABUSE, UNCOMPLICATED: ICD-10-CM

## 2019-08-08 LAB
ALBUMIN SERPL ELPH-MCNC: 4 G/DL — SIGNIFICANT CHANGE UP (ref 3.5–5.2)
ALP SERPL-CCNC: 106 U/L — SIGNIFICANT CHANGE UP (ref 30–115)
ALT FLD-CCNC: 329 U/L — HIGH (ref 0–41)
AMMONIA BLD-MCNC: 31 UMOL/L — SIGNIFICANT CHANGE UP (ref 11–55)
AMPHET UR-MCNC: NEGATIVE — SIGNIFICANT CHANGE UP
AMYLASE P1 CFR SERPL: 38 U/L — SIGNIFICANT CHANGE UP (ref 25–115)
ANION GAP SERPL CALC-SCNC: 16 MMOL/L — HIGH (ref 7–14)
APAP SERPL-MCNC: <5 UG/ML — LOW (ref 10–30)
APPEARANCE UR: ABNORMAL
AST SERPL-CCNC: 180 U/L — HIGH (ref 0–41)
BACTERIA # UR AUTO: ABNORMAL
BARBITURATES UR SCN-MCNC: NEGATIVE — SIGNIFICANT CHANGE UP
BASOPHILS # BLD AUTO: 0.08 K/UL — SIGNIFICANT CHANGE UP (ref 0–0.2)
BASOPHILS NFR BLD AUTO: 1.4 % — HIGH (ref 0–1)
BENZODIAZ UR-MCNC: NEGATIVE — SIGNIFICANT CHANGE UP
BILIRUB SERPL-MCNC: 0.2 MG/DL — SIGNIFICANT CHANGE UP (ref 0.2–1.2)
BILIRUB UR-MCNC: NEGATIVE — SIGNIFICANT CHANGE UP
BUN SERPL-MCNC: 4 MG/DL — LOW (ref 10–20)
CALCIUM SERPL-MCNC: 8.5 MG/DL — SIGNIFICANT CHANGE UP (ref 8.5–10.1)
CHLORIDE SERPL-SCNC: 101 MMOL/L — SIGNIFICANT CHANGE UP (ref 98–110)
CO2 SERPL-SCNC: 24 MMOL/L — SIGNIFICANT CHANGE UP (ref 17–32)
COCAINE METAB.OTHER UR-MCNC: NEGATIVE — SIGNIFICANT CHANGE UP
COLOR SPEC: YELLOW — SIGNIFICANT CHANGE UP
CREAT SERPL-MCNC: 0.5 MG/DL — LOW (ref 0.7–1.5)
DIFF PNL FLD: ABNORMAL
DRUG SCREEN 1, URINE RESULT: SIGNIFICANT CHANGE UP
EOSINOPHIL # BLD AUTO: 0.13 K/UL — SIGNIFICANT CHANGE UP (ref 0–0.7)
EOSINOPHIL NFR BLD AUTO: 2.4 % — SIGNIFICANT CHANGE UP (ref 0–8)
EPI CELLS # UR: ABNORMAL /HPF
ETHANOL SERPL-MCNC: 317 MG/DL — HIGH
GLUCOSE SERPL-MCNC: 100 MG/DL — HIGH (ref 70–99)
GLUCOSE UR QL: NEGATIVE MG/DL — SIGNIFICANT CHANGE UP
HCG UR QL: NEGATIVE — SIGNIFICANT CHANGE UP
HCT VFR BLD CALC: 42.2 % — SIGNIFICANT CHANGE UP (ref 37–47)
HGB BLD-MCNC: 14.4 G/DL — SIGNIFICANT CHANGE UP (ref 12–16)
IMM GRANULOCYTES NFR BLD AUTO: 0.5 % — HIGH (ref 0.1–0.3)
KETONES UR-MCNC: NEGATIVE — SIGNIFICANT CHANGE UP
LEUKOCYTE ESTERASE UR-ACNC: NEGATIVE — SIGNIFICANT CHANGE UP
LYMPHOCYTES # BLD AUTO: 2.33 K/UL — SIGNIFICANT CHANGE UP (ref 1.2–3.4)
LYMPHOCYTES # BLD AUTO: 42.1 % — SIGNIFICANT CHANGE UP (ref 20.5–51.1)
MCHC RBC-ENTMCNC: 31.7 PG — HIGH (ref 27–31)
MCHC RBC-ENTMCNC: 34.1 G/DL — SIGNIFICANT CHANGE UP (ref 32–37)
MCV RBC AUTO: 93 FL — SIGNIFICANT CHANGE UP (ref 81–99)
METHADONE UR-MCNC: NEGATIVE — SIGNIFICANT CHANGE UP
MONOCYTES # BLD AUTO: 0.61 K/UL — HIGH (ref 0.1–0.6)
MONOCYTES NFR BLD AUTO: 11 % — HIGH (ref 1.7–9.3)
NEUTROPHILS # BLD AUTO: 2.35 K/UL — SIGNIFICANT CHANGE UP (ref 1.4–6.5)
NEUTROPHILS NFR BLD AUTO: 42.6 % — SIGNIFICANT CHANGE UP (ref 42.2–75.2)
NITRITE UR-MCNC: NEGATIVE — SIGNIFICANT CHANGE UP
NRBC # BLD: 0 /100 WBCS — SIGNIFICANT CHANGE UP (ref 0–0)
OPIATES UR-MCNC: NEGATIVE — SIGNIFICANT CHANGE UP
PCP UR-MCNC: NEGATIVE — SIGNIFICANT CHANGE UP
PH UR: 6.5 — SIGNIFICANT CHANGE UP (ref 5–8)
PLATELET # BLD AUTO: 342 K/UL — SIGNIFICANT CHANGE UP (ref 130–400)
POTASSIUM SERPL-MCNC: 3.3 MMOL/L — LOW (ref 3.5–5)
POTASSIUM SERPL-SCNC: 3.3 MMOL/L — LOW (ref 3.5–5)
PROPOXYPHENE QUALITATIVE URINE RESULT: NEGATIVE — SIGNIFICANT CHANGE UP
PROT SERPL-MCNC: 6.8 G/DL — SIGNIFICANT CHANGE UP (ref 6–8)
PROT UR-MCNC: NEGATIVE MG/DL — SIGNIFICANT CHANGE UP
RBC # BLD: 4.54 M/UL — SIGNIFICANT CHANGE UP (ref 4.2–5.4)
RBC # FLD: 13.5 % — SIGNIFICANT CHANGE UP (ref 11.5–14.5)
RBC CASTS # UR COMP ASSIST: ABNORMAL /HPF
SALICYLATES SERPL-MCNC: <0.3 MG/DL — LOW (ref 4–30)
SODIUM SERPL-SCNC: 141 MMOL/L — SIGNIFICANT CHANGE UP (ref 135–146)
SP GR SPEC: 1.01 — SIGNIFICANT CHANGE UP (ref 1.01–1.03)
T PALLIDUM AB TITR SER: NEGATIVE — SIGNIFICANT CHANGE UP
THC UR QL: NEGATIVE — SIGNIFICANT CHANGE UP
UROBILINOGEN FLD QL: 0.2 MG/DL — SIGNIFICANT CHANGE UP (ref 0.2–0.2)
WBC # BLD: 5.53 K/UL — SIGNIFICANT CHANGE UP (ref 4.8–10.8)
WBC # FLD AUTO: 5.53 K/UL — SIGNIFICANT CHANGE UP (ref 4.8–10.8)
WBC UR QL: SIGNIFICANT CHANGE UP /HPF

## 2019-08-08 PROCEDURE — 99284 EMERGENCY DEPT VISIT MOD MDM: CPT

## 2019-08-08 PROCEDURE — 72125 CT NECK SPINE W/O DYE: CPT | Mod: 26

## 2019-08-08 PROCEDURE — 99221 1ST HOSP IP/OBS SF/LOW 40: CPT

## 2019-08-08 PROCEDURE — 70450 CT HEAD/BRAIN W/O DYE: CPT | Mod: 26

## 2019-08-08 RX ORDER — DILTIAZEM HCL 120 MG
30 CAPSULE, EXT RELEASE 24 HR ORAL
Refills: 0 | Status: DISCONTINUED | OUTPATIENT
Start: 2019-08-08 | End: 2019-08-08

## 2019-08-08 RX ORDER — HYDROXYZINE HCL 10 MG
100 TABLET ORAL AT BEDTIME
Refills: 0 | Status: DISCONTINUED | OUTPATIENT
Start: 2019-08-08 | End: 2019-08-11

## 2019-08-08 RX ORDER — MAGNESIUM HYDROXIDE 400 MG/1
30 TABLET, CHEWABLE ORAL ONCE
Refills: 0 | Status: DISCONTINUED | OUTPATIENT
Start: 2019-08-08 | End: 2019-08-11

## 2019-08-08 RX ORDER — PSEUDOEPHEDRINE HCL 30 MG
60 TABLET ORAL EVERY 6 HOURS
Refills: 0 | Status: DISCONTINUED | OUTPATIENT
Start: 2019-08-08 | End: 2019-08-11

## 2019-08-08 RX ORDER — POTASSIUM CHLORIDE 20 MEQ
40 PACKET (EA) ORAL ONCE
Refills: 0 | Status: COMPLETED | OUTPATIENT
Start: 2019-08-08 | End: 2019-08-08

## 2019-08-08 RX ORDER — NICOTINE POLACRILEX 2 MG
1 GUM BUCCAL DAILY
Refills: 0 | Status: DISCONTINUED | OUTPATIENT
Start: 2019-08-08 | End: 2019-08-11

## 2019-08-08 RX ORDER — FOLIC ACID 0.8 MG
1 TABLET ORAL DAILY
Refills: 0 | Status: DISCONTINUED | OUTPATIENT
Start: 2019-08-08 | End: 2019-08-11

## 2019-08-08 RX ORDER — METHOCARBAMOL 500 MG/1
500 TABLET, FILM COATED ORAL EVERY 6 HOURS
Refills: 0 | Status: DISCONTINUED | OUTPATIENT
Start: 2019-08-08 | End: 2019-08-11

## 2019-08-08 RX ORDER — TRAZODONE HCL 50 MG
50 TABLET ORAL AT BEDTIME
Refills: 0 | Status: DISCONTINUED | OUTPATIENT
Start: 2019-08-08 | End: 2019-08-11

## 2019-08-08 RX ORDER — GUAIFENESIN/DEXTROMETHORPHAN 600MG-30MG
5 TABLET, EXTENDED RELEASE 12 HR ORAL EVERY 4 HOURS
Refills: 0 | Status: DISCONTINUED | OUTPATIENT
Start: 2019-08-08 | End: 2019-08-11

## 2019-08-08 RX ORDER — TUBERCULIN PURIFIED PROTEIN DERIVATIVE 5 [IU]/.1ML
5 INJECTION, SOLUTION INTRADERMAL ONCE
Refills: 0 | Status: DISCONTINUED | OUTPATIENT
Start: 2019-08-08 | End: 2019-08-11

## 2019-08-08 RX ORDER — MULTIVIT-MIN/FERROUS GLUCONATE 9 MG/15 ML
1 LIQUID (ML) ORAL DAILY
Refills: 0 | Status: DISCONTINUED | OUTPATIENT
Start: 2019-08-08 | End: 2019-08-11

## 2019-08-08 RX ORDER — THIAMINE MONONITRATE (VIT B1) 100 MG
100 TABLET ORAL DAILY
Refills: 0 | Status: COMPLETED | OUTPATIENT
Start: 2019-08-08 | End: 2019-08-10

## 2019-08-08 RX ORDER — IBUPROFEN 200 MG
400 TABLET ORAL EVERY 6 HOURS
Refills: 0 | Status: DISCONTINUED | OUTPATIENT
Start: 2019-08-08 | End: 2019-08-11

## 2019-08-08 RX ORDER — MIRTAZAPINE 45 MG/1
30 TABLET, ORALLY DISINTEGRATING ORAL AT BEDTIME
Refills: 0 | Status: DISCONTINUED | OUTPATIENT
Start: 2019-08-08 | End: 2019-08-11

## 2019-08-08 RX ORDER — PANTOPRAZOLE SODIUM 20 MG/1
40 TABLET, DELAYED RELEASE ORAL
Refills: 0 | Status: DISCONTINUED | OUTPATIENT
Start: 2019-08-08 | End: 2019-08-11

## 2019-08-08 RX ORDER — HYDROXYZINE HCL 10 MG
50 TABLET ORAL EVERY 6 HOURS
Refills: 0 | Status: DISCONTINUED | OUTPATIENT
Start: 2019-08-08 | End: 2019-08-11

## 2019-08-08 RX ADMIN — Medication 2 MILLIGRAM(S): at 20:12

## 2019-08-08 RX ADMIN — Medication 2 MILLIGRAM(S): at 16:14

## 2019-08-08 RX ADMIN — Medication 50 MILLIGRAM(S): at 20:12

## 2019-08-08 RX ADMIN — Medication 100 MILLIGRAM(S): at 10:08

## 2019-08-08 RX ADMIN — Medication 1 MILLIGRAM(S): at 12:07

## 2019-08-08 RX ADMIN — Medication 2 MILLIGRAM(S): at 14:10

## 2019-08-08 RX ADMIN — Medication 40 MILLIEQUIVALENT(S): at 12:07

## 2019-08-08 RX ADMIN — Medication 1 TABLET(S): at 10:08

## 2019-08-08 RX ADMIN — Medication 30 MILLIGRAM(S): at 12:07

## 2019-08-08 RX ADMIN — PANTOPRAZOLE SODIUM 40 MILLIGRAM(S): 20 TABLET, DELAYED RELEASE ORAL at 12:07

## 2019-08-08 RX ADMIN — Medication 2 MILLIGRAM(S): at 10:08

## 2019-08-08 RX ADMIN — Medication 2 MILLIGRAM(S): at 12:08

## 2019-08-08 NOTE — H&P ADULT - NSHPLABSRESULTS_GEN_ALL_CORE
14.4   5.53  )-----------( 342      ( 08 Aug 2019 02:35 )             42.2         141  |  101  |  4<L>  ----------------------------<  100<H>  3.3<L>   |  24  |  0.5<L>    Ca    8.5      08 Aug 2019 02:35    TPro  6.8  /  Alb  4.0  /  TBili  0.2  /  DBili  x   /  AST  180<H>  /  ALT  329<H>  /  AlkPhos  106            Urinalysis Basic - ( 08 Aug 2019 04:15 )    Color: Yellow / Appearance: Slightly Cloudy / S.010 / pH: x  Gluc: x / Ketone: Negative  / Bili: Negative / Urobili: 0.2 mg/dL   Blood: x / Protein: Negative mg/dL / Nitrite: Negative   Leuk Esterase: Negative / RBC: 6-10 /HPF / WBC 3-5 /HPF   Sq Epi: x / Non Sq Epi: Many /HPF / Bacteria: Moderate            CAPILLARY BLOOD GLUCOSE      < from: CT Cervical Spine No Cont (19 @ 05:31) >    IMPRESSION:    No acute fracture or significant subluxation of the cervical spine.           < end of copied text >

## 2019-08-08 NOTE — CHART NOTE - NSCHARTNOTEFT_GEN_A_CORE
pt states takes [id number] daily. Pt advised that the dosing for cardizem is q6h and if taken once a day the starting dose would be 120mg. The bp and hr is normal. will dc the cardizem. Advised pt to f/u with prescribing doctor and review indications for the medication and review recommended dosing of the medication. For htn it should be cd 120mg daily. cardizem 30mgdaily would result in bp control for only 6 hours a day and hence not recommended.

## 2019-08-08 NOTE — ED PROVIDER NOTE - OBJECTIVE STATEMENT
39 y.o. F with PMH of ETOH abuse. Pt drank a pint of vodka. deneis any drug use, no nausea/vomitting, no chills, no shakes, no headache, no vision changes. Pt denies HI or SI. wants detox.

## 2019-08-08 NOTE — ED PROVIDER NOTE - CLINICAL SUMMARY MEDICAL DECISION MAKING FREE TEXT BOX
Pt pw ETOH use. No Si No Hi. No signs of trauma no active withdrawal   pt later co headache  CT head negative.  admitted to detox

## 2019-08-08 NOTE — ED ADULT NURSE NOTE - NSIMPLEMENTINTERV_GEN_ALL_ED
Implemented All Universal Safety Interventions:  [place] to call system. Call bell, personal items and telephone within reach. Instruct patient to call for assistance. Room bathroom lighting operational. Non-slip footwear when patient is off stretcher. Physically safe environment: no spills, clutter or unnecessary equipment. Stretcher in lowest position, wheels locked, appropriate side rails in place.

## 2019-08-08 NOTE — H&P ADULT - PROBLEM SELECTOR PLAN 1
-Pain mgm  -Ativan ciwa  -Catapres 0.1mg q6hrs prn bp>140/90  -Atarax 100mg qhs prn insomnia  -Atarax 50mg q 6hrs prn anxiety

## 2019-08-08 NOTE — H&P ADULT - NSHPPHYSICALEXAM_GEN_ALL_CORE
Vital Signs Last 24 Hrs  T(C): 36.6 (08 Aug 2019 04:01), Max: 36.6 (08 Aug 2019 02:05)  T(F): 97.8 (08 Aug 2019 04:01), Max: 97.8 (08 Aug 2019 02:05)  HR: 88 (08 Aug 2019 04:01) (88 - 98)  BP: 110/86 (08 Aug 2019 04:01) (102/68 - 110/86)  BP(mean): --  RR: 18 (08 Aug 2019 04:01) (18 - 18)  SpO2: 95% (08 Aug 2019 04:01) (95% - 95%)      Constitutional: NAD, A&O x3    Eyes: PERRLA, no conjuctivitis    Neck: no lymphadenopathy    Respiratory: +air entry, no rales, no rhonchi, no wheezes    Cardiovascular: +S1 and S2, regular rate and rhythm    Gastrointestinal: +BS, soft, non-tender, not distended    Extremities:  no edema, no calf tenderness    Neurological: sensation intact, ROM equal B/L, CN II-XII intact    Skin: no rashes, normal turgor

## 2019-08-08 NOTE — ED ADULT NURSE REASSESSMENT NOTE - NS ED NURSE REASSESS COMMENT FT1
Pt just now verbalized that she believes she hit her head prior to arriving to hospital. Pt said she found herself on the floor when she woke up at home and now cannot lay on the back of her head and feels a bump. ANAM Mai notified; MD Hart notified; pt awaiting CT. Delay in transport to Detox.

## 2019-08-08 NOTE — H&P ADULT - HISTORY OF PRESENT ILLNESS
39 y.o. F with PMH of ETOH abuse. presents to the hospital for alcohol detox. Drinks 2 pints of vodka a day x 1 week. Last drink was yesterday. Denies any drug use, no N/V, no chills, no shakes, no headache, no vision changes. Pt denies HI or SI. Denies seizures with etoh withdrawal.    HIV (-) 2019  HEP C (-)  PPD (-)

## 2019-08-08 NOTE — H&P ADULT - ASSESSMENT
39 y.o. F with PMH of ETOH abuse. presents to the hospital for alcohol detox. Drinks 2 pints of vodka a day x 1 week. Last drink was yesterday. Denies any drug use, no N/V, no chills, no shakes, no headache, no vision changes. Pt denies HI or SI. Denies seizures with etoh withdrawal.    HIV (-) 2019  HEP C (-)  PPD (-

## 2019-08-08 NOTE — H&P ADULT - ATTENDING COMMENTS
Patient interviewed and examined.    Chart reviewed.    PA's H&P noted and modified, as appropriate.    Case discussed on team rounds    Following is my summary of the case.    Admitted for detox: from ___X_ED, ___Intake, ____Med/Surg Floor    Alcohol___X_   Opioid_____  Benzo___ Other_____    Substance amount, duration of use, last usage, and prior attempts at detox or rehabs, are outlined above in the H&P and discussed with patient.    Associated withdrawal symptoms presents.  Comorbid conditions noted. Chronic and Stable.    Past Medical Hx, Psych Hx, family Hx, Social Hx from H&P reviewed and NO changes.    Old medical record and medication Hx. Reviewed    Following items reviewed and addressed:  1. labs -  2. EKG  3. Imaging from PACs module    Examination: no change from PA's exam.    Place on following protocol  _____Medically Managed  __X__Medically Supervised    Ciwa__X___Librium taper____Ativan taper___Methadone taper___ Phenobarb taper____ Suboxone Induction____MMTP____    Narcan Kit Offered    Psych Consult __X__N/A  ___Ordered    Physical Therapy  ___X    ___  Ordered    Aftercare disposition to be addressed by counselors.    Estimated length of stay 3-5 days.

## 2019-08-09 LAB
HAV IGM SER-ACNC: SIGNIFICANT CHANGE UP
HBV CORE IGM SER-ACNC: SIGNIFICANT CHANGE UP
HBV SURFACE AG SER-ACNC: SIGNIFICANT CHANGE UP
HCV AB S/CO SERPL IA: 0.12 S/CO — SIGNIFICANT CHANGE UP (ref 0–0.99)
HCV AB SERPL-IMP: SIGNIFICANT CHANGE UP

## 2019-08-09 RX ADMIN — Medication 50 MILLIGRAM(S): at 21:24

## 2019-08-09 RX ADMIN — Medication 100 MILLIGRAM(S): at 09:04

## 2019-08-09 RX ADMIN — Medication 2 MILLIGRAM(S): at 18:14

## 2019-08-09 RX ADMIN — Medication 2 MILLIGRAM(S): at 14:28

## 2019-08-09 RX ADMIN — Medication 400 MILLIGRAM(S): at 21:24

## 2019-08-09 RX ADMIN — Medication 1 TABLET(S): at 09:04

## 2019-08-09 RX ADMIN — Medication 400 MILLIGRAM(S): at 22:20

## 2019-08-09 RX ADMIN — MIRTAZAPINE 30 MILLIGRAM(S): 45 TABLET, ORALLY DISINTEGRATING ORAL at 21:24

## 2019-08-09 RX ADMIN — Medication 50 MILLIGRAM(S): at 14:27

## 2019-08-09 RX ADMIN — Medication 100 MILLIGRAM(S): at 21:24

## 2019-08-09 RX ADMIN — Medication 1 MILLIGRAM(S): at 09:03

## 2019-08-09 RX ADMIN — Medication 2 MILLIGRAM(S): at 09:43

## 2019-08-09 RX ADMIN — Medication 50 MILLIGRAM(S): at 06:15

## 2019-08-09 RX ADMIN — PANTOPRAZOLE SODIUM 40 MILLIGRAM(S): 20 TABLET, DELAYED RELEASE ORAL at 06:17

## 2019-08-10 PROCEDURE — 99231 SBSQ HOSP IP/OBS SF/LOW 25: CPT

## 2019-08-10 RX ORDER — GABAPENTIN 400 MG/1
1 CAPSULE ORAL
Qty: 0 | Refills: 0 | DISCHARGE

## 2019-08-10 RX ORDER — DILTIAZEM HCL 120 MG
1 CAPSULE, EXT RELEASE 24 HR ORAL
Qty: 0 | Refills: 0 | DISCHARGE

## 2019-08-10 RX ADMIN — Medication 1 MILLIGRAM(S): at 09:05

## 2019-08-10 RX ADMIN — Medication 50 MILLIGRAM(S): at 20:10

## 2019-08-10 RX ADMIN — Medication 50 MILLIGRAM(S): at 06:28

## 2019-08-10 RX ADMIN — Medication 50 MILLIGRAM(S): at 20:35

## 2019-08-10 RX ADMIN — MIRTAZAPINE 30 MILLIGRAM(S): 45 TABLET, ORALLY DISINTEGRATING ORAL at 20:35

## 2019-08-10 RX ADMIN — Medication 100 MILLIGRAM(S): at 09:05

## 2019-08-10 RX ADMIN — Medication 400 MILLIGRAM(S): at 21:35

## 2019-08-10 RX ADMIN — PANTOPRAZOLE SODIUM 40 MILLIGRAM(S): 20 TABLET, DELAYED RELEASE ORAL at 06:24

## 2019-08-10 RX ADMIN — Medication 1 TABLET(S): at 09:05

## 2019-08-10 RX ADMIN — Medication 50 MILLIGRAM(S): at 00:32

## 2019-08-10 RX ADMIN — Medication 400 MILLIGRAM(S): at 20:34

## 2019-08-10 RX ADMIN — Medication 50 MILLIGRAM(S): at 13:45

## 2019-08-10 RX ADMIN — Medication 100 MILLIGRAM(S): at 22:47

## 2019-08-10 NOTE — CHART NOTE - NSCHARTNOTEFT_GEN_A_CORE
Subsequent Inpatient Encounter                                       Detox Unit    AURELIA BLANCAS   39y   Female      Chief Complaint:    Follow up for Alcohol  Dependency    HPI:     I reviewed previous notes. No Change, except if noted below.             Detail:_    ROS:   I reviewed with patient.  No changes from previous notes except if noted below.             Detail: _    PFSH I reviewed with patient. No changes from previous notes except if noted below.             Detail_    Medication reconciliation performed.    MEDICATIONS  (STANDING):  folic acid 1 milliGRAM(s) Oral daily  mirtazapine 30 milliGRAM(s) Oral at bedtime  multivitamin/minerals 1 Tablet(s) Oral daily  nicotine - 21 mG/24Hr(s) Patch 1 Patch Transdermal daily  pantoprazole    Tablet 40 milliGRAM(s) Oral before breakfast  PPD  5 Tuberculin Unit(s) Injectable 5 Unit(s) IntraDermal once  thiamine 100 milliGRAM(s) Oral daily  traZODone 50 milliGRAM(s) Oral at bedtime      MEDICATIONS  (PRN):  aluminum hydroxide/magnesium hydroxide/simethicone Suspension 30 milliLiter(s) Oral every 6 hours PRN Heartburn  bismuth subsalicylate Liquid 30 milliLiter(s) Oral every 6 hours PRN Diarrhea  cloNIDine 0.1 milliGRAM(s) Oral every 6 hours PRN Blood Pressure GREATER THAN 140/90 mmHG  guaiFENesin/dextromethorphan  Syrup 5 milliLiter(s) Oral every 4 hours PRN Cough  hydrOXYzine hydrochloride 50 milliGRAM(s) Oral every 6 hours PRN Anxiety  hydrOXYzine hydrochloride 100 milliGRAM(s) Oral at bedtime PRN insomnia  ibuprofen  Tablet. 400 milliGRAM(s) Oral every 6 hours PRN Mild Pain (1 - 3)  LORazepam     Tablet 2 milliGRAM(s) Oral every 2 hours PRN CIWA-Ar score  8 - 15  LORazepam     Tablet 4 milliGRAM(s) Oral every 1 hour PRN CIWA-Ar score GREATER THAN 15  magnesium hydroxide Suspension 30 milliLiter(s) Oral once PRN Constipation  methocarbamol 500 milliGRAM(s) Oral every 6 hours PRN muscle pain  pseudoephedrine 60 milliGRAM(s) Oral every 6 hours PRN Rhinitis  trimethobenzamide 300 milliGRAM(s) Oral every 6 hours PRN Nausea and/or Vomiting  trimethobenzamide Injectable 200 milliGRAM(s) IntraMuscular every 6 hours PRN Nausea and/or Vomiting      T(C): 35.6 (08-10-19 @ 06:00), Max: 36.4 (08-09-19 @ 22:00)  HR: 94 (08-10-19 @ 06:00) (56 - 105)  BP: 107/63 (08-10-19 @ 06:00) (90/54 - 137/79)  RR: 14 (08-10-19 @ 06:00) (14 - 17)  SpO2: --    PHYSICAL EXAM:      Constitutional: NAD, A&O x3    Eyes: PERRLA, no conjuctivitis    Neck: no lymphadenopathy    Respiratory: +air entry, no rales, no rhonchi, no wheezes    Cardiovascular: +S1 and S2, regular rate and rhythm    Gastrointestinal: +BS, soft, non-tender, not distended    Extremities:  no edema, no calf tenderness    Skin: no rashes, normal turgor            Ammonia, Serum: 31 umol/L (08-08-19 @ 02:35)  Amylase, Serum Total: 38 U/L (08-08-19 @ 02:35)  Treponema Pallidum Antibody Interpretation: Negative (08-08-19 @ 02:35)  Hepatitis B Surface Antigen: Nonreact (08-08-19 @ 02:35)  Hepatitis C Virus S/CO Ratio: 0.12 S/CO (08-08-19 @ 02:35)    Hepatitis C Virus Interpretation: Nonreact (08-08-19 @ 02:35)      Drug Screen 1, Urine Result: Done (08-08-19 @ 04:15)        Impression and Plan:    Primary Diagnosis:  Alcohol Dependency                                Medication: Librium Protocol/ CIWA Protocol    Secondary Diagnosis:   Depression/insomnia                                                               Medication: on meds    Tertiary Diagnosis:         increase LFTs                                                              Medication f/u opd      Continue Detox Protocols. Use of PRNS as needed for withdrawal and comfort.    Adjustments to protocols:    Labs/ Tests reviewed.    Tests ordered:     Likely Disposition: _x__Home       ___Rehab       ___Outpatient Program    ___Self Help     _____Other    Estimated Length of stay:___3_

## 2019-08-10 NOTE — CHART NOTE - NSCHARTNOTEFT_GEN_A_CORE
Allergies:  Risperdal (Urticaria)  Topamax (Urticaria)      Diet: Regular    Activity: as tolerated    Follow up with    1. PMD in 2 weeks    2. Psych in 2 weeks    3.    Follow up for abnormal labs/tests    1. elevated LFTS    Extra Instructions:      Flu Vaccine given  Yes_____         No______      Diagnosis:  Chemical Dependency   Maintain sobriety  refrain from all use      Patient Signature___________________________________________  Date_________________      Nurse Signature_____________________________________________Date_________________

## 2019-08-11 VITALS
RESPIRATION RATE: 16 BRPM | SYSTOLIC BLOOD PRESSURE: 122 MMHG | TEMPERATURE: 97 F | HEART RATE: 54 BPM | DIASTOLIC BLOOD PRESSURE: 56 MMHG

## 2019-08-11 RX ADMIN — Medication 1 MILLIGRAM(S): at 08:08

## 2019-08-11 RX ADMIN — Medication 50 MILLIGRAM(S): at 08:31

## 2019-08-11 RX ADMIN — PANTOPRAZOLE SODIUM 40 MILLIGRAM(S): 20 TABLET, DELAYED RELEASE ORAL at 08:09

## 2019-08-11 RX ADMIN — Medication 1 TABLET(S): at 08:08

## 2019-08-11 NOTE — CHART NOTE - NSCHARTNOTEFT_GEN_A_CORE
Subsequent Inpatient Encounter                                       Detox Unit    AURELIA BLANCAS   39y   Female      Chief Complaint:    Follow up for Alcohol  Dependency    HPI:     I reviewed previous notes. No Change, except if noted below.             Detail:_    ROS:   I reviewed with patient.  No changes from previous notes except if noted below.             Detail: _    PFSH I reviewed with patient. No changes from previous notes except if noted below.             Detail_    Medication reconciliation performed.    MEDICATIONS  (STANDING):  folic acid 1 milliGRAM(s) Oral daily  mirtazapine 30 milliGRAM(s) Oral at bedtime  multivitamin/minerals 1 Tablet(s) Oral daily  nicotine - 21 mG/24Hr(s) Patch 1 Patch Transdermal daily  pantoprazole    Tablet 40 milliGRAM(s) Oral before breakfast  PPD  5 Tuberculin Unit(s) Injectable 5 Unit(s) IntraDermal once  traZODone 50 milliGRAM(s) Oral at bedtime      MEDICATIONS  (PRN):  aluminum hydroxide/magnesium hydroxide/simethicone Suspension 30 milliLiter(s) Oral every 6 hours PRN Heartburn  bismuth subsalicylate Liquid 30 milliLiter(s) Oral every 6 hours PRN Diarrhea  cloNIDine 0.1 milliGRAM(s) Oral every 6 hours PRN Blood Pressure GREATER THAN 140/90 mmHG  guaiFENesin/dextromethorphan  Syrup 5 milliLiter(s) Oral every 4 hours PRN Cough  hydrOXYzine hydrochloride 50 milliGRAM(s) Oral every 6 hours PRN Anxiety  hydrOXYzine hydrochloride 100 milliGRAM(s) Oral at bedtime PRN insomnia  ibuprofen  Tablet. 400 milliGRAM(s) Oral every 6 hours PRN Mild Pain (1 - 3)  LORazepam     Tablet 2 milliGRAM(s) Oral every 2 hours PRN CIWA-Ar score  8 - 15  LORazepam     Tablet 4 milliGRAM(s) Oral every 1 hour PRN CIWA-Ar score GREATER THAN 15  magnesium hydroxide Suspension 30 milliLiter(s) Oral once PRN Constipation  methocarbamol 500 milliGRAM(s) Oral every 6 hours PRN muscle pain  pseudoephedrine 60 milliGRAM(s) Oral every 6 hours PRN Rhinitis  trimethobenzamide 300 milliGRAM(s) Oral every 6 hours PRN Nausea and/or Vomiting  trimethobenzamide Injectable 200 milliGRAM(s) IntraMuscular every 6 hours PRN Nausea and/or Vomiting      T(C): 35.9 (08-11-19 @ 06:37), Max: 36.8 (08-11-19 @ 00:00)  HR: 54 (08-11-19 @ 06:37) (54 - 104)  BP: 122/56 (08-11-19 @ 06:37) (111/59 - 132/72)  RR: 16 (08-11-19 @ 06:37) (14 - 18)  SpO2: --    PHYSICAL EXAM:      Constitutional: NAD, A&O x3    Eyes: PERRLA, no conjuctivitis    Neck: no lymphadenopathy    Respiratory: +air entry, no rales, no rhonchi, no wheezes    Cardiovascular: +S1 and S2, regular rate and rhythm    Gastrointestinal: +BS, soft, non-tender, not distended    Extremities:  no edema, no calf tenderness    Skin: no rashes, normal turgor      Impression and Plan:    Primary Diagnosis:  Alcohol Dependency                                Medication: Ativan CIWA Protocol    Secondary Diagnosis:   Depression/insomnia                          Medication: on meds    Tertiary Diagnosis:         increased LFTs                                    Medication f/u opd      Continue Detox Protocols. Use of PRNS as needed for withdrawal and comfort.    Adjustments to protocols: DC today    Labs/ Tests reviewed.    Tests ordered:     Likely Disposition: ___Home       ___Rehab       ___Outpatient Program    ___Self Help     _____Other    Estimated Length of stay:____0 days

## 2019-08-15 DIAGNOSIS — K76.0 FATTY (CHANGE OF) LIVER, NOT ELSEWHERE CLASSIFIED: ICD-10-CM

## 2019-08-15 DIAGNOSIS — K21.9 GASTRO-ESOPHAGEAL REFLUX DISEASE WITHOUT ESOPHAGITIS: ICD-10-CM
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
## 2019-08-15 DIAGNOSIS — F17.210 NICOTINE DEPENDENCE, CIGARETTES, UNCOMPLICATED: ICD-10-CM

## 2019-08-15 DIAGNOSIS — E78.5 HYPERLIPIDEMIA, UNSPECIFIED: ICD-10-CM

## 2019-08-15 DIAGNOSIS — F41.9 ANXIETY DISORDER, UNSPECIFIED: ICD-10-CM

## 2019-08-15 DIAGNOSIS — E05.00 THYROTOXICOSIS WITH DIFFUSE GOITER WITHOUT THYROTOXIC CRISIS OR STORM: ICD-10-CM

## 2019-08-15 DIAGNOSIS — F10.20 ALCOHOL DEPENDENCE, UNCOMPLICATED: ICD-10-CM

## 2019-08-15 DIAGNOSIS — I34.1 NONRHEUMATIC MITRAL (VALVE) PROLAPSE: ICD-10-CM

## 2019-08-15 DIAGNOSIS — E66.9 OBESITY, UNSPECIFIED: ICD-10-CM

## 2019-08-20 ENCOUNTER — APPOINTMENT (OUTPATIENT)
Dept: SURGERY | Facility: CLINIC | Age: 39
End: 2019-08-20

## 2019-08-28 ENCOUNTER — OUTPATIENT (OUTPATIENT)
Dept: OUTPATIENT SERVICES | Facility: HOSPITAL | Age: 39
LOS: 1 days | Discharge: HOME | End: 2019-08-28

## 2019-08-28 DIAGNOSIS — Z98.891 HISTORY OF UTERINE SCAR FROM PREVIOUS SURGERY: Chronic | ICD-10-CM

## 2019-08-28 DIAGNOSIS — F11.20 OPIOID DEPENDENCE, UNCOMPLICATED: ICD-10-CM

## 2019-08-28 DIAGNOSIS — Z98.890 OTHER SPECIFIED POSTPROCEDURAL STATES: Chronic | ICD-10-CM

## 2019-08-30 ENCOUNTER — OUTPATIENT (OUTPATIENT)
Dept: OUTPATIENT SERVICES | Facility: HOSPITAL | Age: 39
LOS: 1 days | Discharge: HOME | End: 2019-08-30

## 2019-08-30 DIAGNOSIS — Z98.891 HISTORY OF UTERINE SCAR FROM PREVIOUS SURGERY: Chronic | ICD-10-CM

## 2019-08-30 DIAGNOSIS — F11.20 OPIOID DEPENDENCE, UNCOMPLICATED: ICD-10-CM

## 2019-08-30 DIAGNOSIS — Z98.890 OTHER SPECIFIED POSTPROCEDURAL STATES: Chronic | ICD-10-CM

## 2019-09-11 NOTE — CHART NOTE - NSCHARTNOTEFT_GEN_A_CORE
The patient was admitted to the inpt detox unit CDU, for   ETOH__X__ Opioid___  Benzo____Polysubstance _____ Dependency.    Pt was admitted from ED___X_, Intake____, Med/surg Floor_______.    Details are present in the preceding History & Physical section and follow up chart notes.  Patient was evaluated on daily detox team  rounds.  Withdrawal symptoms and signs were reviewed on a daily basis, and the protocols were adjusted accordingly.    Labs and imaging results were reviewed and discussed with the patient.    All questions from the patient were addressed.  The patient was seen by the Chemical dependency counselors, and different options for after care were discussed.  The patient attended groups, meetings and 1:1 sessions with the counselors.  Narcane Kit was offered and instructions given prior to discharge.    Psychiatry consultation reviewed______, N/A____X__    Physical therapy evaluation reviewed_____, N/A__X__    Pt was given copies of labs and imaging reports, if applicable.    Prescriptions if needed, were sent through [employer] system to the pharmacy and are noted in the discharge instruction sheet.    After care was arranged by counselors and pt was discharged to:    Home___, Outpt. Program__X_, Rehab ___, Long term____ [facility] ____ [facility]____ SNF____, AMA___, Admin Discharge____    Principal Diagnosis: Alcohol Dependency__X__ Opioid Dependency___ Benzo Dependency____ Polysubstance Dependency____

## 2019-09-13 ENCOUNTER — OUTPATIENT (OUTPATIENT)
Dept: OUTPATIENT SERVICES | Facility: HOSPITAL | Age: 39
LOS: 1 days | Discharge: HOME | End: 2019-09-13

## 2019-09-13 DIAGNOSIS — F11.20 OPIOID DEPENDENCE, UNCOMPLICATED: ICD-10-CM

## 2019-09-13 DIAGNOSIS — Z98.891 HISTORY OF UTERINE SCAR FROM PREVIOUS SURGERY: Chronic | ICD-10-CM

## 2019-09-13 DIAGNOSIS — Z98.890 OTHER SPECIFIED POSTPROCEDURAL STATES: Chronic | ICD-10-CM

## 2019-09-20 ENCOUNTER — OUTPATIENT (OUTPATIENT)
Dept: OUTPATIENT SERVICES | Facility: HOSPITAL | Age: 39
LOS: 1 days | Discharge: HOME | End: 2019-09-20

## 2019-09-20 DIAGNOSIS — Z98.891 HISTORY OF UTERINE SCAR FROM PREVIOUS SURGERY: Chronic | ICD-10-CM

## 2019-09-20 DIAGNOSIS — F11.20 OPIOID DEPENDENCE, UNCOMPLICATED: ICD-10-CM

## 2019-09-20 DIAGNOSIS — Z98.890 OTHER SPECIFIED POSTPROCEDURAL STATES: Chronic | ICD-10-CM

## 2019-09-24 ENCOUNTER — OUTPATIENT (OUTPATIENT)
Dept: OUTPATIENT SERVICES | Facility: HOSPITAL | Age: 39
LOS: 1 days | Discharge: HOME | End: 2019-09-24

## 2019-09-24 DIAGNOSIS — Z98.891 HISTORY OF UTERINE SCAR FROM PREVIOUS SURGERY: Chronic | ICD-10-CM

## 2019-09-24 DIAGNOSIS — F11.20 OPIOID DEPENDENCE, UNCOMPLICATED: ICD-10-CM

## 2019-09-24 DIAGNOSIS — Z98.890 OTHER SPECIFIED POSTPROCEDURAL STATES: Chronic | ICD-10-CM

## 2019-09-27 ENCOUNTER — OUTPATIENT (OUTPATIENT)
Dept: OUTPATIENT SERVICES | Facility: HOSPITAL | Age: 39
LOS: 1 days | Discharge: HOME | End: 2019-09-27

## 2019-09-27 DIAGNOSIS — F11.20 OPIOID DEPENDENCE, UNCOMPLICATED: ICD-10-CM

## 2019-09-27 DIAGNOSIS — Z98.890 OTHER SPECIFIED POSTPROCEDURAL STATES: Chronic | ICD-10-CM

## 2019-09-27 DIAGNOSIS — Z98.891 HISTORY OF UTERINE SCAR FROM PREVIOUS SURGERY: Chronic | ICD-10-CM

## 2019-10-03 ENCOUNTER — OUTPATIENT (OUTPATIENT)
Dept: OUTPATIENT SERVICES | Facility: HOSPITAL | Age: 39
LOS: 1 days | Discharge: HOME | End: 2019-10-03
Payer: MEDICAID

## 2019-10-03 DIAGNOSIS — F11.20 OPIOID DEPENDENCE, UNCOMPLICATED: ICD-10-CM

## 2019-10-03 DIAGNOSIS — Z98.890 OTHER SPECIFIED POSTPROCEDURAL STATES: Chronic | ICD-10-CM

## 2019-10-03 DIAGNOSIS — Z98.891 HISTORY OF UTERINE SCAR FROM PREVIOUS SURGERY: Chronic | ICD-10-CM

## 2019-10-03 PROCEDURE — 90792 PSYCH DIAG EVAL W/MED SRVCS: CPT

## 2019-10-04 ENCOUNTER — OUTPATIENT (OUTPATIENT)
Dept: OUTPATIENT SERVICES | Facility: HOSPITAL | Age: 39
LOS: 1 days | Discharge: HOME | End: 2019-10-04

## 2019-10-04 DIAGNOSIS — Z98.891 HISTORY OF UTERINE SCAR FROM PREVIOUS SURGERY: Chronic | ICD-10-CM

## 2019-10-04 DIAGNOSIS — F11.20 OPIOID DEPENDENCE, UNCOMPLICATED: ICD-10-CM

## 2019-10-04 DIAGNOSIS — Z98.890 OTHER SPECIFIED POSTPROCEDURAL STATES: Chronic | ICD-10-CM

## 2019-10-17 NOTE — ED ADULT NURSE NOTE - PRO INTERPRETER NEED 2
PT MEDICATED PER ORDER. PT VERBALIZED UNDERSTANDING OF MEDICATION TEACHING. SEE
EMAR FOR DETAILS. English

## 2019-10-18 ENCOUNTER — OUTPATIENT (OUTPATIENT)
Dept: OUTPATIENT SERVICES | Facility: HOSPITAL | Age: 39
LOS: 1 days | Discharge: HOME | End: 2019-10-18

## 2019-10-18 DIAGNOSIS — F11.20 OPIOID DEPENDENCE, UNCOMPLICATED: ICD-10-CM

## 2019-10-18 DIAGNOSIS — Z98.891 HISTORY OF UTERINE SCAR FROM PREVIOUS SURGERY: Chronic | ICD-10-CM

## 2019-10-18 DIAGNOSIS — Z98.890 OTHER SPECIFIED POSTPROCEDURAL STATES: Chronic | ICD-10-CM

## 2019-10-22 ENCOUNTER — APPOINTMENT (OUTPATIENT)
Dept: SURGERY | Facility: CLINIC | Age: 39
End: 2019-10-22

## 2019-10-24 ENCOUNTER — EMERGENCY (EMERGENCY)
Facility: HOSPITAL | Age: 39
LOS: 0 days | Discharge: HOME | End: 2019-10-24
Attending: EMERGENCY MEDICINE | Admitting: EMERGENCY MEDICINE
Payer: MEDICAID

## 2019-10-24 VITALS
OXYGEN SATURATION: 96 % | RESPIRATION RATE: 18 BRPM | HEART RATE: 110 BPM | WEIGHT: 154.98 LBS | SYSTOLIC BLOOD PRESSURE: 125 MMHG | TEMPERATURE: 97 F | HEIGHT: 61 IN | DIASTOLIC BLOOD PRESSURE: 77 MMHG

## 2019-10-24 DIAGNOSIS — F10.20 ALCOHOL DEPENDENCE, UNCOMPLICATED: ICD-10-CM

## 2019-10-24 DIAGNOSIS — Z98.891 HISTORY OF UTERINE SCAR FROM PREVIOUS SURGERY: Chronic | ICD-10-CM

## 2019-10-24 DIAGNOSIS — Z88.8 ALLERGY STATUS TO OTHER DRUGS, MEDICAMENTS AND BIOLOGICAL SUBSTANCES STATUS: ICD-10-CM

## 2019-10-24 DIAGNOSIS — Z98.890 OTHER SPECIFIED POSTPROCEDURAL STATES: Chronic | ICD-10-CM

## 2019-10-24 PROCEDURE — 99282 EMERGENCY DEPT VISIT SF MDM: CPT

## 2019-10-24 NOTE — ED PROVIDER NOTE - OBJECTIVE STATEMENT
39 y.o F w/ PMhx EtOH abuse, fatty liver, graves disease, MVP requesting detox. Last drink was on the ambulance ride over to ED. Pt drinks 1 handle 1.75L every 2 days since Friday. NO hx of seizures, no SI/HI/AH/VH, no N/V, no tactile hallucinations. Denies other drugs on board tonight.

## 2019-10-24 NOTE — ED PROVIDER NOTE - CLINICAL SUMMARY MEDICAL DECISION MAKING FREE TEXT BOX
Pt pw ETOH use. No Si No Hi. No signs of trauma no active withdrawal  no  detox beds available - . Ambulating without ataxia, no slurred speech . No somatic complaints . No SI HI Pt not driving.  Given outpt detox info.

## 2019-10-24 NOTE — ED PROVIDER NOTE - NSFOLLOWUPINSTRUCTIONS_ED_ALL_ED_FT
ALCOHOL DEPENDENCE - General Information     Alcohol Dependence    WHAT YOU NEED TO KNOW:    What is alcohol dependence? Alcohol dependence is the need to drink alcohol often to function in your daily life. You often drink large amounts of alcohol. Alcohol dependence is also known as alcoholism or alcohol use disorder. Alcoholism is a disease that can affect almost every part of your body.    What behaviors are common with alcohol dependence?     You keep drinking alcohol even if you know it increases your risk for health problems. Health problems include liver problems, stomach ulcers, high blood pressure, and stroke.      You develop a tolerance for alcohol. Tolerance means the amount of alcohol you usually drink no longer causes the effects you desire. You may need to drink even more alcohol to get its previous effects.      You put extra effort and time into drinking alcohol. You may often go to events or activities that will include drinking. You may also spend much of your time drinking alcohol or being with people who also drink.      You have withdrawal (physical or mental) symptoms after not drinking for a short period. The same amount of alcohol may be needed to relieve or prevent withdrawal symptoms. You may also have to drink to stop tremors (shakes) or to cure a hangover.      You crave alcohol. You may have a desire to drink more frequently and to drink larger amounts of alcohol.      You have problems decreasing or controlling alcohol use. You are not able to control your drinking habits. You keep going back to drinking even after you quit.      You spend less time doing more important things. You have trouble with social or daily activities at school, work, or home.    What increases my risk for alcohol dependence?     Family history      Depression or anxiety      Other substance abuse      Childhood trauma      Posttraumatic stress disorder      Other disorders, such as antisocial personality disorder and bipolar disorder    How is alcohol dependence treated? Your healthcare provider may admit you to the hospital to make sure you withdraw safely. Then you may need any of the following:    Medicines to decrease your craving for alcohol      Support groups such as Alcoholics Anonymous       Psychiatrist or psychologist for therapy       Admission to an inpatient facility for treatment for severe dependence    Where can I get more information about alcohol dependence?       [employer] [place] on Alcoholism and Drug Dependence  [address] [place], [place] [id number]  Phone: 1-783.880.8376  Phone: 1-825.801.8159  Web Address: http://www.ncadd.org            Alcoholics Anonymous  Web Address: http://www.aa.org      CARE AGREEMENT:    You have the right to help plan your care. Learn about your health condition and how it may be treated. Discuss treatment options with your healthcare providers to decide what care you want to receive. You always have the right to refuse treatment

## 2019-10-24 NOTE — ED ADULT NURSE NOTE - NSIMPLEMENTINTERV_GEN_ALL_ED
Implemented All Universal Safety Interventions:  [place] to call system. Call bell, personal items and telephone within reach. Instruct patient to call for assistance. Room bathroom lighting operational. Non-slip footwear when patient is off stretcher. Physically safe environment: no spills, clutter or unnecessary equipment. Stretcher in lowest position, wheels locked, appropriate side rails in place.

## 2019-10-24 NOTE — ED PROVIDER NOTE - PHYSICAL EXAMINATION
CONSTITUTIONAL: in NAD  SKIN: Warm dry, normal skin turgor  HEAD: NCAT  EYES: EOMI, PERRLA, no scleral icterus, conjunctiva injected,   ENT: normal pharynx with no erythema or exudates  NECK: Supple; non tender. Full ROM.  CARD: RRR, no murmurs.  RESP: clear to ausculation b/l. No crackles or wheezing.  ABD: soft, non-tender, non-distended, no rebound or guarding.  EXT: Full ROM, no pedal edema, no calf tenderness  NEURO: moving all extremities.  PSYCH: Cooperative, appropriate.

## 2019-10-24 NOTE — ED PROVIDER NOTE - PATIENT PORTAL LINK FT
You can access the FollowMyHealth Patient Portal offered by [facility] by registering at the following website: [email or web address]. By joining [employer]’s FollowMyHealth portal, you will also be able to view your health information using other applications (apps) compatible with our system.

## 2019-10-29 NOTE — ED PROVIDER NOTE - PR
128 Text: The above diagnosis and findings were noted on the exam but not discussed at this time with the patient. Detail Level: Zone

## 2019-10-31 ENCOUNTER — TRANSCRIPTION ENCOUNTER (OUTPATIENT)
Age: 39
End: 2019-10-31

## 2019-12-01 ENCOUNTER — INPATIENT (INPATIENT)
Facility: HOSPITAL | Age: 39
LOS: 2 days | Discharge: HOME | End: 2019-12-04
Attending: INTERNAL MEDICINE | Admitting: INTERNAL MEDICINE
Payer: MEDICAID

## 2019-12-01 VITALS
RESPIRATION RATE: 20 BRPM | SYSTOLIC BLOOD PRESSURE: 146 MMHG | DIASTOLIC BLOOD PRESSURE: 86 MMHG | WEIGHT: 169.98 LBS | HEART RATE: 105 BPM | TEMPERATURE: 98 F | HEIGHT: 65 IN | OXYGEN SATURATION: 99 %

## 2019-12-01 DIAGNOSIS — F10.929 ALCOHOL USE, UNSPECIFIED WITH INTOXICATION, UNSPECIFIED: ICD-10-CM

## 2019-12-01 DIAGNOSIS — Z98.890 OTHER SPECIFIED POSTPROCEDURAL STATES: Chronic | ICD-10-CM

## 2019-12-01 DIAGNOSIS — Z98.891 HISTORY OF UTERINE SCAR FROM PREVIOUS SURGERY: Chronic | ICD-10-CM

## 2019-12-01 DIAGNOSIS — K29.70 GASTRITIS, UNSPECIFIED, WITHOUT BLEEDING: ICD-10-CM

## 2019-12-01 DIAGNOSIS — F41.9 ANXIETY DISORDER, UNSPECIFIED: ICD-10-CM

## 2019-12-01 DIAGNOSIS — F17.200 NICOTINE DEPENDENCE, UNSPECIFIED, UNCOMPLICATED: ICD-10-CM

## 2019-12-01 LAB
ALBUMIN SERPL ELPH-MCNC: 4.9 G/DL — SIGNIFICANT CHANGE UP (ref 3.5–5.2)
ALP SERPL-CCNC: 70 U/L — SIGNIFICANT CHANGE UP (ref 30–115)
ALT FLD-CCNC: 28 U/L — SIGNIFICANT CHANGE UP (ref 0–41)
AMMONIA BLD-MCNC: 35 UMOL/L — SIGNIFICANT CHANGE UP (ref 11–55)
ANION GAP SERPL CALC-SCNC: 15 MMOL/L — HIGH (ref 7–14)
APAP SERPL-MCNC: <5 UG/ML — LOW (ref 10–30)
AST SERPL-CCNC: 55 U/L — HIGH (ref 0–41)
BASOPHILS # BLD AUTO: 0.09 K/UL — SIGNIFICANT CHANGE UP (ref 0–0.2)
BASOPHILS NFR BLD AUTO: 1.2 % — HIGH (ref 0–1)
BILIRUB SERPL-MCNC: 0.3 MG/DL — SIGNIFICANT CHANGE UP (ref 0.2–1.2)
BUN SERPL-MCNC: 11 MG/DL — SIGNIFICANT CHANGE UP (ref 10–20)
CALCIUM SERPL-MCNC: 9.3 MG/DL — SIGNIFICANT CHANGE UP (ref 8.5–10.1)
CHLORIDE SERPL-SCNC: 101 MMOL/L — SIGNIFICANT CHANGE UP (ref 98–110)
CO2 SERPL-SCNC: 25 MMOL/L — SIGNIFICANT CHANGE UP (ref 17–32)
CREAT SERPL-MCNC: 0.6 MG/DL — LOW (ref 0.7–1.5)
EOSINOPHIL # BLD AUTO: 0.07 K/UL — SIGNIFICANT CHANGE UP (ref 0–0.7)
EOSINOPHIL NFR BLD AUTO: 0.9 % — SIGNIFICANT CHANGE UP (ref 0–8)
ETHANOL SERPL-MCNC: 409 MG/DL — HIGH
GLUCOSE SERPL-MCNC: 98 MG/DL — SIGNIFICANT CHANGE UP (ref 70–99)
HAV IGM SER-ACNC: SIGNIFICANT CHANGE UP
HBV CORE IGM SER-ACNC: SIGNIFICANT CHANGE UP
HBV SURFACE AG SER-ACNC: SIGNIFICANT CHANGE UP
HCG SERPL QL: NEGATIVE — SIGNIFICANT CHANGE UP
HCT VFR BLD CALC: 41.8 % — SIGNIFICANT CHANGE UP (ref 37–47)
HCV AB S/CO SERPL IA: 0.14 S/CO — SIGNIFICANT CHANGE UP (ref 0–0.99)
HCV AB SERPL-IMP: SIGNIFICANT CHANGE UP
HGB BLD-MCNC: 14.7 G/DL — SIGNIFICANT CHANGE UP (ref 12–16)
IMM GRANULOCYTES NFR BLD AUTO: 0.4 % — HIGH (ref 0.1–0.3)
LIDOCAIN IGE QN: 54 U/L — SIGNIFICANT CHANGE UP (ref 7–60)
LYMPHOCYTES # BLD AUTO: 2.32 K/UL — SIGNIFICANT CHANGE UP (ref 1.2–3.4)
LYMPHOCYTES # BLD AUTO: 30.8 % — SIGNIFICANT CHANGE UP (ref 20.5–51.1)
MAGNESIUM SERPL-MCNC: 2.2 MG/DL — SIGNIFICANT CHANGE UP (ref 1.8–2.4)
MCHC RBC-ENTMCNC: 34 PG — HIGH (ref 27–31)
MCHC RBC-ENTMCNC: 35.2 G/DL — SIGNIFICANT CHANGE UP (ref 32–37)
MCV RBC AUTO: 96.8 FL — SIGNIFICANT CHANGE UP (ref 81–99)
MONOCYTES # BLD AUTO: 0.91 K/UL — HIGH (ref 0.1–0.6)
MONOCYTES NFR BLD AUTO: 12.1 % — HIGH (ref 1.7–9.3)
NEUTROPHILS # BLD AUTO: 4.11 K/UL — SIGNIFICANT CHANGE UP (ref 1.4–6.5)
NEUTROPHILS NFR BLD AUTO: 54.6 % — SIGNIFICANT CHANGE UP (ref 42.2–75.2)
NRBC # BLD: 0 /100 WBCS — SIGNIFICANT CHANGE UP (ref 0–0)
PLATELET # BLD AUTO: 246 K/UL — SIGNIFICANT CHANGE UP (ref 130–400)
POTASSIUM SERPL-MCNC: 3.9 MMOL/L — SIGNIFICANT CHANGE UP (ref 3.5–5)
POTASSIUM SERPL-SCNC: 3.9 MMOL/L — SIGNIFICANT CHANGE UP (ref 3.5–5)
PROT SERPL-MCNC: 8.2 G/DL — HIGH (ref 6–8)
RBC # BLD: 4.32 M/UL — SIGNIFICANT CHANGE UP (ref 4.2–5.4)
RBC # FLD: 13.9 % — SIGNIFICANT CHANGE UP (ref 11.5–14.5)
SALICYLATES SERPL-MCNC: <0.3 MG/DL — LOW (ref 4–30)
SODIUM SERPL-SCNC: 141 MMOL/L — SIGNIFICANT CHANGE UP (ref 135–146)
WBC # BLD: 7.53 K/UL — SIGNIFICANT CHANGE UP (ref 4.8–10.8)
WBC # FLD AUTO: 7.53 K/UL — SIGNIFICANT CHANGE UP (ref 4.8–10.8)

## 2019-12-01 PROCEDURE — 99285 EMERGENCY DEPT VISIT HI MDM: CPT

## 2019-12-01 RX ORDER — HYDROXYZINE HCL 10 MG
50 TABLET ORAL EVERY 6 HOURS
Refills: 0 | Status: DISCONTINUED | OUTPATIENT
Start: 2019-12-01 | End: 2019-12-04

## 2019-12-01 RX ORDER — TRAZODONE HCL 50 MG
100 TABLET ORAL DAILY
Refills: 0 | Status: DISCONTINUED | OUTPATIENT
Start: 2019-12-01 | End: 2019-12-01

## 2019-12-01 RX ORDER — MAGNESIUM HYDROXIDE 400 MG/1
30 TABLET, CHEWABLE ORAL ONCE
Refills: 0 | Status: DISCONTINUED | OUTPATIENT
Start: 2019-12-01 | End: 2019-12-04

## 2019-12-01 RX ORDER — MIRTAZAPINE 45 MG/1
30 TABLET, ORALLY DISINTEGRATING ORAL AT BEDTIME
Refills: 0 | Status: DISCONTINUED | OUTPATIENT
Start: 2019-12-01 | End: 2019-12-04

## 2019-12-01 RX ORDER — ACETAMINOPHEN 500 MG
650 TABLET ORAL EVERY 6 HOURS
Refills: 0 | Status: DISCONTINUED | OUTPATIENT
Start: 2019-12-01 | End: 2019-12-04

## 2019-12-01 RX ORDER — METHOCARBAMOL 500 MG/1
500 TABLET, FILM COATED ORAL EVERY 6 HOURS
Refills: 0 | Status: DISCONTINUED | OUTPATIENT
Start: 2019-12-01 | End: 2019-12-04

## 2019-12-01 RX ORDER — ATORVASTATIN CALCIUM 80 MG/1
40 TABLET, FILM COATED ORAL AT BEDTIME
Refills: 0 | Status: DISCONTINUED | OUTPATIENT
Start: 2019-12-01 | End: 2019-12-04

## 2019-12-01 RX ORDER — MIRTAZAPINE 45 MG/1
30 TABLET, ORALLY DISINTEGRATING ORAL DAILY
Refills: 0 | Status: DISCONTINUED | OUTPATIENT
Start: 2019-12-01 | End: 2019-12-01

## 2019-12-01 RX ORDER — THIAMINE MONONITRATE (VIT B1) 100 MG
100 TABLET ORAL DAILY
Refills: 0 | Status: COMPLETED | OUTPATIENT
Start: 2019-12-01 | End: 2019-12-03

## 2019-12-01 RX ORDER — TRAZODONE HCL 50 MG
50 TABLET ORAL AT BEDTIME
Refills: 0 | Status: DISCONTINUED | OUTPATIENT
Start: 2019-12-01 | End: 2019-12-04

## 2019-12-01 RX ORDER — GABAPENTIN 400 MG/1
300 CAPSULE ORAL THREE TIMES A DAY
Refills: 0 | Status: DISCONTINUED | OUTPATIENT
Start: 2019-12-01 | End: 2019-12-04

## 2019-12-01 RX ORDER — NICOTINE POLACRILEX 2 MG
1 GUM BUCCAL DAILY
Refills: 0 | Status: DISCONTINUED | OUTPATIENT
Start: 2019-12-01 | End: 2019-12-04

## 2019-12-01 RX ORDER — IBUPROFEN 200 MG
400 TABLET ORAL EVERY 6 HOURS
Refills: 0 | Status: DISCONTINUED | OUTPATIENT
Start: 2019-12-01 | End: 2019-12-04

## 2019-12-01 RX ORDER — GUAIFENESIN/DEXTROMETHORPHAN 600MG-30MG
5 TABLET, EXTENDED RELEASE 12 HR ORAL EVERY 4 HOURS
Refills: 0 | Status: DISCONTINUED | OUTPATIENT
Start: 2019-12-01 | End: 2019-12-04

## 2019-12-01 RX ORDER — MULTIVIT-MIN/FERROUS GLUCONATE 9 MG/15 ML
1 LIQUID (ML) ORAL DAILY
Refills: 0 | Status: DISCONTINUED | OUTPATIENT
Start: 2019-12-01 | End: 2019-12-04

## 2019-12-01 RX ORDER — HYDROXYZINE HCL 10 MG
100 TABLET ORAL AT BEDTIME
Refills: 0 | Status: DISCONTINUED | OUTPATIENT
Start: 2019-12-01 | End: 2019-12-04

## 2019-12-01 RX ORDER — PSEUDOEPHEDRINE HCL 30 MG
60 TABLET ORAL EVERY 6 HOURS
Refills: 0 | Status: DISCONTINUED | OUTPATIENT
Start: 2019-12-01 | End: 2019-12-04

## 2019-12-01 RX ORDER — FOLIC ACID 0.8 MG
1 TABLET ORAL DAILY
Refills: 0 | Status: DISCONTINUED | OUTPATIENT
Start: 2019-12-01 | End: 2019-12-04

## 2019-12-01 RX ORDER — INFLUENZA VIRUS VACCINE 15; 15; 15; 15 UG/.5ML; UG/.5ML; UG/.5ML; UG/.5ML
0.5 SUSPENSION INTRAMUSCULAR ONCE
Refills: 0 | Status: COMPLETED | OUTPATIENT
Start: 2019-12-01 | End: 2019-12-01

## 2019-12-01 RX ADMIN — MIRTAZAPINE 30 MILLIGRAM(S): 45 TABLET, ORALLY DISINTEGRATING ORAL at 21:39

## 2019-12-01 RX ADMIN — Medication 1 MILLIGRAM(S): at 14:01

## 2019-12-01 RX ADMIN — Medication 25 MILLIGRAM(S): at 18:28

## 2019-12-01 RX ADMIN — GABAPENTIN 300 MILLIGRAM(S): 400 CAPSULE ORAL at 21:39

## 2019-12-01 RX ADMIN — Medication 1 TABLET(S): at 09:10

## 2019-12-01 RX ADMIN — GABAPENTIN 300 MILLIGRAM(S): 400 CAPSULE ORAL at 14:02

## 2019-12-01 RX ADMIN — METHOCARBAMOL 500 MILLIGRAM(S): 500 TABLET, FILM COATED ORAL at 18:28

## 2019-12-01 RX ADMIN — GABAPENTIN 300 MILLIGRAM(S): 400 CAPSULE ORAL at 09:10

## 2019-12-01 RX ADMIN — Medication 50 MILLIGRAM(S): at 21:39

## 2019-12-01 RX ADMIN — Medication 25 MILLIGRAM(S): at 16:47

## 2019-12-01 RX ADMIN — Medication 25 MILLIGRAM(S): at 09:10

## 2019-12-01 RX ADMIN — Medication 50 MILLIGRAM(S): at 18:28

## 2019-12-01 RX ADMIN — Medication 100 MILLIGRAM(S): at 09:10

## 2019-12-01 RX ADMIN — Medication 25 MILLIGRAM(S): at 14:02

## 2019-12-01 RX ADMIN — Medication 25 MILLIGRAM(S): at 22:28

## 2019-12-01 NOTE — ED PROVIDER NOTE - ATTENDING CONTRIBUTION TO CARE
I personally evaluated the patient. I reviewed the Resident’s or Physician Assistant’s note (as assigned above), and agree with the findings and plan except as documented in my note.  Chart reviewed. H/O alcohol abuse, on Klonopin, requesting alcohol detox. Denies SI/HI. Exam unremarkable except for intoxication.

## 2019-12-01 NOTE — H&P ADULT - ASSESSMENT
A 39 female with pmhx fatty liver disease ,anxiety ,  graves disease , HLD, obesity,  etoh abuse presents for alcohol detox.

## 2019-12-01 NOTE — H&P ADULT - HISTORY OF PRESENT ILLNESS
A 39 female with pmhx fatty liver disease , etoh abuse presents for alcohol detox.   Pt denies hx of seizure.      Substance: vodka   amount: 1.7 L vodka a day   route: oral   duration: 2 months   last use: yesterday , 3/4 L of vodka     Benzo use: denies   Alcohol use: as above  Cocaine use:  denies   Opiate use: denies     Seizures: denies  tremors: denies  black outs: denies  DT's: denies  Hallucinations: denies      HepC: denies  HIV: denies  Syphilis: denies  PPD positive: denies    Last Detox: In August   Mental illness HX: denies  AH/SI/HI: denies

## 2019-12-01 NOTE — ED PROVIDER NOTE - PHYSICAL EXAMINATION
CONSTITUTIONAL: Well-appearing; well-nourished; in no apparent distress. + alcohol smell on breath  ENT: normal nose; no rhinorrhea; normal pharynx with no tonsillar hypertrophy.   NECK: Supple; non-tender; no cervical lymphadenopathy. No JVD.   CARDIOVASCULAR: Normal S1, S2; no murmurs, rubs, or gallops.   RESPIRATORY: Normal chest excursion with respiration; breath sounds clear and equal bilaterally; no wheezes, rhonchi, or rales.  GI/: Normal bowel sounds; non-distended; non-tender; no palpable organomegaly.   MS: No evidence of trauma or deformity to extremities  SKIN: Normal for age and race; warm; dry; good turgor; no apparent lesions or exudate.   NEURO/PSYCH: A & O x 4; grossly unremarkable. Denies SI/HI

## 2019-12-01 NOTE — ED ADULT TRIAGE NOTE - LOCATION:
"       Queta Cortez's goals for this visit include:   Chief Complaint   Patient presents with     RECHECK       She requests these members of her care team be copied on today's visit information: pcp    PCP: Alanis Lundberg    Referring Provider:  No referring provider defined for this encounter.    Chief Complaint   Patient presents with     RECHECK       Initial /81 mmHg  Pulse 65  Wt 71.668 kg (158 lb)  LMP 12/11/2015 Estimated body mass index is 22.47 kg/(m^2) as calculated from the following:    Height as of 12/21/16: 1.786 m (5' 10.32\").    Weight as of this encounter: 71.668 kg (158 lb).  BP completed using cuff size: regular    Do you need any medication refills at today's visit? No    Mariposa Hong CMA (AAMA)      " Left arm;

## 2019-12-01 NOTE — ED PROVIDER NOTE - NS ED ROS FT

## 2019-12-01 NOTE — H&P ADULT - NSHPPHYSICALEXAM_GEN_ALL_CORE
VITALS:       ICU Vital Signs Last 24 Hrs  T(C): 35.8 (01 Dec 2019 07:21), Max: 36.8 (01 Dec 2019 04:31)  T(F): 96.5 (01 Dec 2019 07:21), Max: 98.3 (01 Dec 2019 04:31)  HR: 104 (01 Dec 2019 07:21) (104 - 105)  BP: 135/89 (01 Dec 2019 07:21) (135/89 - 146/86)  RR: 16 (01 Dec 2019 07:21) (16 - 20)  SpO2: 99% (01 Dec 2019 04:31) (99% - 99%)      GENERAL: NAD, sitting in chair comfortably  HEAD:  Atraumatic, Normocephalic  EYES: EOMI, PERRLA, conjunctiva and sclera clear  ENT: Moist mucous membranes  NECK: Supple, No JVD  CHEST/LUNG: Clear to auscultation bilaterally; No rales, rhonchi, wheezing, or rubs.   HEART: Regular rate and rhythm; No murmurs, rubs, or gallops  ABDOMEN:  Soft, Nontender, Nondistended. No hepatomegally  EXTREMITIES:  no edema or tenderness, Right foot 2nd toe mild erythema, pt denies trauma, range of motion intact to the digit, sensation intact, capillary refill intact   NERVOUS SYSTEM:  Alert & Oriented X3, speech clear. No deficits   MSK: FROM all 4 extremities, full and equal strength  SKIN: No rashes or lesions

## 2019-12-01 NOTE — ED PROVIDER NOTE - OBJECTIVE STATEMENT
40 yo female hx of alcohol abuse/ polysubstance abuse/ HLD present request alcohol detox.  patient was in detox 3 months ago and started drinking 2 months after detox. states she wanted to stop drinking. She admits to drinking vodka daily. denies fall and injury. Deneis SI/HI.   Denies fever/chill/HA/dizziness/chest pain/palpitation/sob/abd pain/n/v/d/ black stool/bloody stool/urinary sxs

## 2019-12-01 NOTE — H&P ADULT - NSHPLABSRESULTS_GEN_ALL_CORE
14.7   7.53  )-----------( 246      ( 01 Dec 2019 05:30 )             41.8       12-01    141  |  101  |  11  ----------------------------<  98  3.9   |  25  |  0.6<L>    Ca    9.3      01 Dec 2019 05:30  Mg     2.2     12-01    TPro  8.2<H>  /  Alb  4.9  /  TBili  0.3  /  DBili  x   /  AST  55<H>  /  ALT  28  /  AlkPhos  70  12-01          Magnesium, Serum: 2.2 mg/dL (12-01-19 @ 05:30)        Lactate Trend      Serum Pregnancy: Negative (12-01-19 @ 06:10)

## 2019-12-02 LAB — T PALLIDUM AB TITR SER: NEGATIVE — SIGNIFICANT CHANGE UP

## 2019-12-02 RX ADMIN — Medication 25 MILLIGRAM(S): at 09:59

## 2019-12-02 RX ADMIN — Medication 1 MILLIGRAM(S): at 08:36

## 2019-12-02 RX ADMIN — GABAPENTIN 300 MILLIGRAM(S): 400 CAPSULE ORAL at 20:41

## 2019-12-02 RX ADMIN — Medication 25 MILLIGRAM(S): at 00:49

## 2019-12-02 RX ADMIN — MIRTAZAPINE 30 MILLIGRAM(S): 45 TABLET, ORALLY DISINTEGRATING ORAL at 20:41

## 2019-12-02 RX ADMIN — GABAPENTIN 300 MILLIGRAM(S): 400 CAPSULE ORAL at 08:36

## 2019-12-02 RX ADMIN — Medication 25 MILLIGRAM(S): at 16:17

## 2019-12-02 RX ADMIN — Medication 25 MILLIGRAM(S): at 23:32

## 2019-12-02 RX ADMIN — METHOCARBAMOL 500 MILLIGRAM(S): 500 TABLET, FILM COATED ORAL at 00:52

## 2019-12-02 RX ADMIN — METHOCARBAMOL 500 MILLIGRAM(S): 500 TABLET, FILM COATED ORAL at 20:40

## 2019-12-02 RX ADMIN — Medication 25 MILLIGRAM(S): at 12:07

## 2019-12-02 RX ADMIN — Medication 50 MILLIGRAM(S): at 20:40

## 2019-12-02 RX ADMIN — Medication 1 TABLET(S): at 08:36

## 2019-12-02 RX ADMIN — Medication 100 MILLIGRAM(S): at 08:36

## 2019-12-02 RX ADMIN — GABAPENTIN 300 MILLIGRAM(S): 400 CAPSULE ORAL at 12:07

## 2019-12-02 RX ADMIN — Medication 25 MILLIGRAM(S): at 20:40

## 2019-12-02 RX ADMIN — Medication 50 MILLIGRAM(S): at 00:48

## 2019-12-02 RX ADMIN — Medication 25 MILLIGRAM(S): at 14:18

## 2019-12-03 RX ORDER — TRAZODONE HCL 50 MG
1 TABLET ORAL
Qty: 0 | Refills: 0 | DISCHARGE

## 2019-12-03 RX ORDER — GABAPENTIN 400 MG/1
1 CAPSULE ORAL
Qty: 0 | Refills: 0 | DISCHARGE

## 2019-12-03 RX ORDER — GABAPENTIN 400 MG/1
1 CAPSULE ORAL
Qty: 0 | Refills: 0 | DISCHARGE
Start: 2019-12-03

## 2019-12-03 RX ORDER — TRAZODONE HCL 50 MG
1 TABLET ORAL
Qty: 0 | Refills: 0 | DISCHARGE
Start: 2019-12-03

## 2019-12-03 RX ORDER — MIRTAZAPINE 45 MG/1
1 TABLET, ORALLY DISINTEGRATING ORAL
Qty: 0 | Refills: 0 | DISCHARGE
Start: 2019-12-03

## 2019-12-03 RX ORDER — MIRTAZAPINE 45 MG/1
1 TABLET, ORALLY DISINTEGRATING ORAL
Qty: 0 | Refills: 0 | DISCHARGE

## 2019-12-03 RX ADMIN — GABAPENTIN 300 MILLIGRAM(S): 400 CAPSULE ORAL at 12:08

## 2019-12-03 RX ADMIN — Medication 100 MILLIGRAM(S): at 09:03

## 2019-12-03 RX ADMIN — Medication 50 MILLIGRAM(S): at 17:02

## 2019-12-03 RX ADMIN — Medication 30 MILLILITER(S): at 17:00

## 2019-12-03 RX ADMIN — Medication 50 MILLIGRAM(S): at 09:07

## 2019-12-03 RX ADMIN — Medication 400 MILLIGRAM(S): at 17:01

## 2019-12-03 RX ADMIN — METHOCARBAMOL 500 MILLIGRAM(S): 500 TABLET, FILM COATED ORAL at 17:03

## 2019-12-03 RX ADMIN — GABAPENTIN 300 MILLIGRAM(S): 400 CAPSULE ORAL at 09:03

## 2019-12-03 RX ADMIN — Medication 1 MILLIGRAM(S): at 09:03

## 2019-12-03 RX ADMIN — Medication 1 TABLET(S): at 09:03

## 2019-12-03 RX ADMIN — METHOCARBAMOL 500 MILLIGRAM(S): 500 TABLET, FILM COATED ORAL at 09:17

## 2019-12-03 RX ADMIN — Medication 50 MILLIGRAM(S): at 23:48

## 2019-12-03 RX ADMIN — Medication 100 MILLIGRAM(S): at 01:45

## 2019-12-03 RX ADMIN — METHOCARBAMOL 500 MILLIGRAM(S): 500 TABLET, FILM COATED ORAL at 23:48

## 2019-12-03 RX ADMIN — GABAPENTIN 300 MILLIGRAM(S): 400 CAPSULE ORAL at 20:39

## 2019-12-03 RX ADMIN — MIRTAZAPINE 30 MILLIGRAM(S): 45 TABLET, ORALLY DISINTEGRATING ORAL at 23:48

## 2019-12-03 RX ADMIN — Medication 400 MILLIGRAM(S): at 11:59

## 2019-12-03 NOTE — CHART NOTE - NSCHARTNOTEFT_GEN_A_CORE
Per staff pt c/o  chest pains/pressure  admission ekg showed just mild sinus tachycardia  will order a f/u stat ekg and compare Per staff pt c/o  chest pains/pressure  admission ekg showed just mild sinus tachycardia  will order a f/u stat ekg and compare  EKG reviewed, no acute change,   Reassurance, anxiety related  Vistaril

## 2019-12-03 NOTE — CHART NOTE - NSCHARTNOTEFT_GEN_A_CORE
Subsequent Inpatient Encounter                                       Detox Unit    AURELIA BLANCAS   39y   Female      Chief Complaint:    Follow up for Alcohol  Dependency    HPI:     I reviewed previous notes. No Change, except if noted below.             Detail:_    ROS:   I reviewed with patient.  No changes from previous notes except if noted below.             Detail: _    PFSH I reviewed with patient. No changes from previous notes except if noted below.             Detail_    Medication reconciliation performed.    MEDICATIONS  (STANDING):  atorvastatin 40 milliGRAM(s) Oral at bedtime  folic acid 1 milliGRAM(s) Oral daily  gabapentin 300 milliGRAM(s) Oral three times a day  influenza   Vaccine 0.5 milliLiter(s) IntraMuscular once  mirtazapine 30 milliGRAM(s) Oral at bedtime  multivitamin/minerals 1 Tablet(s) Oral daily  nicotine -  14 mG/24Hr(s) Patch 1 Patch Transdermal daily  thiamine 100 milliGRAM(s) Oral daily  traZODone 50 milliGRAM(s) Oral at bedtime      MEDICATIONS  (PRN):  acetaminophen   Tablet .. 650 milliGRAM(s) Oral every 6 hours PRN Temp greater or equal to 38C (100.4F), Mild Pain (1 - 3)  aluminum hydroxide/magnesium hydroxide/simethicone Suspension 30 milliLiter(s) Oral every 6 hours PRN Heartburn  bismuth subsalicylate Liquid 30 milliLiter(s) Oral every 6 hours PRN Diarrhea  chlordiazePOXIDE 25 milliGRAM(s) Oral every 2 hours PRN CIWA-Ar score  8 - 15  chlordiazePOXIDE 50 milliGRAM(s) Oral every 1 hour PRN CIWA-Ar score GREATER THAN 15  guaifenesin/dextromethorphan  Syrup 5 milliLiter(s) Oral every 4 hours PRN Cough  hydrOXYzine hydrochloride 50 milliGRAM(s) Oral every 6 hours PRN Anxiety  hydrOXYzine hydrochloride 100 milliGRAM(s) Oral at bedtime PRN insomnia  ibuprofen  Tablet. 400 milliGRAM(s) Oral every 6 hours PRN Mild Pain (1 - 3)  magnesium hydroxide Suspension 30 milliLiter(s) Oral once PRN Constipation  methocarbamol 500 milliGRAM(s) Oral every 6 hours PRN muscle pain  pseudoephedrine 60 milliGRAM(s) Oral every 6 hours PRN Rhinitis  trimethobenzamide 300 milliGRAM(s) Oral every 6 hours PRN Nausea and/or Vomiting  trimethobenzamide Injectable 200 milliGRAM(s) IntraMuscular every 6 hours PRN Nausea and/or Vomiting      T(C): 35.9 (12-03-19 @ 06:00), Max: 36.7 (12-02-19 @ 22:00)  HR: 102 (12-03-19 @ 06:00) (92 - 115)  BP: 103/66 (12-03-19 @ 06:00) (103/66 - 148/61)  RR: 16 (12-03-19 @ 06:00) (16 - 16)  SpO2: --    PHYSICAL EXAM:      Constitutional: NAD, A&O x3    Eyes: PERRLA, no conjuctivitis    Neck: no lymphadenopathy    Respiratory: +air entry, no rales, no rhonchi, no wheezes    Cardiovascular: +S1 and S2, regular rate and rhythm    Gastrointestinal: +BS, soft, non-tender, not distended    Extremities:  no edema, no calf tenderness    Skin: no rashes, normal turgor            Magnesium, Serum: 2.2 mg/dL (12-01-19 @ 05:30)  Ammonia, Serum: 35 umol/L (12-01-19 @ 05:30)  Treponema Pallidum Antibody Interpretation: Negative (12-01-19 @ 05:30)  Hepatitis B Surface Antigen: Nonreact (12-01-19 @ 05:30)  Hepatitis C Virus S/CO Ratio: 0.14 S/CO (12-01-19 @ 05:30)    Hepatitis C Virus Interpretation: Nonreact (12-01-19 @ 05:30)            Impression and Plan:    Primary Diagnosis:  Alcohol Dependency                                Medication: Librium Protocol/ CIWA Protocol    Secondary Diagnosis:  HLD                                                                Medication: on meds    Tertiary Diagnosis:  Anxiety                                                                     Medication on meds      Continue Detox Protocols. Use of PRNS as needed for withdrawal and comfort.    Adjustments to protocols:    Labs/ Tests reviewed.    Tests ordered:     Likely Disposition: _X__Home       ___Rehab       ___Outpatient Program    ___Self Help     _____Other    Estimated Length of stay:__4__

## 2019-12-04 VITALS
SYSTOLIC BLOOD PRESSURE: 101 MMHG | TEMPERATURE: 97 F | RESPIRATION RATE: 16 BRPM | DIASTOLIC BLOOD PRESSURE: 57 MMHG | HEART RATE: 70 BPM

## 2019-12-04 RX ADMIN — Medication 1 TABLET(S): at 09:08

## 2019-12-04 RX ADMIN — METHOCARBAMOL 500 MILLIGRAM(S): 500 TABLET, FILM COATED ORAL at 09:10

## 2019-12-04 RX ADMIN — Medication 30 MILLILITER(S): at 09:53

## 2019-12-04 RX ADMIN — Medication 50 MILLIGRAM(S): at 09:09

## 2019-12-04 RX ADMIN — Medication 30 MILLILITER(S): at 02:08

## 2019-12-04 RX ADMIN — Medication 1 MILLIGRAM(S): at 09:08

## 2019-12-04 RX ADMIN — Medication 100 MILLIGRAM(S): at 02:07

## 2019-12-04 RX ADMIN — GABAPENTIN 300 MILLIGRAM(S): 400 CAPSULE ORAL at 09:08

## 2019-12-06 DIAGNOSIS — F17.210 NICOTINE DEPENDENCE, CIGARETTES, UNCOMPLICATED: ICD-10-CM

## 2019-12-06 DIAGNOSIS — R00.0 TACHYCARDIA, UNSPECIFIED: ICD-10-CM

## 2019-12-06 DIAGNOSIS — F32.9 MAJOR DEPRESSIVE DISORDER, SINGLE EPISODE, UNSPECIFIED: ICD-10-CM

## 2019-12-06 DIAGNOSIS — E78.5 HYPERLIPIDEMIA, UNSPECIFIED: ICD-10-CM

## 2019-12-06 DIAGNOSIS — K29.70 GASTRITIS, UNSPECIFIED, WITHOUT BLEEDING: ICD-10-CM

## 2019-12-06 DIAGNOSIS — F41.9 ANXIETY DISORDER, UNSPECIFIED: ICD-10-CM

## 2019-12-06 DIAGNOSIS — E05.00 THYROTOXICOSIS WITH DIFFUSE GOITER WITHOUT THYROTOXIC CRISIS OR STORM: ICD-10-CM

## 2019-12-06 DIAGNOSIS — F10.20 ALCOHOL DEPENDENCE, UNCOMPLICATED: ICD-10-CM
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
## 2019-12-06 DIAGNOSIS — K21.9 GASTRO-ESOPHAGEAL REFLUX DISEASE WITHOUT ESOPHAGITIS: ICD-10-CM

## 2019-12-09 NOTE — H&P ADULT - ATTENDING COMMENTS
154.94
Patient interviewed and examined.    Chart reviewed.    PA's H&P noted and modified, as appropriate.    Case discussed on team rounds  Last detox 7/2019, enrolled in [facility] outpt program  Following is my summary of the case.    Admitted for detox: from __x__ED, ___Intake, ____Med/Surg Floor    Alcohol_x__   Opioid_____  Benzo___ Other_____    Substance amount, duration of use, last usage, and prior attempts at detox or rehabs, are outlined above in the H&P and discussed with patient.    Associated withdrawal symptoms presents.  Comorbid conditions noted. Chronic and Stable.    Past Medical Hx, Psych Hx, family Hx, Social Hx from H&P reviewed and NO changes.    Old medical record and medication Hx. Reviewed    Following items reviewed and addressed:  1. labs  2. EKG  3. Imaging from PACs module    Examination: no change from PA's exam.    Place on following protocol  _____Medically Managed  __X__Medically Supervised    Ciwa_x____Librium taper____Ativan taper___Methadone taper___ Phenobarb taper____ Suboxone Induction____MMTP____    Narcan Kit Offered    Psych Consult __X__N/A  ___Ordered    Physical Therapy  ___Xn/a    ___  Ordered    Aftercare disposition to be addressed by counselors.    Estimated length of stay 3-5 days.

## 2020-02-25 ENCOUNTER — TRANSCRIPTION ENCOUNTER (OUTPATIENT)
Age: 40
End: 2020-02-25

## 2020-03-11 ENCOUNTER — INPATIENT (INPATIENT)
Facility: HOSPITAL | Age: 40
LOS: 0 days | Discharge: LEFT BEFORE DISCHARGE | End: 2020-03-12
Attending: INTERNAL MEDICINE | Admitting: INTERNAL MEDICINE
Payer: MEDICAID

## 2020-03-11 VITALS
TEMPERATURE: 97 F | WEIGHT: 179.9 LBS | SYSTOLIC BLOOD PRESSURE: 109 MMHG | HEART RATE: 102 BPM | OXYGEN SATURATION: 95 % | RESPIRATION RATE: 20 BRPM | HEIGHT: 61 IN | DIASTOLIC BLOOD PRESSURE: 72 MMHG

## 2020-03-11 DIAGNOSIS — Z98.891 HISTORY OF UTERINE SCAR FROM PREVIOUS SURGERY: Chronic | ICD-10-CM

## 2020-03-11 DIAGNOSIS — Z98.890 OTHER SPECIFIED POSTPROCEDURAL STATES: Chronic | ICD-10-CM

## 2020-03-11 PROCEDURE — 99285 EMERGENCY DEPT VISIT HI MDM: CPT

## 2020-03-11 NOTE — ED ADULT NURSE NOTE - NSIMPLEMENTINTERV_GEN_ALL_ED
Implemented All Fall with Harm Risk Interventions:  [place] to call system. Call bell, personal items and telephone within reach. Instruct patient to call for assistance. Room bathroom lighting operational. Non-slip footwear when patient is off stretcher. Physically safe environment: no spills, clutter or unnecessary equipment. Stretcher in lowest position, wheels locked, appropriate side rails in place. Provide visual cue, wrist band, yellow gown, etc. Monitor gait and stability. Monitor for mental status changes and reorient to person, place, and time. Review medications for side effects contributing to fall risk. Reinforce activity limits and safety measures with patient and family. Provide visual clues: red socks.

## 2020-03-12 VITALS
HEART RATE: 100 BPM | TEMPERATURE: 96 F | RESPIRATION RATE: 16 BRPM | SYSTOLIC BLOOD PRESSURE: 139 MMHG | DIASTOLIC BLOOD PRESSURE: 82 MMHG

## 2020-03-12 DIAGNOSIS — F10.20 ALCOHOL DEPENDENCE, UNCOMPLICATED: ICD-10-CM

## 2020-03-12 LAB
ALBUMIN SERPL ELPH-MCNC: 4.3 G/DL — SIGNIFICANT CHANGE UP (ref 3.5–5.2)
ALP SERPL-CCNC: 49 U/L — SIGNIFICANT CHANGE UP (ref 30–115)
ALT FLD-CCNC: 34 U/L — SIGNIFICANT CHANGE UP (ref 0–41)
AMYLASE P1 CFR SERPL: 43 U/L — SIGNIFICANT CHANGE UP (ref 25–115)
ANION GAP SERPL CALC-SCNC: 12 MMOL/L — SIGNIFICANT CHANGE UP (ref 7–14)
ANION GAP SERPL CALC-SCNC: 16 MMOL/L — HIGH (ref 7–14)
APAP SERPL-MCNC: <5 UG/ML — LOW (ref 10–30)
APPEARANCE UR: CLEAR — SIGNIFICANT CHANGE UP
AST SERPL-CCNC: 30 U/L — SIGNIFICANT CHANGE UP (ref 0–41)
BASOPHILS # BLD AUTO: 0.07 K/UL — SIGNIFICANT CHANGE UP (ref 0–0.2)
BASOPHILS NFR BLD AUTO: 0.8 % — SIGNIFICANT CHANGE UP (ref 0–1)
BILIRUB SERPL-MCNC: 0.4 MG/DL — SIGNIFICANT CHANGE UP (ref 0.2–1.2)
BILIRUB UR-MCNC: NEGATIVE — SIGNIFICANT CHANGE UP
BUN SERPL-MCNC: 21 MG/DL — HIGH (ref 10–20)
BUN SERPL-MCNC: 24 MG/DL — HIGH (ref 10–20)
CALCIUM SERPL-MCNC: 9.1 MG/DL — SIGNIFICANT CHANGE UP (ref 8.5–10.1)
CALCIUM SERPL-MCNC: 9.1 MG/DL — SIGNIFICANT CHANGE UP (ref 8.5–10.1)
CHLORIDE SERPL-SCNC: 102 MMOL/L — SIGNIFICANT CHANGE UP (ref 98–110)
CHLORIDE SERPL-SCNC: 106 MMOL/L — SIGNIFICANT CHANGE UP (ref 98–110)
CO2 SERPL-SCNC: 20 MMOL/L — SIGNIFICANT CHANGE UP (ref 17–32)
CO2 SERPL-SCNC: 24 MMOL/L — SIGNIFICANT CHANGE UP (ref 17–32)
COLOR SPEC: YELLOW — SIGNIFICANT CHANGE UP
CREAT SERPL-MCNC: 0.7 MG/DL — SIGNIFICANT CHANGE UP (ref 0.7–1.5)
CREAT SERPL-MCNC: 0.8 MG/DL — SIGNIFICANT CHANGE UP (ref 0.7–1.5)
DIFF PNL FLD: NEGATIVE — SIGNIFICANT CHANGE UP
EOSINOPHIL # BLD AUTO: 0.18 K/UL — SIGNIFICANT CHANGE UP (ref 0–0.7)
EOSINOPHIL NFR BLD AUTO: 2.1 % — SIGNIFICANT CHANGE UP (ref 0–8)
ETHANOL SERPL-MCNC: 351 MG/DL — HIGH
GGT SERPL-CCNC: 84 U/L — HIGH (ref 1–40)
GLUCOSE SERPL-MCNC: 103 MG/DL — HIGH (ref 70–99)
GLUCOSE SERPL-MCNC: 82 MG/DL — SIGNIFICANT CHANGE UP (ref 70–99)
GLUCOSE UR QL: NEGATIVE MG/DL — SIGNIFICANT CHANGE UP
HCG SERPL QL: NEGATIVE — SIGNIFICANT CHANGE UP
HCT VFR BLD CALC: 45.5 % — SIGNIFICANT CHANGE UP (ref 37–47)
HGB BLD-MCNC: 15.6 G/DL — SIGNIFICANT CHANGE UP (ref 12–16)
IMM GRANULOCYTES NFR BLD AUTO: 0.2 % — SIGNIFICANT CHANGE UP (ref 0.1–0.3)
KETONES UR-MCNC: NEGATIVE — SIGNIFICANT CHANGE UP
LEUKOCYTE ESTERASE UR-ACNC: NEGATIVE — SIGNIFICANT CHANGE UP
LIDOCAIN IGE QN: 24 U/L — SIGNIFICANT CHANGE UP (ref 7–60)
LYMPHOCYTES # BLD AUTO: 3.95 K/UL — HIGH (ref 1.2–3.4)
LYMPHOCYTES # BLD AUTO: 46.3 % — SIGNIFICANT CHANGE UP (ref 20.5–51.1)
MCHC RBC-ENTMCNC: 33.1 PG — HIGH (ref 27–31)
MCHC RBC-ENTMCNC: 34.3 G/DL — SIGNIFICANT CHANGE UP (ref 32–37)
MCV RBC AUTO: 96.4 FL — SIGNIFICANT CHANGE UP (ref 81–99)
MONOCYTES # BLD AUTO: 0.7 K/UL — HIGH (ref 0.1–0.6)
MONOCYTES NFR BLD AUTO: 8.2 % — SIGNIFICANT CHANGE UP (ref 1.7–9.3)
NEUTROPHILS # BLD AUTO: 3.61 K/UL — SIGNIFICANT CHANGE UP (ref 1.4–6.5)
NEUTROPHILS NFR BLD AUTO: 42.4 % — SIGNIFICANT CHANGE UP (ref 42.2–75.2)
NITRITE UR-MCNC: NEGATIVE — SIGNIFICANT CHANGE UP
NRBC # BLD: 0 /100 WBCS — SIGNIFICANT CHANGE UP (ref 0–0)
PH UR: 6 — SIGNIFICANT CHANGE UP (ref 5–8)
PLATELET # BLD AUTO: 256 K/UL — SIGNIFICANT CHANGE UP (ref 130–400)
POTASSIUM SERPL-MCNC: 4.4 MMOL/L — SIGNIFICANT CHANGE UP (ref 3.5–5)
POTASSIUM SERPL-MCNC: 4.4 MMOL/L — SIGNIFICANT CHANGE UP (ref 3.5–5)
POTASSIUM SERPL-SCNC: 4.4 MMOL/L — SIGNIFICANT CHANGE UP (ref 3.5–5)
POTASSIUM SERPL-SCNC: 4.4 MMOL/L — SIGNIFICANT CHANGE UP (ref 3.5–5)
PROT SERPL-MCNC: 6.9 G/DL — SIGNIFICANT CHANGE UP (ref 6–8)
PROT UR-MCNC: NEGATIVE MG/DL — SIGNIFICANT CHANGE UP
RBC # BLD: 4.72 M/UL — SIGNIFICANT CHANGE UP (ref 4.2–5.4)
RBC # FLD: 13.6 % — SIGNIFICANT CHANGE UP (ref 11.5–14.5)
SALICYLATES SERPL-MCNC: <0.3 MG/DL — LOW (ref 4–30)
SODIUM SERPL-SCNC: 138 MMOL/L — SIGNIFICANT CHANGE UP (ref 135–146)
SODIUM SERPL-SCNC: 142 MMOL/L — SIGNIFICANT CHANGE UP (ref 135–146)
SP GR SPEC: 1.01 — SIGNIFICANT CHANGE UP (ref 1.01–1.03)
UROBILINOGEN FLD QL: 0.2 MG/DL — SIGNIFICANT CHANGE UP (ref 0.2–0.2)
WBC # BLD: 8.53 K/UL — SIGNIFICANT CHANGE UP (ref 4.8–10.8)
WBC # FLD AUTO: 8.53 K/UL — SIGNIFICANT CHANGE UP (ref 4.8–10.8)

## 2020-03-12 PROCEDURE — 71045 X-RAY EXAM CHEST 1 VIEW: CPT | Mod: 26

## 2020-03-12 RX ORDER — GABAPENTIN 400 MG/1
300 CAPSULE ORAL THREE TIMES A DAY
Refills: 0 | Status: DISCONTINUED | OUTPATIENT
Start: 2020-03-12 | End: 2020-03-12

## 2020-03-12 RX ORDER — METHOCARBAMOL 500 MG/1
500 TABLET, FILM COATED ORAL EVERY 6 HOURS
Refills: 0 | Status: DISCONTINUED | OUTPATIENT
Start: 2020-03-12 | End: 2020-03-12

## 2020-03-12 RX ORDER — PANTOPRAZOLE SODIUM 20 MG/1
40 TABLET, DELAYED RELEASE ORAL
Refills: 0 | Status: DISCONTINUED | OUTPATIENT
Start: 2020-03-12 | End: 2020-03-12

## 2020-03-12 RX ORDER — MULTIVIT-MIN/FERROUS GLUCONATE 9 MG/15 ML
1 LIQUID (ML) ORAL DAILY
Refills: 0 | Status: DISCONTINUED | OUTPATIENT
Start: 2020-03-12 | End: 2020-03-12

## 2020-03-12 RX ORDER — MAGNESIUM HYDROXIDE 400 MG/1
30 TABLET, CHEWABLE ORAL ONCE
Refills: 0 | Status: DISCONTINUED | OUTPATIENT
Start: 2020-03-12 | End: 2020-03-12

## 2020-03-12 RX ORDER — MIRTAZAPINE 45 MG/1
30 TABLET, ORALLY DISINTEGRATING ORAL AT BEDTIME
Refills: 0 | Status: DISCONTINUED | OUTPATIENT
Start: 2020-03-12 | End: 2020-03-12

## 2020-03-12 RX ORDER — ATORVASTATIN CALCIUM 80 MG/1
40 TABLET, FILM COATED ORAL AT BEDTIME
Refills: 0 | Status: DISCONTINUED | OUTPATIENT
Start: 2020-03-12 | End: 2020-03-12

## 2020-03-12 RX ORDER — ONDANSETRON 8 MG/1
4 TABLET, FILM COATED ORAL EVERY 6 HOURS
Refills: 0 | Status: DISCONTINUED | OUTPATIENT
Start: 2020-03-12 | End: 2020-03-12

## 2020-03-12 RX ORDER — NICOTINE POLACRILEX 2 MG
1 GUM BUCCAL DAILY
Refills: 0 | Status: DISCONTINUED | OUTPATIENT
Start: 2020-03-12 | End: 2020-03-12

## 2020-03-12 RX ORDER — IBUPROFEN 200 MG
400 TABLET ORAL EVERY 6 HOURS
Refills: 0 | Status: DISCONTINUED | OUTPATIENT
Start: 2020-03-12 | End: 2020-03-12

## 2020-03-12 RX ORDER — TUBERCULIN PURIFIED PROTEIN DERIVATIVE 5 [IU]/.1ML
5 INJECTION, SOLUTION INTRADERMAL ONCE
Refills: 0 | Status: COMPLETED | OUTPATIENT
Start: 2020-03-12 | End: 2020-03-12

## 2020-03-12 RX ORDER — THIAMINE MONONITRATE (VIT B1) 100 MG
100 TABLET ORAL DAILY
Refills: 0 | Status: DISCONTINUED | OUTPATIENT
Start: 2020-03-12 | End: 2020-03-12

## 2020-03-12 RX ORDER — GUAIFENESIN/DEXTROMETHORPHAN 600MG-30MG
5 TABLET, EXTENDED RELEASE 12 HR ORAL EVERY 4 HOURS
Refills: 0 | Status: DISCONTINUED | OUTPATIENT
Start: 2020-03-12 | End: 2020-03-12

## 2020-03-12 RX ORDER — HYDROXYZINE HCL 10 MG
100 TABLET ORAL AT BEDTIME
Refills: 0 | Status: DISCONTINUED | OUTPATIENT
Start: 2020-03-12 | End: 2020-03-12

## 2020-03-12 RX ORDER — TRAZODONE HCL 50 MG
50 TABLET ORAL AT BEDTIME
Refills: 0 | Status: DISCONTINUED | OUTPATIENT
Start: 2020-03-12 | End: 2020-03-12

## 2020-03-12 RX ORDER — ONDANSETRON 8 MG/1
8 TABLET, FILM COATED ORAL EVERY 6 HOURS
Refills: 0 | Status: DISCONTINUED | OUTPATIENT
Start: 2020-03-12 | End: 2020-03-12

## 2020-03-12 RX ORDER — HYDROXYZINE HCL 10 MG
50 TABLET ORAL EVERY 6 HOURS
Refills: 0 | Status: DISCONTINUED | OUTPATIENT
Start: 2020-03-12 | End: 2020-03-12

## 2020-03-12 RX ORDER — PSEUDOEPHEDRINE HCL 30 MG
60 TABLET ORAL EVERY 6 HOURS
Refills: 0 | Status: DISCONTINUED | OUTPATIENT
Start: 2020-03-12 | End: 2020-03-12

## 2020-03-12 RX ADMIN — METHOCARBAMOL 500 MILLIGRAM(S): 500 TABLET, FILM COATED ORAL at 14:35

## 2020-03-12 RX ADMIN — GABAPENTIN 300 MILLIGRAM(S): 400 CAPSULE ORAL at 08:53

## 2020-03-12 RX ADMIN — Medication 1 TABLET(S): at 08:53

## 2020-03-12 RX ADMIN — Medication 2 MILLIGRAM(S): at 06:10

## 2020-03-12 RX ADMIN — GABAPENTIN 300 MILLIGRAM(S): 400 CAPSULE ORAL at 14:32

## 2020-03-12 RX ADMIN — Medication 400 MILLIGRAM(S): at 11:36

## 2020-03-12 RX ADMIN — PANTOPRAZOLE SODIUM 40 MILLIGRAM(S): 20 TABLET, DELAYED RELEASE ORAL at 06:09

## 2020-03-12 RX ADMIN — TUBERCULIN PURIFIED PROTEIN DERIVATIVE 5 UNIT(S): 5 INJECTION, SOLUTION INTRADERMAL at 14:28

## 2020-03-12 RX ADMIN — Medication 2 MILLIGRAM(S): at 08:54

## 2020-03-12 RX ADMIN — Medication 400 MILLIGRAM(S): at 08:54

## 2020-03-12 RX ADMIN — Medication 100 MILLIGRAM(S): at 08:53

## 2020-03-12 RX ADMIN — Medication 50 MILLIGRAM(S): at 14:33

## 2020-03-12 RX ADMIN — METHOCARBAMOL 500 MILLIGRAM(S): 500 TABLET, FILM COATED ORAL at 08:54

## 2020-03-12 NOTE — H&P ADULT - HISTORY OF PRESENT ILLNESS
38 yo female with hx as below and alcohol abuse HLD present request alcohol detox.  patient was in detox 4 months ago. Sober x 1 week. Drinks 1/2 a gallon of vodka a day x 4 months. Last drink was yesterday. Denies seizures with alcohol withdrawal. Denies SI/HI, CP, SOB and palpitations.     HIV (-)  HEP C (-)  PPD (-) 40 yo female with hx as below and alcohol abuse HLD present request alcohol detox.  patient was in detox 4 months ago. Sober x 1 week. Drinks 1/2 a gallon of vodka a day x 4 months. Last drink was yesterday. Denies seizures with alcohol withdrawal. Denies SI/HI, CP, SOB and palpitations.    HIV (-)  HEP C (-)  PPD (-)

## 2020-03-12 NOTE — ED PROVIDER NOTE - CLINICAL SUMMARY MEDICAL DECISION MAKING FREE TEXT BOX
Pt pw ETOH use. No Si No Hi. No signs of trauma no active withdrawal  CXR  done for  cough,     admitted to detox

## 2020-03-12 NOTE — H&P ADULT - PROBLEM SELECTOR PLAN 1
-Pain mgmt  -Librium CIWA  -Catapres 0.1mg q6hrs prn bp>140/90  -Atarax 100mg qhs prn insomnia  -Atarax 50mg q6hrs prn anxiety -Pain mgmt  -LFTs, May change librium to ativan base on results  -Librium CIWA  -Catapres 0.1mg q6hrs prn bp>140/90  -Atarax 100mg qhs prn insomnia  -Atarax 50mg q6hrs prn anxiety

## 2020-03-12 NOTE — H&P ADULT - NSHPLABSRESULTS_GEN_ALL_CORE
15.6   8.53  )-----------( 256      ( 12 Mar 2020 00:10 )             45.5     03-12    142  |  106  |  21<H>  ----------------------------<  103<H>  4.4   |  20  |  0.8    Ca    9.1      12 Mar 2020 00:10            Urinalysis Basic - ( 12 Mar 2020 00:30 )    Color: Yellow / Appearance: Clear / S.010 / pH: x  Gluc: x / Ketone: Negative  / Bili: Negative / Urobili: 0.2 mg/dL   Blood: x / Protein: Negative mg/dL / Nitrite: Negative   Leuk Esterase: Negative / RBC: x / WBC x   Sq Epi: x / Non Sq Epi: x / Bacteria: x            CAPILLARY BLOOD GLUCOSE

## 2020-03-12 NOTE — ED PROVIDER NOTE - OBJECTIVE STATEMENT
38 yo female hx of alcohol abuse/ polysubstance abuse/ HLD present request alcohol detox.  patient was in detox 3 months ago and started drinking 4 months after detox. states she wanted to stop drinking. Last drink was 1 gallon of vodka over 48 hours, denies drug use.

## 2020-03-12 NOTE — H&P ADULT - ASSESSMENT
38 yo female with hx as below and alcohol abuse HLD present request alcohol detox.  patient was in detox 4 months ago. Sober x 1 week. Drinks 1/2 a gallon of vodka a day x 4 months. Last drink was yesterday. Denies seizures with alcohol withdrawal. Denies SI/HI, CP, SOB and palpitations.     HIV (-)  HEP C (-)  PPD (-)

## 2020-03-12 NOTE — H&P ADULT - NSHPPHYSICALEXAM_GEN_ALL_CORE
Vital Signs Last 24 Hrs  T(C): 35.6 (12 Mar 2020 02:30), Max: 36.2 (12 Mar 2020 02:02)  T(F): 96 (12 Mar 2020 02:30), Max: 97.1 (12 Mar 2020 02:02)  HR: 94 (12 Mar 2020 02:30) (94 - 103)  BP: 122/68 (12 Mar 2020 02:30) (108/68 - 122/68)  BP(mean): --  RR: 16 (12 Mar 2020 02:30) (16 - 20)  SpO2: 96% (12 Mar 2020 02:02) (95% - 96%)        Constitutional: NAD, A&O x3    Eyes: PERRLA, no conjuctivitis    Neck: no lymphadenopathy    Respiratory: +air entry, no rales, no rhonchi, no wheezes    Cardiovascular: +S1 and S2, regular rate and rhythm    Gastrointestinal: +BS, soft, non-tender, not distended    Extremities:  no edema, no calf tenderness    Neurological: sensation intact, ROM equal B/L, CN II-XII intact    Skin: no rashes, normal turgor

## 2020-03-12 NOTE — ED PROVIDER NOTE - PHYSICAL EXAMINATION
CONSTITUTIONAL: Well-developed; well-nourished; Intoxicated  SKIN: warm, dry  HEAD: Normocephalic; atraumatic.  EYES: PERRL, EOMI, no conjunctival erythema  ENT: No nasal discharge; airway clear.  NECK: Supple; non tender.  CARD: S1, S2 normal; no murmurs, gallops, or rubs. Regular rate and rhythm.   RESP: No wheezes, rales or rhonchi.  ABD: soft ntnd  EXT: Normal ROM.  No clubbing, cyanosis or edema.   LYMPH: No acute cervical adenopathy.  NEURO: Alert, oriented, grossly unremarkable  PSYCH: Cooperative, appropriate. Intoxicated

## 2020-03-17 DIAGNOSIS — F41.9 ANXIETY DISORDER, UNSPECIFIED: ICD-10-CM

## 2020-03-17 DIAGNOSIS — Z11.1 ENCOUNTER FOR SCREENING FOR RESPIRATORY TUBERCULOSIS: ICD-10-CM

## 2020-03-17 DIAGNOSIS — E66.9 OBESITY, UNSPECIFIED: ICD-10-CM

## 2020-03-17 DIAGNOSIS — F10.20 ALCOHOL DEPENDENCE, UNCOMPLICATED: ICD-10-CM

## 2020-03-17 DIAGNOSIS — G47.00 INSOMNIA, UNSPECIFIED: ICD-10-CM

## 2020-03-17 DIAGNOSIS — E78.5 HYPERLIPIDEMIA, UNSPECIFIED: ICD-10-CM
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
## 2020-03-17 DIAGNOSIS — K21.9 GASTRO-ESOPHAGEAL REFLUX DISEASE WITHOUT ESOPHAGITIS: ICD-10-CM

## 2020-03-17 DIAGNOSIS — F17.210 NICOTINE DEPENDENCE, CIGARETTES, UNCOMPLICATED: ICD-10-CM

## 2020-03-23 ENCOUNTER — INPATIENT (INPATIENT)
Facility: HOSPITAL | Age: 40
LOS: 3 days | Discharge: HOME | End: 2020-03-27
Attending: INTERNAL MEDICINE | Admitting: INTERNAL MEDICINE
Payer: MEDICAID

## 2020-03-23 VITALS
WEIGHT: 179.9 LBS | SYSTOLIC BLOOD PRESSURE: 131 MMHG | DIASTOLIC BLOOD PRESSURE: 64 MMHG | TEMPERATURE: 97 F | RESPIRATION RATE: 19 BRPM | OXYGEN SATURATION: 99 % | HEIGHT: 61 IN | HEART RATE: 94 BPM

## 2020-03-23 DIAGNOSIS — Z98.891 HISTORY OF UTERINE SCAR FROM PREVIOUS SURGERY: Chronic | ICD-10-CM

## 2020-03-23 DIAGNOSIS — I34.1 NONRHEUMATIC MITRAL (VALVE) PROLAPSE: ICD-10-CM

## 2020-03-23 DIAGNOSIS — Z86.39 PERSONAL HISTORY OF OTHER ENDOCRINE, NUTRITIONAL AND METABOLIC DISEASE: ICD-10-CM

## 2020-03-23 DIAGNOSIS — Z98.890 OTHER SPECIFIED POSTPROCEDURAL STATES: Chronic | ICD-10-CM

## 2020-03-23 DIAGNOSIS — E83.119 HEMOCHROMATOSIS, UNSPECIFIED: ICD-10-CM

## 2020-03-23 DIAGNOSIS — K21.9 GASTRO-ESOPHAGEAL REFLUX DISEASE WITHOUT ESOPHAGITIS: ICD-10-CM

## 2020-03-23 DIAGNOSIS — E78.5 HYPERLIPIDEMIA, UNSPECIFIED: ICD-10-CM

## 2020-03-23 DIAGNOSIS — F10.20 ALCOHOL DEPENDENCE, UNCOMPLICATED: ICD-10-CM

## 2020-03-23 DIAGNOSIS — F41.9 ANXIETY DISORDER, UNSPECIFIED: ICD-10-CM

## 2020-03-23 DIAGNOSIS — K76.0 FATTY (CHANGE OF) LIVER, NOT ELSEWHERE CLASSIFIED: ICD-10-CM

## 2020-03-23 LAB
ALBUMIN SERPL ELPH-MCNC: 4.4 G/DL — SIGNIFICANT CHANGE UP (ref 3.5–5.2)
ALP SERPL-CCNC: 78 U/L — SIGNIFICANT CHANGE UP (ref 30–115)
ALT FLD-CCNC: 61 U/L — HIGH (ref 0–41)
AMMONIA BLD-MCNC: 20 UMOL/L — SIGNIFICANT CHANGE UP (ref 11–55)
ANION GAP SERPL CALC-SCNC: 15 MMOL/L — HIGH (ref 7–14)
APAP SERPL-MCNC: <5 UG/ML — LOW (ref 10–30)
AST SERPL-CCNC: 96 U/L — HIGH (ref 0–41)
BASOPHILS # BLD AUTO: 0.03 K/UL — SIGNIFICANT CHANGE UP (ref 0–0.2)
BASOPHILS NFR BLD AUTO: 0.5 % — SIGNIFICANT CHANGE UP (ref 0–1)
BILIRUB SERPL-MCNC: 0.4 MG/DL — SIGNIFICANT CHANGE UP (ref 0.2–1.2)
BUN SERPL-MCNC: 8 MG/DL — LOW (ref 10–20)
CALCIUM SERPL-MCNC: 8.9 MG/DL — SIGNIFICANT CHANGE UP (ref 8.5–10.1)
CHLORIDE SERPL-SCNC: 100 MMOL/L — SIGNIFICANT CHANGE UP (ref 98–110)
CO2 SERPL-SCNC: 27 MMOL/L — SIGNIFICANT CHANGE UP (ref 17–32)
CREAT SERPL-MCNC: 0.9 MG/DL — SIGNIFICANT CHANGE UP (ref 0.7–1.5)
EOSINOPHIL # BLD AUTO: 0.05 K/UL — SIGNIFICANT CHANGE UP (ref 0–0.7)
EOSINOPHIL NFR BLD AUTO: 0.9 % — SIGNIFICANT CHANGE UP (ref 0–8)
ETHANOL SERPL-MCNC: 297 MG/DL — HIGH
GLUCOSE SERPL-MCNC: 123 MG/DL — HIGH (ref 70–99)
HCG SERPL QL: NEGATIVE — SIGNIFICANT CHANGE UP
HCT VFR BLD CALC: 40.4 % — SIGNIFICANT CHANGE UP (ref 37–47)
HGB BLD-MCNC: 13.8 G/DL — SIGNIFICANT CHANGE UP (ref 12–16)
IMM GRANULOCYTES NFR BLD AUTO: 0.3 % — SIGNIFICANT CHANGE UP (ref 0.1–0.3)
LIDOCAIN IGE QN: 78 U/L — HIGH (ref 7–60)
LYMPHOCYTES # BLD AUTO: 1.86 K/UL — SIGNIFICANT CHANGE UP (ref 1.2–3.4)
LYMPHOCYTES # BLD AUTO: 31.9 % — SIGNIFICANT CHANGE UP (ref 20.5–51.1)
MCHC RBC-ENTMCNC: 32.6 PG — HIGH (ref 27–31)
MCHC RBC-ENTMCNC: 34.2 G/DL — SIGNIFICANT CHANGE UP (ref 32–37)
MCV RBC AUTO: 95.5 FL — SIGNIFICANT CHANGE UP (ref 81–99)
MONOCYTES # BLD AUTO: 0.51 K/UL — SIGNIFICANT CHANGE UP (ref 0.1–0.6)
MONOCYTES NFR BLD AUTO: 8.7 % — SIGNIFICANT CHANGE UP (ref 1.7–9.3)
NEUTROPHILS # BLD AUTO: 3.36 K/UL — SIGNIFICANT CHANGE UP (ref 1.4–6.5)
NEUTROPHILS NFR BLD AUTO: 57.7 % — SIGNIFICANT CHANGE UP (ref 42.2–75.2)
NRBC # BLD: 0 /100 WBCS — SIGNIFICANT CHANGE UP (ref 0–0)
PLATELET # BLD AUTO: 183 K/UL — SIGNIFICANT CHANGE UP (ref 130–400)
POTASSIUM SERPL-MCNC: 3.5 MMOL/L — SIGNIFICANT CHANGE UP (ref 3.5–5)
POTASSIUM SERPL-SCNC: 3.5 MMOL/L — SIGNIFICANT CHANGE UP (ref 3.5–5)
PROT SERPL-MCNC: 7.6 G/DL — SIGNIFICANT CHANGE UP (ref 6–8)
RBC # BLD: 4.23 M/UL — SIGNIFICANT CHANGE UP (ref 4.2–5.4)
RBC # FLD: 13.9 % — SIGNIFICANT CHANGE UP (ref 11.5–14.5)
SALICYLATES SERPL-MCNC: <0.3 MG/DL — LOW (ref 4–30)
SODIUM SERPL-SCNC: 142 MMOL/L — SIGNIFICANT CHANGE UP (ref 135–146)
TROPONIN T SERPL-MCNC: <0.01 NG/ML — SIGNIFICANT CHANGE UP
WBC # BLD: 5.83 K/UL — SIGNIFICANT CHANGE UP (ref 4.8–10.8)
WBC # FLD AUTO: 5.83 K/UL — SIGNIFICANT CHANGE UP (ref 4.8–10.8)

## 2020-03-23 PROCEDURE — 71045 X-RAY EXAM CHEST 1 VIEW: CPT | Mod: 26

## 2020-03-23 PROCEDURE — 99285 EMERGENCY DEPT VISIT HI MDM: CPT

## 2020-03-23 RX ORDER — TRAZODONE HCL 50 MG
50 TABLET ORAL AT BEDTIME
Refills: 0 | Status: DISCONTINUED | OUTPATIENT
Start: 2020-03-23 | End: 2020-03-23

## 2020-03-23 RX ORDER — FOLIC ACID 0.8 MG
1 TABLET ORAL DAILY
Refills: 0 | Status: DISCONTINUED | OUTPATIENT
Start: 2020-03-23 | End: 2020-03-27

## 2020-03-23 RX ORDER — MIRTAZAPINE 45 MG/1
30 TABLET, ORALLY DISINTEGRATING ORAL AT BEDTIME
Refills: 0 | Status: DISCONTINUED | OUTPATIENT
Start: 2020-03-23 | End: 2020-03-27

## 2020-03-23 RX ORDER — IBUPROFEN 200 MG
400 TABLET ORAL EVERY 6 HOURS
Refills: 0 | Status: DISCONTINUED | OUTPATIENT
Start: 2020-03-23 | End: 2020-03-27

## 2020-03-23 RX ORDER — GUAIFENESIN/DEXTROMETHORPHAN 600MG-30MG
5 TABLET, EXTENDED RELEASE 12 HR ORAL EVERY 4 HOURS
Refills: 0 | Status: DISCONTINUED | OUTPATIENT
Start: 2020-03-23 | End: 2020-03-27

## 2020-03-23 RX ORDER — MAGNESIUM HYDROXIDE 400 MG/1
30 TABLET, CHEWABLE ORAL ONCE
Refills: 0 | Status: DISCONTINUED | OUTPATIENT
Start: 2020-03-23 | End: 2020-03-27

## 2020-03-23 RX ORDER — ATORVASTATIN CALCIUM 80 MG/1
40 TABLET, FILM COATED ORAL AT BEDTIME
Refills: 0 | Status: DISCONTINUED | OUTPATIENT
Start: 2020-03-23 | End: 2020-03-27

## 2020-03-23 RX ORDER — NICOTINE POLACRILEX 2 MG
1 GUM BUCCAL DAILY
Refills: 0 | Status: DISCONTINUED | OUTPATIENT
Start: 2020-03-23 | End: 2020-03-27

## 2020-03-23 RX ORDER — MULTIVIT-MIN/FERROUS GLUCONATE 9 MG/15 ML
1 LIQUID (ML) ORAL DAILY
Refills: 0 | Status: DISCONTINUED | OUTPATIENT
Start: 2020-03-23 | End: 2020-03-27

## 2020-03-23 RX ORDER — ONDANSETRON 8 MG/1
4 TABLET, FILM COATED ORAL EVERY 6 HOURS
Refills: 0 | Status: DISCONTINUED | OUTPATIENT
Start: 2020-03-23 | End: 2020-03-27

## 2020-03-23 RX ORDER — GABAPENTIN 400 MG/1
800 CAPSULE ORAL THREE TIMES A DAY
Refills: 0 | Status: DISCONTINUED | OUTPATIENT
Start: 2020-03-23 | End: 2020-03-27

## 2020-03-23 RX ORDER — TRAZODONE HCL 50 MG
150 TABLET ORAL AT BEDTIME
Refills: 0 | Status: DISCONTINUED | OUTPATIENT
Start: 2020-03-23 | End: 2020-03-27

## 2020-03-23 RX ORDER — METHOCARBAMOL 500 MG/1
500 TABLET, FILM COATED ORAL EVERY 6 HOURS
Refills: 0 | Status: DISCONTINUED | OUTPATIENT
Start: 2020-03-23 | End: 2020-03-27

## 2020-03-23 RX ORDER — HYDROXYZINE HCL 10 MG
50 TABLET ORAL EVERY 6 HOURS
Refills: 0 | Status: DISCONTINUED | OUTPATIENT
Start: 2020-03-23 | End: 2020-03-27

## 2020-03-23 RX ORDER — THIAMINE MONONITRATE (VIT B1) 100 MG
100 TABLET ORAL DAILY
Refills: 0 | Status: COMPLETED | OUTPATIENT
Start: 2020-03-23 | End: 2020-03-25

## 2020-03-23 RX ORDER — PANTOPRAZOLE SODIUM 20 MG/1
40 TABLET, DELAYED RELEASE ORAL
Refills: 0 | Status: DISCONTINUED | OUTPATIENT
Start: 2020-03-23 | End: 2020-03-27

## 2020-03-23 RX ORDER — ACETAMINOPHEN 500 MG
650 TABLET ORAL EVERY 4 HOURS
Refills: 0 | Status: DISCONTINUED | OUTPATIENT
Start: 2020-03-23 | End: 2020-03-27

## 2020-03-23 RX ORDER — PSEUDOEPHEDRINE HCL 30 MG
60 TABLET ORAL EVERY 6 HOURS
Refills: 0 | Status: DISCONTINUED | OUTPATIENT
Start: 2020-03-23 | End: 2020-03-27

## 2020-03-23 RX ORDER — HYDROXYZINE HCL 10 MG
1 TABLET ORAL
Qty: 0 | Refills: 0 | DISCHARGE

## 2020-03-23 RX ORDER — HYDROXYZINE HCL 10 MG
100 TABLET ORAL AT BEDTIME
Refills: 0 | Status: DISCONTINUED | OUTPATIENT
Start: 2020-03-23 | End: 2020-03-27

## 2020-03-23 RX ORDER — GABAPENTIN 400 MG/1
300 CAPSULE ORAL THREE TIMES A DAY
Refills: 0 | Status: DISCONTINUED | OUTPATIENT
Start: 2020-03-23 | End: 2020-03-23

## 2020-03-23 RX ADMIN — Medication 1 MILLIGRAM(S): at 08:47

## 2020-03-23 RX ADMIN — GABAPENTIN 800 MILLIGRAM(S): 400 CAPSULE ORAL at 10:13

## 2020-03-23 RX ADMIN — Medication 30 MILLILITER(S): at 12:22

## 2020-03-23 RX ADMIN — MIRTAZAPINE 30 MILLIGRAM(S): 45 TABLET, ORALLY DISINTEGRATING ORAL at 21:52

## 2020-03-23 RX ADMIN — Medication 25 MILLIGRAM(S): at 17:18

## 2020-03-23 RX ADMIN — Medication 25 MILLIGRAM(S): at 10:12

## 2020-03-23 RX ADMIN — Medication 50 MILLIGRAM(S): at 20:38

## 2020-03-23 RX ADMIN — Medication 25 MILLIGRAM(S): at 14:15

## 2020-03-23 RX ADMIN — GABAPENTIN 800 MILLIGRAM(S): 400 CAPSULE ORAL at 14:15

## 2020-03-23 RX ADMIN — PANTOPRAZOLE SODIUM 40 MILLIGRAM(S): 20 TABLET, DELAYED RELEASE ORAL at 08:48

## 2020-03-23 RX ADMIN — Medication 150 MILLIGRAM(S): at 21:52

## 2020-03-23 RX ADMIN — METHOCARBAMOL 500 MILLIGRAM(S): 500 TABLET, FILM COATED ORAL at 12:21

## 2020-03-23 RX ADMIN — GABAPENTIN 800 MILLIGRAM(S): 400 CAPSULE ORAL at 20:38

## 2020-03-23 RX ADMIN — Medication 30 MILLILITER(S): at 18:19

## 2020-03-23 RX ADMIN — Medication 25 MILLIGRAM(S): at 18:18

## 2020-03-23 RX ADMIN — Medication 1 TABLET(S): at 08:47

## 2020-03-23 RX ADMIN — Medication 25 MILLIGRAM(S): at 21:52

## 2020-03-23 RX ADMIN — Medication 100 MILLIGRAM(S): at 08:48

## 2020-03-23 RX ADMIN — Medication 25 MILLIGRAM(S): at 12:21

## 2020-03-23 NOTE — ED PROVIDER NOTE - PHYSICAL EXAMINATION
VITAL SIGNS: I have reviewed nursing notes and confirm.  CONSTITUTIONAL: Well-developed; well-nourished; in no acute distress; (+) AOB  SKIN: Skin exam is warm and dry, no acute rash.  HEAD: Normocephalic; atraumatic.  EYES: PERRL, EOM intact; conjunctiva and sclera clear.  ENT: No nasal discharge; airway clear.   NECK: Supple; non tender.  CARD: S1, S2 normal; no murmurs, gallops, or rubs. Regular rate and rhythm.  RESP: No wheezes, rales or rhonchi. Speaking in full sentences.   ABD: Normal bowel sounds; soft; non-distended; non-tender; No rebound or guarding. No CVA tenderness.  EXT: Normal ROM. No clubbing, cyanosis or edema.  NEURO: Alert, oriented. Grossly unremarkable. No focal deficits.   PSYCH: Cooperative, appropriate. Denies SI/HI.

## 2020-03-23 NOTE — ED PROVIDER NOTE - OBJECTIVE STATEMENT
38 yo F with PMHx of GERD, Grave's disease, HLD, MVP and alcohol abuse presents to the ED requesting detox from alcohol and also complains of mild non-radiating left upper chest pain x 2 weeks. She denies aggravating/alleviating factors. She denies other complaints. Pt denies fever, chills, nausea, vomiting, abdominal pain, diarrhea, headache, dizziness, weakness, SOB, back pain, LOC, trauma, urinary symptoms, cough, calf pain/swelling, recent travel, recent surgery, SI/HI.

## 2020-03-23 NOTE — ED PROVIDER NOTE - NS ED ROS FT
Review of Systems  Constitutional:  No fever, chills.  Eyes:  No visual changes, eye pain, or discharge.  ENMT:  No hearing changes, pain, or discharge. No nasal congestion, discharge, or bleeding. No throat pain, swelling, or difficulty swallowing.  Cardiac:  No palpitations, syncope, or edema. (+) chest pain  Respiratory:  No dyspnea, cough. No hemoptysis.  GI:  No nausea, vomiting, diarrhea, or abdominal pain.   :  No dysuria, hematuria, frequency, or burning.   MS:  No back pain.  Skin:  No skin rash, pruritis, jaundice, or lesions.  Neuro:  No headache, dizziness, loss of sensation, or focal weakness.  No change in mental status.

## 2020-03-23 NOTE — ED PROVIDER NOTE - CLINICAL SUMMARY MEDICAL DECISION MAKING FREE TEXT BOX
Labs noted for mildly elevated transaminases and lipase, etoh 297. EKG NSR no acute changes. CXR negative Will admit to detox.

## 2020-03-23 NOTE — ED PROVIDER NOTE - ATTENDING CONTRIBUTION TO CARE
I personally evaluated the patient. I reviewed the Resident’s or Physician Assistant’s note (as assigned above), and agree with the findings and plan except as documented in my note.  Chart reviewed. H/O alcohol abuse, MVP, requesting alcohol detox. Also complaining of left upper chest pain for 2 weeks. Denies suicidal/homicidal ideation. Exam shows alert patient in no distress, HEENT NCAT, lungs clear, RR S1S2 +reproducible tenderness left upper chest, abdomen soft NT +BS, no CCE.

## 2020-03-23 NOTE — H&P ADULT - NSHPLABSRESULTS_GEN_ALL_CORE
13.8   5.83  )-----------( 183      ( 23 Mar 2020 01:23 )             40.4   03-23    142  |  100  |  8<L>  ----------------------------<  123<H>  3.5   |  27  |  0.9    Ca    8.9      23 Mar 2020 01:23    TPro  7.6  /  Alb  4.4  /  TBili  0.4  /  DBili  x   /  AST  96<H>  /  ALT  61<H>  /  AlkPhos  78  03-23

## 2020-03-24 LAB
HAV IGM SER-ACNC: SIGNIFICANT CHANGE UP
HBV CORE IGM SER-ACNC: SIGNIFICANT CHANGE UP
HBV SURFACE AG SER-ACNC: SIGNIFICANT CHANGE UP
HCV AB S/CO SERPL IA: 0.18 S/CO — SIGNIFICANT CHANGE UP (ref 0–0.99)
HCV AB SERPL-IMP: SIGNIFICANT CHANGE UP
T PALLIDUM AB TITR SER: NEGATIVE — SIGNIFICANT CHANGE UP

## 2020-03-24 RX ADMIN — Medication 25 MILLIGRAM(S): at 05:58

## 2020-03-24 RX ADMIN — GABAPENTIN 800 MILLIGRAM(S): 400 CAPSULE ORAL at 09:48

## 2020-03-24 RX ADMIN — Medication 400 MILLIGRAM(S): at 23:19

## 2020-03-24 RX ADMIN — Medication 1 MILLIGRAM(S): at 09:48

## 2020-03-24 RX ADMIN — GABAPENTIN 800 MILLIGRAM(S): 400 CAPSULE ORAL at 13:09

## 2020-03-24 RX ADMIN — Medication 100 MILLIGRAM(S): at 23:19

## 2020-03-24 RX ADMIN — Medication 150 MILLIGRAM(S): at 22:41

## 2020-03-24 RX ADMIN — Medication 50 MILLIGRAM(S): at 20:29

## 2020-03-24 RX ADMIN — Medication 20 MILLIGRAM(S): at 17:14

## 2020-03-24 RX ADMIN — MIRTAZAPINE 30 MILLIGRAM(S): 45 TABLET, ORALLY DISINTEGRATING ORAL at 22:41

## 2020-03-24 RX ADMIN — Medication 20 MILLIGRAM(S): at 21:35

## 2020-03-24 RX ADMIN — Medication 100 MILLIGRAM(S): at 09:48

## 2020-03-24 RX ADMIN — METHOCARBAMOL 500 MILLIGRAM(S): 500 TABLET, FILM COATED ORAL at 09:53

## 2020-03-24 RX ADMIN — Medication 400 MILLIGRAM(S): at 23:50

## 2020-03-24 RX ADMIN — Medication 1 TABLET(S): at 09:48

## 2020-03-24 RX ADMIN — PANTOPRAZOLE SODIUM 40 MILLIGRAM(S): 20 TABLET, DELAYED RELEASE ORAL at 05:58

## 2020-03-24 RX ADMIN — Medication 20 MILLIGRAM(S): at 13:09

## 2020-03-24 RX ADMIN — Medication 25 MILLIGRAM(S): at 01:05

## 2020-03-24 RX ADMIN — Medication 20 MILLIGRAM(S): at 09:48

## 2020-03-24 RX ADMIN — GABAPENTIN 800 MILLIGRAM(S): 400 CAPSULE ORAL at 21:35

## 2020-03-24 NOTE — CHART NOTE - NSCHARTNOTEFT_GEN_A_CORE
Subsequent Inpatient Encounter                                       Detox Unit    AURELIA BLANCAS   39y   Female      Chief Complaint:    Follow up for Alcohol  Dependency    HPI:     I reviewed previous notes. No Change, except if noted below.             Detail:_    ROS:   I reviewed with patient.  No changes from previous notes except if noted below.             Detail: _    PFSH I reviewed with patient. No changes from previous notes except if noted below.             Detail_    Medication reconciliation performed.    MEDICATIONS  (STANDING):  atorvastatin 40 milliGRAM(s) Oral at bedtime  chlordiazePOXIDE   Oral   chlordiazePOXIDE 20 milliGRAM(s) Oral every 4 hours  folic acid 1 milliGRAM(s) Oral daily  gabapentin 800 milliGRAM(s) Oral three times a day  mirtazapine 30 milliGRAM(s) Oral at bedtime  multivitamin/minerals 1 Tablet(s) Oral daily  nicotine - 21 mG/24Hr(s) Patch 1 Patch Transdermal daily  pantoprazole    Tablet 40 milliGRAM(s) Oral before breakfast  thiamine 100 milliGRAM(s) Oral daily  traZODone 150 milliGRAM(s) Oral at bedtime      MEDICATIONS  (PRN):  acetaminophen   Tablet .. 650 milliGRAM(s) Oral every 4 hours PRN Temp greater or equal to 38.5C (101.3F), Moderate Pain (4 - 6)  aluminum hydroxide/magnesium hydroxide/simethicone Suspension 30 milliLiter(s) Oral every 6 hours PRN Heartburn  bismuth subsalicylate Liquid 30 milliLiter(s) Oral every 6 hours PRN Diarrhea  chlordiazePOXIDE 25 milliGRAM(s) Oral every 4 hours PRN Withdrawal  cloNIDine 0.1 milliGRAM(s) Oral every 8 hours PRN Blood Pressure GREATER THAN 140/90 mmHG  guaifenesin/dextromethorphan  Syrup 5 milliLiter(s) Oral every 4 hours PRN Cough  hydrOXYzine hydrochloride 50 milliGRAM(s) Oral every 6 hours PRN Anxiety  hydrOXYzine hydrochloride 100 milliGRAM(s) Oral at bedtime PRN insomnia  ibuprofen  Tablet. 400 milliGRAM(s) Oral every 6 hours PRN Mild Pain (1 - 3)  magnesium hydroxide Suspension 30 milliLiter(s) Oral once PRN Constipation  methocarbamol 500 milliGRAM(s) Oral every 6 hours PRN muscle pain  ondansetron   Disintegrating Tablet 4 milliGRAM(s) Oral every 6 hours PRN Nausea and/or Vomiting  pseudoephedrine 60 milliGRAM(s) Oral every 6 hours PRN Rhinitis      T(C): 36.4 (03-24-20 @ 06:00), Max: 36.4 (03-23-20 @ 12:00)  HR: 58 (03-24-20 @ 06:00) (58 - 103)  BP: 127/65 (03-24-20 @ 06:00) (115/64 - 127/87)  RR: 14 (03-24-20 @ 06:00) (14 - 16)  SpO2: --    PHYSICAL EXAM:      Constitutional: NAD, A&O x3    Eyes: PERRLA, no conjuctivitis    Neck: no lymphadenopathy    Respiratory: +air entry, no rales, no rhonchi, no wheezes    Cardiovascular: +S1 and S2, regular rate and rhythm    Gastrointestinal: +BS, soft, non-tender, not distended    Extremities:  no edema, no calf tenderness    Skin: no rashes, normal turgor                            13.8   5.83  )-----------( 183      ( 23 Mar 2020 01:23 )             40.4   03-23    142  |  100  |  8<L>  ----------------------------<  123<H>  3.5   |  27  |  0.9    Ca    8.9      23 Mar 2020 01:23    TPro  7.6  /  Alb  4.4  /  TBili  0.4  /  DBili  x   /  AST  96<H>  /  ALT  61<H>  /  AlkPhos  78  03-23    Ammonia, Serum: 20 umol/L (03-23-20 @ 01:23)  Treponema Pallidum Antibody Interpretation: Negative (03-23-20 @ 01:23)  Hepatitis B Surface Antigen: Nonreact (03-23-20 @ 01:23)  Hepatitis C Virus S/CO Ratio: 0.18 S/CO (03-23-20 @ 01:23)    Hepatitis C Virus Interpretation: Nonreact (03-23-20 @ 01:23)            Impression and Plan:    Primary Diagnosis:  Alcohol Dependency                                Medication: Librium Protocol/    Secondary Diagnosis: HLD                                                                Medication: on meds    Tertiary Diagnosis:  aNXIETY/PERSONALITY D/O                                                                     Medication on meds      Continue Detox Protocols. Use of PRNS as needed for withdrawal and comfort.    Adjustments to protocols:    Labs/ Tests reviewed.    Tests ordered:     Likely Disposition: ___Home       x___Rehab       ___Outpatient Program    ___Self Help     _____Other    Estimated Length of stay:4____

## 2020-03-24 NOTE — CHART NOTE - NSCHARTNOTEFT_GEN_A_CORE
Called by nurse regarding pt c/o chest, discomfort. Seen and examined, in bed, NAD, AAOx3. Pt c/o body aches including chest area and SOB. Pt c/o anxiety. O2 sat was 100% RA. Denies cough, fever/chills.  Vitals stable as below.     Vital Signs Last 24 Hrs  T(C): 35.6 (24 Mar 2020 18:00), Max: 36.4 (24 Mar 2020 06:00)  T(F): 96 (24 Mar 2020 18:00), Max: 97.5 (24 Mar 2020 06:00)  HR: 76 (24 Mar 2020 18:00) (58 - 103)  BP: 140/79 (24 Mar 2020 18:00) (115/64 - 140/79)  BP(mean): --  RR: 16 (24 Mar 2020 18:00) (14 - 16)  SpO2: --    A/P:   stat ekg  -atarax for anxiety

## 2020-03-25 RX ADMIN — GABAPENTIN 800 MILLIGRAM(S): 400 CAPSULE ORAL at 13:31

## 2020-03-25 RX ADMIN — Medication 1 TABLET(S): at 09:21

## 2020-03-25 RX ADMIN — Medication 150 MILLIGRAM(S): at 22:20

## 2020-03-25 RX ADMIN — Medication 50 MILLIGRAM(S): at 06:08

## 2020-03-25 RX ADMIN — METHOCARBAMOL 500 MILLIGRAM(S): 500 TABLET, FILM COATED ORAL at 06:08

## 2020-03-25 RX ADMIN — PANTOPRAZOLE SODIUM 40 MILLIGRAM(S): 20 TABLET, DELAYED RELEASE ORAL at 06:04

## 2020-03-25 RX ADMIN — GABAPENTIN 800 MILLIGRAM(S): 400 CAPSULE ORAL at 09:21

## 2020-03-25 RX ADMIN — Medication 30 MILLILITER(S): at 19:13

## 2020-03-25 RX ADMIN — GABAPENTIN 800 MILLIGRAM(S): 400 CAPSULE ORAL at 21:28

## 2020-03-25 RX ADMIN — Medication 20 MILLIGRAM(S): at 06:04

## 2020-03-25 RX ADMIN — Medication 15 MILLIGRAM(S): at 17:15

## 2020-03-25 RX ADMIN — Medication 15 MILLIGRAM(S): at 21:28

## 2020-03-25 RX ADMIN — Medication 50 MILLIGRAM(S): at 18:18

## 2020-03-25 RX ADMIN — METHOCARBAMOL 500 MILLIGRAM(S): 500 TABLET, FILM COATED ORAL at 18:18

## 2020-03-25 RX ADMIN — Medication 15 MILLIGRAM(S): at 09:20

## 2020-03-25 RX ADMIN — Medication 15 MILLIGRAM(S): at 13:30

## 2020-03-25 RX ADMIN — Medication 50 MILLIGRAM(S): at 12:14

## 2020-03-25 RX ADMIN — METHOCARBAMOL 500 MILLIGRAM(S): 500 TABLET, FILM COATED ORAL at 12:14

## 2020-03-25 RX ADMIN — Medication 100 MILLIGRAM(S): at 09:21

## 2020-03-25 RX ADMIN — Medication 1 MILLIGRAM(S): at 09:21

## 2020-03-26 RX ADMIN — PANTOPRAZOLE SODIUM 40 MILLIGRAM(S): 20 TABLET, DELAYED RELEASE ORAL at 06:30

## 2020-03-26 RX ADMIN — METHOCARBAMOL 500 MILLIGRAM(S): 500 TABLET, FILM COATED ORAL at 10:02

## 2020-03-26 RX ADMIN — Medication 1 TABLET(S): at 08:52

## 2020-03-26 RX ADMIN — Medication 10 MILLIGRAM(S): at 17:40

## 2020-03-26 RX ADMIN — GABAPENTIN 800 MILLIGRAM(S): 400 CAPSULE ORAL at 08:52

## 2020-03-26 RX ADMIN — METHOCARBAMOL 500 MILLIGRAM(S): 500 TABLET, FILM COATED ORAL at 17:59

## 2020-03-26 RX ADMIN — GABAPENTIN 800 MILLIGRAM(S): 400 CAPSULE ORAL at 13:19

## 2020-03-26 RX ADMIN — Medication 15 MILLIGRAM(S): at 03:09

## 2020-03-26 RX ADMIN — Medication 1 MILLIGRAM(S): at 08:52

## 2020-03-26 RX ADMIN — GABAPENTIN 800 MILLIGRAM(S): 400 CAPSULE ORAL at 21:21

## 2020-03-26 RX ADMIN — Medication 10 MILLIGRAM(S): at 13:19

## 2020-03-26 RX ADMIN — Medication 15 MILLIGRAM(S): at 06:07

## 2020-03-26 RX ADMIN — Medication 10 MILLIGRAM(S): at 09:34

## 2020-03-26 RX ADMIN — Medication 10 MILLIGRAM(S): at 21:22

## 2020-03-26 RX ADMIN — Medication 50 MILLIGRAM(S): at 18:00

## 2020-03-26 RX ADMIN — Medication 50 MILLIGRAM(S): at 10:03

## 2020-03-26 NOTE — CHART NOTE - NSCHARTNOTEFT_GEN_A_CORE
Subsequent Inpatient Encounter                                       Detox Unit    AURELIA BLANCAS   39y   Female      Chief Complaint:    Follow up for Alcohol  Dependency    HPI:     I reviewed previous notes. No Change, except if noted below.             Detail:_    ROS:   I reviewed with patient.  No changes from previous notes except if noted below.             Detail: _    PFSH I reviewed with patient. No changes from previous notes except if noted below.             Detail_    Medication reconciliation performed.    MEDICATIONS  (STANDING):  atorvastatin 40 milliGRAM(s) Oral at bedtime  chlordiazePOXIDE   Oral   chlordiazePOXIDE 10 milliGRAM(s) Oral every 4 hours  folic acid 1 milliGRAM(s) Oral daily  gabapentin 800 milliGRAM(s) Oral three times a day  mirtazapine 30 milliGRAM(s) Oral at bedtime  multivitamin/minerals 1 Tablet(s) Oral daily  nicotine - 21 mG/24Hr(s) Patch 1 Patch Transdermal daily  pantoprazole    Tablet 40 milliGRAM(s) Oral before breakfast  traZODone 150 milliGRAM(s) Oral at bedtime      MEDICATIONS  (PRN):  acetaminophen   Tablet .. 650 milliGRAM(s) Oral every 4 hours PRN Temp greater or equal to 38.5C (101.3F), Moderate Pain (4 - 6)  aluminum hydroxide/magnesium hydroxide/simethicone Suspension 30 milliLiter(s) Oral every 6 hours PRN Heartburn  bismuth subsalicylate Liquid 30 milliLiter(s) Oral every 6 hours PRN Diarrhea  chlordiazePOXIDE 25 milliGRAM(s) Oral every 4 hours PRN Withdrawal  cloNIDine 0.1 milliGRAM(s) Oral every 8 hours PRN Blood Pressure GREATER THAN 140/90 mmHG  guaifenesin/dextromethorphan  Syrup 5 milliLiter(s) Oral every 4 hours PRN Cough  hydrOXYzine hydrochloride 50 milliGRAM(s) Oral every 6 hours PRN Anxiety  hydrOXYzine hydrochloride 100 milliGRAM(s) Oral at bedtime PRN insomnia  ibuprofen  Tablet. 400 milliGRAM(s) Oral every 6 hours PRN Mild Pain (1 - 3)  magnesium hydroxide Suspension 30 milliLiter(s) Oral once PRN Constipation  methocarbamol 500 milliGRAM(s) Oral every 6 hours PRN muscle pain  ondansetron   Disintegrating Tablet 4 milliGRAM(s) Oral every 6 hours PRN Nausea and/or Vomiting  pseudoephedrine 60 milliGRAM(s) Oral every 6 hours PRN Rhinitis      T(C): 36.2 (03-26-20 @ 06:00), Max: 36.2 (03-26-20 @ 00:00)  HR: 75 (03-26-20 @ 06:00) (71 - 80)  BP: 93/50 (03-26-20 @ 06:00) (93/50 - 135/80)  RR: 16 (03-26-20 @ 06:00) (14 - 18)  SpO2: --    PHYSICAL EXAM:      Constitutional: NAD, A&O x3    Eyes: PERRLA, no conjuctivitis    Neck: no lymphadenopathy    Respiratory: +air entry, no rales, no rhonchi, no wheezes    Cardiovascular: +S1 and S2, regular rate and rhythm    Gastrointestinal: +BS, soft, non-tender, not distended    Extremities:  no edema, no calf tenderness    Skin: no rashes, normal turgor                        Impression and Plan:    Primary Diagnosis:  Alcohol Dependency                                Medication: Librium Protocol/ CIWA Protocol    Secondary Diagnosis:     Anxiety/Depression                                                             Medication: on meds    Tertiary Diagnosis:        HLD                                                               Medication on meds      Continue Detox Protocols. Use of PRNS as needed for withdrawal and comfort.    Adjustments to protocols:    Labs/ Tests reviewed.    Tests ordered:     Likely Disposition: _X__Home       ___Rehab       ___Outpatient Program    ___Self Help     _____Other    Estimated Length of stay:___4_

## 2020-03-27 VITALS
HEART RATE: 80 BPM | RESPIRATION RATE: 16 BRPM | SYSTOLIC BLOOD PRESSURE: 109 MMHG | DIASTOLIC BLOOD PRESSURE: 76 MMHG | TEMPERATURE: 97 F

## 2020-03-27 RX ORDER — ACAMPROSATE CALCIUM 333 MG/1
2 TABLET, DELAYED RELEASE ORAL
Qty: 42 | Refills: 0
Start: 2020-03-27 | End: 2020-04-02

## 2020-03-27 RX ADMIN — GABAPENTIN 800 MILLIGRAM(S): 400 CAPSULE ORAL at 08:54

## 2020-03-27 RX ADMIN — Medication 1 TABLET(S): at 08:54

## 2020-03-27 RX ADMIN — Medication 10 MILLIGRAM(S): at 06:11

## 2020-03-27 RX ADMIN — PANTOPRAZOLE SODIUM 40 MILLIGRAM(S): 20 TABLET, DELAYED RELEASE ORAL at 06:11

## 2020-03-27 RX ADMIN — Medication 150 MILLIGRAM(S): at 00:01

## 2020-03-27 RX ADMIN — Medication 1 MILLIGRAM(S): at 08:54

## 2020-03-27 RX ADMIN — Medication 30 MILLILITER(S): at 06:11

## 2020-03-27 NOTE — CHART NOTE - NSCHARTNOTEFT_GEN_A_CORE
The patient was admitted to the inpt detox unit CDU, for   ETOH___x_ Opioid___  Benzo____Polysubstance _____ Dependency.    Pt was admitted from ED_x___, Intake____, Med/surg Floor_______.    Details are present in the preceding History & Physical section and follow up chart notes.  patient was evaluated on daily detox team  rounds.  Withdrawal symptoms and signs were reviewed on a daily basis, and the protocols were adjusted accordingly.    Labs and imaging results were reviewed and discussed with the patient.    All questions from the patient were addressed.  The patient was seen by the Chemical dependency counselors, and different options for after care were discussed.  The patient attended groups, meetings and 1:1 sessions with the counselors.  Narcane Kit was offered and instructions given prior to discharge.    Psychiatry consultation reviewed______, N/A__x____    Physical therapy evaluation reviewed_____, N/A_x___    Pt was given copies of labs and imaging reports, if applicable.    Prescriptions if needed, were sent through [facility] system to the pharmacy amnd are noted in the discharge instruction sheet.    After care was arranged by counselors and pt was discharged to:    Home__x_, Outpt. Program___, Rehab ___, Long term____ [facility] ____ [facility]____ SNF____, AMA___, Admin Discharge____    Principal Diagnosis: Alcohol Dependency_x___ Opioid Dependency___ Benzo Dependency____ Polysubstance Dependency____

## 2020-03-30 DIAGNOSIS — E78.5 HYPERLIPIDEMIA, UNSPECIFIED: ICD-10-CM

## 2020-03-30 DIAGNOSIS — F32.9 MAJOR DEPRESSIVE DISORDER, SINGLE EPISODE, UNSPECIFIED: ICD-10-CM

## 2020-03-30 DIAGNOSIS — F10.20 ALCOHOL DEPENDENCE, UNCOMPLICATED: ICD-10-CM

## 2020-03-30 DIAGNOSIS — F41.9 ANXIETY DISORDER, UNSPECIFIED: ICD-10-CM

## 2020-03-30 DIAGNOSIS — F17.210 NICOTINE DEPENDENCE, CIGARETTES, UNCOMPLICATED: ICD-10-CM

## 2020-04-01 ENCOUNTER — OUTPATIENT (OUTPATIENT)
Dept: OUTPATIENT SERVICES | Facility: HOSPITAL | Age: 40
LOS: 1 days | End: 2020-04-01

## 2020-04-01 DIAGNOSIS — Z98.890 OTHER SPECIFIED POSTPROCEDURAL STATES: Chronic | ICD-10-CM

## 2020-04-01 DIAGNOSIS — Z98.891 HISTORY OF UTERINE SCAR FROM PREVIOUS SURGERY: Chronic | ICD-10-CM

## 2020-05-01 DIAGNOSIS — Z71.89 OTHER SPECIFIED COUNSELING: ICD-10-CM

## 2020-05-16 ENCOUNTER — TRANSCRIPTION ENCOUNTER (OUTPATIENT)
Age: 40
End: 2020-05-16

## 2020-05-25 ENCOUNTER — EMERGENCY (EMERGENCY)
Facility: HOSPITAL | Age: 40
LOS: 0 days | Discharge: LEFT BEFORE DISCHARGE | End: 2020-05-25
Attending: EMERGENCY MEDICINE | Admitting: EMERGENCY MEDICINE
Payer: MEDICAID

## 2020-05-25 VITALS
DIASTOLIC BLOOD PRESSURE: 75 MMHG | OXYGEN SATURATION: 99 % | RESPIRATION RATE: 18 BRPM | TEMPERATURE: 97 F | HEART RATE: 110 BPM | SYSTOLIC BLOOD PRESSURE: 123 MMHG

## 2020-05-25 DIAGNOSIS — Z88.8 ALLERGY STATUS TO OTHER DRUGS, MEDICAMENTS AND BIOLOGICAL SUBSTANCES STATUS: ICD-10-CM

## 2020-05-25 DIAGNOSIS — Z98.891 HISTORY OF UTERINE SCAR FROM PREVIOUS SURGERY: Chronic | ICD-10-CM

## 2020-05-25 DIAGNOSIS — F10.220 ALCOHOL DEPENDENCE WITH INTOXICATION, UNCOMPLICATED: ICD-10-CM

## 2020-05-25 DIAGNOSIS — Z98.890 OTHER SPECIFIED POSTPROCEDURAL STATES: Chronic | ICD-10-CM

## 2020-05-25 PROCEDURE — L9991: CPT

## 2020-06-04 ENCOUNTER — TRANSCRIPTION ENCOUNTER (OUTPATIENT)
Age: 40
End: 2020-06-04

## 2020-07-01 ENCOUNTER — OUTPATIENT (OUTPATIENT)
Dept: OUTPATIENT SERVICES | Facility: HOSPITAL | Age: 40
LOS: 1 days | End: 2020-07-01

## 2020-07-01 DIAGNOSIS — Z98.890 OTHER SPECIFIED POSTPROCEDURAL STATES: Chronic | ICD-10-CM

## 2020-07-01 DIAGNOSIS — Z98.891 HISTORY OF UTERINE SCAR FROM PREVIOUS SURGERY: Chronic | ICD-10-CM

## 2020-07-21 DIAGNOSIS — Z71.89 OTHER SPECIFIED COUNSELING: ICD-10-CM

## 2020-11-06 NOTE — ED ADULT NURSE NOTE - BREATH SOUNDS, MLM
Pt advised that her Beta was negative nad she needs to get 1 more done in a  week and if that is also negative. She does not need any more.    Clear

## 2020-11-12 ENCOUNTER — TRANSCRIPTION ENCOUNTER (OUTPATIENT)
Age: 40
End: 2020-11-12

## 2020-11-12 NOTE — PROGRESS NOTE BEHAVIORAL HEALTH - NSBHFUPINTERVALHXFT_PSY_A_CORE
6
39 yo WF w alcohol use disorder, major depression presents to rehab from detox. Pt has been doing well while on unit. Pt consistently contributes meaningfully in groups. Pt has recently started Remeron to address insomnia, depression and anxiety. Pt recently titrated from 15 mg to 30 mg which will begin today 3/1/19. Pt has been tolerating this medication well with no adverse side effects. Pt is verbalizing some insight into her addiction and identifies goals for recovery.

## 2020-12-01 PROCEDURE — G9005: CPT

## 2020-12-01 PROCEDURE — G9001: CPT

## 2020-12-24 NOTE — ED ADULT NURSE NOTE - NS ED NURSE DISCH DISPOSITION
Left without being seen (saw a nurse but never saw a physician or midlevel provider)
1 inch by 0.5 inch hematoma on left side of scalp.

## 2021-01-18 ENCOUNTER — TRANSCRIPTION ENCOUNTER (OUTPATIENT)
Age: 41
End: 2021-01-18

## 2021-02-08 NOTE — ED PROVIDER NOTE - CPE EDP [facility] NORM
2/3/2021  IPrashanth MD, saw and evaluated the patient  I have discussed the patient with the resident/non-physician practitioner and agree with the resident's/non-physician practitioner's findings, Plan of Care, and MDM as documented in the resident's/non-physician practitioner's note, except where noted  All available labs and Radiology studies were reviewed  I was present for key portions of any procedure(s) performed by the resident/non-physician practitioner and I was immediately available to provide assistance  At this point I agree with the current assessment done in the Emergency Department  I have conducted an independent evaluation of this patient a history and physical is as follows:    ED Course    patient is a 19-year-old male who presents with worsening neck pain and right cervical radicular symptoms  Patient has a history of multiple cervical disc herniations followed by Neurosurgery as outpatient  Patient states the pain is now worse radiating down bilateral upper extremities over the ulnar aspects of hands  Patient also states he has had worsening use of his hands  States he is dropping lots of things  Strength and tone are grossly intact  Impression cervical radiculopathy  Will treat patient's pain get MRI of the cervical spine exclude acute disc herniation giving worsening symptoms  Anticipate patient will be admitted to the hospital for intractable pain and further evaluation and management        Critical Care Time  Procedures
normal...

## 2021-02-19 NOTE — ED ADULT NURSE NOTE - NSIMPLEMENTINTERV_GEN_ALL_ED
Patient's wife informed that RX sent to pharmacy.
[FreeTextEntry1] : 70 year old man with 911 exposure (), former smoker, hypertension, metastatic lung adenoCA being treated with carbo/pemetrexed and pembrolizumab x4 (last dose 1/8/21). At oncology visit 1/14 reported chest discomfort and labile blood pressure with hypertension at home, noted to have new TWI on his ECG and sent to ER for workup. In ER Hs Annika was 27-25-21 ng/L (negative), BNP 5716 (elevated). ECG RBBB, chronic. CT-Angiogram showed new segmental PE, started on anticoagulation with enoxaparin and transitioned to apixaban. An echocardiogram was not performed.\par \par Also noted on the CT were coronary artery calcification and signs of colitis. He was prescribed a course of cipro/flagyl for this by the ER, which he feels did nothing. He denies any chest pain or new shortness of breath. He denies any dizziness, palpitations, syncope, or near syncope. He does complain of difficulty swallowing liquids, worsening following each administration of the PD-1 inhibitor.\par \par He denies any history of heart disease but has right bundle branch block on the ECG, which is chronic. Work as a  causes him moderate stress, and he smoked until cancer diagnosis.\par \par He tells me he takes amlodipine for HTN, but is no longer taking HCTZ or losartan. No recent stress test.\par \par Compliant with apixaban, notes some ecchymoses.\par \par Interval Events:\par Since last visit, seen by GI and pulmonologist. Scheduled for esophagram soon. Dysphagia is much improved, appetite is excellent. No difficulty swallowing pills. Adherent to apixaban and amlodipine. Echocardiogram was performed this AM just prior to today's visit. Labs from last visit reviewed with patient. Lipid panel is at goal. BNP downtrending.\par \par No new chest pain, palps, dizziness/lightheadedness.\par \par \par \par \par 
Implemented All Universal Safety Interventions:  [place] to call system. Call bell, personal items and telephone within reach. Instruct patient to call for assistance. Room bathroom lighting operational. Non-slip footwear when patient is off stretcher. Physically safe environment: no spills, clutter or unnecessary equipment. Stretcher in lowest position, wheels locked, appropriate side rails in place.

## 2021-02-25 NOTE — ED ADULT NURSE NOTE - CAS DISCH ACCOMP BY
Trauma Surgery Daily Progress Note  =====================================================    SUBJECTIVE: Patient seen and examined at bedside on AM rounds. Patient reports that they're feeling well. Tolerating diet, denies nausea, vomiting. OOB/Ambulating as tolerated. Denies fever, chills.     24 HOUR EVENTS:    MEDICATIONS  (STANDING):  albuterol/ipratropium for Nebulization 3 milliLiter(s) Nebulizer every 6 hours  aMIOdarone    Tablet 200 milliGRAM(s) Oral daily  chlorhexidine 0.12% Liquid 15 milliLiter(s) Oral Mucosa two times a day  chlorhexidine 2% Cloths 1 Application(s) Topical <User Schedule>  enoxaparin Injectable 40 milliGRAM(s) SubCutaneous every 12 hours  influenza   Vaccine 0.5 milliLiter(s) IntraMuscular once  nystatin Powder 1 Application(s) Topical <User Schedule>  vancomycin    Solution 500 milliGRAM(s) Oral every 6 hours    MEDICATIONS  (PRN):  acetaminophen    Suspension .. 975 milliGRAM(s) Enteral Tube every 6 hours PRN Mild Pain (1 - 3)      OBJECTIVE:    Vital Signs Last 24 Hrs  T(C): 38 (2021 23:00), Max: 38.2 (2021 15:15)  T(F): 100.4 (2021 23:00), Max: 100.8 (2021 15:15)  HR: 103 (2021 00:00) (84 - 118)  BP: 99/65 (2021 00:00) (98/72 - 144/81)  BP(mean): 78 (2021 00:00) (62 - 103)  RR: 34 (2021 00:00) (25 - 39)  SpO2: 75% (2021 00:00) (70% - 100%)        I&O's Detail    2021 07:01  -  2021 07:00  --------------------------------------------------------  IN:    Free Water: 500 mL    IV PiggyBack: 100 mL    Lactated Ringers Bolus: 1000 mL    Pivot 1.5: 180 mL    Vital1.5: 1350 mL  Total IN: 3130 mL    OUT:    Indwelling Catheter - Urethral (mL): 1095 mL    Rectal Tube (mL): 350 mL    VAC (Vacuum Assisted Closure) System (mL): 550 mL  Total OUT: 1995 mL    Total NET: 1135 mL      2021 07:01  -  2021 00:47  --------------------------------------------------------  IN:    Free Water: 250 mL    IV PiggyBack: 250 mL    Vital1.5: 1260 mL  Total IN: 1760 mL    OUT:    Indwelling Catheter - Urethral (mL): 925 mL    VAC (Vacuum Assisted Closure) System (mL): 400 mL  Total OUT: 1325 mL    Total NET: 435 mL          Daily     Daily Weight in k.6 (2021 00:01)    PHYSICAL EXAM:  General Appearance: No acute distress  Respiratory: Bipap  CV: Pulse regularly present  Abdomen: Obese, soft, nontender, nondistended w/o rebound tenderness or guarding. L breast incision site c/d/i. Wound vac in place. Abd incision site c/d/i.  Extremities: Warm and well perfused  LABS:                        7.5    13.98 )-----------( 202      ( 2021 00:16 )             26.4     02-    144  |  112<H>  |  60<H>  ----------------------------<  138<H>  4.1   |  22  |  1.43<H>    Ca    7.5<L>      2021 12:34  Phos  4.4     02-24  Mg     2.0     -24    TPro  5.3<L>  /  Alb  1.2<L>  /  TBili  0.3  /  DBili  0.2  /  AST  17  /  ALT  18  /  AlkPhos  151<H>  02-24    PT/INR - ( 2021 00:16 )   PT: 13.4 sec;   INR: 1.12 ratio         PTT - ( 2021 00:16 )  PTT:29.9 sec  Urinalysis Basic - ( 2021 14:48 )    Color: Yellow / Appearance: Turbid / S.022 / pH: x  Gluc: x / Ketone: Negative  / Bili: Negative / Urobili: 3 mg/dL   Blood: x / Protein: 100 / Nitrite: Negative   Leuk Esterase: Small / RBC: 2 /hpf / WBC 55 /HPF   Sq Epi: x / Non Sq Epi: 11 /hpf / Bacteria: Negative                         Trauma Surgery Daily Progress Note  =====================================================    SUBJECTIVE: Patient seen and examined at bedside on AM rounds. Patient reports she is having some pain.  She is tolerating her tube feeds.      24 HOUR EVENTS:  - C diff came back positive, d/c'd all systemic ABX, and started on PO vaco  -VAC changed yesterday   - 5mg of metolazone given, not much response  -resumed metoprolol 12.5mg q12h since BP improved  - T 38 given 1 dose of tylenol  -patient pulled out feeding tube while on bipap machine, re-inserted awaiting for CXR for confirmation    MEDICATIONS  (STANDING):  albuterol/ipratropium for Nebulization 3 milliLiter(s) Nebulizer every 6 hours  aMIOdarone    Tablet 200 milliGRAM(s) Oral daily  chlorhexidine 0.12% Liquid 15 milliLiter(s) Oral Mucosa two times a day  chlorhexidine 2% Cloths 1 Application(s) Topical <User Schedule>  enoxaparin Injectable 40 milliGRAM(s) SubCutaneous every 12 hours  influenza   Vaccine 0.5 milliLiter(s) IntraMuscular once  nystatin Powder 1 Application(s) Topical <User Schedule>  vancomycin    Solution 500 milliGRAM(s) Oral every 6 hours    MEDICATIONS  (PRN):  acetaminophen    Suspension .. 975 milliGRAM(s) Enteral Tube every 6 hours PRN Mild Pain (1 - 3)      OBJECTIVE:    Vital Signs Last 24 Hrs  T(C): 38 (2021 23:00), Max: 38.2 (2021 15:15)  T(F): 100.4 (2021 23:00), Max: 100.8 (2021 15:15)  HR: 103 (2021 00:00) (84 - 118)  BP: 99/65 (2021 00:00) (98/72 - 144/81)  BP(mean): 78 (2021 00:00) (62 - 103)  RR: 34 (2021 00:00) (25 - 39)  SpO2: 75% (2021 00:00) (70% - 100%)        I&O's Detail    2021 07:01  -  2021 07:00  --------------------------------------------------------  IN:    Free Water: 500 mL    IV PiggyBack: 100 mL    Lactated Ringers Bolus: 1000 mL    Pivot 1.5: 180 mL    Vital1.5: 1350 mL  Total IN: 3130 mL    OUT:    Indwelling Catheter - Urethral (mL): 1095 mL    Rectal Tube (mL): 350 mL    VAC (Vacuum Assisted Closure) System (mL): 550 mL  Total OUT: 1995 mL    Total NET: 1135 mL      2021 07:01  -  2021 00:47  --------------------------------------------------------  IN:    Free Water: 250 mL    IV PiggyBack: 250 mL    Vital1.5: 1260 mL  Total IN: 1760 mL    OUT:    Indwelling Catheter - Urethral (mL): 925 mL    VAC (Vacuum Assisted Closure) System (mL): 400 mL  Total OUT: 1325 mL    Total NET: 435 mL          Daily     Daily Weight in k.6 (2021 00:01)    PHYSICAL EXAM:  General Appearance: No acute distress  Respiratory: Bipap  CV: Pulse regularly present  Abdomen: Obese, soft, nontender, nondistended w/o rebound tenderness or guarding. L breast incision site c/d/i. Wound vac in place. Abd incision site c/d/i.  Extremities: Warm and well perfused  LABS:                        7.5    13.98 )-----------( 202      ( 2021 00:16 )             26.4     02-24    144  |  112<H>  |  60<H>  ----------------------------<  138<H>  4.1   |  22  |  1.43<H>    Ca    7.5<L>      2021 12:34  Phos  4.4     -  Mg     2.0     -24    TPro  5.3<L>  /  Alb  1.2<L>  /  TBili  0.3  /  DBili  0.2  /  AST  17  /  ALT  18  /  AlkPhos  151<H>      PT/INR - ( 2021 00:16 )   PT: 13.4 sec;   INR: 1.12 ratio         PTT - ( 2021 00:16 )  PTT:29.9 sec  Urinalysis Basic - ( 2021 14:48 )    Color: Yellow / Appearance: Turbid / S.022 / pH: x  Gluc: x / Ketone: Negative  / Bili: Negative / Urobili: 3 mg/dL   Blood: x / Protein: 100 / Nitrite: Negative   Leuk Esterase: Small / RBC: 2 /hpf / WBC 55 /HPF   Sq Epi: x / Non Sq Epi: 11 /hpf / Bacteria: Negative                         Transport

## 2021-03-01 ENCOUNTER — OUTPATIENT (OUTPATIENT)
Dept: OUTPATIENT SERVICES | Facility: HOSPITAL | Age: 41
LOS: 1 days | End: 2021-03-01

## 2021-03-01 ENCOUNTER — OUTPATIENT (OUTPATIENT)
Dept: OUTPATIENT SERVICES | Facility: HOSPITAL | Age: 41
LOS: 1 days | End: 2021-03-01
Payer: MEDICAID

## 2021-03-01 DIAGNOSIS — Z98.891 HISTORY OF UTERINE SCAR FROM PREVIOUS SURGERY: Chronic | ICD-10-CM

## 2021-03-01 DIAGNOSIS — Z98.890 OTHER SPECIFIED POSTPROCEDURAL STATES: Chronic | ICD-10-CM

## 2021-03-01 PROCEDURE — H0002: CPT

## 2021-03-09 ENCOUNTER — TRANSCRIPTION ENCOUNTER (OUTPATIENT)
Age: 41
End: 2021-03-09

## 2021-03-11 NOTE — ED ADULT NURSE NOTE - CAS DISCH TRANSFER METHOD
· The patient was seen in consultation by Pulmonology  · The patient will need an ambulatory pulse oximetry check on room air on the day of discharge to see if he qualifies for supplemental oxygen    · Needs outpatient pulmonary function tests and completed by his outpatient Pulmonologist  · Will also need a repeat outpatient sleep study completed per Pulmonology's recommendations Private car

## 2021-04-06 ENCOUNTER — TRANSCRIPTION ENCOUNTER (OUTPATIENT)
Age: 41
End: 2021-04-06

## 2021-04-16 ENCOUNTER — TRANSCRIPTION ENCOUNTER (OUTPATIENT)
Age: 41
End: 2021-04-16

## 2021-04-17 ENCOUNTER — EMERGENCY (EMERGENCY)
Facility: HOSPITAL | Age: 41
LOS: 0 days | Discharge: HOME | End: 2021-04-18
Attending: EMERGENCY MEDICINE | Admitting: EMERGENCY MEDICINE
Payer: MEDICAID

## 2021-04-17 VITALS
WEIGHT: 182.1 LBS | HEART RATE: 109 BPM | HEIGHT: 61 IN | SYSTOLIC BLOOD PRESSURE: 164 MMHG | TEMPERATURE: 97 F | OXYGEN SATURATION: 98 % | DIASTOLIC BLOOD PRESSURE: 93 MMHG | RESPIRATION RATE: 17 BRPM

## 2021-04-17 DIAGNOSIS — Z98.891 HISTORY OF UTERINE SCAR FROM PREVIOUS SURGERY: Chronic | ICD-10-CM

## 2021-04-17 DIAGNOSIS — Z98.890 OTHER SPECIFIED POSTPROCEDURAL STATES: ICD-10-CM

## 2021-04-17 DIAGNOSIS — Z79.899 OTHER LONG TERM (CURRENT) DRUG THERAPY: ICD-10-CM

## 2021-04-17 DIAGNOSIS — E78.5 HYPERLIPIDEMIA, UNSPECIFIED: ICD-10-CM

## 2021-04-17 DIAGNOSIS — R10.12 LEFT UPPER QUADRANT PAIN: ICD-10-CM

## 2021-04-17 DIAGNOSIS — E78.00 PURE HYPERCHOLESTEROLEMIA, UNSPECIFIED: ICD-10-CM

## 2021-04-17 DIAGNOSIS — Z88.8 ALLERGY STATUS TO OTHER DRUGS, MEDICAMENTS AND BIOLOGICAL SUBSTANCES STATUS: ICD-10-CM

## 2021-04-17 DIAGNOSIS — I34.1 NONRHEUMATIC MITRAL (VALVE) PROLAPSE: ICD-10-CM

## 2021-04-17 DIAGNOSIS — R10.11 RIGHT UPPER QUADRANT PAIN: ICD-10-CM

## 2021-04-17 DIAGNOSIS — Z87.42 PERSONAL HISTORY OF OTHER DISEASES OF THE FEMALE GENITAL TRACT: ICD-10-CM

## 2021-04-17 DIAGNOSIS — K21.9 GASTRO-ESOPHAGEAL REFLUX DISEASE WITHOUT ESOPHAGITIS: ICD-10-CM

## 2021-04-17 DIAGNOSIS — Z98.890 OTHER SPECIFIED POSTPROCEDURAL STATES: Chronic | ICD-10-CM

## 2021-04-17 DIAGNOSIS — F41.9 ANXIETY DISORDER, UNSPECIFIED: ICD-10-CM

## 2021-04-17 DIAGNOSIS — E05.00 THYROTOXICOSIS WITH DIFFUSE GOITER WITHOUT THYROTOXIC CRISIS OR STORM: ICD-10-CM

## 2021-04-17 LAB
ALBUMIN SERPL ELPH-MCNC: 4.6 G/DL — SIGNIFICANT CHANGE UP (ref 3.5–5.2)
ALBUMIN SERPL ELPH-MCNC: 4.7 G/DL — SIGNIFICANT CHANGE UP (ref 3.5–5.2)
ALP SERPL-CCNC: 60 U/L — SIGNIFICANT CHANGE UP (ref 30–115)
ALP SERPL-CCNC: 77 U/L — SIGNIFICANT CHANGE UP (ref 30–115)
ALT FLD-CCNC: 49 U/L — HIGH (ref 0–41)
ALT FLD-CCNC: 57 U/L — HIGH (ref 0–41)
ANION GAP SERPL CALC-SCNC: 17 MMOL/L — HIGH (ref 7–14)
ANION GAP SERPL CALC-SCNC: 17 MMOL/L — HIGH (ref 7–14)
APPEARANCE UR: ABNORMAL
APTT BLD: 38.3 SEC — SIGNIFICANT CHANGE UP (ref 27–39.2)
AST SERPL-CCNC: 129 U/L — HIGH (ref 0–41)
AST SERPL-CCNC: 64 U/L — HIGH (ref 0–41)
BACTERIA # UR AUTO: ABNORMAL
BASE EXCESS BLDV CALC-SCNC: -5.1 MMOL/L — LOW (ref -2–2)
BASOPHILS # BLD AUTO: 0.04 K/UL — SIGNIFICANT CHANGE UP (ref 0–0.2)
BASOPHILS NFR BLD AUTO: 0.4 % — SIGNIFICANT CHANGE UP (ref 0–1)
BILIRUB DIRECT SERPL-MCNC: 0.2 MG/DL — SIGNIFICANT CHANGE UP (ref 0–0.2)
BILIRUB INDIRECT FLD-MCNC: 0.5 MG/DL — SIGNIFICANT CHANGE UP (ref 0.2–1.2)
BILIRUB SERPL-MCNC: 0.7 MG/DL — SIGNIFICANT CHANGE UP (ref 0.2–1.2)
BILIRUB SERPL-MCNC: 0.7 MG/DL — SIGNIFICANT CHANGE UP (ref 0.2–1.2)
BILIRUB UR-MCNC: NEGATIVE — SIGNIFICANT CHANGE UP
BUN SERPL-MCNC: 8 MG/DL — LOW (ref 10–20)
BUN SERPL-MCNC: 8 MG/DL — LOW (ref 10–20)
CA-I SERPL-SCNC: 1.24 MMOL/L — SIGNIFICANT CHANGE UP (ref 1.12–1.3)
CALCIUM SERPL-MCNC: 9.3 MG/DL — SIGNIFICANT CHANGE UP (ref 8.5–10.1)
CALCIUM SERPL-MCNC: 9.4 MG/DL — SIGNIFICANT CHANGE UP (ref 8.5–10.1)
CHLORIDE SERPL-SCNC: 95 MMOL/L — LOW (ref 98–110)
CHLORIDE SERPL-SCNC: 98 MMOL/L — SIGNIFICANT CHANGE UP (ref 98–110)
CO2 SERPL-SCNC: 17 MMOL/L — SIGNIFICANT CHANGE UP (ref 17–32)
CO2 SERPL-SCNC: 19 MMOL/L — SIGNIFICANT CHANGE UP (ref 17–32)
COLOR SPEC: YELLOW — SIGNIFICANT CHANGE UP
CREAT SERPL-MCNC: 0.8 MG/DL — SIGNIFICANT CHANGE UP (ref 0.7–1.5)
CREAT SERPL-MCNC: 0.8 MG/DL — SIGNIFICANT CHANGE UP (ref 0.7–1.5)
D DIMER BLD IA.RAPID-MCNC: 67 NG/ML DDU — SIGNIFICANT CHANGE UP (ref 0–230)
DIFF PNL FLD: NEGATIVE — SIGNIFICANT CHANGE UP
EOSINOPHIL # BLD AUTO: 0.09 K/UL — SIGNIFICANT CHANGE UP (ref 0–0.7)
EOSINOPHIL NFR BLD AUTO: 0.9 % — SIGNIFICANT CHANGE UP (ref 0–8)
EPI CELLS # UR: 12 /HPF — HIGH (ref 0–5)
GAS PNL BLDV: 139 MMOL/L — SIGNIFICANT CHANGE UP (ref 136–145)
GAS PNL BLDV: SIGNIFICANT CHANGE UP
GLUCOSE SERPL-MCNC: 76 MG/DL — SIGNIFICANT CHANGE UP (ref 70–99)
GLUCOSE SERPL-MCNC: 78 MG/DL — SIGNIFICANT CHANGE UP (ref 70–99)
GLUCOSE UR QL: NEGATIVE — SIGNIFICANT CHANGE UP
HCG SERPL QL: NEGATIVE — SIGNIFICANT CHANGE UP
HCO3 BLDV-SCNC: 21 MMOL/L — LOW (ref 22–29)
HCT VFR BLD CALC: 41.5 % — SIGNIFICANT CHANGE UP (ref 37–47)
HCT VFR BLDA CALC: 40.7 % — SIGNIFICANT CHANGE UP (ref 34–44)
HGB BLD CALC-MCNC: 13.3 G/DL — LOW (ref 14–18)
HGB BLD-MCNC: 14 G/DL — SIGNIFICANT CHANGE UP (ref 12–16)
HYALINE CASTS # UR AUTO: 7 /LPF — SIGNIFICANT CHANGE UP (ref 0–7)
IMM GRANULOCYTES NFR BLD AUTO: 0.8 % — HIGH (ref 0.1–0.3)
INR BLD: 1.06 RATIO — SIGNIFICANT CHANGE UP (ref 0.65–1.3)
KETONES UR-MCNC: ABNORMAL
LACTATE BLDV-MCNC: 0.9 MMOL/L — SIGNIFICANT CHANGE UP (ref 0.5–1.6)
LACTATE SERPL-SCNC: 0.9 MMOL/L — SIGNIFICANT CHANGE UP (ref 0.7–2)
LEUKOCYTE ESTERASE UR-ACNC: NEGATIVE — SIGNIFICANT CHANGE UP
LIDOCAIN IGE QN: 24 U/L — SIGNIFICANT CHANGE UP (ref 7–60)
LYMPHOCYTES # BLD AUTO: 2.13 K/UL — SIGNIFICANT CHANGE UP (ref 1.2–3.4)
LYMPHOCYTES # BLD AUTO: 20.4 % — LOW (ref 20.5–51.1)
MAGNESIUM SERPL-MCNC: 2.1 MG/DL — SIGNIFICANT CHANGE UP (ref 1.8–2.4)
MCHC RBC-ENTMCNC: 33.7 G/DL — SIGNIFICANT CHANGE UP (ref 32–37)
MCHC RBC-ENTMCNC: 34.3 PG — HIGH (ref 27–31)
MCV RBC AUTO: 101.7 FL — HIGH (ref 81–99)
MONOCYTES # BLD AUTO: 0.94 K/UL — HIGH (ref 0.1–0.6)
MONOCYTES NFR BLD AUTO: 9 % — SIGNIFICANT CHANGE UP (ref 1.7–9.3)
NEUTROPHILS # BLD AUTO: 7.18 K/UL — HIGH (ref 1.4–6.5)
NEUTROPHILS NFR BLD AUTO: 68.5 % — SIGNIFICANT CHANGE UP (ref 42.2–75.2)
NITRITE UR-MCNC: NEGATIVE — SIGNIFICANT CHANGE UP
NRBC # BLD: 0 /100 WBCS — SIGNIFICANT CHANGE UP (ref 0–0)
PCO2 BLDV: 41 MMHG — SIGNIFICANT CHANGE UP (ref 39–42)
PH BLDV: 7.32 — SIGNIFICANT CHANGE UP (ref 7.26–7.43)
PH UR: 5.5 — SIGNIFICANT CHANGE UP (ref 5–8)
PLATELET # BLD AUTO: 209 K/UL — SIGNIFICANT CHANGE UP (ref 130–400)
PO2 BLDV: 37 MMHG — SIGNIFICANT CHANGE UP (ref 20–40)
POTASSIUM BLDV-SCNC: 3.8 MMOL/L — SIGNIFICANT CHANGE UP (ref 3.3–5.6)
POTASSIUM SERPL-MCNC: 3.7 MMOL/L — SIGNIFICANT CHANGE UP (ref 3.5–5)
POTASSIUM SERPL-MCNC: 9.2 MMOL/L — CRITICAL HIGH (ref 3.5–5)
POTASSIUM SERPL-SCNC: 3.7 MMOL/L — SIGNIFICANT CHANGE UP (ref 3.5–5)
POTASSIUM SERPL-SCNC: 9.2 MMOL/L — CRITICAL HIGH (ref 3.5–5)
PROT SERPL-MCNC: 7.9 G/DL — SIGNIFICANT CHANGE UP (ref 6–8)
PROT SERPL-MCNC: 8.9 G/DL — HIGH (ref 6–8)
PROT UR-MCNC: SIGNIFICANT CHANGE UP
PROTHROM AB SERPL-ACNC: 12.2 SEC — SIGNIFICANT CHANGE UP (ref 9.95–12.87)
RBC # BLD: 4.08 M/UL — LOW (ref 4.2–5.4)
RBC # FLD: 13 % — SIGNIFICANT CHANGE UP (ref 11.5–14.5)
RBC CASTS # UR COMP ASSIST: 4 /HPF — SIGNIFICANT CHANGE UP (ref 0–4)
SAO2 % BLDV: 68 % — SIGNIFICANT CHANGE UP
SODIUM SERPL-SCNC: 129 MMOL/L — LOW (ref 135–146)
SODIUM SERPL-SCNC: 134 MMOL/L — LOW (ref 135–146)
SP GR SPEC: 1.01 — SIGNIFICANT CHANGE UP (ref 1.01–1.03)
TROPONIN T SERPL-MCNC: <0.01 NG/ML — SIGNIFICANT CHANGE UP
UROBILINOGEN FLD QL: SIGNIFICANT CHANGE UP
WBC # BLD: 10.46 K/UL — SIGNIFICANT CHANGE UP (ref 4.8–10.8)
WBC # FLD AUTO: 10.46 K/UL — SIGNIFICANT CHANGE UP (ref 4.8–10.8)
WBC UR QL: 6 /HPF — HIGH (ref 0–5)

## 2021-04-17 PROCEDURE — 99285 EMERGENCY DEPT VISIT HI MDM: CPT

## 2021-04-17 PROCEDURE — 71045 X-RAY EXAM CHEST 1 VIEW: CPT | Mod: 26

## 2021-04-17 PROCEDURE — 74177 CT ABD & PELVIS W/CONTRAST: CPT | Mod: 26,MA

## 2021-04-17 PROCEDURE — 93010 ELECTROCARDIOGRAM REPORT: CPT

## 2021-04-17 RX ORDER — MORPHINE SULFATE 50 MG/1
4 CAPSULE, EXTENDED RELEASE ORAL ONCE
Refills: 0 | Status: DISCONTINUED | OUTPATIENT
Start: 2021-04-17 | End: 2021-04-17

## 2021-04-17 RX ORDER — SODIUM CHLORIDE 9 MG/ML
1000 INJECTION INTRAMUSCULAR; INTRAVENOUS; SUBCUTANEOUS ONCE
Refills: 0 | Status: COMPLETED | OUTPATIENT
Start: 2021-04-17 | End: 2021-04-17

## 2021-04-17 RX ORDER — FAMOTIDINE 10 MG/ML
20 INJECTION INTRAVENOUS ONCE
Refills: 0 | Status: COMPLETED | OUTPATIENT
Start: 2021-04-17 | End: 2021-04-17

## 2021-04-17 RX ADMIN — MORPHINE SULFATE 4 MILLIGRAM(S): 50 CAPSULE, EXTENDED RELEASE ORAL at 18:57

## 2021-04-17 RX ADMIN — FAMOTIDINE 104 MILLIGRAM(S): 10 INJECTION INTRAVENOUS at 18:57

## 2021-04-17 RX ADMIN — SODIUM CHLORIDE 1000 MILLILITER(S): 9 INJECTION INTRAMUSCULAR; INTRAVENOUS; SUBCUTANEOUS at 18:57

## 2021-04-17 NOTE — ED PROVIDER NOTE - ATTENDING CONTRIBUTION TO CARE
41 y/o female with h/o etoh abuse, in ER with c/o upper abdominal pain for ~ 3-4 days.  Pt stopped drinking 5 days ago, states she has had similar pain after stopping drinking in the past. no lower abd pain.  no n/v/d.  no urinary symptoms.  no cp/sob.  no cough.  no ha/dizziness/loc.  no palpitations, no tremors. pt tolerating po.   PE - nad, nc/at, eomi, perrl, op - clear, mmm, cta b/l, no w/r/r, rrr, abd - soft, mild upper abd tenderness, no guarding/rebound, no cvat, from x 4, A&O x 3, no focal neuro deficits.  -ivf, check labs, re-eval.

## 2021-04-17 NOTE — ED PROVIDER NOTE - PROGRESS NOTE DETAILS
cbc ok, initial cmp hemolyzed, repeat sent and ok > mildly elevated LFT's.  CT abd - no acute pathology, hepatic steatosis, improved from prior.  pt feeling much better now, tolerating po, to d/c home, pt to f/u with her GI, Dr, Parmer, to take omeprazole (is prescribed, but stopped taking it), told to return to ER if she feels worse or for any other new/concerning symptoms.  pt understands and agrees with plan.

## 2021-04-17 NOTE — ED PROVIDER NOTE - PATIENT PORTAL LINK FT
You can access the FollowMyHealth Patient Portal offered by [facility] by registering at the following website: [email or web address]. By joining [facility] FollowMyHealth portal, you will also be able to view your health information using other applications (apps) compatible with our system.

## 2021-04-17 NOTE — ED ADULT TRIAGE NOTE - CHIEF COMPLAINT QUOTE
patient states she is an alcoholic stopped drinking 4 days ago and now complaint of left upper quadrant pain

## 2021-04-17 NOTE — ED PROVIDER NOTE - PHYSICAL EXAMINATION
CONST: NAD  EYES: Sclera and conjunctiva clear.   ENT: No nasal discharge. Oropharynx normal appearing, no erythema or exudates. No abscess or swelling. Uvula midline.   NECK: Non-tender, no meningeal signs. normal ROM. supple   CARD: S1 S2; No jvd  RESP: Equal BS B/L, No wheezes, rhonchi or rales. No distress  GI: RUQ and LUQ tenderness. Soft, non-distended. no cva tenderness. normal BS  MS: Normal ROM in all extremities. pulses 2 +. no calf tenderness or swelling  SKIN: Warm, dry, no acute rashes. Good turgor  NEURO: A&Ox3, No focal deficits. Strength 5/5 with no sensory deficits. Steady gait.

## 2021-04-17 NOTE — ED PROVIDER NOTE - OBJECTIVE STATEMENT
40y F pmh as listed presents for abd pain. Pt states she was drinking almost every day x2yrs until Monday when she stopped, since developed moderate sharp upper abdominal pain in her RUQ and LUQ, no aggravating or relieving factors. Pt has had similar episodes in the past when she stops drinking. Denies fever, ha, cp, sob, cough, weakness, numbness, dysuria, hematuria, n/v/d/c

## 2021-04-17 NOTE — ED PROVIDER NOTE - NS ED ROS FT
Constitutional: (-) fever  Eyes/ENT: (-) blurry vision, (-) epistaxis  Cardiovascular: (-) chest pain, (-) syncope  Respiratory: (-) cough, (-) shortness of breath  Gastrointestinal: (+) abd pain, (-) vomiting, (-) diarrhea  Musculoskeletal: (-) neck pain, (-) back pain, (-) joint pain  Integumentary: (-) rash, (-) edema  Neurological: (-) headache, (-) altered mental status  Psychiatric: (-) hallucinations, (-) si/hi  Allergic/Immunologic: (-) pruritus

## 2021-04-18 VITALS
OXYGEN SATURATION: 98 % | DIASTOLIC BLOOD PRESSURE: 76 MMHG | SYSTOLIC BLOOD PRESSURE: 122 MMHG | HEART RATE: 83 BPM | RESPIRATION RATE: 18 BRPM | TEMPERATURE: 98 F

## 2021-04-18 RX ORDER — DIPHENHYDRAMINE HCL 50 MG
50 CAPSULE ORAL ONCE
Refills: 0 | Status: COMPLETED | OUTPATIENT
Start: 2021-04-18 | End: 2021-04-18

## 2021-04-18 RX ADMIN — Medication 50 MILLIGRAM(S): at 01:39

## 2021-04-19 LAB
CULTURE RESULTS: SIGNIFICANT CHANGE UP
SPECIMEN SOURCE: SIGNIFICANT CHANGE UP

## 2021-05-18 ENCOUNTER — TRANSCRIPTION ENCOUNTER (OUTPATIENT)
Age: 41
End: 2021-05-18

## 2021-05-28 ENCOUNTER — TRANSCRIPTION ENCOUNTER (OUTPATIENT)
Age: 41
End: 2021-05-28

## 2021-05-28 ENCOUNTER — EMERGENCY (EMERGENCY)
Facility: HOSPITAL | Age: 41
LOS: 0 days | Discharge: HOME | End: 2021-05-28
Attending: EMERGENCY MEDICINE | Admitting: EMERGENCY MEDICINE
Payer: MEDICAID

## 2021-05-28 VITALS
DIASTOLIC BLOOD PRESSURE: 90 MMHG | RESPIRATION RATE: 18 BRPM | OXYGEN SATURATION: 98 % | TEMPERATURE: 98 F | SYSTOLIC BLOOD PRESSURE: 137 MMHG | HEIGHT: 61 IN | WEIGHT: 149.91 LBS | HEART RATE: 132 BPM
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
## 2021-05-28 DIAGNOSIS — K21.9 GASTRO-ESOPHAGEAL REFLUX DISEASE WITHOUT ESOPHAGITIS: ICD-10-CM

## 2021-05-28 DIAGNOSIS — Z98.891 HISTORY OF UTERINE SCAR FROM PREVIOUS SURGERY: Chronic | ICD-10-CM

## 2021-05-28 DIAGNOSIS — I34.1 NONRHEUMATIC MITRAL (VALVE) PROLAPSE: ICD-10-CM

## 2021-05-28 DIAGNOSIS — Z88.8 ALLERGY STATUS TO OTHER DRUGS, MEDICAMENTS AND BIOLOGICAL SUBSTANCES STATUS: ICD-10-CM

## 2021-05-28 DIAGNOSIS — Z79.899 OTHER LONG TERM (CURRENT) DRUG THERAPY: ICD-10-CM

## 2021-05-28 DIAGNOSIS — F17.200 NICOTINE DEPENDENCE, UNSPECIFIED, UNCOMPLICATED: ICD-10-CM

## 2021-05-28 DIAGNOSIS — E66.9 OBESITY, UNSPECIFIED: ICD-10-CM

## 2021-05-28 DIAGNOSIS — Z98.890 OTHER SPECIFIED POSTPROCEDURAL STATES: Chronic | ICD-10-CM

## 2021-05-28 DIAGNOSIS — K76.0 FATTY (CHANGE OF) LIVER, NOT ELSEWHERE CLASSIFIED: ICD-10-CM

## 2021-05-28 DIAGNOSIS — E78.00 PURE HYPERCHOLESTEROLEMIA, UNSPECIFIED: ICD-10-CM
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
## 2021-05-28 DIAGNOSIS — F41.9 ANXIETY DISORDER, UNSPECIFIED: ICD-10-CM

## 2021-05-28 DIAGNOSIS — Z87.19 PERSONAL HISTORY OF OTHER DISEASES OF THE DIGESTIVE SYSTEM: ICD-10-CM

## 2021-05-28 DIAGNOSIS — E78.5 HYPERLIPIDEMIA, UNSPECIFIED: ICD-10-CM

## 2021-05-28 DIAGNOSIS — S09.90XA UNSPECIFIED INJURY OF HEAD, INITIAL ENCOUNTER: ICD-10-CM

## 2021-05-28 DIAGNOSIS — S01.01XA LACERATION WITHOUT FOREIGN BODY OF SCALP, INITIAL ENCOUNTER: ICD-10-CM

## 2021-05-28 DIAGNOSIS — R01.1 CARDIAC MURMUR, UNSPECIFIED: ICD-10-CM

## 2021-05-28 DIAGNOSIS — Z86.59 PERSONAL HISTORY OF OTHER MENTAL AND BEHAVIORAL DISORDERS: ICD-10-CM

## 2021-05-28 PROCEDURE — 12002 RPR S/N/AX/GEN/TRNK2.6-7.5CM: CPT

## 2021-05-28 PROCEDURE — 99283 EMERGENCY DEPT VISIT LOW MDM: CPT | Mod: 25

## 2021-05-28 NOTE — ED PROVIDER NOTE - NS ED ROS FT
Constitutional: (-) fever  Eyes/ENT: (-) blurry vision, (-) epistaxis  Cardiovascular: (-) chest pain, (-) syncope  Respiratory: (-) cough, (-) shortness of breath  Gastrointestinal: (-) vomiting, (-) diarrhea  Musculoskeletal: (-) neck pain, (-) back pain, (-) joint pain  Integumentary: (-) rash, (-) edema  Neurological: (-) headache, (-) altered mental status; see HPI  Psychiatric: (-) hallucinations  Allergic/Immunologic: (-) pruritus

## 2021-05-28 NOTE — ED PROVIDER NOTE - CARE PLAN
Principal Discharge DX:	Closed head injury  Secondary Diagnosis:	Laceration of scalp, initial encounter

## 2021-05-28 NOTE — ED PROVIDER NOTE - NSFOLLOWUPCLINICS_GEN_ALL_ED_FT
[facility] Concussion Program  Concussion Program  [address] [place] [address]   Phone: [phone]  Fax:
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
## 2021-05-28 NOTE — ED ADULT NURSE NOTE - NSIMPLEMENTINTERV_GEN_ALL_ED
Implemented All Universal Safety Interventions:  [place] to call system. Call bell, personal items and telephone within reach. Instruct patient to call for assistance. Room bathroom lighting operational. Non-slip footwear when patient is off stretcher. Physically safe environment: no spills, clutter or unnecessary equipment. Stretcher in lowest position, wheels locked, appropriate side rails in place.

## 2021-05-28 NOTE — ED PROVIDER NOTE - PATIENT PORTAL LINK FT
You can access the FollowMyHealth Patient Portal offered by [facility] by registering at the following website: [email or web address]. By joining [employer]’s FollowMyHealth portal, you will also be able to view your health information using other applications (apps) compatible with our system.
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
## 2021-05-28 NOTE — ED PROVIDER NOTE - NSFOLLOWUPINSTRUCTIONS_ED_ALL_ED_FT
Closed Head Injury    Closed head injury in an injury to your head that may or may not involve a traumatic brain injury (TBI). Symptoms of TBI can be short or long lasting and include headache, dizziness, interference with memory or speech, fatigue, confusion, changes in sleep, mood changes, nausea, depression/anxiety, and dulling of senses. Make sure to obtain proper rest which includes getting plenty of sleep, avoiding excessive visual stimulation, and avoiding activities that may cause physical or mental stress. Avoid any situation where there is potential for another head injury including sports.    SEEK MEDICAL CARE IF YOU HAVE THE FOLLOWING SYMPTOMS: unusual drowsiness, vomiting, severe dizziness, seizures, lightheadedness, muscular weakness, different pupil sizes, visual changes, or clear or bloody discharge from your ears or nose.     Laceration    Staples will need to be removed in 7-10 days.    WHAT YOU NEED TO KNOW:    A laceration is an injury to the skin and the soft tissue underneath it. Lacerations happen when you are cut or hit by something. They can happen anywhere on the body.     DISCHARGE INSTRUCTIONS:    Return to the emergency department if:   You have heavy bleeding or bleeding that does not stop after 10 minutes of holding firm, direct pressure over the wound.   Your wound opens up.     Contact your healthcare provider if:   You have a fever or chills.   Your laceration is red, warm, or swollen.  You have red streaks on your skin coming from your wound.  You have white or yellow drainage from the wound that smells bad.  You have pain that gets worse, even after treatment.   You have questions or concerns about your condition or care.     Medicines:   Prescription pain medicine may be given. Ask how to take this medicine safely.   Antibiotics help treat or prevent a bacterial infection.     Take your medicine as directed. Contact your healthcare provider if you think your medicine is not helping or if you have side effects. Tell him or her if you are allergic to any medicine. Keep a list of the medicines, vitamins, and herbs you take. Include the amounts, and when and why you take them. Bring the list or the pill bottles to follow-up visits. Carry your medicine list with you in case of an emergency.    Care for your wound as directed:     Do not get your wound wet until your healthcare provider says it is okay. Do not soak your wound in water. Do not go swimming until your healthcare provider says it is okay. Carefully wash the wound with soap and water. Gently pat the area dry or allow it to air dry.     Change your bandages when they get wet, dirty, or after washing. Apply new, clean bandages as directed. Do not apply elastic bandages or tape too tight. Do not put powders or lotions over your incision.     Apply antibiotic ointment as directed. Your healthcare provider may give you antibiotic ointment to put over your wound if you have stitches. If you have strips of tape over your incision, let them dry up and fall off on their own. If they do not fall off within 14 days, gently remove them. If you have glue over your wound, do not remove or pick at it. If your glue comes off, do not replace it with glue that you have at home.       Check your wound every day for signs of infection such as swelling, redness, or pus.     Self-care:     Apply ice on your wound for 15 to 20 minutes every hour or as directed. Use an ice pack, or put crushed ice in a plastic bag. Cover it with a towel. Ice helps prevent tissue damage and decreases swelling and pain.    Use a splint as directed. A splint will decrease movement and stress on your wound. It may help it heal faster. A splint may be used for lacerations over joints or areas of your body that bend. Ask your healthcare provider how to apply and remove a splint.     Decrease scarring of your wound by applying ointments as directed. Do not apply ointments until your healthcare provider says it is okay. You may need to wait until your wound is healed. Ask which ointment to buy and how often to use it. After your wound is healed, use sunscreen over the area when you are out in the sun. You should do this for at least 6 months to 1 year after your injury.     Follow up with your healthcare provider as directed: You may need to follow up in 24 to 48 hours to have your wound checked for infection. You will need to return in 3 to 14 days if you have stitches or staples so they can be removed. Care for your wound as directed to prevent infection and help it heal. Write down your questions so you remember to ask them during your visits.

## 2021-05-28 NOTE — ED ADULT NURSE NOTE - HOW MANY DRINKS CONTAINING ALCOHOL DO YOU HAVE ON A TYPICAL DAY WHEN YOU ARE DRINKING?
Cosentyx Pregnancy And Lactation Text: This medication is Pregnancy Category B and is considered safe during pregnancy. It is unknown if this medication is excreted in breast milk. 10 or more

## 2021-06-07 DIAGNOSIS — Z71.89 OTHER SPECIFIED COUNSELING: ICD-10-CM

## 2021-06-08 ENCOUNTER — TRANSCRIPTION ENCOUNTER (OUTPATIENT)
Age: 41
End: 2021-06-08

## 2021-06-12 ENCOUNTER — TRANSCRIPTION ENCOUNTER (OUTPATIENT)
Age: 41
End: 2021-06-12

## 2021-06-20 ENCOUNTER — TRANSCRIPTION ENCOUNTER (OUTPATIENT)
Age: 41
End: 2021-06-20

## 2021-06-20 DIAGNOSIS — Z71.89 OTHER SPECIFIED COUNSELING: ICD-10-CM

## 2021-06-28 ENCOUNTER — TRANSCRIPTION ENCOUNTER (OUTPATIENT)
Age: 41
End: 2021-06-28

## 2021-08-11 DIAGNOSIS — Z71.89 OTHER SPECIFIED COUNSELING: ICD-10-CM

## 2021-08-17 ENCOUNTER — TRANSCRIPTION ENCOUNTER (OUTPATIENT)
Age: 41
End: 2021-08-17

## 2021-10-12 ENCOUNTER — INPATIENT (INPATIENT)
Facility: HOSPITAL | Age: 41
LOS: 4 days | Discharge: HOME | End: 2021-10-17
Attending: INTERNAL MEDICINE | Admitting: INTERNAL MEDICINE
Payer: MEDICAID

## 2021-10-12 VITALS
HEIGHT: 61 IN | RESPIRATION RATE: 18 BRPM | SYSTOLIC BLOOD PRESSURE: 138 MMHG | WEIGHT: 169.98 LBS | DIASTOLIC BLOOD PRESSURE: 98 MMHG | HEART RATE: 110 BPM | OXYGEN SATURATION: 96 % | TEMPERATURE: 98 F

## 2021-10-12 DIAGNOSIS — F10.239 ALCOHOL DEPENDENCE WITH WITHDRAWAL, UNSPECIFIED: ICD-10-CM

## 2021-10-12 DIAGNOSIS — Z98.891 HISTORY OF UTERINE SCAR FROM PREVIOUS SURGERY: Chronic | ICD-10-CM

## 2021-10-12 DIAGNOSIS — Z98.890 OTHER SPECIFIED POSTPROCEDURAL STATES: Chronic | ICD-10-CM

## 2021-10-12 LAB
ALBUMIN SERPL ELPH-MCNC: 3.6 G/DL — SIGNIFICANT CHANGE UP (ref 3.5–5.2)
ALBUMIN SERPL ELPH-MCNC: 5.1 G/DL — SIGNIFICANT CHANGE UP (ref 3.5–5.2)
ALP SERPL-CCNC: 52 U/L — SIGNIFICANT CHANGE UP (ref 30–115)
ALP SERPL-CCNC: 83 U/L — SIGNIFICANT CHANGE UP (ref 30–115)
ALT FLD-CCNC: 49 U/L — HIGH (ref 0–41)
ALT FLD-CCNC: 79 U/L — HIGH (ref 0–41)
AMPHET UR-MCNC: NEGATIVE — SIGNIFICANT CHANGE UP
ANION GAP SERPL CALC-SCNC: 11 MMOL/L — SIGNIFICANT CHANGE UP (ref 7–14)
ANION GAP SERPL CALC-SCNC: 19 MMOL/L — HIGH (ref 7–14)
ANION GAP SERPL CALC-SCNC: 27 MMOL/L — HIGH (ref 7–14)
ANION GAP SERPL CALC-SCNC: 9 MMOL/L — SIGNIFICANT CHANGE UP (ref 7–14)
AST SERPL-CCNC: 61 U/L — HIGH (ref 0–41)
AST SERPL-CCNC: 96 U/L — HIGH (ref 0–41)
B-OH-BUTYR SERPL-SCNC: 2.7 MMOL/L — HIGH
BARBITURATES UR SCN-MCNC: NEGATIVE — SIGNIFICANT CHANGE UP
BASE EXCESS BLDV CALC-SCNC: -6.9 MMOL/L — LOW (ref -2–3)
BASE EXCESS BLDV CALC-SCNC: -7.1 MMOL/L — LOW (ref -2–3)
BASE EXCESS BLDV CALC-SCNC: -8.6 MMOL/L — LOW (ref -2–3)
BASOPHILS # BLD AUTO: 0.05 K/UL — SIGNIFICANT CHANGE UP (ref 0–0.2)
BASOPHILS NFR BLD AUTO: 0.8 % — SIGNIFICANT CHANGE UP (ref 0–1)
BENZODIAZ UR-MCNC: NEGATIVE — SIGNIFICANT CHANGE UP
BILIRUB DIRECT SERPL-MCNC: 0.3 MG/DL — HIGH (ref 0–0.2)
BILIRUB INDIRECT FLD-MCNC: 0.7 MG/DL — SIGNIFICANT CHANGE UP (ref 0.2–1.2)
BILIRUB SERPL-MCNC: 0.8 MG/DL — SIGNIFICANT CHANGE UP (ref 0.2–1.2)
BILIRUB SERPL-MCNC: 1 MG/DL — SIGNIFICANT CHANGE UP (ref 0.2–1.2)
BUN SERPL-MCNC: 6 MG/DL — LOW (ref 10–20)
BUN SERPL-MCNC: 7 MG/DL — LOW (ref 10–20)
BUN SERPL-MCNC: 8 MG/DL — LOW (ref 10–20)
BUN SERPL-MCNC: 9 MG/DL — LOW (ref 10–20)
CA-I SERPL-SCNC: 1.08 MMOL/L — LOW (ref 1.15–1.33)
CA-I SERPL-SCNC: 1.09 MMOL/L — LOW (ref 1.15–1.33)
CA-I SERPL-SCNC: 1.1 MMOL/L — LOW (ref 1.15–1.33)
CALCIUM SERPL-MCNC: 8 MG/DL — LOW (ref 8.5–10.1)
CALCIUM SERPL-MCNC: 8.1 MG/DL — LOW (ref 8.5–10.1)
CALCIUM SERPL-MCNC: 8.2 MG/DL — LOW (ref 8.5–10.1)
CALCIUM SERPL-MCNC: 9.5 MG/DL — SIGNIFICANT CHANGE UP (ref 8.5–10.1)
CHLORIDE SERPL-SCNC: 100 MMOL/L — SIGNIFICANT CHANGE UP (ref 98–110)
CHLORIDE SERPL-SCNC: 103 MMOL/L — SIGNIFICANT CHANGE UP (ref 98–110)
CHLORIDE SERPL-SCNC: 104 MMOL/L — SIGNIFICANT CHANGE UP (ref 98–110)
CHLORIDE SERPL-SCNC: 94 MMOL/L — LOW (ref 98–110)
CO2 SERPL-SCNC: 14 MMOL/L — LOW (ref 17–32)
CO2 SERPL-SCNC: 18 MMOL/L — SIGNIFICANT CHANGE UP (ref 17–32)
CO2 SERPL-SCNC: 24 MMOL/L — SIGNIFICANT CHANGE UP (ref 17–32)
CO2 SERPL-SCNC: 26 MMOL/L — SIGNIFICANT CHANGE UP (ref 17–32)
COCAINE METAB.OTHER UR-MCNC: NEGATIVE — SIGNIFICANT CHANGE UP
CREAT SERPL-MCNC: 0.5 MG/DL — LOW (ref 0.7–1.5)
CREAT SERPL-MCNC: 0.5 MG/DL — LOW (ref 0.7–1.5)
CREAT SERPL-MCNC: 0.6 MG/DL — LOW (ref 0.7–1.5)
CREAT SERPL-MCNC: 0.7 MG/DL — SIGNIFICANT CHANGE UP (ref 0.7–1.5)
EOSINOPHIL # BLD AUTO: 0 K/UL — SIGNIFICANT CHANGE UP (ref 0–0.7)
EOSINOPHIL NFR BLD AUTO: 0 % — SIGNIFICANT CHANGE UP (ref 0–8)
ETHANOL SERPL-MCNC: 350 MG/DL — HIGH
GAS PNL BLDV: 134 MMOL/L — LOW (ref 136–145)
GAS PNL BLDV: 134 MMOL/L — LOW (ref 136–145)
GAS PNL BLDV: 136 MMOL/L — SIGNIFICANT CHANGE UP (ref 136–145)
GAS PNL BLDV: SIGNIFICANT CHANGE UP
GLUCOSE SERPL-MCNC: 131 MG/DL — HIGH (ref 70–99)
GLUCOSE SERPL-MCNC: 137 MG/DL — HIGH (ref 70–99)
GLUCOSE SERPL-MCNC: 71 MG/DL — SIGNIFICANT CHANGE UP (ref 70–99)
GLUCOSE SERPL-MCNC: 72 MG/DL — SIGNIFICANT CHANGE UP (ref 70–99)
HCG SERPL QL: NEGATIVE — SIGNIFICANT CHANGE UP
HCG UR QL: NEGATIVE — SIGNIFICANT CHANGE UP
HCO3 BLDV-SCNC: 17 MMOL/L — LOW (ref 22–29)
HCO3 BLDV-SCNC: 18 MMOL/L — LOW (ref 22–29)
HCO3 BLDV-SCNC: 19 MMOL/L — LOW (ref 22–29)
HCT VFR BLD CALC: 46.5 % — SIGNIFICANT CHANGE UP (ref 37–47)
HCT VFR BLDA CALC: 41 % — SIGNIFICANT CHANGE UP (ref 34.5–46.5)
HCT VFR BLDA CALC: 41 % — SIGNIFICANT CHANGE UP (ref 34.5–46.5)
HCT VFR BLDA CALC: 51 % — HIGH (ref 34.5–46.5)
HGB BLD CALC-MCNC: 13.5 G/DL — SIGNIFICANT CHANGE UP (ref 11.7–16.1)
HGB BLD CALC-MCNC: 13.6 G/DL — SIGNIFICANT CHANGE UP (ref 11.7–16.1)
HGB BLD CALC-MCNC: 17 G/DL — HIGH (ref 11.7–16.1)
HGB BLD-MCNC: 16 G/DL — SIGNIFICANT CHANGE UP (ref 12–16)
IMM GRANULOCYTES NFR BLD AUTO: 0.3 % — SIGNIFICANT CHANGE UP (ref 0.1–0.3)
LACTATE BLDV-MCNC: 6.3 MMOL/L — CRITICAL HIGH (ref 0.5–2)
LACTATE BLDV-MCNC: 7 MMOL/L — CRITICAL HIGH (ref 0.5–2)
LACTATE BLDV-MCNC: 8 MMOL/L — CRITICAL HIGH (ref 0.5–2)
LACTATE SERPL-SCNC: 1.1 MMOL/L — SIGNIFICANT CHANGE UP (ref 0.7–2)
LACTATE SERPL-SCNC: 1.3 MMOL/L — SIGNIFICANT CHANGE UP (ref 0.7–2)
LACTATE SERPL-SCNC: 6 MMOL/L — CRITICAL HIGH (ref 0.7–2)
LIDOCAIN IGE QN: 28 U/L — SIGNIFICANT CHANGE UP (ref 7–60)
LYMPHOCYTES # BLD AUTO: 1.63 K/UL — SIGNIFICANT CHANGE UP (ref 1.2–3.4)
LYMPHOCYTES # BLD AUTO: 26.5 % — SIGNIFICANT CHANGE UP (ref 20.5–51.1)
MAGNESIUM SERPL-MCNC: 2.1 MG/DL — SIGNIFICANT CHANGE UP (ref 1.8–2.4)
MCHC RBC-ENTMCNC: 34.4 G/DL — SIGNIFICANT CHANGE UP (ref 32–37)
MCHC RBC-ENTMCNC: 34.7 PG — HIGH (ref 27–31)
MCV RBC AUTO: 100.9 FL — HIGH (ref 81–99)
METHADONE UR-MCNC: NEGATIVE — SIGNIFICANT CHANGE UP
MONOCYTES # BLD AUTO: 0.44 K/UL — SIGNIFICANT CHANGE UP (ref 0.1–0.6)
MONOCYTES NFR BLD AUTO: 7.2 % — SIGNIFICANT CHANGE UP (ref 1.7–9.3)
NEUTROPHILS # BLD AUTO: 4.01 K/UL — SIGNIFICANT CHANGE UP (ref 1.4–6.5)
NEUTROPHILS NFR BLD AUTO: 65.2 % — SIGNIFICANT CHANGE UP (ref 42.2–75.2)
NRBC # BLD: 0 /100 WBCS — SIGNIFICANT CHANGE UP (ref 0–0)
NT-PROBNP SERPL-SCNC: 15 PG/ML — SIGNIFICANT CHANGE UP (ref 0–300)
OPIATES UR-MCNC: NEGATIVE — SIGNIFICANT CHANGE UP
OSMOLALITY SERPL: 285 MOS/KG — SIGNIFICANT CHANGE UP (ref 275–300)
PCO2 BLDV: 36 MMHG — LOW (ref 39–42)
PCO2 BLDV: 37 MMHG — LOW (ref 39–42)
PCO2 BLDV: 38 MMHG — LOW (ref 39–42)
PCP SPEC-MCNC: SIGNIFICANT CHANGE UP
PH BLDV: 7.28 — LOW (ref 7.32–7.43)
PH BLDV: 7.3 — LOW (ref 7.32–7.43)
PH BLDV: 7.32 — SIGNIFICANT CHANGE UP (ref 7.32–7.43)
PLATELET # BLD AUTO: 247 K/UL — SIGNIFICANT CHANGE UP (ref 130–400)
PO2 BLDV: 121 MMHG — SIGNIFICANT CHANGE UP
PO2 BLDV: 203 MMHG — SIGNIFICANT CHANGE UP
PO2 BLDV: 61 MMHG — SIGNIFICANT CHANGE UP
POTASSIUM BLDV-SCNC: 4.6 MMOL/L — SIGNIFICANT CHANGE UP (ref 3.5–5.1)
POTASSIUM BLDV-SCNC: 4.8 MMOL/L — SIGNIFICANT CHANGE UP (ref 3.5–5.1)
POTASSIUM BLDV-SCNC: 5.4 MMOL/L — HIGH (ref 3.5–5.1)
POTASSIUM SERPL-MCNC: 4.2 MMOL/L — SIGNIFICANT CHANGE UP (ref 3.5–5)
POTASSIUM SERPL-MCNC: 4.3 MMOL/L — SIGNIFICANT CHANGE UP (ref 3.5–5)
POTASSIUM SERPL-MCNC: 4.4 MMOL/L — SIGNIFICANT CHANGE UP (ref 3.5–5)
POTASSIUM SERPL-MCNC: 4.5 MMOL/L — SIGNIFICANT CHANGE UP (ref 3.5–5)
POTASSIUM SERPL-SCNC: 4.2 MMOL/L — SIGNIFICANT CHANGE UP (ref 3.5–5)
POTASSIUM SERPL-SCNC: 4.3 MMOL/L — SIGNIFICANT CHANGE UP (ref 3.5–5)
POTASSIUM SERPL-SCNC: 4.4 MMOL/L — SIGNIFICANT CHANGE UP (ref 3.5–5)
POTASSIUM SERPL-SCNC: 4.5 MMOL/L — SIGNIFICANT CHANGE UP (ref 3.5–5)
PROPOXYPHENE QUALITATIVE URINE RESULT: NEGATIVE — SIGNIFICANT CHANGE UP
PROT SERPL-MCNC: 5.7 G/DL — LOW (ref 6–8)
PROT SERPL-MCNC: 8.5 G/DL — HIGH (ref 6–8)
RBC # BLD: 4.61 M/UL — SIGNIFICANT CHANGE UP (ref 4.2–5.4)
RBC # FLD: 13.4 % — SIGNIFICANT CHANGE UP (ref 11.5–14.5)
SALICYLATES SERPL-MCNC: 0.9 MG/DL — LOW (ref 4–30)
SAO2 % BLDV: 88 % — SIGNIFICANT CHANGE UP
SAO2 % BLDV: 98.7 % — SIGNIFICANT CHANGE UP
SAO2 % BLDV: 99 % — SIGNIFICANT CHANGE UP
SARS-COV-2 RNA SPEC QL NAA+PROBE: SIGNIFICANT CHANGE UP
SODIUM SERPL-SCNC: 135 MMOL/L — SIGNIFICANT CHANGE UP (ref 135–146)
SODIUM SERPL-SCNC: 137 MMOL/L — SIGNIFICANT CHANGE UP (ref 135–146)
SODIUM SERPL-SCNC: 138 MMOL/L — SIGNIFICANT CHANGE UP (ref 135–146)
SODIUM SERPL-SCNC: 139 MMOL/L — SIGNIFICANT CHANGE UP (ref 135–146)
TROPONIN T SERPL-MCNC: <0.01 NG/ML — SIGNIFICANT CHANGE UP
TSH SERPL-MCNC: 1.08 UIU/ML — SIGNIFICANT CHANGE UP (ref 0.27–4.2)
TSH SERPL-MCNC: 1.92 UIU/ML — SIGNIFICANT CHANGE UP (ref 0.27–4.2)
WBC # BLD: 6.15 K/UL — SIGNIFICANT CHANGE UP (ref 4.8–10.8)
WBC # FLD AUTO: 6.15 K/UL — SIGNIFICANT CHANGE UP (ref 4.8–10.8)

## 2021-10-12 PROCEDURE — 99221 1ST HOSP IP/OBS SF/LOW 40: CPT

## 2021-10-12 PROCEDURE — 74177 CT ABD & PELVIS W/CONTRAST: CPT | Mod: 26,MA

## 2021-10-12 PROCEDURE — 71045 X-RAY EXAM CHEST 1 VIEW: CPT | Mod: 26

## 2021-10-12 PROCEDURE — 76604 US EXAM CHEST: CPT | Mod: 26

## 2021-10-12 PROCEDURE — 99233 SBSQ HOSP IP/OBS HIGH 50: CPT

## 2021-10-12 PROCEDURE — 99291 CRITICAL CARE FIRST HOUR: CPT

## 2021-10-12 PROCEDURE — 93010 ELECTROCARDIOGRAM REPORT: CPT

## 2021-10-12 PROCEDURE — 71275 CT ANGIOGRAPHY CHEST: CPT | Mod: 26,MA

## 2021-10-12 PROCEDURE — 99291 CRITICAL CARE FIRST HOUR: CPT | Mod: 25

## 2021-10-12 PROCEDURE — 99251: CPT

## 2021-10-12 RX ORDER — TRAZODONE HCL 50 MG
100 TABLET ORAL AT BEDTIME
Refills: 0 | Status: DISCONTINUED | OUTPATIENT
Start: 2021-10-12 | End: 2021-10-17

## 2021-10-12 RX ORDER — ONDANSETRON 8 MG/1
4 TABLET, FILM COATED ORAL ONCE
Refills: 0 | Status: COMPLETED | OUTPATIENT
Start: 2021-10-12 | End: 2021-10-12

## 2021-10-12 RX ORDER — SODIUM CHLORIDE 9 MG/ML
1000 INJECTION, SOLUTION INTRAVENOUS
Refills: 0 | Status: DISCONTINUED | OUTPATIENT
Start: 2021-10-12 | End: 2021-10-14

## 2021-10-12 RX ORDER — FOLIC ACID 0.8 MG
1 TABLET ORAL DAILY
Refills: 0 | Status: DISCONTINUED | OUTPATIENT
Start: 2021-10-12 | End: 2021-10-14

## 2021-10-12 RX ORDER — PANTOPRAZOLE SODIUM 20 MG/1
40 TABLET, DELAYED RELEASE ORAL ONCE
Refills: 0 | Status: COMPLETED | OUTPATIENT
Start: 2021-10-12 | End: 2021-10-12

## 2021-10-12 RX ORDER — ACETAMINOPHEN 500 MG
650 TABLET ORAL EVERY 6 HOURS
Refills: 0 | Status: DISCONTINUED | OUTPATIENT
Start: 2021-10-12 | End: 2021-10-17

## 2021-10-12 RX ORDER — PANTOPRAZOLE SODIUM 20 MG/1
40 TABLET, DELAYED RELEASE ORAL
Refills: 0 | Status: DISCONTINUED | OUTPATIENT
Start: 2021-10-13 | End: 2021-10-13

## 2021-10-12 RX ORDER — DEXTROSE 50 % IN WATER 50 %
50 SYRINGE (ML) INTRAVENOUS ONCE
Refills: 0 | Status: COMPLETED | OUTPATIENT
Start: 2021-10-12 | End: 2021-10-12

## 2021-10-12 RX ORDER — SODIUM CHLORIDE 9 MG/ML
1000 INJECTION, SOLUTION INTRAVENOUS
Refills: 0 | Status: DISCONTINUED | OUTPATIENT
Start: 2021-10-12 | End: 2021-10-12

## 2021-10-12 RX ORDER — SODIUM CHLORIDE 9 MG/ML
1000 INJECTION, SOLUTION INTRAVENOUS ONCE
Refills: 0 | Status: COMPLETED | OUTPATIENT
Start: 2021-10-12 | End: 2021-10-12

## 2021-10-12 RX ORDER — FOLIC ACID 0.8 MG
1 TABLET ORAL ONCE
Refills: 0 | Status: COMPLETED | OUTPATIENT
Start: 2021-10-12 | End: 2021-10-12

## 2021-10-12 RX ORDER — DIAZEPAM 5 MG
5 TABLET ORAL ONCE
Refills: 0 | Status: DISCONTINUED | OUTPATIENT
Start: 2021-10-12 | End: 2021-10-12

## 2021-10-12 RX ORDER — INFLUENZA VIRUS VACCINE 15; 15; 15; 15 UG/.5ML; UG/.5ML; UG/.5ML; UG/.5ML
0.5 SUSPENSION INTRAMUSCULAR ONCE
Refills: 0 | Status: DISCONTINUED | OUTPATIENT
Start: 2021-10-12 | End: 2021-10-17

## 2021-10-12 RX ORDER — ENOXAPARIN SODIUM 100 MG/ML
40 INJECTION SUBCUTANEOUS AT BEDTIME
Refills: 0 | Status: DISCONTINUED | OUTPATIENT
Start: 2021-10-12 | End: 2021-10-17

## 2021-10-12 RX ORDER — THIAMINE MONONITRATE (VIT B1) 100 MG
100 TABLET ORAL DAILY
Refills: 0 | Status: DISCONTINUED | OUTPATIENT
Start: 2021-10-12 | End: 2021-10-14

## 2021-10-12 RX ORDER — SODIUM CHLORIDE 9 MG/ML
1000 INJECTION INTRAMUSCULAR; INTRAVENOUS; SUBCUTANEOUS ONCE
Refills: 0 | Status: COMPLETED | OUTPATIENT
Start: 2021-10-12 | End: 2021-10-12

## 2021-10-12 RX ORDER — THIAMINE MONONITRATE (VIT B1) 100 MG
100 TABLET ORAL ONCE
Refills: 0 | Status: COMPLETED | OUTPATIENT
Start: 2021-10-12 | End: 2021-10-12

## 2021-10-12 RX ADMIN — SODIUM CHLORIDE 200 MILLILITER(S): 9 INJECTION, SOLUTION INTRAVENOUS at 12:10

## 2021-10-12 RX ADMIN — Medication 1 TABLET(S): at 18:13

## 2021-10-12 RX ADMIN — Medication 2 MILLIGRAM(S): at 04:19

## 2021-10-12 RX ADMIN — Medication 50 MILLILITER(S): at 04:17

## 2021-10-12 RX ADMIN — ENOXAPARIN SODIUM 40 MILLIGRAM(S): 100 INJECTION SUBCUTANEOUS at 21:52

## 2021-10-12 RX ADMIN — Medication 30 MILLILITER(S): at 18:12

## 2021-10-12 RX ADMIN — SODIUM CHLORIDE 200 MILLILITER(S): 9 INJECTION, SOLUTION INTRAVENOUS at 16:19

## 2021-10-12 RX ADMIN — Medication 2 MILLIGRAM(S): at 11:22

## 2021-10-12 RX ADMIN — Medication 30 MILLILITER(S): at 21:52

## 2021-10-12 RX ADMIN — SODIUM CHLORIDE 1000 MILLILITER(S): 9 INJECTION, SOLUTION INTRAVENOUS at 04:18

## 2021-10-12 RX ADMIN — Medication 5 MILLIGRAM(S): at 07:54

## 2021-10-12 RX ADMIN — Medication 2 MILLIGRAM(S): at 10:57

## 2021-10-12 RX ADMIN — ONDANSETRON 4 MILLIGRAM(S): 8 TABLET, FILM COATED ORAL at 05:59

## 2021-10-12 RX ADMIN — Medication 1 MILLIGRAM(S): at 09:10

## 2021-10-12 RX ADMIN — Medication 4 MILLIGRAM(S): at 21:52

## 2021-10-12 RX ADMIN — SODIUM CHLORIDE 150 MILLILITER(S): 9 INJECTION, SOLUTION INTRAVENOUS at 11:11

## 2021-10-12 RX ADMIN — SODIUM CHLORIDE 1000 MILLILITER(S): 9 INJECTION INTRAMUSCULAR; INTRAVENOUS; SUBCUTANEOUS at 02:51

## 2021-10-12 RX ADMIN — SODIUM CHLORIDE 200 MILLILITER(S): 9 INJECTION, SOLUTION INTRAVENOUS at 21:50

## 2021-10-12 RX ADMIN — Medication 100 MILLIGRAM(S): at 04:17

## 2021-10-12 RX ADMIN — Medication 2 MILLIGRAM(S): at 18:12

## 2021-10-12 RX ADMIN — Medication 100 MILLIGRAM(S): at 21:52

## 2021-10-12 RX ADMIN — Medication 2 MILLIGRAM(S): at 15:24

## 2021-10-12 RX ADMIN — PANTOPRAZOLE SODIUM 40 MILLIGRAM(S): 20 TABLET, DELAYED RELEASE ORAL at 07:54

## 2021-10-12 NOTE — H&P ADULT - ASSESSMENT
Atypical chest pain  etoh abuse/withdrawal  alcoholic liver disease  abdominal pain  lactic acidosis    Pt w significant hx of etoh abuse now w chest pressure and vomiting x 1 week, no GI bleeding, no free air in the thorax unlikely Boerhaaves sx. Due to patient's hx of withdrawal, she will require CIWA protocol, she received thiamine in the ED and Folic acid and Vit B12 to be added.  Unclear source for the elevated lactic acid, continue IVF hydration, obtain ct abd/pelvis to eval for intra-abdominal pathology.  Check amylase and lipase. Check PT/PTT, consider GI eval.  PPI for possible gastritis. admit to ICU.

## 2021-10-12 NOTE — CONSULT NOTE ADULT - ASSESSMENT
IMPRESSION:    Alcoholic Ketoacidosis, HAGMA  Lactic acidosis, unclear etiology  EtOH intoxication & withdrawal  HO EtOH & Benzodiazepine abuse  HO Graves' disease      PLAN:    CNS: Ativan taper protocol. CIWA protocol, Ativan q1-2h prn IV for CIWA > 7/8.  UDS & SDS, Serum Osm.  Thiamine, Folic Acid, MVI.    HEENT: oral care     PULMONARY:  HOB @ 30-45 degrees.  Aspiration precautions.  Incentive spirometer.  Target POx > 94%.  ETCO2 trending if very somnolent.    CARDIOVASCULAR:  D5NS @ 200 cc/hr.  ECHO.  Monitor QTc.    GASTROENTEROLOGY:  GI prophylaxis.  Monitor LFTs.  Amylase.  Hepatitis panel.  NPO until fully awake.  Feeding as tolerated.  Bowel regimen.    RENAL:  Check CK.  FU lytes (K, Mg, iP, ionized Ca).  Correct electrolytes as necessary.  Monitor UO.  Bladder US & Zarate if retaining.    INFECTIOUS DISEASE:  Monitor off ABX.  Procalcitonin.  FU cultures    HEMATOLOGICAL:  DVT prophylaxis.    ENDOCRINE:  Follow up FS.  Insulin drip.  TSH    Monitor in MICU IMPRESSION:    Alcoholic Ketoacidosis, HAGMA  Lactic acidosis, unclear etiology  EtOH intoxication & withdrawal  HO EtOH & Benzodiazepine abuse  HO Graves' disease      PLAN:    CNS: Ativan taper protocol. CIWA protocol, Ativan q1-2h prn IV for CIWA > 7/8.  UDS & SDS, Serum Osm.  Thiamine, Folic Acid, MVI.    HEENT: oral care     PULMONARY:  HOB @ 30-45 degrees.  Aspiration precautions.  Incentive spirometer.  Target POx > 94%.  ETCO2 trending if very somnolent.    CARDIOVASCULAR:  D5NS @ 200 cc/hr.  ECHO.  Monitor QTc.    GASTROENTEROLOGY:  GI prophylaxis.  Monitor LFTs.  Amylase.  Hepatitis panel.  NPO until fully awake.  Feeding as tolerated.  Bowel regimen.    RENAL:  Check CK.  Trend Lactic Acid.  FU lytes (K, Mg, iP, ionized Ca).  Correct electrolytes as necessary.  Monitor UO.  Bladder US & Zarate if retaining.    INFECTIOUS DISEASE:  Monitor off ABX.  Procalcitonin.  FU cultures    HEMATOLOGICAL:  DVT prophylaxis.    ENDOCRINE:  Follow up FS.  Insulin drip.  TSH    Monitor in MICU

## 2021-10-12 NOTE — CONSULT NOTE ADULT - SUBJECTIVE AND OBJECTIVE BOX
Patient is a 41y old  Female who presents with a chief complaint of 1 week of vomiting and chest pressure  40 yo female PMHx sig for ETOH abuse, BZD abuse, Esophagitis, Fatty Liver disease (etoh), GERD, Grave's disease, MVP, HLD.  She states she has been having chest pressure x 1 week associated w vomiting, yellowish, non-bloody. She was concerned she was having a "heart attack".  She states she has high cholesterol and is not compliant w her medications.  She states she drinks daily 1.75 L bottle of vodka every 2 days. She states she goes into withdrawal when she stops. (12 Oct 2021 07:47)    PAST MEDICAL & SURGICAL HISTORY:  Alcoholism  Anxiety  Graves disease  GERD (gastroesophageal reflux disease)  Gastritis  MVP (mitral valve prolapse)  Nicotine dependence  Benzodiazepine misuse  Hypercholesterolemia  Obesity  Esophagitis  Heart murmur  Hyperlipidemia  Hemochromatosis  Fatty liver  History of  section  S/P tendon repair  5th finger right hand    SOCIAL HX:   Smoker        EtOH abuse           Benzodiazepine abuse  Denies IVDU    FAMILY HISTORY:  Benzodiazepine misuse  No known cardiovascular family history     Review Of Systems:   All ROS are negative except per HPI     Allergies  Risperdal (Urticaria)  Topamax (Urticaria)    Intolerances    PHYSICAL EXAM  ICU Vital Signs Last 24 Hrs  T(C): 37.2 (12 Oct 2021 10:22), Max: 37.2 (12 Oct 2021 10:22)  T(F): 99 (12 Oct 2021 10:22), Max: 99 (12 Oct 2021 10:22)  HR: 116 (12 Oct 2021 10:22) (91 - 116)  BP: 156/78 (12 Oct 2021 10:22) (118/79 - 156/78)  RR: 28 (12 Oct 2021 10:22) (18 - 28)  SpO2: 93% (12 Oct 2021 10:22) (93% - 96%)      CONSTITUTIONAL:  Well nourished.  NAD    ENT:   Airway patent,   Mouth with normal mucosa.   No thrush    EYES:   pupils equal,   round and reactive to light.    CARDIAC:   Normal rate,   Regular rhythm.    Heart sounds S1, S2.   No edema    RESPIRATORY:   No wheezing   Normal chest expansion  No use of accessory muscles    GASTROINTESTINAL:  Abdomen soft   Non-tender,   No guarding,   + BS    MUSCULOSKELETAL:   Range of motion is not limited,  No clubbing, cyanosis    NEUROLOGICAL:   Alert and oriented   No motor or sensory deficits.    SKIN:   Skin normal color for race,   Warm and dry  No evidence of rash.    PSYCHIATRIC:   Restless at times  No suicidal ideation at this time    HEME LYMPH:   No cervical  lymphadenopathy.  No inguinal lymphadenopathy    LABS:                          16.0   6.15  )-----------( 247      ( 12 Oct 2021 02:55 )             46.5     137  |  100  |  8<L>  ----------------------------<  72  4.5   |  18  |  0.5<L>    Ca    8.2<L>      12 Oct 2021 07:00  Mg     2.1     10-12    Lipase, Serum: 28 U/L (10.12.21 @ 02:55)    TPro  8.5<H>  /  Alb  5.1  /  TBili  0.8  /  DBili  x   /  AST  96<H>  /  ALT  79<H>  /  AlkPhos  83  10-12                      CARDIAC MARKERS ( 12 Oct 2021 02:55 )  x     / <0.01 ng/mL / x     / x     / x        LIVER FUNCTIONS - ( 12 Oct 2021 02:55 )  Alb: 5.1 g/dL / Pro: 8.5 g/dL / ALK PHOS: 83 U/L / ALT: 79 U/L / AST: 96 U/L / GGT: x             X-Rays reviewed:                                                                                    ECHO    CXR interpreted by me:  no acute cardiopulmonary pathology      MEDICATIONS  (STANDING):  lactated ringers. 1000 milliLiter(s) (150 mL/Hr) IV Continuous <Continuous>  LORazepam   Injectable   IV Push   LORazepam   Injectable 2 milliGRAM(s) IV Push every 4 hours    MEDICATIONS  (PRN):  LORazepam   Injectable 2 milliGRAM(s) IV Push every 2 hours PRN Agitation

## 2021-10-12 NOTE — ED PROVIDER NOTE - OBJECTIVE STATEMENT
41yoF with pmhx of fatty liver disease, alcohol use d/o, benzo use d/o, MVP, hematochromatosis, grave's disease who present with CP and SOB for 1 week. it is intermittent, crushing substernal cp radiates to left neck and shoulder, unrelated to activities, non-pleuritic nonreproducible. she also has nausea and vomiting but has been having this for past few years, no new changes, non bloody. she drank a bit prior to coming in to prevent herself from getting alcohol withdrawal, she drinks 1L per day. non smoker.

## 2021-10-12 NOTE — ED PROVIDER NOTE - PHYSICAL EXAMINATION
CONSTITUTIONAL: Well-developed; well-nourished; in no acute distress  SKIN: warm, dry  HEAD: Normocephalic; atraumatic  EYES: no conjunctival erythema  ENT: No nasal discharge; airway clear  NECK: Supple; non tender.  CARD: Regular rate and rhythm  RESP: No wheezes, rales or rhonchi tachypneic increased work of breathing satting 92, now on nc 4L went up to 98  ABD: soft ntnd  EXT: Normal ROM.  No cyanosis or edema.   NEURO: Alert, oriented, grossly unremarkable  PSYCH: Cooperative, appropriate

## 2021-10-12 NOTE — CONSULT NOTE ADULT - ATTENDING COMMENTS
Attending Statement: I have personally performed a face to face diagnostic evaluation on this patient. The patient is suffering from Alcoholic Ketoacidosis, HAGMA, Lactic acidosis, EtOH intoxication & withdrawal.  I have reviewed the above note and agree with the history, exam and suggestions for care, except as I have noted in the text. I have amended the treatment plans as necessary.

## 2021-10-12 NOTE — H&P ADULT - NSHPLABSRESULTS_GEN_ALL_CORE
16.0   6.15  )-----------( 247      ( 12 Oct 2021 02:55 )             46.5       10-12    135  |  94<L>  |  9<L>  ----------------------------<  71  4.4   |  14<L>  |  0.7    Ca    9.5      12 Oct 2021 02:55  Mg     2.1     10-12    TPro  8.5<H>  /  Alb  5.1  /  TBili  0.8  /  DBili  x   /  AST  96<H>  /  ALT  79<H>  /  AlkPhos  83  10-12                      Lactate Trend      CARDIAC MARKERS ( 12 Oct 2021 02:55 )  x     / <0.01 ng/mL / x     / x     / x            CAPILLARY BLOOD GLUCOSE            CT chest    IMPRESSION:    1. No evidence of acute intrathoracic pathology or pulmonary embolism.  2. Hepatic steatosis.

## 2021-10-12 NOTE — ED PROVIDER NOTE - NS ED ROS FT
Constitutional: No fever, chills, weight loss, generalized fatigue  Eyes:  No visual changes  Ears:  No hearing change  Neck: No neck pain, stiffness, or edema  Cardiac: +chest pain  Respiratory:  +SOB  GI:  No abdominal pain or diarrhea +nausea +vomiting  :  No dysuria, frequency or burning  MS:  No back pain  Neuro:  No headache or weakness.  No LOC  Skin:  No skin rash

## 2021-10-12 NOTE — PROGRESS NOTE ADULT - ASSESSMENT
42 yo female PMHx sig for ETOH abuse (1 L vodka daily), BZD abuse, Esophagitis, Fatty Liver disease, GERD, Grave's disease, MVP, HLD.  She states she has been  vomiting  yellowish non-bloody bile for the past week, not eating but still drinking vodka daily.     alcoholic ketoacidosis / suspected thiamine and folate deficiency     - continue D5NS   - benzo detox protocol   - supplement thiamine and folate   - check repeat lft's and electrolytes in am   - DVT prophylaxis

## 2021-10-12 NOTE — SBIRT NOTE ADULT - NSSBIRTBRIEFINTDET_GEN_A_CORE
Screening results were reviewed with the patient and patient was provided information about healthy guidelines and potential negative consequences associated with level of risk. Motivation and readiness to reduce or stop use was discussed and goals and activities to make changes were suggested/offered.     Options discussed for further evaluation and treatment, but referral to treatment was not completed as patient is being admitted medically, and reports active engagement in [facility] outpatient program. HC discussed with patient adding additional support systems. Provided patient with listings of in person AA meetings in her area, as well as AA meetings across [place] that are for women only. Also provided patient with information for [employer], phone based support program, and contact information for Telephonic SBIRT.

## 2021-10-12 NOTE — ED PROVIDER NOTE - CLINICAL SUMMARY MEDICAL DECISION MAKING FREE TEXT BOX
41yF pmhx  GERD, Grave's disease, HLD, MVP and alcohol abuse , gastroparesis,  pw  vomiting  nonbloody ,  chest pain across chest x 1 week  to left shoulder,  SOB,  and right neck pain -    Pt  says her last drink was 8pm.    Alert nontoxic, perrl eomi, no pallor   CVS tachy Resp CTA  no hyperpnea, pharynx  clear no crepitus, ,  Abd soft nontender   nondistended , No le edema,    CTA ro PE    negative CXR  no  pneumomediastinum,  CTA no  Boerhaave,    pt  AG met acidosis - IVF  thiamine,  bzd given      etoh was 350. [place]  8,  hco3 14,  AG 27   pt felt better -  . repeat  ABG:   LA 7,  Hco3 18 41yF pmhx  GERD, Grave's disease, HLD, MVP and alcohol abuse , gastroparesis,  pw  vomiting  nonbloody ,  chest pain across chest x 1 week  to left shoulder,  SOB,  and right neck pain -    Pt  says her last drink was 8pm.    Alert nontoxic, perrl eomi, no pallor   CVS tachy Resp CTA  no hyperpnea, pharynx  clear no crepitus, ,  Abd soft nontender   nondistended , No le edema,    CTA ro PE    negative CXR  no  pneumomediastinum,  CTA no  Boerhaave,    pt  AG met acidosis - IVF  thiamine,  bzd given      etoh was 350. LA  8,  hco3 14,  AG 27   pt felt better -  . repeat  ABG:   LA 7,  Hco3 18    s/o to me from Dr. Hart- sp ICU eval, d5ns started for AKA, CTAP reviewed. will admit

## 2021-10-12 NOTE — H&P ADULT - NSHPPHYSICALEXAM_GEN_ALL_CORE
awake and responsive, somewhat tremulous, oriented and cooperative  o/p: clear  neck: -jvd  lungs: clear  heart: s1s2 rrr  abd: obese, Mild tenderness epigastric and LUQ  ext: -c/c/e  neuro: no focal deficits, mild tremulousness

## 2021-10-12 NOTE — H&P ADULT - HISTORY OF PRESENT ILLNESS
42 yo female PMHx sig for ETOH abuse, BZD abuse, Esophagitis, Fatty Liver disease (etoh), GERD, Grave's disease, MVP, HLD.  She states she has been having chest pressure x 1 week associated w vomiting, yellowish, non-bloody. She was concerned she was having a "heart attack".  She states she has high cholesterol and is not compliant w her medications.  She states she drinks daily 1.75 L bottle of vodka every 2 days. She states she goes into withdrawal when she stops.

## 2021-10-12 NOTE — CONSULT NOTE ADULT - SUBJECTIVE AND OBJECTIVE BOX
Pt interviewed, examined and EMR chart reviewed.  Pt admits to drinking 1 liter of vodka per day  x  about 2 years. Pt states last sobriety was pre pandemic. Last Drink was 1dpta   Hx of withdrawal tremors, blackouts and DTs about 11 years ago.  variable periods of sobriety in the past.  Has been in detox before __X___yes,   _____No    Pt denies any other substance abuse.    SOCIAL HISTORY:    REVIEW OF SYSTEMS:    Constitutional: No fever, weight loss or fatigue  ENT:  No difficulty hearing, tinnitus, vertigo; No sinus or throat pain  Neck: No pain or stiffness  Respiratory: No cough, wheezing, chills or hemoptysis  Cardiovascular: No chest pain, palpitations, shortness of breath, dizziness or leg swelling  Gastrointestinal: No abdominal or epigastric pain. No nausea, vomiting or hematemesis; No diarrhea or constipation. No melena or hematochezia.  Neurological: No headaches, memory loss, loss of strength, numbness or tremors  Musculoskeletal: No joint pain or swelling; No muscle, back or extremity pain  Psychiatric: No depression, anxiety, mood swings or difficulty sleeping    MEDICATIONS  (STANDING):  lactated ringers. 1000 milliLiter(s) (150 mL/Hr) IV Continuous <Continuous>  LORazepam   Injectable   IV Push   LORazepam   Injectable 2 milliGRAM(s) IV Push every 4 hours    MEDICATIONS  (PRN):  LORazepam   Injectable 2 milliGRAM(s) IV Push every 2 hours PRN Agitation      Vital Signs Last 24 Hrs  T(C): 37.2 (12 Oct 2021 10:22), Max: 37.2 (12 Oct 2021 10:22)  T(F): 99 (12 Oct 2021 10:22), Max: 99 (12 Oct 2021 10:22)  HR: 116 (12 Oct 2021 10:22) (91 - 116)  BP: 156/78 (12 Oct 2021 10:22) (118/79 - 156/78)  BP(mean): --  RR: 28 (12 Oct 2021 10:22) (18 - 28)  SpO2: 93% (12 Oct 2021 10:22) (93% - 96%)    PHYSICAL EXAM:    Constitutional: NAD, well-groomed, well-developed  HEENT: PERRLA, EOMI, Normal Hearing, MMM  Neck: No LAD, No JVD  Back: Normal spine flexure, No CVA tenderness  Respiratory: CTAB/L  Cardiovascular: S1 and S2, RRR, no M/G/R  Gastrointestinal: BS+, soft, NT/ND  Extremities: No peripheral edema  Vascular: 2+ peripheral pulses  Neurological: A/O x 3, no focal deficits    LABS:                        16.0   6.15  )-----------( 247      ( 12 Oct 2021 02:55 )             46.5     10-12    137  |  100  |  8<L>  ----------------------------<  72  4.5   |  18  |  0.5<L>    Ca    8.2<L>      12 Oct 2021 07:00  Mg     2.1     10-12    TPro  8.5<H>  /  Alb  5.1  /  TBili  0.8  /  DBili  x   /  AST  96<H>  /  ALT  79<H>  /  AlkPhos  83  10-12        Drug Screen Urine:  Alcohol Level  Alcohol, Blood: 350 mg/dL (10-12-21 @ 02:55)        RADIOLOGY & ADDITIONAL STUDIES:

## 2021-10-12 NOTE — CONSULT NOTE ADULT - PROBLEM SELECTOR RECOMMENDATION 9
After evaluation at this time protocol continue on IV ativan protocol. when patient stable would transition to PO ativan and monitor for withdrawal. Continue with thiamine/folic acid supplementation. monitor electrolytes. Pt will be monitored and supportive care provided  CATCH team involved for aftercare and pt will follow up with aftercare out patient marisol after discharge

## 2021-10-12 NOTE — ED PROVIDER NOTE - CARE PLAN
1 Principal Discharge DX:	Alcoholic ketoacidosis  Secondary Diagnosis:	Chest pain  Secondary Diagnosis:	Vomiting  Secondary Diagnosis:	Lactic acidosis

## 2021-10-12 NOTE — ED PROVIDER NOTE - PROGRESS NOTE DETAILS
RK: POCUS showed relatively normal EF, no right heart strain seen by ICU -   recommends  CT ap  , will require ICU for  withdrawal as well seen by Dr Castellon, order CT, admit to ICU

## 2021-10-13 LAB
ALBUMIN SERPL ELPH-MCNC: 3.6 G/DL — SIGNIFICANT CHANGE UP (ref 3.5–5.2)
ALP SERPL-CCNC: 49 U/L — SIGNIFICANT CHANGE UP (ref 30–115)
ALT FLD-CCNC: 43 U/L — HIGH (ref 0–41)
ANION GAP SERPL CALC-SCNC: 11 MMOL/L — SIGNIFICANT CHANGE UP (ref 7–14)
AST SERPL-CCNC: 53 U/L — HIGH (ref 0–41)
BASOPHILS # BLD AUTO: 0.02 K/UL — SIGNIFICANT CHANGE UP (ref 0–0.2)
BASOPHILS NFR BLD AUTO: 0.5 % — SIGNIFICANT CHANGE UP (ref 0–1)
BILIRUB SERPL-MCNC: 1 MG/DL — SIGNIFICANT CHANGE UP (ref 0.2–1.2)
BUN SERPL-MCNC: 4 MG/DL — LOW (ref 10–20)
CA-I BLD-SCNC: 1.08 MMOL/L — LOW (ref 1.15–1.33)
CALCIUM SERPL-MCNC: 8 MG/DL — LOW (ref 8.5–10.1)
CHLORIDE SERPL-SCNC: 105 MMOL/L — SIGNIFICANT CHANGE UP (ref 98–110)
CK SERPL-CCNC: 83 U/L — SIGNIFICANT CHANGE UP (ref 0–225)
CO2 SERPL-SCNC: 25 MMOL/L — SIGNIFICANT CHANGE UP (ref 17–32)
CREAT SERPL-MCNC: 0.5 MG/DL — LOW (ref 0.7–1.5)
EOSINOPHIL # BLD AUTO: 0.04 K/UL — SIGNIFICANT CHANGE UP (ref 0–0.7)
EOSINOPHIL NFR BLD AUTO: 0.9 % — SIGNIFICANT CHANGE UP (ref 0–8)
GLUCOSE SERPL-MCNC: 96 MG/DL — SIGNIFICANT CHANGE UP (ref 70–99)
HAV IGM SER-ACNC: SIGNIFICANT CHANGE UP
HBV CORE IGM SER-ACNC: SIGNIFICANT CHANGE UP
HBV SURFACE AG SER-ACNC: SIGNIFICANT CHANGE UP
HCT VFR BLD CALC: 34.3 % — LOW (ref 37–47)
HCV AB S/CO SERPL IA: 0.1 S/CO — SIGNIFICANT CHANGE UP (ref 0–0.99)
HCV AB SERPL-IMP: SIGNIFICANT CHANGE UP
HGB BLD-MCNC: 11.6 G/DL — LOW (ref 12–16)
IMM GRANULOCYTES NFR BLD AUTO: 0.5 % — HIGH (ref 0.1–0.3)
LACTATE SERPL-SCNC: 1.5 MMOL/L — SIGNIFICANT CHANGE UP (ref 0.7–2)
LYMPHOCYTES # BLD AUTO: 1.67 K/UL — SIGNIFICANT CHANGE UP (ref 1.2–3.4)
LYMPHOCYTES # BLD AUTO: 38.4 % — SIGNIFICANT CHANGE UP (ref 20.5–51.1)
MAGNESIUM SERPL-MCNC: 1.5 MG/DL — LOW (ref 1.8–2.4)
MCHC RBC-ENTMCNC: 33.8 G/DL — SIGNIFICANT CHANGE UP (ref 32–37)
MCHC RBC-ENTMCNC: 35.5 PG — HIGH (ref 27–31)
MCV RBC AUTO: 104.9 FL — HIGH (ref 81–99)
MONOCYTES # BLD AUTO: 0.38 K/UL — SIGNIFICANT CHANGE UP (ref 0.1–0.6)
MONOCYTES NFR BLD AUTO: 8.7 % — SIGNIFICANT CHANGE UP (ref 1.7–9.3)
NEUTROPHILS # BLD AUTO: 2.22 K/UL — SIGNIFICANT CHANGE UP (ref 1.4–6.5)
NEUTROPHILS NFR BLD AUTO: 51 % — SIGNIFICANT CHANGE UP (ref 42.2–75.2)
NRBC # BLD: 0 /100 WBCS — SIGNIFICANT CHANGE UP (ref 0–0)
PHOSPHATE SERPL-MCNC: 1.3 MG/DL — LOW (ref 2.1–4.9)
PLATELET # BLD AUTO: 170 K/UL — SIGNIFICANT CHANGE UP (ref 130–400)
POTASSIUM SERPL-MCNC: 3.8 MMOL/L — SIGNIFICANT CHANGE UP (ref 3.5–5)
POTASSIUM SERPL-SCNC: 3.8 MMOL/L — SIGNIFICANT CHANGE UP (ref 3.5–5)
PROCALCITONIN SERPL-MCNC: 0.12 NG/ML — HIGH (ref 0.02–0.1)
PROT SERPL-MCNC: 5.5 G/DL — LOW (ref 6–8)
RBC # BLD: 3.27 M/UL — LOW (ref 4.2–5.4)
RBC # FLD: 13.4 % — SIGNIFICANT CHANGE UP (ref 11.5–14.5)
SODIUM SERPL-SCNC: 141 MMOL/L — SIGNIFICANT CHANGE UP (ref 135–146)
TROPONIN T SERPL-MCNC: <0.01 NG/ML — SIGNIFICANT CHANGE UP
WBC # BLD: 4.35 K/UL — LOW (ref 4.8–10.8)
WBC # FLD AUTO: 4.35 K/UL — LOW (ref 4.8–10.8)

## 2021-10-13 PROCEDURE — 93306 TTE W/DOPPLER COMPLETE: CPT | Mod: 26

## 2021-10-13 PROCEDURE — 93010 ELECTROCARDIOGRAM REPORT: CPT

## 2021-10-13 PROCEDURE — 99231 SBSQ HOSP IP/OBS SF/LOW 25: CPT

## 2021-10-13 PROCEDURE — 99232 SBSQ HOSP IP/OBS MODERATE 35: CPT

## 2021-10-13 PROCEDURE — 99233 SBSQ HOSP IP/OBS HIGH 50: CPT

## 2021-10-13 PROCEDURE — 71045 X-RAY EXAM CHEST 1 VIEW: CPT | Mod: 26

## 2021-10-13 RX ORDER — MAGNESIUM SULFATE 500 MG/ML
2 VIAL (ML) INJECTION
Refills: 0 | Status: DISCONTINUED | OUTPATIENT
Start: 2021-10-13 | End: 2021-10-13

## 2021-10-13 RX ORDER — IPRATROPIUM/ALBUTEROL SULFATE 18-103MCG
3 AEROSOL WITH ADAPTER (GRAM) INHALATION ONCE
Refills: 0 | Status: COMPLETED | OUTPATIENT
Start: 2021-10-13 | End: 2021-10-13

## 2021-10-13 RX ORDER — PANTOPRAZOLE SODIUM 20 MG/1
40 TABLET, DELAYED RELEASE ORAL
Refills: 0 | Status: DISCONTINUED | OUTPATIENT
Start: 2021-10-13 | End: 2021-10-17

## 2021-10-13 RX ORDER — CALCIUM CARBONATE 500(1250)
1 TABLET ORAL DAILY
Refills: 0 | Status: DISCONTINUED | OUTPATIENT
Start: 2021-10-13 | End: 2021-10-17

## 2021-10-13 RX ORDER — IPRATROPIUM/ALBUTEROL SULFATE 18-103MCG
3 AEROSOL WITH ADAPTER (GRAM) INHALATION EVERY 6 HOURS
Refills: 0 | Status: DISCONTINUED | OUTPATIENT
Start: 2021-10-13 | End: 2021-10-17

## 2021-10-13 RX ORDER — MAGNESIUM SULFATE 500 MG/ML
2 VIAL (ML) INJECTION EVERY 4 HOURS
Refills: 0 | Status: COMPLETED | OUTPATIENT
Start: 2021-10-13 | End: 2021-10-13

## 2021-10-13 RX ORDER — SODIUM,POTASSIUM PHOSPHATES 278-250MG
1 POWDER IN PACKET (EA) ORAL
Refills: 0 | Status: COMPLETED | OUTPATIENT
Start: 2021-10-13 | End: 2021-10-15

## 2021-10-13 RX ADMIN — Medication 3 MILLIGRAM(S): at 10:42

## 2021-10-13 RX ADMIN — Medication 12.5 GRAM(S): at 14:46

## 2021-10-13 RX ADMIN — Medication 4 MILLIGRAM(S): at 06:14

## 2021-10-13 RX ADMIN — Medication 3 MILLILITER(S): at 06:34

## 2021-10-13 RX ADMIN — Medication 3 MILLILITER(S): at 15:14

## 2021-10-13 RX ADMIN — Medication 100 MILLIGRAM(S): at 14:45

## 2021-10-13 RX ADMIN — Medication 40 MILLIGRAM(S): at 10:30

## 2021-10-13 RX ADMIN — Medication 12.5 GRAM(S): at 10:35

## 2021-10-13 RX ADMIN — Medication 3 MILLIGRAM(S): at 18:03

## 2021-10-13 RX ADMIN — SODIUM CHLORIDE 200 MILLILITER(S): 9 INJECTION, SOLUTION INTRAVENOUS at 06:02

## 2021-10-13 RX ADMIN — Medication 3 MILLILITER(S): at 08:26

## 2021-10-13 RX ADMIN — Medication 1 TABLET(S): at 16:32

## 2021-10-13 RX ADMIN — SODIUM CHLORIDE 100 MILLILITER(S): 9 INJECTION, SOLUTION INTRAVENOUS at 10:29

## 2021-10-13 RX ADMIN — SODIUM CHLORIDE 60 MILLILITER(S): 9 INJECTION, SOLUTION INTRAVENOUS at 12:33

## 2021-10-13 RX ADMIN — Medication 2 MILLIGRAM(S): at 15:05

## 2021-10-13 RX ADMIN — Medication 3 MILLIGRAM(S): at 22:56

## 2021-10-13 RX ADMIN — Medication 4 MILLIGRAM(S): at 01:46

## 2021-10-13 RX ADMIN — PANTOPRAZOLE SODIUM 40 MILLIGRAM(S): 20 TABLET, DELAYED RELEASE ORAL at 06:02

## 2021-10-13 RX ADMIN — Medication 3 MILLILITER(S): at 19:51

## 2021-10-13 RX ADMIN — Medication 1 TABLET(S): at 14:03

## 2021-10-13 RX ADMIN — Medication 1 PACKET(S): at 17:21

## 2021-10-13 RX ADMIN — PANTOPRAZOLE SODIUM 40 MILLIGRAM(S): 20 TABLET, DELAYED RELEASE ORAL at 17:21

## 2021-10-13 RX ADMIN — Medication 100 MILLIGRAM(S): at 22:56

## 2021-10-13 RX ADMIN — Medication 1 MILLIGRAM(S): at 14:46

## 2021-10-13 RX ADMIN — Medication 650 MILLIGRAM(S): at 07:08

## 2021-10-13 RX ADMIN — ENOXAPARIN SODIUM 40 MILLIGRAM(S): 100 INJECTION SUBCUTANEOUS at 22:56

## 2021-10-13 RX ADMIN — Medication 2 MILLIGRAM(S): at 19:30

## 2021-10-13 RX ADMIN — Medication 1 PACKET(S): at 23:00

## 2021-10-13 RX ADMIN — Medication 650 MILLIGRAM(S): at 07:38

## 2021-10-13 RX ADMIN — SODIUM CHLORIDE 60 MILLILITER(S): 9 INJECTION, SOLUTION INTRAVENOUS at 19:25

## 2021-10-13 RX ADMIN — Medication 1 PACKET(S): at 10:30

## 2021-10-13 NOTE — PROGRESS NOTE ADULT - ASSESSMENT
42 yo female PMHx sig for ETOH abuse (1 L vodka daily), BZD abuse, Esophagitis, Fatty Liver disease, GERD, Grave's disease, MVP, HLD.  She states she has been  vomiting  yellowish non-bloody bile for the past week, not eating but still drinking vodka daily.     alcohol abuse and now probably starting withdrawal / suspected thiamine and folate deficiency / hypophosphatemia / hypomagnesemia      - DC IV fluids as SOB may be secondary to mild fluid overload   - oral prednisone as per pulmonary   - supplement and follow repeat lytes   - continue benzo detox protocol   - supplement thiamine and folate   - monitor in ICU as withdrawal may become more severe   - may use Precedex if needed    - DVT prophylaxis

## 2021-10-13 NOTE — PROGRESS NOTE ADULT - ASSESSMENT
42 yo female PMHx sig for EtOH abuse (1 L vodka daily), BZD abuse, Esophagitis, Fatty Liver disease, GERD, Grave's disease, MVP, HLD.  presents after vomiting  yellowish, non-bloody bile for the past week, not eating but still drinking vodka daily.  She came to the ED after feeling chest pressure and abdominal pain, and worried she was experiencing a heart attack.     # Alcoholic Ketoacidosis - Resolved  # GERD  #Gastritis     # Lactic Acidosis - Resolved    - Decrease D5NS to 100cc/hr     # EtOH Intoxication and Withdrawal  # EtOH and Benzodiazepine Abuse    - Patient drinks 1L of vodka daily   - CIWA Score 25, 10/13: patient is still experiencing withdrawal symptoms   - F/u Amylase and hepatitis panel, lipase normal   - Check CK   - Monitor LFTs  - Continue with CIWA protocol and Ativan taper protocol  - Continue with folic acid and thiamine supplementation     # Episode of chest pain, SOB, likely an Asthma Exacerbation  - Chest X-Ray significant for mild pulmonary vascular congestion, otherwise unremarkable   - ECG unremarkable, ECHO ordered  - Trops repeated   - Patient is afebrile   - Prednisone 40mg x5 days   - DuoNebs q8 and q6 prn  - Push incentive spirometry  - Keep SpO2 > 94%     # Hypomagnesia   - Mg 1.5, 10/13  - Will replete  - Keep Mg>2    # Hypocalcemia  - Ca 8.0, 10/13  - Will replete     DVT Prophylaxis: LOVENOX   GI Prophylaxis: Pantoprazole  Dispo: MICU  Code Status: FULL CODE 42 yo female PMHx sig for EtOH abuse (1 L vodka daily), BZD abuse, Esophagitis, Fatty Liver disease, GERD, Grave's disease, MVP, HLD.  presents after vomiting  yellowish, non-bloody bile for the past week, not eating but still drinking vodka daily.  She came to the ED after feeling chest pressure and abdominal pain, and worried she was experiencing a heart attack.        # EtOH Intoxication and Withdrawal  # EtOH and Benzodiazepine Abuse    # Alcoholic Ketoacidosis - Resolved  # Lactic Acidosis - Resolved   - Patient drinks 1L of vodka daily   - CIWA Score 25, 10/13: patient is still experiencing withdrawal symptoms   - F/u Amylase and hepatitis panel, lipase normal  - Check CK,  Monitor LFTs  - Continue with CIWA protocol and Ativan taper  - Continue with folic acid and thiamine supplementation   -F/up Addiction medicine     # Episode of chest pain, SOB, likely an Asthma Exacerbation  - Chest X-Ray:  mild pulmonary vascular congestion, otherwise unremarkable   - ECG unremarkable, ECHO ordered  - Initial Trops Neg, will  repeat  - Started on Prednisone 40mg, DuoNebs q8   - Push incentive spirometry  -Keep SpO2 > 94%   -Will O/P Study     Hypomagnesia +  Hypocalcemia + hypophosphatemia    Monitor and replete     DVT Prophylaxis: LOVENOX   GI Prophylaxis: Pantoprazole  Dispo: MICU  Code Status: FULL CODE

## 2021-10-14 LAB
ALBUMIN SERPL ELPH-MCNC: 3.6 G/DL — SIGNIFICANT CHANGE UP (ref 3.5–5.2)
ALP SERPL-CCNC: 52 U/L — SIGNIFICANT CHANGE UP (ref 30–115)
ALT FLD-CCNC: 39 U/L — SIGNIFICANT CHANGE UP (ref 0–41)
AMYLASE P1 CFR SERPL: 26 U/L — SIGNIFICANT CHANGE UP (ref 25–115)
ANION GAP SERPL CALC-SCNC: 10 MMOL/L — SIGNIFICANT CHANGE UP (ref 7–14)
AST SERPL-CCNC: 48 U/L — HIGH (ref 0–41)
BASOPHILS # BLD AUTO: 0.02 K/UL — SIGNIFICANT CHANGE UP (ref 0–0.2)
BASOPHILS NFR BLD AUTO: 0.4 % — SIGNIFICANT CHANGE UP (ref 0–1)
BILIRUB SERPL-MCNC: 0.6 MG/DL — SIGNIFICANT CHANGE UP (ref 0.2–1.2)
BUN SERPL-MCNC: <3 MG/DL — LOW (ref 10–20)
CALCIUM SERPL-MCNC: 8.6 MG/DL — SIGNIFICANT CHANGE UP (ref 8.5–10.1)
CHLORIDE SERPL-SCNC: 101 MMOL/L — SIGNIFICANT CHANGE UP (ref 98–110)
CO2 SERPL-SCNC: 26 MMOL/L — SIGNIFICANT CHANGE UP (ref 17–32)
COVID-19 SPIKE DOMAIN AB INTERP: POSITIVE
COVID-19 SPIKE DOMAIN ANTIBODY RESULT: 114 U/ML — HIGH
CREAT SERPL-MCNC: 0.5 MG/DL — LOW (ref 0.7–1.5)
EOSINOPHIL # BLD AUTO: 0.02 K/UL — SIGNIFICANT CHANGE UP (ref 0–0.7)
EOSINOPHIL NFR BLD AUTO: 0.4 % — SIGNIFICANT CHANGE UP (ref 0–8)
GLUCOSE SERPL-MCNC: 108 MG/DL — HIGH (ref 70–99)
HCT VFR BLD CALC: 33.4 % — LOW (ref 37–47)
HGB BLD-MCNC: 11.5 G/DL — LOW (ref 12–16)
IMM GRANULOCYTES NFR BLD AUTO: 0.6 % — HIGH (ref 0.1–0.3)
LYMPHOCYTES # BLD AUTO: 1.21 K/UL — SIGNIFICANT CHANGE UP (ref 1.2–3.4)
LYMPHOCYTES # BLD AUTO: 25.7 % — SIGNIFICANT CHANGE UP (ref 20.5–51.1)
MAGNESIUM SERPL-MCNC: 2 MG/DL — SIGNIFICANT CHANGE UP (ref 1.8–2.4)
MCHC RBC-ENTMCNC: 34.4 G/DL — SIGNIFICANT CHANGE UP (ref 32–37)
MCHC RBC-ENTMCNC: 35.3 PG — HIGH (ref 27–31)
MCV RBC AUTO: 102.5 FL — HIGH (ref 81–99)
MONOCYTES # BLD AUTO: 0.48 K/UL — SIGNIFICANT CHANGE UP (ref 0.1–0.6)
MONOCYTES NFR BLD AUTO: 10.2 % — HIGH (ref 1.7–9.3)
NEUTROPHILS # BLD AUTO: 2.95 K/UL — SIGNIFICANT CHANGE UP (ref 1.4–6.5)
NEUTROPHILS NFR BLD AUTO: 62.7 % — SIGNIFICANT CHANGE UP (ref 42.2–75.2)
NRBC # BLD: 0 /100 WBCS — SIGNIFICANT CHANGE UP (ref 0–0)
PHOSPHATE SERPL-MCNC: 2.3 MG/DL — SIGNIFICANT CHANGE UP (ref 2.1–4.9)
PLATELET # BLD AUTO: 162 K/UL — SIGNIFICANT CHANGE UP (ref 130–400)
POTASSIUM SERPL-MCNC: 4.1 MMOL/L — SIGNIFICANT CHANGE UP (ref 3.5–5)
POTASSIUM SERPL-SCNC: 4.1 MMOL/L — SIGNIFICANT CHANGE UP (ref 3.5–5)
PROT SERPL-MCNC: 5.8 G/DL — LOW (ref 6–8)
RBC # BLD: 3.26 M/UL — LOW (ref 4.2–5.4)
RBC # FLD: 12.7 % — SIGNIFICANT CHANGE UP (ref 11.5–14.5)
SARS-COV-2 IGG+IGM SERPL QL IA: 114 U/ML — HIGH
SARS-COV-2 IGG+IGM SERPL QL IA: POSITIVE
SODIUM SERPL-SCNC: 137 MMOL/L — SIGNIFICANT CHANGE UP (ref 135–146)
TROPONIN T SERPL-MCNC: <0.01 NG/ML — SIGNIFICANT CHANGE UP
WBC # BLD: 4.71 K/UL — LOW (ref 4.8–10.8)
WBC # FLD AUTO: 4.71 K/UL — LOW (ref 4.8–10.8)

## 2021-10-14 PROCEDURE — 99233 SBSQ HOSP IP/OBS HIGH 50: CPT

## 2021-10-14 PROCEDURE — 99232 SBSQ HOSP IP/OBS MODERATE 35: CPT

## 2021-10-14 PROCEDURE — 99231 SBSQ HOSP IP/OBS SF/LOW 25: CPT

## 2021-10-14 RX ORDER — FOLIC ACID 0.8 MG
1 TABLET ORAL DAILY
Refills: 0 | Status: DISCONTINUED | OUTPATIENT
Start: 2021-10-14 | End: 2021-10-17

## 2021-10-14 RX ORDER — THIAMINE MONONITRATE (VIT B1) 100 MG
100 TABLET ORAL DAILY
Refills: 0 | Status: DISCONTINUED | OUTPATIENT
Start: 2021-10-14 | End: 2021-10-17

## 2021-10-14 RX ORDER — ONDANSETRON 8 MG/1
4 TABLET, FILM COATED ORAL ONCE
Refills: 0 | Status: COMPLETED | OUTPATIENT
Start: 2021-10-14 | End: 2021-10-14

## 2021-10-14 RX ORDER — TRAZODONE HCL 50 MG
50 TABLET ORAL ONCE
Refills: 0 | Status: COMPLETED | OUTPATIENT
Start: 2021-10-14 | End: 2021-10-14

## 2021-10-14 RX ADMIN — Medication 2 MILLIGRAM(S): at 19:06

## 2021-10-14 RX ADMIN — ONDANSETRON 4 MILLIGRAM(S): 8 TABLET, FILM COATED ORAL at 11:25

## 2021-10-14 RX ADMIN — Medication 100 MILLIGRAM(S): at 21:01

## 2021-10-14 RX ADMIN — Medication 40 MILLIGRAM(S): at 05:53

## 2021-10-14 RX ADMIN — PANTOPRAZOLE SODIUM 40 MILLIGRAM(S): 20 TABLET, DELAYED RELEASE ORAL at 19:07

## 2021-10-14 RX ADMIN — Medication 3 MILLILITER(S): at 07:23

## 2021-10-14 RX ADMIN — Medication 2 MILLIGRAM(S): at 21:57

## 2021-10-14 RX ADMIN — Medication 3 MILLILITER(S): at 19:01

## 2021-10-14 RX ADMIN — Medication 3 MILLIGRAM(S): at 02:12

## 2021-10-14 RX ADMIN — Medication 50 MILLIGRAM(S): at 21:57

## 2021-10-14 RX ADMIN — Medication 2 MILLIGRAM(S): at 11:25

## 2021-10-14 RX ADMIN — PANTOPRAZOLE SODIUM 40 MILLIGRAM(S): 20 TABLET, DELAYED RELEASE ORAL at 05:53

## 2021-10-14 RX ADMIN — Medication 1 PACKET(S): at 11:26

## 2021-10-14 RX ADMIN — Medication 3 MILLILITER(S): at 02:20

## 2021-10-14 RX ADMIN — Medication 1 TABLET(S): at 11:25

## 2021-10-14 RX ADMIN — Medication 650 MILLIGRAM(S): at 02:12

## 2021-10-14 RX ADMIN — Medication 1 TABLET(S): at 11:26

## 2021-10-14 RX ADMIN — Medication 1 PACKET(S): at 19:07

## 2021-10-14 RX ADMIN — Medication 1 PACKET(S): at 05:55

## 2021-10-14 RX ADMIN — Medication 3 MILLIGRAM(S): at 05:55

## 2021-10-14 RX ADMIN — Medication 100 MILLIGRAM(S): at 11:25

## 2021-10-14 RX ADMIN — Medication 30 MILLILITER(S): at 08:59

## 2021-10-14 RX ADMIN — Medication 1 MILLIGRAM(S): at 11:26

## 2021-10-14 RX ADMIN — Medication 3 MILLILITER(S): at 14:20

## 2021-10-14 RX ADMIN — Medication 30 MILLILITER(S): at 02:12

## 2021-10-14 RX ADMIN — Medication 2 MILLIGRAM(S): at 15:30

## 2021-10-14 RX ADMIN — Medication 100 MILLIGRAM(S): at 21:57

## 2021-10-14 RX ADMIN — ENOXAPARIN SODIUM 40 MILLIGRAM(S): 100 INJECTION SUBCUTANEOUS at 21:02

## 2021-10-14 NOTE — PROGRESS NOTE ADULT - ATTENDING COMMENTS
Attending Statement: I have personally performed a face to face diagnostic evaluation on this patient. The patient is suffering from Alcoholic Ketoacidosis, HAGMA, Asthma exacerbation, HRAD and Lactic acidosis,.  I have reviewed the above note and agree with the history, exam and suggestions for care, except as I have noted in the text. I have amended the treatment plans as necessary.
Attending Statement: I have personally performed a face to face diagnostic evaluation on this patient. The patient is suffering from Alcoholic Ketoacidosis, HAGMA, Asthma exacerbation, HRAD and Lactic acidosis,.  I have reviewed the above note and agree with the history, exam and suggestions for care, except as I have noted in the text. I have amended the treatment plans as necessary.

## 2021-10-14 NOTE — PROGRESS NOTE ADULT - ASSESSMENT
40 yo female PMHx sig for ETOH abuse (1 L vodka daily), BZD abuse, Esophagitis, Fatty Liver disease, GERD, Grave's disease, MVP, HLD.  She states she has been  vomiting  yellowish non-bloody bile for the past week, not eating but still drinking vodka daily.     alcohol abuse and intoxication on admission  and now withdrawal / suspected thiamine and folate deficiency / hypophosphatemia -resolved/ hypomagnesemia - resolved         - complete oral  benzo detox protocol   - supplement thiamine and folate   - complete oral prednisone course as per pulmonary   - DVT prophylaxis   - may downgrade to floor

## 2021-10-14 NOTE — PROGRESS NOTE ADULT - ASSESSMENT
42 yo female PMHx sig for EtOH abuse (1 L vodka daily), BZD abuse, Esophagitis, Fatty Liver disease, GERD, Grave's disease, MVP, HLD.  presents after vomiting  yellowish, non-bloody bile for the past week, not eating but still drinking vodka daily.  She came to the ED after feeling chest pressure and abdominal pain, and worried she was experiencing a heart attack.    # EtOH Intoxication and Withdrawal  # EtOH and Benzodiazepine Abuse    # Alcoholic Ketoacidosis - Resolved  # Lactic Acidosis - Resolved   - Patient drinks 1L of vodka daily   - Patient is still experiencing withdrawal symptoms   - Addiction medicine consult appreciated   - Amylase and lipase normal; hepatitis panel unremarkable   - CK and repeat troponin normal     - Monitor LFTs  - Monitor lytes and replete as needed   - Continue with CIWA protocol and Ativan taper protocol   - Switch ativan from IV to PO  - Continue with folic acid and thiamine supplementation     # Episode of chest pain, SOB secondary to Volume Overload vs Asthma Exacerbation  - Chest X-Ray, 10/13:  mild pulmonary vascular congestion, otherwise unremarkable   - ECG unremarkable, ECHO unremarkable   - Repeat trops and CK negative   - Continue with Prednisone 40mg, DuoNebs q8   - Push incentive spirometry  - Keep SpO2 > 94%   - Will O/P Study   - D/c fluids for possible volume overload     #Hypomagnesia - Resolved   #Hypophosphatemia - Resolved  #Hypocalcemia - Resolved     DVT Prophylaxis: LOVENOX   GI Prophylaxis: Pantoprazole  Dispo: MICU  Code Status: FULL CODE   40 yo female PMHx sig for EtOH abuse (1 L vodka daily), BZD abuse, Esophagitis, Fatty Liver disease, GERD, Grave's disease, MVP, HLD.  presents after vomiting  yellowish, non-bloody bile for the past week, not eating but still drinking vodka daily.  She came to the ED after feeling chest pressure and abdominal pain, and worried she was experiencing a heart attack.    # EtOH Intoxication and Withdrawal  # EtOH and Benzodiazepine Abuse    # Alcoholic Ketoacidosis - Resolved  # Lactic Acidosis - Resolved   - Patient drinks 1L of vodka daily   - Patient is still experiencing withdrawal symptoms   - Addiction medicine consult appreciated   - Amylase and lipase normal; hepatitis panel unremarkable   - CK and repeat troponin normal     - Monitor LFTs  - Monitor lytes and replete as needed   - Continue with CIWA protocol and Ativan taper protocol   - Switch ativan from IV to PO  - Continue with folic acid and thiamine supplementation  - Start Zofran for nausea      # Episode of chest pain, SOB secondary to Volume Overload vs Asthma Exacerbation  - Chest X-Ray, 10/13:  mild pulmonary vascular congestion, otherwise unremarkable   - ECG unremarkable, ECHO unremarkable   - Repeat trops and CK negative   - Continue with Prednisone 40mg, DuoNebs q8   - Push incentive spirometry  - Keep SpO2 > 94%   - Will O/P Study   - D/c fluids for possible volume overload     #Hypomagnesia - Resolved   #Hypophosphatemia - Resolved  #Hypocalcemia - Resolved     DVT Prophylaxis: LOVENOX   GI Prophylaxis: Pantoprazole  Dispo: MICU  Code Status: FULL CODE   42 yo female PMHx sig for EtOH abuse (1 L vodka daily), BZD abuse, Esophagitis, Fatty Liver disease, GERD, Grave's disease, MVP, HLD.  presents after vomiting  yellowish, non-bloody bile for the past week, not eating but still drinking vodka daily.  She came to the ED after feeling chest pressure and abdominal pain, and worried she was experiencing a heart attack.    # EtOH Intoxication and Withdrawal: improved   # EtOH and Benzodiazepine Abuse    # Alcoholic Ketoacidosis - Resolved  # Lactic Acidosis - Resolved   - Patient drinks 1L of vodka daily   - Addiction medicine consult appreciated   - Amylase and lipase normal; hepatitis panel unremarkable   - CK and repeat troponin normal     - Monitor lytes and replete as needed   - Continue with CIWA protocol and Ativan taper protocol   - Continue with folic acid and thiamine supplementation  - Zofran for nausea      # Episode of chest pain, SOB secondary to Volume Overload vs Asthma Exacerbation  - Chest X-Ray, 10/13:  mild pulmonary vascular congestion, otherwise unremarkable   - ECG unremarkable, ECHO unremarkable   - Repeat trops and CK negative   - Continue with Prednisone 40mg, DuoNebs q8   - Push incentive spirometry  - Keep SpO2 > 94%   - Will O/P Study   - D/c fluids for possible volume overload     #Hypomagnesia - Resolved   #Hypophosphatemia - Resolved  #Hypocalcemia - Resolved     DVT Prophylaxis: LOVENOX   GI Prophylaxis: Pantoprazole  Dispo: MICU  Code Status: FULL CODE

## 2021-10-14 NOTE — PROGRESS NOTE ADULT - ASSESSMENT
IMPRESSION:    Alcoholic Ketoacidosis, HAGMA, resolved  Asthma exacerbation, HRAD, stable  Lactic acidosis, resolved  EtOH intoxication & withdrawal  HO EtOH & Benzodiazepine abuse  HO Graves' disease      PLAN:    CNS: Ativan taper protocol.  CIWA protocol, Ativan q1-2h prn IV for CIWA > 7/8. Thiamine, Folic Acid, MVI.    HEENT: oral care     PULMONARY:  HOB @ 30-45 degrees.  Aspiration precautions.  Prednisone for 5 days.  DuoNebz q8h & q6h prn.  Incentive spirometer.  Target POx > 94%.    CARDIOVASCULAR:  DC fluids.  Keep I = O    GASTROENTEROLOGY:  GI prophylaxis.  Monitor LFTs.  FU Amylase & Hepatitis panel.  Feeding as tolerated.  Bowel regimen.    RENAL:  FU lytes (K, Mg, iP, ionized Ca).  Correct electrolytes as necessary.  Monitor UO.    INFECTIOUS DISEASE:  Monitor off ABX.  FU cultures    HEMATOLOGICAL:  DVT prophylaxis.    ENDOCRINE:  Follow up FS.  Downgrade to floor  Recall prn

## 2021-10-15 LAB
ANION GAP SERPL CALC-SCNC: 15 MMOL/L — HIGH (ref 7–14)
BASOPHILS # BLD AUTO: 0.02 K/UL — SIGNIFICANT CHANGE UP (ref 0–0.2)
BASOPHILS NFR BLD AUTO: 0.3 % — SIGNIFICANT CHANGE UP (ref 0–1)
BUN SERPL-MCNC: 6 MG/DL — LOW (ref 10–20)
CALCIUM SERPL-MCNC: 9.1 MG/DL — SIGNIFICANT CHANGE UP (ref 8.5–10.1)
CHLORIDE SERPL-SCNC: 99 MMOL/L — SIGNIFICANT CHANGE UP (ref 98–110)
CO2 SERPL-SCNC: 25 MMOL/L — SIGNIFICANT CHANGE UP (ref 17–32)
CREAT SERPL-MCNC: 0.7 MG/DL — SIGNIFICANT CHANGE UP (ref 0.7–1.5)
DRUG SCREEN, SERUM: SIGNIFICANT CHANGE UP
EOSINOPHIL # BLD AUTO: 0.05 K/UL — SIGNIFICANT CHANGE UP (ref 0–0.7)
EOSINOPHIL NFR BLD AUTO: 0.7 % — SIGNIFICANT CHANGE UP (ref 0–8)
GLUCOSE SERPL-MCNC: 92 MG/DL — SIGNIFICANT CHANGE UP (ref 70–99)
HCT VFR BLD CALC: 36.8 % — LOW (ref 37–47)
HGB BLD-MCNC: 12.5 G/DL — SIGNIFICANT CHANGE UP (ref 12–16)
IMM GRANULOCYTES NFR BLD AUTO: 0.4 % — HIGH (ref 0.1–0.3)
IRON SATN MFR SERPL: 25 % — SIGNIFICANT CHANGE UP (ref 15–50)
IRON SATN MFR SERPL: 52 UG/DL — SIGNIFICANT CHANGE UP (ref 35–150)
LYMPHOCYTES # BLD AUTO: 0.64 K/UL — LOW (ref 1.2–3.4)
LYMPHOCYTES # BLD AUTO: 9.2 % — LOW (ref 20.5–51.1)
MCHC RBC-ENTMCNC: 34 G/DL — SIGNIFICANT CHANGE UP (ref 32–37)
MCHC RBC-ENTMCNC: 35.1 PG — HIGH (ref 27–31)
MCV RBC AUTO: 103.4 FL — HIGH (ref 81–99)
MONOCYTES # BLD AUTO: 0.38 K/UL — SIGNIFICANT CHANGE UP (ref 0.1–0.6)
MONOCYTES NFR BLD AUTO: 5.4 % — SIGNIFICANT CHANGE UP (ref 1.7–9.3)
NEUTROPHILS # BLD AUTO: 5.86 K/UL — SIGNIFICANT CHANGE UP (ref 1.4–6.5)
NEUTROPHILS NFR BLD AUTO: 84 % — HIGH (ref 42.2–75.2)
NRBC # BLD: 0 /100 WBCS — SIGNIFICANT CHANGE UP (ref 0–0)
PLATELET # BLD AUTO: 185 K/UL — SIGNIFICANT CHANGE UP (ref 130–400)
POTASSIUM SERPL-MCNC: 4.5 MMOL/L — SIGNIFICANT CHANGE UP (ref 3.5–5)
POTASSIUM SERPL-SCNC: 4.5 MMOL/L — SIGNIFICANT CHANGE UP (ref 3.5–5)
RBC # BLD: 3.56 M/UL — LOW (ref 4.2–5.4)
RBC # FLD: 13 % — SIGNIFICANT CHANGE UP (ref 11.5–14.5)
SODIUM SERPL-SCNC: 139 MMOL/L — SIGNIFICANT CHANGE UP (ref 135–146)
TIBC SERPL-MCNC: 208 UG/DL — LOW (ref 220–430)
UIBC SERPL-MCNC: 156 UG/DL — SIGNIFICANT CHANGE UP (ref 110–370)
WBC # BLD: 6.98 K/UL — SIGNIFICANT CHANGE UP (ref 4.8–10.8)
WBC # FLD AUTO: 6.98 K/UL — SIGNIFICANT CHANGE UP (ref 4.8–10.8)

## 2021-10-15 PROCEDURE — 99232 SBSQ HOSP IP/OBS MODERATE 35: CPT

## 2021-10-15 PROCEDURE — 99231 SBSQ HOSP IP/OBS SF/LOW 25: CPT

## 2021-10-15 PROCEDURE — 93010 ELECTROCARDIOGRAM REPORT: CPT

## 2021-10-15 RX ORDER — ONDANSETRON 8 MG/1
4 TABLET, FILM COATED ORAL EVERY 6 HOURS
Refills: 0 | Status: DISCONTINUED | OUTPATIENT
Start: 2021-10-15 | End: 2021-10-17

## 2021-10-15 RX ORDER — HYDROXYZINE HCL 10 MG
25 TABLET ORAL ONCE
Refills: 0 | Status: DISCONTINUED | OUTPATIENT
Start: 2021-10-15 | End: 2021-10-17

## 2021-10-15 RX ADMIN — Medication 40 MILLIGRAM(S): at 05:46

## 2021-10-15 RX ADMIN — Medication 1 MILLIGRAM(S): at 14:02

## 2021-10-15 RX ADMIN — Medication 1 PACKET(S): at 05:47

## 2021-10-15 RX ADMIN — Medication 1 MILLIGRAM(S): at 23:22

## 2021-10-15 RX ADMIN — Medication 1 TABLET(S): at 11:29

## 2021-10-15 RX ADMIN — Medication 2 MILLIGRAM(S): at 05:47

## 2021-10-15 RX ADMIN — Medication 1 TABLET(S): at 11:30

## 2021-10-15 RX ADMIN — Medication 1 PACKET(S): at 00:58

## 2021-10-15 RX ADMIN — PANTOPRAZOLE SODIUM 40 MILLIGRAM(S): 20 TABLET, DELAYED RELEASE ORAL at 05:47

## 2021-10-15 RX ADMIN — Medication 1 MILLIGRAM(S): at 10:08

## 2021-10-15 RX ADMIN — Medication 100 MILLIGRAM(S): at 16:37

## 2021-10-15 RX ADMIN — Medication 1 MILLIGRAM(S): at 11:29

## 2021-10-15 RX ADMIN — Medication 100 MILLIGRAM(S): at 21:29

## 2021-10-15 RX ADMIN — Medication 650 MILLIGRAM(S): at 23:56

## 2021-10-15 RX ADMIN — Medication 3 MILLILITER(S): at 15:08

## 2021-10-15 RX ADMIN — Medication 100 MILLIGRAM(S): at 11:29

## 2021-10-15 RX ADMIN — Medication 3 MILLILITER(S): at 02:24

## 2021-10-15 RX ADMIN — Medication 2 MILLIGRAM(S): at 01:01

## 2021-10-15 RX ADMIN — Medication 3 MILLILITER(S): at 08:47

## 2021-10-15 RX ADMIN — Medication 650 MILLIGRAM(S): at 20:26

## 2021-10-15 RX ADMIN — Medication 1 MILLIGRAM(S): at 21:29

## 2021-10-15 RX ADMIN — PANTOPRAZOLE SODIUM 40 MILLIGRAM(S): 20 TABLET, DELAYED RELEASE ORAL at 17:59

## 2021-10-15 RX ADMIN — Medication 100 MILLIGRAM(S): at 05:46

## 2021-10-15 RX ADMIN — Medication 1 MILLIGRAM(S): at 17:59

## 2021-10-15 NOTE — CHART NOTE - NSCHARTNOTEFT_GEN_A_CORE
Will order an extra dose of ativan to prevent withdrawal (IV prn ordered but feel oral dose is sufficient)

## 2021-10-15 NOTE — PROGRESS NOTE ADULT - ASSESSMENT
40 yo female PMHx sig for ETOH abuse (1 L vodka daily), BZD abuse, Esophagitis, Fatty Liver disease, GERD, Grave's disease, MVP, HLD.  She states she has been  vomiting  yellowish non-bloody bile for the past week, not eating but still drinking vodka daily.     alcohol abuse and intoxication on admission  and now withdrawal / suspected thiamine and folate deficiency / hypophosphatemia -resolved/ hypomagnesemia - resolved         - complete oral  benzo detox protocol   - supplement thiamine and folate   - complete oral prednisone course as per pulmonary   - DVT prophylaxis   - Zofran prn for nausea

## 2021-10-15 NOTE — PROGRESS NOTE ADULT - PROBLEM SELECTOR PLAN 1
After evaluation at this time protocol should be continued and completed. Pt is improving from yesterdays withdrawals.  Pt will be monitored and supportive care provided  CATCH team involved for aftercare and pt will follow up with aftercare at [facility]. Pt will receive appt within 48hrs of discharge.    Pt to be monitored and treated by medical floor care team.
After evaluation at this time protocol should be continued and completed. Pt is improving from yesterdays withdrawals.  Pt will be monitored and supportive care provided  CATCH team involved for aftercare and pt will follow up with aftercare at [facility]. Pt will receive appt within 48hrs of discharge.    Pt to be monitored and treated by critical care team.
After evaluation at this time protocol should be continued and completed. Pt is improving from yesterdays withdrawals. Pt will be monitored and supportive care provided  CATCH team involved for aftercare and pt will follow up with aftercare at [facility]. Pt will receive appt within 48hrs of discharge.    Pt to be monitored and treated by critical care team.

## 2021-10-15 NOTE — PROGRESS NOTE ADULT - PROBLEM SELECTOR PROBLEM 1
Alcohol dependence with withdrawal with complication

## 2021-10-15 NOTE — CHART NOTE - NSCHARTNOTEFT_GEN_A_CORE
pt complains of mid chest intermittent sharp like a pinch non radiating lasting few seconds and resolves. pt also complains of b/l legs cramps while in bed improved with ambulation. Pt denies abdominal pain, SOB, n/v, fever or chills  EXAM: axox3, comfortable in bed, NAD  Vital Signs Last 24 Hrs  T(C): 36.4 (15 Oct 2021 14:26), Max: 36.8 (15 Oct 2021 05:00)  T(F): 97.5 (15 Oct 2021 14:26), Max: 98.2 (15 Oct 2021 05:00)  HR: 76 (15 Oct 2021 14:26) (76 - 84)  BP: 123/71 (15 Oct 2021 14:26) (123/71 - 127/72)  RR: 18 (15 Oct 2021 14:26) (18 - 18)  labs:                  12.5   6.98  )-----------( 185      ( 15 Oct 2021 08:59 )             36.8   10-15    139  |  99  |  6<L>  ----------------------------<  92  4.5   |  25  |  0.7    Ca    9.1      15 Oct 2021 08:59  Phos  2.3     10-14  Mg     2.0     10-14    TPro  5.8<L>  /  Alb  3.6  /  TBili  0.6  /  DBili  x   /  AST  48<H>  /  ALT  39  /  AlkPhos  52  10-14       Lactate Trend  10-13 @ 05:48 Lactate:1.5   10-12 @ 21:44 Lactate:1.1   10-12 @ 15:15 Lactate:1.3   10-12 @ 07:00 Lactate:6.0   ECG 10/13/21 - NSR  TTE- Wnl    A/P: intermittent chest pain since admission  all cardiac workup negative  alcohol/benzo withdrawal sxs  rpt ecg stat  continue current tx  increase ambulation  monitor vss  monitor pt

## 2021-10-16 LAB
FERRITIN SERPL-MCNC: 551 NG/ML — HIGH (ref 15–150)
FOLATE SERPL-MCNC: 8.9 NG/ML — SIGNIFICANT CHANGE UP
VIT B12 SERPL-MCNC: 508 PG/ML — SIGNIFICANT CHANGE UP (ref 232–1245)

## 2021-10-16 PROCEDURE — 99233 SBSQ HOSP IP/OBS HIGH 50: CPT

## 2021-10-16 RX ORDER — POLYETHYLENE GLYCOL 3350 17 G/17G
17 POWDER, FOR SOLUTION ORAL
Refills: 0 | Status: DISCONTINUED | OUTPATIENT
Start: 2021-10-16 | End: 2021-10-17

## 2021-10-16 RX ORDER — SENNA PLUS 8.6 MG/1
2 TABLET ORAL AT BEDTIME
Refills: 0 | Status: DISCONTINUED | OUTPATIENT
Start: 2021-10-16 | End: 2021-10-17

## 2021-10-16 RX ADMIN — SENNA PLUS 2 TABLET(S): 8.6 TABLET ORAL at 21:40

## 2021-10-16 RX ADMIN — Medication 1 TABLET(S): at 10:40

## 2021-10-16 RX ADMIN — Medication 0.5 MILLIGRAM(S): at 14:34

## 2021-10-16 RX ADMIN — Medication 0.5 MILLIGRAM(S): at 05:48

## 2021-10-16 RX ADMIN — ONDANSETRON 4 MILLIGRAM(S): 8 TABLET, FILM COATED ORAL at 22:01

## 2021-10-16 RX ADMIN — Medication 1 TABLET(S): at 10:41

## 2021-10-16 RX ADMIN — ONDANSETRON 4 MILLIGRAM(S): 8 TABLET, FILM COATED ORAL at 02:18

## 2021-10-16 RX ADMIN — PANTOPRAZOLE SODIUM 40 MILLIGRAM(S): 20 TABLET, DELAYED RELEASE ORAL at 17:45

## 2021-10-16 RX ADMIN — Medication 100 MILLIGRAM(S): at 14:34

## 2021-10-16 RX ADMIN — Medication 30 MILLILITER(S): at 22:43

## 2021-10-16 RX ADMIN — Medication 100 MILLIGRAM(S): at 21:39

## 2021-10-16 RX ADMIN — Medication 100 MILLIGRAM(S): at 05:48

## 2021-10-16 RX ADMIN — POLYETHYLENE GLYCOL 3350 17 GRAM(S): 17 POWDER, FOR SOLUTION ORAL at 17:46

## 2021-10-16 RX ADMIN — Medication 0.5 MILLIGRAM(S): at 17:45

## 2021-10-16 RX ADMIN — Medication 100 MILLIGRAM(S): at 21:41

## 2021-10-16 RX ADMIN — Medication 100 MILLIGRAM(S): at 10:40

## 2021-10-16 RX ADMIN — Medication 650 MILLIGRAM(S): at 22:52

## 2021-10-16 RX ADMIN — Medication 0.5 MILLIGRAM(S): at 21:40

## 2021-10-16 RX ADMIN — Medication 40 MILLIGRAM(S): at 05:48

## 2021-10-16 RX ADMIN — Medication 3 MILLILITER(S): at 13:53

## 2021-10-16 RX ADMIN — PANTOPRAZOLE SODIUM 40 MILLIGRAM(S): 20 TABLET, DELAYED RELEASE ORAL at 05:48

## 2021-10-16 RX ADMIN — Medication 3 MILLILITER(S): at 19:51

## 2021-10-16 RX ADMIN — Medication 650 MILLIGRAM(S): at 21:58

## 2021-10-16 RX ADMIN — Medication 0.5 MILLIGRAM(S): at 02:17

## 2021-10-16 RX ADMIN — Medication 0.5 MILLIGRAM(S): at 10:40

## 2021-10-16 RX ADMIN — Medication 3 MILLILITER(S): at 08:07

## 2021-10-16 RX ADMIN — Medication 1 MILLIGRAM(S): at 10:40

## 2021-10-16 NOTE — PROGRESS NOTE ADULT - ASSESSMENT
40 yo female PMHx sig for EtOH abuse (1 L vodka daily), BZD abuse, Esophagitis, Fatty Liver disease, GERD, Grave's disease, MVP, HLD.  presents after vomiting  yellowish, non-bloody bile for the past week, not eating but still drinking vodka daily.  She came to the ED after feeling chest pressure and abdominal pain, and worried she was experiencing a heart attack. However no evidence of Heart attack on w/up and during ICU Care.     # EtOH Intoxication and Withdrawal: improved   # EtOH and Benzodiazepine Abuse    # Alcoholic Ketoacidosis - Resolved  # Lactic Acidosis - Resolved   - Patient drinks 1L of vodka daily   - Addiction medicine consult reviewed   - Amylase and lipase normal; hepatitis panel unremarkable   - CK and repeat troponin normal     - Monitor lytes and replete as needed   - Continue Ativan taper protocol will finish 10/17th   - Continue with folic acid and thiamine supplementation  - Zofran for nausea      # Episode of chest pain, SOB secondary to Volume Overload vs Asthma Exacerbation  - Chest X-Ray, 10/13:  mild pulmonary vascular congestion, otherwise unremarkable   - ECG unremarkable, ECHO unremarkable   - Repeat trops and CK negative   - Continue with Prednisone 40mg, DuoNebs q8   - Push incentive spirometry  - Keep SpO2 > 94%   - Will O/P Study   - D/c fluids for possible volume overload     #Hypomagnesia - Resolved   #Hypophosphatemia - Resolved  #Hypocalcemia - Resolved     DVT Prophylaxis: LOVENOX   GI Prophylaxis: Pantoprazole    Code Status: FULL CODE    Dispo: Home on 10/17th am d/c / anticipated

## 2021-10-17 ENCOUNTER — TRANSCRIPTION ENCOUNTER (OUTPATIENT)
Age: 41
End: 2021-10-17

## 2021-10-17 ENCOUNTER — EMERGENCY (EMERGENCY)
Facility: HOSPITAL | Age: 41
LOS: 0 days | Discharge: HOME | End: 2021-10-18
Attending: STUDENT IN AN ORGANIZED HEALTH CARE EDUCATION/TRAINING PROGRAM | Admitting: STUDENT IN AN ORGANIZED HEALTH CARE EDUCATION/TRAINING PROGRAM
Payer: MEDICAID

## 2021-10-17 VITALS
HEART RATE: 97 BPM | WEIGHT: 169.98 LBS | HEIGHT: 61.5 IN | DIASTOLIC BLOOD PRESSURE: 70 MMHG | OXYGEN SATURATION: 97 % | RESPIRATION RATE: 17 BRPM | TEMPERATURE: 96 F | SYSTOLIC BLOOD PRESSURE: 136 MMHG

## 2021-10-17 VITALS — SYSTOLIC BLOOD PRESSURE: 103 MMHG | HEART RATE: 80 BPM | DIASTOLIC BLOOD PRESSURE: 57 MMHG | RESPIRATION RATE: 16 BRPM

## 2021-10-17 DIAGNOSIS — Z98.890 OTHER SPECIFIED POSTPROCEDURAL STATES: Chronic | ICD-10-CM

## 2021-10-17 DIAGNOSIS — Z87.19 PERSONAL HISTORY OF OTHER DISEASES OF THE DIGESTIVE SYSTEM: ICD-10-CM

## 2021-10-17 DIAGNOSIS — Z98.891 HISTORY OF UTERINE SCAR FROM PREVIOUS SURGERY: Chronic | ICD-10-CM

## 2021-10-17 DIAGNOSIS — F17.200 NICOTINE DEPENDENCE, UNSPECIFIED, UNCOMPLICATED: ICD-10-CM

## 2021-10-17 DIAGNOSIS — Z88.8 ALLERGY STATUS TO OTHER DRUGS, MEDICAMENTS AND BIOLOGICAL SUBSTANCES: ICD-10-CM

## 2021-10-17 DIAGNOSIS — E05.00 THYROTOXICOSIS WITH DIFFUSE GOITER WITHOUT THYROTOXIC CRISIS OR STORM: ICD-10-CM

## 2021-10-17 DIAGNOSIS — I34.1 NONRHEUMATIC MITRAL (VALVE) PROLAPSE: ICD-10-CM

## 2021-10-17 DIAGNOSIS — K21.9 GASTRO-ESOPHAGEAL REFLUX DISEASE WITHOUT ESOPHAGITIS: ICD-10-CM

## 2021-10-17 DIAGNOSIS — R19.5 OTHER FECAL ABNORMALITIES: ICD-10-CM

## 2021-10-17 DIAGNOSIS — E78.5 HYPERLIPIDEMIA, UNSPECIFIED: ICD-10-CM

## 2021-10-17 PROCEDURE — 71045 X-RAY EXAM CHEST 1 VIEW: CPT | Mod: 26

## 2021-10-17 PROCEDURE — 99284 EMERGENCY DEPT VISIT MOD MDM: CPT

## 2021-10-17 PROCEDURE — 99239 HOSP IP/OBS DSCHRG MGMT >30: CPT

## 2021-10-17 RX ORDER — FAMOTIDINE 10 MG/ML
20 INJECTION INTRAVENOUS ONCE
Refills: 0 | Status: COMPLETED | OUTPATIENT
Start: 2021-10-17 | End: 2021-10-17

## 2021-10-17 RX ADMIN — PANTOPRAZOLE SODIUM 40 MILLIGRAM(S): 20 TABLET, DELAYED RELEASE ORAL at 05:22

## 2021-10-17 RX ADMIN — Medication 100 MILLIGRAM(S): at 05:21

## 2021-10-17 RX ADMIN — Medication 40 MILLIGRAM(S): at 05:22

## 2021-10-17 RX ADMIN — Medication 3 MILLILITER(S): at 03:04

## 2021-10-17 RX ADMIN — Medication 3 MILLILITER(S): at 08:58

## 2021-10-17 RX ADMIN — ONDANSETRON 4 MILLIGRAM(S): 8 TABLET, FILM COATED ORAL at 05:22

## 2021-10-17 NOTE — CHART NOTE - NSCHARTNOTEFT_GEN_A_CORE
PA notified to discharge patient as per attending.  Case discussed with Dr. Love  Med Reconciliation reviewed  and advised the meds to continue.  Patient stable no changes.  Patient agreeable to discharge.        T(C): 36.4 (10-17-21 @ 05:00), Max: 36.4 (10-16-21 @ 14:08)  HR: 80 (10-17-21 @ 06:52) (71 - 81)  BP: 103/57 (10-17-21 @ 06:52) (95/51 - 145/97)  RR: 16 (10-17-21 @ 06:52) (16 - 18)  SpO2: --        Patient discharged home. RN aware and will manage.

## 2021-10-17 NOTE — DISCHARGE NOTE PROVIDER - NSDCMRMEDTOKEN_GEN_ALL_CORE_FT
gabapentin 800 mg oral tablet: 1 tab(s) orally 3 times a day  pantoprazole 40 mg oral delayed release tablet: 1 tab(s) orally once a day  traZODone 100 mg oral tablet: 1 tab(s) orally once a day (at bedtime)

## 2021-10-17 NOTE — DISCHARGE NOTE NURSING/CASE MANAGEMENT/SOCIAL WORK - PATIENT PORTAL LINK FT
You can access the FollowMyHealth Patient Portal offered by [facility] by registering at the following website: [email or web address]. By joining [facility]’s FollowMyHealth portal, you will also be able to view your health information using other applications (apps) compatible with our system.

## 2021-10-17 NOTE — PROGRESS NOTE ADULT - REASON FOR ADMISSION
1 week of vomiting and chest pressure
alcohol abuse
alcoholic ketoacidosis
1 week of vomiting and chest pressure
alcohol intoxication / abuse
1 week of vomiting and chest pressure
1 week of vomiting and chest pressure
alcohol abuse
alcohol intox / abuse

## 2021-10-17 NOTE — ED ADULT TRIAGE NOTE - CHIEF COMPLAINT QUOTE
Pt c/o abdominal pain and nausea w/ "black tarry stools" since earlier today and concerned for drop in hemoglobin lab results (16-11.6); recently discharged from hospital today.

## 2021-10-17 NOTE — DISCHARGE NOTE NURSING/CASE MANAGEMENT/SOCIAL WORK - NSDCPEEMAIL_GEN_ALL_CORE
[facility] for Tobacco Control email [email or web address]

## 2021-10-17 NOTE — PROGRESS NOTE ADULT - SUBJECTIVE AND OBJECTIVE BOX
Patient is a 41y old  Female who presents with a chief complaint of 1 week of vomiting and chest pressure (14 Oct 2021 10:11)      T(F): 97 (10-14-21 @ 07:01), Max: 97.5 (10-13-21 @ 19:09)  HR: 58 (10-14-21 @ 07:18)  BP: 107/65 (10-14-21 @ 07:18)  RR: 18 (10-14-21 @ 07:18)  SpO2: 92% (10-14-21 @ 07:18) (90% - 98%)    PHYSICAL EXAM:  GENERAL: NAD  HEAD:  Atraumatic, Normocephalic  EYES: EOMI, PERRLA, conjunctiva and sclera clear  NERVOUS SYSTEM:  Alert & Oriented X3, no focal deficits   CHEST/LUNG: Clear to percussion bilaterally; No rales, rhonchi, wheezing, or rubs  HEART: Regular rate and rhythm; No murmurs, rubs, or gallops  ABDOMEN: Soft, Nontender, Nondistended; Bowel sounds present  EXTREMITIES:  2+ Peripheral Pulses, No clubbing, cyanosis, or edema    LABS  10-14    137  |  101  |  <3<L>  ----------------------------<  108<H>  4.1   |  26  |  0.5<L>    Ca    8.6      14 Oct 2021 06:34  Phos  2.3     10-14  Mg     2.0     10-14    TPro  5.8<L>  /  Alb  3.6  /  TBili  0.6  /  DBili  x   /  AST  48<H>  /  ALT  39  /  AlkPhos  52  10-14                          11.5   4.71  )-----------( 162      ( 14 Oct 2021 06:34 )             33.4         CARDIAC ENZYMES  Creatine Kinase, Serum: 83 (10-13 @ 07:00)      Troponin T, Serum: <0.01 ng/mL (10-14-21 @ 06:34)  Troponin T, Serum: <0.01 ng/mL (10-13-21 @ 15:14)  Troponin T, Serum: <0.01 ng/mL (10-12-21 @ 02:55)    < from: TTE Echo Complete w/o Contrast w/ Doppler (10.13.21 @ 09:46) >    Summary:   1. Left ventricular ejection fraction, by visual estimation, is 55 to 60%.   2. Normal right ventricular size and function.   3. Normal left atrial size.   4. There is no evidence of pericardial effusion.   5. No evidence of mitral valve regurgitation.   6. Normal trileaflet aortic valve with normal opening.    < end of copied text >  < from: 12 Lead ECG (10.12.21 @ 02:17) >  Diagnosis Line Sinus tachycardia  Otherwise normal ECG      < end of copied text >      RADIOLOGY  < from: CT Abdomen and Pelvis w/ IV Cont (10.12.21 @ 08:24) >  IMPRESSION:    No CT evidence for acute intra-abdominal or pelvic pathology.  Hepatic steatosis.    < end of copied text >  < from: CT Angio Chest PE Protocol w/ IV Cont (10.12.21 @ 05:42) >  IMPRESSION:    1. No evidence of acute intrathoracic pathology or pulmonary embolism.  2. Hepatic steatosis.    < end of copied text >    MEDICATIONS  (STANDING):  albuterol/ipratropium for Nebulization 3 milliLiter(s) Nebulizer every 6 hours  calcium carbonate   1250 mG (OsCal) 1 Tablet(s) Oral daily  enoxaparin Injectable 40 milliGRAM(s) SubCutaneous at bedtime  folic acid 1 milliGRAM(s) Oral daily  influenza   Vaccine 0.5 milliLiter(s) IntraMuscular once  LORazepam     Tablet   Oral   LORazepam     Tablet 2 milliGRAM(s) Oral every 4 hours  multivitamin  Chewable 1 Tablet(s) Oral daily  pantoprazole    Tablet 40 milliGRAM(s) Oral two times a day  potassium phosphate / sodium phosphate Powder (PHOS-NaK) 1 Packet(s) Oral four times a day  predniSONE   Tablet 40 milliGRAM(s) Oral daily  thiamine 100 milliGRAM(s) Oral daily  traZODone 100 milliGRAM(s) Oral at bedtime    MEDICATIONS  (PRN):  acetaminophen   Tablet .. 650 milliGRAM(s) Oral every 6 hours PRN Mild Pain (1 - 3)  aluminum hydroxide/magnesium hydroxide/simethicone Suspension 30 milliLiter(s) Oral every 4 hours PRN Dyspepsia  LORazepam   Injectable 2 milliGRAM(s) IV Push every 2 hours PRN Agitation    
 Patient is feeling better, still some nausea       T(F): 97.5 (10-15-21 @ 14:26), Max: 98.2 (10-15-21 @ 05:00)  HR: 76 (10-15-21 @ 14:26)  BP: 123/71 (10-15-21 @ 14:26)  RR: 18 (10-15-21 @ 14:26)  SpO2: 90% (10-14-21 @ 15:20) (90% - 93%)    PHYSICAL EXAM:  GENERAL: NAD  HEAD:  Atraumatic, Normocephalic  EYES: EOMI, PERRLA, conjunctiva and sclera clear  NERVOUS SYSTEM:  Alert & Oriented X3, no focal deficits   CHEST/LUNG: Clear to percussion bilaterally; No rales, rhonchi, wheezing, or rubs  HEART: Regular rate and rhythm; No murmurs, rubs, or gallops  ABDOMEN: Soft, Nontender, Nondistended; Bowel sounds present  EXTREMITIES:  2+ Peripheral Pulses, No clubbing, cyanosis, or edema    LABS  10-15    139  |  99  |  6<L>  ----------------------------<  92  4.5   |  25  |  0.7    Ca    9.1      15 Oct 2021 08:59  Phos  2.3     10-14  Mg     2.0     10-14    TPro  5.8<L>  /  Alb  3.6  /  TBili  0.6  /  DBili  x   /  AST  48<H>  /  ALT  39  /  AlkPhos  52  10-14                          12.5   6.98  )-----------( 185      ( 15 Oct 2021 08:59 )             36.8         CARDIAC ENZYMES  Creatine Kinase, Serum: 83 (10-13 @ 07:00)      Troponin T, Serum: <0.01 ng/mL (10-14-21 @ 06:34)  Troponin T, Serum: <0.01 ng/mL (10-13-21 @ 15:14)    < from: TTE Echo Complete w/o Contrast w/ Doppler (10.13.21 @ 09:46) >  Summary:   1. Left ventricular ejection fraction, by visual estimation, is 55 to 60%.   2. Normal right ventricular size and function.   3. Normal left atrial size.   4. There is no evidence of pericardial effusion.   5. No evidence of mitral valve regurgitation.   6. Normal trileaflet aortic valve with normal opening.    < end of copied text >      RADIOLOGY  < from: CT Abdomen and Pelvis w/ IV Cont (10.12.21 @ 08:24) >  IMPRESSION:    No CT evidence for acute intra-abdominal or pelvic pathology.  Hepatic steatosis.      < end of copied text >  < from: CT Angio Chest PE Protocol w/ IV Cont (10.12.21 @ 05:42) >  IMPRESSION:    1. No evidence of acute intrathoracic pathology or pulmonary embolism.  2. Hepatic steatosis.    < end of copied text >    MEDICATIONS  (STANDING):  albuterol/ipratropium for Nebulization 3 milliLiter(s) Nebulizer every 6 hours  benzonatate 100 milliGRAM(s) Oral every 8 hours  calcium carbonate   1250 mG (OsCal) 1 Tablet(s) Oral daily  enoxaparin Injectable 40 milliGRAM(s) SubCutaneous at bedtime  folic acid 1 milliGRAM(s) Oral daily  influenza   Vaccine 0.5 milliLiter(s) IntraMuscular once  LORazepam     Tablet 1 milliGRAM(s) Oral every 4 hours  multivitamin  Chewable 1 Tablet(s) Oral daily  pantoprazole    Tablet 40 milliGRAM(s) Oral two times a day  predniSONE   Tablet 40 milliGRAM(s) Oral daily  thiamine 100 milliGRAM(s) Oral daily  traZODone 100 milliGRAM(s) Oral at bedtime    MEDICATIONS  (PRN):  acetaminophen   Tablet .. 650 milliGRAM(s) Oral every 6 hours PRN Mild Pain (1 - 3)  aluminum hydroxide/magnesium hydroxide/simethicone Suspension 30 milliLiter(s) Oral every 4 hours PRN Dyspepsia  LORazepam   Injectable 2 milliGRAM(s) IV Push every 2 hours PRN Agitation  ondansetron    Tablet 4 milliGRAM(s) Oral every 6 hours PRN Nausea and/or Vomiting    
SUBJECTIVE:    Patient is a 41y old Female who presents with a chief complaint of 1 week of vomiting and chest pressure (14 Oct 2021 08:51)    Currently admitted to medicine with the primary diagnosis of Alcoholic ketoacidosis, Lactic Acidosis, and Chest pain       Today is hospital day 2d. Overnight, the patient was frequently awakened by coughing, SOB and chest pain, during which times she desaturated and need the venti mask and duonebs.    PAST MEDICAL & SURGICAL HISTORY  Alcoholism    Anxiety    Graves&#x27; disease    GERD (gastroesophageal reflux disease)    Gastritis    MVP (mitral valve prolapse)    Nicotine dependence    Benzodiazepine misuse    Hypercholesterolemia    Obesity    Esophagitis    History of Graves&#x27; disease    Heart murmur    Hyperlipidemia, unspecified hyperlipidemia type    Hemochromatosis    Fatty liver    History of  section    S/P tendon repair  5th finger right hand      SOCIAL HISTORY:  Negative for smoking/alcohol/drug use.     ALLERGIES:  Risperdal (Urticaria)  Topamax (Urticaria)    MEDICATIONS:  STANDING MEDICATIONS  albuterol/ipratropium for Nebulization 3 milliLiter(s) Nebulizer every 6 hours  calcium carbonate   1250 mG (OsCal) 1 Tablet(s) Oral daily  enoxaparin Injectable 40 milliGRAM(s) SubCutaneous at bedtime  folic acid 1 milliGRAM(s) Oral daily  influenza   Vaccine 0.5 milliLiter(s) IntraMuscular once  multivitamin  Chewable 1 Tablet(s) Oral daily  pantoprazole    Tablet 40 milliGRAM(s) Oral two times a day  potassium phosphate / sodium phosphate Powder (PHOS-NaK) 1 Packet(s) Oral four times a day  predniSONE   Tablet 40 milliGRAM(s) Oral daily  thiamine 100 milliGRAM(s) Oral daily  traZODone 100 milliGRAM(s) Oral at bedtime    PRN MEDICATIONS  acetaminophen   Tablet .. 650 milliGRAM(s) Oral every 6 hours PRN  aluminum hydroxide/magnesium hydroxide/simethicone Suspension 30 milliLiter(s) Oral every 4 hours PRN  LORazepam   Injectable 2 milliGRAM(s) IV Push every 2 hours PRN    VITALS:   T(F): 97  HR: 58  BP: 107/65  RR: 18  SpO2: 92%    LABS:                        11.5   4.71  )-----------( 162      ( 14 Oct 2021 06:34 )             33.4     10-14    137  |  101  |  <3<L>  ----------------------------<  108<H>  4.1   |  26  |  0.5<L>    Ca    8.6      14 Oct 2021 06:34  Phos  2.3     10-14  Mg     2.0     10-14    TPro  5.8<L>  /  Alb  3.6  /  TBili  0.6  /  DBili  x   /  AST  48<H>  /  ALT  39  /  AlkPhos  52  10-14    Troponin T, Serum: <0.01 ng/mL (10-14-21 @ 06:34)  Troponin T, Serum: <0.01 ng/mL (10-13-21 @ 15:14)    CARDIAC MARKERS ( 14 Oct 2021 06:34 )  x     / <0.01 ng/mL / x     / x     / x      CARDIAC MARKERS ( 13 Oct 2021 15:14 )  x     / <0.01 ng/mL / x     / x     / x      CARDIAC MARKERS ( 13 Oct 2021 07:00 )  x     / x     / 83 U/L / x     / x          RADIOLOGY:  < from: TTE Echo Complete w/o Contrast w/ Doppler (10.13.21 @ 09:46) >  Summary:   1. Left ventricular ejection fraction, by visual estimation, is 55 to 60%.   2. Normal right ventricular size and function.   3. Normal left atrial size.   4. There is no evidence of pericardial effusion.   5. No evidence of mitral valve regurgitation.   6. Normal trileaflet aortic valve with normal opening.    < end of copied text >  < from: Xray Chest 1 View AP/PA (10.13.21 @ 06:59) >  Impression:    Mild pulmonary vascular congestion. No pleural effusion parenchymal opacity or air leak      < end of copied text >  < from: CT Abdomen and Pelvis w/ IV Cont (10.12.21 @ 08:24) >  IMPRESSION:    No CT evidence for acute intra-abdominal or pelvic pathology.  Hepatic steatosis.    < end of copied text >  < from: 12 Lead ECG (10.13.21 @ 07:32) >  Diagnosis Line Normal sinus rhythm  Low voltage QRS  Cannot rule out Anterior infarct , age undetermined  Abnormal ECG    < end of copied text >      PHYSICAL EXAM:  GENERAL: Patient is mildly distressed   HEAD:  Atraumatic, Normocephalic  EYES: EOMI, PERRLA, conjunctiva and sclera clear  ENMT: No tonsillar erythema, exudates, or enlargement; Moist mucous membranes, No lesions  NERVOUS SYSTEM:  Alert & Oriented X3, Good concentration; Motor Strength 5/5 B/L upper and lower extremities  CHEST/LUNG: Clear to auscultation bilaterally; No rales, rhonchi, wheezing, or rubs  HEART: Regular rate and rhythm; No murmurs, rubs, or gallops  ABDOMEN: Soft, Mildly tender in the epigastric area; Bowel sounds present in all 4 quadrants  EXTREMITIES:  2+ Peripheral Pulses, No clubbing, cyanosis, or edema    IV Access: YES  NG tube present: NO  PEG tube present: NO  Zarate catheter present: NO        
SUBJECTIVE:    Patient is a 41y old Female who presents with a chief complaint of 1 week of vomiting and chest pressure (14 Oct 2021 08:51)    Currently admitted to medicine with the primary diagnosis of Alcoholic ketoacidosis, Lactic Acidosis, and Chest pain       HD# 3     PAST MEDICAL & SURGICAL HISTORY  Alcoholism    Anxiety    Graves&#x27; disease    GERD (gastroesophageal reflux disease)    Gastritis    MVP (mitral valve prolapse)    Nicotine dependence    Benzodiazepine misuse    Hypercholesterolemia    Obesity    Esophagitis    History of Graves&#x27; disease    Heart murmur    Hyperlipidemia, unspecified hyperlipidemia type    Hemochromatosis    Fatty liver    History of  section    S/P tendon repair  5th finger right hand      SOCIAL HISTORY:  Negative for smoking/alcohol/drug use.     ALLERGIES:  Risperdal (Urticaria)  Topamax (Urticaria)    MEDICATIONS:  MEDICATIONS  (STANDING):  albuterol/ipratropium for Nebulization 3 milliLiter(s) Nebulizer every 6 hours  benzonatate 100 milliGRAM(s) Oral every 8 hours  calcium carbonate   1250 mG (OsCal) 1 Tablet(s) Oral daily  enoxaparin Injectable 40 milliGRAM(s) SubCutaneous at bedtime  folic acid 1 milliGRAM(s) Oral daily  hydrOXYzine hydrochloride 25 milliGRAM(s) Oral once  influenza   Vaccine 0.5 milliLiter(s) IntraMuscular once  LORazepam     Tablet 0.5 milliGRAM(s) Oral every 4 hours  multivitamin  Chewable 1 Tablet(s) Oral daily  pantoprazole    Tablet 40 milliGRAM(s) Oral two times a day  predniSONE   Tablet 40 milliGRAM(s) Oral daily  thiamine 100 milliGRAM(s) Oral daily  traZODone 100 milliGRAM(s) Oral at bedtime    MEDICATIONS  (PRN):  acetaminophen   Tablet .. 650 milliGRAM(s) Oral every 6 hours PRN Mild Pain (1 - 3)  aluminum hydroxide/magnesium hydroxide/simethicone Suspension 30 milliLiter(s) Oral every 4 hours PRN Dyspepsia  LORazepam   Injectable 2 milliGRAM(s) IV Push every 2 hours PRN Agitation  ondansetron    Tablet 4 milliGRAM(s) Oral every 6 hours PRN Nausea and/or Vomiting    VITALS:   T(C): 36.4 (16 Oct 2021 14:08), Max: 36.9 (15 Oct 2021 20:45)  T(F): 97.6 (16 Oct 2021 14:08), Max: 98.5 (15 Oct 2021 20:45)  HR: 71 (16 Oct 2021 14:08) (71 - 93)  BP: 101/55 (16 Oct 2021 14:08) (101/55 - 132/90)  RR: 18 (16 Oct 2021 14:08) (17 - 18)  SpO2: 95% RA     LABS:                            12.5   6.98  )-----------( 185      ( 15 Oct 2021 08:59 )             36.8     10-15    139  |  99  |  6<L>  ----------------------------<  92  4.5   |  25  |  0.7    Ca    9.1      15 Oct 2021 08:59    Troponin T, Serum: <0.01 ng/mL (10-14-21 @ 06:34)    Troponin T, Serum: <0.01 ng/mL (10-13-21 @ 15:14)    CARDIAC MARKERS ( 14 Oct 2021 06:34 )  x     / <0.01 ng/mL / x     / x     / x      CARDIAC MARKERS ( 13 Oct 2021 15:14 )  x     / <0.01 ng/mL / x     / x     / x      CARDIAC MARKERS ( 13 Oct 2021 07:00 )  x     / x     / 83 U/L / x     / x          RADIOLOGY:  < from: TTE Echo Complete w/o Contrast w/ Doppler (10.13.21 @ 09:46) >    Summary:   1. Left ventricular ejection fraction, by visual estimation, is 55 to 60%.   2. Normal right ventricular size and function.   3. Normal left atrial size.   4. There is no evidence of pericardial effusion.   5. No evidence of mitral valve regurgitation.   6. Normal trileaflet aortic valve with normal opening.    < end of copied text >  < from: Xray Chest 1 View AP/PA (10.13.21 @ 06:59) >  Impression:    Mild pulmonary vascular congestion. No pleural effusion parenchymal opacity or air leak      < end of copied text >  < from: CT Abdomen and Pelvis w/ IV Cont (10.12.21 @ 08:24) >    IMPRESSION:    No CT evidence for acute intra-abdominal or pelvic pathology.  Hepatic steatosis.    < end of copied text >  < from: 12 Lead ECG (10.13.21 @ 07:32) >  Diagnosis Line Normal sinus rhythm  Low voltage QRS  Cannot rule out Anterior infarct , age undetermined  Abnormal ECG    < end of copied text >    PHYSICAL EXAM:  GENERAL: Patient is mildly distressed   HEAD:  Atraumatic, Normocephalic  EYES: EOMI, PERRLA, conjunctiva and sclera clear  ENMT: No tonsillar erythema, exudates, or enlargement; Moist mucous membranes, No lesions  NERVOUS SYSTEM:  Alert & Oriented X3, Good concentration; Motor Strength 5/5 B/L upper and lower extremities  CHEST/LUNG: Clear to auscultation bilaterally; No rales, rhonchi, wheezing, or rubs  HEART: Regular rate and rhythm; No murmurs, rubs, or gallops  ABDOMEN: Soft, Mildly tender in the epigastric area; Bowel sounds present in all 4 quadrants  EXTREMITIES:  2+ Peripheral Pulses, No clubbing, cyanosis, or edema    IV Access: YES        
 Patient feels improved, tolerating oral diet      T(F): 97.5 (10-17-21 @ 05:00), Max: 97.6 (10-16-21 @ 14:08)  HR: 80 (10-17-21 @ 06:52)  BP: 103/57 (10-17-21 @ 06:52)  RR: 16 (10-17-21 @ 06:52)      PHYSICAL EXAM:  GENERAL: NAD  HEAD:  Atraumatic, Normocephalic  EYES: EOMI, PERRLA, conjunctiva and sclera clear  NERVOUS SYSTEM:  Alert & Oriented X3, no focal deficits   CHEST/LUNG: Clear to percussion bilaterally; No rales, rhonchi, wheezing, or rubs  HEART: Regular rate and rhythm; No murmurs, rubs, or gallops  ABDOMEN: Soft, Nontender, Nondistended; Bowel sounds present  EXTREMITIES:  2+ Peripheral Pulses, No clubbing, cyanosis, or edema      MEDICATIONS  (STANDING):  albuterol/ipratropium for Nebulization 3 milliLiter(s) Nebulizer every 6 hours  benzonatate 100 milliGRAM(s) Oral every 8 hours  calcium carbonate   1250 mG (OsCal) 1 Tablet(s) Oral daily  enoxaparin Injectable 40 milliGRAM(s) SubCutaneous at bedtime  folic acid 1 milliGRAM(s) Oral daily  hydrOXYzine hydrochloride 25 milliGRAM(s) Oral once  influenza   Vaccine 0.5 milliLiter(s) IntraMuscular once  multivitamin  Chewable 1 Tablet(s) Oral daily  pantoprazole    Tablet 40 milliGRAM(s) Oral two times a day  predniSONE   Tablet 40 milliGRAM(s) Oral daily  senna 2 Tablet(s) Oral at bedtime  thiamine 100 milliGRAM(s) Oral daily  traZODone 100 milliGRAM(s) Oral at bedtime    MEDICATIONS  (PRN):  acetaminophen   Tablet .. 650 milliGRAM(s) Oral every 6 hours PRN Mild Pain (1 - 3)  aluminum hydroxide/magnesium hydroxide/simethicone Suspension 30 milliLiter(s) Oral every 4 hours PRN Dyspepsia  LORazepam   Injectable 2 milliGRAM(s) IV Push every 2 hours PRN Agitation  ondansetron    Tablet 4 milliGRAM(s) Oral every 6 hours PRN Nausea and/or Vomiting  polyethylene glycol 3350 17 Gram(s) Oral two times a day PRN Constipation    
 Patient reports unable to eat over the past week secondary to continued 1L/day vodka intake associated with nausea and vomiting       T(F): 98.5 (10-12-21 @ 11:00), Max: 99 (10-12-21 @ 10:22)  HR: 116 (10-12-21 @ 10:22)  BP: 156/78 (10-12-21 @ 10:22)  RR: 20  SpO2: 93% (10-12-21 @ 10:22) (93% - 96%)    PHYSICAL EXAM:  GENERAL: NAD  HEAD:  Atraumatic, Normocephalic  EYES: EOMI, PERRLA, conjunctiva and sclera clear  NERVOUS SYSTEM:  Alert & Oriented X3, no focal deficits   CHEST/LUNG: Clear to percussion bilaterally; No rales, rhonchi, wheezing, or rubs  HEART: Regular rate and rhythm; No murmurs, rubs, or gallops  ABDOMEN: Soft, Nontender, Nondistended; Bowel sounds present  EXTREMITIES:  2+ Peripheral Pulses, No clubbing, cyanosis, or edema    LABS  10-12    137  |  100  |  8<L>  ----------------------------<  72  4.5   |  18  |  0.5<L>    Ca    8.2<L>      12 Oct 2021 07:00  Mg     2.1     10-12    TPro  8.5<H>  /  Alb  5.1  /  TBili  0.8  /  DBili  x   /  AST  96<H>  /  ALT  79<H>  /  AlkPhos  83  10-12                          16.0   6.15  )-----------( 247      ( 12 Oct 2021 02:55 )             46.5   Alcohol, Blood (10.12.21 @ 02:55)   Alcohol, Blood: 350 mg/dL   Lipase, Serum (10.12.21 @ 02:55)   Lipase, Serum: 28 U/L     CARDIAC ENZYMES    Troponin T, Serum: <0.01 ng/mL (10-12-21 @ 02:55)    RADIOLOGY  < from: CT Angio Chest PE Protocol w/ IV Cont (10.12.21 @ 05:42) >  1. No evidence of acute intrathoracic pathology or pulmonary embolism.  2. Hepatic steatosis.    < end of copied text >    MEDICATIONS  (STANDING):  dextrose 5% + sodium chloride 0.9%. 1000 milliLiter(s) (200 mL/Hr) IV Continuous <Continuous>  enoxaparin Injectable 40 milliGRAM(s) SubCutaneous at bedtime  LORazepam   Injectable 4 milliGRAM(s) IV Push every 4 hours  LORazepam   Injectable   IV Push   pantoprazole  Injectable 40 milliGRAM(s) IV Push once    MEDICATIONS  (PRN):  LORazepam   Injectable 2 milliGRAM(s) IV Push every 2 hours PRN Agitation    
 Patient is c/o SOB and nausea       T(F): 97.9 (10-13-21 @ 11:00), Max: 98.6 (10-12-21 @ 19:00)  HR: 109 (10-13-21 @ 05:56)  BP: 153/94 (10-13-21 @ 05:56)  RR: 20  SpO2: 96% (10-13-21 @ 05:56) (90% - 98%)    PHYSICAL EXAM:  GENERAL: NAD  HEAD:  Atraumatic, Normocephalic  EYES: EOMI, PERRLA, conjunctiva and sclera clear  NERVOUS SYSTEM:  Alert & Oriented X3, no focal deficits   CHEST/LUNG: Clear to percussion bilaterally; No rales, rhonchi, wheezing, or rubs  HEART: Regular rate and rhythm; No murmurs, rubs, or gallops  ABDOMEN: Soft, Nontender, Nondistended; Bowel sounds present  EXTREMITIES:  2+ Peripheral Pulses, No clubbing, cyanosis, or edema    LABS  10-13    141  |  105  |  4<L>  ----------------------------<  96  3.8   |  25  |  0.5<L>    Ca    8.0<L>      13 Oct 2021 05:48  Phos  1.3     10-13  Mg     1.5     10-13    TPro  5.5<L>  /  Alb  3.6  /  TBili  1.0  /  DBili  x   /  AST  53<H>  /  ALT  43<H>  /  AlkPhos  49  10-13                          11.6   4.35  )-----------( 170      ( 13 Oct 2021 05:48 )             34.3     Alcohol, Blood (10.12.21 @ 02:55)   Alcohol, Blood: 350 mg/dL   Lactate, Blood (10.13.21 @ 05:48)   Lactate, Blood: 1.5 mmol/L   CARDIAC ENZYMES  Creatine Kinase, Serum: 83 (10-13 @ 07:00)    Troponin T, Serum: <0.01 ng/mL (10-12-21 @ 02:55)    RADIOLOGY  < from: Xray Chest 1 View AP/PA (10.13.21 @ 06:59) >    Impression:    Mild pulmonary vascular congestion. No pleural effusion parenchymal opacity or air leak      < end of copied text >  < from: CT Abdomen and Pelvis w/ IV Cont (10.12.21 @ 08:24) >  IMPRESSION:    No CT evidence for acute intra-abdominal or pelvic pathology.  Hepatic steatosis.    < end of copied text >  < from: CT Angio Chest PE Protocol w/ IV Cont (10.12.21 @ 05:42) >  IMPRESSION:    1. No evidence of acute intrathoracic pathology or pulmonary embolism.  2. Hepatic steatosis.      < end of copied text >    MEDICATIONS  (STANDING):  albuterol/ipratropium for Nebulization 3 milliLiter(s) Nebulizer every 6 hours  calcium carbonate   1250 mG (OsCal) 1 Tablet(s) Oral daily  dextrose 5% + sodium chloride 0.9%. 1000 milliLiter(s) (60 mL/Hr) IV Continuous <Continuous>  enoxaparin Injectable 40 milliGRAM(s) SubCutaneous at bedtime  folic acid Injectable 1 milliGRAM(s) IV Push daily  influenza   Vaccine 0.5 milliLiter(s) IntraMuscular once  LORazepam   Injectable 3 milliGRAM(s) IV Push every 4 hours  LORazepam   Injectable   IV Push   magnesium sulfate  IVPB 2 Gram(s) IV Intermittent every 4 hours  multivitamin  Chewable 1 Tablet(s) Oral daily  pantoprazole    Tablet 40 milliGRAM(s) Oral two times a day  potassium phosphate / sodium phosphate Powder (PHOS-NaK) 1 Packet(s) Oral four times a day  predniSONE   Tablet 40 milliGRAM(s) Oral daily  thiamine Injectable 100 milliGRAM(s) IV Push daily  traZODone 100 milliGRAM(s) Oral at bedtime    MEDICATIONS  (PRN):  acetaminophen   Tablet .. 650 milliGRAM(s) Oral every 6 hours PRN Mild Pain (1 - 3)  aluminum hydroxide/magnesium hydroxide/simethicone Suspension 30 milliLiter(s) Oral every 4 hours PRN Dyspepsia  LORazepam   Injectable 2 milliGRAM(s) IV Push every 2 hours PRN Agitation    
Patient is a 41y old  Female who presents with a chief complaint of 1 week of vomiting and chest pressure (13 Oct 2021 14:20)    Over Night Events:  No overnight events.    ROS:   All ROS are negative except HPI    PHYSICAL EXAM  ICU Vital Signs Last 24 Hrs  T(C): 36.1 (14 Oct 2021 07:01), Max: 36.6 (13 Oct 2021 11:00)  T(F): 97 (14 Oct 2021 07:01), Max: 97.9 (13 Oct 2021 11:00)  HR: 58 (14 Oct 2021 07:18) (58 - 119)  BP: 107/65 (14 Oct 2021 07:18) (103/57 - 156/76)  BP(mean): 80 (14 Oct 2021 07:18) (75 - 106)  RR: 18 (14 Oct 2021 07:18) (18 - 36)  SpO2: 92% (14 Oct 2021 07:18) (90% - 98%)      CONSTITUTIONAL:  Well nourished.  NAD    ENT:   Airway patent,   Mouth with normal mucosa.   No thrush    EYES:   pupils equal,   round and reactive to light.    CARDIAC:   Normal rate,   Regular rhythm.    Heart sounds S1, S2.   No edema    RESPIRATORY:   No wheezing   Normal chest expansion  No use of accessory muscles    GASTROINTESTINAL:  Abdomen soft   Non-tender,   No guarding,   + BS    MUSCULOSKELETAL:   Range of motion is not limited,  No clubbing, cyanosis    NEUROLOGICAL:   Alert and oriented   No motor or sensory deficits.    SKIN:   Skin normal color for race,   Warm and dry  No evidence of rash.    HEME LYMPH:   No cervical  lymphadenopathy.  No inguinal lymphadenopathy    10-13-21 @ 07:01  -  10-14-21 @ 07:00  --------------------------------------------------------  IN:    dextrose 5% + sodium chloride 0.9%: 1080 mL    Oral Fluid: 460 mL  Total IN: 1540 mL    OUT:    Voided (mL): 200 mL  Total OUT: 200 mL    Total NET: 1340 mL          LABS:                            11.5   4.71  )-----------( 162      ( 14 Oct 2021 06:34 )             33.4                                               10-14    137  |  101  |  <3<L>  ----------------------------<  108<H>  4.1   |  26  |  0.5<L>    Ca    8.6      14 Oct 2021 06:34  Phos  2.3     10-14  Mg     2.0     10-14    TPro  5.8<L>  /  Alb  3.6  /  TBili  0.6  /  DBili  x   /  AST  48<H>  /  ALT  39  /  AlkPhos  52  10-14    CARDIAC MARKERS ( 14 Oct 2021 06:34 )  x     / <0.01 ng/mL / x     / x     / x      CARDIAC MARKERS ( 13 Oct 2021 15:14 )  x     / <0.01 ng/mL / x     / x     / x      CARDIAC MARKERS ( 13 Oct 2021 07:00 )  x     / x     / 83 U/L / x     / x        LIVER FUNCTIONS - ( 14 Oct 2021 06:34 )  Alb: 3.6 g/dL / Pro: 5.8 g/dL / ALK PHOS: 52 U/L / ALT: 39 U/L / AST: 48 U/L / GGT: x                                                                                 MEDICATIONS  (STANDING):  albuterol/ipratropium for Nebulization 3 milliLiter(s) Nebulizer every 6 hours  calcium carbonate   1250 mG (OsCal) 1 Tablet(s) Oral daily  enoxaparin Injectable 40 milliGRAM(s) SubCutaneous at bedtime  folic acid Injectable 1 milliGRAM(s) IV Push daily  influenza   Vaccine 0.5 milliLiter(s) IntraMuscular once  LORazepam   Injectable 2 milliGRAM(s) IV Push every 4 hours  LORazepam   Injectable   IV Push   multivitamin  Chewable 1 Tablet(s) Oral daily  pantoprazole    Tablet 40 milliGRAM(s) Oral two times a day  potassium phosphate / sodium phosphate Powder (PHOS-NaK) 1 Packet(s) Oral four times a day  predniSONE   Tablet 40 milliGRAM(s) Oral daily  thiamine Injectable 100 milliGRAM(s) IV Push daily  traZODone 100 milliGRAM(s) Oral at bedtime    MEDICATIONS  (PRN):  acetaminophen   Tablet .. 650 milliGRAM(s) Oral every 6 hours PRN Mild Pain (1 - 3)  aluminum hydroxide/magnesium hydroxide/simethicone Suspension 30 milliLiter(s) Oral every 4 hours PRN Dyspepsia  LORazepam   Injectable 2 milliGRAM(s) IV Push every 2 hours PRN Agitation  
Patient is a 41y old  Female who presents with a chief complaint of alcoholic ketoacidosis (12 Oct 2021 12:56)    Over Night Events:  Episode of chest pain, localized, wheezing, cough and shortness of breath which have resolved, received DuoNeb.    ROS:   All ROS are negative except HPI     PHYSICAL EXAM  ICU Vital Signs Last 24 Hrs  T(C): 36.1 (13 Oct 2021 07:11), Max: 37.2 (12 Oct 2021 10:22)  T(F): 97 (13 Oct 2021 07:11), Max: 99 (12 Oct 2021 10:22)  HR: 109 (13 Oct 2021 05:56) (62 - 116)  BP: 153/94 (13 Oct 2021 05:56) (98/54 - 156/78)  BP(mean): 115 (13 Oct 2021 05:56) (71 - 115)  RR: 26 (13 Oct 2021 07:11) (16 - 39)  SpO2: 96% (13 Oct 2021 05:56) (90% - 98%)      CONSTITUTIONAL:  Well nourished.  NAD    ENT:   Airway patent,   Mouth with normal mucosa.   No thrush    EYES:   pupils equal,   round and reactive to light.    CARDIAC:   Normal rate,   Regular rhythm.    Heart sounds S1, S2.   No edema    RESPIRATORY:   No wheezing   Normal chest expansion  No use of accessory muscles    GASTROINTESTINAL:  Abdomen soft   Non-tender,   No guarding,   + BS    MUSCULOSKELETAL:   Range of motion is not limited,  No clubbing, cyanosis    NEUROLOGICAL:   Alert and oriented   No motor or sensory deficits.    SKIN:   Skin normal color for race,   Warm and dry  No evidence of rash.    PSYCHIATRIC:   Restless at times  No suicidal ideation at this time    HEME LYMPH:   No cervical  lymphadenopathy.  No inguinal lymphadenopathy      10-12-21 @ 07:01  -  10-13-21 @ 07:00  --------------------------------------------------------  IN:    dextrose 5% + sodium chloride 0.9%: 3600 mL    Lactated Ringers: 300 mL    Oral Fluid: 200 mL  Total IN: 4100 mL    OUT:  Total OUT: 0 mL    Total NET: 4100 mL      10-13-21 @ 07:01  -  10-13-21 @ 08:56  --------------------------------------------------------  IN:    Oral Fluid: 240 mL  Total IN: 240 mL    OUT:  Total OUT: 0 mL    Total NET: 240 mL      LABS:                          11.6   4.35  )-----------( 170      ( 13 Oct 2021 05:48 )             34.3     141  |  105  |  4<L>  ----------------------------<  96  3.8   |  25  |  0.5<L>    Ca    8.0<L>      13 Oct 2021 05:48  Phos  1.3     10-13  Mg     1.5     10-13    TPro  5.5<L>  /  Alb  3.6  /  TBili  1.0  /  DBili  x   /  AST  53<H>  /  ALT  43<H>  /  AlkPhos  49  10-13    CARDIAC MARKERS ( 12 Oct 2021 02:55 )  x     / <0.01 ng/mL / x     / x     / x        LIVER FUNCTIONS - ( 13 Oct 2021 05:48 )  Alb: 3.6 g/dL / Pro: 5.5 g/dL / ALK PHOS: 49 U/L / ALT: 43 U/L / AST: 53 U/L / GGT: x           MEDICATIONS  (STANDING):  albuterol/ipratropium for Nebulization 3 milliLiter(s) Nebulizer every 6 hours  dextrose 5% + sodium chloride 0.9%. 1000 milliLiter(s) (200 mL/Hr) IV Continuous <Continuous>  enoxaparin Injectable 40 milliGRAM(s) SubCutaneous at bedtime  folic acid Injectable 1 milliGRAM(s) IV Push daily  influenza   Vaccine 0.5 milliLiter(s) IntraMuscular once  LORazepam   Injectable 3 milliGRAM(s) IV Push every 4 hours  LORazepam   Injectable   IV Push   multivitamin  Chewable 1 Tablet(s) Oral daily  pantoprazole    Tablet 40 milliGRAM(s) Oral before breakfast  thiamine Injectable 100 milliGRAM(s) IV Push daily  traZODone 100 milliGRAM(s) Oral at bedtime    MEDICATIONS  (PRN):  acetaminophen   Tablet .. 650 milliGRAM(s) Oral every 6 hours PRN Mild Pain (1 - 3)  aluminum hydroxide/magnesium hydroxide/simethicone Suspension 30 milliLiter(s) Oral every 4 hours PRN Dyspepsia  LORazepam   Injectable 2 milliGRAM(s) IV Push every 2 hours PRN Agitation      New X-rays reviewed:                                                                                  ECHO    CXR interpreted by me:  no acute cardiopulmonary pathology
SUBJECTIVE:    Patient is a 41y old Female who presents with a chief complaint of 1 week of vomiting and chest pressure (13 Oct 2021 08:56)    Currently admitted to medicine with the primary diagnosis of Alcoholic ketoacidosis, Lactic Acidosis, and Chest Pain     Today is hospital day 1d. Overnight, she had an episode of worsening chest pain, cough, dyspnea, and desaturation to 89.  During this episode, the patient reports wheezing. Duonebs did not provide relief.     PAST MEDICAL & SURGICAL HISTORY  Alcoholism    Anxiety    Graves&#x27; disease    GERD (gastroesophageal reflux disease)    Gastritis    MVP (mitral valve prolapse)    Nicotine dependence    Benzodiazepine misuse    Hypercholesterolemia    Obesity    Esophagitis    History of Graves&#x27; disease    Heart murmur    Hyperlipidemia, unspecified hyperlipidemia type    Hemochromatosis    Fatty liver    History of  section    S/P tendon repair  5th finger right hand      SOCIAL HISTORY:  ETOH Abuse and former smoker.     ALLERGIES:  Risperdal (Urticaria)  Topamax (Urticaria)    MEDICATIONS:  STANDING MEDICATIONS  albuterol/ipratropium for Nebulization 3 milliLiter(s) Nebulizer every 6 hours  calcium carbonate   1250 mG (OsCal) 1 Tablet(s) Oral daily  dextrose 5% + sodium chloride 0.9%. 1000 milliLiter(s) IV Continuous <Continuous>  enoxaparin Injectable 40 milliGRAM(s) SubCutaneous at bedtime  folic acid Injectable 1 milliGRAM(s) IV Push daily  influenza   Vaccine 0.5 milliLiter(s) IntraMuscular once  LORazepam   Injectable 3 milliGRAM(s) IV Push every 4 hours  LORazepam   Injectable   IV Push   magnesium sulfate  IVPB 2 Gram(s) IV Intermittent every 4 hours  multivitamin  Chewable 1 Tablet(s) Oral daily  pantoprazole    Tablet 40 milliGRAM(s) Oral before breakfast  potassium phosphate / sodium phosphate Powder (PHOS-NaK) 1 Packet(s) Oral four times a day  predniSONE   Tablet 40 milliGRAM(s) Oral daily  thiamine Injectable 100 milliGRAM(s) IV Push daily  traZODone 100 milliGRAM(s) Oral at bedtime    PRN MEDICATIONS  acetaminophen   Tablet .. 650 milliGRAM(s) Oral every 6 hours PRN  aluminum hydroxide/magnesium hydroxide/simethicone Suspension 30 milliLiter(s) Oral every 4 hours PRN  LORazepam   Injectable 2 milliGRAM(s) IV Push every 2 hours PRN    VITALS:   T(F): 97  HR: 109  BP: 153/94  RR: 26  SpO2: 96%    LABS:                        11.6   4.35  )-----------( 170      ( 13 Oct 2021 05:48 )             34.3     10-13    141  |  105  |  4<L>  ----------------------------<  96  3.8   |  25  |  0.5<L>    Ca    8.0<L>      13 Oct 2021 05:48  Phos  1.3     10-13  Mg     1.5     10-13    TPro  5.5<L>  /  Alb  3.6  /  TBili  1.0  /  DBili  x   /  AST  53<H>  /  ALT  43<H>  /  AlkPhos  49  10-13    Creatine Kinase, Serum: 83 U/L (10-13-21 @ 07:00)  Lactate, Blood: 1.5 mmol/L (10-13-21 @ 05:48)  Lactate, Blood: 1.1 mmol/L (10-12-21 @ 21:44)  Lactate, Blood: 1.3 mmol/L (10-12-21 @ 15:15)      CARDIAC MARKERS ( 13 Oct 2021 07:00 )  x     / x     / 83 U/L / x     / x      CARDIAC MARKERS ( 12 Oct 2021 02:55 )  x     / <0.01 ng/mL / x     / x     / x          RADIOLOGY:  < from: CT Abdomen and Pelvis w/ IV Cont (10.12.21 @ 08:24) >  IMPRESSION:    No CT evidence for acute intra-abdominal or pelvic pathology.  Hepatic steatosis.      < end of copied text >  < from: Xray Chest 1 View AP/PA (10.13.21 @ 06:59) >  Impression:    Mild pulmonary vascular congestion. No pleural effusion parenchymal opacity or air leak    < end of copied text >  < from: CT Angio Chest PE Protocol w/ IV Cont (10.12.21 @ 05:42) >  IMPRESSION:    1. No evidence of acute intrathoracic pathology or pulmonary embolism.  2. Hepatic steatosis.    < end of copied text >      PHYSICAL EXAM:  GENERAL: Patient is in mild distress and discomfort  HEAD:  Atraumatic, Normocephalic  EYES: EOMI, PERRLA, conjunctiva and sclera clear  ENMT: No tonsillar erythema, exudates, or enlargement; Moist mucous membranes, No lesions  NECK: Supple, No JVD, Normal thyroid  NERVOUS SYSTEM:  Alert & Oriented X4, Good concentration  CHEST/LUNG: Clear to auscultation bilaterally; No rales, rhonchi, wheezing, or rubs  HEART: Regular rate and rhythm; No murmurs, rubs, or gallops  ABDOMEN: Soft, Nontender, Nondistended; Bowel sounds present in all 4 quadrants  EXTREMITIES:  2+ Peripheral Pulses, No clubbing, cyanosis, or edema    IV Access: YES  NG tube present: NO  PEG tube present: NO  Zarate catheter present: NO        
    Pt interviewed, examined and EMR chart reviewed.    Follow up of Alcohol Depenency. Pt is on elevated iv push ativan protocolprotocol. Pt is doing better from withdrawal standpoint. Pt with complaint of sob and hypoxemia. Pt is concerned after critical care rounds.    SOCIAL HISTORY:    REVIEW OF SYSTEMS:    Constitutional: No fever, weight loss or fatigue  ENT:  No difficulty hearing, tinnitus, vertigo; No sinus or throat pain  Neck: No pain or stiffness  Respiratory: positive sob  Cardiovascular: No chest pain, palpitations, shortness of breath, dizziness or leg swelling  Gastrointestinal: No abdominal or epigastric pain. No nausea, vomiting or hematemesis; No diarrhea or constipation. No melena or hematochezia.  Neurological: No headaches, memory loss, loss of strength, numbness...positive tremors  Musculoskeletal: No joint pain or swelling; No muscle, back or extremity pain      MEDICATIONS  (STANDING):  albuterol/ipratropium for Nebulization 3 milliLiter(s) Nebulizer every 6 hours  calcium carbonate   1250 mG (OsCal) 1 Tablet(s) Oral daily  dextrose 5% + sodium chloride 0.9%. 1000 milliLiter(s) (60 mL/Hr) IV Continuous <Continuous>  enoxaparin Injectable 40 milliGRAM(s) SubCutaneous at bedtime  folic acid Injectable 1 milliGRAM(s) IV Push daily  influenza   Vaccine 0.5 milliLiter(s) IntraMuscular once  LORazepam   Injectable 3 milliGRAM(s) IV Push every 4 hours  LORazepam   Injectable   IV Push   magnesium sulfate  IVPB 2 Gram(s) IV Intermittent every 4 hours  multivitamin  Chewable 1 Tablet(s) Oral daily  pantoprazole    Tablet 40 milliGRAM(s) Oral two times a day  potassium phosphate / sodium phosphate Powder (PHOS-NaK) 1 Packet(s) Oral four times a day  predniSONE   Tablet 40 milliGRAM(s) Oral daily  thiamine Injectable 100 milliGRAM(s) IV Push daily  traZODone 100 milliGRAM(s) Oral at bedtime    MEDICATIONS  (PRN):  acetaminophen   Tablet .. 650 milliGRAM(s) Oral every 6 hours PRN Mild Pain (1 - 3)  aluminum hydroxide/magnesium hydroxide/simethicone Suspension 30 milliLiter(s) Oral every 4 hours PRN Dyspepsia  LORazepam   Injectable 2 milliGRAM(s) IV Push every 2 hours PRN Agitation      Vital Signs Last 24 Hrs  T(C): 36.6 (13 Oct 2021 11:00), Max: 37 (12 Oct 2021 19:00)  T(F): 97.9 (13 Oct 2021 11:00), Max: 98.6 (12 Oct 2021 19:00)  HR: 109 (13 Oct 2021 05:56) (62 - 109)  BP: 153/94 (13 Oct 2021 05:56) (98/54 - 153/94)  BP(mean): 115 (13 Oct 2021 05:56) (71 - 115)  RR: 36 (13 Oct 2021 11:00) (16 - 39)  SpO2: 96% (13 Oct 2021 05:56) (90% - 98%)    PHYSICAL EXAM:    Constitutional: NAD, well-groomed, well-developed  HEENT: PERRLA, EOMI, Normal Hearing, MMM  Neck: No LAD, No JVD  Back: Normal spine flexure, No CVA tenderness  Respiratory: CTAB/L  Cardiovascular: S1 and S2, RRR, no M/G/R  Gastrointestinal: BS+, soft, NT/ND  Extremities: No peripheral edema  Vascular: 2+ peripheral pulses  Neurological: A/O x 3, no focal deficits    LABS:                        11.6   4.35  )-----------( 170      ( 13 Oct 2021 05:48 )             34.3     10-13    141  |  105  |  4<L>  ----------------------------<  96  3.8   |  25  |  0.5<L>    Ca    8.0<L>      13 Oct 2021 05:48  Phos  1.3     10-13  Mg     1.5     10-13    TPro  5.5<L>  /  Alb  3.6  /  TBili  1.0  /  DBili  x   /  AST  53<H>  /  ALT  43<H>  /  AlkPhos  49  10-13        Drug Screen Urine:  Alcohol Level  Alcohol, Blood: 350 mg/dL (10-12-21 @ 02:55)        RADIOLOGY & ADDITIONAL STUDIES:  
    Pt interviewed, examined and EMR chart reviewed.    Follow up of Alcohol Depenency. Pt is on po ativan protocol. Pt is doing better from withdrawal standpoint. Pt doing well.  Pt is concerned about continued abdominal pain.    SOCIAL HISTORY:    REVIEW OF SYSTEMS:    Constitutional: No fever, weight loss or fatigue  ENT:  No difficulty hearing, tinnitus, vertigo; No sinus or throat pain  Neck: No pain or stiffness  Respiratory: positive sob  Cardiovascular: No chest pain, palpitations, shortness of breath, dizziness or leg swelling  Gastrointestinal: No abdominal or epigastric pain. No nausea, vomiting or hematemesis; No diarrhea or constipation. No melena or hematochezia.  Neurological: No headaches, memory loss, loss of strength, numbness...positive tremors  Musculoskeletal: No joint pain or swelling; No muscle, back or extremity pain      MEDICATIONS  (STANDING):  albuterol/ipratropium for Nebulization 3 milliLiter(s) Nebulizer every 6 hours  calcium carbonate   1250 mG (OsCal) 1 Tablet(s) Oral daily  dextrose 5% + sodium chloride 0.9%. 1000 milliLiter(s) (60 mL/Hr) IV Continuous <Continuous>  enoxaparin Injectable 40 milliGRAM(s) SubCutaneous at bedtime  folic acid Injectable 1 milliGRAM(s) IV Push daily  influenza   Vaccine 0.5 milliLiter(s) IntraMuscular once  LORazepam   Injectable 3 milliGRAM(s) IV Push every 4 hours  LORazepam   Injectable   IV Push   magnesium sulfate  IVPB 2 Gram(s) IV Intermittent every 4 hours  multivitamin  Chewable 1 Tablet(s) Oral daily  pantoprazole    Tablet 40 milliGRAM(s) Oral two times a day  potassium phosphate / sodium phosphate Powder (PHOS-NaK) 1 Packet(s) Oral four times a day  predniSONE   Tablet 40 milliGRAM(s) Oral daily  thiamine Injectable 100 milliGRAM(s) IV Push daily  traZODone 100 milliGRAM(s) Oral at bedtime    MEDICATIONS  (PRN):  acetaminophen   Tablet .. 650 milliGRAM(s) Oral every 6 hours PRN Mild Pain (1 - 3)  aluminum hydroxide/magnesium hydroxide/simethicone Suspension 30 milliLiter(s) Oral every 4 hours PRN Dyspepsia  LORazepam   Injectable 2 milliGRAM(s) IV Push every 2 hours PRN Agitation      Vital Signs Last 24 Hrs  T(C): 36.6 (13 Oct 2021 11:00), Max: 37 (12 Oct 2021 19:00)  T(F): 97.9 (13 Oct 2021 11:00), Max: 98.6 (12 Oct 2021 19:00)  HR: 109 (13 Oct 2021 05:56) (62 - 109)  BP: 153/94 (13 Oct 2021 05:56) (98/54 - 153/94)  BP(mean): 115 (13 Oct 2021 05:56) (71 - 115)  RR: 36 (13 Oct 2021 11:00) (16 - 39)  SpO2: 96% (13 Oct 2021 05:56) (90% - 98%)    PHYSICAL EXAM:    Constitutional: NAD, well-groomed, well-developed  HEENT: PERRLA, EOMI, Normal Hearing, MMM  Neck: No LAD, No JVD  Back: Normal spine flexure, No CVA tenderness  Respiratory: CTAB/L  Cardiovascular: S1 and S2, RRR, no M/G/R  Gastrointestinal: BS+, soft, NT/ND  Extremities: No peripheral edema  Vascular: 2+ peripheral pulses  Neurological: A/O x 3, no focal deficits    LABS:                        11.6   4.35  )-----------( 170      ( 13 Oct 2021 05:48 )             34.3     10-13    141  |  105  |  4<L>  ----------------------------<  96  3.8   |  25  |  0.5<L>    Ca    8.0<L>      13 Oct 2021 05:48  Phos  1.3     10-13  Mg     1.5     10-13    TPro  5.5<L>  /  Alb  3.6  /  TBili  1.0  /  DBili  x   /  AST  53<H>  /  ALT  43<H>  /  AlkPhos  49  10-13        Drug Screen Urine:  Alcohol Level  Alcohol, Blood: 350 mg/dL (10-12-21 @ 02:55)        RADIOLOGY & ADDITIONAL STUDIES:  
    Pt interviewed, examined and EMR chart reviewed.    Follow up of Alcohol Depenency. Pt is on elevated iv push ativan protocolprotocol. Pt is doing better from withdrawal standpoint. Pt with complaint of sob and hypoxemia which has improved over the last 24hrs. Pt is concerned about continued abdominal pain.    SOCIAL HISTORY:    REVIEW OF SYSTEMS:    Constitutional: No fever, weight loss or fatigue  ENT:  No difficulty hearing, tinnitus, vertigo; No sinus or throat pain  Neck: No pain or stiffness  Respiratory: positive sob  Cardiovascular: No chest pain, palpitations, shortness of breath, dizziness or leg swelling  Gastrointestinal: No abdominal or epigastric pain. No nausea, vomiting or hematemesis; No diarrhea or constipation. No melena or hematochezia.  Neurological: No headaches, memory loss, loss of strength, numbness...positive tremors  Musculoskeletal: No joint pain or swelling; No muscle, back or extremity pain      MEDICATIONS  (STANDING):  albuterol/ipratropium for Nebulization 3 milliLiter(s) Nebulizer every 6 hours  calcium carbonate   1250 mG (OsCal) 1 Tablet(s) Oral daily  dextrose 5% + sodium chloride 0.9%. 1000 milliLiter(s) (60 mL/Hr) IV Continuous <Continuous>  enoxaparin Injectable 40 milliGRAM(s) SubCutaneous at bedtime  folic acid Injectable 1 milliGRAM(s) IV Push daily  influenza   Vaccine 0.5 milliLiter(s) IntraMuscular once  LORazepam   Injectable 3 milliGRAM(s) IV Push every 4 hours  LORazepam   Injectable   IV Push   magnesium sulfate  IVPB 2 Gram(s) IV Intermittent every 4 hours  multivitamin  Chewable 1 Tablet(s) Oral daily  pantoprazole    Tablet 40 milliGRAM(s) Oral two times a day  potassium phosphate / sodium phosphate Powder (PHOS-NaK) 1 Packet(s) Oral four times a day  predniSONE   Tablet 40 milliGRAM(s) Oral daily  thiamine Injectable 100 milliGRAM(s) IV Push daily  traZODone 100 milliGRAM(s) Oral at bedtime    MEDICATIONS  (PRN):  acetaminophen   Tablet .. 650 milliGRAM(s) Oral every 6 hours PRN Mild Pain (1 - 3)  aluminum hydroxide/magnesium hydroxide/simethicone Suspension 30 milliLiter(s) Oral every 4 hours PRN Dyspepsia  LORazepam   Injectable 2 milliGRAM(s) IV Push every 2 hours PRN Agitation      Vital Signs Last 24 Hrs  T(C): 36.6 (13 Oct 2021 11:00), Max: 37 (12 Oct 2021 19:00)  T(F): 97.9 (13 Oct 2021 11:00), Max: 98.6 (12 Oct 2021 19:00)  HR: 109 (13 Oct 2021 05:56) (62 - 109)  BP: 153/94 (13 Oct 2021 05:56) (98/54 - 153/94)  BP(mean): 115 (13 Oct 2021 05:56) (71 - 115)  RR: 36 (13 Oct 2021 11:00) (16 - 39)  SpO2: 96% (13 Oct 2021 05:56) (90% - 98%)    PHYSICAL EXAM:    Constitutional: NAD, well-groomed, well-developed  HEENT: PERRLA, EOMI, Normal Hearing, MMM  Neck: No LAD, No JVD  Back: Normal spine flexure, No CVA tenderness  Respiratory: CTAB/L  Cardiovascular: S1 and S2, RRR, no M/G/R  Gastrointestinal: BS+, soft, NT/ND  Extremities: No peripheral edema  Vascular: 2+ peripheral pulses  Neurological: A/O x 3, no focal deficits    LABS:                        11.6   4.35  )-----------( 170      ( 13 Oct 2021 05:48 )             34.3     10-13    141  |  105  |  4<L>  ----------------------------<  96  3.8   |  25  |  0.5<L>    Ca    8.0<L>      13 Oct 2021 05:48  Phos  1.3     10-13  Mg     1.5     10-13    TPro  5.5<L>  /  Alb  3.6  /  TBili  1.0  /  DBili  x   /  AST  53<H>  /  ALT  43<H>  /  AlkPhos  49  10-13        Drug Screen Urine:  Alcohol Level  Alcohol, Blood: 350 mg/dL (10-12-21 @ 02:55)        RADIOLOGY & ADDITIONAL STUDIES:

## 2021-10-17 NOTE — DISCHARGE NOTE NURSING/CASE MANAGEMENT/SOCIAL WORK - NSDCPEWEB_GEN_ALL_CORE
[employer] for Tobacco Control website --- [email or web address] --- [email or web address]

## 2021-10-17 NOTE — PROGRESS NOTE ADULT - PROVIDER SPECIALTY LIST ADULT
Pulmonology
Hospitalist
MICU
Pulmonology
Hospitalist
MICU
Addiction Medicine

## 2021-10-17 NOTE — DISCHARGE NOTE PROVIDER - HOSPITAL COURSE
42 yo female PMHx sig for EtOH abuse (1 L vodka daily), BZD abuse, Esophagitis, Fatty Liver disease, GERD, Grave's disease, MVP, HLD.  presents after vomiting  yellowish, non-bloody bile for the past week, not eating but still drinking vodka daily.  She came to the ED after feeling chest pressure and abdominal pain, and worried she was experiencing a heart attack. However no evidence of Heart attack on w/up and during ICU Care.     # EtOH Intoxication and Withdrawal: improved   # EtOH and Benzodiazepine Abuse    # Alcoholic Ketoacidosis - Resolved  # Lactic Acidosis - Resolved   - Patient drinks 1L of vodka daily   - Addiction medicine consult reviewed   - Amylase and lipase normal; hepatitis panel unremarkable   - CK and repeat troponin normal     - Monitor lytes and replete as needed   - Continue Ativan taper protocol will finish 10/17th   - Continue with folic acid and thiamine supplementation  - Zofran for nausea      # Episode of chest pain, SOB secondary to Volume Overload vs Asthma Exacerbation  - Chest X-Ray, 10/13:  mild pulmonary vascular congestion, otherwise unremarkable   - ECG unremarkable, ECHO unremarkable   - Repeat trops and CK negative   - Continue with Prednisone 40mg, DuoNebs q8   - Push incentive spirometry  - Keep SpO2 > 94%   - Will O/P Study   - D/c fluids for possible volume overload     #Hypomagnesia - Resolved   #Hypophosphatemia - Resolved  #Hypocalcemia - Resolved     DVT Prophylaxis: LOVENOX   GI Prophylaxis: Pantoprazole

## 2021-10-17 NOTE — PROGRESS NOTE ADULT - ASSESSMENT
40 yo female PMHx sig for ETOH abuse (1 L vodka daily), BZD abuse, Esophagitis, Fatty Liver disease, GERD, Grave's disease, MVP, HLD.  She states she has been  vomiting  yellowish non-bloody bile for the past week, not eating but still drinking vodka daily.     alcohol abuse and intoxication on admission  and now withdrawal / suspected thiamine and folate deficiency / hypophosphatemia -resolved/ hypomagnesemia - resolved         - may DC home today   - avoid alcohol   - 32min spent on DC

## 2021-10-17 NOTE — DISCHARGE NOTE PROVIDER - NSDCCPCAREPLAN_GEN_ALL_CORE_FT
PRINCIPAL DISCHARGE DIAGNOSIS  Diagnosis: Alcoholic ketoacidosis  Assessment and Plan of Treatment:       SECONDARY DISCHARGE DIAGNOSES  Diagnosis: Chest pain  Assessment and Plan of Treatment:     Diagnosis: Vomiting  Assessment and Plan of Treatment:     Diagnosis: Lactic acidosis  Assessment and Plan of Treatment:

## 2021-10-18 LAB
ALBUMIN SERPL ELPH-MCNC: 4.7 G/DL — SIGNIFICANT CHANGE UP (ref 3.5–5.2)
ALP SERPL-CCNC: 66 U/L — SIGNIFICANT CHANGE UP (ref 30–115)
ALT FLD-CCNC: 72 U/L — HIGH (ref 0–41)
ANION GAP SERPL CALC-SCNC: 19 MMOL/L — HIGH (ref 7–14)
APTT BLD: 32.7 SEC — SIGNIFICANT CHANGE UP (ref 27–39.2)
AST SERPL-CCNC: 54 U/L — HIGH (ref 0–41)
BASOPHILS # BLD AUTO: 0.05 K/UL — SIGNIFICANT CHANGE UP (ref 0–0.2)
BASOPHILS NFR BLD AUTO: 0.3 % — SIGNIFICANT CHANGE UP (ref 0–1)
BILIRUB SERPL-MCNC: 0.6 MG/DL — SIGNIFICANT CHANGE UP (ref 0.2–1.2)
BLD GP AB SCN SERPL QL: SIGNIFICANT CHANGE UP
BUN SERPL-MCNC: 23 MG/DL — HIGH (ref 10–20)
CALCIUM SERPL-MCNC: 10.3 MG/DL — HIGH (ref 8.5–10.1)
CHLORIDE SERPL-SCNC: 94 MMOL/L — LOW (ref 98–110)
CO2 SERPL-SCNC: 22 MMOL/L — SIGNIFICANT CHANGE UP (ref 17–32)
CREAT SERPL-MCNC: 0.9 MG/DL — SIGNIFICANT CHANGE UP (ref 0.7–1.5)
EOSINOPHIL # BLD AUTO: 0.03 K/UL — SIGNIFICANT CHANGE UP (ref 0–0.7)
EOSINOPHIL NFR BLD AUTO: 0.2 % — SIGNIFICANT CHANGE UP (ref 0–8)
ETHANOL SERPL-MCNC: <10 MG/DL — SIGNIFICANT CHANGE UP
GLUCOSE SERPL-MCNC: 82 MG/DL — SIGNIFICANT CHANGE UP (ref 70–99)
HCG SERPL QL: NEGATIVE — SIGNIFICANT CHANGE UP
HCT VFR BLD CALC: 40.4 % — SIGNIFICANT CHANGE UP (ref 37–47)
HGB BLD-MCNC: 14.1 G/DL — SIGNIFICANT CHANGE UP (ref 12–16)
IMM GRANULOCYTES NFR BLD AUTO: 0.7 % — HIGH (ref 0.1–0.3)
INR BLD: 1.04 RATIO — SIGNIFICANT CHANGE UP (ref 0.65–1.3)
LIDOCAIN IGE QN: 39 U/L — SIGNIFICANT CHANGE UP (ref 7–60)
LYMPHOCYTES # BLD AUTO: 15 % — LOW (ref 20.5–51.1)
LYMPHOCYTES # BLD AUTO: 2.41 K/UL — SIGNIFICANT CHANGE UP (ref 1.2–3.4)
MAGNESIUM SERPL-MCNC: 2 MG/DL — SIGNIFICANT CHANGE UP (ref 1.8–2.4)
MCHC RBC-ENTMCNC: 34.9 G/DL — SIGNIFICANT CHANGE UP (ref 32–37)
MCHC RBC-ENTMCNC: 35.3 PG — HIGH (ref 27–31)
MCV RBC AUTO: 101.3 FL — HIGH (ref 81–99)
MONOCYTES # BLD AUTO: 1.69 K/UL — HIGH (ref 0.1–0.6)
MONOCYTES NFR BLD AUTO: 10.5 % — HIGH (ref 1.7–9.3)
NEUTROPHILS # BLD AUTO: 11.73 K/UL — HIGH (ref 1.4–6.5)
NEUTROPHILS NFR BLD AUTO: 73.3 % — SIGNIFICANT CHANGE UP (ref 42.2–75.2)
NRBC # BLD: 0 /100 WBCS — SIGNIFICANT CHANGE UP (ref 0–0)
PLATELET # BLD AUTO: 260 K/UL — SIGNIFICANT CHANGE UP (ref 130–400)
POTASSIUM SERPL-MCNC: 4.1 MMOL/L — SIGNIFICANT CHANGE UP (ref 3.5–5)
POTASSIUM SERPL-SCNC: 4.1 MMOL/L — SIGNIFICANT CHANGE UP (ref 3.5–5)
PROT SERPL-MCNC: 7.9 G/DL — SIGNIFICANT CHANGE UP (ref 6–8)
PROTHROM AB SERPL-ACNC: 12 SEC — SIGNIFICANT CHANGE UP (ref 9.95–12.87)
RBC # BLD: 3.99 M/UL — LOW (ref 4.2–5.4)
RBC # FLD: 13 % — SIGNIFICANT CHANGE UP (ref 11.5–14.5)
SODIUM SERPL-SCNC: 135 MMOL/L — SIGNIFICANT CHANGE UP (ref 135–146)
WBC # BLD: 16.02 K/UL — HIGH (ref 4.8–10.8)
WBC # FLD AUTO: 16.02 K/UL — HIGH (ref 4.8–10.8)

## 2021-10-18 RX ADMIN — Medication 30 MILLILITER(S): at 00:04

## 2021-10-18 RX ADMIN — FAMOTIDINE 104 MILLIGRAM(S): 10 INJECTION INTRAVENOUS at 00:04

## 2021-10-18 NOTE — ED PROVIDER NOTE - PROGRESS NOTE DETAILS
SR: patient feeling better. Detailed discussion with patient reguarding return precautions, patient follows with PMD Dr. Puckett & GI Dr. Lane which she will make an appointment to see.

## 2021-10-18 NOTE — ED PROVIDER NOTE - PROVIDER TOKENS
PROVIDER:[TOKEN:[[id number]],FOLLOWUP:[1-3 Days]],PROVIDER:[TOKEN:[[id number]],FOLLOWUP:[1-3 Days]]

## 2021-10-18 NOTE — ED PROVIDER NOTE - CLINICAL SUMMARY MEDICAL DECISION MAKING FREE TEXT BOX
41 year old with pmhx of fatty liver disease, alcohol use d/o, benzo use d/o, MVP, hematochromatosis, grave's disease with recent admission discharged today presents to the ed c.o black tarry stool. Patient states she didn't notice her hb drop while admitted but then when she came home noticed black tarry stools as well as throat discomfort. No fever chill cp vomiting or abdominal pain. Patient brought a stool sample with her that was black and tarry. VS reviewed. On exam + brown stool. Hb stable. patient given meds with improvement. Patient a spoken to in detail about results  All questions addressed.  Results of ED work up discussed and patient given a copy of the results. Patient has proper follow up (PMD Dr. Puckett & GI Dr. Martin). Return precautions given.

## 2021-10-18 NOTE — ED PROVIDER NOTE - ATTENDING CONTRIBUTION TO CARE
41 year old with pmhx of fatty liver disease, alcohol use d/o, benzo use d/o, MVP, hematochromatosis, grave's disease with recent admission discharged today presents to the ed c.o black tarry stool. Patient states she didn't notice her hb drop while admitted but then when she came home noticed black tarry stools as well as throat discomfort. No fever chill cp vomiting or abdominal pain. Patient brought a stool sample with her that was black and tarry.  on exam  CONSTITUTIONAL: WA / WN / NAD  HEAD: NCAT  EYES: PERRL; EOMI; anicteric.  ENT: Normal pharynx; mucous membranes pink/moist, no erythema. airway patnet  NECK: Supple;   CARD: RRR; nl S1/S2; no M/R/G. Pulses equal bilaterally.  RESP: Respiratory rate and effort are normal; breath sounds clear and equal bilaterally.  ABD: Soft, NT ND   RECTAL: performed alongside PA halie + brown stool  MSK/EXT: No gross deformities; full range of motion.  SKIN: Warm and dry;   NEURO: AAOx3,  PSYCH: Memory Intact, Normal Affect 41 year old with pmhx of fatty liver disease, alcohol use d/o, benzo use d/o, MVP, hematochromatosis, grave's disease with recent admission discharged today presents to the ed c.o black tarry stool. Patient states she didn't notice her hb drop while admitted but then when she came home noticed black tarry stools as well as throat discomfort. No fever chill cp vomiting or abdominal pain. Patient brought a stool sample with her that was black and tarry. Denies drinking alcohol.   on exam  CONSTITUTIONAL: WA / WN / NAD  HEAD: NCAT  EYES: PERRL; EOMI; anicteric.  ENT: Normal pharynx; mucous membranes pink/moist, no erythema. airway patent  NECK: Supple;   CARD: RRR; nl S1/S2; no M/R/G. Pulses equal bilaterally.  RESP: Respiratory rate and effort are normal; breath sounds clear and equal bilaterally.  ABD: Soft, NT ND   RECTAL: performed alongside PA halie + brown stool  MSK/EXT: No gross deformities; full range of motion.  SKIN: Warm and dry;   NEURO: AAOx3,  PSYCH: Memory Intact, Normal Affect

## 2021-10-18 NOTE — ED PROVIDER NOTE - CARE PROVIDER_API CALL
Juaquin Paz  GASTROENTEROLOGY  [address] [place] [address]  Phone: [phone]  Fax: [phone]  Follow Up Time: 1-3 Days    Julio Cesar Puckett  INTERNAL MEDICINE  [address] [place] [address]  Phone: [phone]  Fax: [phone]  Follow Up Time: 1-3 Days

## 2021-10-18 NOTE — ED ADULT NURSE NOTE - NS_SISCREENINGSR_GEN_ALL_ED
Patient Education     Lower Extremity Bruise  You have a bruise (contusion on a leg, knee, ankle, foot, or toe. Symptoms include pain, swelling, and skin discoloration. No bones are broken. This injury may take from a few days to a few weeks to heal.  During that time, the bruise may change from reddish in color, to purple-blue, to green-yellow, to yellow-brown.   Home care  · Unless another medicine was prescribed, you can take acetaminophen, ibuprofen, or naproxen to control pain. Talk with your healthcare provider before using these medicines if you have chronic liver or kidney disease or ever had a stomach ulcer or digestive bleeding.  · Elevate the injured area to reduce pain and swelling. As much as possible, sit or lie down with the injured area raised about the level of your heart. This is especially important during the first 48 hours.  · Ice the injured area to help reduce pain and swelling. Wrap an ice pack or ice cubes in a plastic bag in a thin towel. Apply to the bruised area for 20 minutes every 1 to 2 hours the first day. Continue this 3 to 4 times a day until the pain and swelling goes away.  · If crutches have been advised, don't bear full weight on the injured leg until you can do so without pain. You may return to sports when you are able to put full weight and impact on the injured leg without pain.    Follow up  Follow up with your healthcare provider, or as advised. Call if you are not improving within the next 1 to 2 weeks.   When to seek medical advice   Call your healthcare provider right away if any of these occur:  · Increased pain or swelling  · Foot or toes become cold, blue, numb or tingly  · Signs of infection: Warmth, drainage, or increased redness or pain around the injury  · Inability to move the injured area, or any joints below the injured area  · Frequent bruising for unknown reasons  [employer] last reviewed this educational content on 8/1/2019  © 8226-7132 [employer]. All rights reserved. This information is not intended as a substitute for professional medical care. Always follow your healthcare professional's instructions.            Negative

## 2021-10-18 NOTE — ED PROVIDER NOTE - OBJECTIVE STATEMENT
40 yo F with PMHx if alcohol abuse, benzo abuse, esophagitis, alcoholic fatty liver disease, GERD, Grave's disease, MVP, HLD and recent admission for alcoholic ketoacidosis presents to the ED c/o black tarry stool. Pt concerned because she noted her hemoglobin went from 16 to 12.5 while she was admitted. Pt also c/o mild throat discomfort. Pt admits to taking OTC medicine for nausea and taking pepto bismol throughout the day. Pt denies other complaints. Pt denies fever, chills, nausea, vomiting, abdominal pain, diarrhea, headache, dizziness, weakness, chest pain, SOB, back pain, LOC, trauma, urinary symptoms, cough, calf pain/swelling, recent travel, recent surgery.

## 2021-10-18 NOTE — ED PROVIDER NOTE - PHYSICAL EXAMINATION
VITAL SIGNS: I have reviewed nursing notes and confirm.  CONSTITUTIONAL: Well-developed; well-nourished; in no acute distress.  SKIN: Skin exam is warm and dry, no acute rash.  HEAD: Normocephalic; atraumatic.  EYES: Conjunctiva and sclera clear.  ENT: No nasal discharge; airway clear. Oropharynx clear. Uvula midline. No drooling or stridor.   CARD: S1, S2 normal; no murmurs, gallops, or rubs. Regular rate and rhythm.  RESP: No wheezes, rales or rhonchi. Speaking in full sentences.   ABD: Normal bowel sounds; soft; non-distended; non-tender; No rebound or guarding. No CVA tenderness.  RECTAL: Brown stool. No bleeding. Chaperoned by Dr. Courtney.   EXT: Normal ROM. No clubbing, cyanosis or edema.  NEURO: Alert, oriented. Grossly unremarkable. No focal deficits.

## 2021-10-18 NOTE — ED PROVIDER NOTE - PATIENT PORTAL LINK FT
You can access the FollowMyHealth Patient Portal offered by [facility] by registering at the following website: [email or web address]. By joining "[employer]’s FollowMyHealth portal, you will also be able to view your health information using other applications (apps) compatible with our system.

## 2021-10-22 DIAGNOSIS — E83.51 HYPOCALCEMIA: ICD-10-CM

## 2021-10-22 DIAGNOSIS — Z91.14 PATIENT'S OTHER NONCOMPLIANCE WITH MEDICATION REGIMEN: ICD-10-CM
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
## 2021-10-22 DIAGNOSIS — J45.901 UNSPECIFIED ASTHMA WITH (ACUTE) EXACERBATION: ICD-10-CM

## 2021-10-22 DIAGNOSIS — R11.10 VOMITING, UNSPECIFIED: ICD-10-CM

## 2021-10-22 DIAGNOSIS — E51.9 THIAMINE DEFICIENCY, UNSPECIFIED: ICD-10-CM

## 2021-10-22 DIAGNOSIS — K76.0 FATTY (CHANGE OF) LIVER, NOT ELSEWHERE CLASSIFIED: ICD-10-CM

## 2021-10-22 DIAGNOSIS — E83.39 OTHER DISORDERS OF PHOSPHORUS METABOLISM: ICD-10-CM

## 2021-10-22 DIAGNOSIS — K21.9 GASTRO-ESOPHAGEAL REFLUX DISEASE WITHOUT ESOPHAGITIS: ICD-10-CM

## 2021-10-22 DIAGNOSIS — I34.1 NONRHEUMATIC MITRAL (VALVE) PROLAPSE: ICD-10-CM

## 2021-10-22 DIAGNOSIS — E05.00 THYROTOXICOSIS WITH DIFFUSE GOITER WITHOUT THYROTOXIC CRISIS OR STORM: ICD-10-CM

## 2021-10-22 DIAGNOSIS — F41.9 ANXIETY DISORDER, UNSPECIFIED: ICD-10-CM

## 2021-10-22 DIAGNOSIS — E83.42 HYPOMAGNESEMIA: ICD-10-CM

## 2021-10-22 DIAGNOSIS — K70.9 ALCOHOLIC LIVER DISEASE, UNSPECIFIED: ICD-10-CM

## 2021-10-22 DIAGNOSIS — F10.129 ALCOHOL ABUSE WITH INTOXICATION, UNSPECIFIED: ICD-10-CM

## 2021-10-22 DIAGNOSIS — E53.8 DEFICIENCY OF OTHER SPECIFIED B GROUP VITAMINS: ICD-10-CM

## 2021-10-22 DIAGNOSIS — F13.10 SEDATIVE, HYPNOTIC OR ANXIOLYTIC ABUSE, UNCOMPLICATED: ICD-10-CM

## 2021-10-22 DIAGNOSIS — R07.89 OTHER CHEST PAIN: ICD-10-CM

## 2021-10-22 DIAGNOSIS — E87.70 FLUID OVERLOAD, UNSPECIFIED: ICD-10-CM

## 2021-10-22 DIAGNOSIS — E78.5 HYPERLIPIDEMIA, UNSPECIFIED: ICD-10-CM

## 2021-10-22 DIAGNOSIS — E87.2 ACIDOSIS: ICD-10-CM

## 2021-10-22 DIAGNOSIS — F10.139 ALCOHOL ABUSE WITH WITHDRAWAL, UNSPECIFIED: ICD-10-CM

## 2021-10-30 ENCOUNTER — TRANSCRIPTION ENCOUNTER (OUTPATIENT)
Age: 41
End: 2021-10-30

## 2021-11-10 ENCOUNTER — TRANSCRIPTION ENCOUNTER (OUTPATIENT)
Age: 41
End: 2021-11-10

## 2021-12-01 NOTE — H&P ADULT - PSH
To get better and follow your care plan as instructed. History of  section    S/P tendon repair  5th finger right hand

## 2022-01-11 ENCOUNTER — EMERGENCY (EMERGENCY)
Facility: HOSPITAL | Age: 42
LOS: 0 days | Discharge: HOME | End: 2022-01-11
Attending: EMERGENCY MEDICINE | Admitting: EMERGENCY MEDICINE
Payer: MEDICAID

## 2022-01-11 VITALS
TEMPERATURE: 98 F | HEIGHT: 61.5 IN | HEART RATE: 114 BPM | SYSTOLIC BLOOD PRESSURE: 168 MMHG | DIASTOLIC BLOOD PRESSURE: 96 MMHG | RESPIRATION RATE: 22 BRPM | OXYGEN SATURATION: 97 % | WEIGHT: 195.11 LBS

## 2022-01-11 VITALS
OXYGEN SATURATION: 99 % | SYSTOLIC BLOOD PRESSURE: 145 MMHG | RESPIRATION RATE: 20 BRPM | HEART RATE: 112 BPM | DIASTOLIC BLOOD PRESSURE: 77 MMHG

## 2022-01-11 DIAGNOSIS — Z88.8 ALLERGY STATUS TO OTHER DRUGS, MEDICAMENTS AND BIOLOGICAL SUBSTANCES STATUS: ICD-10-CM

## 2022-01-11 DIAGNOSIS — Z98.891 HISTORY OF UTERINE SCAR FROM PREVIOUS SURGERY: Chronic | ICD-10-CM

## 2022-01-11 DIAGNOSIS — Z98.890 OTHER SPECIFIED POSTPROCEDURAL STATES: Chronic | ICD-10-CM

## 2022-01-11 DIAGNOSIS — F10.10 ALCOHOL ABUSE, UNCOMPLICATED: ICD-10-CM

## 2022-01-11 DIAGNOSIS — K21.9 GASTRO-ESOPHAGEAL REFLUX DISEASE WITHOUT ESOPHAGITIS: ICD-10-CM

## 2022-01-11 DIAGNOSIS — E78.5 HYPERLIPIDEMIA, UNSPECIFIED: ICD-10-CM

## 2022-01-11 DIAGNOSIS — K76.0 FATTY (CHANGE OF) LIVER, NOT ELSEWHERE CLASSIFIED: ICD-10-CM

## 2022-01-11 LAB
ALBUMIN SERPL ELPH-MCNC: 4.7 G/DL — SIGNIFICANT CHANGE UP (ref 3.5–5.2)
ALP SERPL-CCNC: 114 U/L — SIGNIFICANT CHANGE UP (ref 30–115)
ALT FLD-CCNC: 68 U/L — HIGH (ref 0–41)
ANION GAP SERPL CALC-SCNC: 20 MMOL/L — HIGH (ref 7–14)
AST SERPL-CCNC: 112 U/L — HIGH (ref 0–41)
B-OH-BUTYR SERPL-SCNC: <0.2 MMOL/L — SIGNIFICANT CHANGE UP
BASE EXCESS BLDV CALC-SCNC: 4.5 MMOL/L — HIGH (ref -2–3)
BASOPHILS # BLD AUTO: 0.09 K/UL — SIGNIFICANT CHANGE UP (ref 0–0.2)
BASOPHILS NFR BLD AUTO: 1 % — SIGNIFICANT CHANGE UP (ref 0–1)
BILIRUB SERPL-MCNC: 0.5 MG/DL — SIGNIFICANT CHANGE UP (ref 0.2–1.2)
BUN SERPL-MCNC: 5 MG/DL — LOW (ref 10–20)
CA-I SERPL-SCNC: 1.05 MMOL/L — LOW (ref 1.15–1.33)
CALCIUM SERPL-MCNC: 9.1 MG/DL — SIGNIFICANT CHANGE UP (ref 8.5–10.1)
CHLORIDE SERPL-SCNC: 99 MMOL/L — SIGNIFICANT CHANGE UP (ref 98–110)
CO2 SERPL-SCNC: 20 MMOL/L — SIGNIFICANT CHANGE UP (ref 17–32)
CREAT SERPL-MCNC: 0.6 MG/DL — LOW (ref 0.7–1.5)
EOSINOPHIL # BLD AUTO: 0.01 K/UL — SIGNIFICANT CHANGE UP (ref 0–0.7)
EOSINOPHIL NFR BLD AUTO: 0.1 % — SIGNIFICANT CHANGE UP (ref 0–8)
ETHANOL SERPL-MCNC: 385 MG/DL — HIGH
GAS PNL BLDV: 136 MMOL/L — SIGNIFICANT CHANGE UP (ref 136–145)
GAS PNL BLDV: SIGNIFICANT CHANGE UP
GLUCOSE SERPL-MCNC: 115 MG/DL — HIGH (ref 70–99)
HCG SERPL QL: NEGATIVE — SIGNIFICANT CHANGE UP
HCO3 BLDV-SCNC: 29 MMOL/L — SIGNIFICANT CHANGE UP (ref 22–29)
HCT VFR BLD CALC: 46 % — SIGNIFICANT CHANGE UP (ref 37–47)
HCT VFR BLDA CALC: 55 % — HIGH (ref 34.5–46.5)
HGB BLD CALC-MCNC: 18.2 G/DL — HIGH (ref 11.7–16.1)
HGB BLD-MCNC: 16.5 G/DL — HIGH (ref 12–16)
IMM GRANULOCYTES NFR BLD AUTO: 0.3 % — SIGNIFICANT CHANGE UP (ref 0.1–0.3)
LACTATE BLDV-MCNC: 3.4 MMOL/L — HIGH (ref 0.5–2)
LIDOCAIN IGE QN: 16 U/L — SIGNIFICANT CHANGE UP (ref 7–60)
LYMPHOCYTES # BLD AUTO: 2.98 K/UL — SIGNIFICANT CHANGE UP (ref 1.2–3.4)
LYMPHOCYTES # BLD AUTO: 32.1 % — SIGNIFICANT CHANGE UP (ref 20.5–51.1)
MAGNESIUM SERPL-MCNC: 2.1 MG/DL — SIGNIFICANT CHANGE UP (ref 1.8–2.4)
MCHC RBC-ENTMCNC: 35.9 G/DL — SIGNIFICANT CHANGE UP (ref 32–37)
MCHC RBC-ENTMCNC: 36.1 PG — HIGH (ref 27–31)
MCV RBC AUTO: 100.7 FL — HIGH (ref 81–99)
MONOCYTES # BLD AUTO: 0.77 K/UL — HIGH (ref 0.1–0.6)
MONOCYTES NFR BLD AUTO: 8.3 % — SIGNIFICANT CHANGE UP (ref 1.7–9.3)
NEUTROPHILS # BLD AUTO: 5.4 K/UL — SIGNIFICANT CHANGE UP (ref 1.4–6.5)
NEUTROPHILS NFR BLD AUTO: 58.2 % — SIGNIFICANT CHANGE UP (ref 42.2–75.2)
NRBC # BLD: 0 /100 WBCS — SIGNIFICANT CHANGE UP (ref 0–0)
PCO2 BLDV: 42 MMHG — SIGNIFICANT CHANGE UP (ref 39–42)
PH BLDV: 7.45 — HIGH (ref 7.32–7.43)
PHOSPHATE SERPL-MCNC: 2.5 MG/DL — SIGNIFICANT CHANGE UP (ref 2.1–4.9)
PLATELET # BLD AUTO: 353 K/UL — SIGNIFICANT CHANGE UP (ref 130–400)
PO2 BLDV: 66 MMHG — SIGNIFICANT CHANGE UP
POTASSIUM BLDV-SCNC: 3.7 MMOL/L — SIGNIFICANT CHANGE UP (ref 3.5–5.1)
POTASSIUM SERPL-MCNC: 3.9 MMOL/L — SIGNIFICANT CHANGE UP (ref 3.5–5)
POTASSIUM SERPL-SCNC: 3.9 MMOL/L — SIGNIFICANT CHANGE UP (ref 3.5–5)
PROT SERPL-MCNC: 8.1 G/DL — HIGH (ref 6–8)
RBC # BLD: 4.57 M/UL — SIGNIFICANT CHANGE UP (ref 4.2–5.4)
RBC # FLD: 12.4 % — SIGNIFICANT CHANGE UP (ref 11.5–14.5)
SAO2 % BLDV: 90.5 % — SIGNIFICANT CHANGE UP
SARS-COV-2 RNA SPEC QL NAA+PROBE: SIGNIFICANT CHANGE UP
SODIUM SERPL-SCNC: 139 MMOL/L — SIGNIFICANT CHANGE UP (ref 135–146)
WBC # BLD: 9.28 K/UL — SIGNIFICANT CHANGE UP (ref 4.8–10.8)
WBC # FLD AUTO: 9.28 K/UL — SIGNIFICANT CHANGE UP (ref 4.8–10.8)

## 2022-01-11 PROCEDURE — 71045 X-RAY EXAM CHEST 1 VIEW: CPT | Mod: 26

## 2022-01-11 PROCEDURE — 99285 EMERGENCY DEPT VISIT HI MDM: CPT

## 2022-01-11 PROCEDURE — 93010 ELECTROCARDIOGRAM REPORT: CPT | Mod: 76

## 2022-01-11 RX ORDER — METOCLOPRAMIDE HCL 10 MG
10 TABLET ORAL ONCE
Refills: 0 | Status: COMPLETED | OUTPATIENT
Start: 2022-01-11 | End: 2022-01-11

## 2022-01-11 RX ORDER — ONDANSETRON 8 MG/1
4 TABLET, FILM COATED ORAL ONCE
Refills: 0 | Status: COMPLETED | OUTPATIENT
Start: 2022-01-11 | End: 2022-01-11

## 2022-01-11 RX ORDER — METOCLOPRAMIDE HCL 10 MG
1 TABLET ORAL
Qty: 28 | Refills: 0
Start: 2022-01-11 | End: 2022-01-17

## 2022-01-11 RX ORDER — DIAZEPAM 5 MG
5 TABLET ORAL ONCE
Refills: 0 | Status: DISCONTINUED | OUTPATIENT
Start: 2022-01-11 | End: 2022-01-11

## 2022-01-11 RX ORDER — SODIUM CHLORIDE 9 MG/ML
1000 INJECTION, SOLUTION INTRAVENOUS ONCE
Refills: 0 | Status: COMPLETED | OUTPATIENT
Start: 2022-01-11 | End: 2022-01-11

## 2022-01-11 RX ADMIN — ONDANSETRON 4 MILLIGRAM(S): 8 TABLET, FILM COATED ORAL at 20:01

## 2022-01-11 RX ADMIN — Medication 5 MILLIGRAM(S): at 20:01

## 2022-01-11 RX ADMIN — Medication 10 MILLIGRAM(S): at 23:13

## 2022-01-11 RX ADMIN — Medication 50 MILLIGRAM(S): at 23:02

## 2022-01-11 RX ADMIN — Medication 10 MILLIGRAM(S): at 20:53

## 2022-01-11 RX ADMIN — Medication 50 MILLIGRAM(S): at 20:55

## 2022-01-11 RX ADMIN — SODIUM CHLORIDE 1000 MILLILITER(S): 9 INJECTION, SOLUTION INTRAVENOUS at 19:22

## 2022-01-11 NOTE — ED ADULT TRIAGE NOTE - CHIEF COMPLAINT QUOTE
"I think I am in DKA. I have a history of lactic acidosis". Pt admits to drinking daily. Last drink was today.  in ED

## 2022-01-11 NOTE — ED PROVIDER NOTE - NSFOLLOWUPCLINICS_GEN_ALL_ED_FT
[facility]  Detox Mgmt  [address] [place] [address]  Phone: [phone]  Fax:

## 2022-01-11 NOTE — ED PROVIDER NOTE - OBJECTIVE STATEMENT
42 yo female hx of EtOH abuse drinks 1 L vodka daily, Esophagitis, Fatty Liver disease, GERD, Grave's disease, MVP, HLD with recent hospital admission for AKA  presents to the ED with vomiting, non-bloody bile for the past week, not eating but still drinking vodka daily.  patient last drink this am. She came to the ED after feeling chest pressure and abdominal pain. no fever,chills. patient vaccinated for covid.  no SI/HI or hearing voices. patient with non bloody diarrhea.

## 2022-01-11 NOTE — ED PROVIDER NOTE - PHYSICAL EXAMINATION
Vital Signs: I have reviewed the initial vital signs.  Constitutional: well-nourished, no acute distress, normocephalic  Eyes: PERRLA, EOMI,  clear conjunctiva  ENT: MMM, no tongue fasiculations  Cardiovascular: regular rate, regular rhythm, no murmur appreciated  Respiratory: unlabored respiratory effort, clear to auscultation bilaterally  Gastrointestinal: soft, non-tender, non-distended  abdomen, no pulsatile mass  Musculoskeletal: supple neck, no lower extremity edema, no bony tenderness  Integumentary: warm, dry, no rash  Neurologic: awake, alert, cranial nerves II-XII grossly intact, extremities’ motor and sensory functions grossly intact, no focal deficits

## 2022-01-11 NOTE — ED PROVIDER NOTE - PATIENT PORTAL LINK FT
You can access the FollowMyHealth Patient Portal offered by [facility] by registering at the following website: [email or web address]. By joining [email or web address] [email or web address] portal, you will also be able to view your health information using other applications (apps) compatible with our system.

## 2022-01-11 NOTE — ED PROVIDER NOTE - CLINICAL SUMMARY MEDICAL DECISION MAKING FREE TEXT BOX
pt with history of etoh absue, GERD, DL presenting for nausea/vomiting, feels like she is in AKA. Well appearing, NAD, non toxic. NCAT PERRLA EOMI neck supple non tender normal wob cta bl rrr abdomen s nt nd no rebound no guarding WWPx4 neuro non focal. labs imaging reviewed. pt worried she will withdraw tonight, given librium, outpt detox info. Aware of all results, given a copy of all available results, comfortable with discharge and follow-up outpatient, strict return precautions given. Endorses understanding of all of this and aware that they can return at any time for new or concerning symptoms. No further questions or concerns at this time

## 2022-01-11 NOTE — ED PROVIDER NOTE - NS ED ROS FT
Review of Systems    Constitutional: (-) fever/ chills (-)loss of appetite or  weight loss  Eyes (-) visual changes  ENT: (-) epistaxis (-) sore throat (-) ear pain  Cardiovascular: (-) chest pain, (-) syncope (-) palpitations  Respiratory: (-) cough, (-) shortness of breath  Gastrointestinal: (+) vomiting, (-) diarrhea (-) abdominal pain  : (-) dysuria , hematuria   neck: (-) neck pain or stiffness  Musculoskeletal:  (-) back pain, (-) joint pain   Integumentary: (-) rash, (-) swelling  Neurological: (-) headache, (-) altered mental status  Psychiatric: (-) hallucinations or depression

## 2022-01-12 ENCOUNTER — EMERGENCY (EMERGENCY)
Facility: HOSPITAL | Age: 42
LOS: 0 days | Discharge: HOME | End: 2022-01-12
Attending: EMERGENCY MEDICINE | Admitting: EMERGENCY MEDICINE
Payer: MEDICAID

## 2022-01-12 VITALS
RESPIRATION RATE: 16 BRPM | SYSTOLIC BLOOD PRESSURE: 132 MMHG | OXYGEN SATURATION: 96 % | HEART RATE: 97 BPM | HEIGHT: 61.5 IN | TEMPERATURE: 99 F | DIASTOLIC BLOOD PRESSURE: 82 MMHG

## 2022-01-12 DIAGNOSIS — Z98.891 HISTORY OF UTERINE SCAR FROM PREVIOUS SURGERY: Chronic | ICD-10-CM

## 2022-01-12 DIAGNOSIS — F10.20 ALCOHOL DEPENDENCE, UNCOMPLICATED: ICD-10-CM

## 2022-01-12 DIAGNOSIS — Z98.890 OTHER SPECIFIED POSTPROCEDURAL STATES: Chronic | ICD-10-CM

## 2022-01-12 DIAGNOSIS — F17.200 NICOTINE DEPENDENCE, UNSPECIFIED, UNCOMPLICATED: ICD-10-CM

## 2022-01-12 LAB — OSMOLALITY SERPL: 388 MOS/KG — HIGH (ref 275–300)

## 2022-01-12 PROCEDURE — 99284 EMERGENCY DEPT VISIT MOD MDM: CPT

## 2022-01-12 NOTE — ED PROVIDER NOTE - NSFOLLOWUPINSTRUCTIONS_ED_ALL_ED_FT
Alcohol Use Disorder    WHAT YOU NEED TO KNOW:    Alcohol use disorder (AUD) is a medical condition that causes problems with alcohol use. You are not able to control alcohol use even though it causes negative effects. AUD includes alcohol abuse and alcohol dependence. Alcohol abuse means you drink more than the recommended daily or weekly limits. Alcohol dependence means you have withdrawal symptoms after not drinking for a short period.    DISCHARGE INSTRUCTIONS:    Call your local emergency number (911 in the [place]), or have someone call if:   •You have seizures.          Seek care immediately if:   •You vomit blood.          Call your doctor if:   •Your heart is beating faster than usual.      •You have hallucinations.      •You cannot remember what happens while you are drinking.      •You are anxious and have nausea.      •Your hands are shaky and you are sweating heavily.      •You have questions or concerns about your condition or care.      Follow up with your doctor as directed: Do not try to stop drinking on your own. Your healthcare provider may need to help you withdraw from alcohol safely. He or she may need to admit you to the hospital. You may also need any of the following:  •Medicines to decrease your craving for alcohol      •Support groups such as Alcoholics Anonymous      •Therapy from a psychiatrist or psychologist      •Admission to an inpatient facility for treatment for severe AUD      For support and more information:   •Substance Abuse and Mental Health Services Administration  [address]  [place] [address]  Web Address: http://www.samhsa.gov      •Alcoholics Anonymous  Web Address: http://www.aa.org

## 2022-01-12 NOTE — ED PROVIDER NOTE - PHYSICAL EXAMINATION
VITAL SIGNS: I have reviewed nursing notes and confirm.  CONSTITUTIONAL: Well-developed; well-nourished; anxious, in no distress, no tremor  SKIN: Skin exam is warm and dry, no acute rash.  HEAD: Normocephalic; atraumatic.  EYES: PERRL, EOM intact; conjunctiva and sclera clear, no nystagmus  ENT: No nasal discharge; airway clear, no tongue fasciculations  CARD: RRR, no murmur  RESP: No wheezes, rales or rhonchi.  ABD: Normal bowel sounds; soft; non-distended; non-tender  EXT: Normal ROM, no tremor  NEURO: Alert, oriented. Grossly unremarkable. No focal deficits.  PSYCH: tearful but redirectable, no SI or HI, no apparent psychosis.

## 2022-01-12 NOTE — ED PROVIDER NOTE - OBJECTIVE STATEMENT
40 yo F, hx of alcohol dependence, benzo dependence, esophagitis, GERD, grave's disease, here for assessment of feeling anxious -- patient is requesting benzos for what she feels like is alcohol withdrawal.    No CP, nausea, vomiting at this time.    Patient was seen here earlier, intoxicated, had full set of labs which were unremarkable, received benzo, was PO tolerant and was discharged.

## 2022-01-12 NOTE — ED PROVIDER NOTE - PATIENT PORTAL LINK FT
You can access the FollowMyHealth Patient Portal offered by [facility] by registering at the following website: [email or web address]. By joining [email or web address]’s FollowMyHealth portal, you will also be able to view your health information using other applications (apps) compatible with our system.

## 2022-01-12 NOTE — ED PROVIDER NOTE - NS ED ROS FT
Constitutional: no fever, chills, no recent weight loss, change in appetite or malaise  Cardiac: No chest pain, SOB or edema.  Respiratory: No cough or respiratory distress  GI: see HPI  : No dysuria, frequency, urgency or hematuria  MS: no pain to back or extremities, no loss of ROM, no weakness  Neuro: No headache or weakness. No LOC.  Skin: No skin rash.  Psych: see HPI, no SI or HI  Except as documented in the HPI, all other systems are negative.

## 2022-01-12 NOTE — ED PROVIDER NOTE - CLINICAL SUMMARY MEDICAL DECISION MAKING FREE TEXT BOX
No signs of withdrawal, no indication for benzos or further testing at this time. Will dc with outpatient detox follow up.

## 2022-01-12 NOTE — ED PROVIDER NOTE - NSFOLLOWUPCLINICS_GEN_ALL_ED_FT
[facility]  Detox Mgmt  [address] [place] [address]  Phone: [phone]  Fax:   Follow Up Time: 1-3 Days

## 2022-03-08 NOTE — ED ADULT TRIAGE NOTE - AS HEIGHT TYPE
stated Doxepin Counseling:  Patient advised that the medication is sedating and not to drive a car after taking this medication. Patient informed of potential adverse effects including but not limited to dry mouth, urinary retention, and blurry vision.  The patient verbalized understanding of the proper use and possible adverse effects of doxepin.  All of the patient's questions and concerns were addressed.

## 2022-03-17 NOTE — ED PROVIDER NOTE - OBJECTIVE STATEMENT
Patient is a 39 yo f who presents for evaluation for detox at this time from alcohol as well as suboxone.  She is not homicidal or suicidal at this time she denies any chest pain, shortness of breath, fevers, chills or vomiting.  she is not tremulous. she last drank just prior to arrival
normal S1, S2 heard

## 2022-03-24 NOTE — ED ADULT NURSE NOTE - NS ED NURSE PATIENT LEFT UNIT TIME
[FreeTextEntry1] : The patient is a 73-year-old woman who is seen in follow-up evaluation from ER presentation to [facility] yesterday 3/15/2022.  She had presented with a tachycardia SVT short RP about 180 bpm.  Patient was on high-dose propafenone as well as metoprolol.   The tachycardia appeared to be AV node reentry.  I saw her in the emergency room and she was discharged home with follow-up instructions to be seen in the office to discuss catheter ablation.  Patient returned today for discussion. 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  She has a hematologist who is going to recommend 2 units of FFP.  The patient has had reactions to the FFP and she would need to be premedicated  before given FFP.  We will discuss with her hematologist the full premedication protocol as well as the duration and time of infusion of FFP.  This will be done prior to the procedure.  \par \par On February 9 she caught an episode.  She was wearing the cardia mobile device and recorded episodes at a rate of 150 bpm the P waves were not very distinct but appeared to be at end of the QRS and a distorted QRS as well suggesting some form of SVT.  The QRS complex was narrow.\par \par She has a past history of paroxysmal atrial fibrillation, dyslipidemia, factor XI deficiency, nonobstructive coronary arteries and a prior history of breast CA.\par Prior history of hypertension and borderline diabetes.\par She has a prior history of tremors and has been on propanolol.  The patient also has been on Rythmol metoprolol and anticoagulated with warfarin.\par \par Previous cardiac catheterization had showed nonobstructive coronary arteries in 2008.\par \par The report from her cardiac catheterization dated 12/18/2008 from [facility] in [place] was reviewed.  It indicated that she had atrial fibrillation with significant ST depression and borderline troponins.  Noted her history of hemophilia C.  She was given FFP prior to the procedure.  The findings on the cardiac catheterization showed that she had normal coronary arteries.  She was on carvedilol and the dose was increased to 25 mg twice daily.  When she was in rapid tachycardia she was given adenosine and it was reported after second dose of adenosine the arrhythmia had slowed and it was noted that she had underlying A. fib.  History of hypothyroidism on Synthroid was noted.  Carotid ultrasound was done then and showed less than 50% ICA stenosis bilaterally.  She had a prior history of right neck bruit.  Previous echo from 2008 that showed preserved left ventricular function and no significant valvular disease.\par \par Review of her previous test\par \par Echocardiogram done 10/9/2018 EF 60%, left atrial diameter 2.9 cm with mild minimal mitral regurgitation, left atrial volume index 24 cc/m², mild diastolic dysfunction, trace pericardial effusion, borderline pulmonary hypertension.\par \par She had a nuclear stress test performed 2/7/2022 regadenoson stress tests showing normal myocardial perfusion scan\par \par The patient had a monitor performed for 28 days starting 12/5/2018 21/6/19 reported intermittent paroxysmal atrial fibrillation.  I did not see the atrial fibrillation upon review and it appeared more of an atrial tachycardia with a slower ventricular response.\par \par She did not have any repeat the monitor since then.  The only other monitor reviewed was the episode that she showed me in February which I reviewed and thought it was regular and perhaps an atrial tachycardia as well.\par \par We reviewed the frequency of her symptoms 3-5 episodes per day lasting a few minutes to hours.  No associated triggers.  She does get shortness of breath with episodes and sometimes dizzy/lightheaded and not comfortable during episodes.\par \par \par Noted she had a prior history of breast CA status post mastectomy and chemotherapy in 1989.
02:04

## 2022-05-13 ENCOUNTER — NON-APPOINTMENT (OUTPATIENT)
Age: 42
End: 2022-05-13

## 2022-05-23 ENCOUNTER — EMERGENCY (EMERGENCY)
Facility: HOSPITAL | Age: 42
LOS: 0 days | Discharge: HOME | End: 2022-05-23
Attending: EMERGENCY MEDICINE | Admitting: EMERGENCY MEDICINE
Payer: COMMERCIAL

## 2022-05-23 VITALS
DIASTOLIC BLOOD PRESSURE: 64 MMHG | HEART RATE: 118 BPM | WEIGHT: 184.97 LBS | HEIGHT: 61.5 IN | OXYGEN SATURATION: 96 % | TEMPERATURE: 97 F | RESPIRATION RATE: 20 BRPM | SYSTOLIC BLOOD PRESSURE: 127 MMHG

## 2022-05-23 DIAGNOSIS — Z87.19 PERSONAL HISTORY OF OTHER DISEASES OF THE DIGESTIVE SYSTEM: ICD-10-CM

## 2022-05-23 DIAGNOSIS — Z98.891 HISTORY OF UTERINE SCAR FROM PREVIOUS SURGERY: Chronic | ICD-10-CM

## 2022-05-23 DIAGNOSIS — Z04.1 ENCOUNTER FOR EXAMINATION AND OBSERVATION FOLLOWING TRANSPORT ACCIDENT: ICD-10-CM

## 2022-05-23 DIAGNOSIS — F17.200 NICOTINE DEPENDENCE, UNSPECIFIED, UNCOMPLICATED: ICD-10-CM

## 2022-05-23 DIAGNOSIS — Z88.8 ALLERGY STATUS TO OTHER DRUGS, MEDICAMENTS AND BIOLOGICAL SUBSTANCES: ICD-10-CM

## 2022-05-23 DIAGNOSIS — F41.9 ANXIETY DISORDER, UNSPECIFIED: ICD-10-CM

## 2022-05-23 DIAGNOSIS — K21.9 GASTRO-ESOPHAGEAL REFLUX DISEASE WITHOUT ESOPHAGITIS: ICD-10-CM

## 2022-05-23 DIAGNOSIS — E05.00 THYROTOXICOSIS WITH DIFFUSE GOITER WITHOUT THYROTOXIC CRISIS OR STORM: ICD-10-CM
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
## 2022-05-23 DIAGNOSIS — Z98.890 OTHER SPECIFIED POSTPROCEDURAL STATES: Chronic | ICD-10-CM

## 2022-05-23 DIAGNOSIS — E78.5 HYPERLIPIDEMIA, UNSPECIFIED: ICD-10-CM
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
## 2022-05-23 DIAGNOSIS — E78.00 PURE HYPERCHOLESTEROLEMIA, UNSPECIFIED: ICD-10-CM

## 2022-05-23 DIAGNOSIS — E66.9 OBESITY, UNSPECIFIED: ICD-10-CM

## 2022-05-23 PROCEDURE — 99283 EMERGENCY DEPT VISIT LOW MDM: CPT
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
## 2022-05-23 NOTE — ED PROVIDER NOTE - PHYSICAL EXAMINATION
CONSTITUTIONAL: Well-developed; well-nourished; in no acute distress. gcs 15, speaking in full sentences, moving all extremities  SKIN: warm, dry  HEAD: Normocephalic; see above  EYES: PERRL, EOMI, no conjunctival erythema  ENT: No nasal discharge; airway clear, mucous membranes moist  CARD: , no chest wall tenderness or crepitus  RESP: No wheezes, rales or rhonchi. CTABL  ABD: soft ntnd, no rebound, no guarding, no rigidity  EXT: moves all extremities,  No clubbing, cyanosis or edema.   NEURO: Alert, oriented, grossly unremarkable, cn ii-xii grossly intact, follows commands  PSYCH: Cooperative, appropriate.
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
## 2022-05-23 NOTE — ED ADULT NURSE NOTE - NSIMPLEMENTINTERV_GEN_ALL_ED
Implemented All Fall Risk Interventions:  Arthur to call system. Call bell, personal items and telephone within reach. Instruct patient to call for assistance. Room bathroom lighting operational. Non-slip footwear when patient is off stretcher. Physically safe environment: no spills, clutter or unnecessary equipment. Stretcher in lowest position, wheels locked, appropriate side rails in place. Provide visual cue, wrist band, yellow gown, etc. Monitor gait and stability. Monitor for mental status changes and reorient to person, place, and time. Review medications for side effects contributing to fall risk. Reinforce activity limits and safety measures with patient and family. Trilobed Flap Text: The defect edges were debeveled with a #15 scalpel blade.  Given the location of the defect and the proximity to free margins a trilobed flap was deemed most appropriate.  Using a sterile surgical marker, an appropriate trilobed flap drawn around the defect.    The area thus outlined was incised deep to adipose tissue with a #15 scalpel blade.  The skin margins were undermined to an appropriate distance in all directions utilizing iris scissors.

## 2022-05-23 NOTE — ED PROVIDER NOTE - PATIENT PORTAL LINK FT
You can access the FollowMyHealth Patient Portal offered by [facility] by registering at the following website: [email or web address]. By joining [employer]’s FollowMyHealth portal, you will also be able to view your health information using other applications (apps) compatible with our system.

## 2022-05-24 RX ORDER — KETOROLAC TROMETHAMINE 30 MG/ML
1 SYRINGE (ML) INJECTION
Qty: 15 | Refills: 0
Start: 2022-05-24 | End: 2022-05-28

## 2022-07-05 ENCOUNTER — NON-APPOINTMENT (OUTPATIENT)
Age: 42
End: 2022-07-05

## 2022-07-07 NOTE — H&P ADULT - NSHPOUTPATIENTPROVIDERS_GEN_ALL_CORE
[Chaperone Present] : A chaperone was present in the examining room during all aspects of the physical examination [Appropriately responsive] : appropriately responsive [Alert] : alert [No Acute Distress] : no acute distress [No Lymphadenopathy] : no lymphadenopathy [Soft] : soft [Non-tender] : non-tender [Non-distended] : non-distended [No HSM] : No HSM [No Lesions] : no lesions [No Mass] : no mass [Oriented x3] : oriented x3 [Labia Majora] : normal [Labia Minora] : normal [Normal] : normal [Uterine Adnexae] : normal [FreeTextEntry4] :  estring removed and replaced Unknown

## 2022-07-16 ENCOUNTER — EMERGENCY (EMERGENCY)
Facility: HOSPITAL | Age: 42
LOS: 0 days | Discharge: HOME | End: 2022-07-16
Attending: STUDENT IN AN ORGANIZED HEALTH CARE EDUCATION/TRAINING PROGRAM | Admitting: STUDENT IN AN ORGANIZED HEALTH CARE EDUCATION/TRAINING PROGRAM

## 2022-07-16 VITALS
RESPIRATION RATE: 18 BRPM | OXYGEN SATURATION: 95 % | SYSTOLIC BLOOD PRESSURE: 124 MMHG | HEIGHT: 61.5 IN | DIASTOLIC BLOOD PRESSURE: 79 MMHG | HEART RATE: 101 BPM | TEMPERATURE: 98 F

## 2022-07-16 DIAGNOSIS — Z88.8 ALLERGY STATUS TO OTHER DRUGS, MEDICAMENTS AND BIOLOGICAL SUBSTANCES STATUS: ICD-10-CM

## 2022-07-16 DIAGNOSIS — F17.200 NICOTINE DEPENDENCE, UNSPECIFIED, UNCOMPLICATED: ICD-10-CM

## 2022-07-16 DIAGNOSIS — Z98.891 HISTORY OF UTERINE SCAR FROM PREVIOUS SURGERY: Chronic | ICD-10-CM

## 2022-07-16 DIAGNOSIS — E05.00 THYROTOXICOSIS WITH DIFFUSE GOITER WITHOUT THYROTOXIC CRISIS OR STORM: ICD-10-CM

## 2022-07-16 DIAGNOSIS — R11.0 NAUSEA: ICD-10-CM

## 2022-07-16 DIAGNOSIS — R07.9 CHEST PAIN, UNSPECIFIED: ICD-10-CM

## 2022-07-16 DIAGNOSIS — R19.7 DIARRHEA, UNSPECIFIED: ICD-10-CM

## 2022-07-16 DIAGNOSIS — E78.5 HYPERLIPIDEMIA, UNSPECIFIED: ICD-10-CM

## 2022-07-16 DIAGNOSIS — M79.602 PAIN IN LEFT ARM: ICD-10-CM

## 2022-07-16 DIAGNOSIS — M25.512 PAIN IN LEFT SHOULDER: ICD-10-CM

## 2022-07-16 DIAGNOSIS — Z98.890 OTHER SPECIFIED POSTPROCEDURAL STATES: Chronic | ICD-10-CM

## 2022-07-16 DIAGNOSIS — F10.239 ALCOHOL DEPENDENCE WITH WITHDRAWAL, UNSPECIFIED: ICD-10-CM

## 2022-07-16 LAB
ALBUMIN SERPL ELPH-MCNC: 4.8 G/DL — SIGNIFICANT CHANGE UP (ref 3.5–5.2)
ALP SERPL-CCNC: 78 U/L — SIGNIFICANT CHANGE UP (ref 30–115)
ALT FLD-CCNC: 172 U/L — HIGH (ref 0–41)
ANION GAP SERPL CALC-SCNC: 13 MMOL/L — SIGNIFICANT CHANGE UP (ref 7–14)
APPEARANCE UR: CLEAR — SIGNIFICANT CHANGE UP
AST SERPL-CCNC: 150 U/L — HIGH (ref 0–41)
BASOPHILS # BLD AUTO: 0.06 K/UL — SIGNIFICANT CHANGE UP (ref 0–0.2)
BASOPHILS NFR BLD AUTO: 1.1 % — HIGH (ref 0–1)
BILIRUB SERPL-MCNC: 0.3 MG/DL — SIGNIFICANT CHANGE UP (ref 0.2–1.2)
BILIRUB UR-MCNC: NEGATIVE — SIGNIFICANT CHANGE UP
BUN SERPL-MCNC: 12 MG/DL — SIGNIFICANT CHANGE UP (ref 10–20)
CALCIUM SERPL-MCNC: 9.2 MG/DL — SIGNIFICANT CHANGE UP (ref 8.5–10.1)
CHLORIDE SERPL-SCNC: 100 MMOL/L — SIGNIFICANT CHANGE UP (ref 98–110)
CO2 SERPL-SCNC: 26 MMOL/L — SIGNIFICANT CHANGE UP (ref 17–32)
COLOR SPEC: YELLOW — SIGNIFICANT CHANGE UP
CREAT SERPL-MCNC: 0.7 MG/DL — SIGNIFICANT CHANGE UP (ref 0.7–1.5)
DIFF PNL FLD: NEGATIVE — SIGNIFICANT CHANGE UP
EGFR: 112 ML/MIN/1.73M2 — SIGNIFICANT CHANGE UP
EOSINOPHIL # BLD AUTO: 0.16 K/UL — SIGNIFICANT CHANGE UP (ref 0–0.7)
EOSINOPHIL NFR BLD AUTO: 3 % — SIGNIFICANT CHANGE UP (ref 0–8)
GLUCOSE SERPL-MCNC: 98 MG/DL — SIGNIFICANT CHANGE UP (ref 70–99)
GLUCOSE UR QL: NEGATIVE MG/DL — SIGNIFICANT CHANGE UP
HCG SERPL QL: NEGATIVE — SIGNIFICANT CHANGE UP
HCT VFR BLD CALC: 39.9 % — SIGNIFICANT CHANGE UP (ref 37–47)
HGB BLD-MCNC: 13.5 G/DL — SIGNIFICANT CHANGE UP (ref 12–16)
IMM GRANULOCYTES NFR BLD AUTO: 0.2 % — SIGNIFICANT CHANGE UP (ref 0.1–0.3)
KETONES UR-MCNC: NEGATIVE — SIGNIFICANT CHANGE UP
LEUKOCYTE ESTERASE UR-ACNC: NEGATIVE — SIGNIFICANT CHANGE UP
LIDOCAIN IGE QN: 35 U/L — SIGNIFICANT CHANGE UP (ref 7–60)
LYMPHOCYTES # BLD AUTO: 2.59 K/UL — SIGNIFICANT CHANGE UP (ref 1.2–3.4)
LYMPHOCYTES # BLD AUTO: 48.4 % — SIGNIFICANT CHANGE UP (ref 20.5–51.1)
MAGNESIUM SERPL-MCNC: 2 MG/DL — SIGNIFICANT CHANGE UP (ref 1.8–2.4)
MCHC RBC-ENTMCNC: 32.8 PG — HIGH (ref 27–31)
MCHC RBC-ENTMCNC: 33.8 G/DL — SIGNIFICANT CHANGE UP (ref 32–37)
MCV RBC AUTO: 96.8 FL — SIGNIFICANT CHANGE UP (ref 81–99)
MONOCYTES # BLD AUTO: 0.52 K/UL — SIGNIFICANT CHANGE UP (ref 0.1–0.6)
MONOCYTES NFR BLD AUTO: 9.7 % — HIGH (ref 1.7–9.3)
NEUTROPHILS # BLD AUTO: 2.01 K/UL — SIGNIFICANT CHANGE UP (ref 1.4–6.5)
NEUTROPHILS NFR BLD AUTO: 37.6 % — LOW (ref 42.2–75.2)
NITRITE UR-MCNC: NEGATIVE — SIGNIFICANT CHANGE UP
NRBC # BLD: 0 /100 WBCS — SIGNIFICANT CHANGE UP (ref 0–0)
NT-PROBNP SERPL-SCNC: 29 PG/ML — SIGNIFICANT CHANGE UP (ref 0–300)
PH UR: 6.5 — SIGNIFICANT CHANGE UP (ref 5–8)
PLATELET # BLD AUTO: 214 K/UL — SIGNIFICANT CHANGE UP (ref 130–400)
POTASSIUM SERPL-MCNC: 3.6 MMOL/L — SIGNIFICANT CHANGE UP (ref 3.5–5)
POTASSIUM SERPL-SCNC: 3.6 MMOL/L — SIGNIFICANT CHANGE UP (ref 3.5–5)
PROT SERPL-MCNC: 7.5 G/DL — SIGNIFICANT CHANGE UP (ref 6–8)
PROT UR-MCNC: NEGATIVE MG/DL — SIGNIFICANT CHANGE UP
RBC # BLD: 4.12 M/UL — LOW (ref 4.2–5.4)
RBC # FLD: 15.5 % — HIGH (ref 11.5–14.5)
SODIUM SERPL-SCNC: 139 MMOL/L — SIGNIFICANT CHANGE UP (ref 135–146)
SP GR SPEC: 1.01 — SIGNIFICANT CHANGE UP (ref 1.01–1.03)
TROPONIN T SERPL-MCNC: <0.01 NG/ML — SIGNIFICANT CHANGE UP
UROBILINOGEN FLD QL: 0.2 MG/DL — SIGNIFICANT CHANGE UP
WBC # BLD: 5.35 K/UL — SIGNIFICANT CHANGE UP (ref 4.8–10.8)
WBC # FLD AUTO: 5.35 K/UL — SIGNIFICANT CHANGE UP (ref 4.8–10.8)

## 2022-07-16 PROCEDURE — 93010 ELECTROCARDIOGRAM REPORT: CPT

## 2022-07-16 PROCEDURE — 71045 X-RAY EXAM CHEST 1 VIEW: CPT | Mod: 26

## 2022-07-16 PROCEDURE — 99285 EMERGENCY DEPT VISIT HI MDM: CPT

## 2022-07-16 RX ORDER — SODIUM CHLORIDE 9 MG/ML
1000 INJECTION, SOLUTION INTRAVENOUS ONCE
Refills: 0 | Status: COMPLETED | OUTPATIENT
Start: 2022-07-16 | End: 2022-07-16

## 2022-07-16 RX ADMIN — Medication 100 MILLIGRAM(S): at 06:20

## 2022-07-16 RX ADMIN — SODIUM CHLORIDE 1000 MILLILITER(S): 9 INJECTION, SOLUTION INTRAVENOUS at 04:10

## 2022-07-16 NOTE — ED PROVIDER NOTE - CLINICAL SUMMARY MEDICAL DECISION MAKING FREE TEXT BOX
I personally evaluated the patient. I reviewed the Resident´s or Physician Assistant´s note (as assigned above), and agree with the findings and plan except as documented in my note.  Patient evaluated for chest pain.  Labs, EKG, chest x-ray performed in the ED.  Patient began having withdrawal symptoms, given 1 dose of Librium prior to discharge.  Patient well-appearing, vital signs stable, instructed to follow-up with rehab if she would like to detox from alcohol, instructed to follow-up with cardiology.  I have fully discussed the medical management and delivery of care with the patient. I have discussed any available labs, imaging and treatment options with the patient. Patient confirms understanding and has been given detailed return precautions. Patient instructed to return to the ED should symptoms persist or worsen. Patient has demonstrated capacity and has verbalized understanding. Patient is well appearing upon discharge.

## 2022-07-16 NOTE — ED PROVIDER NOTE - PATIENT PORTAL LINK FT
You can access the FollowMyHealth Patient Portal offered by [facility] by registering at the following website: [email or web address]. By joining [employer]’s FollowMyHealth portal, you will also be able to view your health information using other applications (apps) compatible with our system.

## 2022-07-16 NOTE — ED PROVIDER NOTE - ATTENDING CONTRIBUTION TO CARE
42-year-old female past medical history of alcoholism, hyperlipidemia, Graves' disease presents chest pain.  Left squeezing chest pain that radiates to the left arm and shoulder.  Associated with nausea and diarrhea for the past few months.  Patient states that she drank a little under 2 L of vodka which is her normal amount.  No tremors, no fever/chills, no breath, no palpitations, no dizziness, no lightheadedness, no leg swelling, no abdominal pain, no back pain, no vomiting, no melena, no hematochezia.    CONSTITUTIONAL: No acute distress.   SKIN: skin exam is warm and dry, no acute rash.  HEAD: Normocephalic; atraumatic.  EYES: PERRL, 3 mm bilateral, no nystagmus, EOM intact; conjunctiva and sclera clear.  ENT: No nasal discharge; airway clear.  NECK: Supple; non tender. + full passive ROM in all directions. No JVD  CARD: S1, S2 normal; no murmurs, gallops, or rubs. Regular rate and rhythm. + Symmetric Strong Pulses  RESP: No wheezes, rales or rhonchi. Good air movement bilaterally  ABD: soft; non-distended; non-tender. No Rebound, No Guarding, No signs of peritonitis, No CVA tenderness. No pulsatile abdominal mass. + Strong and Symmetric Pulses  EXT: Normal ROM. No clubbing, cyanosis or edema. Dp and Pt Pulses intact. Cap refill less than 3 seconds  NEURO: CN 2-12 intact, normal finger to nose, normal romberg, stable gait, no sensory or motor deficits, Alert, oriented, grossly unremarkable. No Focal deficits. GCS 15. NIH 0  PSYCH: Cooperative, appropriate.

## 2022-07-16 NOTE — ED PROVIDER NOTE - NS ED ROS FT
Constitutional:  No fever, chills, lethargy, or abnormal weight loss  Eyes:  No eye pain or visual changes  ENMT: No nasal discharge, no sore throat.   Cardiac:  +per hpi  Respiratory:  No cough or respiratory distress  GI:  +nausea, diarrhea   :  No dysuria, frequency, or hematuria  MS:  No back or joint pain  Neuro:  No headache. No numbness, weakness, or tingling  Skin:  No skin rash or pruritus.   Except as documented in the HPI,  all other systems are negative

## 2022-07-16 NOTE — ED PROVIDER NOTE - OBJECTIVE STATEMENT
41yo female PMHx of alcoholism, HLD, Grave's Dz presenting with chest pain x several days described as a "squeezing" to the left shoulder that radiates to the left arm. Also w/ complaints at nausea and diarrhea persistent "several months" that she attributes to alcohol withdrawal. Drank 1.75 L vodka, which is her normal amount. No recent F/C, a few days ago some SOB that resolved on its own.

## 2022-07-16 NOTE — ED PROVIDER NOTE - NSCAREINITIATED _GEN_ER
Elaina Bolden (:  1953) is a 74 y.o. female,Established patient, here for evaluation of the following chief complaint(s): Annual Exam (refills)         ASSESSMENT/PLAN:  1. Benign essential HTN  -     lisinopril (PRINIVIL;ZESTRIL) 20 MG tablet; Take 1 tablet by mouth 2 times daily, Disp-180 tablet, R-3Normal  -     cloNIDine (CATAPRES) 0.1 MG tablet; TAKE 1 TABLET BY MOUTH 2 TIMES DAILY, Disp-180 tablet, R-3Normal  -     Lipid Panel; Future  -     CBC Auto Differential; Future  -     Comprehensive Metabolic Panel; Future  -stable on current meds. 2. Scalp psoriasis  -     fluocinonide (LIDEX) 0.05 % external solution; Apply topically to scalp nightly., Disp-1 Bottle, R-11, Normal  -stable on lidex  3. Encounter for screening mammogram for malignant neoplasm of breast  -     [facility] BRITTANY DIGITAL SCREEN BILATERAL; Future  4. Screen for colon cancer  -     [facility] - Sharona Olivo MD, Gastroenterology, [facility]      No follow-ups on file. Subjective   SUBJECTIVE/OBJECTIVE:  HPI  Pt here for annual exam and med refills. Needs fasting blood work. Reviewed BMI of 29. Encouraged diet, exercise and weight loss. Reviewed health maintenance, needs updated mammogram and colonoscopy. Overall is feeling well. , nonsmoker, pmh reviewed. Review of Systems   Constitutional: Negative for chills, fatigue, fever and unexpected weight change. HENT: Negative for congestion, ear pain, rhinorrhea and sore throat. Eyes: Negative for pain and visual disturbance. Respiratory: Negative for cough, chest tightness, shortness of breath and wheezing. Cardiovascular: Negative for chest pain and palpitations. Gastrointestinal: Negative for abdominal pain, blood in stool, constipation, diarrhea, nausea and vomiting. Genitourinary: Negative for difficulty urinating, frequency, hematuria and urgency. Musculoskeletal: Negative for back pain, joint swelling, myalgias and neck pain.    Skin: Negative for rash.   Neurological: Negative for dizziness and headaches. Hematological: Negative for adenopathy. Does not bruise/bleed easily. Psychiatric/Behavioral: Negative for behavioral problems and sleep disturbance. The patient is not nervous/anxious. Objective   Physical Exam  Vitals and nursing note reviewed. Constitutional:       Appearance: She is well-developed. HENT:      Head: Normocephalic and atraumatic. Right Ear: External ear normal.      Left Ear: External ear normal.      Nose: Nose normal.      Mouth/Throat:      Mouth: Mucous membranes are moist.   Eyes:      Pupils: Pupils are equal, round, and reactive to light. Neck:      Thyroid: No thyromegaly. Vascular: No carotid bruit. Cardiovascular:      Rate and Rhythm: Normal rate and regular rhythm. Heart sounds: Normal heart sounds. Pulmonary:      Breath sounds: Normal breath sounds. No wheezing or rales. Abdominal:      General: Bowel sounds are normal.      Palpations: Abdomen is soft. Tenderness: There is no abdominal tenderness. There is no guarding or rebound. Musculoskeletal:         General: Normal range of motion. Cervical back: Neck supple. Lymphadenopathy:      Cervical: No cervical adenopathy. Skin:     General: Skin is warm and dry. Findings: No rash. Neurological:      Mental Status: She is alert and oriented to person, place, and time. Cranial Nerves: No cranial nerve deficit. Deep Tendon Reflexes: Reflexes are normal and symmetric. An electronic signature was used to authenticate this note.     --Gurinder Navarro MD [facility](Resident)

## 2022-07-16 NOTE — ED PROVIDER NOTE - NSFOLLOWUPINSTRUCTIONS_ED_ALL_ED_FT
Chest Pain    Chest pain can be caused by many different conditions which may or may not be dangerous. Causes include heartburn, lung infections, heart attack, blood clot in lungs, skin infections, strain or damage to muscle, cartilage, or bones, etc. In addition to a history and physical examination, an electrocardiogram (ECG) or other lab tests may have been performed to determine the cause of your chest pain. Follow up with your primary care provider or with a cardiologist as instructed.     SEEK IMMEDIATE MEDICAL CARE IF YOU HAVE ANY OF THE FOLLOWING SYMPTOMS: worsening chest pain, coughing up blood, unexplained back/neck/jaw pain, severe abdominal pain, dizziness or lightheadedness, fainting, shortness of breath, sweaty or clammy skin, vomiting, or racing heart beat. These symptoms may represent a serious problem that is an emergency. Do not wait to see if the symptoms will go away. Get medical help right away. Call 911 and do not drive yourself to the hospital.      Cardiology follow-up: Our Emergency Department Referral Coordinators will be reaching out to you in the next 24-48 hours from 9:00am to 5:00pm with a follow up appointment. Please expect a phone call from the hospital in that time frame. If you do not receive a call or if you have any questions or concerns, you can reach them at [phone] or [phone].

## 2022-07-30 ENCOUNTER — INPATIENT (INPATIENT)
Facility: HOSPITAL | Age: 42
LOS: 1 days | Discharge: LEFT BEFORE DISCHARGE | End: 2022-08-01
Attending: INTERNAL MEDICINE | Admitting: INTERNAL MEDICINE

## 2022-07-30 VITALS
RESPIRATION RATE: 20 BRPM | TEMPERATURE: 97 F | HEART RATE: 113 BPM | OXYGEN SATURATION: 95 % | DIASTOLIC BLOOD PRESSURE: 70 MMHG | HEIGHT: 61.5 IN | SYSTOLIC BLOOD PRESSURE: 121 MMHG

## 2022-07-30 DIAGNOSIS — Z98.891 HISTORY OF UTERINE SCAR FROM PREVIOUS SURGERY: Chronic | ICD-10-CM

## 2022-07-30 DIAGNOSIS — Z98.890 OTHER SPECIFIED POSTPROCEDURAL STATES: Chronic | ICD-10-CM

## 2022-07-30 LAB
ALBUMIN SERPL ELPH-MCNC: 4.8 G/DL — SIGNIFICANT CHANGE UP (ref 3.5–5.2)
ALP SERPL-CCNC: 122 U/L — HIGH (ref 30–115)
ALT FLD-CCNC: 169 U/L — HIGH (ref 0–41)
ANION GAP SERPL CALC-SCNC: 29 MMOL/L — HIGH (ref 7–14)
APPEARANCE UR: CLEAR — SIGNIFICANT CHANGE UP
AST SERPL-CCNC: 124 U/L — HIGH (ref 0–41)
B-OH-BUTYR SERPL-SCNC: 1 MMOL/L — HIGH
BASE EXCESS BLDV CALC-SCNC: -5.2 MMOL/L — LOW (ref -2–3)
BASOPHILS # BLD AUTO: 0.06 K/UL — SIGNIFICANT CHANGE UP (ref 0–0.2)
BASOPHILS NFR BLD AUTO: 0.7 % — SIGNIFICANT CHANGE UP (ref 0–1)
BILIRUB SERPL-MCNC: 0.6 MG/DL — SIGNIFICANT CHANGE UP (ref 0.2–1.2)
BILIRUB UR-MCNC: NEGATIVE — SIGNIFICANT CHANGE UP
BUN SERPL-MCNC: 10 MG/DL — SIGNIFICANT CHANGE UP (ref 10–20)
CA-I SERPL-SCNC: 1.07 MMOL/L — LOW (ref 1.15–1.33)
CALCIUM SERPL-MCNC: 8.8 MG/DL — SIGNIFICANT CHANGE UP (ref 8.5–10.1)
CHLORIDE SERPL-SCNC: 91 MMOL/L — LOW (ref 98–110)
CO2 SERPL-SCNC: 14 MMOL/L — LOW (ref 17–32)
COLOR SPEC: SIGNIFICANT CHANGE UP
CREAT SERPL-MCNC: 0.7 MG/DL — SIGNIFICANT CHANGE UP (ref 0.7–1.5)
DIFF PNL FLD: NEGATIVE — SIGNIFICANT CHANGE UP
EGFR: 112 ML/MIN/1.73M2 — SIGNIFICANT CHANGE UP
EOSINOPHIL # BLD AUTO: 0.01 K/UL — SIGNIFICANT CHANGE UP (ref 0–0.7)
EOSINOPHIL NFR BLD AUTO: 0.1 % — SIGNIFICANT CHANGE UP (ref 0–8)
ETHANOL SERPL-MCNC: 220 MG/DL — HIGH
GAS PNL BLDV: 130 MMOL/L — LOW (ref 136–145)
GAS PNL BLDV: SIGNIFICANT CHANGE UP
GLUCOSE SERPL-MCNC: 88 MG/DL — SIGNIFICANT CHANGE UP (ref 70–99)
GLUCOSE UR QL: NEGATIVE — SIGNIFICANT CHANGE UP
HCG SERPL QL: NEGATIVE — SIGNIFICANT CHANGE UP
HCO3 BLDV-SCNC: 19 MMOL/L — LOW (ref 22–29)
HCT VFR BLD CALC: 40.1 % — SIGNIFICANT CHANGE UP (ref 37–47)
HCT VFR BLDA CALC: 42 % — SIGNIFICANT CHANGE UP (ref 39–51)
HGB BLD CALC-MCNC: 14 G/DL — SIGNIFICANT CHANGE UP (ref 12.6–17.4)
HGB BLD-MCNC: 14.1 G/DL — SIGNIFICANT CHANGE UP (ref 12–16)
IMM GRANULOCYTES NFR BLD AUTO: 0.5 % — HIGH (ref 0.1–0.3)
KETONES UR-MCNC: ABNORMAL
LACTATE BLDV-MCNC: 5.5 MMOL/L — CRITICAL HIGH (ref 0.5–2)
LEUKOCYTE ESTERASE UR-ACNC: NEGATIVE — SIGNIFICANT CHANGE UP
LIDOCAIN IGE QN: 21 U/L — SIGNIFICANT CHANGE UP (ref 7–60)
LYMPHOCYTES # BLD AUTO: 1.61 K/UL — SIGNIFICANT CHANGE UP (ref 1.2–3.4)
LYMPHOCYTES # BLD AUTO: 18.3 % — LOW (ref 20.5–51.1)
MCHC RBC-ENTMCNC: 33.5 PG — HIGH (ref 27–31)
MCHC RBC-ENTMCNC: 35.2 G/DL — SIGNIFICANT CHANGE UP (ref 32–37)
MCV RBC AUTO: 95.2 FL — SIGNIFICANT CHANGE UP (ref 81–99)
MONOCYTES # BLD AUTO: 0.58 K/UL — SIGNIFICANT CHANGE UP (ref 0.1–0.6)
MONOCYTES NFR BLD AUTO: 6.6 % — SIGNIFICANT CHANGE UP (ref 1.7–9.3)
NEUTROPHILS # BLD AUTO: 6.51 K/UL — HIGH (ref 1.4–6.5)
NEUTROPHILS NFR BLD AUTO: 73.8 % — SIGNIFICANT CHANGE UP (ref 42.2–75.2)
NITRITE UR-MCNC: NEGATIVE — SIGNIFICANT CHANGE UP
NRBC # BLD: 0 /100 WBCS — SIGNIFICANT CHANGE UP (ref 0–0)
PCO2 BLDV: 32 MMHG — LOW (ref 39–42)
PH BLDV: 7.38 — SIGNIFICANT CHANGE UP (ref 7.32–7.43)
PH UR: 6 — SIGNIFICANT CHANGE UP (ref 5–8)
PLATELET # BLD AUTO: 268 K/UL — SIGNIFICANT CHANGE UP (ref 130–400)
PO2 BLDV: 48 MMHG — SIGNIFICANT CHANGE UP
POTASSIUM BLDV-SCNC: 4.4 MMOL/L — SIGNIFICANT CHANGE UP (ref 3.5–5.1)
POTASSIUM SERPL-MCNC: 4.4 MMOL/L — SIGNIFICANT CHANGE UP (ref 3.5–5)
POTASSIUM SERPL-SCNC: 4.4 MMOL/L — SIGNIFICANT CHANGE UP (ref 3.5–5)
PROT SERPL-MCNC: 7.9 G/DL — SIGNIFICANT CHANGE UP (ref 6–8)
PROT UR-MCNC: SIGNIFICANT CHANGE UP
RBC # BLD: 4.21 M/UL — SIGNIFICANT CHANGE UP (ref 4.2–5.4)
RBC # FLD: 15.3 % — HIGH (ref 11.5–14.5)
SAO2 % BLDV: 77.2 % — SIGNIFICANT CHANGE UP
SARS-COV-2 RNA SPEC QL NAA+PROBE: SIGNIFICANT CHANGE UP
SODIUM SERPL-SCNC: 134 MMOL/L — LOW (ref 135–146)
SP GR SPEC: 1.02 — SIGNIFICANT CHANGE UP (ref 1.01–1.03)
TROPONIN T SERPL-MCNC: <0.01 NG/ML — SIGNIFICANT CHANGE UP
UROBILINOGEN FLD QL: SIGNIFICANT CHANGE UP
WBC # BLD: 8.81 K/UL — SIGNIFICANT CHANGE UP (ref 4.8–10.8)
WBC # FLD AUTO: 8.81 K/UL — SIGNIFICANT CHANGE UP (ref 4.8–10.8)

## 2022-07-30 PROCEDURE — 71045 X-RAY EXAM CHEST 1 VIEW: CPT | Mod: 26

## 2022-07-30 PROCEDURE — 74177 CT ABD & PELVIS W/CONTRAST: CPT | Mod: 26,MA

## 2022-07-30 PROCEDURE — 99291 CRITICAL CARE FIRST HOUR: CPT

## 2022-07-30 PROCEDURE — 93010 ELECTROCARDIOGRAM REPORT: CPT | Mod: 76

## 2022-07-30 PROCEDURE — 70450 CT HEAD/BRAIN W/O DYE: CPT | Mod: 26,MA

## 2022-07-30 RX ORDER — ENOXAPARIN SODIUM 100 MG/ML
40 INJECTION SUBCUTANEOUS EVERY 24 HOURS
Refills: 0 | Status: DISCONTINUED | OUTPATIENT
Start: 2022-07-30 | End: 2022-08-01

## 2022-07-30 RX ORDER — SODIUM CHLORIDE 9 MG/ML
1000 INJECTION, SOLUTION INTRAVENOUS
Refills: 0 | Status: DISCONTINUED | OUTPATIENT
Start: 2022-07-30 | End: 2022-07-31

## 2022-07-30 RX ORDER — DIAZEPAM 5 MG
5 TABLET ORAL ONCE
Refills: 0 | Status: DISCONTINUED | OUTPATIENT
Start: 2022-07-30 | End: 2022-07-30

## 2022-07-30 RX ORDER — ONDANSETRON 8 MG/1
4 TABLET, FILM COATED ORAL ONCE
Refills: 0 | Status: COMPLETED | OUTPATIENT
Start: 2022-07-30 | End: 2022-07-30

## 2022-07-30 RX ORDER — THIAMINE MONONITRATE (VIT B1) 100 MG
100 TABLET ORAL DAILY
Refills: 0 | Status: DISCONTINUED | OUTPATIENT
Start: 2022-07-31 | End: 2022-08-01

## 2022-07-30 RX ORDER — PANTOPRAZOLE SODIUM 20 MG/1
40 TABLET, DELAYED RELEASE ORAL ONCE
Refills: 0 | Status: COMPLETED | OUTPATIENT
Start: 2022-07-30 | End: 2022-07-30

## 2022-07-30 RX ORDER — QUETIAPINE FUMARATE 200 MG/1
1 TABLET, FILM COATED ORAL
Qty: 0 | Refills: 0 | DISCHARGE

## 2022-07-30 RX ORDER — PANTOPRAZOLE SODIUM 20 MG/1
1 TABLET, DELAYED RELEASE ORAL
Qty: 0 | Refills: 0 | DISCHARGE

## 2022-07-30 RX ORDER — SODIUM CHLORIDE 9 MG/ML
1000 INJECTION, SOLUTION INTRAVENOUS ONCE
Refills: 0 | Status: COMPLETED | OUTPATIENT
Start: 2022-07-30 | End: 2022-07-30

## 2022-07-30 RX ORDER — MAGNESIUM SULFATE 500 MG/ML
2 VIAL (ML) INJECTION ONCE
Refills: 0 | Status: COMPLETED | OUTPATIENT
Start: 2022-07-30 | End: 2022-07-30

## 2022-07-30 RX ORDER — PANTOPRAZOLE SODIUM 20 MG/1
40 TABLET, DELAYED RELEASE ORAL
Refills: 0 | Status: DISCONTINUED | OUTPATIENT
Start: 2022-07-30 | End: 2022-08-01

## 2022-07-30 RX ORDER — FOLIC ACID 0.8 MG
1 TABLET ORAL DAILY
Refills: 0 | Status: DISCONTINUED | OUTPATIENT
Start: 2022-07-30 | End: 2022-08-01

## 2022-07-30 RX ORDER — CHLORHEXIDINE GLUCONATE 213 G/1000ML
1 SOLUTION TOPICAL DAILY
Refills: 0 | Status: DISCONTINUED | OUTPATIENT
Start: 2022-07-30 | End: 2022-08-01

## 2022-07-30 RX ORDER — ONDANSETRON 8 MG/1
4 TABLET, FILM COATED ORAL EVERY 8 HOURS
Refills: 0 | Status: DISCONTINUED | OUTPATIENT
Start: 2022-07-30 | End: 2022-08-01

## 2022-07-30 RX ORDER — THIAMINE MONONITRATE (VIT B1) 100 MG
100 TABLET ORAL ONCE
Refills: 0 | Status: COMPLETED | OUTPATIENT
Start: 2022-07-30 | End: 2022-07-30

## 2022-07-30 RX ORDER — DIAZEPAM 5 MG
10 TABLET ORAL ONCE
Refills: 0 | Status: DISCONTINUED | OUTPATIENT
Start: 2022-07-30 | End: 2022-07-30

## 2022-07-30 RX ORDER — QUETIAPINE FUMARATE 200 MG/1
200 TABLET, FILM COATED ORAL AT BEDTIME
Refills: 0 | Status: DISCONTINUED | OUTPATIENT
Start: 2022-07-30 | End: 2022-08-01

## 2022-07-30 RX ADMIN — SODIUM CHLORIDE 1000 MILLILITER(S): 9 INJECTION, SOLUTION INTRAVENOUS at 20:00

## 2022-07-30 RX ADMIN — Medication 10 MILLIGRAM(S): at 20:02

## 2022-07-30 RX ADMIN — PANTOPRAZOLE SODIUM 40 MILLIGRAM(S): 20 TABLET, DELAYED RELEASE ORAL at 12:57

## 2022-07-30 RX ADMIN — Medication 5 MILLIGRAM(S): at 18:24

## 2022-07-30 RX ADMIN — Medication 100 MILLIGRAM(S): at 18:23

## 2022-07-30 RX ADMIN — SODIUM CHLORIDE 1000 MILLILITER(S): 9 INJECTION, SOLUTION INTRAVENOUS at 18:25

## 2022-07-30 RX ADMIN — Medication 25 GRAM(S): at 16:19

## 2022-07-30 RX ADMIN — ONDANSETRON 4 MILLIGRAM(S): 8 TABLET, FILM COATED ORAL at 13:07

## 2022-07-30 RX ADMIN — Medication 100 MILLIGRAM(S): at 13:08

## 2022-07-30 RX ADMIN — SODIUM CHLORIDE 1000 MILLILITER(S): 9 INJECTION, SOLUTION INTRAVENOUS at 12:57

## 2022-07-30 NOTE — ED PROVIDER NOTE - CLINICAL SUMMARY MEDICAL DECISION MAKING FREE TEXT BOX
ed w/u w/o acute infectious findings, pt persistently tremulous and tachycardic w/ multiple bzd doses, d/w MICU fellow who approves admission. thyroid studies added and MICU resident to follow.

## 2022-07-30 NOTE — ED PROVIDER NOTE - NS ED ROS FT
Constitutional: no fever or rigors  Eyes: no eye redness, acute visual change  ENMT: no ear pain, no throat pain  Card: no chest pain, no palpitations  Pulm: no cough, no shortness of breath  GI: pos epigastric abdominal pain, nausea and vomiting  : no dysuria or hematuria  MSK: no limitation in range of motion, no neck pain  Skin: no rash, no abrasion  Neuro: no numbness, no weakness  Heme/Onc: no easy bruising, no bleeding tendency   Allergic: no hives, no throat swelling

## 2022-07-30 NOTE — ED PROVIDER NOTE - CARE PLAN
Principal Discharge DX:	Alcohol withdrawal  Secondary Diagnosis:	Syncope  Secondary Diagnosis:	Prolonged QT interval   1

## 2022-07-30 NOTE — H&P ADULT - NSHPPHYSICALEXAM_GEN_ALL_CORE
General: AAO*3, but reported auditory hallucinations  Lungs: GBAE, no adventitious sounds   Heart: Tachycardic, regular rhythm  Abdomen: +BS, soft, nontender  LE: No SONI

## 2022-07-30 NOTE — ED PROVIDER NOTE - OBJECTIVE STATEMENT
41yo female PMHx of alcoholism, HLD, Grave's Dz, gerd  pt presents for multiple complaints. pt states for the past day, she had epigastric pain, nausea, vomiting. pt endorses clear vomiting and wretching overnight. pt states she was still able to drink sips of alcohol because she was trying to avoid withdrawal. pt states in the morning, she went to lay on her bed. pt states she felt body flushed, full body shaking then had a syncopal event. no tongue bite, incontinence. event unwitnessed. pt came to ed because of persistent nausea.

## 2022-07-30 NOTE — ED ADULT NURSE NOTE - OBJECTIVE STATEMENT
Pt. has hx of ETOH abuse, drinks over 1 liter of vodka a day. States she had a "seizure" this morning. C/o warm feeling in body and feeling foggy during episode. Pt. A+Ox4. Pt. has hx of ETOH abuse, drinks over 1 liter of vodka a day. Last drink was this am, a small cup of vodka. States she had a "seizure" this morning. C/o warm feeling in body and feeling foggy during episode. Pt. A+Ox4.

## 2022-07-30 NOTE — H&P ADULT - NSHPLABSRESULTS_GEN_ALL_CORE
WBC Count: 8.81 K/uL   RBC Count: 4.21 M/uL   Hemoglobin: 14.1 g/dL   Hematocrit: 40.1 %   Mean Cell Volume: 95.2 fL   Mean Cell Hemoglobin: 33.5 pg   Mean Cell Hemoglobin Conc: 35.2 g/dL   Red Cell Distrib Width: 15.3 %   Platelet Count - Automated: 268 K/uL   Auto Neutrophil #: 6.51 K/uL   Auto Lymphocyte #: 1.61 K/uL   Auto Monocyte #: 0.58 K/uL   Auto Eosinophil #: 0.01 K/uL   Auto Basophil #: 0.06 K/uL   Auto Neutrophil %: 73.8: Differential percentages must be correlated with absolute numbers for   clinical significance. %   Auto Lymphocyte %: 18.3 %   Auto Monocyte %: 6.6 %   Auto Eosinophil %: 0.1 %   Auto Basophil %: 0.7 %   Auto Immature Granulocyte %: 0.5: (Includes meta, myelo and promyelocytes) %   Nucleated RBC: 0 /100 WBCs Sodium, Serum: 134 mmol/L   Potassium, Serum: 4.4 mmol/L   Chloride, Serum: 91 mmol/L   Carbon Dioxide, Serum: 14 mmol/L   Anion Gap, Serum: 29 mmol/L   Blood Urea Nitrogen, Serum: 10 mg/dL   Creatinine, Serum: 0.7 mg/dL   Glucose, Serum: 88 mg/dL   Calcium, Total Serum: 8.8 mg/dL   Protein Total, Serum: 7.9 g/dL   Albumin, Serum: 4.8 g/dL   Bilirubin Total, Serum: 0.6 mg/dL   Alkaline Phosphatase, Serum: 122 U/L   Aspartate Aminotransferase (AST/SGOT): 124 U/L   Alanine Aminotransferase (ALT/SGPT): 169 U/L Blood Gas Venous - Lactate: 5.50: WBC Count: 8.81 K/uL   RBC Count: 4.21 M/uL   Hemoglobin: 14.1 g/dL   Hematocrit: 40.1 %   Mean Cell Volume: 95.2 fL   Mean Cell Hemoglobin: 33.5 pg   Mean Cell Hemoglobin Conc: 35.2 g/dL   Red Cell Distrib Width: 15.3 %   Platelet Count - Automated: 268 K/uL   Auto Neutrophil #: 6.51 K/uL   Auto Lymphocyte #: 1.61 K/uL   Auto Monocyte #: 0.58 K/uL   Auto Eosinophil #: 0.01 K/uL   Auto Basophil #: 0.06 K/uL   Auto Neutrophil %: 73.8: Differential percentages must be correlated with absolute numbers for   clinical significance. %   Auto Lymphocyte %: 18.3 %   Auto Monocyte %: 6.6 %   Auto Eosinophil %: 0.1 %   Auto Basophil %: 0.7 %   Auto Immature Granulocyte %: 0.5: (Includes meta, myelo and promyelocytes) %   Nucleated RBC: 0 /100 WBCs     Sodium, Serum: 134 mmol/L   Potassium, Serum: 4.4 mmol/L   Chloride, Serum: 91 mmol/L   Carbon Dioxide, Serum: 14 mmol/L   Anion Gap, Serum: 29 mmol/L   Blood Urea Nitrogen, Serum: 10 mg/dL   Creatinine, Serum: 0.7 mg/dL   Glucose, Serum: 88 mg/dL   Calcium, Total Serum: 8.8 mg/dL   Protein Total, Serum: 7.9 g/dL   Albumin, Serum: 4.8 g/dL   Bilirubin Total, Serum: 0.6 mg/dL   Alkaline Phosphatase, Serum: 122 U/L   Aspartate Aminotransferase (AST/SGOT): 124 U/L   Alanine Aminotransferase (ALT/SGPT): 169 U/L     Blood Gas Venous - Lactate: 5.50:  Troponin T, Serum: <0.01 ng/mL    < from: CT Head No Cont (07.30.22 @ 14:10) >    Impression:    No acute intracranial abnormality.    If the patient continues to be symptomatic follow-up MRI of the brain   with and without contrast may be helpful for further evaluation.    < end of copied text >    < from: CT Abdomen and Pelvis w/ IV Cont (07.30.22 @ 14:18) >      IMPRESSION:    No evidence of an acute intra-abdominal abnormality.    Hepatic steatosis.    < end of copied text >

## 2022-07-30 NOTE — CHART NOTE - NSCHARTNOTEFT_GEN_A_CORE
Called by ED for patient with acute onset n/v belly pain, heavy alcohol ingestion in the past week which decreased over the past few days to sips due to n/v and decreased PO intake , after 2L fluids and valium patient still has lactate > 5 and CIWA 13. Will admit to ICU for severe alcohol     IMPRESSION:    Severe alcohol intoxication/withdrawal  Hyperlactatemia  Unwitnessed syncope    PLAN:    CNS: CIWA monitoring and standing ativan detox protocol, Precedex drip, start thiamine and folate administration. Seizure precautions. Social work/addiction medicine eval during hospitalization    HEENT: Oral care    PULMONARY:  HOB @ 45 degrees    CARDIOVASCULAR: D5 LR @ 125 cc/hr. Check serial Trop, EKG     GI: Start GI prophylaxis and anti-emetics.  NPO until tolerates intake.    RENAL:  Follow up lytes.  Correct as needed. Repeat lactate.     INFECTIOUS DISEASE: Follow up cultures    HEMATOLOGICAL:  DVT prophylaxis.    ENDOCRINE:  Follow up FS.  Insulin protocol if needed.    MUSCULOSKELETAL: Bed rest    MICU monitoring for 24 hrs for high risk of DT.

## 2022-07-30 NOTE — H&P ADULT - ATTENDING COMMENTS
events noted, alcohol withdrawal, seizure, LA improved, alcohol use disorder, CIWA protocol, thiamine, folic acid, IVF, SDU

## 2022-07-30 NOTE — ED PROVIDER NOTE - PHYSICAL EXAMINATION
PHYSICAL EXAM:    Constitutional: awake, alert, NAD  Eyes: EOMI, no conj injection  HENT: NC AT  Back: no c/t/l spine ttp  Respiratory: no respiratory distress, breath sounds equal b/l, no wheezing, rhonchi or stridor.   Cardiovascular: sinus tach nml S1S2  Gastrointestinal: soft, no masses, mild epigastric tenderness, nondistended.    Extremities: no peripheral edema  Neurological: AAOx3, CN II-XII grossly intact, no focal numbness or weakness, mild extremity tremulousness  Skin: no rash  Musculoskeletal: no gross deformity

## 2022-07-30 NOTE — H&P ADULT - ASSESSMENT
Impression:     #Alcohol Intoxication  #Unwitnessed seizure  #HAGMA 2/2 lactic acidosis 2/2 alcohol intoxication vs suspected seizure   Impression:     #Alcohol Intoxication (CAGE 3/4)  #Unwitnessed seizure (1st episode ever)  #HAGMA 2/2 lactic acidosis 2/2 alcohol intoxication vs suspected seizure  #Transaminitis 2/2 to alcohol intoxication    CNS: CIWA monitoring and standing ativan detox protocol, start thiamine and folate administration. Seizure precautions. EEG. MRI if needed, CT scan was negative. Social work/addiction medicine eval during hospitalization    HEENT: Oral care    PULMONARY:  HOB @ 45 degrees, Aspiration precautions.    CARDIOVASCULAR: D5 LR @ 125 cc/hr. Check serial Trop, EKG and trend lactate.    GI: Start GI prophylaxis and anti-emetics. Feeding as tolerated    RENAL:  Follow up lytes. Correct as needed. Repeat lactate.     INFECTIOUS DISEASE: Monitor off ABX    HEMATOLOGICAL:  DVT prophylaxis.    ENDOCRINE:  Follow up FS.  Insulin protocol if needed.    MUSCULOSKELETAL: Bed rest    Downgrade to Med/Surg floor in a.m. ( MICU monitoring for 24 hours)   Impression:     #Alcohol Intoxication (CAGE 3/4)  #Unwitnessed seizure (1st episode ever)  #HAGMA 2/2 lactic acidosis 2/2 alcohol intoxication vs suspected seizure  #Hepatic Steatosis  #Transaminitis 2/2 to alcohol intoxication    CNS: CIWA monitoring and standing ativan detox protocol, start thiamine and folate administration. Seizure precautions. EEG. MRI if needed, CT scan was negative. Social work/addiction medicine eval during hospitalization    HEENT: Oral care    PULMONARY:  HOB @ 45 degrees, Aspiration precautions.    CARDIOVASCULAR: D5 LR @ 125 cc/hr. Check serial Trop, EKG and trend lactate.    GI: Start GI prophylaxis and anti-emetics. Feeding as tolerated    RENAL:  Follow up lytes. Correct as needed. Repeat lactate.     INFECTIOUS DISEASE: Monitor off ABX    HEMATOLOGICAL:  DVT prophylaxis.    ENDOCRINE:  Follow up FS.  F/up TSH.  Insulin protocol if needed.    MUSCULOSKELETAL: Bed rest    Downgrade to Med/Surg floor in a.m. ( MICU monitoring for 24 hours)   Impression:     #Alcohol Intoxication (CAGE 3/4)  #Unwitnessed seizure (1st episode ever)  #HAGMA 2/2 lactic acidosis 2/2 alcohol intoxication vs suspected seizure  #Hepatic Steatosis  #Transaminitis 2/2 to alcohol intoxication    CNS: CIWA monitoring and standing ativan detox protocol, start thiamine and folate administration. No need for precedex gtt at the moment. Seizure precautions. EEG. MRI if needed, CT scan was negative. Social work/addiction medicine eval during hospitalization    HEENT: Oral care    PULMONARY:  HOB @ 45 degrees, Aspiration precautions.    CARDIOVASCULAR: D5 + 0.9% NSS (D5LR is currently unavailable) @100 cc/hr. Check serial Trop, EKG and trend lactate.    GI: Start GI prophylaxis and anti-emetics. Feeding as tolerated    RENAL:  Follow up lytes. Correct as needed. Repeat lactate.     INFECTIOUS DISEASE: Monitor off ABX    HEMATOLOGICAL:  DVT prophylaxis.    ENDOCRINE:  Follow up FS.  F/up TSH.  Insulin protocol if needed.    MUSCULOSKELETAL: Bed rest    Monitor in ICU, will downgrade to SDU if stable in a.m.   Impression:     #Alcohol Intoxication   #Unwitnessed seizure (1st episode ever)  #HAGMA 2/2 lactic acidosis 2/2 alcohol intoxication vs suspected seizure  #Hepatic Steatosis  #Transaminitis 2/2 to alcohol intoxication    CNS: CIWA monitoring and standing ativan detox protocol, start thiamine and folate administration. No need for precedex gtt at the moment. Seizure precautions. EEG. MRI if needed, CT scan was negative. Social work/addiction medicine eval during hospitalization    HEENT: Oral care    PULMONARY:  HOB @ 45 degrees, Aspiration precautions.    CARDIOVASCULAR: D5 + 0.9% NSS (D5LR is currently unavailable) @100 cc/hr. Check serial Trop, EKG and trend lactate.    GI: Start GI prophylaxis and anti-emetics. Feeding as tolerated    RENAL:  Follow up lytes. Correct as needed. Repeat lactate.     INFECTIOUS DISEASE: Monitor off ABX    HEMATOLOGICAL:  DVT prophylaxis.    ENDOCRINE:  Follow up FS.  F/up TSH.  Insulin protocol if needed.    MUSCULOSKELETAL: Bed rest    Monitor in ICU, will downgrade to SDU if stable in a.m.

## 2022-07-30 NOTE — ED ADULT TRIAGE NOTE - CHIEF COMPLAINT QUOTE
BIBA, pt states she had unwitnessed seizure at home today. pt states she is in withdrawals. Last had 4 oz alcohol this morning. also c/o LLQ abd pain.

## 2022-07-30 NOTE — H&P ADULT - HISTORY OF PRESENT ILLNESS
42-year-old, F, PMHx: anxiety and alcohol use disorder, presented for a seizure. History goes back to earlier today when the patient had an unwitnessed seizure. She reports that she had shaking movements of her body and head and lost consciousness for 2 mins. Unsure of post-ictal confusion or tongue bitting. Denies urinary or fecal incontinence.

## 2022-07-31 LAB
ALBUMIN SERPL ELPH-MCNC: 4.2 G/DL — SIGNIFICANT CHANGE UP (ref 3.5–5.2)
ALP SERPL-CCNC: 92 U/L — SIGNIFICANT CHANGE UP (ref 30–115)
ALT FLD-CCNC: 107 U/L — HIGH (ref 0–41)
ANION GAP SERPL CALC-SCNC: 11 MMOL/L — SIGNIFICANT CHANGE UP (ref 7–14)
ANION GAP SERPL CALC-SCNC: 12 MMOL/L — SIGNIFICANT CHANGE UP (ref 7–14)
AST SERPL-CCNC: 69 U/L — HIGH (ref 0–41)
BASOPHILS # BLD AUTO: 0.04 K/UL — SIGNIFICANT CHANGE UP (ref 0–0.2)
BASOPHILS NFR BLD AUTO: 0.7 % — SIGNIFICANT CHANGE UP (ref 0–1)
BILIRUB SERPL-MCNC: 1.1 MG/DL — SIGNIFICANT CHANGE UP (ref 0.2–1.2)
BUN SERPL-MCNC: 6 MG/DL — LOW (ref 10–20)
BUN SERPL-MCNC: 7 MG/DL — LOW (ref 10–20)
CALCIUM SERPL-MCNC: 8.3 MG/DL — LOW (ref 8.5–10.1)
CALCIUM SERPL-MCNC: 9 MG/DL — SIGNIFICANT CHANGE UP (ref 8.5–10.1)
CHLORIDE SERPL-SCNC: 101 MMOL/L — SIGNIFICANT CHANGE UP (ref 98–110)
CHLORIDE SERPL-SCNC: 101 MMOL/L — SIGNIFICANT CHANGE UP (ref 98–110)
CHOLEST SERPL-MCNC: 293 MG/DL — HIGH
CO2 SERPL-SCNC: 23 MMOL/L — SIGNIFICANT CHANGE UP (ref 17–32)
CO2 SERPL-SCNC: 24 MMOL/L — SIGNIFICANT CHANGE UP (ref 17–32)
CREAT SERPL-MCNC: 0.6 MG/DL — LOW (ref 0.7–1.5)
CREAT SERPL-MCNC: 0.6 MG/DL — LOW (ref 0.7–1.5)
EGFR: 116 ML/MIN/1.73M2 — SIGNIFICANT CHANGE UP
EGFR: 116 ML/MIN/1.73M2 — SIGNIFICANT CHANGE UP
EOSINOPHIL # BLD AUTO: 0.03 K/UL — SIGNIFICANT CHANGE UP (ref 0–0.7)
EOSINOPHIL NFR BLD AUTO: 0.6 % — SIGNIFICANT CHANGE UP (ref 0–8)
GLUCOSE SERPL-MCNC: 109 MG/DL — HIGH (ref 70–99)
GLUCOSE SERPL-MCNC: 136 MG/DL — HIGH (ref 70–99)
HCT VFR BLD CALC: 34.7 % — LOW (ref 37–47)
HDLC SERPL-MCNC: 99 MG/DL — SIGNIFICANT CHANGE UP
HGB BLD-MCNC: 12.3 G/DL — SIGNIFICANT CHANGE UP (ref 12–16)
IMM GRANULOCYTES NFR BLD AUTO: 0.2 % — SIGNIFICANT CHANGE UP (ref 0.1–0.3)
LACTATE SERPL-SCNC: 1 MMOL/L — SIGNIFICANT CHANGE UP (ref 0.7–2)
LIPID PNL WITH DIRECT LDL SERPL: 177 MG/DL — HIGH
LYMPHOCYTES # BLD AUTO: 1.2 K/UL — SIGNIFICANT CHANGE UP (ref 1.2–3.4)
LYMPHOCYTES # BLD AUTO: 22.4 % — SIGNIFICANT CHANGE UP (ref 20.5–51.1)
MAGNESIUM SERPL-MCNC: 2.3 MG/DL — SIGNIFICANT CHANGE UP (ref 1.8–2.4)
MAGNESIUM SERPL-MCNC: 2.4 MG/DL — SIGNIFICANT CHANGE UP (ref 1.8–2.4)
MCHC RBC-ENTMCNC: 34.6 PG — HIGH (ref 27–31)
MCHC RBC-ENTMCNC: 35.4 G/DL — SIGNIFICANT CHANGE UP (ref 32–37)
MCV RBC AUTO: 97.5 FL — SIGNIFICANT CHANGE UP (ref 81–99)
MONOCYTES # BLD AUTO: 0.57 K/UL — SIGNIFICANT CHANGE UP (ref 0.1–0.6)
MONOCYTES NFR BLD AUTO: 10.6 % — HIGH (ref 1.7–9.3)
NEUTROPHILS # BLD AUTO: 3.51 K/UL — SIGNIFICANT CHANGE UP (ref 1.4–6.5)
NEUTROPHILS NFR BLD AUTO: 65.5 % — SIGNIFICANT CHANGE UP (ref 42.2–75.2)
NON HDL CHOLESTEROL: 194 MG/DL — HIGH
NRBC # BLD: 0 /100 WBCS — SIGNIFICANT CHANGE UP (ref 0–0)
PHOSPHATE SERPL-MCNC: 2.2 MG/DL — SIGNIFICANT CHANGE UP (ref 2.1–4.9)
PHOSPHATE SERPL-MCNC: 2.9 MG/DL — SIGNIFICANT CHANGE UP (ref 2.1–4.9)
PLATELET # BLD AUTO: 221 K/UL — SIGNIFICANT CHANGE UP (ref 130–400)
POTASSIUM SERPL-MCNC: 4.4 MMOL/L — SIGNIFICANT CHANGE UP (ref 3.5–5)
POTASSIUM SERPL-MCNC: 4.7 MMOL/L — SIGNIFICANT CHANGE UP (ref 3.5–5)
POTASSIUM SERPL-SCNC: 4.4 MMOL/L — SIGNIFICANT CHANGE UP (ref 3.5–5)
POTASSIUM SERPL-SCNC: 4.7 MMOL/L — SIGNIFICANT CHANGE UP (ref 3.5–5)
PROT SERPL-MCNC: 6.5 G/DL — SIGNIFICANT CHANGE UP (ref 6–8)
RBC # BLD: 3.56 M/UL — LOW (ref 4.2–5.4)
RBC # FLD: 15.6 % — HIGH (ref 11.5–14.5)
SODIUM SERPL-SCNC: 135 MMOL/L — SIGNIFICANT CHANGE UP (ref 135–146)
SODIUM SERPL-SCNC: 137 MMOL/L — SIGNIFICANT CHANGE UP (ref 135–146)
TRIGL SERPL-MCNC: 85 MG/DL — SIGNIFICANT CHANGE UP
TROPONIN T SERPL-MCNC: <0.01 NG/ML — SIGNIFICANT CHANGE UP
WBC # BLD: 5.36 K/UL — SIGNIFICANT CHANGE UP (ref 4.8–10.8)
WBC # FLD AUTO: 5.36 K/UL — SIGNIFICANT CHANGE UP (ref 4.8–10.8)

## 2022-07-31 PROCEDURE — 99223 1ST HOSP IP/OBS HIGH 75: CPT

## 2022-07-31 RX ORDER — SODIUM CHLORIDE 9 MG/ML
1000 INJECTION, SOLUTION INTRAVENOUS
Refills: 0 | Status: DISCONTINUED | OUTPATIENT
Start: 2022-07-31 | End: 2022-08-01

## 2022-07-31 RX ADMIN — Medication 1 MILLIGRAM(S): at 11:27

## 2022-07-31 RX ADMIN — Medication 2 MILLIGRAM(S): at 11:33

## 2022-07-31 RX ADMIN — SODIUM CHLORIDE 125 MILLILITER(S): 9 INJECTION, SOLUTION INTRAVENOUS at 02:49

## 2022-07-31 RX ADMIN — SODIUM CHLORIDE 100 MILLILITER(S): 9 INJECTION, SOLUTION INTRAVENOUS at 06:46

## 2022-07-31 RX ADMIN — Medication 1 TABLET(S): at 11:27

## 2022-07-31 RX ADMIN — Medication 2 MILLIGRAM(S): at 16:05

## 2022-07-31 RX ADMIN — ONDANSETRON 4 MILLIGRAM(S): 8 TABLET, FILM COATED ORAL at 22:45

## 2022-07-31 RX ADMIN — QUETIAPINE FUMARATE 200 MILLIGRAM(S): 200 TABLET, FILM COATED ORAL at 22:45

## 2022-07-31 RX ADMIN — ENOXAPARIN SODIUM 40 MILLIGRAM(S): 100 INJECTION SUBCUTANEOUS at 07:26

## 2022-07-31 RX ADMIN — Medication 2 MILLIGRAM(S): at 22:45

## 2022-07-31 RX ADMIN — Medication 100 MILLIGRAM(S): at 11:27

## 2022-07-31 RX ADMIN — SODIUM CHLORIDE 100 MILLILITER(S): 9 INJECTION, SOLUTION INTRAVENOUS at 22:54

## 2022-07-31 RX ADMIN — PANTOPRAZOLE SODIUM 40 MILLIGRAM(S): 20 TABLET, DELAYED RELEASE ORAL at 07:26

## 2022-07-31 RX ADMIN — Medication 2 MILLIGRAM(S): at 02:06

## 2022-07-31 NOTE — PATIENT PROFILE ADULT - FALL HARM RISK - UNIVERSAL INTERVENTIONS
Bed in lowest position, wheels locked, appropriate side rails in place/Call bell, personal items and telephone in reach/Instruct patient to call for assistance before getting out of bed or chair/Non-slip footwear when patient is out of bed/[place] to call system/Physically safe environment - no spills, clutter or unnecessary equipment/Purposeful Proactive Rounding/Room/bathroom lighting operational, light cord in reach

## 2022-07-31 NOTE — PATIENT PROFILE ADULT - NSPROPTRIGHTCAREGIVER_GEN_A_NUR
Terbinafine Pregnancy And Lactation Text: This medication is Pregnancy Category B and is considered safe during pregnancy. It is also excreted in breast milk and breast feeding isn't recommended. Topical Retinoid Pregnancy And Lactation Text: This medication is Pregnancy Category C. It is unknown if this medication is excreted in breast milk. Tremfya Counseling: I discussed with the patient the risks of guselkumab including but not limited to immunosuppression, serious infections, worsening of inflammatory bowel disease and drug reactions.  The patient understands that monitoring is required including a PPD at baseline and must alert us or the primary physician if symptoms of infection or other concerning signs are noted. Tetracycline Pregnancy And Lactation Text: This medication is Pregnancy Category D and not consider safe during pregnancy. It is also excreted in breast milk. Libtayo Counseling- I discussed with the patient the risks of Libtayo including but not limited to nausea, vomiting, diarrhea, and bone or muscle pain.  The patient verbalized understanding of the proper use and possible adverse effects of Libtayo.  All of the patient's questions and concerns were addressed. Propranolol Pregnancy And Lactation Text: This medication is Pregnancy Category C and it isn't known if it is safe during pregnancy. It is excreted in breast milk. Prednisone Counseling:  I discussed with the patient the risks of prolonged use of prednisone including but not limited to weight gain, insomnia, osteoporosis, mood changes, diabetes, susceptibility to infection, glaucoma and high blood pressure.  In cases where prednisone use is prolonged, patients should be monitored with blood pressure checks, serum glucose levels and an eye exam.  Additionally, the patient may need to be placed on GI prophylaxis, PCP prophylaxis, and calcium and vitamin D supplementation and/or a bisphosphonate.  The patient verbalized understanding of the proper use and the possible adverse effects of prednisone.  All of the patient's questions and concerns were addressed. Arava Counseling:  Patient counseled regarding adverse effects of Arava including but not limited to nausea, vomiting, abnormalities in liver function tests. Patients may develop mouth sores, rash, diarrhea, and abnormalities in blood counts. The patient understands that monitoring is required including LFTs and blood counts.  There is a rare possibility of scarring of the liver and lung problems that can occur when taking methotrexate. Persistent nausea, loss of appetite, pale stools, dark urine, cough, and shortness of breath should be reported immediately. Patient advised to discontinue Arava treatment and consult with a physician prior to attempting conception. The patient will have to undergo a treatment to eliminate Arava from the body prior to conception. Oxybutynin Counseling:  I discussed with the patient the risks of oxybutynin including but not limited to skin rash, drowsiness, dry mouth, difficulty urinating, and blurred vision. Nsaids Counseling: NSAID Counseling: I discussed with the patient that NSAIDs should be taken with food. Prolonged use of NSAIDs can result in the development of stomach ulcers.  Patient advised to stop taking NSAIDs if abdominal pain occurs.  The patient verbalized understanding of the proper use and possible adverse effects of NSAIDs.  All of the patient's questions and concerns were addressed. Griseofulvin Counseling:  I discussed with the patient the risks of griseofulvin including but not limited to photosensitivity, cytopenia, liver damage, nausea/vomiting and severe allergy.  The patient understands that this medication is best absorbed when taken with a fatty meal (e.g., ice cream or french fries). Itraconazole Counseling:  I discussed with the patient the risks of itraconazole including but not limited to liver damage, nausea/vomiting, neuropathy, and severe allergy.  The patient understands that this medication is best absorbed when taken with acidic beverages such as non-diet cola or ginger ale.  The patient understands that monitoring is required including baseline LFTs and repeat LFTs at intervals.  The patient understands that they are to contact us or the primary physician if concerning signs are noted. Methotrexate Pregnancy And Lactation Text: This medication is Pregnancy Category X and is known to cause fetal harm. This medication is excreted in breast milk. Terbinafine Counseling: Patient counseling regarding adverse effects of terbinafine including but not limited to headache, diarrhea, rash, upset stomach, liver function test abnormalities, itching, taste/smell disturbance, nausea, abdominal pain, and flatulence.  There is a rare possibility of liver failure that can occur when taking terbinafine.  The patient understands that a baseline LFT and kidney function test may be required. The patient verbalized understanding of the proper use and possible adverse effects of terbinafine.  All of the patient's questions and concerns were addressed. Glycopyrrolate Pregnancy And Lactation Text: This medication is Pregnancy Category B and is considered safe during pregnancy. It is unknown if it is excreted breast milk. Taltz Counseling: I discussed with the patient the risks of ixekizumab including but not limited to immunosuppression, serious infections, worsening of inflammatory bowel disease and drug reactions.  The patient understands that monitoring is required including a PPD at baseline and must alert us or the primary physician if symptoms of infection or other concerning signs are noted. Dapsone Counseling: I discussed with the patient the risks of dapsone including but not limited to hemolytic anemia, agranulocytosis, rashes, methemoglobinemia, kidney failure, peripheral neuropathy, headaches, GI upset, and liver toxicity.  Patients who start dapsone require monitoring including baseline LFTs and weekly CBCs for the first month, then every month thereafter.  The patient verbalized understanding of the proper use and possible adverse effects of dapsone.  All of the patient's questions and concerns were addressed. Taltz Pregnancy And Lactation Text: The risk during pregnancy and breastfeeding is uncertain with this medication. Rituxan Counseling:  I discussed with the patient the risks of Rituxan infusions. Side effects can include infusion reactions, severe drug rashes including mucocutaneous reactions, reactivation of latent hepatitis and other infections and rarely progressive multifocal leukoencephalopathy.  All of the patient's questions and concerns were addressed. 5-Fu Pregnancy And Lactation Text: This medication is Pregnancy Category X and contraindicated in pregnancy and in women who may become pregnant. It is unknown if this medication is excreted in breast milk. Humira Pregnancy And Lactation Text: This medication is Pregnancy Category B and is considered safe during pregnancy. It is unknown if this medication is excreted in breast milk. no Odomzo Counseling- I discussed with the patient the risks of Odomzo including but not limited to nausea, vomiting, diarrhea, constipation, weight loss, changes in the sense of taste, decreased appetite, muscle spasms, and hair loss.  The patient verbalized understanding of the proper use and possible adverse effects of Odomzo.  All of the patient's questions and concerns were addressed. Azathioprine Pregnancy And Lactation Text: This medication is Pregnancy Category D and isn't considered safe during pregnancy. It is unknown if this medication is excreted in breast milk. Propranolol Counseling:  I discussed with the patient the risks of propranolol including but not limited to low heart rate, low blood pressure, low blood sugar, restlessness and increased cold sensitivity. They should call the office if they experience any of these side effects. Ketoconazole Pregnancy And Lactation Text: This medication is Pregnancy Category C and it isn't know if it is safe during pregnancy. It is also excreted in breast milk and breast feeding isn't recommended. Tazorac Pregnancy And Lactation Text: This medication is not safe during pregnancy. It is unknown if this medication is excreted in breast milk. Valtrex Pregnancy And Lactation Text: this medication is Pregnancy Category B and is considered safe during pregnancy. This medication is not directly found in breast milk but it's metabolite acyclovir is present. Tazorac Counseling:  Patient advised that medication is irritating and drying.  Patient may need to apply sparingly and wash off after an hour before eventually leaving it on overnight.  The patient verbalized understanding of the proper use and possible adverse effects of tazorac.  All of the patient's questions and concerns were addressed. Ketoconazole Counseling:   Patient counseled regarding improving absorption with orange juice.  Adverse effects include but are not limited to breast enlargement, headache, diarrhea, nausea, upset stomach, liver function test abnormalities, taste disturbance, and stomach pain.  There is a rare possibility of liver failure that can occur when taking ketoconazole. The patient understands that monitoring of LFTs may be required, especially at baseline. The patient verbalized understanding of the proper use and possible adverse effects of ketoconazole.  All of the patient's questions and concerns were addressed. Acitretin Pregnancy And Lactation Text: This medication is Pregnancy Category X and should not be given to women who are pregnant or may become pregnant in the future. This medication is excreted in breast milk. Wartpeel Counseling:  I discussed with the patient the risks of Wartpeel including but not limited to erythema, scaling, itching, weeping, crusting, and pain. High Dose Vitamin A Pregnancy And Lactation Text: High dose vitamin A therapy is contraindicated during pregnancy and breast feeding. Topical Clindamycin Pregnancy And Lactation Text: This medication is Pregnancy Category B and is considered safe during pregnancy. It is unknown if it is excreted in breast milk. Solaraze Counseling:  I discussed with the patient the risks of Solaraze including but not limited to erythema, scaling, itching, weeping, crusting, and pain. Dupixent Pregnancy And Lactation Text: This medication likely crosses the placenta but the risk for the fetus is uncertain. This medication is excreted in breast milk. Eucrisa Counseling: Patient may experience a mild burning sensation during topical application. Eucrisa is not approved in children less than 2 years of age. Colchicine Pregnancy And Lactation Text: This medication is Pregnancy Category C and isn't considered safe during pregnancy. It is excreted in breast milk. Nsaids Pregnancy And Lactation Text: These medications are considered safe up to 30 weeks gestation. It is excreted in breast milk. Imiquimod Counseling:  I discussed with the patient the risks of imiquimod including but not limited to erythema, scaling, itching, weeping, crusting, and pain.  Patient understands that the inflammatory response to imiquimod is variable from person to person and was educated regarded proper titration schedule.  If flu-like symptoms develop, patient knows to discontinue the medication and contact us. Siliq Counseling:  I discussed with the patient the risks of Siliq including but not limited to new or worsening depression, suicidal thoughts and behavior, immunosuppression, malignancy, posterior leukoencephalopathy syndrome, and serious infections.  The patient understands that monitoring is required including a PPD at baseline and must alert us or the primary physician if symptoms of infection or other concerning signs are noted. There is also a special program designed to monitor depression which is required with Siliq. Calcipotriene Pregnancy And Lactation Text: This medication has not been proven safe during pregnancy. It is unknown if this medication is excreted in breast milk. Bexarotene Counseling:  I discussed with the patient the risks of bexarotene including but not limited to hair loss, dry lips/skin/eyes, liver abnormalities, hyperlipidemia, pancreatitis, depression/suicidal ideation, photosensitivity, drug rash/allergic reactions, hypothyroidism, anemia, leukopenia, infection, cataracts, and teratogenicity.  Patient understands that they will need regular blood tests to check lipid profile, liver function tests, white blood cell count, thyroid function tests and pregnancy test if applicable. Rhofade Counseling: Rhofade is a topical medication which can decrease superficial blood flow where applied. Side effects are uncommon and include stinging, redness and allergic reactions. Erivedge Pregnancy And Lactation Text: This medication is Pregnancy Category X and is absolutely contraindicated during pregnancy. It is unknown if it is excreted in breast milk. High Dose Vitamin A Counseling: Side effects reviewed, pt to contact office should one occur. Gabapentin Counseling: I discussed with the patient the risks of gabapentin including but not limited to dizziness, somnolence, fatigue and ataxia. Isotretinoin Counseling: Patient should get monthly blood tests, not donate blood, not drive at night if vision affected, not share medication, and not undergo elective surgery for 6 months after tx completed. Side effects reviewed, pt to contact office should one occur. Minoxidil Counseling: Minoxidil is a topical medication which can increase blood flow where it is applied. It is uncertain how this medication increases hair growth. Side effects are uncommon and include stinging and allergic reactions. Hydroquinone Counseling:  Patient advised that medication may result in skin irritation, lightening (hypopigmentation), dryness, and burning.  In the event of skin irritation, the patient was advised to reduce the amount of the drug applied or use it less frequently.  Rarely, spots that are treated with hydroquinone can become darker (pseudoochronosis).  Should this occur, patient instructed to stop medication and call the office. The patient verbalized understanding of the proper use and possible adverse effects of hydroquinone.  All of the patient's questions and concerns were addressed. Spironolactone Counseling: Patient advised regarding risks of diarrhea, abdominal pain, hyperkalemia, birth defects (for female patients), liver toxicity and renal toxicity. The patient may need blood work to monitor liver and kidney function and potassium levels while on therapy. The patient verbalized understanding of the proper use and possible adverse effects of spironolactone.  All of the patient's questions and concerns were addressed. Rifampin Pregnancy And Lactation Text: This medication is Pregnancy Category C and it isn't know if it is safe during pregnancy. It is also excreted in breast milk and should not be used if you are breast feeding. Carac Counseling:  I discussed with the patient the risks of Carac including but not limited to erythema, scaling, itching, weeping, crusting, and pain. Topical Clindamycin Counseling: Patient counseled that this medication may cause skin irritation or allergic reactions.  In the event of skin irritation, the patient was advised to reduce the amount of the drug applied or use it less frequently.   The patient verbalized understanding of the proper use and possible adverse effects of clindamycin.  All of the patient's questions and concerns were addressed. Mirvaso Counseling: Mirvaso is a topical medication which can decrease superficial blood flow where applied. Side effects are uncommon and include stinging, redness and allergic reactions. Finasteride Male Counseling: Finasteride Counseling:  I discussed with the patient the risks of use of finasteride including but not limited to decreased libido, decreased ejaculate volume, gynecomastia, and depression. Women should not handle medication.  All of the patient's questions and concerns were addressed. Rhofade Pregnancy And Lactation Text: This medication has not been assigned a Pregnancy Risk Category. It is unknown if the medication is excreted in breast milk. Rifampin Counseling: I discussed with the patient the risks of rifampin including but not limited to liver damage, kidney damage, red-orange body fluids, nausea/vomiting and severe allergy. Ilumya Counseling: I discussed with the patient the risks of tildrakizumab including but not limited to immunosuppression, malignancy, posterior leukoencephalopathy syndrome, and serious infections.  The patient understands that monitoring is required including a PPD at baseline and must alert us or the primary physician if symptoms of infection or other concerning signs are noted. Sarecycline Counseling: Patient advised regarding possible photosensitivity and discoloration of the teeth, skin, lips, tongue and gums.  Patient instructed to avoid sunlight, if possible.  When exposed to sunlight, patients should wear protective clothing, sunglasses, and sunscreen.  The patient was instructed to call the office immediately if the following severe adverse effects occur:  hearing changes, easy bruising/bleeding, severe headache, or vision changes.  The patient verbalized understanding of the proper use and possible adverse effects of sarecycline.  All of the patient's questions and concerns were addressed. Hydroxyzine Pregnancy And Lactation Text: This medication is not safe during pregnancy and should not be taken. It is also excreted in breast milk and breast feeding isn't recommended. Cellcept Counseling:  I discussed with the patient the risks of mycophenolate mofetil including but not limited to infection/immunosuppression, GI upset, hypokalemia, hypercholesterolemia, bone marrow suppression, lymphoproliferative disorders, malignancy, GI ulceration/bleed/perforation, colitis, interstitial lung disease, kidney failure, progressive multifocal leukoencephalopathy, and birth defects.  The patient understands that monitoring is required including a baseline creatinine and regular CBC testing. In addition, patient must alert us immediately if symptoms of infection or other concerning signs are noted. Bactrim Counseling:  I discussed with the patient the risks of sulfa antibiotics including but not limited to GI upset, allergic reaction, drug rash, diarrhea, dizziness, photosensitivity, and yeast infections.  Rarely, more serious reactions can occur including but not limited to aplastic anemia, agranulocytosis, methemoglobinemia, blood dyscrasias, liver or kidney failure, lung infiltrates or desquamative/blistering drug rashes. Cimzia Counseling:  I discussed with the patient the risks of Cimzia including but not limited to immunosuppression, allergic reactions and infections.  The patient understands that monitoring is required including a PPD at baseline and must alert us or the primary physician if symptoms of infection or other concerning signs are noted. Tranexamic Acid Pregnancy And Lactation Text: It is unknown if this medication is safe during pregnancy or breast feeding. Colchicine Counseling:  Patient counseled regarding adverse effects including but not limited to stomach upset (nausea, vomiting, stomach pain, or diarrhea).  Patient instructed to limit alcohol consumption while taking this medication.  Colchicine may reduce blood counts especially with prolonged use.  The patient understands that monitoring of kidney function and blood counts may be required, especially at baseline. The patient verbalized understanding of the proper use and possible adverse effects of colchicine.  All of the patient's questions and concerns were addressed. Azithromycin Counseling:  I discussed with the patient the risks of azithromycin including but not limited to GI upset, allergic reaction, drug rash, diarrhea, and yeast infections. Xolair Pregnancy And Lactation Text: This medication is Pregnancy Category B and is considered safe during pregnancy. This medication is excreted in breast milk. SSKI Counseling:  I discussed with the patient the risks of SSKI including but not limited to thyroid abnormalities, metallic taste, GI upset, fever, headache, acne, arthralgias, paraesthesias, lymphadenopathy, easy bleeding, arrhythmias, and allergic reaction. Protopic Pregnancy And Lactation Text: This medication is Pregnancy Category C. It is unknown if this medication is excreted in breast milk when applied topically. Tetracycline Counseling: Patient counseled regarding possible photosensitivity and increased risk for sunburn.  Patient instructed to avoid sunlight, if possible.  When exposed to sunlight, patients should wear protective clothing, sunglasses, and sunscreen.  The patient was instructed to call the office immediately if the following severe adverse effects occur:  hearing changes, easy bruising/bleeding, severe headache, or vision changes.  The patient verbalized understanding of the proper use and possible adverse effects of tetracycline.  All of the patient's questions and concerns were addressed. Patient understands to avoid pregnancy while on therapy due to potential birth defects. Cimzia Pregnancy And Lactation Text: This medication crosses the placenta but can be considered safe in certain situations. Cimzia may be excreted in breast milk. Opioid Counseling: I discussed with the patient the potential side effects of opioids including but not limited to addiction, altered mental status, and depression. I stressed avoiding alcohol, benzodiazepines, muscle relaxants and sleep aids unless specifically okayed by a physician. The patient verbalized understanding of the proper use and possible adverse effects of opioids. All of the patient's questions and concerns were addressed. They were instructed to flush the remaining pills down the toilet if they did not need them for pain. Azathioprine Counseling:  I discussed with the patient the risks of azathioprine including but not limited to myelosuppression, immunosuppression, hepatotoxicity, lymphoma, and infections.  The patient understands that monitoring is required including baseline LFTs, Creatinine, possible TPMP genotyping and weekly CBCs for the first month and then every 2 weeks thereafter.  The patient verbalized understanding of the proper use and possible adverse effects of azathioprine.  All of the patient's questions and concerns were addressed. Quinolones Counseling:  I discussed with the patient the risks of fluoroquinolones including but not limited to GI upset, allergic reaction, drug rash, diarrhea, dizziness, photosensitivity, yeast infections, liver function test abnormalities, tendonitis/tendon rupture. Hydroxychloroquine Counseling:  I discussed with the patient that a baseline ophthalmologic exam is needed at the start of therapy and every year thereafter while on therapy. A CBC may also be warranted for monitoring.  The side effects of this medication were discussed with the patient, including but not limited to agranulocytosis, aplastic anemia, seizures, rashes, retinopathy, and liver toxicity. Patient instructed to call the office should any adverse effect occur.  The patient verbalized understanding of the proper use and possible adverse effects of Plaquenil.  All the patient's questions and concerns were addressed. Hydroxyzine Counseling: Patient advised that the medication is sedating and not to drive a car after taking this medication.  Patient informed of potential adverse effects including but not limited to dry mouth, urinary retention, and blurry vision.  The patient verbalized understanding of the proper use and possible adverse effects of hydroxyzine.  All of the patient's questions and concerns were addressed. Bactrim Pregnancy And Lactation Text: This medication is Pregnancy Category D and is known to cause fetal risk.  It is also excreted in breast milk. Include Pregnancy/Lactation Warning?: No Libtayo Pregnancy And Lactation Text: This medication is contraindicated in pregnancy and when breast feeding. Niacinamide Pregnancy And Lactation Text: These medications are considered safe during pregnancy. Albendazole Pregnancy And Lactation Text: This medication is Pregnancy Category C and it isn't known if it is safe during pregnancy. It is also excreted in breast milk. Doxepin Pregnancy And Lactation Text: This medication is Pregnancy Category C and it isn't known if it is safe during pregnancy. It is also excreted in breast milk and breast feeding isn't recommended. Topical Sulfur Applications Counseling: Topical Sulfur Counseling: Patient counseled that this medication may cause skin irritation or allergic reactions.  In the event of skin irritation, the patient was advised to reduce the amount of the drug applied or use it less frequently.   The patient verbalized understanding of the proper use and possible adverse effects of topical sulfur application.  All of the patient's questions and concerns were addressed. Drysol Pregnancy And Lactation Text: This medication is considered safe during pregnancy and breast feeding. Xeljanz Counseling: I discussed with the patient the risks of Xeljanz therapy including increased risk of infection, liver issues, headache, diarrhea, or cold symptoms. Live vaccines should be avoided. They were instructed to call if they have any problems. Griseofulvin Pregnancy And Lactation Text: This medication is Pregnancy Category X and is known to cause serious birth defects. It is unknown if this medication is excreted in breast milk but breast feeding should be avoided. Dupixent Counseling: I discussed with the patient the risks of dupilumab including but not limited to eye infection and irritation, cold sores, injection site reactions, worsening of asthma, allergic reactions and increased risk of parasitic infection.  Live vaccines should be avoided while taking dupilumab. Dupilumab will also interact with certain medications such as warfarin and cyclosporine. The patient understands that monitoring is required and they must alert us or the primary physician if symptoms of infection or other concerning signs are noted. Valtrex Counseling: I discussed with the patient the risks of valacyclovir including but not limited to kidney damage, nausea, vomiting and severe allergy.  The patient understands that if the infection seems to be worsening or is not improving, they are to call. Enbrel Counseling:  I discussed with the patient the risks of etanercept including but not limited to myelosuppression, immunosuppression, autoimmune hepatitis, demyelinating diseases, lymphoma, and infections.  The patient understands that monitoring is required including a PPD at baseline and must alert us or the primary physician if symptoms of infection or other concerning signs are noted. Fluconazole Counseling:  Patient counseled regarding adverse effects of fluconazole including but not limited to headache, diarrhea, nausea, upset stomach, liver function test abnormalities, taste disturbance, and stomach pain.  There is a rare possibility of liver failure that can occur when taking fluconazole.  The patient understands that monitoring of LFTs and kidney function test may be required, especially at baseline. The patient verbalized understanding of the proper use and possible adverse effects of fluconazole.  All of the patient's questions and concerns were addressed. Acitretin Counseling:  I discussed with the patient the risks of acitretin including but not limited to hair loss, dry lips/skin/eyes, liver damage, hyperlipidemia, depression/suicidal ideation, photosensitivity.  Serious rare side effects can include but are not limited to pancreatitis, pseudotumor cerebri, bony changes, clot formation/stroke/heart attack.  Patient understands that alcohol is contraindicated since it can result in liver toxicity and significantly prolong the elimination of the drug by many years. Elidel Counseling: Patient may experience a mild burning sensation during topical application. Elidel is not approved in children less than 2 years of age. There have been case reports of hematologic and skin malignancies in patients using topical calcineurin inhibitors although causality is questionable. Calcipotriene Counseling:  I discussed with the patient the risks of calcipotriene including but not limited to erythema, scaling, itching, and irritation. Doxycycline Counseling:  Patient counseled regarding possible photosensitivity and increased risk for sunburn.  Patient instructed to avoid sunlight, if possible.  When exposed to sunlight, patients should wear protective clothing, sunglasses, and sunscreen.  The patient was instructed to call the office immediately if the following severe adverse effects occur:  hearing changes, easy bruising/bleeding, severe headache, or vision changes.  The patient verbalized understanding of the proper use and possible adverse effects of doxycycline.  All of the patient's questions and concerns were addressed. Doxepin Counseling:  Patient advised that the medication is sedating and not to drive a car after taking this medication. Patient informed of potential adverse effects including but not limited to dry mouth, urinary retention, and blurry vision.  The patient verbalized understanding of the proper use and possible adverse effects of doxepin.  All of the patient's questions and concerns were addressed. Cephalexin Counseling: I counseled the patient regarding use of cephalexin as an antibiotic for prophylactic and/or therapeutic purposes. Cephalexin (commonly prescribed under brand name Keflex) is a cephalosporin antibiotic which is active against numerous classes of bacteria, including most skin bacteria. Side effects may include nausea, diarrhea, gastrointestinal upset, rash, hives, yeast infections, and in rare cases, hepatitis, kidney disease, seizures, fever, confusion, neurologic symptoms, and others. Patients with severe allergies to penicillin medications are cautioned that there is about a 10% incidence of cross-reactivity with cephalosporins. When possible, patients with penicillin allergies should use alternatives to cephalosporins for antibiotic therapy. Cimetidine Counseling:  I discussed with the patient the risks of Cimetidine including but not limited to gynecomastia, headache, diarrhea, nausea, drowsiness, arrhythmias, pancreatitis, skin rashes, psychosis, bone marrow suppression and kidney toxicity. Eucrisa Pregnancy And Lactation Text: This medication has not been assigned a Pregnancy Risk Category but animal studies failed to show danger with the topical medication. It is unknown if the medication is excreted in breast milk. Hydroxychloroquine Pregnancy And Lactation Text: This medication has been shown to cause fetal harm but it isn't assigned a Pregnancy Risk Category. There are small amounts excreted in breast milk. Infliximab Counseling:  I discussed with the patient the risks of infliximab including but not limited to myelosuppression, immunosuppression, autoimmune hepatitis, demyelinating diseases, lymphoma, and serious infections.  The patient understands that monitoring is required including a PPD at baseline and must alert us or the primary physician if symptoms of infection or other concerning signs are noted. Rituxan Pregnancy And Lactation Text: This medication is Pregnancy Category C and it isn't know if it is safe during pregnancy. It is unknown if this medication is excreted in breast milk but similar antibodies are known to be excreted. Erythromycin Pregnancy And Lactation Text: This medication is Pregnancy Category B and is considered safe during pregnancy. It is also excreted in breast milk. Cyclosporine Pregnancy And Lactation Text: This medication is Pregnancy Category C and it isn't know if it is safe during pregnancy. This medication is excreted in breast milk. Sski Pregnancy And Lactation Text: This medication is Pregnancy Category D and isn't considered safe during pregnancy. It is excreted in breast milk. Albendazole Counseling:  I discussed with the patient the risks of albendazole including but not limited to cytopenia, kidney damage, nausea/vomiting and severe allergy.  The patient understands that this medication is being used in an off-label manner. Metronidazole Counseling:  I discussed with the patient the risks of metronidazole including but not limited to seizures, nausea/vomiting, a metallic taste in the mouth, nausea/vomiting and severe allergy. Topical Sulfur Applications Pregnancy And Lactation Text: This medication is Pregnancy Category C and has an unknown safety profile during pregnancy. It is unknown if this topical medication is excreted in breast milk. Spironolactone Pregnancy And Lactation Text: This medication can cause feminization of the male fetus and should be avoided during pregnancy. The active metabolite is also found in breast milk. Erythromycin Counseling:  I discussed with the patient the risks of erythromycin including but not limited to GI upset, allergic reaction, drug rash, diarrhea, increase in liver enzymes, and yeast infections. Cyclophosphamide Pregnancy And Lactation Text: This medication is Pregnancy Category D and it isn't considered safe during pregnancy. This medication is excreted in breast milk. Xelfantaz Pregnancy And Lactation Text: This medication is Pregnancy Category D and is not considered safe during pregnancy.  The risk during breast feeding is also uncertain. Birth Control Pills Counseling: Birth Control Pill Counseling: I discussed with the patient the potential side effects of OCPs including but not limited to increased risk of stroke, heart attack, thrombophlebitis, deep venous thrombosis, hepatic adenomas, breast changes, GI upset, headaches, and depression.  The patient verbalized understanding of the proper use and possible adverse effects of OCPs. All of the patient's questions and concerns were addressed. Ivermectin Counseling:  Patient instructed to take medication on an empty stomach with a full glass of water.  Patient informed of potential adverse effects including but not limited to nausea, diarrhea, dizziness, itching, and swelling of the extremities or lymph nodes.  The patient verbalized understanding of the proper use and possible adverse effects of ivermectin.  All of the patient's questions and concerns were addressed. Clindamycin Pregnancy And Lactation Text: This medication can be used in pregnancy if certain situations. Clindamycin is also present in breast milk. Opioid Pregnancy And Lactation Text: These medications can lead to premature delivery and should be avoided during pregnancy. These medications are also present in breast milk in small amounts. Quinolones Pregnancy And Lactation Text: This medication is Pregnancy Category C and it isn't know if it is safe during pregnancy. It is also excreted in breast milk. Otezla Pregnancy And Lactation Text: This medication is Pregnancy Category C and it isn't known if it is safe during pregnancy. It is unknown if it is excreted in breast milk. Glycopyrrolate Counseling:  I discussed with the patient the risks of glycopyrrolate including but not limited to skin rash, drowsiness, dry mouth, difficulty urinating, and blurred vision. Birth Control Pills Pregnancy And Lactation Text: This medication should be avoided if pregnant and for the first 30 days post-partum. Cyclophosphamide Counseling:  I discussed with the patient the risks of cyclophosphamide including but not limited to hair loss, hormonal abnormalities, decreased fertility, abdominal pain, diarrhea, nausea and vomiting, bone marrow suppression and infection. The patient understands that monitoring is required while taking this medication. Methotrexate Counseling:  Patient counseled regarding adverse effects of methotrexate including but not limited to nausea, vomiting, abnormalities in liver function tests. Patients may develop mouth sores, rash, diarrhea, and abnormalities in blood counts. The patient understands that monitoring is required including LFT's and blood counts.  There is a rare possibility of scarring of the liver and lung problems that can occur when taking methotrexate. Persistent nausea, loss of appetite, pale stools, dark urine, cough, and shortness of breath should be reported immediately. Patient advised to discontinue methotrexate treatment at least three months before attempting to become pregnant.  I discussed the need for folate supplements while taking methotrexate.  These supplements can decrease side effects during methotrexate treatment. The patient verbalized understanding of the proper use and possible adverse effects of methotrexate.  All of the patient's questions and concerns were addressed. Picato Counseling:  I discussed with the patient the risks of Picato including but not limited to erythema, scaling, itching, weeping, crusting, and pain. Zyclara Counseling:  I discussed with the patient the risks of imiquimod including but not limited to erythema, scaling, itching, weeping, crusting, and pain.  Patient understands that the inflammatory response to imiquimod is variable from person to person and was educated regarded proper titration schedule.  If flu-like symptoms develop, patient knows to discontinue the medication and contact us. Solaraze Pregnancy And Lactation Text: This medication is Pregnancy Category B and is considered safe. There is some data to suggest avoiding during the third trimester. It is unknown if this medication is excreted in breast milk. Topical Retinoid counseling:  Patient advised to apply a pea-sized amount only at bedtime and wait 30 minutes after washing their face before applying.  If too drying, patient may add a non-comedogenic moisturizer. The patient verbalized understanding of the proper use and possible adverse effects of retinoids.  All of the patient's questions and concerns were addressed. Isotretinoin Pregnancy And Lactation Text: This medication is Pregnancy Category X and is considered extremely dangerous during pregnancy. It is unknown if it is excreted in breast milk. Metronidazole Pregnancy And Lactation Text: This medication is Pregnancy Category B and considered safe during pregnancy.  It is also excreted in breast milk. Cosentyx Counseling:  I discussed with the patient the risks of Cosentyx including but not limited to worsening of Crohn's disease, immunosuppression, allergic reactions and infections.  The patient understands that monitoring is required including a PPD at baseline and must alert us or the primary physician if symptoms of infection or other concerning signs are noted. Cyclosporine Counseling:  I discussed with the patient the risks of cyclosporine including but not limited to hypertension, gingival hyperplasia,myelosuppression, immunosuppression, liver damage, kidney damage, neurotoxicity, lymphoma, and serious infections. The patient understands that monitoring is required including baseline blood pressure, CBC, CMP, lipid panel and uric acid, and then 1-2 times monthly CMP and blood pressure. Drysol Counseling:  I discussed with the patient the risks of drysol/aluminum chloride including but not limited to skin rash, itching, irritation, burning. Tranexamic Acid Counseling:  Patient advised of the small risk of bleeding problems with tranexamic acid. They were also instructed to call if they developed any nausea, vomiting or diarrhea. All of the patient's questions and concerns were addressed. Azithromycin Pregnancy And Lactation Text: This medication is considered safe during pregnancy and is also secreted in breast milk. 5-Fu Counseling: 5-Fluorouracil Counseling:  I discussed with the patient the risks of 5-fluorouracil including but not limited to erythema, scaling, itching, weeping, crusting, and pain. Otezla Counseling: The side effects of Otezla were discussed with the patient, including but not limited to worsening or new depression, weight loss, diarrhea, nausea, upper respiratory tract infection, and headache. Patient instructed to call the office should any adverse effect occur.  The patient verbalized understanding of the proper use and possible adverse effects of Otezla.  All the patient's questions and concerns were addressed. Simponi Counseling:  I discussed with the patient the risks of golimumab including but not limited to myelosuppression, immunosuppression, autoimmune hepatitis, demyelinating diseases, lymphoma, and serious infections.  The patient understands that monitoring is required including a PPD at baseline and must alert us or the primary physician if symptoms of infection or other concerning signs are noted. Minocycline Counseling: Patient advised regarding possible photosensitivity and discoloration of the teeth, skin, lips, tongue and gums.  Patient instructed to avoid sunlight, if possible.  When exposed to sunlight, patients should wear protective clothing, sunglasses, and sunscreen.  The patient was instructed to call the office immediately if the following severe adverse effects occur:  hearing changes, easy bruising/bleeding, severe headache, or vision changes.  The patient verbalized understanding of the proper use and possible adverse effects of minocycline.  All of the patient's questions and concerns were addressed. Cephalexin Pregnancy And Lactation Text: This medication is Pregnancy Category B and considered safe during pregnancy.  It is also excreted in breast milk but can be used safely for shorter doses. Stelara Counseling:  I discussed with the patient the risks of ustekinumab including but not limited to immunosuppression, malignancy, posterior leukoencephalopathy syndrome, and serious infections.  The patient understands that monitoring is required including a PPD at baseline and must alert us or the primary physician if symptoms of infection or other concerning signs are noted. Niacinamide Counseling: I recommended taking niacin or niacinamide, also know as vitamin B3, twice daily. Recent evidence suggests that taking vitamin B3 (500 mg twice daily) can reduce the risk of actinic keratoses and non-melanoma skin cancers. Side effects of vitamin B3 include flushing and headache. Erivedge Counseling- I discussed with the patient the risks of Erivedge including but not limited to nausea, vomiting, diarrhea, constipation, weight loss, changes in the sense of taste, decreased appetite, muscle spasms, and hair loss.  The patient verbalized understanding of the proper use and possible adverse effects of Erivedge.  All of the patient's questions and concerns were addressed. Doxycycline Pregnancy And Lactation Text: This medication is Pregnancy Category D and not consider safe during pregnancy. It is also excreted in breast milk but is considered safe for shorter treatment courses. Protopic Counseling: Patient may experience a mild burning sensation during topical application. Protopic is not approved in children less than 2 years of age. There have been case reports of hematologic and skin malignancies in patients using topical calcineurin inhibitors although causality is questionable. Thalidomide Counseling: I discussed with the patient the risks of thalidomide including but not limited to birth defects, anxiety, weakness, chest pain, dizziness, cough and severe allergy. Humira Counseling:  I discussed with the patient the risks of adalimumab including but not limited to myelosuppression, immunosuppression, autoimmune hepatitis, demyelinating diseases, lymphoma, and serious infections.  The patient understands that monitoring is required including a PPD at baseline and must alert us or the primary physician if symptoms of infection or other concerning signs are noted. Clindamycin Counseling: I counseled the patient regarding use of clindamycin as an antibiotic for prophylactic and/or therapeutic purposes. Clindamycin is active against numerous classes of bacteria, including skin bacteria. Side effects may include nausea, diarrhea, gastrointestinal upset, rash, hives, yeast infections, and in rare cases, colitis. Xolair Counseling:  Patient informed of potential adverse effects including but not limited to fever, muscle aches, rash and allergic reactions.  The patient verbalized understanding of the proper use and possible adverse effects of Xolair.  All of the patient's questions and concerns were addressed. Clofazimine Counseling:  I discussed with the patient the risks of clofazimine including but not limited to skin and eye pigmentation, liver damage, nausea/vomiting, gastrointestinal bleeding and allergy. Skyrizi Counseling: I discussed with the patient the risks of risankizumab-rzaa including but not limited to immunosuppression, and serious infections.  The patient understands that monitoring is required including a PPD at baseline and must alert us or the primary physician if symptoms of infection or other concerning signs are noted. Finasteride Pregnancy And Lactation Text: This medication is absolutely contraindicated during pregnancy. It is unknown if it is excreted in breast milk. Detail Level: Zone Benzoyl Peroxide Counseling: Patient counseled that medicine may cause skin irritation and bleach clothing.  In the event of skin irritation, the patient was advised to reduce the amount of the drug applied or use it less frequently.   The patient verbalized understanding of the proper use and possible adverse effects of benzoyl peroxide.  All of the patient's questions and concerns were addressed. Dapsone Pregnancy And Lactation Text: This medication is Pregnancy Category C and is not considered safe during pregnancy or breast feeding. Bexarotene Pregnancy And Lactation Text: This medication is Pregnancy Category X and should not be given to women who are pregnant or may become pregnant. This medication should not be used if you are breast feeding. Benzoyl Peroxide Pregnancy And Lactation Text: This medication is Pregnancy Category C. It is unknown if benzoyl peroxide is excreted in breast milk.

## 2022-07-31 NOTE — EEG REPORT - NS EEG TEXT BOX
[facility]   COMPREHENSIVE EPILEPSY CENTER   REPORT OF ROUTINE EEG     [facility]: [address] [place] [address] [address], [place] [address], Ph#: [phone]  [facility]: [id number], [place] [address], Ph#: [phone]  Office: [address] [place] [address], [facility] [address], [place] [address] Ph#: [phone]    Patient Name: AURELIA BLANCAS  Age and : 42y ([birth year])  MRN #: [id number]  Location: [place] [address] A  Referring Physician: Paramjit Pettit    Study Date: 22    _____________________________________________________________  TECHNICAL INFORMATION    Placement and Labeling of Electrodes:  The EEG was performed utilizing 20 channels referential EEG connections (coronal over temporal over parasagittal montage) using all standard 10-20 electrode placements with EKG.  Recording was at a sampling rate of 256 samples per second per channel. Irving and seizure detection algorithms were utilized.    _____________________________________________________________  HISTORY    Patient is a 42y old  Female who presents with a chief complaint of Alcohol intoxication (2022 14:39)    PERTINENT MEDICATION:  MEDICATIONS  (STANDING):  chlorhexidine 2% Cloths 1 Application(s) Topical daily  dextrose 5% + sodium chloride 0.9%. 1000 milliLiter(s) (100 mL/Hr) IV Continuous <Continuous>  enoxaparin Injectable 40 milliGRAM(s) SubCutaneous every 24 hours  folic acid 1 milliGRAM(s) Oral daily  multivitamin 1 Tablet(s) Oral daily  pantoprazole    Tablet 40 milliGRAM(s) Oral before breakfast  QUEtiapine 200 milliGRAM(s) Oral at bedtime  thiamine 100 milliGRAM(s) Oral daily    _____________________________________________________________  STUDY INTERPRETATION    Findings: The background was continuous, spontaneously variable and reactive. During wakefulness, the posterior dominant rhythm consisted of symmetric, well-modulated 8 Hz activity, with amplitude to 30 uV, that attenuated to eye opening.  Low amplitude frontal beta was noted in wakefulness.    Background Slowing:  No generalized background slowing was present.    Focal Slowing:   None were present.    Sleep Background:  Drowsiness was characterized by fragmentation, attenuation, and slowing of the background activity.    Sleep was characterized by the presence of vertex waves, symmetric sleep spindles and K-complexes.    Other Non-Epileptiform Findings:  Diffuse excess beta activity.    Interictal Epileptiform Activity:   None were present.    Events:  Clinical events: None recorded.  Seizures: None recorded.    Activation Procedures:   Hyperventilation was not performed.    Photic stimulation was not performed.     Artifacts:  Intermittent myogenic and movement artifacts were noted.    ECG:  The heart rate on single channel ECG was predominantly between 60-70 BPM.    _____________________________________________________________  EEG SUMMARY/CLASSIFICATION    Normal EEG in the awake, drowsy and asleep states.    _____________________________________________________________  EEG IMPRESSION/CLINICAL CORRELATE    Normal EEG study.  No epileptic pattern or seizure seen.  Diffuse excess beta activity may be seen with medication use such as benzodiazepines or barbiturates.      Mee Guerrero MD  Epilepsy Attending, [facility] Epilepsy [place]

## 2022-07-31 NOTE — CONSULT NOTE ADULT - SUBJECTIVE AND OBJECTIVE BOX
Addiction medicine consult placed for alcohol abuse. Per resident, no acute withdrawal management recommendations necessary at this time, patient's symptoms are currently well-controlled, though patient does require referral for services.  CATCH team social workers aware, will follow patient.

## 2022-08-01 ENCOUNTER — TRANSCRIPTION ENCOUNTER (OUTPATIENT)
Age: 42
End: 2022-08-01

## 2022-08-01 VITALS
OXYGEN SATURATION: 97 % | DIASTOLIC BLOOD PRESSURE: 79 MMHG | SYSTOLIC BLOOD PRESSURE: 121 MMHG | TEMPERATURE: 98 F | HEART RATE: 86 BPM | RESPIRATION RATE: 18 BRPM

## 2022-08-01 LAB
A1C WITH ESTIMATED AVERAGE GLUCOSE RESULT: 5 % — SIGNIFICANT CHANGE UP (ref 4–5.6)
ESTIMATED AVERAGE GLUCOSE: 97 MG/DL — SIGNIFICANT CHANGE UP (ref 68–114)
FOLATE SERPL-MCNC: 11.8 NG/ML — SIGNIFICANT CHANGE UP
T3 SERPL-MCNC: 72 NG/DL — LOW (ref 80–200)
T4 AB SER-ACNC: 4.6 UG/DL — SIGNIFICANT CHANGE UP (ref 4.6–12)
TSH SERPL-MCNC: 0.83 UIU/ML — SIGNIFICANT CHANGE UP (ref 0.27–4.2)
TSH SERPL-MCNC: 5.18 UIU/ML — HIGH (ref 0.27–4.2)
VIT B12 SERPL-MCNC: 470 PG/ML — SIGNIFICANT CHANGE UP (ref 232–1245)

## 2022-08-01 PROCEDURE — 99239 HOSP IP/OBS DSCHRG MGMT >30: CPT

## 2022-08-01 PROCEDURE — 99232 SBSQ HOSP IP/OBS MODERATE 35: CPT

## 2022-08-01 RX ORDER — THIAMINE MONONITRATE (VIT B1) 100 MG
1 TABLET ORAL
Qty: 0 | Refills: 0 | DISCHARGE
Start: 2022-08-01

## 2022-08-01 RX ORDER — FOLIC ACID 0.8 MG
1 TABLET ORAL
Qty: 30 | Refills: 1
Start: 2022-08-01 | End: 2022-09-29

## 2022-08-01 RX ORDER — THIAMINE MONONITRATE (VIT B1) 100 MG
1 TABLET ORAL
Qty: 30 | Refills: 1
Start: 2022-08-01 | End: 2022-09-29

## 2022-08-01 RX ADMIN — PANTOPRAZOLE SODIUM 40 MILLIGRAM(S): 20 TABLET, DELAYED RELEASE ORAL at 05:27

## 2022-08-01 RX ADMIN — Medication 2 MILLIGRAM(S): at 16:09

## 2022-08-01 RX ADMIN — CHLORHEXIDINE GLUCONATE 1 APPLICATION(S): 213 SOLUTION TOPICAL at 12:13

## 2022-08-01 RX ADMIN — Medication 1 MILLIGRAM(S): at 11:51

## 2022-08-01 RX ADMIN — Medication 100 MILLIGRAM(S): at 12:13

## 2022-08-01 RX ADMIN — Medication 2 MILLIGRAM(S): at 12:39

## 2022-08-01 RX ADMIN — Medication 1 TABLET(S): at 11:51

## 2022-08-01 RX ADMIN — Medication 2 MILLIGRAM(S): at 00:57

## 2022-08-01 NOTE — PROGRESS NOTE ADULT - SUBJECTIVE AND OBJECTIVE BOX
AURELIA BLANCAS 42y Female  MRN#: [id number]  FULL CODE  HOSPITAL DAY 2    Pt is currently admitted with the primary diagnosis of alcohol intoxication    HPI: 42-year-old, F, PMHx: anxiety and alcohol use disorder, presented for a seizure. History goes back to earlier today when the patient had an unwitnessed seizure. She reports that she had shaking movements of her body and head and lost consciousness for 2 mins. Unsure of post-ictal confusion or tongue bitting. Denies urinary or fecal incontinence.     SUBJECTIVE  Overnight events: None  Subjective complaints: None    EEG: no epileptiform activity     Present Today:   - Zarate:  No [ x ], Yes [   ] : Indication:     - Type of IV Access:       .. CVC/Piccline:  No [ x ], Yes [   ] : Indication:       .. Midline: No [ x ], Yes [   ] : Indication:                                             ----------------------------------------------------------  OBJECTIVE    VITAL SIGNS: Last 24 Hours  T(C): 36.4 (01 Aug 2022 03:15), Max: 36.8 (2022 14:35)  T(F): 97.6 (01 Aug 2022 03:15), Max: 98.2 (2022 14:35)  HR: 72 (01 Aug 2022 03:15) (72 - 99)  BP: 129/77 (01 Aug 2022 03:15) (115/77 - 129/77)  BP(mean): --  RR: 17 (01 Aug 2022 03:15) (17 - 18)  SpO2: 98% (01 Aug 2022 03:15) (97% - 98%)    PHYSICAL EXAM:  General: well-appearing in NAD.  HEENT: Normocephalic, nontraumatic.   LUNGS: Clear to auscultation b/l. No wheezes, rales, or rhonchi.  HEART: RRR. No murmurs, rubs, or gallops.  ABDOMEN: Nontender, nondistended. + bowel sounds.  EXT: Pulses palpable x 4. Nonedematous.  NEURO: AAOX4.  SKIN: Warm, dry.    INS AND OUTS  22 @ 07:01  -  - @ 07:00  --------------------------------------------------------  IN: 820 mL / OUT: 0 mL / NET: 820 mL                                            ------------------------------------------------------------    PAST MEDICAL & SURGICAL HISTORY  Alcoholism  Anxiety  Graves disease  GERD (gastroesophageal reflux disease)  Gastritis  MVP (mitral valve prolapse)  Nicotine dependence  Benzodiazepine misuse  Hypercholesterolemia  Obesity  Esophagitis  Heart murmur  Hyperlipidemia, unspecified hyperlipidemia type  Hemochromatosis  Fatty liver  History of  section  S/P tendon repair  5th finger right hand                                            -----------------------------------------------------------  ALLERGIES:  No Known Allergies                                            ------------------------------------------------------------    HOME MEDICATIONS  Home Medications:  Ativan 1 mg oral tablet: 1  orally 2 times a day (2022 21:28)  SEROquel 200 mg oral tablet: 1 tab(s) orally once a day (at bedtime) (2022 21:28)    STANDING MEDICATIONS  artificial  tears Solution 1 Drop(s) Both EYES once  chlorhexidine 2% Cloths 1 Application(s) Topical daily  dextrose 5% + sodium chloride 0.9%. 1000 milliLiter(s) IV Continuous <Continuous>  enoxaparin Injectable 40 milliGRAM(s) SubCutaneous every 24 hours  folic acid 1 milliGRAM(s) Oral daily  multivitamin 1 Tablet(s) Oral daily  pantoprazole    Tablet 40 milliGRAM(s) Oral before breakfast  QUEtiapine 200 milliGRAM(s) Oral at bedtime  thiamine 100 milliGRAM(s) Oral daily    PRN MEDICATIONS  LORazepam   Injectable 2 milliGRAM(s) IV Push every 2 hours PRN  ondansetron Injectable 4 milliGRAM(s) IV Push every 8 hours PRN                                           --------------------------------------------------------------  LABS:                        12.3   5.36  )-----------( 221      ( 2022 06:54 )             34.7         135  |  101  |  6<L>  ----------------------------<  136<H>  4.7   |  23  |  0.6<L>    Ca    9.0      2022 17:00  Phos  2.2       Mg     2.3         TPro  6.5  /  Alb  4.2  /  TBili  1.1  /  DBili  x   /  AST  69<H>  /  ALT  107<H>  /  AlkPhos  92      Urinalysis Basic - ( 2022 13:23 )    Color: Light Yellow / Appearance: Clear / S.016 / pH: x  Gluc: x / Ketone: Moderate  / Bili: Negative / Urobili: <2 mg/dL   Blood: x / Protein: Trace / Nitrite: Negative   Leuk Esterase: Negative / RBC: x / WBC x   Sq Epi: x / Non Sq Epi: x / Bacteria: x    Lactate, Blood: 1.0 mmol/L (22 @ 17:00)    CARDIAC MARKERS ( 2022 06:54 )  x     / <0.01 ng/mL / x     / x     / x      CARDIAC MARKERS ( 2022 13:06 )  x     / <0.01 ng/mL / x     / x     / x                                                -------------------------------------------------------------  RADIOLOGY:    < from: Xray Chest 1 View- PORTABLE-Urgent (22 @ 14:38) >  Impression: No radiographic evidence of acute cardiopulmonary disease.    < from: CT Abdomen and Pelvis w/ IV Cont (22 @ 14:18) >  IMPRESSION: No evidence of an acute intra-abdominal abnormality. Hepatic steatosis.    < from: CT Head No Cont (22 @ 14:10) >  Impression: No acute intracranial abnormality.    If the patient continues to be symptomatic follow-up MRI of the brain   with and without contrast may be helpful for further evaluation.

## 2022-08-01 NOTE — DISCHARGE NOTE PROVIDER - NSDCMRMEDTOKEN_GEN_ALL_CORE_FT
Ativan 1 mg oral tablet: 1  orally 2 times a day  folic acid 1 mg oral tablet: 1 tab(s) orally once a day  SEROquel 200 mg oral tablet: 1 tab(s) orally once a day (at bedtime)  thiamine 100 mg oral tablet: 1 tab(s) orally once a day  thiamine 100 mg oral tablet: 1 tab(s) orally once a day

## 2022-08-01 NOTE — DISCHARGE NOTE PROVIDER - NSDCCPCAREPLAN_GEN_ALL_CORE_FT
PRINCIPAL DISCHARGE DIAGNOSIS  Diagnosis: Alcohol withdrawal  Assessment and Plan of Treatment: You came into the ED after experiencing a seizure. We ran an EEG which didn't show any more seizure activity.      SECONDARY DISCHARGE DIAGNOSES  Diagnosis: Syncope  Assessment and Plan of Treatment:     Diagnosis: Prolonged QT interval  Assessment and Plan of Treatment:

## 2022-08-01 NOTE — PROGRESS NOTE ADULT - ASSESSMENT
IMPRESSION:    alcohol withdrawal seizure  Lactic acidosis, resolved  alcohol withdrawal  Asthma stable  HO EtOH & Benzodiazepine abuse  HO Graves' disease      PLAN:    CNS:  CIWA protocol. Thiamine, Folic Acid, MVI.    HEENT: oral care     PULMONARY:  HOB @ 30-45 degrees.  Aspiration precautions.  DuoNeb as needed    CARDIOVASCULAR:  DC fluid if tolerates po    GASTROENTEROLOGY:  GI prophylaxis.     RENAL:  FU lytes (K, Mg, iP, ionized Ca).  Correct electrolytes as necessary.  Monitor UO.    INFECTIOUS DISEASE:  Monitor off ABX.  FU cultures    HEMATOLOGICAL:  DVT prophylaxis.    floor

## 2022-08-01 NOTE — DISCHARGE NOTE NURSING/CASE MANAGEMENT/SOCIAL WORK - NSDCPEFALRISK_GEN_ALL_CORE
For information on Fall & Injury Prevention, visit: [email or web address] OR  [email or web address] OR  https://www.cdc.gov/steadi/patient.html

## 2022-08-01 NOTE — PROGRESS NOTE ADULT - SUBJECTIVE AND OBJECTIVE BOX
Over Night Events: events noted, EEG reviewed, afebrile    PHYSICAL EXAM    ICU Vital Signs Last 24 Hrs  T(C): 36.4 (01 Aug 2022 03:15), Max: 36.9 (2022 11:30)  T(F): 97.6 (01 Aug 2022 03:15), Max: 98.5 (2022 11:30)  HR: 72 (01 Aug 2022 03:15) (72 - 99)  BP: 129/77 (01 Aug 2022 03:15) (115/77 - 129/77)  RR: 17 (01 Aug 2022 03:15) (17 - 18)  SpO2: 98% (01 Aug 2022 03:15) (97% - 98%)    O2 Parameters below as of 01 Aug 2022 03:15  Patient On (Oxygen Delivery Method): room air            General: comfotable  Lungs: Bilateral BS  Cardiovascular: Regular   Abdomen: Soft, Positive BS  Extremities: No clubbing   Skin: Warm  Neurological: Non focal       22 @ 07:01  -  22 @ 06:25  --------------------------------------------------------  IN:    dextrose 5% + sodium chloride 0.9%: 700 mL    Oral Fluid: 120 mL  Total IN: 820 mL    OUT:  Total OUT: 0 mL    Total NET: 820 mL          LABS:                          12.3   5.36  )-----------( 221      ( 2022 06:54 )             34.7                                                   135  |  101  |  6<L>  ----------------------------<  136<H>  4.7   |  23  |  0.6<L>    Ca    9.0      2022 17:00  Phos  2.2       Mg     2.3         TPro  6.5  /  Alb  4.2  /  TBili  1.1  /  DBili  x   /  AST  69<H>  /  ALT  107<H>  /  AlkPhos  92                                               Urinalysis Basic - ( 2022 13:23 )    Color: Light Yellow / Appearance: Clear / S.016 / pH: x  Gluc: x / Ketone: Moderate  / Bili: Negative / Urobili: <2 mg/dL   Blood: x / Protein: Trace / Nitrite: Negative   Leuk Esterase: Negative / RBC: x / WBC x   Sq Epi: x / Non Sq Epi: x / Bacteria: x        CARDIAC MARKERS ( 2022 06:54 )  x     / <0.01 ng/mL / x     / x     / x      CARDIAC MARKERS ( 2022 13:06 )  x     / <0.01 ng/mL / x     / x     / x                                                LIVER FUNCTIONS - ( 2022 06:54 )  Alb: 4.2 g/dL / Pro: 6.5 g/dL / ALK PHOS: 92 U/L / ALT: 107 U/L / AST: 69 U/L / GGT: x                                                                                                                                       MEDICATIONS  (STANDING):  chlorhexidine 2% Cloths 1 Application(s) Topical daily  dextrose 5% + sodium chloride 0.9%. 1000 milliLiter(s) (100 mL/Hr) IV Continuous <Continuous>  enoxaparin Injectable 40 milliGRAM(s) SubCutaneous every 24 hours  folic acid 1 milliGRAM(s) Oral daily  multivitamin 1 Tablet(s) Oral daily  pantoprazole    Tablet 40 milliGRAM(s) Oral before breakfast  QUEtiapine 200 milliGRAM(s) Oral at bedtime  thiamine 100 milliGRAM(s) Oral daily    MEDICATIONS  (PRN):  LORazepam   Injectable 2 milliGRAM(s) IV Push every 2 hours PRN CIWA-Ar score increase by 2 points and a total score of 7 or less  ondansetron Injectable 4 milliGRAM(s) IV Push every 8 hours PRN Nausea and/or Vomiting      Xrays:                                                                                     ECHO

## 2022-08-01 NOTE — PROGRESS NOTE ADULT - ASSESSMENT
42-year-old, F, PMHx: anxiety and alcohol use disorder, presented for a seizure. History goes back to earlier today when the patient had an unwitnessed seizure. She reports that she had shaking movements of her body and head and lost consciousness for 2 mins.    #Alcohol intoxication  #Unwitnessed seizure (1st ever episode)  #HAGMA 2/2 lactic acidosis 2/2 alcohol intoxication vs suspected seizure (resolved)  #Hepatic steatosis  #Transaminitis 2/2 to alcohol misuse  - CIWA monitoring and standing ativan detox protocol  - C/w thiamine and folate  - Addiction medicine consulted, CATCH team following    GI PPX: Protonix 50mg qd  DVT PPX: Lovenox subq qd 42-year-old, F, PMHx: anxiety and alcohol use disorder, presented for a seizure. History goes back to earlier today when the patient had an unwitnessed seizure. She reports that she had shaking movements of her body and head and lost consciousness for 2 mins.    #Alcohol intoxication  #Unwitnessed seizure (1st ever episode)  #HAGMA 2/2 lactic acidosis 2/2 alcohol intoxication vs suspected seizure (resolved)  #Hepatic steatosis  #Transaminitis 2/2 to alcohol misuse  - CIWA monitoring and standing ativan detox protocol  - EEG: normal wave patterns, no epileptiform activity  - C/w thiamine, folate, multivitamin  - Addiction medicine consulted, CATCH team following    #Other  GI PPX: Protonix 50mg qd  DVT PPX: Lovenox subq qd  Activity: OOB as tolerated  Diet: regular  Disposition: From home, downgrade to medicine for further monitoring of detox.

## 2022-08-01 NOTE — DISCHARGE NOTE NURSING/CASE MANAGEMENT/SOCIAL WORK - PATIENT PORTAL LINK FT
You can access the FollowMyHealth Patient Portal offered by [facility] by registering at the following website: [email or web address]. By joining [employer]’s FollowMyHealth portal, you will also be able to view your health information using other applications (apps) compatible with our system.

## 2022-08-01 NOTE — DISCHARGE NOTE PROVIDER - NSDCQMAMI_CARD_ALL_CORE
[facility] HOSP - [facility]  Procedure Note    Kalpana Velasco Patient Status:  Inpatient    1940 MRN [id number]   Location [facility] Attending Jalen Hernandez MD   Hosp Day # 9 PCP Tamara Yan MD     Procedure: L3/4
No

## 2022-08-01 NOTE — DISCHARGE NOTE PROVIDER - NSFOLLOWUPCLINICS_GEN_ALL_ED_FT
[facility]  Medicine  [address], [place]   Phone: [phone]  Fax:   Follow Up Time: 2 weeks

## 2022-08-01 NOTE — DISCHARGE NOTE PROVIDER - HOSPITAL COURSE
Pt is 42-year-old, F, PMHx: anxiety and alcohol use disorder, presented for a seizure. History goes back to earlier today when the patient had an unwitnessed seizure. She reports that she had shaking movements of her body and head and lost consciousness for 2 mins. Unsure of post-ictal confusion or tongue bitting. Denies urinary or fecal incontinence. In the step down unit pt was on CIWA protocol, receiving ativan 2mg IV PRN. CTH was negative. EEG showed no epileptiform activity. Pt remained stable.    Pt is leaving AMA. Pt was counseled on risk of seizure at home and of status epilepticus, which if not treated can be fatal.

## 2022-08-01 NOTE — PROGRESS NOTE ADULT - ATTENDING COMMENTS
Patient seen and examined independently    42-year-old, F, PMHx: anxiety and alcohol use disorder, presented for a seizure. History goes back to earlier today when the patient had an unwitnessed seizure. She reports that she had shaking movements of her body and head and lost consciousness for 2 mins. Patient reports drinking a pint of vodka daily for many years  Currently feels well, denies any complaints, wishes to leave AMA. Risks/benefits explained, patient verbalizes understanding and still wishes to leave the hospital

## 2022-08-04 DIAGNOSIS — K21.9 GASTRO-ESOPHAGEAL REFLUX DISEASE WITHOUT ESOPHAGITIS: ICD-10-CM

## 2022-08-04 DIAGNOSIS — E78.5 HYPERLIPIDEMIA, UNSPECIFIED: ICD-10-CM

## 2022-08-04 DIAGNOSIS — F10.229 ALCOHOL DEPENDENCE WITH INTOXICATION, UNSPECIFIED: ICD-10-CM

## 2022-08-04 DIAGNOSIS — J45.909 UNSPECIFIED ASTHMA, UNCOMPLICATED: ICD-10-CM

## 2022-08-04 DIAGNOSIS — F19.10 OTHER PSYCHOACTIVE SUBSTANCE ABUSE, UNCOMPLICATED: ICD-10-CM

## 2022-08-04 DIAGNOSIS — R55 SYNCOPE AND COLLAPSE: ICD-10-CM

## 2022-08-04 DIAGNOSIS — K76.0 FATTY (CHANGE OF) LIVER, NOT ELSEWHERE CLASSIFIED: ICD-10-CM

## 2022-08-04 DIAGNOSIS — R94.31 ABNORMAL ELECTROCARDIOGRAM [ECG] [EKG]: ICD-10-CM

## 2022-08-04 DIAGNOSIS — G40.89 OTHER SEIZURES: ICD-10-CM
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
## 2022-08-04 DIAGNOSIS — E87.2 ACIDOSIS: ICD-10-CM

## 2022-08-04 DIAGNOSIS — E22.1 HYPERPROLACTINEMIA: ICD-10-CM

## 2022-08-04 DIAGNOSIS — E05.00 THYROTOXICOSIS WITH DIFFUSE GOITER WITHOUT THYROTOXIC CRISIS OR STORM: ICD-10-CM

## 2022-08-04 DIAGNOSIS — F10.239 ALCOHOL DEPENDENCE WITH WITHDRAWAL, UNSPECIFIED: ICD-10-CM

## 2022-08-04 DIAGNOSIS — Z53.29 PROCEDURE AND TREATMENT NOT CARRIED OUT BECAUSE OF PATIENT'S DECISION FOR OTHER REASONS: ICD-10-CM

## 2022-08-05 LAB
CULTURE RESULTS: SIGNIFICANT CHANGE UP
SPECIMEN SOURCE: SIGNIFICANT CHANGE UP

## 2022-09-13 NOTE — ED PROVIDER NOTE - DISPOSITION TYPE
How Severe Is Your Rash?: moderate
Is This A New Presentation, Or A Follow-Up?: Follow Up Rash
Additional History: Diagnosed as contact dermatitis at last visit. Patient stated that the rash comes and goes and is better after applying cortisone.
Additional History: Diagnosed as seborrheic dermatitis at last visit. Patient was given fluocinolone ointment at last visit, has been helping to improve the rash. Patient states the rash comes and goes and appears after shaving.
ADMIT

## 2022-09-15 NOTE — ED PROVIDER NOTE - PHYSICAL EXAMINATION
CONSTITUTIONAL: Well-appearing; well-nourished; anxious.   HEAD: Normocephalic; atraumatic.   EYES: PERRL; EOM intact. Conjunctiva normal B/L.   ENT: Normal pharynx with no tonsillar hypertrophy. MMM.  NECK: Supple; non-tender; no cervical lymphadenopathy.   CHEST: Normal chest excursion with respiration.   CARDIOVASCULAR: Normal S1, S2; no murmurs, rubs, or gallops.   RESPIRATORY: Normal chest excursion with respiration; breath sounds clear and equal bilaterally; no wheezes, rhonchi, or rales.  GI/: Normal bowel sounds; non-distended; + Mild epigastric tenderness. Negative murphys sign. No rebound or guarding. No masses or hernias.   BACK: No evidence of trauma or deformity. Non-tender to palpation. No CVA tenderness.   EXT: Normal ROM in all four extremities; non-tender to palpation; distal pulses are normal. No leg edema B/L.   SKIN: Diffuse sunburn  NEURO: A & O x 4; CN 2-12 intact. Grossly unremarkable.  ] No restrictions

## 2022-09-16 ENCOUNTER — EMERGENCY (EMERGENCY)
Facility: HOSPITAL | Age: 42
LOS: 0 days | Discharge: HOME | End: 2022-09-16
Attending: EMERGENCY MEDICINE | Admitting: EMERGENCY MEDICINE

## 2022-09-16 VITALS
HEART RATE: 123 BPM | HEIGHT: 61.5 IN | DIASTOLIC BLOOD PRESSURE: 78 MMHG | OXYGEN SATURATION: 98 % | TEMPERATURE: 98 F | RESPIRATION RATE: 20 BRPM | SYSTOLIC BLOOD PRESSURE: 119 MMHG

## 2022-09-16 DIAGNOSIS — Z98.890 OTHER SPECIFIED POSTPROCEDURAL STATES: Chronic | ICD-10-CM

## 2022-09-16 DIAGNOSIS — R00.0 TACHYCARDIA, UNSPECIFIED: ICD-10-CM

## 2022-09-16 DIAGNOSIS — F10.229 ALCOHOL DEPENDENCE WITH INTOXICATION, UNSPECIFIED: ICD-10-CM

## 2022-09-16 DIAGNOSIS — Z98.891 HISTORY OF UTERINE SCAR FROM PREVIOUS SURGERY: Chronic | ICD-10-CM

## 2022-09-16 PROCEDURE — 99283 EMERGENCY DEPT VISIT LOW MDM: CPT

## 2022-09-16 NOTE — ED PROVIDER NOTE - OBJECTIVE STATEMENT
Pt brought in by EMS against her will because she was refusing to answer questions at her home.  She originally called 911 because of a domestic dispute with her landlord but after [employer] arrived on scene, they noted that the patient with alcohol use disorder was intoxicated and called EMS to scene.  She was not violent or suggesting harm to herself or others but they forced her to come to ED.  Pt doesn't wish to be here and has no acute medical complaints.

## 2022-09-16 NOTE — ED PROVIDER NOTE - NSFOLLOWUPCLINICS_GEN_ALL_ED_FT
[facility]  Detox Mgmt  [address] [place] [address]  Phone: [phone]  Fax:   Follow Up Time: Routine

## 2022-09-16 NOTE — ED PROVIDER NOTE - CLINICAL SUMMARY MEDICAL DECISION MAKING FREE TEXT BOX
alcohol use disorder - insight and judgment not impaired - speech clear, steady gait, early symptoms of withdrawal - dc home

## 2022-09-16 NOTE — ED PROVIDER NOTE - PHYSICAL EXAMINATION
Vital Signs: I have reviewed the initial vital signs.  Constitutional: well-nourished, appears stated age, no acute distress  Cardiovascular: tachycardia, well-perfused extremities  Respiratory: unlabored respiratory effort, clear to auscultation bilaterally  Gastrointestinal: soft, non-tender abdomen, no pulsatile mass  Neurologic: normal gait, nonfocal exam  Psychiatric: tearful but consolable, mood-affect congruent

## 2022-09-16 NOTE — ED PROVIDER NOTE - NS ED ROS FT
Constitutional: (-) fever  Cardiovascular: (-) syncope  Integumentary: (-) rash  Neurological: (-) syncope

## 2022-09-16 NOTE — ED PROVIDER NOTE - PATIENT PORTAL LINK FT
You can access the FollowMyHealth Patient Portal offered by [facility] by registering at the following website: [email or web address]. By joining [employer]’s FollowMyHealth portal, you will also be able to view your health information using other applications (apps) compatible with our system.

## 2022-09-24 ENCOUNTER — INPATIENT (INPATIENT)
Facility: HOSPITAL | Age: 42
LOS: 3 days | Discharge: HOME | End: 2022-09-28
Attending: INTERNAL MEDICINE | Admitting: INTERNAL MEDICINE

## 2022-09-24 VITALS
TEMPERATURE: 97 F | HEART RATE: 110 BPM | RESPIRATION RATE: 18 BRPM | DIASTOLIC BLOOD PRESSURE: 86 MMHG | HEIGHT: 61.5 IN | OXYGEN SATURATION: 99 % | SYSTOLIC BLOOD PRESSURE: 134 MMHG

## 2022-09-24 DIAGNOSIS — Z98.890 OTHER SPECIFIED POSTPROCEDURAL STATES: Chronic | ICD-10-CM

## 2022-09-24 DIAGNOSIS — Z98.891 HISTORY OF UTERINE SCAR FROM PREVIOUS SURGERY: Chronic | ICD-10-CM

## 2022-09-24 LAB
ALBUMIN SERPL ELPH-MCNC: 5.4 G/DL — HIGH (ref 3.5–5.2)
ALP SERPL-CCNC: 111 U/L — SIGNIFICANT CHANGE UP (ref 30–115)
ALT FLD-CCNC: 126 U/L — HIGH (ref 0–41)
ANION GAP SERPL CALC-SCNC: 28 MMOL/L — HIGH (ref 7–14)
APTT BLD: 38.6 SEC — SIGNIFICANT CHANGE UP (ref 27–39.2)
AST SERPL-CCNC: 192 U/L — HIGH (ref 0–41)
BASE EXCESS BLDV CALC-SCNC: -5.9 MMOL/L — LOW (ref -2–3)
BASOPHILS # BLD AUTO: 0.05 K/UL — SIGNIFICANT CHANGE UP (ref 0–0.2)
BASOPHILS NFR BLD AUTO: 0.9 % — SIGNIFICANT CHANGE UP (ref 0–1)
BILIRUB SERPL-MCNC: 1.2 MG/DL — SIGNIFICANT CHANGE UP (ref 0.2–1.2)
BUN SERPL-MCNC: 10 MG/DL — SIGNIFICANT CHANGE UP (ref 10–20)
CA-I SERPL-SCNC: 1.09 MMOL/L — LOW (ref 1.15–1.33)
CALCIUM SERPL-MCNC: 9.6 MG/DL — SIGNIFICANT CHANGE UP (ref 8.4–10.5)
CHLORIDE SERPL-SCNC: 92 MMOL/L — LOW (ref 98–110)
CO2 SERPL-SCNC: 17 MMOL/L — SIGNIFICANT CHANGE UP (ref 17–32)
CREAT SERPL-MCNC: 0.7 MG/DL — SIGNIFICANT CHANGE UP (ref 0.7–1.5)
EGFR: 112 ML/MIN/1.73M2 — SIGNIFICANT CHANGE UP
EOSINOPHIL # BLD AUTO: 0.01 K/UL — SIGNIFICANT CHANGE UP (ref 0–0.7)
EOSINOPHIL NFR BLD AUTO: 0.2 % — SIGNIFICANT CHANGE UP (ref 0–8)
ETHANOL SERPL-MCNC: 372 MG/DL — HIGH
GAS PNL BLDV: 137 MMOL/L — SIGNIFICANT CHANGE UP (ref 136–145)
GAS PNL BLDV: SIGNIFICANT CHANGE UP
GLUCOSE SERPL-MCNC: 80 MG/DL — SIGNIFICANT CHANGE UP (ref 70–99)
HCG SERPL QL: NEGATIVE — SIGNIFICANT CHANGE UP
HCO3 BLDV-SCNC: 19 MMOL/L — LOW (ref 22–29)
HCT VFR BLD CALC: 43.4 % — SIGNIFICANT CHANGE UP (ref 37–47)
HCT VFR BLDA CALC: 47 % — SIGNIFICANT CHANGE UP (ref 39–51)
HGB BLD CALC-MCNC: 15.6 G/DL — SIGNIFICANT CHANGE UP (ref 12.6–17.4)
HGB BLD-MCNC: 15.1 G/DL — SIGNIFICANT CHANGE UP (ref 12–16)
IMM GRANULOCYTES NFR BLD AUTO: 0.2 % — SIGNIFICANT CHANGE UP (ref 0.1–0.3)
INR BLD: 1.03 RATIO — SIGNIFICANT CHANGE UP (ref 0.65–1.3)
LACTATE BLDV-MCNC: 4.9 MMOL/L — CRITICAL HIGH (ref 0.5–2)
LIDOCAIN IGE QN: 38 U/L — SIGNIFICANT CHANGE UP (ref 7–60)
LYMPHOCYTES # BLD AUTO: 0.88 K/UL — LOW (ref 1.2–3.4)
LYMPHOCYTES # BLD AUTO: 16.6 % — LOW (ref 20.5–51.1)
MAGNESIUM SERPL-MCNC: 2.2 MG/DL — SIGNIFICANT CHANGE UP (ref 1.8–2.4)
MCHC RBC-ENTMCNC: 34.1 PG — HIGH (ref 27–31)
MCHC RBC-ENTMCNC: 34.8 G/DL — SIGNIFICANT CHANGE UP (ref 32–37)
MCV RBC AUTO: 98 FL — SIGNIFICANT CHANGE UP (ref 81–99)
MONOCYTES # BLD AUTO: 0.41 K/UL — SIGNIFICANT CHANGE UP (ref 0.1–0.6)
MONOCYTES NFR BLD AUTO: 7.7 % — SIGNIFICANT CHANGE UP (ref 1.7–9.3)
NEUTROPHILS # BLD AUTO: 3.94 K/UL — SIGNIFICANT CHANGE UP (ref 1.4–6.5)
NEUTROPHILS NFR BLD AUTO: 74.4 % — SIGNIFICANT CHANGE UP (ref 42.2–75.2)
NRBC # BLD: 0 /100 WBCS — SIGNIFICANT CHANGE UP (ref 0–0)
PCO2 BLDV: 34 MMHG — LOW (ref 39–42)
PH BLDV: 7.35 — SIGNIFICANT CHANGE UP (ref 7.32–7.43)
PLATELET # BLD AUTO: 209 K/UL — SIGNIFICANT CHANGE UP (ref 130–400)
PO2 BLDV: 53 MMHG — SIGNIFICANT CHANGE UP
POTASSIUM BLDV-SCNC: 4 MMOL/L — SIGNIFICANT CHANGE UP (ref 3.5–5.1)
POTASSIUM SERPL-MCNC: 4.1 MMOL/L — SIGNIFICANT CHANGE UP (ref 3.5–5)
POTASSIUM SERPL-SCNC: 4.1 MMOL/L — SIGNIFICANT CHANGE UP (ref 3.5–5)
PROT SERPL-MCNC: 8.5 G/DL — HIGH (ref 6–8)
PROTHROM AB SERPL-ACNC: 11.8 SEC — SIGNIFICANT CHANGE UP (ref 9.95–12.87)
RBC # BLD: 4.43 M/UL — SIGNIFICANT CHANGE UP (ref 4.2–5.4)
RBC # FLD: 12 % — SIGNIFICANT CHANGE UP (ref 11.5–14.5)
SAO2 % BLDV: 80 % — SIGNIFICANT CHANGE UP
SARS-COV-2 RNA SPEC QL NAA+PROBE: SIGNIFICANT CHANGE UP
SODIUM SERPL-SCNC: 137 MMOL/L — SIGNIFICANT CHANGE UP (ref 135–146)
WBC # BLD: 5.3 K/UL — SIGNIFICANT CHANGE UP (ref 4.8–10.8)
WBC # FLD AUTO: 5.3 K/UL — SIGNIFICANT CHANGE UP (ref 4.8–10.8)

## 2022-09-24 PROCEDURE — 99285 EMERGENCY DEPT VISIT HI MDM: CPT

## 2022-09-24 PROCEDURE — 93010 ELECTROCARDIOGRAM REPORT: CPT

## 2022-09-24 RX ORDER — THIAMINE MONONITRATE (VIT B1) 100 MG
500 TABLET ORAL ONCE
Refills: 0 | Status: COMPLETED | OUTPATIENT
Start: 2022-09-24 | End: 2022-09-24

## 2022-09-24 RX ORDER — SODIUM CHLORIDE 9 MG/ML
2000 INJECTION, SOLUTION INTRAVENOUS ONCE
Refills: 0 | Status: COMPLETED | OUTPATIENT
Start: 2022-09-24 | End: 2022-09-24

## 2022-09-24 RX ORDER — ONDANSETRON 8 MG/1
4 TABLET, FILM COATED ORAL ONCE
Refills: 0 | Status: COMPLETED | OUTPATIENT
Start: 2022-09-24 | End: 2022-09-24

## 2022-09-24 RX ADMIN — Medication 105 MILLIGRAM(S): at 22:54

## 2022-09-24 RX ADMIN — SODIUM CHLORIDE 2000 MILLILITER(S): 9 INJECTION, SOLUTION INTRAVENOUS at 23:00

## 2022-09-24 NOTE — ED PROVIDER NOTE - ATTENDING APP SHARED VISIT CONTRIBUTION OF CARE
I personally evaluated the patient. I reviewed the Resident´s or Physician Assistant´s note (as assigned above), and agree with the findings and plan except as documented in my note.  42-year-old female, history of alcohol abuse, benzodiazepine abuse, hyperlipidemia, Graves' disease, presents to the ED with weakness and multiple episodes of vomiting.  Patient thinks she has alcoholic ketoacidosis.  Exam shows alert intoxicated patient in no distress, HEENT NCAT PERRL, neck supple, lungs clear, RR S1S2, abdomen soft NT +BS, no CCE, skin no rash, neuro A&OX3 GCS 15 no deficits.

## 2022-09-24 NOTE — ED PROVIDER NOTE - CLINICAL SUMMARY MEDICAL DECISION MAKING FREE TEXT BOX
Labs noted for blood alcohol 372, lactate 4.9, CO2 17, anion gap 20.  EKG sinus tach, no acute changes.  Given IV fluids, Zofran and thiamine.  Will admit.

## 2022-09-24 NOTE — ED PROVIDER NOTE - NS ED ATTENDING STATEMENT MOD
This was a shared visit with the ALMITA. I reviewed and verified the documentation and independently performed the documented:

## 2022-09-24 NOTE — ED ADULT NURSE NOTE - NSIMPLEMENTINTERV_GEN_ALL_ED
Implemented All Universal Safety Interventions:  [place] to call system. Call bell, personal items and telephone within reach. Instruct patient to call for assistance. Room bathroom lighting operational. Non-slip footwear when patient is off stretcher. Physically safe environment: no spills, clutter or unnecessary equipment. Stretcher in lowest position, wheels locked, appropriate side rails in place. Implemented All Fall with Harm Risk Interventions:  [place] to call system. Call bell, personal items and telephone within reach. Instruct patient to call for assistance. Room bathroom lighting operational. Non-slip footwear when patient is off stretcher. Physically safe environment: no spills, clutter or unnecessary equipment. Stretcher in lowest position, wheels locked, appropriate side rails in place. Provide visual cue, wrist band, yellow gown, etc. Monitor gait and stability. Monitor for mental status changes and reorient to person, place, and time. Review medications for side effects contributing to fall risk. Reinforce activity limits and safety measures with patient and family. Provide visual clues: red socks.

## 2022-09-25 LAB
ALBUMIN SERPL ELPH-MCNC: 4.2 G/DL — SIGNIFICANT CHANGE UP (ref 3.5–5.2)
ALP SERPL-CCNC: 83 U/L — SIGNIFICANT CHANGE UP (ref 30–115)
ALT FLD-CCNC: 98 U/L — HIGH (ref 0–41)
ANION GAP SERPL CALC-SCNC: 19 MMOL/L — HIGH (ref 7–14)
ANION GAP SERPL CALC-SCNC: 24 MMOL/L — HIGH (ref 7–14)
AST SERPL-CCNC: 155 U/L — HIGH (ref 0–41)
BILIRUB DIRECT SERPL-MCNC: 0.5 MG/DL — HIGH (ref 0–0.3)
BILIRUB INDIRECT FLD-MCNC: 0.8 MG/DL — SIGNIFICANT CHANGE UP (ref 0.2–1.2)
BILIRUB SERPL-MCNC: 1.3 MG/DL — HIGH (ref 0.2–1.2)
BUN SERPL-MCNC: 9 MG/DL — LOW (ref 10–20)
BUN SERPL-MCNC: 9 MG/DL — LOW (ref 10–20)
CALCIUM SERPL-MCNC: 8.7 MG/DL — SIGNIFICANT CHANGE UP (ref 8.4–10.5)
CALCIUM SERPL-MCNC: 8.7 MG/DL — SIGNIFICANT CHANGE UP (ref 8.4–10.5)
CHLORIDE SERPL-SCNC: 96 MMOL/L — LOW (ref 98–110)
CHLORIDE SERPL-SCNC: 97 MMOL/L — LOW (ref 98–110)
CO2 SERPL-SCNC: 19 MMOL/L — SIGNIFICANT CHANGE UP (ref 17–32)
CO2 SERPL-SCNC: 22 MMOL/L — SIGNIFICANT CHANGE UP (ref 17–32)
CREAT SERPL-MCNC: 0.5 MG/DL — LOW (ref 0.7–1.5)
CREAT SERPL-MCNC: 0.6 MG/DL — LOW (ref 0.7–1.5)
EGFR: 116 ML/MIN/1.73M2 — SIGNIFICANT CHANGE UP
EGFR: 122 ML/MIN/1.73M2 — SIGNIFICANT CHANGE UP
GLUCOSE BLDC GLUCOMTR-MCNC: 118 MG/DL — HIGH (ref 70–99)
GLUCOSE SERPL-MCNC: 72 MG/DL — SIGNIFICANT CHANGE UP (ref 70–99)
GLUCOSE SERPL-MCNC: 85 MG/DL — SIGNIFICANT CHANGE UP (ref 70–99)
HCT VFR BLD CALC: 35.7 % — LOW (ref 37–47)
HGB BLD-MCNC: 12.4 G/DL — SIGNIFICANT CHANGE UP (ref 12–16)
LACTATE SERPL-SCNC: 2.4 MMOL/L — HIGH (ref 0.7–2)
LACTATE SERPL-SCNC: 5.8 MMOL/L — CRITICAL HIGH (ref 0.7–2)
MAGNESIUM SERPL-MCNC: 2 MG/DL — SIGNIFICANT CHANGE UP (ref 1.8–2.4)
MCHC RBC-ENTMCNC: 34.1 PG — HIGH (ref 27–31)
MCHC RBC-ENTMCNC: 34.7 G/DL — SIGNIFICANT CHANGE UP (ref 32–37)
MCV RBC AUTO: 98.1 FL — SIGNIFICANT CHANGE UP (ref 81–99)
NRBC # BLD: 0 /100 WBCS — SIGNIFICANT CHANGE UP (ref 0–0)
PHOSPHATE SERPL-MCNC: 1.6 MG/DL — LOW (ref 2.1–4.9)
PLATELET # BLD AUTO: 96 K/UL — LOW (ref 130–400)
POTASSIUM SERPL-MCNC: 3.9 MMOL/L — SIGNIFICANT CHANGE UP (ref 3.5–5)
POTASSIUM SERPL-MCNC: 4.1 MMOL/L — SIGNIFICANT CHANGE UP (ref 3.5–5)
POTASSIUM SERPL-SCNC: 3.9 MMOL/L — SIGNIFICANT CHANGE UP (ref 3.5–5)
POTASSIUM SERPL-SCNC: 4.1 MMOL/L — SIGNIFICANT CHANGE UP (ref 3.5–5)
PROT SERPL-MCNC: 6.7 G/DL — SIGNIFICANT CHANGE UP (ref 6–8)
RBC # BLD: 3.64 M/UL — LOW (ref 4.2–5.4)
RBC # FLD: 11.9 % — SIGNIFICANT CHANGE UP (ref 11.5–14.5)
SODIUM SERPL-SCNC: 137 MMOL/L — SIGNIFICANT CHANGE UP (ref 135–146)
SODIUM SERPL-SCNC: 140 MMOL/L — SIGNIFICANT CHANGE UP (ref 135–146)
WBC # BLD: 4.35 K/UL — LOW (ref 4.8–10.8)
WBC # FLD AUTO: 4.35 K/UL — LOW (ref 4.8–10.8)

## 2022-09-25 PROCEDURE — 99222 1ST HOSP IP/OBS MODERATE 55: CPT

## 2022-09-25 PROCEDURE — 93010 ELECTROCARDIOGRAM REPORT: CPT | Mod: 76

## 2022-09-25 RX ORDER — SODIUM CHLORIDE 9 MG/ML
1000 INJECTION, SOLUTION INTRAVENOUS
Refills: 0 | Status: DISCONTINUED | OUTPATIENT
Start: 2022-09-25 | End: 2022-09-26

## 2022-09-25 RX ORDER — FOLIC ACID 0.8 MG
1 TABLET ORAL DAILY
Refills: 0 | Status: DISCONTINUED | OUTPATIENT
Start: 2022-09-25 | End: 2022-09-28

## 2022-09-25 RX ORDER — QUETIAPINE FUMARATE 200 MG/1
200 TABLET, FILM COATED ORAL AT BEDTIME
Refills: 0 | Status: DISCONTINUED | OUTPATIENT
Start: 2022-09-25 | End: 2022-09-28

## 2022-09-25 RX ORDER — ONDANSETRON 8 MG/1
4 TABLET, FILM COATED ORAL ONCE
Refills: 0 | Status: COMPLETED | OUTPATIENT
Start: 2022-09-25 | End: 2022-09-25

## 2022-09-25 RX ORDER — SODIUM CHLORIDE 9 MG/ML
1000 INJECTION, SOLUTION INTRAVENOUS ONCE
Refills: 0 | Status: COMPLETED | OUTPATIENT
Start: 2022-09-25 | End: 2022-09-25

## 2022-09-25 RX ORDER — FAMOTIDINE 10 MG/ML
20 INJECTION INTRAVENOUS
Refills: 0 | Status: DISCONTINUED | OUTPATIENT
Start: 2022-09-25 | End: 2022-09-25

## 2022-09-25 RX ORDER — INFLUENZA VIRUS VACCINE 15; 15; 15; 15 UG/.5ML; UG/.5ML; UG/.5ML; UG/.5ML
0.5 SUSPENSION INTRAMUSCULAR ONCE
Refills: 0 | Status: COMPLETED | OUTPATIENT
Start: 2022-09-25 | End: 2022-09-25

## 2022-09-25 RX ORDER — BENZOCAINE 10 %
1 GEL (GRAM) MUCOUS MEMBRANE ONCE
Refills: 0 | Status: COMPLETED | OUTPATIENT
Start: 2022-09-25 | End: 2022-09-25

## 2022-09-25 RX ORDER — THIAMINE MONONITRATE (VIT B1) 100 MG
100 TABLET ORAL DAILY
Refills: 0 | Status: DISCONTINUED | OUTPATIENT
Start: 2022-09-25 | End: 2022-09-28

## 2022-09-25 RX ORDER — FAMOTIDINE 10 MG/ML
20 INJECTION INTRAVENOUS
Refills: 0 | Status: DISCONTINUED | OUTPATIENT
Start: 2022-09-25 | End: 2022-09-28

## 2022-09-25 RX ORDER — ENOXAPARIN SODIUM 100 MG/ML
40 INJECTION SUBCUTANEOUS EVERY 24 HOURS
Refills: 0 | Status: DISCONTINUED | OUTPATIENT
Start: 2022-09-25 | End: 2022-09-28

## 2022-09-25 RX ADMIN — Medication 2 MILLIGRAM(S): at 12:43

## 2022-09-25 RX ADMIN — Medication 2 MILLIGRAM(S): at 07:52

## 2022-09-25 RX ADMIN — FAMOTIDINE 20 MILLIGRAM(S): 10 INJECTION INTRAVENOUS at 05:15

## 2022-09-25 RX ADMIN — ONDANSETRON 4 MILLIGRAM(S): 8 TABLET, FILM COATED ORAL at 00:49

## 2022-09-25 RX ADMIN — Medication 2 MILLIGRAM(S): at 21:23

## 2022-09-25 RX ADMIN — Medication 1 SPRAY(S): at 04:49

## 2022-09-25 RX ADMIN — Medication 100 MILLIGRAM(S): at 11:05

## 2022-09-25 RX ADMIN — Medication 2 MILLIGRAM(S): at 04:33

## 2022-09-25 RX ADMIN — ENOXAPARIN SODIUM 40 MILLIGRAM(S): 100 INJECTION SUBCUTANEOUS at 11:06

## 2022-09-25 RX ADMIN — Medication 1 MILLIGRAM(S): at 11:06

## 2022-09-25 RX ADMIN — QUETIAPINE FUMARATE 200 MILLIGRAM(S): 200 TABLET, FILM COATED ORAL at 22:46

## 2022-09-25 RX ADMIN — SODIUM CHLORIDE 1000 MILLILITER(S): 9 INJECTION, SOLUTION INTRAVENOUS at 02:50

## 2022-09-25 RX ADMIN — SODIUM CHLORIDE 100 MILLILITER(S): 9 INJECTION, SOLUTION INTRAVENOUS at 04:11

## 2022-09-25 RX ADMIN — Medication 2 MILLIGRAM(S): at 16:42

## 2022-09-25 RX ADMIN — SODIUM CHLORIDE 100 MILLILITER(S): 9 INJECTION, SOLUTION INTRAVENOUS at 11:06

## 2022-09-25 RX ADMIN — ONDANSETRON 4 MILLIGRAM(S): 8 TABLET, FILM COATED ORAL at 16:41

## 2022-09-25 RX ADMIN — FAMOTIDINE 20 MILLIGRAM(S): 10 INJECTION INTRAVENOUS at 16:45

## 2022-09-25 NOTE — ED PROVIDER NOTE - ICU PHYSICIAN
URINALYSIS & REFLEX MICROSCOPY WITH CULTURE IF INDICATED  Order: [id number]   Status: Final result     Visible to patient: No (scheduled for 9/25/2022  2:10 PM)     Dx: Dysuria     0 Result Notes    Component Ref Range & Units 13:05   (9/25/22) 1 yr ago   (8/27/21) 3 yr ago   (6/14/19) 3 yr ago   (6/14/19) 3 yr ago   (10/16/18)   COLOR, URINALYSIS  Yellow  Orange  Yellow   yellow    APPEARANCE, URINALYSIS  Clear  Turbid  Slightly Cloudy   slightly cloudy    GLUCOSE, URINALYSIS Negative mg/dL Negative  Unable to interpret due to interfering substances Abnormal   Negative R   neg R    BILIRUBIN, URINALYSIS Negative Negative  Negative  Negative R   neg R    KETONES, URINALYSIS Negative mg/dL Negative  Unable to interpret due to interfering substances Abnormal   Negative R   neg R    SPECIFIC GRAVITY, URINALYSIS 1.005 - 1.030 >=1.030  1.020  1.025 R  1.025 R  1.025 R    OCCULT BLOOD, URINALYSIS Negative Negative  Large Abnormal   Negative R   small R    PH, URINALYSIS 5.0 - 7.0 6.0  5.0  6.5 R   7.0 R    PROTEIN, URINALYSIS Negative mg/dL Negative  Unable to interpret due to interfering substances Abnormal   Trace R   neg R    UROBILINOGEN, URINALYSIS 0.2, 1.0 mg/dL 0.2  Unable to interpret due to interfering substances Abnormal   0.2 E.U/dL R   0.2 EU/dl R    NITRITE, URINALYSIS Negative Negative  Unable to interpret due to interfering substances Abnormal   Negative R   neg R    LEUKOCYTE ESTERASE, URINALYSIS Negative Negative  Unable to interpret due to interfering substances Abnormal   Trace R   small R    [facility]                 Specimen Collected: 09/25/22 13:05 Last Resulted: 09/25/22 13:10             Patient with normal UA.  Recommend follow up with OB/GYN for ongoing urinary complaints.  No sign of infection present.  Please notify patient.    JOSH So  09/25/22  1:57 PM     [place]

## 2022-09-25 NOTE — H&P ADULT - NSHPLABSRESULTS_GEN_ALL_CORE
LABS:              15.1   5.30  )-----------( 209      ( 24 Sep 2022 22:00 )             43.4     09-25    140  |  97<L>  |  9<L>  ----------------------------<  72  4.1   |  19  |  0.5<L>    Ca    8.7      25 Sep 2022 00:35  Mg     2.2     09-24    TPro  8.5<H>  /  Alb  5.4<H>  /  TBili  1.2  /  DBili  x   /  AST  192<H>  /  ALT  126<H>  /  AlkPhos  111  09-24    LIVER FUNCTIONS - ( 24 Sep 2022 22:00 )  Alb: 5.4 g/dL / Pro: 8.5 g/dL / ALK PHOS: 111 U/L / ALT: 126 U/L / AST: 192 U/L / GGT: x           PT/INR - ( 24 Sep 2022 22:00 )   PT: 11.80 sec;   INR: 1.03 ratio       PTT - ( 24 Sep 2022 22:00 )  PTT:38.6 sec

## 2022-09-25 NOTE — H&P ADULT - ASSESSMENT
#Alcoholic ketoacidosis  Fluids __  Anion gap __  Trend  Addiction medicine consult  CIWA protocol  Librium protocol  Thiamine 100mg and folate 1mg  Monitor vital signs  AM labs including phosphorous and magnesium.   Zofran PRN for nausea  GI ppx: PPI         #Alcoholic ketoacidosis  Lactate 5.8  Fluids __  Anion gap 24.  Trend  Addiction medicine consult  CIWA protocol  Librium protocol  Thiamine 100mg and folate 1mg  Monitor vital signs  AM labs including phosphorous and magnesium.   Zofran PRN for nausea  GI ppx: PPI   Pt is a 42 YOF who is admitted for the treatment of alcoholism / alcoholic ketoacidosis.       #Alcoholic ketoacidosis  #lactic acidosis due to above  #alcohol intoxication   Lactate 5.8, trend lactic acid  Fluids D5LR @100ml/hr  Anion gap 24. trend AG  Addiction medicine consult  CIWA protocol  Librium protocol  Thiamine 100mg and folate 1mg daily  Monitor vital signs  AM labs including phosphorous and magnesium.   Zofran PRN for nausea  GI ppx: PPI

## 2022-09-25 NOTE — PATIENT PROFILE ADULT - FALL HARM RISK - HARM RISK INTERVENTIONS
Assistance with ambulation/Assistance OOB with selected safe patient handling equipment/Communicate Risk of Fall with Harm to all staff/Monitor for mental status changes/Monitor gait and stability/Reinforce activity limits and safety measures with patient and family/Tailored Fall Risk Interventions/Toileting schedule using arm’s reach rule for commode and bathroom/Use of alarms - bed, chair and/or voice tab/Visual Cue: Yellow wristband and red socks/Bed in lowest position, wheels locked, appropriate side rails in place/Call bell, personal items and telephone in reach/Instruct patient to call for assistance before getting out of bed or chair/Non-slip footwear when patient is out of bed/[place] to call system/Physically safe environment - no spills, clutter or unnecessary equipment/Purposeful Proactive Rounding/Room/bathroom lighting operational, light cord in reach

## 2022-09-25 NOTE — H&P ADULT - NSHPSOCIALHISTORY_GEN_ALL_CORE
Substance Use (street drugs): ( x ) never used  (  ) other:  Tobacco Usage:  ( x  ) never smoked   (   ) former smoker   (   ) current smoker  (     ) pack year  Alcohol Usage: None Substance Use (street drugs): Former cocaine, crack and marijuana user 20 years ago,   Tobacco Usage:   ( X  ) former smoker 2-3 cigarettes a daily  Alcohol Usage: Daily alcohol user

## 2022-09-25 NOTE — H&P ADULT - NSHPPHYSICALEXAM_GEN_ALL_CORE
Vital Signs Last 24 Hrs  T(C): 35.9 (24 Sep 2022 21:15), Max: 35.9 (24 Sep 2022 21:15)  T(F): 96.7 (24 Sep 2022 21:15), Max: 96.7 (24 Sep 2022 21:15)  HR: 110 (24 Sep 2022 21:15) (110 - 110)  BP: 134/86 (24 Sep 2022 21:15) (134/86 - 134/86)  RR: 18 (24 Sep 2022 21:15) (18 - 18)  SpO2: 99% (24 Sep 2022 21:15) (99% - 99%)    Parameters below as of 24 Sep 2022 21:15  Patient On (Oxygen Delivery Method): room air    GENERAL: NAD, lying in bed comfortably  HEAD:  Atraumatic, Normocephalic  EYES: EOMI, PERRLA, conjunctiva and sclera clear  ENT: Moist mucous membranes  NECK: Supple, No JVD  CHEST/LUNG: Clear to auscultation bilaterally; No rales, rhonchi, wheezing, or rubs. Unlabored respirations  HEART: Regular rate and rhythm; No murmurs, rubs, or gallops  ABDOMEN: Bowel sounds present; Soft, Nontender, Nondistended. No hepatomegally  EXTREMITIES:  2+ Peripheral Pulses, brisk capillary refill. No clubbing, cyanosis, or edema  NERVOUS SYSTEM:  Alert & Oriented X3, speech clear. No deficits   MSK: FROM all 4 extremities, full and equal strength  SKIN: No rashes or lesions Vital Signs Last 24 Hrs  T(C): 35.9 (24 Sep 2022 21:15), Max: 35.9 (24 Sep 2022 21:15)  T(F): 96.7 (24 Sep 2022 21:15), Max: 96.7 (24 Sep 2022 21:15)  HR: 110 (24 Sep 2022 21:15) (110 - 110)  BP: 134/86 (24 Sep 2022 21:15) (134/86 - 134/86)  RR: 18 (24 Sep 2022 21:15) (18 - 18)  SpO2: 99% (24 Sep 2022 21:15) (99% - 99%)    Parameters below as of 24 Sep 2022 21:15  Patient On (Oxygen Delivery Method): room air    GENERAL: Anxious appearing.   HEAD:  Atraumatic, Normocephalic  EYES: EOMI, PERRLA, conjunctiva and sclera clear  ENT: Moist mucous membranes  NECK: Supple, No JVD  CHEST/LUNG: Clear to auscultation bilaterally; No rales, rhonchi, wheezing, or rubs. Unlabored respirations  HEART: Regular rate and rhythm; No murmurs, rubs, or gallops  ABDOMEN: Bowel sounds present; Soft, Nontender, Nondistended. No hepatomegally  EXTREMITIES:  2+ Peripheral Pulses, brisk capillary refill. No clubbing, cyanosis, or edema  NERVOUS SYSTEM:  Alert & Oriented X3, speech clear. No deficits   MSK: FROM all 4 extremities, full and equal strength  SKIN: No rashes or lesions

## 2022-09-25 NOTE — H&P ADULT - HISTORY OF PRESENT ILLNESS
Patient is a 41 y/o F with no pmhx who reports to the ER intoxicated stating she drinks 1L of vodka daily over the past 15 years. Last drink was at 5pm. States today she experienced multiple episodes of vomit and cannot tolerate any PO. Denies suicidal/ homicidal ideations, auditory/visual hallucinations, chest pain or SOB.   Patient is a 43 y/o F with a pmhx of Hemochromatosis, HLD,  Graves disease, alcoholism, anxiety, benzo abuse. Pt reports to the ER intoxicated stating she drinks 1L of vodka daily over the past 15 years. Last drink was at 5pm. States today she experienced multiple episodes of vomit and cannot tolerate any PO. In the ED, lactic acid was 4.9 -> 5.8, anion gap 24, pt was given thiamine in the ED + LR. Denies suicidal/ homicidal ideations, auditory/visual hallucinations, chest pain or SOB.

## 2022-09-26 DIAGNOSIS — F10.239 ALCOHOL DEPENDENCE WITH WITHDRAWAL, UNSPECIFIED: ICD-10-CM

## 2022-09-26 LAB
ALBUMIN SERPL ELPH-MCNC: 3.7 G/DL — SIGNIFICANT CHANGE UP (ref 3.5–5.2)
ALP SERPL-CCNC: 68 U/L — SIGNIFICANT CHANGE UP (ref 30–115)
ALT FLD-CCNC: 82 U/L — HIGH (ref 0–41)
ANION GAP SERPL CALC-SCNC: 10 MMOL/L — SIGNIFICANT CHANGE UP (ref 7–14)
AST SERPL-CCNC: 135 U/L — HIGH (ref 0–41)
BASOPHILS # BLD AUTO: 0.01 K/UL — SIGNIFICANT CHANGE UP (ref 0–0.2)
BASOPHILS NFR BLD AUTO: 0.3 % — SIGNIFICANT CHANGE UP (ref 0–1)
BILIRUB SERPL-MCNC: 1.4 MG/DL — HIGH (ref 0.2–1.2)
BUN SERPL-MCNC: 8 MG/DL — LOW (ref 10–20)
CALCIUM SERPL-MCNC: 9.1 MG/DL — SIGNIFICANT CHANGE UP (ref 8.4–10.5)
CHLORIDE SERPL-SCNC: 97 MMOL/L — LOW (ref 98–110)
CO2 SERPL-SCNC: 30 MMOL/L — SIGNIFICANT CHANGE UP (ref 17–32)
CREAT SERPL-MCNC: 0.6 MG/DL — LOW (ref 0.7–1.5)
EGFR: 116 ML/MIN/1.73M2 — SIGNIFICANT CHANGE UP
EOSINOPHIL # BLD AUTO: 0.09 K/UL — SIGNIFICANT CHANGE UP (ref 0–0.7)
EOSINOPHIL NFR BLD AUTO: 2.7 % — SIGNIFICANT CHANGE UP (ref 0–8)
GLUCOSE SERPL-MCNC: 113 MG/DL — HIGH (ref 70–99)
HCT VFR BLD CALC: 33.3 % — LOW (ref 37–47)
HGB BLD-MCNC: 11.4 G/DL — LOW (ref 12–16)
IMM GRANULOCYTES NFR BLD AUTO: 0.3 % — SIGNIFICANT CHANGE UP (ref 0.1–0.3)
LACTATE SERPL-SCNC: 1.5 MMOL/L — SIGNIFICANT CHANGE UP (ref 0.7–2)
LYMPHOCYTES # BLD AUTO: 1.43 K/UL — SIGNIFICANT CHANGE UP (ref 1.2–3.4)
LYMPHOCYTES # BLD AUTO: 43.3 % — SIGNIFICANT CHANGE UP (ref 20.5–51.1)
MAGNESIUM SERPL-MCNC: 1.6 MG/DL — LOW (ref 1.8–2.4)
MCHC RBC-ENTMCNC: 34.2 G/DL — SIGNIFICANT CHANGE UP (ref 32–37)
MCHC RBC-ENTMCNC: 34.2 PG — HIGH (ref 27–31)
MCV RBC AUTO: 100 FL — HIGH (ref 81–99)
MONOCYTES # BLD AUTO: 0.3 K/UL — SIGNIFICANT CHANGE UP (ref 0.1–0.6)
MONOCYTES NFR BLD AUTO: 9.1 % — SIGNIFICANT CHANGE UP (ref 1.7–9.3)
NEUTROPHILS # BLD AUTO: 1.46 K/UL — SIGNIFICANT CHANGE UP (ref 1.4–6.5)
NEUTROPHILS NFR BLD AUTO: 44.3 % — SIGNIFICANT CHANGE UP (ref 42.2–75.2)
NRBC # BLD: 0 /100 WBCS — SIGNIFICANT CHANGE UP (ref 0–0)
PHOSPHATE SERPL-MCNC: 2.3 MG/DL — SIGNIFICANT CHANGE UP (ref 2.1–4.9)
PLATELET # BLD AUTO: 114 K/UL — LOW (ref 130–400)
POTASSIUM SERPL-MCNC: 4 MMOL/L — SIGNIFICANT CHANGE UP (ref 3.5–5)
POTASSIUM SERPL-SCNC: 4 MMOL/L — SIGNIFICANT CHANGE UP (ref 3.5–5)
PROT SERPL-MCNC: 5.7 G/DL — LOW (ref 6–8)
RBC # BLD: 3.33 M/UL — LOW (ref 4.2–5.4)
RBC # FLD: 11.7 % — SIGNIFICANT CHANGE UP (ref 11.5–14.5)
SODIUM SERPL-SCNC: 137 MMOL/L — SIGNIFICANT CHANGE UP (ref 135–146)
WBC # BLD: 3.3 K/UL — LOW (ref 4.8–10.8)
WBC # FLD AUTO: 3.3 K/UL — LOW (ref 4.8–10.8)

## 2022-09-26 PROCEDURE — 99221 1ST HOSP IP/OBS SF/LOW 40: CPT

## 2022-09-26 PROCEDURE — 99251: CPT

## 2022-09-26 PROCEDURE — 99232 SBSQ HOSP IP/OBS MODERATE 35: CPT

## 2022-09-26 RX ORDER — ERYTHROMYCIN BASE 5 MG/GRAM
1 OINTMENT (GRAM) OPHTHALMIC (EYE)
Refills: 0 | Status: DISCONTINUED | OUTPATIENT
Start: 2022-09-26 | End: 2022-09-28

## 2022-09-26 RX ORDER — MAGNESIUM SULFATE 500 MG/ML
2 VIAL (ML) INJECTION ONCE
Refills: 0 | Status: COMPLETED | OUTPATIENT
Start: 2022-09-26 | End: 2022-09-26

## 2022-09-26 RX ADMIN — Medication 100 MILLIGRAM(S): at 10:51

## 2022-09-26 RX ADMIN — Medication 2 MILLIGRAM(S): at 21:41

## 2022-09-26 RX ADMIN — Medication 2 MILLIGRAM(S): at 05:28

## 2022-09-26 RX ADMIN — Medication 2 MILLIGRAM(S): at 12:47

## 2022-09-26 RX ADMIN — ENOXAPARIN SODIUM 40 MILLIGRAM(S): 100 INJECTION SUBCUTANEOUS at 10:51

## 2022-09-26 RX ADMIN — Medication 1 MILLIGRAM(S): at 10:51

## 2022-09-26 RX ADMIN — FAMOTIDINE 20 MILLIGRAM(S): 10 INJECTION INTRAVENOUS at 05:28

## 2022-09-26 RX ADMIN — QUETIAPINE FUMARATE 200 MILLIGRAM(S): 200 TABLET, FILM COATED ORAL at 21:41

## 2022-09-26 RX ADMIN — FAMOTIDINE 20 MILLIGRAM(S): 10 INJECTION INTRAVENOUS at 17:31

## 2022-09-26 RX ADMIN — Medication 25 GRAM(S): at 10:51

## 2022-09-26 NOTE — CONSULT NOTE ADULT - PROBLEM SELECTOR RECOMMENDATION 9
After evaluation at this time would place CIWA protocol. Nursing staff informed of change and appropriate use of tool. Pt should be on Thiamine and Folic acid. Pt will be monitored and supportive care provided.    -Alcohol counseling provided   CATCH team involved for aftercare and pt will follow up with aftercare for outpatient. Pt is refusing inpatient rehab.  Dr Tan aware of plan..orders placed.

## 2022-09-26 NOTE — ED ADULT NURSE NOTE - GASTROINTESTINAL ASSESSMENT
- - - Taltz Counseling: I discussed with the patient the risks of ixekizumab including but not limited to immunosuppression, serious infections, worsening of inflammatory bowel disease and drug reactions.  The patient understands that monitoring is required including a PPD at baseline and must alert us or the primary physician if symptoms of infection or other concerning signs are noted. Tazorac Pregnancy And Lactation Text: This medication is not safe during pregnancy. It is unknown if this medication is excreted in breast milk. Enbrel Counseling:  I discussed with the patient the risks of etanercept including but not limited to myelosuppression, immunosuppression, autoimmune hepatitis, demyelinating diseases, lymphoma, and infections.  The patient understands that monitoring is required including a PPD at baseline and must alert us or the primary physician if symptoms of infection or other concerning signs are noted. Arava Pregnancy And Lactation Text: This medication is Pregnancy Category X and is absolutely contraindicated during pregnancy. It is unknown if it is excreted in breast milk. Methotrexate Pregnancy And Lactation Text: This medication is Pregnancy Category X and is known to cause fetal harm. This medication is excreted in breast milk. Mirvaso Counseling: Mirvaso is a topical medication which can decrease superficial blood flow where applied. Side effects are uncommon and include stinging, redness and allergic reactions. Clofazimine Counseling:  I discussed with the patient the risks of clofazimine including but not limited to skin and eye pigmentation, liver damage, nausea/vomiting, gastrointestinal bleeding and allergy. Taltz Pregnancy And Lactation Text: The risk during pregnancy and breastfeeding is uncertain with this medication. Spironolactone Counseling: Patient advised regarding risks of diarrhea, abdominal pain, hyperkalemia, birth defects (for female patients), liver toxicity and renal toxicity. The patient may need blood work to monitor liver and kidney function and potassium levels while on therapy. The patient verbalized understanding of the proper use and possible adverse effects of spironolactone.  All of the patient's questions and concerns were addressed. Doxepin Counseling:  Patient advised that the medication is sedating and not to drive a car after taking this medication. Patient informed of potential adverse effects including but not limited to dry mouth, urinary retention, and blurry vision.  The patient verbalized understanding of the proper use and possible adverse effects of doxepin.  All of the patient's questions and concerns were addressed. Libtayo Counseling- I discussed with the patient the risks of Libtayo including but not limited to nausea, vomiting, diarrhea, and bone or muscle pain.  The patient verbalized understanding of the proper use and possible adverse effects of Libtayo.  All of the patient's questions and concerns were addressed. Topical Clindamycin Counseling: Patient counseled that this medication may cause skin irritation or allergic reactions.  In the event of skin irritation, the patient was advised to reduce the amount of the drug applied or use it less frequently.   The patient verbalized understanding of the proper use and possible adverse effects of clindamycin.  All of the patient's questions and concerns were addressed. Sarecycline Counseling: Patient advised regarding possible photosensitivity and discoloration of the teeth, skin, lips, tongue and gums.  Patient instructed to avoid sunlight, if possible.  When exposed to sunlight, patients should wear protective clothing, sunglasses, and sunscreen.  The patient was instructed to call the office immediately if the following severe adverse effects occur:  hearing changes, easy bruising/bleeding, severe headache, or vision changes.  The patient verbalized understanding of the proper use and possible adverse effects of sarecycline.  All of the patient's questions and concerns were addressed. Libtayo Pregnancy And Lactation Text: This medication is contraindicated in pregnancy and when breast feeding. Prednisone Counseling:  I discussed with the patient the risks of prolonged use of prednisone including but not limited to weight gain, insomnia, osteoporosis, mood changes, diabetes, susceptibility to infection, glaucoma and high blood pressure.  In cases where prednisone use is prolonged, patients should be monitored with blood pressure checks, serum glucose levels and an eye exam.  Additionally, the patient may need to be placed on GI prophylaxis, PCP prophylaxis, and calcium and vitamin D supplementation and/or a bisphosphonate.  The patient verbalized understanding of the proper use and the possible adverse effects of prednisone.  All of the patient's questions and concerns were addressed. Carac Counseling:  I discussed with the patient the risks of Carac including but not limited to erythema, scaling, itching, weeping, crusting, and pain. Enbrel Pregnancy And Lactation Text: This medication is Pregnancy Category B and is considered safe during pregnancy. It is unknown if this medication is excreted in breast milk. Clofazimine Pregnancy And Lactation Text: This medication is Pregnancy Category C and isn't considered safe during pregnancy. It is excreted in breast milk. Prednisone Pregnancy And Lactation Text: This medication is Pregnancy Category C and it isn't know if it is safe during pregnancy. This medication is excreted in breast milk. Doxepin Pregnancy And Lactation Text: This medication is Pregnancy Category C and it isn't known if it is safe during pregnancy. It is also excreted in breast milk and breast feeding isn't recommended. Carac Pregnancy And Lactation Text: This medication is Pregnancy Category X and contraindicated in pregnancy and in women who may become pregnant. It is unknown if this medication is excreted in breast milk. Spironolactone Pregnancy And Lactation Text: This medication can cause feminization of the male fetus and should be avoided during pregnancy. The active metabolite is also found in breast milk. Mirvaso Pregnancy And Lactation Text: This medication has not been assigned a Pregnancy Risk Category. It is unknown if the medication is excreted in breast milk. Sarecycline Pregnancy And Lactation Text: This medication is Pregnancy Category D and not consider safe during pregnancy. It is also excreted in breast milk. Tremfya Counseling: I discussed with the patient the risks of guselkumab including but not limited to immunosuppression, serious infections, and drug reactions.  The patient understands that monitoring is required including a PPD at baseline and must alert us or the primary physician if symptoms of infection or other concerning signs are noted. Humira Counseling:  I discussed with the patient the risks of adalimumab including but not limited to myelosuppression, immunosuppression, autoimmune hepatitis, demyelinating diseases, lymphoma, and serious infections.  The patient understands that monitoring is required including a PPD at baseline and must alert us or the primary physician if symptoms of infection or other concerning signs are noted. Picato Counseling:  I discussed with the patient the risks of Picato including but not limited to erythema, scaling, itching, weeping, crusting, and pain. SSKI Counseling:  I discussed with the patient the risks of SSKI including but not limited to thyroid abnormalities, metallic taste, GI upset, fever, headache, acne, arthralgias, paraesthesias, lymphadenopathy, easy bleeding, arrhythmias, and allergic reaction. Topical Clindamycin Pregnancy And Lactation Text: This medication is Pregnancy Category B and is considered safe during pregnancy. It is unknown if it is excreted in breast milk. Hydroxyzine Counseling: Patient advised that the medication is sedating and not to drive a car after taking this medication.  Patient informed of potential adverse effects including but not limited to dry mouth, urinary retention, and blurry vision.  The patient verbalized understanding of the proper use and possible adverse effects of hydroxyzine.  All of the patient's questions and concerns were addressed. Tetracycline Counseling: Patient counseled regarding possible photosensitivity and increased risk for sunburn.  Patient instructed to avoid sunlight, if possible.  When exposed to sunlight, patients should wear protective clothing, sunglasses, and sunscreen.  The patient was instructed to call the office immediately if the following severe adverse effects occur:  hearing changes, easy bruising/bleeding, severe headache, or vision changes.  The patient verbalized understanding of the proper use and possible adverse effects of tetracycline.  All of the patient's questions and concerns were addressed. Patient understands to avoid pregnancy while on therapy due to potential birth defects. Topical Ketoconazole Counseling: Patient counseled that this medication may cause skin irritation or allergic reactions.  In the event of skin irritation, the patient was advised to reduce the amount of the drug applied or use it less frequently.   The patient verbalized understanding of the proper use and possible adverse effects of ketoconazole.  All of the patient's questions and concerns were addressed. Niacinamide Counseling: I recommended taking niacin or niacinamide, also know as vitamin B3, twice daily. Recent evidence suggests that taking vitamin B3 (500 mg twice daily) can reduce the risk of actinic keratoses and non-melanoma skin cancers. Side effects of vitamin B3 include flushing and headache. Colchicine Counseling:  Patient counseled regarding adverse effects including but not limited to stomach upset (nausea, vomiting, stomach pain, or diarrhea).  Patient instructed to limit alcohol consumption while taking this medication.  Colchicine may reduce blood counts especially with prolonged use.  The patient understands that monitoring of kidney function and blood counts may be required, especially at baseline. The patient verbalized understanding of the proper use and possible adverse effects of colchicine.  All of the patient's questions and concerns were addressed. Calcipotriene Counseling:  I discussed with the patient the risks of calcipotriene including but not limited to erythema, scaling, itching, and irritation. Picato Pregnancy And Lactation Text: This medication is Pregnancy Category C. It is unknown if this medication is excreted in breast milk. Azithromycin Counseling:  I discussed with the patient the risks of azithromycin including but not limited to GI upset, allergic reaction, drug rash, diarrhea, and yeast infections. Sski Pregnancy And Lactation Text: This medication is Pregnancy Category D and isn't considered safe during pregnancy. It is excreted in breast milk. Hydroxyzine Pregnancy And Lactation Text: This medication is not safe during pregnancy and should not be taken. It is also excreted in breast milk and breast feeding isn't recommended. Cephalexin Pregnancy And Lactation Text: This medication is Pregnancy Category B and considered safe during pregnancy.  It is also excreted in breast milk but can be used safely for shorter doses. Calcipotriene Pregnancy And Lactation Text: This medication has not been proven safe during pregnancy. It is unknown if this medication is excreted in breast milk. Niacinamide Pregnancy And Lactation Text: These medications are considered safe during pregnancy. Xeljanz Counseling: I discussed with the patient the risks of Xeljanz therapy including increased risk of infection, liver issues, headache, diarrhea, or cold symptoms. Live vaccines should be avoided. They were instructed to call if they have any problems. Nsaids Counseling: NSAID Counseling: I discussed with the patient that NSAIDs should be taken with food. Prolonged use of NSAIDs can result in the development of stomach ulcers.  Patient advised to stop taking NSAIDs if abdominal pain occurs.  The patient verbalized understanding of the proper use and possible adverse effects of NSAIDs.  All of the patient's questions and concerns were addressed. Ilumya Counseling: I discussed with the patient the risks of tildrakizumab including but not limited to immunosuppression, malignancy, posterior leukoencephalopathy syndrome, and serious infections.  The patient understands that monitoring is required including a PPD at baseline and must alert us or the primary physician if symptoms of infection or other concerning signs are noted. Dapsone Counseling: I discussed with the patient the risks of dapsone including but not limited to hemolytic anemia, agranulocytosis, rashes, methemoglobinemia, kidney failure, peripheral neuropathy, headaches, GI upset, and liver toxicity.  Patients who start dapsone require monitoring including baseline LFTs and weekly CBCs for the first month, then every month thereafter.  The patient verbalized understanding of the proper use and possible adverse effects of dapsone.  All of the patient's questions and concerns were addressed. Acitretin Counseling:  I discussed with the patient the risks of acitretin including but not limited to hair loss, dry lips/skin/eyes, liver damage, hyperlipidemia, depression/suicidal ideation, photosensitivity.  Serious rare side effects can include but are not limited to pancreatitis, pseudotumor cerebri, bony changes, clot formation/stroke/heart attack.  Patient understands that alcohol is contraindicated since it can result in liver toxicity and significantly prolong the elimination of the drug by many years. 5-Fu Counseling: 5-Fluorouracil Counseling:  I discussed with the patient the risks of 5-fluorouracil including but not limited to erythema, scaling, itching, weeping, crusting, and pain. Azithromycin Pregnancy And Lactation Text: This medication is considered safe during pregnancy and is also secreted in breast milk. Thalidomide Counseling: I discussed with the patient the risks of thalidomide including but not limited to birth defects, anxiety, weakness, chest pain, dizziness, cough and severe allergy. Protopic Counseling: Patient may experience a mild burning sensation during topical application. Protopic is not approved in children less than 2 years of age. There have been case reports of hematologic and skin malignancies in patients using topical calcineurin inhibitors although causality is questionable. Clindamycin Counseling: I counseled the patient regarding use of clindamycin as an antibiotic for prophylactic and/or therapeutic purposes. Clindamycin is active against numerous classes of bacteria, including skin bacteria. Side effects may include nausea, diarrhea, gastrointestinal upset, rash, hives, yeast infections, and in rare cases, colitis. Xelshanicez Pregnancy And Lactation Text: This medication is Pregnancy Category D and is not considered safe during pregnancy.  The risk during breast feeding is also uncertain. Albendazole Counseling:  I discussed with the patient the risks of albendazole including but not limited to cytopenia, kidney damage, nausea/vomiting and severe allergy.  The patient understands that this medication is being used in an off-label manner. Topical Sulfur Applications Counseling: Topical Sulfur Counseling: Patient counseled that this medication may cause skin irritation or allergic reactions.  In the event of skin irritation, the patient was advised to reduce the amount of the drug applied or use it less frequently.   The patient verbalized understanding of the proper use and possible adverse effects of topical sulfur application.  All of the patient's questions and concerns were addressed. Fluconazole Counseling:  Patient counseled regarding adverse effects of fluconazole including but not limited to headache, diarrhea, nausea, upset stomach, liver function test abnormalities, taste disturbance, and stomach pain.  There is a rare possibility of liver failure that can occur when taking fluconazole.  The patient understands that monitoring of LFTs and kidney function test may be required, especially at baseline. The patient verbalized understanding of the proper use and possible adverse effects of fluconazole.  All of the patient's questions and concerns were addressed. Topical Sulfur Applications Pregnancy And Lactation Text: This medication is considered safe during pregnancy and breast feeding secondary to limited systemic absorption. Bactrim Counseling:  I discussed with the patient the risks of sulfa antibiotics including but not limited to GI upset, allergic reaction, drug rash, diarrhea, dizziness, photosensitivity, and yeast infections.  Rarely, more serious reactions can occur including but not limited to aplastic anemia, agranulocytosis, methemoglobinemia, blood dyscrasias, liver or kidney failure, lung infiltrates or desquamative/blistering drug rashes. Nsaids Pregnancy And Lactation Text: These medications are considered safe up to 30 weeks gestation. It is excreted in breast milk. Acitretin Pregnancy And Lactation Text: This medication is Pregnancy Category X and should not be given to women who are pregnant or may become pregnant in the future. This medication is excreted in breast milk. Bactrim Pregnancy And Lactation Text: This medication is Pregnancy Category D and is known to cause fetal risk.  It is also excreted in breast milk. Clindamycin Pregnancy And Lactation Text: This medication can be used in pregnancy if certain situations. Clindamycin is also present in breast milk. Xolair Counseling:  Patient informed of potential adverse effects including but not limited to fever, muscle aches, rash and allergic reactions.  The patient verbalized understanding of the proper use and possible adverse effects of Xolair.  All of the patient's questions and concerns were addressed. Dapsone Pregnancy And Lactation Text: This medication is Pregnancy Category C and is not considered safe during pregnancy or breast feeding. Tranexamic Acid Counseling:  Patient advised of the small risk of bleeding problems with tranexamic acid. They were also instructed to call if they developed any nausea, vomiting or diarrhea. All of the patient's questions and concerns were addressed. Infliximab Counseling:  I discussed with the patient the risks of infliximab including but not limited to myelosuppression, immunosuppression, autoimmune hepatitis, demyelinating diseases, lymphoma, and serious infections.  The patient understands that monitoring is required including a PPD at baseline and must alert us or the primary physician if symptoms of infection or other concerning signs are noted. Dutasteride Male Counseling: Dustasteride Counseling:  I discussed with the patient the risks of use of dutasteride including but not limited to decreased libido, decreased ejaculate volume, and gynecomastia. Women who can become pregnant should not handle medication.  All of the patient's questions and concerns were addressed. Wartpeel Counseling:  I discussed with the patient the risks of Wartpeel including but not limited to erythema, scaling, itching, weeping, crusting, and pain. Albendazole Pregnancy And Lactation Text: This medication is Pregnancy Category C and it isn't known if it is safe during pregnancy. It is also excreted in breast milk. Bexarotene Counseling:  I discussed with the patient the risks of bexarotene including but not limited to hair loss, dry lips/skin/eyes, liver abnormalities, hyperlipidemia, pancreatitis, depression/suicidal ideation, photosensitivity, drug rash/allergic reactions, hypothyroidism, anemia, leukopenia, infection, cataracts, and teratogenicity.  Patient understands that they will need regular blood tests to check lipid profile, liver function tests, white blood cell count, thyroid function tests and pregnancy test if applicable. Fluconazole Pregnancy And Lactation Text: This medication is Pregnancy Category C and it isn't know if it is safe during pregnancy. It is also excreted in breast milk. Drysol Counseling:  I discussed with the patient the risks of drysol/aluminum chloride including but not limited to skin rash, itching, irritation, burning. Odomzo Counseling- I discussed with the patient the risks of Odomzo including but not limited to nausea, vomiting, diarrhea, constipation, weight loss, changes in the sense of taste, decreased appetite, muscle spasms, and hair loss.  The patient verbalized understanding of the proper use and possible adverse effects of Odomzo.  All of the patient's questions and concerns were addressed. Xolair Pregnancy And Lactation Text: This medication is Pregnancy Category B and is considered safe during pregnancy. This medication is excreted in breast milk. Doxycycline Counseling:  Patient counseled regarding possible photosensitivity and increased risk for sunburn.  Patient instructed to avoid sunlight, if possible.  When exposed to sunlight, patients should wear protective clothing, sunglasses, and sunscreen.  The patient was instructed to call the office immediately if the following severe adverse effects occur:  hearing changes, easy bruising/bleeding, severe headache, or vision changes.  The patient verbalized understanding of the proper use and possible adverse effects of doxycycline.  All of the patient's questions and concerns were addressed. Drysol Pregnancy And Lactation Text: This medication is considered safe during pregnancy and breast feeding. Tranexamic Acid Pregnancy And Lactation Text: It is unknown if this medication is safe during pregnancy or breast feeding. Cephalexin Counseling: I counseled the patient regarding use of cephalexin as an antibiotic for prophylactic and/or therapeutic purposes. Cephalexin (commonly prescribed under brand name Keflex) is a cephalosporin antibiotic which is active against numerous classes of bacteria, including most skin bacteria. Side effects may include nausea, diarrhea, gastrointestinal upset, rash, hives, yeast infections, and in rare cases, hepatitis, kidney disease, seizures, fever, confusion, neurologic symptoms, and others. Patients with severe allergies to penicillin medications are cautioned that there is about a 10% incidence of cross-reactivity with cephalosporins. When possible, patients with penicillin allergies should use alternatives to cephalosporins for antibiotic therapy. Ivermectin Counseling:  Patient instructed to take medication on an empty stomach with a full glass of water.  Patient informed of potential adverse effects including but not limited to nausea, diarrhea, dizziness, itching, and swelling of the extremities or lymph nodes.  The patient verbalized understanding of the proper use and possible adverse effects of ivermectin.  All of the patient's questions and concerns were addressed. Griseofulvin Counseling:  I discussed with the patient the risks of griseofulvin including but not limited to photosensitivity, cytopenia, liver damage, nausea/vomiting and severe allergy.  The patient understands that this medication is best absorbed when taken with a fatty meal (e.g., ice cream or french fries). Doxycycline Pregnancy And Lactation Text: This medication is Pregnancy Category D and not consider safe during pregnancy. It is also excreted in breast milk but is considered safe for shorter treatment courses. Rituxan Counseling:  I discussed with the patient the risks of Rituxan infusions. Side effects can include infusion reactions, severe drug rashes including mucocutaneous reactions, reactivation of latent hepatitis and other infections and rarely progressive multifocal leukoencephalopathy.  All of the patient's questions and concerns were addressed. Dutasteride Pregnancy And Lactation Text: This medication is absolutely contraindicated in women, especially during pregnancy and breast feeding. Feminization of male fetuses is possible if taking while pregnant. Bexarotene Pregnancy And Lactation Text: This medication is Pregnancy Category X and should not be given to women who are pregnant or may become pregnant. This medication should not be used if you are breast feeding. Valtrex Counseling: I discussed with the patient the risks of valacyclovir including but not limited to kidney damage, nausea, vomiting and severe allergy.  The patient understands that if the infection seems to be worsening or is not improving, they are to call. Erivedge Counseling- I discussed with the patient the risks of Erivedge including but not limited to nausea, vomiting, diarrhea, constipation, weight loss, changes in the sense of taste, decreased appetite, muscle spasms, and hair loss.  The patient verbalized understanding of the proper use and possible adverse effects of Erivedge.  All of the patient's questions and concerns were addressed. Azathioprine Counseling:  I discussed with the patient the risks of azathioprine including but not limited to myelosuppression, immunosuppression, hepatotoxicity, lymphoma, and infections.  The patient understands that monitoring is required including baseline LFTs, Creatinine, possible TPMP genotyping and weekly CBCs for the first month and then every 2 weeks thereafter.  The patient verbalized understanding of the proper use and possible adverse effects of azathioprine.  All of the patient's questions and concerns were addressed. Oral Minoxidil Counseling- I discussed with the patient the risks of oral minoxidil including but not limited to shortness of breath, swelling of the feet or ankles, dizziness, lightheadedness, unwanted hair growth and allergic reaction.  The patient verbalized understanding of the proper use and possible adverse effects of oral minoxidil.  All of the patient's questions and concerns were addressed. Winlevi Counseling:  I discussed with the patient the risks of topical clascoterone including but not limited to erythema, scaling, itching, and stinging. Patient voiced their understanding. Griseofulvin Pregnancy And Lactation Text: This medication is Pregnancy Category X and is known to cause serious birth defects. It is unknown if this medication is excreted in breast milk but breast feeding should be avoided. Erythromycin Counseling:  I discussed with the patient the risks of erythromycin including but not limited to GI upset, allergic reaction, drug rash, diarrhea, increase in liver enzymes, and yeast infections. Isotretinoin Counseling: Patient should get monthly blood tests, not donate blood, not drive at night if vision affected, not share medication, and not undergo elective surgery for 6 months after tx completed. Side effects reviewed, pt to contact office should one occur. Elidel Counseling: Patient may experience a mild burning sensation during topical application. Elidel is not approved in children less than 2 years of age. There have been case reports of hematologic and skin malignancies in patients using topical calcineurin inhibitors although causality is questionable. Rituxan Pregnancy And Lactation Text: This medication is Pregnancy Category C and it isn't know if it is safe during pregnancy. It is unknown if this medication is excreted in breast milk but similar antibodies are known to be excreted. Isotretinoin Pregnancy And Lactation Text: This medication is Pregnancy Category X and is considered extremely dangerous during pregnancy. It is unknown if it is excreted in breast milk. Oral Minoxidil Pregnancy And Lactation Text: This medication should only be used when clearly needed if you are pregnant, attempting to become pregnant or breast feeding. Valtrex Pregnancy And Lactation Text: this medication is Pregnancy Category B and is considered safe during pregnancy. This medication is not directly found in breast milk but it's metabolite acyclovir is present. Itraconazole Counseling:  I discussed with the patient the risks of itraconazole including but not limited to liver damage, nausea/vomiting, neuropathy, and severe allergy.  The patient understands that this medication is best absorbed when taken with acidic beverages such as non-diet cola or ginger ale.  The patient understands that monitoring is required including baseline LFTs and repeat LFTs at intervals.  The patient understands that they are to contact us or the primary physician if concerning signs are noted. Erythromycin Pregnancy And Lactation Text: This medication is Pregnancy Category B and is considered safe during pregnancy. It is also excreted in breast milk. Detail Level: Detailed Siliq Counseling:  I discussed with the patient the risks of Siliq including but not limited to new or worsening depression, suicidal thoughts and behavior, immunosuppression, malignancy, posterior leukoencephalopathy syndrome, and serious infections.  The patient understands that monitoring is required including a PPD at baseline and must alert us or the primary physician if symptoms of infection or other concerning signs are noted. There is also a special program designed to monitor depression which is required with Siliq. Protopic Pregnancy And Lactation Text: This medication is Pregnancy Category C. It is unknown if this medication is excreted in breast milk when applied topically. Winlevi Pregnancy And Lactation Text: This medication is considered safe during pregnancy and breastfeeding. Azathioprine Pregnancy And Lactation Text: This medication is Pregnancy Category D and isn't considered safe during pregnancy. It is unknown if this medication is excreted in breast milk. Eucrisa Counseling: Patient may experience a mild burning sensation during topical application. Eucrisa is not approved in children less than 3 months of age. Zyclara Counseling:  I discussed with the patient the risks of imiquimod including but not limited to erythema, scaling, itching, weeping, crusting, and pain.  Patient understands that the inflammatory response to imiquimod is variable from person to person and was educated regarded proper titration schedule.  If flu-like symptoms develop, patient knows to discontinue the medication and contact us. Otezla Counseling: The side effects of Otezla were discussed with the patient, including but not limited to worsening or new depression, weight loss, diarrhea, nausea, upper respiratory tract infection, and headache. Patient instructed to call the office should any adverse effect occur.  The patient verbalized understanding of the proper use and possible adverse effects of Otezla.  All the patient's questions and concerns were addressed. Finasteride Male Counseling: Finasteride Counseling:  I discussed with the patient the risks of use of finasteride including but not limited to decreased libido, decreased ejaculate volume, gynecomastia, and depression. Women should not handle medication.  All of the patient's questions and concerns were addressed. High Dose Vitamin A Counseling: Side effects reviewed, pt to contact office should one occur. Rhofade Counseling: Rhofade is a topical medication which can decrease superficial blood flow where applied. Side effects are uncommon and include stinging, redness and allergic reactions. Metronidazole Counseling:  I discussed with the patient the risks of metronidazole including but not limited to seizures, nausea/vomiting, a metallic taste in the mouth, nausea/vomiting and severe allergy. Finasteride Pregnancy And Lactation Text: This medication is absolutely contraindicated during pregnancy. It is unknown if it is excreted in breast milk. Cellcept Counseling:  I discussed with the patient the risks of mycophenolate mofetil including but not limited to infection/immunosuppression, GI upset, hypokalemia, hypercholesterolemia, bone marrow suppression, lymphoproliferative disorders, malignancy, GI ulceration/bleed/perforation, colitis, interstitial lung disease, kidney failure, progressive multifocal leukoencephalopathy, and birth defects.  The patient understands that monitoring is required including a baseline creatinine and regular CBC testing. In addition, patient must alert us immediately if symptoms of infection or other concerning signs are noted. Use Enhanced Medication Counseling?: No Ketoconazole Counseling:   Patient counseled regarding improving absorption with orange juice.  Adverse effects include but are not limited to breast enlargement, headache, diarrhea, nausea, upset stomach, liver function test abnormalities, taste disturbance, and stomach pain.  There is a rare possibility of liver failure that can occur when taking ketoconazole. The patient understands that monitoring of LFTs may be required, especially at baseline. The patient verbalized understanding of the proper use and possible adverse effects of ketoconazole.  All of the patient's questions and concerns were addressed. High Dose Vitamin A Pregnancy And Lactation Text: High dose vitamin A therapy is contraindicated during pregnancy and breast feeding. Eucrisa Pregnancy And Lactation Text: This medication has not been assigned a Pregnancy Risk Category but animal studies failed to show danger with the topical medication. It is unknown if the medication is excreted in breast milk. Metronidazole Pregnancy And Lactation Text: This medication is Pregnancy Category B and considered safe during pregnancy.  It is also excreted in breast milk. Otezla Pregnancy And Lactation Text: This medication is Pregnancy Category C and it isn't known if it is safe during pregnancy. It is unknown if it is excreted in breast milk. Simponi Counseling:  I discussed with the patient the risks of golimumab including but not limited to myelosuppression, immunosuppression, autoimmune hepatitis, demyelinating diseases, lymphoma, and serious infections.  The patient understands that monitoring is required including a PPD at baseline and must alert us or the primary physician if symptoms of infection or other concerning signs are noted. Gabapentin Counseling: I discussed with the patient the risks of gabapentin including but not limited to dizziness, somnolence, fatigue and ataxia. Cimzia Counseling:  I discussed with the patient the risks of Cimzia including but not limited to immunosuppression, allergic reactions and infections.  The patient understands that monitoring is required including a PPD at baseline and must alert us or the primary physician if symptoms of infection or other concerning signs are noted. Hydroquinone Counseling:  Patient advised that medication may result in skin irritation, lightening (hypopigmentation), dryness, and burning.  In the event of skin irritation, the patient was advised to reduce the amount of the drug applied or use it less frequently.  Rarely, spots that are treated with hydroquinone can become darker (pseudoochronosis).  Should this occur, patient instructed to stop medication and call the office. The patient verbalized understanding of the proper use and possible adverse effects of hydroquinone.  All of the patient's questions and concerns were addressed. Oxybutynin Counseling:  I discussed with the patient the risks of oxybutynin including but not limited to skin rash, drowsiness, dry mouth, difficulty urinating, and blurred vision. Ketoconazole Pregnancy And Lactation Text: This medication is Pregnancy Category C and it isn't know if it is safe during pregnancy. It is also excreted in breast milk and breast feeding isn't recommended. Minocycline Counseling: Patient advised regarding possible photosensitivity and discoloration of the teeth, skin, lips, tongue and gums.  Patient instructed to avoid sunlight, if possible.  When exposed to sunlight, patients should wear protective clothing, sunglasses, and sunscreen.  The patient was instructed to call the office immediately if the following severe adverse effects occur:  hearing changes, easy bruising/bleeding, severe headache, or vision changes.  The patient verbalized understanding of the proper use and possible adverse effects of minocycline.  All of the patient's questions and concerns were addressed. Solaraze Counseling:  I discussed with the patient the risks of Solaraze including but not limited to erythema, scaling, itching, weeping, crusting, and pain. Cimzia Pregnancy And Lactation Text: This medication crosses the placenta but can be considered safe in certain situations. Cimzia may be excreted in breast milk. Cyclophosphamide Counseling:  I discussed with the patient the risks of cyclophosphamide including but not limited to hair loss, hormonal abnormalities, decreased fertility, abdominal pain, diarrhea, nausea and vomiting, bone marrow suppression and infection. The patient understands that monitoring is required while taking this medication. Terbinafine Counseling: Patient counseling regarding adverse effects of terbinafine including but not limited to headache, diarrhea, rash, upset stomach, liver function test abnormalities, itching, taste/smell disturbance, nausea, abdominal pain, and flatulence.  There is a rare possibility of liver failure that can occur when taking terbinafine.  The patient understands that a baseline LFT and kidney function test may be required. The patient verbalized understanding of the proper use and possible adverse effects of terbinafine.  All of the patient's questions and concerns were addressed. Opioid Counseling: I discussed with the patient the potential side effects of opioids including but not limited to addiction, altered mental status, and depression. I stressed avoiding alcohol, benzodiazepines, muscle relaxants and sleep aids unless specifically okayed by a physician. The patient verbalized understanding of the proper use and possible adverse effects of opioids. All of the patient's questions and concerns were addressed. They were instructed to flush the remaining pills down the toilet if they did not need them for pain. Solaraze Pregnancy And Lactation Text: This medication is Pregnancy Category B and is considered safe. There is some data to suggest avoiding during the third trimester. It is unknown if this medication is excreted in breast milk. Cyclophosphamide Pregnancy And Lactation Text: This medication is Pregnancy Category D and it isn't considered safe during pregnancy. This medication is excreted in breast milk. Azelaic Acid Counseling: Patient counseled that medicine may cause skin irritation and to avoid applying near the eyes.  In the event of skin irritation, the patient was advised to reduce the amount of the drug applied or use it less frequently.   The patient verbalized understanding of the proper use and possible adverse effects of azelaic acid.  All of the patient's questions and concerns were addressed. Skyrizi Counseling: I discussed with the patient the risks of risankizumab-rzaa including but not limited to immunosuppression, and serious infections.  The patient understands that monitoring is required including a PPD at baseline and must alert us or the primary physician if symptoms of infection or other concerning signs are noted. Opioid Pregnancy And Lactation Text: These medications can lead to premature delivery and should be avoided during pregnancy. These medications are also present in breast milk in small amounts. Glycopyrrolate Counseling:  I discussed with the patient the risks of glycopyrrolate including but not limited to skin rash, drowsiness, dry mouth, difficulty urinating, and blurred vision. Cyclosporine Counseling:  I discussed with the patient the risks of cyclosporine including but not limited to hypertension, gingival hyperplasia,myelosuppression, immunosuppression, liver damage, kidney damage, neurotoxicity, lymphoma, and serious infections. The patient understands that monitoring is required including baseline blood pressure, CBC, CMP, lipid panel and uric acid, and then 1-2 times monthly CMP and blood pressure. Cosentyx Counseling:  I discussed with the patient the risks of Cosentyx including but not limited to worsening of Crohn's disease, immunosuppression, allergic reactions and infections.  The patient understands that monitoring is required including a PPD at baseline and must alert us or the primary physician if symptoms of infection or other concerning signs are noted. Terbinafine Pregnancy And Lactation Text: This medication is Pregnancy Category B and is considered safe during pregnancy. It is also excreted in breast milk and breast feeding isn't recommended. Quinolones Counseling:  I discussed with the patient the risks of fluoroquinolones including but not limited to GI upset, allergic reaction, drug rash, diarrhea, dizziness, photosensitivity, yeast infections, liver function test abnormalities, tendonitis/tendon rupture. Imiquimod Counseling:  I discussed with the patient the risks of imiquimod including but not limited to erythema, scaling, itching, weeping, crusting, and pain.  Patient understands that the inflammatory response to imiquimod is variable from person to person and was educated regarded proper titration schedule.  If flu-like symptoms develop, patient knows to discontinue the medication and contact us. Propranolol Counseling:  I discussed with the patient the risks of propranolol including but not limited to low heart rate, low blood pressure, low blood sugar, restlessness and increased cold sensitivity. They should call the office if they experience any of these side effects. Propranolol Pregnancy And Lactation Text: This medication is Pregnancy Category C and it isn't known if it is safe during pregnancy. It is excreted in breast milk. Topical Retinoid counseling:  Patient advised to apply a pea-sized amount only at bedtime and wait 30 minutes after washing their face before applying.  If too drying, patient may add a non-comedogenic moisturizer. The patient verbalized understanding of the proper use and possible adverse effects of retinoids.  All of the patient's questions and concerns were addressed. Stelara Counseling:  I discussed with the patient the risks of ustekinumab including but not limited to immunosuppression, malignancy, posterior leukoencephalopathy syndrome, and serious infections.  The patient understands that monitoring is required including a PPD at baseline and must alert us or the primary physician if symptoms of infection or other concerning signs are noted. Glycopyrrolate Pregnancy And Lactation Text: This medication is Pregnancy Category B and is considered safe during pregnancy. It is unknown if it is excreted breast milk. Birth Control Pills Counseling: Birth Control Pill Counseling: I discussed with the patient the potential side effects of OCPs including but not limited to increased risk of stroke, heart attack, thrombophlebitis, deep venous thrombosis, hepatic adenomas, breast changes, GI upset, headaches, and depression.  The patient verbalized understanding of the proper use and possible adverse effects of OCPs. All of the patient's questions and concerns were addressed. Dupixent Counseling: I discussed with the patient the risks of dupilumab including but not limited to eye infection and irritation, cold sores, injection site reactions, worsening of asthma, allergic reactions and increased risk of parasitic infection.  Live vaccines should be avoided while taking dupilumab. Dupilumab will also interact with certain medications such as warfarin and cyclosporine. The patient understands that monitoring is required and they must alert us or the primary physician if symptoms of infection or other concerning signs are noted. Arava Counseling:  Patient counseled regarding adverse effects of Arava including but not limited to nausea, vomiting, abnormalities in liver function tests. Patients may develop mouth sores, rash, diarrhea, and abnormalities in blood counts. The patient understands that monitoring is required including LFTs and blood counts.  There is a rare possibility of scarring of the liver and lung problems that can occur when taking methotrexate. Persistent nausea, loss of appetite, pale stools, dark urine, cough, and shortness of breath should be reported immediately. Patient advised to discontinue Arava treatment and consult with a physician prior to attempting conception. The patient will have to undergo a treatment to eliminate Arava from the body prior to conception. Methotrexate Counseling:  Patient counseled regarding adverse effects of methotrexate including but not limited to nausea, vomiting, abnormalities in liver function tests. Patients may develop mouth sores, rash, diarrhea, and abnormalities in blood counts. The patient understands that monitoring is required including LFT's and blood counts.  There is a rare possibility of scarring of the liver and lung problems that can occur when taking methotrexate. Persistent nausea, loss of appetite, pale stools, dark urine, cough, and shortness of breath should be reported immediately. Patient advised to discontinue methotrexate treatment at least three months before attempting to become pregnant.  I discussed the need for folate supplements while taking methotrexate.  These supplements can decrease side effects during methotrexate treatment. The patient verbalized understanding of the proper use and possible adverse effects of methotrexate.  All of the patient's questions and concerns were addressed. Cimetidine Counseling:  I discussed with the patient the risks of Cimetidine including but not limited to gynecomastia, headache, diarrhea, nausea, drowsiness, arrhythmias, pancreatitis, skin rashes, psychosis, bone marrow suppression and kidney toxicity. Rifampin Counseling: I discussed with the patient the risks of rifampin including but not limited to liver damage, kidney damage, red-orange body fluids, nausea/vomiting and severe allergy. Benzoyl Peroxide Counseling: Patient counseled that medicine may cause skin irritation and bleach clothing.  In the event of skin irritation, the patient was advised to reduce the amount of the drug applied or use it less frequently.   The patient verbalized understanding of the proper use and possible adverse effects of benzoyl peroxide.  All of the patient's questions and concerns were addressed. Hydroxychloroquine Counseling:  I discussed with the patient that a baseline ophthalmologic exam is needed at the start of therapy and every year thereafter while on therapy. A CBC may also be warranted for monitoring.  The side effects of this medication were discussed with the patient, including but not limited to agranulocytosis, aplastic anemia, seizures, rashes, retinopathy, and liver toxicity. Patient instructed to call the office should any adverse effect occur.  The patient verbalized understanding of the proper use and possible adverse effects of Plaquenil.  All the patient's questions and concerns were addressed. Minoxidil Counseling: Minoxidil is a topical medication which can increase blood flow where it is applied. It is uncertain how this medication increases hair growth. Side effects are uncommon and include stinging and allergic reactions. Tazorac Counseling:  Patient advised that medication is irritating and drying.  Patient may need to apply sparingly and wash off after an hour before eventually leaving it on overnight.  The patient verbalized understanding of the proper use and possible adverse effects of tazorac.  All of the patient's questions and concerns were addressed. Hydroxychloroquine Pregnancy And Lactation Text: This medication has been shown to cause fetal harm but it isn't assigned a Pregnancy Risk Category. There are small amounts excreted in breast milk. Dupixent Pregnancy And Lactation Text: This medication likely crosses the placenta but the risk for the fetus is uncertain. This medication is excreted in breast milk. Benzoyl Peroxide Pregnancy And Lactation Text: This medication is Pregnancy Category C. It is unknown if benzoyl peroxide is excreted in breast milk. Birth Control Pills Pregnancy And Lactation Text: This medication should be avoided if pregnant and for the first 30 days post-partum. Rifampin Pregnancy And Lactation Text: This medication is Pregnancy Category C and it isn't know if it is safe during pregnancy. It is also excreted in breast milk and should not be used if you are breast feeding.

## 2022-09-26 NOTE — CONSULT NOTE ADULT - SUBJECTIVE AND OBJECTIVE BOX
Pt interviewed, examined and EMR chart reviewed.  Pt admits to drinking 1 liter of vodka per day  x  6 weeks. Pt had 2 weeks sobriety prior to this. Pt last drink day of admission. Pt has extensive history of treatment. Pt was recently admitted 2 months ago and detoxed at [facility]. Pt has been at [facility] for detox and rehab 5-6 months ago as well.    Hx of withdrawal Seizure x 1 ..2 months ago???  variable periods of sobriety in the past.  Has been in detox before __X___yes,   _____No    SOCIAL HISTORY:    REVIEW OF SYSTEMS:    Constitutional: No fever, weight loss or fatigue  ENT:  No difficulty hearing, tinnitus, vertigo; No sinus or throat pain  Neck: No pain or stiffness  Respiratory: No cough, wheezing, chills or hemoptysis  Cardiovascular: No chest pain, palpitations, shortness of breath, dizziness or leg swelling  Gastrointestinal: SEE HPI  Neurological: No headaches, memory loss, loss of strength, numbness or tremors  Musculoskeletal: No joint pain or swelling; No muscle, back or extremity pain  Psychiatric: No depression, anxiety, mood swings or difficulty sleeping    MEDICATIONS  (STANDING):  dextrose 5% + lactated ringers. 1000 milliLiter(s) (100 mL/Hr) IV Continuous <Continuous>  enoxaparin Injectable 40 milliGRAM(s) SubCutaneous every 24 hours  famotidine    Tablet 20 milliGRAM(s) Oral two times a day  folic acid 1 milliGRAM(s) Oral daily  influenza   Vaccine 0.5 milliLiter(s) IntraMuscular once  QUEtiapine 200 milliGRAM(s) Oral at bedtime  thiamine 100 milliGRAM(s) Oral daily    MEDICATIONS  (PRN):  LORazepam     Tablet 2 milliGRAM(s) Oral every 1 hour PRN CIWA-Ar score 8 or greater      Vital Signs Last 24 Hrs  T(C): 36.8 (26 Sep 2022 05:23), Max: 37.4 (25 Sep 2022 20:47)  T(F): 98.2 (26 Sep 2022 05:23), Max: 99.4 (25 Sep 2022 20:47)  HR: 101 (26 Sep 2022 05:23) (90 - 111)  BP: 109/71 (26 Sep 2022 05:23) (109/71 - 138/85)  BP(mean): --  RR: 16 (26 Sep 2022 05:23) (16 - 16)  SpO2: --        PHYSICAL EXAM:    Constitutional: NAD, well-groomed, well-developed  HEENT: PERRLA, EOMI, Normal Hearing, MMM  Neck: No LAD, No JVD  Back: Normal spine flexure, No CVA tenderness  Respiratory: CTAB/L  Cardiovascular: S1 and S2, RRR, no M/G/R  Gastrointestinal: BS+, soft, NT/ND  Extremities: No peripheral edema  Vascular: 2+ peripheral pulses  Neurological: A/O x 3, no focal deficits    LABS:                        11.4   3.30  )-----------( 114      ( 26 Sep 2022 07:26 )             33.3     09-26    137  |  97<L>  |  8<L>  ----------------------------<  113<H>  4.0   |  30  |  0.6<L>    Ca    9.1      26 Sep 2022 07:26  Phos  2.3     09-26  Mg     1.6     09-26    TPro  5.7<L>  /  Alb  3.7  /  TBili  1.4<H>  /  DBili  x   /  AST  135<H>  /  ALT  82<H>  /  AlkPhos  68  09-26    PT/INR - ( 24 Sep 2022 22:00 )   PT: 11.80 sec;   INR: 1.03 ratio         PTT - ( 24 Sep 2022 22:00 )  PTT:38.6 sec    Drug Screen Urine:  Alcohol Level  Alcohol, Blood: 372 mg/dL (09-24-22 @ 22:00)        RADIOLOGY & ADDITIONAL STUDIES:

## 2022-09-26 NOTE — ED ADULT NURSE NOTE - NSFALLRSKASSESASSIST_ED_ALL_ED
Hospital Medicine  Progress Note    Patient Name: Brandyn Ty  MRN: [id number]  Status: IP- Inpatient   Admission Date: 8/24/2022  Length of Stay: 32      CC: hospital follow-up for open femur fracture       SUBJECTIVE   HPI and hospital course reviewed.    Today: Patient seen and examined at bedside, and chart reviewed.  No new issues or events.      MEDICATIONS   Scheduled   benztropine  2 mg Oral Daily    haloperidoL  5 mg Oral Daily before evening meal    rivaroxaban  10 mg Oral Daily with dinner     Continuous Infusions  None      PHYSICAL EXAM   VITALS: T 98.3 °F (36.8 °C)   /76   P 98   RR 18   O2 98 %    GENERAL: Awake and in NAD  LUNGS: CTA anteriorly  CVS: Normal rate  GI/: Soft, NT, bowel sounds positive.  EXTREMITIES: No peripheral edema  NEURO: Awake and alert  PSYCH: Calm, partially cooperative      LABS   CBC  No results for input(s): WBC, HGB, HCT, PLT in the last 72 hours.   CHEM  No results for input(s): NA, K, MG, CO2, BUN, CREATININE, CALCIUM, BILITOT, AST, ALT, ALKPHOS, ALBUMIN, PHOS in the last 72 hours.       ASSESSMENT   Open distal left femur fracture s/p debridement & ORIF 8/24; Revision & Ex-fix adjustment 8/28  h/o Schizophrenia,   Essential hypertension    PLAN   CM has been working on placement  Continue current management including therapy services in the interim        Prophylaxis: Xarelto 10        Timur Oconnor MD  [facility]  09/25/2022      yes

## 2022-09-26 NOTE — CHART NOTE - NSCHARTNOTEFT_GEN_A_CORE
42 YOF who is admitted for the treatment of alcoholism / alcoholic ketoacidosis.       #Alcoholic ketoacidosis  #lactic acidosis due to above  #alcohol intoxication   Fluids D5LR @100ml/hr  Addiction medicine consult  CIWA protocol  Librium protocol  Thiamine 100mg and folate 1mg daily  Monitor vital signs  AM labs including phosphorous and magnesium.   Zofran PRN for nausea  GI ppx: PPI    Labs improving
pt c/o R eye pain and foreign body sensation after trying to remove her contact  pt states she may have scratched her eye  will order erythromycin ointment
Called by lab for high LA 2.4 (was 5.8) vastly improved.    Anion gap also improving.   Labs will by monitored by medical team.

## 2022-09-27 LAB
ALBUMIN SERPL ELPH-MCNC: 3.9 G/DL — SIGNIFICANT CHANGE UP (ref 3.5–5.2)
ALP SERPL-CCNC: 78 U/L — SIGNIFICANT CHANGE UP (ref 30–115)
ALT FLD-CCNC: 94 U/L — HIGH (ref 0–41)
ANION GAP SERPL CALC-SCNC: 9 MMOL/L — SIGNIFICANT CHANGE UP (ref 7–14)
AST SERPL-CCNC: 144 U/L — HIGH (ref 0–41)
BASOPHILS # BLD AUTO: 0.03 K/UL — SIGNIFICANT CHANGE UP (ref 0–0.2)
BASOPHILS NFR BLD AUTO: 0.9 % — SIGNIFICANT CHANGE UP (ref 0–1)
BILIRUB SERPL-MCNC: 0.9 MG/DL — SIGNIFICANT CHANGE UP (ref 0.2–1.2)
BUN SERPL-MCNC: 5 MG/DL — LOW (ref 10–20)
CALCIUM SERPL-MCNC: 9.2 MG/DL — SIGNIFICANT CHANGE UP (ref 8.4–10.5)
CHLORIDE SERPL-SCNC: 102 MMOL/L — SIGNIFICANT CHANGE UP (ref 98–110)
CO2 SERPL-SCNC: 27 MMOL/L — SIGNIFICANT CHANGE UP (ref 17–32)
CREAT SERPL-MCNC: 0.6 MG/DL — LOW (ref 0.7–1.5)
EGFR: 116 ML/MIN/1.73M2 — SIGNIFICANT CHANGE UP
EOSINOPHIL # BLD AUTO: 0.15 K/UL — SIGNIFICANT CHANGE UP (ref 0–0.7)
EOSINOPHIL NFR BLD AUTO: 4.4 % — SIGNIFICANT CHANGE UP (ref 0–8)
GLUCOSE SERPL-MCNC: 95 MG/DL — SIGNIFICANT CHANGE UP (ref 70–99)
HCT VFR BLD CALC: 35 % — LOW (ref 37–47)
HGB BLD-MCNC: 11.9 G/DL — LOW (ref 12–16)
IMM GRANULOCYTES NFR BLD AUTO: 0.3 % — SIGNIFICANT CHANGE UP (ref 0.1–0.3)
LACTATE SERPL-SCNC: 0.9 MMOL/L — SIGNIFICANT CHANGE UP (ref 0.7–2)
LYMPHOCYTES # BLD AUTO: 1.51 K/UL — SIGNIFICANT CHANGE UP (ref 1.2–3.4)
LYMPHOCYTES # BLD AUTO: 44.5 % — SIGNIFICANT CHANGE UP (ref 20.5–51.1)
MCHC RBC-ENTMCNC: 34 G/DL — SIGNIFICANT CHANGE UP (ref 32–37)
MCHC RBC-ENTMCNC: 34.1 PG — HIGH (ref 27–31)
MCV RBC AUTO: 100.3 FL — HIGH (ref 81–99)
MONOCYTES # BLD AUTO: 0.24 K/UL — SIGNIFICANT CHANGE UP (ref 0.1–0.6)
MONOCYTES NFR BLD AUTO: 7.1 % — SIGNIFICANT CHANGE UP (ref 1.7–9.3)
NEUTROPHILS # BLD AUTO: 1.45 K/UL — SIGNIFICANT CHANGE UP (ref 1.4–6.5)
NEUTROPHILS NFR BLD AUTO: 42.8 % — SIGNIFICANT CHANGE UP (ref 42.2–75.2)
NRBC # BLD: 0 /100 WBCS — SIGNIFICANT CHANGE UP (ref 0–0)
PLATELET # BLD AUTO: 128 K/UL — LOW (ref 130–400)
POTASSIUM SERPL-MCNC: 4.6 MMOL/L — SIGNIFICANT CHANGE UP (ref 3.5–5)
POTASSIUM SERPL-SCNC: 4.6 MMOL/L — SIGNIFICANT CHANGE UP (ref 3.5–5)
PROT SERPL-MCNC: 6.5 G/DL — SIGNIFICANT CHANGE UP (ref 6–8)
RBC # BLD: 3.49 M/UL — LOW (ref 4.2–5.4)
RBC # FLD: 11.5 % — SIGNIFICANT CHANGE UP (ref 11.5–14.5)
SODIUM SERPL-SCNC: 138 MMOL/L — SIGNIFICANT CHANGE UP (ref 135–146)
WBC # BLD: 3.39 K/UL — LOW (ref 4.8–10.8)
WBC # FLD AUTO: 3.39 K/UL — LOW (ref 4.8–10.8)

## 2022-09-27 PROCEDURE — 99232 SBSQ HOSP IP/OBS MODERATE 35: CPT

## 2022-09-27 PROCEDURE — 99231 SBSQ HOSP IP/OBS SF/LOW 25: CPT

## 2022-09-27 RX ORDER — IBUPROFEN 200 MG
400 TABLET ORAL EVERY 8 HOURS
Refills: 0 | Status: DISCONTINUED | OUTPATIENT
Start: 2022-09-27 | End: 2022-09-28

## 2022-09-27 RX ADMIN — Medication 1 APPLICATION(S): at 18:18

## 2022-09-27 RX ADMIN — Medication 100 MILLIGRAM(S): at 12:49

## 2022-09-27 RX ADMIN — FAMOTIDINE 20 MILLIGRAM(S): 10 INJECTION INTRAVENOUS at 05:59

## 2022-09-27 RX ADMIN — Medication 1 MILLIGRAM(S): at 12:49

## 2022-09-27 RX ADMIN — Medication 400 MILLIGRAM(S): at 23:30

## 2022-09-27 RX ADMIN — QUETIAPINE FUMARATE 200 MILLIGRAM(S): 200 TABLET, FILM COATED ORAL at 22:34

## 2022-09-27 RX ADMIN — FAMOTIDINE 20 MILLIGRAM(S): 10 INJECTION INTRAVENOUS at 18:18

## 2022-09-27 RX ADMIN — Medication 400 MILLIGRAM(S): at 22:51

## 2022-09-27 RX ADMIN — Medication 1 MILLIGRAM(S): at 20:07

## 2022-09-27 RX ADMIN — Medication 1 APPLICATION(S): at 05:58

## 2022-09-28 ENCOUNTER — TRANSCRIPTION ENCOUNTER (OUTPATIENT)
Age: 42
End: 2022-09-28

## 2022-09-28 VITALS
HEART RATE: 96 BPM | SYSTOLIC BLOOD PRESSURE: 108 MMHG | RESPIRATION RATE: 18 BRPM | DIASTOLIC BLOOD PRESSURE: 80 MMHG | TEMPERATURE: 96 F

## 2022-09-28 LAB
ALBUMIN SERPL ELPH-MCNC: 4 G/DL — SIGNIFICANT CHANGE UP (ref 3.5–5.2)
ALP SERPL-CCNC: 84 U/L — SIGNIFICANT CHANGE UP (ref 30–115)
ALT FLD-CCNC: 117 U/L — HIGH (ref 0–41)
ANION GAP SERPL CALC-SCNC: 13 MMOL/L — SIGNIFICANT CHANGE UP (ref 7–14)
AST SERPL-CCNC: 150 U/L — HIGH (ref 0–41)
BASOPHILS # BLD AUTO: 0.03 K/UL — SIGNIFICANT CHANGE UP (ref 0–0.2)
BASOPHILS NFR BLD AUTO: 0.5 % — SIGNIFICANT CHANGE UP (ref 0–1)
BILIRUB SERPL-MCNC: 0.7 MG/DL — SIGNIFICANT CHANGE UP (ref 0.2–1.2)
BUN SERPL-MCNC: 10 MG/DL — SIGNIFICANT CHANGE UP (ref 10–20)
CALCIUM SERPL-MCNC: 9.9 MG/DL — SIGNIFICANT CHANGE UP (ref 8.4–10.5)
CHLORIDE SERPL-SCNC: 104 MMOL/L — SIGNIFICANT CHANGE UP (ref 98–110)
CO2 SERPL-SCNC: 22 MMOL/L — SIGNIFICANT CHANGE UP (ref 17–32)
CREAT SERPL-MCNC: 0.6 MG/DL — LOW (ref 0.7–1.5)
EGFR: 116 ML/MIN/1.73M2 — SIGNIFICANT CHANGE UP
EOSINOPHIL # BLD AUTO: 0.2 K/UL — SIGNIFICANT CHANGE UP (ref 0–0.7)
EOSINOPHIL NFR BLD AUTO: 3.4 % — SIGNIFICANT CHANGE UP (ref 0–8)
GLUCOSE SERPL-MCNC: 90 MG/DL — SIGNIFICANT CHANGE UP (ref 70–99)
HCT VFR BLD CALC: 36.6 % — LOW (ref 37–47)
HGB BLD-MCNC: 12.5 G/DL — SIGNIFICANT CHANGE UP (ref 12–16)
IMM GRANULOCYTES NFR BLD AUTO: 0.5 % — HIGH (ref 0.1–0.3)
LACTATE SERPL-SCNC: 0.9 MMOL/L — SIGNIFICANT CHANGE UP (ref 0.7–2)
LYMPHOCYTES # BLD AUTO: 1.78 K/UL — SIGNIFICANT CHANGE UP (ref 1.2–3.4)
LYMPHOCYTES # BLD AUTO: 30.1 % — SIGNIFICANT CHANGE UP (ref 20.5–51.1)
MCHC RBC-ENTMCNC: 34.1 PG — HIGH (ref 27–31)
MCHC RBC-ENTMCNC: 34.2 G/DL — SIGNIFICANT CHANGE UP (ref 32–37)
MCV RBC AUTO: 99.7 FL — HIGH (ref 81–99)
MONOCYTES # BLD AUTO: 0.57 K/UL — SIGNIFICANT CHANGE UP (ref 0.1–0.6)
MONOCYTES NFR BLD AUTO: 9.6 % — HIGH (ref 1.7–9.3)
NEUTROPHILS # BLD AUTO: 3.31 K/UL — SIGNIFICANT CHANGE UP (ref 1.4–6.5)
NEUTROPHILS NFR BLD AUTO: 55.9 % — SIGNIFICANT CHANGE UP (ref 42.2–75.2)
NRBC # BLD: 0 /100 WBCS — SIGNIFICANT CHANGE UP (ref 0–0)
PLATELET # BLD AUTO: 142 K/UL — SIGNIFICANT CHANGE UP (ref 130–400)
POTASSIUM SERPL-MCNC: 4.1 MMOL/L — SIGNIFICANT CHANGE UP (ref 3.5–5)
POTASSIUM SERPL-SCNC: 4.1 MMOL/L — SIGNIFICANT CHANGE UP (ref 3.5–5)
PROT SERPL-MCNC: 6.5 G/DL — SIGNIFICANT CHANGE UP (ref 6–8)
RBC # BLD: 3.67 M/UL — LOW (ref 4.2–5.4)
RBC # FLD: 11.5 % — SIGNIFICANT CHANGE UP (ref 11.5–14.5)
SODIUM SERPL-SCNC: 139 MMOL/L — SIGNIFICANT CHANGE UP (ref 135–146)
WBC # BLD: 5.92 K/UL — SIGNIFICANT CHANGE UP (ref 4.8–10.8)
WBC # FLD AUTO: 5.92 K/UL — SIGNIFICANT CHANGE UP (ref 4.8–10.8)

## 2022-09-28 PROCEDURE — 99233 SBSQ HOSP IP/OBS HIGH 50: CPT

## 2022-09-28 PROCEDURE — 99231 SBSQ HOSP IP/OBS SF/LOW 25: CPT

## 2022-09-28 RX ORDER — FAMOTIDINE 10 MG/ML
1 INJECTION INTRAVENOUS
Qty: 60 | Refills: 0
Start: 2022-09-28 | End: 2022-10-27

## 2022-09-28 RX ORDER — ERYTHROMYCIN BASE 5 MG/GRAM
1 OINTMENT (GRAM) OPHTHALMIC (EYE)
Qty: 100 | Refills: 0
Start: 2022-09-28 | End: 2022-10-02

## 2022-09-28 RX ADMIN — Medication 1 APPLICATION(S): at 05:07

## 2022-09-28 RX ADMIN — Medication 1 MILLIGRAM(S): at 05:07

## 2022-09-28 RX ADMIN — FAMOTIDINE 20 MILLIGRAM(S): 10 INJECTION INTRAVENOUS at 05:07

## 2022-09-28 RX ADMIN — Medication 1 MILLIGRAM(S): at 14:33

## 2022-09-28 RX ADMIN — Medication 100 MILLIGRAM(S): at 14:34

## 2022-09-28 NOTE — DISCHARGE NOTE NURSING/CASE MANAGEMENT/SOCIAL WORK - PATIENT PORTAL LINK FT
You can access the FollowMyHealth Patient Portal offered by [facility] by registering at the following website: [email or web address]. By joining [employer]’s FollowMyHealth portal, you will also be able to view your health information using other applications (apps) compatible with our system.

## 2022-09-28 NOTE — PROGRESS NOTE ADULT - SUBJECTIVE AND OBJECTIVE BOX
Pt interviewed, examined and EMR chart reviewed.    Follow up of Alcohol Depenency. Pt is on Ativan CIWA protocol. Pt is doing better. Pt with complaint of mild tremors and some anxiety    SOCIAL HISTORY:    REVIEW OF SYSTEMS:    Constitutional: No fever, weight loss or fatigue  ENT:  No difficulty hearing, tinnitus, vertigo; No sinus or throat pain  Neck: No pain or stiffness  Respiratory: No cough, wheezing, chills or hemoptysis  Cardiovascular: No chest pain, palpitations, shortness of breath, dizziness or leg swelling  Gastrointestinal: See HPI  Neurological: No headaches, memory loss, loss of strength, numbness or tremors  Musculoskeletal: No joint pain or swelling; No muscle, back or extremity pain      MEDICATIONS  (STANDING):  enoxaparin Injectable 40 milliGRAM(s) SubCutaneous every 24 hours  erythromycin   Ointment 1 Application(s) Right EYE two times a day  famotidine    Tablet 20 milliGRAM(s) Oral two times a day  folic acid 1 milliGRAM(s) Oral daily  QUEtiapine 200 milliGRAM(s) Oral at bedtime  thiamine 100 milliGRAM(s) Oral daily    MEDICATIONS  (PRN):  LORazepam     Tablet 2 milliGRAM(s) Oral every 2 hours PRN CIWA-Ar score  8 - 15  LORazepam     Tablet 4 milliGRAM(s) Oral every 1 hour PRN CIWA-Ar score GREATER THAN 15      Vital Signs Last 24 Hrs  T(C): 36.1 (27 Sep 2022 05:22), Max: 36.9 (26 Sep 2022 20:46)  T(F): 97 (27 Sep 2022 05:22), Max: 98.5 (26 Sep 2022 20:46)  HR: 94 (27 Sep 2022 05:22) (94 - 101)  BP: 114/66 (27 Sep 2022 05:22) (103/69 - 128/91)  BP(mean): --  RR: 18 (27 Sep 2022 05:22) (16 - 18)  SpO2: --        PHYSICAL EXAM:    Constitutional: NAD, well-groomed, well-developed  HEENT: PERRLA, EOMI, Normal Hearing, MMM  Neck: No LAD, No JVD  Back: Normal spine flexure, No CVA tenderness  Respiratory: CTAB/L  Cardiovascular: S1 and S2, RRR, no M/G/R  Gastrointestinal: BS+, soft, NT/ND  Extremities: No peripheral edema  Vascular: 2+ peripheral pulses  Neurological: A/O x 3, no focal deficits    LABS:                        11.9   3.39  )-----------( 128      ( 27 Sep 2022 06:29 )             35.0     09-27    138  |  102  |  5<L>  ----------------------------<  95  4.6   |  27  |  0.6<L>    Ca    9.2      27 Sep 2022 06:29  Phos  2.3     09-26  Mg     1.6     09-26    TPro  6.5  /  Alb  3.9  /  TBili  0.9  /  DBili  x   /  AST  144<H>  /  ALT  94<H>  /  AlkPhos  78  09-27        Drug Screen Urine:  Alcohol Level  Alcohol, Blood: 372 mg/dL (09-24-22 @ 22:00)        RADIOLOGY & ADDITIONAL STUDIES:  
42 YOF who is admitted for the treatment of alcoholism / alcoholic ketoacidosis.     Today:  Seen at bedside, complained of right eye itchiness.  She slept with her contact lens in.  Denies blurred vision, eye pain.            REVIEW OF SYSTEMS:  Right eye itchiness        MEDICATIONS  (STANDING):  enoxaparin Injectable 40 milliGRAM(s) SubCutaneous every 24 hours  erythromycin   Ointment 1 Application(s) Right EYE two times a day  famotidine    Tablet 20 milliGRAM(s) Oral two times a day  folic acid 1 milliGRAM(s) Oral daily  QUEtiapine 200 milliGRAM(s) Oral at bedtime  thiamine 100 milliGRAM(s) Oral daily    MEDICATIONS  (PRN):  LORazepam     Tablet 2 milliGRAM(s) Oral every 2 hours PRN CIWA-Ar score  8 - 15  LORazepam     Tablet 4 milliGRAM(s) Oral every 1 hour PRN CIWA-Ar score GREATER THAN 15      Allergies  No Known Allergies        FAMILY HISTORY:  Benzodiazepine misuse        Vital Signs Last 24 Hrs  T(C): 36.1 (27 Sep 2022 05:22), Max: 36.9 (26 Sep 2022 20:46)  T(F): 97 (27 Sep 2022 05:22), Max: 98.5 (26 Sep 2022 20:46)  HR: 94 (27 Sep 2022 05:22) (94 - 101)  BP: 114/66 (27 Sep 2022 05:22) (103/69 - 128/91)  RR: 18 (27 Sep 2022 05:22) (16 - 18)          PHYSICAL EXAM:  GENERAL: NAD, well-groomed, well-developed  HEAD:  Atraumatic, Normocephalic  EYES: EOMI, PERRLA, conjunctiva and sclera clear  NECK: Supple, No JVD, Normal thyroid  NERVOUS SYSTEM:  Alert & Oriented X3, Good concentration  CHEST/LUNG: CTA bilaterally; No rales, rhonchi, wheezing, or rubs  HEART: Regular rate and rhythm; No murmurs, rubs, or gallops  ABDOMEN: Soft, Nontender, Nondistended; Bowel sounds present  EXTREMITIES:  2+ Peripheral Pulses, No clubbing, cyanosis, or edema      LABS:                        11.9   3.39  )-----------( 128      ( 27 Sep 2022 06:29 )             35.0     09-27    138  |  102  |  5<L>  ----------------------------<  95  4.6   |  27  |  0.6<L>    Ca    9.2      27 Sep 2022 06:29  Phos  2.3     09-26  Mg     1.6     09-26    TPro  6.5  /  Alb  3.9  /  TBili  0.9  /  DBili  x   /  AST  144<H>  /  ALT  94<H>  /  AlkPhos  78  09-27        
42 YOF who is admitted for the treatment of alcoholism / alcoholic ketoacidosis.     Today:  Seen at bedside, no new complaints.          REVIEW OF SYSTEMS:  No new complaints      MEDICATIONS  (STANDING):  dextrose 5% + lactated ringers. 1000 milliLiter(s) (100 mL/Hr) IV Continuous <Continuous>  enoxaparin Injectable 40 milliGRAM(s) SubCutaneous every 24 hours  famotidine    Tablet 20 milliGRAM(s) Oral two times a day  folic acid 1 milliGRAM(s) Oral daily  influenza   Vaccine 0.5 milliLiter(s) IntraMuscular once  QUEtiapine 200 milliGRAM(s) Oral at bedtime  thiamine 100 milliGRAM(s) Oral daily    MEDICATIONS  (PRN):  LORazepam     Tablet 2 milliGRAM(s) Oral every 2 hours PRN CIWA-Ar score  8 - 15  LORazepam     Tablet 4 milliGRAM(s) Oral every 1 hour PRN CIWA-Ar score GREATER THAN 15      Allergies  No Known Allergies        FAMILY HISTORY:  Benzodiazepine misuse        Vital Signs Last 24 Hrs  T(C): 35.8 (26 Sep 2022 14:16), Max: 37.4 (25 Sep 2022 20:47)  T(F): 96.5 (26 Sep 2022 14:16), Max: 99.4 (25 Sep 2022 20:47)  HR: 101 (26 Sep 2022 14:16) (90 - 101)  BP: 103/69 (26 Sep 2022 14:16) (103/69 - 138/85)  RR: 16 (26 Sep 2022 14:16) (16 - 16)          PHYSICAL EXAM:  GENERAL: NAD, well-groomed, well-developed  HEAD:  Atraumatic, Normocephalic  EYES: EOMI, PERRLA, conjunctiva and sclera clear  NECK: Supple, No JVD, Normal thyroid  NERVOUS SYSTEM:  Alert & Oriented X3, Good concentration  CHEST/LUNG: CTA bilaterally; No rales, rhonchi, wheezing, or rubs  HEART: Regular rate and rhythm; No murmurs, rubs, or gallops  ABDOMEN: Soft, Nontender, Nondistended; Bowel sounds present  EXTREMITIES:  2+ Peripheral Pulses, No clubbing, cyanosis, or edema      LABS:                        11.4   3.30  )-----------( 114      ( 26 Sep 2022 07:26 )             33.3     09-26    137  |  97<L>  |  8<L>  ----------------------------<  113<H>  4.0   |  30  |  0.6<L>    Ca    9.1      26 Sep 2022 07:26  Phos  2.3     09-26  Mg     1.6     09-26    TPro  5.7<L>  /  Alb  3.7  /  TBili  1.4<H>  /  DBili  x   /  AST  135<H>  /  ALT  82<H>  /  AlkPhos  68  09-26    PT/INR - ( 24 Sep 2022 22:00 )   PT: 11.80 sec;   INR: 1.03 ratio         PTT - ( 24 Sep 2022 22:00 )  PTT:38.6 sec    
    Pt interviewed, examined and EMR chart reviewed.    Follow up of Alcohol Depenency. Pt is on _Ativan CIWA _protocol. Pt is doing better . Pt with complaint of some anxiety. Pt still refusing aftercare.    SOCIAL HISTORY:    REVIEW OF SYSTEMS:    Constitutional: No fever, weight loss or fatigue  ENT:  No difficulty hearing, tinnitus, vertigo; No sinus or throat pain  Neck: No pain or stiffness  Respiratory: No cough, wheezing, chills or hemoptysis  Cardiovascular: No chest pain, palpitations, shortness of breath, dizziness or leg swelling  Gastrointestinal: No abdominal or epigastric pain. No nausea, vomiting or hematemesis; No diarrhea or constipation. No melena or hematochezia.  Neurological: No headaches, memory loss, loss of strength, numbness or tremors  Musculoskeletal: No joint pain or swelling; No muscle, back or extremity pain      MEDICATIONS  (STANDING):  enoxaparin Injectable 40 milliGRAM(s) SubCutaneous every 24 hours  erythromycin   Ointment 1 Application(s) Right EYE two times a day  famotidine    Tablet 20 milliGRAM(s) Oral two times a day  folic acid 1 milliGRAM(s) Oral daily  LORazepam     Tablet 1 milliGRAM(s) Oral two times a day  QUEtiapine 200 milliGRAM(s) Oral at bedtime  thiamine 100 milliGRAM(s) Oral daily    MEDICATIONS  (PRN):  ibuprofen  Tablet. 400 milliGRAM(s) Oral every 8 hours PRN Mild Pain (1 - 3)  LORazepam     Tablet 2 milliGRAM(s) Oral every 2 hours PRN CIWA-Ar score  8 - 15  LORazepam     Tablet 4 milliGRAM(s) Oral every 1 hour PRN CIWA-Ar score GREATER THAN 15      Vital Signs Last 24 Hrs  T(C): 36 (28 Sep 2022 05:00), Max: 36.2 (27 Sep 2022 20:17)  T(F): 96.8 (28 Sep 2022 05:00), Max: 97.1 (27 Sep 2022 20:17)  HR: 65 (28 Sep 2022 05:00) (65 - 102)  BP: 122/65 (28 Sep 2022 05:00) (115/74 - 134/88)  BP(mean): --  RR: 18 (28 Sep 2022 05:00) (18 - 18)  SpO2: 96% (27 Sep 2022 16:30) (96% - 96%)    Parameters below as of 27 Sep 2022 16:30  Patient On (Oxygen Delivery Method): room air        PHYSICAL EXAM:    Constitutional: NAD, well-groomed, well-developed  HEENT: PERRLA, EOMI, Normal Hearing, MMM  Neck: No LAD, No JVD  Back: Normal spine flexure, No CVA tenderness  Respiratory: CTAB/L  Cardiovascular: S1 and S2, RRR, no M/G/R  Gastrointestinal: BS+, soft, NT/ND  Extremities: No peripheral edema  Vascular: 2+ peripheral pulses  Neurological: A/O x 3, no focal deficits    LABS:                        12.5   5.92  )-----------( 142      ( 28 Sep 2022 05:54 )             36.6     09-28    139  |  104  |  10  ----------------------------<  90  4.1   |  22  |  0.6<L>    Ca    9.9      28 Sep 2022 05:54    TPro  6.5  /  Alb  4.0  /  TBili  0.7  /  DBili  x   /  AST  150<H>  /  ALT  117<H>  /  AlkPhos  84  09-28        Drug Screen Urine:  Alcohol Level        RADIOLOGY & ADDITIONAL STUDIES:  
Patient is a 42y old  Female who presents with a chief complaint of ETOH withdrawal       MEDICATIONS  (STANDING):  enoxaparin Injectable 40 milliGRAM(s) SubCutaneous every 24 hours  erythromycin   Ointment 1 Application(s) Right EYE two times a day  famotidine    Tablet 20 milliGRAM(s) Oral two times a day  folic acid 1 milliGRAM(s) Oral daily  LORazepam     Tablet 1 milliGRAM(s) Oral two times a day  QUEtiapine 200 milliGRAM(s) Oral at bedtime  thiamine 100 milliGRAM(s) Oral daily    MEDICATIONS  (PRN):  aluminum hydroxide/magnesium hydroxide/simethicone Suspension 30 milliLiter(s) Oral every 4 hours PRN Dyspepsia  LORazepam     Tablet 2 milliGRAM(s) Oral every 2 hours PRN CIWA-Ar score  8 - 15  LORazepam     Tablet 4 milliGRAM(s) Oral every 1 hour PRN CIWA-Ar score GREATER THAN 15      PHYSICAL EXAM:  Vital Signs Last 24 Hrs  T(C): 36 (28 Sep 2022 05:00), Max: 36.2 (27 Sep 2022 20:17)  T(F): 96.8 (28 Sep 2022 05:00), Max: 97.1 (27 Sep 2022 20:17)  HR: 65 (28 Sep 2022 05:00) (65 - 102)  BP: 122/65 (28 Sep 2022 05:00) (115/74 - 134/88)  BP(mean): --  RR: 18 (28 Sep 2022 05:00) (18 - 18)  SpO2: 96% (27 Sep 2022 16:30) (96% - 96%)    Parameters below as of 27 Sep 2022 16:30  Patient On (Oxygen Delivery Method): room air      GENERAL: No acute distress, well-developed  HEAD:  Atraumatic, Normocephalic  EYES: EOMI, PERRLA, conjunctiva and sclera clear  NECK: Supple, no lymphadenopathy, no JVD  CHEST/LUNG: CTAB; No wheezes, rales, or rhonchi  HEART: Regular rate and rhythm; No murmurs, rubs, or gallops  ABDOMEN: Soft, non-tender, non-distended; normal bowel sounds, no organomegaly  EXTREMITIES:  2+ peripheral pulses b/l, No clubbing, cyanosis, or edema  NEUROLOGY: A&O x 3, no focal deficits  SKIN: No rashes or lesions    LABS:                        12.5   5.92  )-----------( 142      ( 28 Sep 2022 05:54 )             36.6     09-28    139  |  104  |  10  ----------------------------<  90  4.1   |  22  |  0.6<L>    Ca    9.9      28 Sep 2022 05:54    TPro  6.5  /  Alb  4.0  /  TBili  0.7  /  DBili  x   /  AST  150<H>  /  ALT  117<H>  /  AlkPhos  84  09-28

## 2022-09-28 NOTE — DISCHARGE NOTE PROVIDER - ATTENDING DISCHARGE PHYSICAL EXAMINATION:
GENERAL: No acute distress, well-developed  HEAD:  Atraumatic, Normocephalic  EYES: EOMI, PERRLA, conjunctiva and sclera clear  NECK: Supple, no lymphadenopathy, no JVD  CHEST/LUNG: CTAB; No wheezes, rales, or rhonchi  HEART: Regular rate and rhythm; No murmurs, rubs, or gallops  ABDOMEN: Soft, non-tender, non-distended; normal bowel sounds, no organomegaly  EXTREMITIES:  2+ peripheral pulses b/l, No clubbing, cyanosis, or edema  NEUROLOGY: A&O x 3, no focal deficits  SKIN: No rashes or lesions

## 2022-09-28 NOTE — PROGRESS NOTE ADULT - PROBLEM SELECTOR PLAN 1
After evaluation at this time  pt is improving has only received 2 doses on CIWA in last 24 hours. Pt should be on Thiamine and Folic acid. Pt will be monitored and supportive care provided.      -Alcohol counseling provided   CATCH team involved for aftercare and pt will follow up with aftercare on her own as she is concerned about starting her new job.  If patient continues to improve for possible discharge in AM
After evaluation at this time continue on CIWA Ativan protocol until discharge.  Pt should be on Thiamine and Folic acid. Pt will be monitored and supportive care provided.    Abdominal ultrasound AFP every 6 months for HCC screening  -EGD outpatient for esophageal varices screening   -Follow up with Private GI or our [facility] located at [address] [place] [address]. Phone Number: [phone]  -Alcohol counseling provided   CATCH team involved for aftercare and pt will follow up with aftercare on her own.,

## 2022-09-28 NOTE — DISCHARGE NOTE NURSING/CASE MANAGEMENT/SOCIAL WORK - NSDCPEFALRISK_GEN_ALL_CORE
For information on Fall & Injury Prevention, visit: [email or web address] OR  [email or web address] OR  https://www.cdc.gov/steadi/patient.html

## 2022-09-28 NOTE — DISCHARGE NOTE PROVIDER - CARE PROVIDER_API CALL
Julio Cesar Puckett (DO)  Internal Medicine  [facility] [place] [address]  Phone: [phone]  Fax: [phone]  Follow Up Time:     LIVER CLINIC,   GI [facility] Clinic located at [address] [place] [address]. Phone Number: [phone]  Phone: (   )    -  Fax: (   )    -  Follow Up Time:

## 2022-09-28 NOTE — DISCHARGE NOTE PROVIDER - NSDCCPCAREPLAN_GEN_ALL_CORE_FT
PRINCIPAL DISCHARGE DIAGNOSIS  Diagnosis: Alcohol dependence with withdrawal  Assessment and Plan of Treatment: Resolved, completed libruim taper. Folow-up with your addiction medicine in 1-2 weeks

## 2022-09-28 NOTE — PROGRESS NOTE ADULT - ASSESSMENT
42 YOF who is admitted for the treatment of alcoholism / alcoholic ketoacidosis.       #Alcoholic ketoacidosis-resolved  #lactic acidosis due to above-resolved  #alcohol withdrawal  Addiction medicine consult appreciated  CIWA protocol  Librium protocol  Thiamine 100mg and folate 1mg daily  Monitor vital signs  AM labs including phosphorous and magnesium.   Zofran PRN for nausea  GI ppx: PPI    Right eye injury:  -Pt slept with contact in  -Continue erythromycin drops  -Follow with ophthalmology as outpatient    Dispo:  -Once no longer requiring CIWA dosing can be DCed home
42 YOF who is admitted for the treatment of alcoholism / alcoholic ketoacidosis.       #Alcoholic ketoacidosis-resolved  #lactic acidosis due to above-resolved  #alcohol withdrawal  Fluids D5LR @100ml/hr  Addiction medicine consult  CIWA protocol  Librium protocol  Thiamine 100mg and folate 1mg daily  Monitor vital signs  AM labs including phosphorous and magnesium.   Zofran PRN for nausea  GI ppx: PPI    Labs improving.
42 YOF who is admitted for the treatment of alcoholism / alcoholic ketoacidosis.       Alcoholic ketoacidosis-resolved  lactic acidosis due to above-resolved  Alcohol withdrawal: resolved   Addiction medicine consult appreciated  CIWA protocol and Librium protocol: complete   Thiamine 100mg and folate 1mg daily  -D/C today with O/P f/up       Right eye Erythema   -Pt slept with contact in  -Continue erythromycin drops  -Follow with ophthalmology as outpatient    DVT PPX: Lovenox   GI PPX: H2 blockers  Full code  Dispo: Home

## 2022-09-28 NOTE — DISCHARGE NOTE PROVIDER - NSDCFUADDINST_GEN_ALL_CORE_FT
Follow up with Private GI or our GI [facility] located at [address] [place] [address]. Phone Number: [phone]  You will need an EGD for Esophageal varices screening     F/up with PCP in 1-2 weeks

## 2022-09-28 NOTE — DISCHARGE NOTE PROVIDER - DATE OF DISCHARGE SERVICE:
Telephone to patient- message left to call the pain management department at [phone].      Per Latisha- a referral can be sent to the ortho department if she wants a second opinion regarding for possible decompression and patient is requesting discussion of SI joint fusion.    28-Sep-2022

## 2022-09-28 NOTE — DISCHARGE NOTE PROVIDER - NSDCMRMEDTOKEN_GEN_ALL_CORE_FT
aluminum hydroxide-magnesium hydroxide 200 mg-200 mg/5 mL oral suspension: 30 milliliter(s) orally every 6 hours, As Needed -Dyspepsia   Ativan 1 mg oral tablet: 1  orally 2 times a day  Dialyvite 800 oral tablet: 1 tab(s) orally once a day   erythromycin 0.5% ophthalmic ointment: 1 application to each affected eye 2 times a day  famotidine 20 mg oral tablet: 1 tab(s) orally 2 times a day  folic acid 1 mg oral tablet: 1 tab(s) orally once a day  SEROquel 200 mg oral tablet: 1 tab(s) orally once a day (at bedtime)  thiamine 100 mg oral tablet: 1 tab(s) orally once a day

## 2022-09-28 NOTE — DISCHARGE NOTE PROVIDER - PROVIDER TOKENS
PROVIDER:[TOKEN:[[id number]]],FREE:[LAST:[LIVER CLINIC],PHONE:[(   )    -],FAX:[(   )    -],ADDRESS:[ Liver Clinic located at [address] [place] [address]. Phone Number: [phone]]]

## 2022-09-28 NOTE — DISCHARGE NOTE PROVIDER - HOSPITAL COURSE
42 YOF who is admitted for the treatment of alcoholism / alcoholic ketoacidosis.       Alcoholic ketoacidosis-resolved  lactic acidosis due to above-resolved  Alcohol withdrawal: resolved   Addiction medicine consult appreciated  CIWA protocol and Librium protocol: complete   Thiamine 100mg and folate 1mg daily  -D/C today with O/P f/up

## 2022-10-03 DIAGNOSIS — F10.239 ALCOHOL DEPENDENCE WITH WITHDRAWAL, UNSPECIFIED: ICD-10-CM

## 2022-10-03 DIAGNOSIS — E78.5 HYPERLIPIDEMIA, UNSPECIFIED: ICD-10-CM

## 2022-10-03 DIAGNOSIS — K21.9 GASTRO-ESOPHAGEAL REFLUX DISEASE WITHOUT ESOPHAGITIS: ICD-10-CM

## 2022-10-03 DIAGNOSIS — E05.00 THYROTOXICOSIS WITH DIFFUSE GOITER WITHOUT THYROTOXIC CRISIS OR STORM: ICD-10-CM

## 2022-10-03 DIAGNOSIS — E66.9 OBESITY, UNSPECIFIED: ICD-10-CM

## 2022-10-03 DIAGNOSIS — Z20.822 CONTACT WITH AND (SUSPECTED) EXPOSURE TO COVID-19: ICD-10-CM

## 2022-10-03 DIAGNOSIS — Z87.891 PERSONAL HISTORY OF NICOTINE DEPENDENCE: ICD-10-CM

## 2022-10-03 DIAGNOSIS — E87.2 ACIDOSIS: ICD-10-CM

## 2022-10-03 DIAGNOSIS — E83.119 HEMOCHROMATOSIS, UNSPECIFIED: ICD-10-CM

## 2022-10-08 ENCOUNTER — EMERGENCY (EMERGENCY)
Facility: HOSPITAL | Age: 42
LOS: 0 days | Discharge: HOME | End: 2022-10-08
Attending: STUDENT IN AN ORGANIZED HEALTH CARE EDUCATION/TRAINING PROGRAM | Admitting: EMERGENCY MEDICINE

## 2022-10-08 VITALS
DIASTOLIC BLOOD PRESSURE: 61 MMHG | SYSTOLIC BLOOD PRESSURE: 107 MMHG | TEMPERATURE: 98 F | HEART RATE: 96 BPM | RESPIRATION RATE: 18 BRPM | OXYGEN SATURATION: 99 %

## 2022-10-08 VITALS — HEIGHT: 62 IN | WEIGHT: 190.04 LBS

## 2022-10-08 DIAGNOSIS — R19.7 DIARRHEA, UNSPECIFIED: ICD-10-CM

## 2022-10-08 DIAGNOSIS — E78.5 HYPERLIPIDEMIA, UNSPECIFIED: ICD-10-CM

## 2022-10-08 DIAGNOSIS — Z20.822 CONTACT WITH AND (SUSPECTED) EXPOSURE TO COVID-19: ICD-10-CM

## 2022-10-08 DIAGNOSIS — E83.42 HYPOMAGNESEMIA: ICD-10-CM

## 2022-10-08 DIAGNOSIS — R06.02 SHORTNESS OF BREATH: ICD-10-CM

## 2022-10-08 DIAGNOSIS — I34.1 NONRHEUMATIC MITRAL (VALVE) PROLAPSE: ICD-10-CM

## 2022-10-08 DIAGNOSIS — Z98.891 HISTORY OF UTERINE SCAR FROM PREVIOUS SURGERY: Chronic | ICD-10-CM

## 2022-10-08 DIAGNOSIS — R11.2 NAUSEA WITH VOMITING, UNSPECIFIED: ICD-10-CM

## 2022-10-08 DIAGNOSIS — R10.13 EPIGASTRIC PAIN: ICD-10-CM

## 2022-10-08 DIAGNOSIS — Z98.890 OTHER SPECIFIED POSTPROCEDURAL STATES: Chronic | ICD-10-CM

## 2022-10-08 DIAGNOSIS — F10.129 ALCOHOL ABUSE WITH INTOXICATION, UNSPECIFIED: ICD-10-CM

## 2022-10-08 DIAGNOSIS — I85.00 ESOPHAGEAL VARICES WITHOUT BLEEDING: ICD-10-CM

## 2022-10-08 LAB
ALBUMIN SERPL ELPH-MCNC: 4.8 G/DL — SIGNIFICANT CHANGE UP (ref 3.5–5.2)
ALP SERPL-CCNC: 88 U/L — SIGNIFICANT CHANGE UP (ref 30–115)
ALT FLD-CCNC: 137 U/L — HIGH (ref 0–41)
ANION GAP SERPL CALC-SCNC: 17 MMOL/L — HIGH (ref 7–14)
AST SERPL-CCNC: 153 U/L — HIGH (ref 0–41)
B-OH-BUTYR SERPL-SCNC: 0.5 MMOL/L — HIGH
BASOPHILS # BLD AUTO: 0.08 K/UL — SIGNIFICANT CHANGE UP (ref 0–0.2)
BASOPHILS NFR BLD AUTO: 1.4 % — HIGH (ref 0–1)
BILIRUB SERPL-MCNC: 0.4 MG/DL — SIGNIFICANT CHANGE UP (ref 0.2–1.2)
BUN SERPL-MCNC: 4 MG/DL — LOW (ref 10–20)
CALCIUM SERPL-MCNC: 9.6 MG/DL — SIGNIFICANT CHANGE UP (ref 8.4–10.5)
CHLORIDE SERPL-SCNC: 99 MMOL/L — SIGNIFICANT CHANGE UP (ref 98–110)
CO2 SERPL-SCNC: 26 MMOL/L — SIGNIFICANT CHANGE UP (ref 17–32)
CREAT SERPL-MCNC: 0.5 MG/DL — LOW (ref 0.7–1.5)
EGFR: 122 ML/MIN/1.73M2 — SIGNIFICANT CHANGE UP
EOSINOPHIL # BLD AUTO: 0.07 K/UL — SIGNIFICANT CHANGE UP (ref 0–0.7)
EOSINOPHIL NFR BLD AUTO: 1.2 % — SIGNIFICANT CHANGE UP (ref 0–8)
ETHANOL SERPL-MCNC: 306 MG/DL — HIGH
FLUAV AG NPH QL: SIGNIFICANT CHANGE UP
FLUBV AG NPH QL: SIGNIFICANT CHANGE UP
GLUCOSE SERPL-MCNC: 98 MG/DL — SIGNIFICANT CHANGE UP (ref 70–99)
HCG SERPL QL: NEGATIVE — SIGNIFICANT CHANGE UP
HCT VFR BLD CALC: 38.2 % — SIGNIFICANT CHANGE UP (ref 37–47)
HGB BLD-MCNC: 13.1 G/DL — SIGNIFICANT CHANGE UP (ref 12–16)
IMM GRANULOCYTES NFR BLD AUTO: 0.5 % — HIGH (ref 0.1–0.3)
LACTATE SERPL-SCNC: 3 MMOL/L — HIGH (ref 0.7–2)
LIDOCAIN IGE QN: 57 U/L — SIGNIFICANT CHANGE UP (ref 7–60)
LYMPHOCYTES # BLD AUTO: 2.92 K/UL — SIGNIFICANT CHANGE UP (ref 1.2–3.4)
LYMPHOCYTES # BLD AUTO: 49.6 % — SIGNIFICANT CHANGE UP (ref 20.5–51.1)
MAGNESIUM SERPL-MCNC: 1.7 MG/DL — LOW (ref 1.8–2.4)
MCHC RBC-ENTMCNC: 33.6 PG — HIGH (ref 27–31)
MCHC RBC-ENTMCNC: 34.3 G/DL — SIGNIFICANT CHANGE UP (ref 32–37)
MCV RBC AUTO: 97.9 FL — SIGNIFICANT CHANGE UP (ref 81–99)
MONOCYTES # BLD AUTO: 0.65 K/UL — HIGH (ref 0.1–0.6)
MONOCYTES NFR BLD AUTO: 11 % — HIGH (ref 1.7–9.3)
NEUTROPHILS # BLD AUTO: 2.14 K/UL — SIGNIFICANT CHANGE UP (ref 1.4–6.5)
NEUTROPHILS NFR BLD AUTO: 36.3 % — LOW (ref 42.2–75.2)
NRBC # BLD: 0 /100 WBCS — SIGNIFICANT CHANGE UP (ref 0–0)
PLATELET # BLD AUTO: 357 K/UL — SIGNIFICANT CHANGE UP (ref 130–400)
POTASSIUM SERPL-MCNC: 3.9 MMOL/L — SIGNIFICANT CHANGE UP (ref 3.5–5)
POTASSIUM SERPL-SCNC: 3.9 MMOL/L — SIGNIFICANT CHANGE UP (ref 3.5–5)
PROT SERPL-MCNC: 7.7 G/DL — SIGNIFICANT CHANGE UP (ref 6–8)
RBC # BLD: 3.9 M/UL — LOW (ref 4.2–5.4)
RBC # FLD: 11.8 % — SIGNIFICANT CHANGE UP (ref 11.5–14.5)
RSV RNA NPH QL NAA+NON-PROBE: SIGNIFICANT CHANGE UP
SARS-COV-2 RNA SPEC QL NAA+PROBE: SIGNIFICANT CHANGE UP
SODIUM SERPL-SCNC: 142 MMOL/L — SIGNIFICANT CHANGE UP (ref 135–146)
TROPONIN T SERPL-MCNC: <0.01 NG/ML — SIGNIFICANT CHANGE UP
WBC # BLD: 5.89 K/UL — SIGNIFICANT CHANGE UP (ref 4.8–10.8)
WBC # FLD AUTO: 5.89 K/UL — SIGNIFICANT CHANGE UP (ref 4.8–10.8)

## 2022-10-08 PROCEDURE — 71045 X-RAY EXAM CHEST 1 VIEW: CPT | Mod: 26

## 2022-10-08 PROCEDURE — 99285 EMERGENCY DEPT VISIT HI MDM: CPT

## 2022-10-08 PROCEDURE — 93010 ELECTROCARDIOGRAM REPORT: CPT

## 2022-10-08 RX ORDER — MAGNESIUM SULFATE 500 MG/ML
2 VIAL (ML) INJECTION ONCE
Refills: 0 | Status: DISCONTINUED | OUTPATIENT
Start: 2022-10-08 | End: 2022-10-08

## 2022-10-08 RX ORDER — THIAMINE MONONITRATE (VIT B1) 100 MG
100 TABLET ORAL ONCE
Refills: 0 | Status: COMPLETED | OUTPATIENT
Start: 2022-10-08 | End: 2022-10-08

## 2022-10-08 RX ORDER — MAGNESIUM OXIDE 400 MG ORAL TABLET 241.3 MG
400 TABLET ORAL ONCE
Refills: 0 | Status: COMPLETED | OUTPATIENT
Start: 2022-10-08 | End: 2022-10-08

## 2022-10-08 RX ORDER — SODIUM CHLORIDE 9 MG/ML
1000 INJECTION, SOLUTION INTRAVENOUS ONCE
Refills: 0 | Status: COMPLETED | OUTPATIENT
Start: 2022-10-08 | End: 2022-10-08

## 2022-10-08 RX ADMIN — SODIUM CHLORIDE 1000 MILLILITER(S): 9 INJECTION, SOLUTION INTRAVENOUS at 06:10

## 2022-10-08 RX ADMIN — Medication 100 MILLIGRAM(S): at 07:18

## 2022-10-08 RX ADMIN — Medication 50 MILLIGRAM(S): at 06:10

## 2022-10-08 RX ADMIN — MAGNESIUM OXIDE 400 MG ORAL TABLET 400 MILLIGRAM(S): 241.3 TABLET ORAL at 07:18

## 2022-10-08 NOTE — ED PROVIDER NOTE - OBJECTIVE STATEMENT
42-year-old female past medical history chronic alcohol use, esophageal varices, hyperlipidemia, mitral valve prolapse, alcoholic ketoacidosis presenting to the ED for evaluation.  Patient reporting nausea, vomiting, diarrhea, epigastric discomfort and shortness of breath since last night.  Last drink was at 7 PM last night, drinks 1 L/day.  Patient reports she had elevated lactic acid last time she was in the hospital and is concerned.  Denies fever, chills, chest pain.

## 2022-10-08 NOTE — ED PROVIDER NOTE - CLINICAL SUMMARY MEDICAL DECISION MAKING FREE TEXT BOX
I personally evaluated the patient. I reviewed the Resident’s or Physician Assistant’s note (as assigned above), and agree with the findings and plan except as documented in my note. Patient evaluated for multiple complaints. Labs, ekg, cxr performed in the ED. Pt well appearing, no signs of withdrawal or vomiting in the ED. Magnesium repleted in the ED. I have fully discussed the medical management and delivery of care with the patient. I have discussed any available labs, imaging and treatment options with the patient. Patient confirms understanding and has been given detailed return precautions. Patient instructed to return to the ED should symptoms persist or worsen. Patient has demonstrated capacity and has verbalized understanding. Patient is well appearing upon discharge.

## 2022-10-08 NOTE — ED PROVIDER NOTE - NSFOLLOWUPCLINICS_GEN_ALL_ED_FT
[facility]  [facility]mt  [address] [place] [address]  Phone: [phone]  Fax:   Follow Up Time: 1-3 Days

## 2022-10-08 NOTE — ED PROVIDER NOTE - NSFOLLOWUPINSTRUCTIONS_ED_ALL_ED_FT
Alcohol Use Disorder  Alcohol use disorder is when your drinking disrupts your daily life. When you have this condition, you drink too much alcohol and you cannot control your drinking.    Alcohol use disorder can cause serious problems with your physical health. It can affect your brain, heart, liver, pancreas, immune system, stomach, and intestines. Alcohol use disorder can increase your risk for certain cancers and cause problems with your mental health, such as depression, anxiety, psychosis, delirium, and dementia. People with this disorder risk hurting themselves and others.    What are the causes?  This condition is caused by drinking too much alcohol over time. It is not caused by drinking too much alcohol only one or two times. Some people with this condition drink alcohol to cope with or escape from negative life events. Others drink to relieve pain or symptoms of mental illness.    What increases the risk?  You are more likely to develop this condition if:    You have a family history of alcohol use disorder.  Your culture encourages drinking to the point of intoxication, or makes alcohol easy to get.  You had a mood or conduct disorder in childhood.  You have been a victim of abuse.  You are an adolescent and:    You have poor grades or difficulties in school.  Your caregivers do not talk to you about saying no to alcohol, or supervise your activities.  You are impulsive or you have trouble with self-control.      What are the signs or symptoms?  Symptoms of this condition include:    Drinking more than you want to.  Drinking for longer than you want to.  Trying several times to drink less or to control your drinking.  Spending a lot of time getting alcohol, drinking, or recovering from drinking.  Craving alcohol.  Having problems at work, at school, or at home due to drinking.  Having problems in relationships due to drinking.  Drinking when it is dangerous to drink, such as before driving a car.  Continuing to drink even though you know you might have a physical or mental problem related to drinking.  Needing more and more alcohol to get the same effect you want from the alcohol (building up tolerance).  Having symptoms of withdrawal when you stop drinking. Symptoms of withdrawal include:    Fatigue.  Nightmares.  Trouble sleeping.  Depression.  Anxiety.  Fever.  Seizures.  Severe confusion.  Feeling or seeing things that are not there (hallucinations).  Tremors.  Rapid heart rate.  Rapid breathing.  High blood pressure.    Drinking to avoid symptoms of withdrawal.    How is this diagnosed?  This condition is diagnosed with an assessment. Your health care provider may start the assessment by asking three or four questions about your drinking.    Your health care provider may perform a physical exam or do lab tests to see if you have physical problems resulting from alcohol use. She or he may refer you to a mental health professional for evaluation.    How is this treated?  Some people with alcohol use disorder are able to reduce their alcohol use to low-risk levels. Others need to completely quit drinking alcohol. When necessary, mental health professionals with specialized training in substance use treatment can help. Your health care provider can help you decide how severe your alcohol use disorder is and what type of treatment you need. The following forms of treatment are available:    Detoxification. Detoxification involves quitting drinking and using prescription medicines within the first week to help lessen withdrawal symptoms. This treatment is important for people who have had withdrawal symptoms before and for heavy drinkers who are likely to have withdrawal symptoms. Alcohol withdrawal can be dangerous, and in severe cases, it can cause death. Detoxification may be provided in a home, community, or primary care setting, or in a hospital or substance use treatment facility.  Counseling. This treatment is also called talk therapy. It is provided by substance use treatment counselors. A counselor can address the reasons you use alcohol and suggest ways to keep you from drinking again or to prevent problem drinking. The goals of talk therapy are to:    Find healthy activities and ways for you to cope with stress.  Identify and avoid the things that trigger your alcohol use.  Help you learn how to handle cravings.    Medicines. Medicines can help treat alcohol use disorder by:    Decreasing alcohol cravings.  Decreasing the positive feeling you have when you drink alcohol.  Causing an uncomfortable physical reaction when you drink alcohol (aversion therapy).    Support groups. Support groups are led by people who have quit drinking. They provide emotional support, advice, and guidance.    ImageThese forms of treatment are often combined. Some people with this condition benefit from a combination of treatments provided by specialized substance use treatment centers.    Follow these instructions at home:  Take over-the-counter and prescription medicines only as told by your health care provider.  Check with your health care provider before starting any new medicines.  Ask friends and family members not to offer you alcohol.  Avoid situations where alcohol is served, including gatherings where others are drinking alcohol.  Create a plan for what to do when you are tempted to use alcohol.  Find hobbies or activities that you enjoy that do not include alcohol.  Keep all follow-up visits as told by your health care provider. This is important.  How is this prevented?  If you drink, limit alcohol intake to no more than 1 drink a day for nonpregnant women and 2 drinks a day for men. One drink equals 12 oz of beer, 5 oz of wine, or 1½ oz of hard liquor.  If you have a mental health condition, get treatment and support.  Do not give alcohol to adolescents.  If you are an adolescent:    Do not drink alcohol.  Do not be afraid to say no if someone offers you alcohol. Speak up about why you do not want to drink. You can be a positive role model for your friends and set a good example for those around you by not drinking alcohol.  If your friends drink, spend time with others who do not drink alcohol. Make new friends who do not use alcohol.  Find healthy ways to manage stress and emotions, such as meditation or deep breathing, exercise, spending time in nature, listening to music, or talking with a trusted friend or family member.    Contact a health care provider if:  You are not able to take your medicines as told.  Your symptoms get worse.  You return to drinking alcohol (relapse) and your symptoms get worse.  Get help right away if:  You have thoughts about hurting yourself or others.  If you ever feel like you may hurt yourself or others, or have thoughts about taking your own life, get help right away. You can go to your nearest emergency department or call:     Your local emergency services (911 in the [place]   A suicide crisis helpline, such as the National Suicide Prevention Lifeline at 1-402.809.3075. This is open 24 hours a day.     Summary  Alcohol use disorder is when your drinking disrupts your daily life. When you have this condition, you drink too much alcohol and you cannot control your drinking.  Treatment may include detoxification, counseling, medicine, and support groups.  Ask friends and family members not to offer you alcohol. Avoid situations where alcohol is served.  Get help right away if you have thoughts about hurting yourself or others.  This information is not intended to replace advice given to you by your health care provider. Make sure you discuss any questions you have with your health care provider.

## 2022-10-08 NOTE — ED PROVIDER NOTE - ATTENDING APP SHARED VISIT CONTRIBUTION OF CARE
43 yo F pmh etoh abuse, esophageal varices, hld, mvp, etoh ketoacidosis pw multiple cc. Pt c/o nausea, several episodes nbnb vomiting and epigastric pain since last night. Pt's last drink was last night, states she typically drinks 1 L of vodka daily and drank her normal amount. Pt concerned that her lactate is elevated because last time she was in the hospital she had alcoholic ketoacidosis. No f/c, no cp, no abd pain, no back pain, no extremity pain, no rash, no melena, no hematochezia.     CONSTITUTIONAL: Well-developed; well-nourished; in no acute distress. Sitting up and providing appropriate history and physical examination  SKIN: skin exam is warm and dry, no acute rash.  HEAD: Normocephalic; atraumatic.  EYES: PERRL, 3 mm bilateral, no nystagmus, EOM intact; conjunctiva and sclera clear.  ENT: No nasal discharge; airway clear.  NECK: Supple; non tender. + full passive ROM in all directions. No JVD  CARD: S1, S2 normal; no murmurs, gallops, or rubs. Regular rate and rhythm. + Symmetric Strong Pulses  RESP: No wheezes, rales or rhonchi. Good air movement bilaterally  ABD: soft; non-distended; non-tender. No Rebound, No Guarding, No signs of peritonitis, No CVA tenderness. No pulsatile abdominal mass. + Strong and Symmetric Pulses  EXT: Normal ROM. No clubbing, cyanosis or edema. Dp and Pt Pulses intact. Cap refill less than 3 seconds  NEURO: CN 2-12 intact, normal finger to nose, normal romberg, stable gait, no sensory or motor deficits, Alert, oriented, grossly unremarkable. No Focal deficits. GCS 15. NIH 0  PSYCH: Cooperative, appropriate.

## 2022-10-08 NOTE — ED ADULT TRIAGE NOTE - CHIEF COMPLAINT QUOTE
Pt BIBA from home for alcohol withdrawal symptoms, vomiting, chills, diarrhea, chest pain/ belly pain; last drink was 1900 yesterday

## 2022-10-08 NOTE — ED PROVIDER NOTE - PATIENT PORTAL LINK FT
You can access the FollowMyHealth Patient Portal offered by [facility] by registering at the following website: [email or web address]. By joining [facility]’s FollowMyHealth portal, you will also be able to view your health information using other applications (apps) compatible with our system.

## 2022-10-08 NOTE — ED PROVIDER NOTE - NS ED ROS FT
Constitutional: No fever, chills.  Eyes:  No visual changes  ENMT:  No neck pain  Cardiac:  No chest pain  Respiratory:  (+)sob. No cough  GI: (+) nausea, vomiting, diarrhea, abdominal pain.  :  No dysuria, hematuria  MS:  No back pain.  Neuro:  No headache or lightheadedness  Skin:  No skin rash  Endocrine: No history of thyroid disease or diabetes.  Except as documented in the HPI,  all other systems are negative.

## 2022-12-27 ENCOUNTER — NON-APPOINTMENT (OUTPATIENT)
Age: 42
End: 2022-12-27

## 2022-12-28 ENCOUNTER — EMERGENCY (EMERGENCY)
Facility: HOSPITAL | Age: 42
LOS: 0 days | Discharge: HOME | End: 2022-12-28
Attending: STUDENT IN AN ORGANIZED HEALTH CARE EDUCATION/TRAINING PROGRAM | Admitting: STUDENT IN AN ORGANIZED HEALTH CARE EDUCATION/TRAINING PROGRAM
Payer: MEDICAID

## 2022-12-28 VITALS
SYSTOLIC BLOOD PRESSURE: 115 MMHG | RESPIRATION RATE: 16 BRPM | HEART RATE: 91 BPM | DIASTOLIC BLOOD PRESSURE: 73 MMHG | OXYGEN SATURATION: 98 % | TEMPERATURE: 98 F

## 2022-12-28 VITALS
DIASTOLIC BLOOD PRESSURE: 74 MMHG | RESPIRATION RATE: 18 BRPM | SYSTOLIC BLOOD PRESSURE: 127 MMHG | WEIGHT: 184.97 LBS | HEART RATE: 98 BPM | OXYGEN SATURATION: 100 % | TEMPERATURE: 98 F

## 2022-12-28 DIAGNOSIS — Z87.891 PERSONAL HISTORY OF NICOTINE DEPENDENCE: ICD-10-CM

## 2022-12-28 DIAGNOSIS — K21.9 GASTRO-ESOPHAGEAL REFLUX DISEASE WITHOUT ESOPHAGITIS: ICD-10-CM

## 2022-12-28 DIAGNOSIS — Z86.79 PERSONAL HISTORY OF OTHER DISEASES OF THE CIRCULATORY SYSTEM: ICD-10-CM

## 2022-12-28 DIAGNOSIS — Z20.822 CONTACT WITH AND (SUSPECTED) EXPOSURE TO COVID-19: ICD-10-CM

## 2022-12-28 DIAGNOSIS — Z87.19 PERSONAL HISTORY OF OTHER DISEASES OF THE DIGESTIVE SYSTEM: ICD-10-CM

## 2022-12-28 DIAGNOSIS — Z98.891 HISTORY OF UTERINE SCAR FROM PREVIOUS SURGERY: Chronic | ICD-10-CM

## 2022-12-28 DIAGNOSIS — E83.119 HEMOCHROMATOSIS, UNSPECIFIED: ICD-10-CM

## 2022-12-28 DIAGNOSIS — E78.00 PURE HYPERCHOLESTEROLEMIA, UNSPECIFIED: ICD-10-CM

## 2022-12-28 DIAGNOSIS — F41.9 ANXIETY DISORDER, UNSPECIFIED: ICD-10-CM

## 2022-12-28 DIAGNOSIS — F10.21 ALCOHOL DEPENDENCE, IN REMISSION: ICD-10-CM

## 2022-12-28 DIAGNOSIS — R10.84 GENERALIZED ABDOMINAL PAIN: ICD-10-CM

## 2022-12-28 DIAGNOSIS — Z98.890 OTHER SPECIFIED POSTPROCEDURAL STATES: Chronic | ICD-10-CM

## 2022-12-28 DIAGNOSIS — E05.00 THYROTOXICOSIS WITH DIFFUSE GOITER WITHOUT THYROTOXIC CRISIS OR STORM: ICD-10-CM

## 2022-12-28 LAB
ALBUMIN SERPL ELPH-MCNC: 4.4 G/DL — SIGNIFICANT CHANGE UP (ref 3.5–5.2)
ALP SERPL-CCNC: 72 U/L — SIGNIFICANT CHANGE UP (ref 30–115)
ALT FLD-CCNC: 136 U/L — HIGH (ref 0–41)
ANION GAP SERPL CALC-SCNC: 17 MMOL/L — HIGH (ref 7–14)
APPEARANCE UR: CLEAR — SIGNIFICANT CHANGE UP
AST SERPL-CCNC: 164 U/L — HIGH (ref 0–41)
BACTERIA # UR AUTO: ABNORMAL
BASOPHILS # BLD AUTO: 0.02 K/UL — SIGNIFICANT CHANGE UP (ref 0–0.2)
BASOPHILS NFR BLD AUTO: 0.4 % — SIGNIFICANT CHANGE UP (ref 0–1)
BILIRUB SERPL-MCNC: 0.6 MG/DL — SIGNIFICANT CHANGE UP (ref 0.2–1.2)
BILIRUB UR-MCNC: NEGATIVE — SIGNIFICANT CHANGE UP
BUN SERPL-MCNC: 9 MG/DL — LOW (ref 10–20)
CALCIUM SERPL-MCNC: 9.5 MG/DL — SIGNIFICANT CHANGE UP (ref 8.4–10.5)
CHLORIDE SERPL-SCNC: 92 MMOL/L — LOW (ref 98–110)
CO2 SERPL-SCNC: 22 MMOL/L — SIGNIFICANT CHANGE UP (ref 17–32)
COLOR SPEC: YELLOW — SIGNIFICANT CHANGE UP
CREAT SERPL-MCNC: 0.7 MG/DL — SIGNIFICANT CHANGE UP (ref 0.7–1.5)
DIFF PNL FLD: NEGATIVE — SIGNIFICANT CHANGE UP
EGFR: 112 ML/MIN/1.73M2 — SIGNIFICANT CHANGE UP
EOSINOPHIL # BLD AUTO: 0.04 K/UL — SIGNIFICANT CHANGE UP (ref 0–0.7)
EOSINOPHIL NFR BLD AUTO: 0.7 % — SIGNIFICANT CHANGE UP (ref 0–8)
EPI CELLS # UR: ABNORMAL /HPF
GLUCOSE SERPL-MCNC: 97 MG/DL — SIGNIFICANT CHANGE UP (ref 70–99)
GLUCOSE UR QL: NEGATIVE MG/DL — SIGNIFICANT CHANGE UP
HCT VFR BLD CALC: 31.8 % — LOW (ref 37–47)
HGB BLD-MCNC: 11.2 G/DL — LOW (ref 12–16)
IMM GRANULOCYTES NFR BLD AUTO: 0.2 % — SIGNIFICANT CHANGE UP (ref 0.1–0.3)
KETONES UR-MCNC: NEGATIVE — SIGNIFICANT CHANGE UP
LACTATE SERPL-SCNC: 0.9 MMOL/L — SIGNIFICANT CHANGE UP (ref 0.7–2)
LEUKOCYTE ESTERASE UR-ACNC: ABNORMAL
LIDOCAIN IGE QN: 137 U/L — HIGH (ref 7–60)
LYMPHOCYTES # BLD AUTO: 1.47 K/UL — SIGNIFICANT CHANGE UP (ref 1.2–3.4)
LYMPHOCYTES # BLD AUTO: 26 % — SIGNIFICANT CHANGE UP (ref 20.5–51.1)
MCHC RBC-ENTMCNC: 33.8 PG — HIGH (ref 27–31)
MCHC RBC-ENTMCNC: 35.2 G/DL — SIGNIFICANT CHANGE UP (ref 32–37)
MCV RBC AUTO: 96.1 FL — SIGNIFICANT CHANGE UP (ref 81–99)
MONOCYTES # BLD AUTO: 0.95 K/UL — HIGH (ref 0.1–0.6)
MONOCYTES NFR BLD AUTO: 16.8 % — HIGH (ref 1.7–9.3)
NEUTROPHILS # BLD AUTO: 3.17 K/UL — SIGNIFICANT CHANGE UP (ref 1.4–6.5)
NEUTROPHILS NFR BLD AUTO: 55.9 % — SIGNIFICANT CHANGE UP (ref 42.2–75.2)
NITRITE UR-MCNC: NEGATIVE — SIGNIFICANT CHANGE UP
NRBC # BLD: 0 /100 WBCS — SIGNIFICANT CHANGE UP (ref 0–0)
PH UR: 7 — SIGNIFICANT CHANGE UP (ref 5–8)
PLATELET # BLD AUTO: 263 K/UL — SIGNIFICANT CHANGE UP (ref 130–400)
POTASSIUM SERPL-MCNC: 4.1 MMOL/L — SIGNIFICANT CHANGE UP (ref 3.5–5)
POTASSIUM SERPL-SCNC: 4.1 MMOL/L — SIGNIFICANT CHANGE UP (ref 3.5–5)
PROT SERPL-MCNC: 6.8 G/DL — SIGNIFICANT CHANGE UP (ref 6–8)
PROT UR-MCNC: NEGATIVE MG/DL — SIGNIFICANT CHANGE UP
RBC # BLD: 3.31 M/UL — LOW (ref 4.2–5.4)
RBC # FLD: 12.9 % — SIGNIFICANT CHANGE UP (ref 11.5–14.5)
RBC CASTS # UR COMP ASSIST: NEGATIVE — SIGNIFICANT CHANGE UP
SARS-COV-2 RNA SPEC QL NAA+PROBE: SIGNIFICANT CHANGE UP
SODIUM SERPL-SCNC: 131 MMOL/L — LOW (ref 135–146)
SP GR SPEC: 1.01 — SIGNIFICANT CHANGE UP (ref 1.01–1.03)
UROBILINOGEN FLD QL: 0.2 MG/DL — SIGNIFICANT CHANGE UP
WBC # BLD: 5.66 K/UL — SIGNIFICANT CHANGE UP (ref 4.8–10.8)
WBC # FLD AUTO: 5.66 K/UL — SIGNIFICANT CHANGE UP (ref 4.8–10.8)
WBC UR QL: SIGNIFICANT CHANGE UP /HPF

## 2022-12-28 PROCEDURE — 99285 EMERGENCY DEPT VISIT HI MDM: CPT

## 2022-12-28 PROCEDURE — 74177 CT ABD & PELVIS W/CONTRAST: CPT | Mod: 26,MA

## 2022-12-28 RX ORDER — ONDANSETRON 8 MG/1
4 TABLET, FILM COATED ORAL ONCE
Refills: 0 | Status: COMPLETED | OUTPATIENT
Start: 2022-12-28 | End: 2022-12-28

## 2022-12-28 RX ORDER — MORPHINE SULFATE 50 MG/1
4 CAPSULE, EXTENDED RELEASE ORAL ONCE
Refills: 0 | Status: DISCONTINUED | OUTPATIENT
Start: 2022-12-28 | End: 2022-12-28

## 2022-12-28 RX ORDER — SODIUM CHLORIDE 9 MG/ML
1000 INJECTION, SOLUTION INTRAVENOUS ONCE
Refills: 0 | Status: COMPLETED | OUTPATIENT
Start: 2022-12-28 | End: 2022-12-28

## 2022-12-28 RX ORDER — THIAMINE MONONITRATE (VIT B1) 100 MG
500 TABLET ORAL ONCE
Refills: 0 | Status: COMPLETED | OUTPATIENT
Start: 2022-12-28 | End: 2022-12-28

## 2022-12-28 RX ADMIN — MORPHINE SULFATE 4 MILLIGRAM(S): 50 CAPSULE, EXTENDED RELEASE ORAL at 21:39

## 2022-12-28 RX ADMIN — SODIUM CHLORIDE 1000 MILLILITER(S): 9 INJECTION, SOLUTION INTRAVENOUS at 23:15

## 2022-12-28 RX ADMIN — MORPHINE SULFATE 4 MILLIGRAM(S): 50 CAPSULE, EXTENDED RELEASE ORAL at 22:47

## 2022-12-28 RX ADMIN — SODIUM CHLORIDE 1000 MILLILITER(S): 9 INJECTION, SOLUTION INTRAVENOUS at 21:39

## 2022-12-28 RX ADMIN — ONDANSETRON 4 MILLIGRAM(S): 8 TABLET, FILM COATED ORAL at 21:40

## 2022-12-28 NOTE — ED PROVIDER NOTE - NSFOLLOWUPINSTRUCTIONS_ED_ALL_ED_FT
Our Emergency Department Referral Coordinators will be reaching out ot you in the next 24-48 hours from 9:00am to 5:00pm (Monday to Friday) with a follow up appointment. Please expect a phone call from the hospital in that time frame. If you do not receive a call or if you have any questions or concerns, you can reach them at [phone] or [phone].       Abdominal Pain    Many things can cause abdominal pain. Many times, abdominal pain is not caused by a disease and will improve without treatment. Your health care provider will do a physical exam to determine if there is a dangerous cause of your pain; blood tests and imaging may help determine the cause of your pain. However, in many cases, no cause may be found and you may need further testing as an outpatient. Monitor your abdominal pain for any changes.     SEEK IMMEDIATE MEDICAL CARE IF YOU HAVE ANY OF THE FOLLOWING SYMPTOMS: worsening abdominal pain, uncontrollable vomiting, profuse diarrhea, inability to have bowel movements or pass gas, black or bloody stools, fever accompanying chest pain or back pain, or fainting. These symptoms may represent a serious problem that is an emergency. Do not wait to see if the symptoms will go away. Get medical help right away. Call 911 and do not drive yourself to the hospital.

## 2022-12-28 NOTE — ED PROVIDER NOTE - NSICDXFAMILYHX_GEN_ALL_CORE_FT
[address]  [address] [address] [place] [address]  Dept: [phone]  Dept Fax: [phone]  Loc: [phone]    Keesha Yun is a 21 m.o. male who presents today for his medical conditions/complaints as noted below. Keesha Yun is c/o of Pharyngitis and Cough        HPI:     Pharyngitis  This is a new problem. The current episode started yesterday. Associated symptoms include congestion, coughing and a sore throat. Pertinent negatives include no fatigue, fever, rash or vomiting. Associated symptoms comments: Decreased appetite. Nothing aggravates the symptoms. He has tried acetaminophen for the symptoms. The treatment provided no relief. Was diagnosed with tonsillitis a few weeks ago and was treated with cefzil. Mother reports he improved and started holding his head and crying last night. Past Medical History:   Diagnosis Date    Allergic dermatitis 2020    Congenital dilation of renal pelvis 2020    Gastroesophageal reflux disease without esophagitis 2020    Heart murmur 2020    Two vessel cord affecting care of  2020     No past surgical history on file. No family history on file.     Social History     Tobacco Use    Smoking status: Not on file    Smokeless tobacco: Not on file   Substance Use Topics    Alcohol use: Not on file      Current Outpatient Medications   Medication Sig Dispense Refill    Cetirizine HCl (ZYRTEC ALLERGY CHILDRENS PO) Take by mouth      amoxicillin (AMOXIL) 400 MG/5ML suspension Take 7ml PO BID x 10 days 140 mL 0    albuterol (ACCUNEB) 1.25 MG/3ML nebulizer solution Inhale 3 mLs into the lungs every 6 hours as needed (Croup) 240 mL 3    pediatric multivitamin-iron (POLY-VI-SOL WITH IRON) solution Take 0.5 mLs by mouth every 12 hours       Probiotic Product (TRUNATURE PROBIOTIC FOR KIDS PO) Take by mouth       brompheniramine-pseudoephedrine-DM 2-30-10 MG/5ML syrup Take 1.3 mLs by mouth 3 times daily as needed for Congestion or Cough (Patient not taking: Reported on 3/12/2022) 118 mL 2     No current facility-administered medications for this visit. No Known Allergies    Health Maintenance   Topic Date Due    Lead screen 1 and 2 (1) Never done    Flu vaccine (1 of 2) Never done    Polio vaccine (4 of 4 - 4-dose series) 04/01/2024    Measles,Mumps,Rubella (MMR) vaccine (2 of 2 - Standard series) 04/01/2024    Varicella vaccine (2 of 2 - 2-dose childhood series) 04/01/2024    DTaP/Tdap/Td vaccine (5 - DTaP) 04/01/2024    HPV vaccine (1 - Male 2-dose series) 04/01/2031    Meningococcal (ACWY) vaccine (1 - 2-dose series) 04/01/2031    Hepatitis A vaccine  Completed    Hepatitis B vaccine  Completed    Hib vaccine  Completed    Rotavirus vaccine  Completed    Pneumococcal 0-64 years Vaccine  Completed       Subjective:     Review of Systems   Constitutional: Positive for appetite change and irritability. Negative for activity change, fatigue and fever. HENT: Positive for congestion and sore throat. Negative for ear discharge, ear pain, rhinorrhea and sneezing. Eyes: Negative for discharge. Respiratory: Positive for cough. Negative for wheezing. Gastrointestinal: Negative for constipation, diarrhea and vomiting. Genitourinary: Negative for decreased urine volume. Skin: Negative for color change and rash. All other systems reviewed and are negative.      :Objective      Physical Exam  Vitals and nursing note reviewed. Constitutional:       Appearance: Normal appearance. He is well-developed. HENT:      Head: Normocephalic and atraumatic. Right Ear: Ear canal normal. A middle ear effusion is present. Tympanic membrane is erythematous. Left Ear: Ear canal normal. A middle ear effusion is present. Tympanic membrane is erythematous and bulging.       Mouth/Throat:      Mouth: Mucous membranes are moist.      Pharynx: No FAMILY HISTORY:  Benzodiazepine misuse     posterior oropharyngeal erythema. Eyes:      Extraocular Movements: Extraocular movements intact. Pupils: Pupils are equal, round, and reactive to light. Cardiovascular:      Rate and Rhythm: Normal rate and regular rhythm. Pulses: Normal pulses. Heart sounds: Normal heart sounds. Pulmonary:      Effort: Pulmonary effort is normal. No respiratory distress, nasal flaring or retractions. Breath sounds: Normal breath sounds. No decreased air movement. Skin:     General: Skin is warm. Capillary Refill: Capillary refill takes less than 2 seconds. Coloration: Skin is not cyanotic. Findings: No rash. Neurological:      General: No focal deficit present. Mental Status: He is alert and oriented for age. Psychiatric:         Attention and Perception: Attention normal.         Behavior: Behavior normal.      Comments: irritable       Pulse 128   Temp 98.9 °F (37.2 °C) (Temporal)   Resp 22   Wt 28 lb (12.7 kg)   SpO2 97%     :Assessment       Diagnosis Orders   1. Non-recurrent acute suppurative otitis media of both ears without spontaneous rupture of tympanic membranes     2. Sore throat  POCT rapid strep A       :Plan   - Take full course of antibiotics  - Increase fluid intake  - Recommended OTC benadryl  - Recommended OTC claritin or zyrtec  - The patient is to follow up with PCP or return to clinic if symptoms worsen/fail to improve. Orders Placed This Encounter   Procedures    POCT rapid strep A     Results for orders placed or performed in visit on 03/12/22   POCT rapid strep A   Result Value Ref Range    Strep A Ag None Detected None Detected       Return if symptoms worsen or fail to improve. Orders Placed This Encounter   Medications    amoxicillin (AMOXIL) 400 MG/5ML suspension     Sig: Take 7ml PO BID x 10 days     Dispense:  140 mL     Refill:  0       Patient given educational materials- see patient instructions.   Discussed use, benefit, and side effects of prescribedmedications. All patient questions answered. Pt voiced understanding. Patient Instructions     Patient Education        Learning About Ear Infections (Otitis Media) in Children  What is an ear infection? An ear infection is an infection behind the eardrum. This type of infection is called otitis media. It can be caused by a virus or bacteria. An ear infection usually starts with a cold. A cold can cause swelling in the small tube that connects each ear to the throat. These two tubes are called eustachian (say \"kyle-STAY-shun\") tubes. Swelling can block the tube and trap fluid inside the ear. This makes it a perfect place for bacteria or viruses to grow and cause an infection. Ear infections happen mostly to young children. This is because their eustachian tubes are smaller and get blocked more easily. An ear infection can be painful. Children with ear infections often fuss and cry, pull at their ears, and sleep poorly. Older children will often tell you that their ear hurts. How are ear infections treated? Your doctor will discuss treatment with you based on your child's age and symptoms. Many children just need rest and home care. Regular doses of pain medicine are the best way to reduce fever and help your child feel better. · You can give your child acetaminophen (Tylenol) or ibuprofen (Advil, Motrin) for fever or pain. Do not use ibuprofen if your child is less than 6 months old unless the doctor gave you instructions to use it. Be safe with medicines. For children 6 months and older, read and follow all instructions on the label. · Your doctor may also give you eardrops to help your child's pain. · Do not give aspirin to anyone younger than 20. It has been linked to Reye syndrome, a serious illness. Doctors often take a wait-and-see approach to treating ear infections, especially in children older than 6 months who aren't very sick.  A doctor may wait for 2 or 3 days to see if the ear infection improves on its own. If the child doesn't get better with home care, including pain medicine, the doctor may prescribe antibiotics then. Why don't doctors always prescribe antibiotics for ear infections? Antibiotics often are not needed to treat an ear infection. · Most ear infections will clear up on their own. This is true whether they are caused by bacteria or a virus. · Antibiotics kill only bacteria. They won't help with an infection caused by a virus. · Antibiotics won't help much with pain. There are good reasons not to give antibiotics if they are not needed. · Overuse of antibiotics can be harmful. If antibiotics are taken when they aren't needed, they may not work later when they're really needed. This is because bacteria can become resistant to antibiotics. · Antibiotics can cause side effects, such as stomach cramps, nausea, rash, and diarrhea. They can also lead to vaginal yeast infections. Follow-up care is a key part of your child's treatment and safety. Be sure to make and go to all appointments, and call your doctor if your child is having problems. It's also a good idea to know your child's test results and keep a list of the medicines your child takes. Where can you learn more? Go to https://[employer]. org and sign in to your [employer] account. Enter [phone] [phone] in the [facility] box to learn more about \"Learning About Ear Infections (Otitis Media) in Children. \"     If you do not have an account, please click on the \"Sign Up Now\" link. Current as of: September 8, 2021               Content Version: 13.1  © 2006-2021 [employer]. Care instructions adapted under license by [facility] ([facility]).  If you have questions about a medical condition or this instruction, always ask your healthcare professional. [address] [address] any warranty or liability for your use of this information.       - Take full course of antibiotics  - Increase fluid intake  - Recommended OTC benadryl  - Recommended OTC claritin or zyrtec  - The patient is to follow up with PCP or return to clinic if symptoms worsen/fail to improve.             Electronically signed by Darylene Inks, APRN - CNP on 3/12/2022 at 8:36 AM

## 2022-12-28 NOTE — ED ADULT NURSE NOTE - OBJECTIVE STATEMENT
Pt states her last drink was last Wednesday. States on Thursday complains of pain to entire upper abdomen more LUQ with nausea and vomiting. Also complains of dark urine.    States she drinks a liter of vodka every day since she was 27 years old. Last drink last Wednesday.

## 2022-12-28 NOTE — ED PROVIDER NOTE - OBJECTIVE STATEMENT
42-year-old female, past medical history of alcoholism GERD, Graves' disease, hyperlipidemia, presents emergency department for abdominal pain several days generalized mild aching no specific radiation.  Patient states has been sober for 7 days. Denies fever, chills, cp, sob, nvd, dysuria, hematuria.

## 2022-12-28 NOTE — ED PROVIDER NOTE - CLINICAL SUMMARY MEDICAL DECISION MAKING FREE TEXT BOX
6-year-old female with history of EtOH abuse, hyperlipidemia, Graves' disease presents to the ER for diffuse abdominal pain, aching for the past few days.  Reports her last drink was 7 days ago.  No vomiting, fevers, chills, diarrhea, bloody or melanotic stool.  Vitals noted, triage note reviewed, exam as documented above.  Patient was given fluids, supportive care.  Labs and imaging personally reviewed.  Patient well-appearing on reassessment, abdomen is soft, nontender. She was given supportive care, close follow-up, return precautions, and she verbalized understanding. I have fully discussed the medical management and delivery of care with the patient. I have discussed any available labs, imaging and treatment options with the patient. All Questions answered at the bedside and printed copies of all results provided and recommended to review with PCP. Patient confirms understanding and has been given detailed return precautions. Patient instructed to return to the ED should symptoms persist or worsen. Patient has demonstrated capacity and has verbalized understanding. Patient is well appearing upon discharge, ambulatory with a steady gait.

## 2022-12-28 NOTE — ED PROVIDER NOTE - PATIENT PORTAL LINK FT
You can access the FollowMyHealth Patient Portal offered by [facility] by registering at the following website: [email or web address]. By joining [facility]’s FollowMyHealth portal, you will also be able to view your health information using other applications (apps) compatible with our system.

## 2022-12-28 NOTE — ED ADULT NURSE NOTE - HIV OFFER
Detail Level: Zone Recommended patient to wear a compression stocking on right stump to decrease swelling and avoid blisters.\\nIf bullae burst, pt will have to wash them daily with soap and water, apply vaseline jelly, telfa and compression stocking until healed. Previously Declined (within the last year)

## 2022-12-30 LAB
CULTURE RESULTS: SIGNIFICANT CHANGE UP
SPECIMEN SOURCE: SIGNIFICANT CHANGE UP

## 2023-01-12 ENCOUNTER — OUTPATIENT (OUTPATIENT)
Dept: OUTPATIENT SERVICES | Facility: HOSPITAL | Age: 43
LOS: 1 days | Discharge: HOME | End: 2023-01-12
Payer: MEDICAID

## 2023-01-12 DIAGNOSIS — Z98.891 HISTORY OF UTERINE SCAR FROM PREVIOUS SURGERY: Chronic | ICD-10-CM

## 2023-01-12 DIAGNOSIS — R94.8 ABNORMAL RESULTS OF FUNCTION STUDIES OF OTHER ORGANS AND SYSTEMS: ICD-10-CM

## 2023-01-12 DIAGNOSIS — Z98.890 OTHER SPECIFIED POSTPROCEDURAL STATES: Chronic | ICD-10-CM

## 2023-01-12 PROCEDURE — 76700 US EXAM ABDOM COMPLETE: CPT | Mod: 26

## 2023-01-24 ENCOUNTER — NON-APPOINTMENT (OUTPATIENT)
Age: 43
End: 2023-01-24

## 2023-02-08 ENCOUNTER — APPOINTMENT (OUTPATIENT)
Dept: HEMATOLOGY ONCOLOGY | Facility: CLINIC | Age: 43
End: 2023-02-08

## 2023-02-15 ENCOUNTER — NON-APPOINTMENT (OUTPATIENT)
Age: 43
End: 2023-02-15

## 2023-02-15 NOTE — H&P ADULT - MUSCULOSKELETAL
A/P  1.) Allergic conjunctivitis OU  -H/o chronic perennial allergic conjunctivitis. Quiet today and for past few months  -Currently on AT/gel, Restasis bid, and ketotifen prn for itching. Continue all  -S/p punctal cautery uppers OU. Lowers likely scarred as unable to insert plugs previously  -Okay to continue current treatment for now. Will give Maxitrol raffi prn for outer lid itching. 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 She is largely going uncorrected and corrects to 20/20 with small astigmatic glasses Rx. No CL indicated  -Left eye with higher cyl/PMD. Still refracts fairly well. Has monovision CL that she uses occasionally. Has noticed deposit on it - Progent cycle done today  -Fit fine on left CL, okay to continue.     3.) Right hypertropia with esotropia component  -Decompensating phoria noticeable with double vision at night  -Responds well to prism in office today. Would rec trying in glasses. If unable to adapt and bothersome may consider strabismus surgery referral    Monitor 1 year comprehensive, sooner prn with new concerns    
No joint pain, swelling or deformity; no limitation of movement

## 2023-02-27 ENCOUNTER — NON-APPOINTMENT (OUTPATIENT)
Age: 43
End: 2023-02-27

## 2023-02-28 NOTE — SBIRT NOTE ADULT - NSSBIRTDRGPOSREINDET_GEN_A_CORE
54M with PMH epilepsy and HTN p/w dizziness and ataxia. The patient began feeling dizzy with poor balance on the morning of admission (~8am) that lasted approx 1hr, a/w blurry vision, reports loss of balance at this time with controlled fall without head trauma. The patient checked his BP at this time which was 160/90. Per the pt, he took his 400mg dose of carbamazepine at 11PM the previous night (later than usual) and took his subsequent morning dose at 4AM (earlier than usual). The patient also reports inking 3 cups of coffee during the morning of presentation (normally drinks 2 cups in the morning). The patient usually takes carbamazepine 400mg BID. The patient denies fevers, chills, chest pain, nausea, vomiting, syncope or palpitations. Denies confusion or excessive somnolence after the fall, tongue biting or lacerations or tremors/shaking. Reports last breakthrough seizure was 3-4 years ago. He reports that 1 week ago he felt dizzy with blurry vision, BP at the time was 90s systolic per home BP cuff which self resolved without intervention, reports 2 controlled falls at that time with no head trauma. After this episode, the patient's outpt neurologist instructed him to take his amlodipine every other day. Patient reports that he now feels at baseline and denies dizziness, blurry vision, or balance issues at present. The patient denies recent sick contacts or travel.  Positive reinforcement provided given patient currently within healthy guidelines. Education materials reviewed and given to patient.  54M with PMH epilepsy and HTN p/w dizziness and ataxia. The patient began feeling dizzy with poor balance on the morning of admission (~8am) that lasted approx 1hr, a/w blurry vision, reports loss of balance at this time with controlled fall without head trauma or LOC. The patient checked his BP at this time which was 160/90. Per the pt, he took his 400mg dose of carbamazepine at 11PM the previous night (later than usual) and took his subsequent morning dose at 4AM (earlier than usual). The patient also reports inking 3 cups of coffee during the morning of presentation (normally drinks 2 cups in the morning). The patient usually takes carbamazepine 400mg BID. The patient denies fevers, chills, chest pain, nausea, vomiting, syncope or palpitations. Denies confusion or excessive somnolence after the fall, tongue biting or lacerations or tremors/shaking. Reports last breakthrough seizure was 3-4 years ago. He reports that 1 week ago he felt dizzy with blurry vision, BP at the time was 90s systolic per home BP cuff which self resolved without intervention, reports 2 controlled falls at that time with no head trauma. After this episode, the patient's outpt neurologist instructed him to take his amlodipine every other day. Patient reports that he now feels at baseline and denies dizziness, blurry vision, or balance issues at present. The patient denies recent sick contacts or travel.

## 2023-03-01 ENCOUNTER — APPOINTMENT (OUTPATIENT)
Dept: HEMATOLOGY ONCOLOGY | Facility: CLINIC | Age: 43
End: 2023-03-01

## 2023-03-06 NOTE — ED PROVIDER NOTE - NS HIV RISK FACTOR YES
Addended by: KATE BRUNO on: 3/6/2023 12:47 PM     Modules accepted: Orders    
Progress Notes by Ellen Ortega DO at 18 03:02 PM     Author:  Ellen Ortega DO Service:  (none) Author Type:  Physician     Filed:  18 06:16 PM Encounter Date:  2018 Status:  Signed     :  Ellen Ortega DO (Physician)            [facility]  PSYCHIATRIC PROGRESS NOTE    NAME:  Jhony Morales  : 2011  MRN: [id number]  AGE:[PG1.1C] 6  year old 6  month old[PG1.2T]      PCP: Dr Vee  Attending: Ellen Ortega DO MA    Duration: 25 minutes[PG1.1C]  E/M with attempted psychotherapy but pt too inattentive to attend. Letter drafted and provided requesting supportive services for SR-K[PG1.3M]    Red Rule Observed      IDENTIFYING INFORMATION:   Jhony is a[PG1.1C] 6  year old 6  month old[PG1.2T]  old little boy who lives with his mother and younger sister Amrit who is 1 1/2 younger. Mother is a PE/'s  at [employer]. Parents are  and mother has full custody. Father is a  and sees intermittently. Pt attends  at [employer]     CHIEF COMPLAINT:   Aggressivity and hyperactivity    HISTORY OF PRESENT ILLNESS:[PG1.1C]      At that time of last visit, Jhony was[PG1.1T] doing relatively well on Regular Release methylphenidate at dosing of 10/5/5[PG1.1M].[PG1.1T] Mom felt the larger am dose was holding him through school day even though subsequent doses were less. Mom Mom was curious if 7.5 mg given twice a day might be equally efficacious[PG1.1M] without[PG1.3M] side-effects.[PG1.1M] Writer[PG1.1T] agreed with mom's proposed trial of 7.5/5/5 and if needed 7/5 mg in early evening. Since last appt, mom has contacted me about possibly obtaining a 504 Plan, which school is not likely to put in place until he starts SR-K.[PG1.1M]      Today Jhony returns to clinic[PG1.1T] with mom[PG1.3M]. Patient is seen[PG1.1T] with parent given inability to provide reliable history.  Jhony is not cooperative with the process, largely refusing to 
engage in conversation with me until the end of the session. This is more a function of his being coy and playful rather than blatantly oppositional. Jhony does become progressively more hyperkinetic and loud as the session progresses to the point mom has to carry him out of reception at the end secondary to his running the halls and yelling. Mom told me he had not gotten his 2:30 scheduled dose of medication[PG1.3M]    Current medications include:  Current Outpatient Prescriptions     Medication  Sig   • methylphenidate (RITALIN) 10 MG tablet 1/2 po at 10:30 and again at 2 pm.   • methylphenidate (RITALIN) 5 MG tablet 1 1/2 po q am upon awakening and 1 to 1 /2 mg at 5 pm   • methylphenidate (RITALIN) 10 MG tablet 1/2 po at 10:30 and again at 2 pm.   • [START ON 1/29/2018] methylphenidate (RITALIN) 10 MG tablet 1/2 po at 10:30 and again at 2 pm.   • methylphenidate (RITALIN) 5 MG tablet 1 1/2 po q am upon awakening and 1 to 1 /2 mg at 5 pm   • [START ON 1/22/2018] methylphenidate (RITALIN) 5 MG tablet 1 1/2 po q am upon awakening and 1 to 1 /2 mg at 5 pm   • betamethasone dipropionate (DIPROLENE) 0.05 % cream to the penis 2 time daily.       He is[PG1.1T] compliant all of the time[PG1.4M]. Jhony[PG1.1T] denies any untoward side effects[PG1.4M].    Immunization history is as follows:[PG1.1T]  VACCINE/DOSE DATE DATE DATE DATE DATE DATE   INFLUENZA 2011 1/13/2014 11/24/2014 10/3/2015 9/19/2016 9/21/2017   INFLUENZA EGG FREE[PG1.2T]             Patient had no new medical issues.    Mom tells me things have been going pretty well, as long as Jhony gets his medication on a regular basis every 3 1/2 to 4 hours. Mom continues to administer Ritalin 5 mg TID when he is at home with her. She feels he is largely manageable and demands are less at home. When he has an extracurricular activity such as Karate she will give him 10 mg as does substantially better.   School has been giving pt 10 mg Ritalin in am and either 
7.5 - 10 mg at 10:30 and 2:30. Mom is not exactly sure what exact dose school is administering but states director of program has commented that 5 mg does not hold him and that he does better on 10 mg.[PG1.3M]     Appetite is suppressed but weight stable[PG1.1M]. Will eat at table at school but so busy at home, mom will put plate of food out on coffee table or floor and let pt graze as too busy to sit and eat.[PG1.3M]   Sleeps better with medication in his system than when without[PG1.1M]. Will tantrum and throw things in later evening at home or when without meds.[PG1.3M]     Dosing times are at 6:30; 10:30 and 2:30 and only prn on evenings with activities[PG1.1M]. Difficulties are limited to lower doses or when meds are administered more than 4 hours apart, such as today[PG1.3M]    Sleep is good; appetite well and denies sz, headaches, psychosis or emergent of tics.[PG1.3T]    Stressors include:[PG1.1T]  Social, developmental[PG1.3M]    Substance use[PG1.1T] is not[PG1.3M] an issue.[PG1.1T]     Jhony[PG1.5T] denies other symptoms.[PG1.3M] Jhony[PG1.1T] can[PG1.3M] convincingly contract for safety.  We reviewed his safety plan.[PG1.1T]     PAST PSYCHIATRIC HISTORY:   Current Therapist: Tia Haines    DEVELOPMENTAL:  Identified for Early Intervention at 2 1/2 for S/L issues.    PAST MEDICAL HISTORY:   Current medical problems: S/L delays[PG1.1C]   Wt Readings from Last 3 Encounters:    01/02/18 37 lb 6.4 oz (17 kg)   12/04/17 38 lb 12.8 oz (17.6 kg)   10/31/17 38 lb 9.6 oz (17.5 kg)[PG1.2T]         ALLERGIES: none    FAMILY PSYCHIATRIC HISTORY:   Father: active ETOH, Bipolar and possible ADHD. Has history of DUI and Disorderly Conduct arrests  PUncle- ADHD and Rx  MGGAunt- Attempted suicide # of times, hospitalized # times, odd  MGAunt: depressed but not hospitalized  2nd Cousin- ADHD  2nd Cousin- Bipolar  M2nd Cousin- Incarcerated due to drugs  Sister - fine, academically ahead of pt    SOCIAL HISTORY: 
  Patient lives with: mother and younger sister. Parents are  and he visits with his dad on occasional weekends. Mom has sole custody and describes pt as more agitated upon return from visits with dad. Currently in [employer] pre-school. Has had Early Intervention due to S/L issues in past. Denies substance use.     MENTAL STATUS EXAM:[PG1.1C]   BP 90/50 (BP Location: Left arm, Patient Position: Sitting)  Pulse 92  Ht 3' 3.75\" (1.01 m)  Wt 37 lb 6.4 oz (17 kg)  BMI 16.64 kg/m2[PG1.2T]  Musculoskeletal Exam: Normal muscle tone and strength. Normal gait and station  Neurological: No notable neurological difficulties.   Orientation: to person, place, time &  purpose  General Appearance and Manner: casually dressed; adequately groomed, good eye contact;[PG1.1C] un[PG1.3M]cooperative  Speech:[PG1.1C]  Progressively louder as session progresses[PG1.3M]   Thought Processes: Coherent and goal directed.   Associations: normal associations; developmentally appropriate formulations  Suicidal Ideation: no ideation; plan or intent. Convincingly contracts for safety  Homicidal Ideation: denies ideation; plan or intent. Convincingly contracts for safety  Violent Ideation: denies ideation, plan or intent. Convincingly contracts for safety  Perceptual difficulties: denies A/VH/ no evidence of responding to IS                                                               Judgement and Insight: improved, fair  Memory: recent, intermediate and remote are grossly intact but difficult to test  Attention/Concentration: fair  Language: no notable language barriers; English in primary language  Fund of Knowledge: appropriate for chronological age  Mood: good  Affect:[PG1.1C] hyperkinetic[PG1.3M]  Family History of Psychiatric Hospitalizations: maternal family history; MGGaunt and MGAunt  Family History of Attempted or Successful Suicide: MGGAunt  Access to Firearms: no  Strengths: likeable  Weakness: learning and speech 
issues  Motivation for Treatment: mom high      ASSESSMENT:   Axis I: ADHD- combined type; R/O DMDD; Expressive Language Disorder; R/O Autistic Spectrum Disorder  Axis II: Deferred.   Axis III: none  Axis IV: peer, social, family, academic  Axis V: 5[PG1.1C]4[PG1.3M]    PLAN:[PG1.1C]   - Use 10 mg methylphenidate in am, 10:30 and 2:30  - Mom to use 5-10 mg at home q 4 hours pending on activity and need  - Try to serve evening meal while meds in system and waning. Reward positive table behavior[PG1.3C]  - Letter requesting IEP/504 Plan in SR-K provided[PG1.3M]    Current Outpatient Prescriptions     Medication  Sig   • methylphenidate (RITALIN) 10 MG tablet 1 tab po 0630; 1030; 1430 and 1/2 to 1 tab at 1830   • [START ON 1/30/2018] methylphenidate (RITALIN) 10 MG tablet 1 tab po 0630; 1030; 1430 and 1/2 to 1 tab at 1830   • [START ON 2/20/2018] methylphenidate (RITALIN) 10 MG tablet 1 tab po 0630; 1030; 1430 and 1/2 to 1 tab at 1830   • methylphenidate (RITALIN) 5 MG tablet 1 1/2 po q am upon awakening and 1 to 1 /2 mg at 5 pm   • betamethasone dipropionate (DIPROLENE) 0.05 % cream to the penis 2 time daily.[PG1.3C]       - Risks and benefits of medications use was discussed at length. Writer provided psycho-education to patient and parents regarding possible side-effects including the most serious and most common. Parents also understand our treatment hopes and objectives. Pt and parents are agreeable to course of treatment as outlined above and will monitor for any adverse side-effects. Parents agree to notify writer immediately if any problems     - As always, will coordinate care with other providers    - Encouraged regular compliance with medications and appointments. Discussed the risks of not doing so. Parents agree to call us at least 72 hours in advance to running out of medications and calling us prior to making adjustments on their own    - If any concerns re: life-threatening behavior or side-effects, 
parents understands that they are to call 911 and notify us upon emergency evaluation    - Numbers for emergency hotlines, hospital crisis line provided. Family is aware that there is a child psychiatrist on call 24/7 via our answering service.      Patient Education:  Writer provided psycho-education to mother regarding the above mentioned diagnoses and what is currently considered the standard of care regarding treatment. Writer also provided education   on the risks, benefits, and potential side effects about the psychotropic medications discussed above.        Ellen Ortega DO, MA  Board Certified Child & Adolescent Psychiatrist  [facility]      Thank you for the opportunity to consult on this patient.[PG1.1C]            Revision History        User Key Date/Time User Provider Type Action    > PG1.4 01/02/18 06:16 PM Ellen Ortega DO Physician Sign     PG1.5 01/02/18 06:11 PM Ellen Ortega DO Physician      PG1.2 01/02/18 06:03 PM Ellen Ortega DO Physician      PG1.3 01/02/18 06:02 PM Ellen Ortega DO Physician      PG1.1 01/02/18 03:02 PM Ellen Ortega DO Physician     C - Copied, M - Manual, T - Template            
Declined

## 2023-04-13 ENCOUNTER — INPATIENT (INPATIENT)
Facility: HOSPITAL | Age: 43
LOS: 2 days | Discharge: HOME | End: 2023-04-16
Attending: INTERNAL MEDICINE | Admitting: INTERNAL MEDICINE
Payer: MEDICAID

## 2023-04-13 VITALS
OXYGEN SATURATION: 99 % | DIASTOLIC BLOOD PRESSURE: 84 MMHG | SYSTOLIC BLOOD PRESSURE: 137 MMHG | TEMPERATURE: 98 F | RESPIRATION RATE: 20 BRPM | HEART RATE: 99 BPM | WEIGHT: 199.96 LBS | HEIGHT: 62 IN

## 2023-04-13 DIAGNOSIS — F10.20 ALCOHOL DEPENDENCE, UNCOMPLICATED: ICD-10-CM

## 2023-04-13 DIAGNOSIS — F10.239 ALCOHOL DEPENDENCE WITH WITHDRAWAL, UNSPECIFIED: ICD-10-CM

## 2023-04-13 DIAGNOSIS — Z98.890 OTHER SPECIFIED POSTPROCEDURAL STATES: Chronic | ICD-10-CM

## 2023-04-13 DIAGNOSIS — Z98.891 HISTORY OF UTERINE SCAR FROM PREVIOUS SURGERY: Chronic | ICD-10-CM

## 2023-04-13 LAB
ALBUMIN SERPL ELPH-MCNC: 4.8 G/DL — SIGNIFICANT CHANGE UP (ref 3.5–5.2)
ALP SERPL-CCNC: 80 U/L — SIGNIFICANT CHANGE UP (ref 30–115)
ALT FLD-CCNC: 82 U/L — HIGH (ref 0–41)
AMPHET UR-MCNC: NEGATIVE — SIGNIFICANT CHANGE UP
ANION GAP SERPL CALC-SCNC: 17 MMOL/L — HIGH (ref 7–14)
APAP SERPL-MCNC: <5 UG/ML — LOW (ref 10–30)
APPEARANCE UR: CLEAR — SIGNIFICANT CHANGE UP
APTT BLD: 36.8 SEC — SIGNIFICANT CHANGE UP (ref 27–39.2)
AST SERPL-CCNC: 185 U/L — HIGH (ref 0–41)
B-OH-BUTYR SERPL-SCNC: <0.2 MMOL/L — SIGNIFICANT CHANGE UP
BACTERIA # UR AUTO: ABNORMAL
BARBITURATES UR SCN-MCNC: NEGATIVE — SIGNIFICANT CHANGE UP
BASE EXCESS BLDV CALC-SCNC: 6.7 MMOL/L — HIGH (ref -2–3)
BASOPHILS # BLD AUTO: 0.03 K/UL — SIGNIFICANT CHANGE UP (ref 0–0.2)
BASOPHILS NFR BLD AUTO: 0.6 % — SIGNIFICANT CHANGE UP (ref 0–1)
BENZODIAZ UR-MCNC: NEGATIVE — SIGNIFICANT CHANGE UP
BILIRUB SERPL-MCNC: 0.7 MG/DL — SIGNIFICANT CHANGE UP (ref 0.2–1.2)
BILIRUB UR-MCNC: NEGATIVE — SIGNIFICANT CHANGE UP
BUN SERPL-MCNC: 7 MG/DL — LOW (ref 10–20)
CA-I SERPL-SCNC: 1.12 MMOL/L — LOW (ref 1.15–1.33)
CALCIUM SERPL-MCNC: 9.5 MG/DL — SIGNIFICANT CHANGE UP (ref 8.4–10.5)
CHLORIDE SERPL-SCNC: 99 MMOL/L — SIGNIFICANT CHANGE UP (ref 98–110)
CO2 SERPL-SCNC: 24 MMOL/L — SIGNIFICANT CHANGE UP (ref 17–32)
COCAINE METAB.OTHER UR-MCNC: NEGATIVE — SIGNIFICANT CHANGE UP
COD CRY URNS QL: NEGATIVE — SIGNIFICANT CHANGE UP
COLOR SPEC: YELLOW — SIGNIFICANT CHANGE UP
CREAT SERPL-MCNC: 0.6 MG/DL — LOW (ref 0.7–1.5)
DIFF PNL FLD: ABNORMAL
DRUG SCREEN 1, URINE RESULT: SIGNIFICANT CHANGE UP
EGFR: 116 ML/MIN/1.73M2 — SIGNIFICANT CHANGE UP
EOSINOPHIL # BLD AUTO: 0.04 K/UL — SIGNIFICANT CHANGE UP (ref 0–0.7)
EOSINOPHIL NFR BLD AUTO: 0.7 % — SIGNIFICANT CHANGE UP (ref 0–8)
EPI CELLS # UR: ABNORMAL /HPF
ETHANOL SERPL-MCNC: 286 MG/DL — HIGH
FENTANYL UR QL: NEGATIVE — SIGNIFICANT CHANGE UP
GAS PNL BLDV: 141 MMOL/L — SIGNIFICANT CHANGE UP (ref 136–145)
GAS PNL BLDV: SIGNIFICANT CHANGE UP
GLUCOSE SERPL-MCNC: 95 MG/DL — SIGNIFICANT CHANGE UP (ref 70–99)
GLUCOSE UR QL: NEGATIVE MG/DL — SIGNIFICANT CHANGE UP
GRAN CASTS # UR COMP ASSIST: NEGATIVE — SIGNIFICANT CHANGE UP
HCG SERPL QL: NEGATIVE — SIGNIFICANT CHANGE UP
HCO3 BLDV-SCNC: 30 MMOL/L — HIGH (ref 22–29)
HCT VFR BLD CALC: 40.4 % — SIGNIFICANT CHANGE UP (ref 37–47)
HCT VFR BLDA CALC: 43 % — SIGNIFICANT CHANGE UP (ref 39–51)
HGB BLD CALC-MCNC: 14.3 G/DL — SIGNIFICANT CHANGE UP (ref 12.6–17.4)
HGB BLD-MCNC: 14.3 G/DL — SIGNIFICANT CHANGE UP (ref 12–16)
HYALINE CASTS # UR AUTO: NEGATIVE — SIGNIFICANT CHANGE UP
IMM GRANULOCYTES NFR BLD AUTO: 0.2 % — SIGNIFICANT CHANGE UP (ref 0.1–0.3)
INR BLD: 1 RATIO — SIGNIFICANT CHANGE UP (ref 0.65–1.3)
KETONES UR-MCNC: NEGATIVE — SIGNIFICANT CHANGE UP
LACTATE BLDV-MCNC: 4.1 MMOL/L — CRITICAL HIGH (ref 0.5–2)
LACTATE SERPL-SCNC: 1.3 MMOL/L — SIGNIFICANT CHANGE UP (ref 0.7–2)
LEUKOCYTE ESTERASE UR-ACNC: NEGATIVE — SIGNIFICANT CHANGE UP
LIDOCAIN IGE QN: 22 U/L — SIGNIFICANT CHANGE UP (ref 7–60)
LYMPHOCYTES # BLD AUTO: 1.99 K/UL — SIGNIFICANT CHANGE UP (ref 1.2–3.4)
LYMPHOCYTES # BLD AUTO: 36.6 % — SIGNIFICANT CHANGE UP (ref 20.5–51.1)
MAGNESIUM SERPL-MCNC: 1.9 MG/DL — SIGNIFICANT CHANGE UP (ref 1.8–2.4)
MCHC RBC-ENTMCNC: 34.4 PG — HIGH (ref 27–31)
MCHC RBC-ENTMCNC: 35.4 G/DL — SIGNIFICANT CHANGE UP (ref 32–37)
MCV RBC AUTO: 97.1 FL — SIGNIFICANT CHANGE UP (ref 81–99)
METHADONE UR-MCNC: NEGATIVE — SIGNIFICANT CHANGE UP
MONOCYTES # BLD AUTO: 0.57 K/UL — SIGNIFICANT CHANGE UP (ref 0.1–0.6)
MONOCYTES NFR BLD AUTO: 10.5 % — HIGH (ref 1.7–9.3)
NEUTROPHILS # BLD AUTO: 2.8 K/UL — SIGNIFICANT CHANGE UP (ref 1.4–6.5)
NEUTROPHILS NFR BLD AUTO: 51.4 % — SIGNIFICANT CHANGE UP (ref 42.2–75.2)
NITRITE UR-MCNC: NEGATIVE — SIGNIFICANT CHANGE UP
NRBC # BLD: 0 /100 WBCS — SIGNIFICANT CHANGE UP (ref 0–0)
OPIATES UR-MCNC: NEGATIVE — SIGNIFICANT CHANGE UP
OXYCODONE UR-MCNC: NEGATIVE — SIGNIFICANT CHANGE UP
PCO2 BLDV: 39 MMHG — SIGNIFICANT CHANGE UP (ref 39–42)
PCP UR-MCNC: NEGATIVE — SIGNIFICANT CHANGE UP
PH BLDV: 7.5 — HIGH (ref 7.32–7.43)
PH UR: 6.5 — SIGNIFICANT CHANGE UP (ref 5–8)
PLATELET # BLD AUTO: 235 K/UL — SIGNIFICANT CHANGE UP (ref 130–400)
PMV BLD: 8.9 FL — SIGNIFICANT CHANGE UP (ref 7.4–10.4)
PO2 BLDV: 80 MMHG — SIGNIFICANT CHANGE UP
POTASSIUM BLDV-SCNC: 3.8 MMOL/L — SIGNIFICANT CHANGE UP (ref 3.5–5.1)
POTASSIUM SERPL-MCNC: 3.9 MMOL/L — SIGNIFICANT CHANGE UP (ref 3.5–5)
POTASSIUM SERPL-SCNC: 3.9 MMOL/L — SIGNIFICANT CHANGE UP (ref 3.5–5)
PROT SERPL-MCNC: 7.7 G/DL — SIGNIFICANT CHANGE UP (ref 6–8)
PROT UR-MCNC: NEGATIVE MG/DL — SIGNIFICANT CHANGE UP
PROTHROM AB SERPL-ACNC: 11.4 SEC — SIGNIFICANT CHANGE UP (ref 9.95–12.87)
RBC # BLD: 4.16 M/UL — LOW (ref 4.2–5.4)
RBC # FLD: 15.2 % — HIGH (ref 11.5–14.5)
RBC CASTS # UR COMP ASSIST: SIGNIFICANT CHANGE UP /HPF
SALICYLATES SERPL-MCNC: <0.3 MG/DL — LOW (ref 4–30)
SAO2 % BLDV: 96.6 % — SIGNIFICANT CHANGE UP
SARS-COV-2 RNA SPEC QL NAA+PROBE: SIGNIFICANT CHANGE UP
SODIUM SERPL-SCNC: 140 MMOL/L — SIGNIFICANT CHANGE UP (ref 135–146)
SP GR SPEC: 1.01 — SIGNIFICANT CHANGE UP (ref 1.01–1.03)
THC UR QL: NEGATIVE — SIGNIFICANT CHANGE UP
TRI-PHOS CRY UR QL COMP ASSIST: NEGATIVE — SIGNIFICANT CHANGE UP
TROPONIN T SERPL-MCNC: <0.01 NG/ML — SIGNIFICANT CHANGE UP
URATE CRY FLD QL MICRO: NEGATIVE — SIGNIFICANT CHANGE UP
UROBILINOGEN FLD QL: 0.2 MG/DL — SIGNIFICANT CHANGE UP
WBC # BLD: 5.44 K/UL — SIGNIFICANT CHANGE UP (ref 4.8–10.8)
WBC # FLD AUTO: 5.44 K/UL — SIGNIFICANT CHANGE UP (ref 4.8–10.8)
WBC UR QL: SIGNIFICANT CHANGE UP /HPF

## 2023-04-13 PROCEDURE — 83605 ASSAY OF LACTIC ACID: CPT

## 2023-04-13 PROCEDURE — 76937 US GUIDE VASCULAR ACCESS: CPT

## 2023-04-13 PROCEDURE — 85025 COMPLETE CBC W/AUTO DIFF WBC: CPT

## 2023-04-13 PROCEDURE — 71045 X-RAY EXAM CHEST 1 VIEW: CPT | Mod: 26

## 2023-04-13 PROCEDURE — 80053 COMPREHEN METABOLIC PANEL: CPT

## 2023-04-13 PROCEDURE — 99222 1ST HOSP IP/OBS MODERATE 55: CPT

## 2023-04-13 PROCEDURE — 74176 CT ABD & PELVIS W/O CONTRAST: CPT

## 2023-04-13 PROCEDURE — 99252 IP/OBS CONSLTJ NEW/EST SF 35: CPT

## 2023-04-13 PROCEDURE — 93010 ELECTROCARDIOGRAM REPORT: CPT

## 2023-04-13 PROCEDURE — 99285 EMERGENCY DEPT VISIT HI MDM: CPT

## 2023-04-13 PROCEDURE — 36415 COLL VENOUS BLD VENIPUNCTURE: CPT

## 2023-04-13 PROCEDURE — 76937 US GUIDE VASCULAR ACCESS: CPT | Mod: 26

## 2023-04-13 PROCEDURE — 99231 SBSQ HOSP IP/OBS SF/LOW 25: CPT

## 2023-04-13 RX ORDER — SODIUM CHLORIDE 9 MG/ML
1000 INJECTION, SOLUTION INTRAVENOUS
Refills: 0 | Status: DISCONTINUED | OUTPATIENT
Start: 2023-04-13 | End: 2023-04-16

## 2023-04-13 RX ORDER — PANTOPRAZOLE SODIUM 20 MG/1
40 TABLET, DELAYED RELEASE ORAL
Refills: 0 | Status: DISCONTINUED | OUTPATIENT
Start: 2023-04-13 | End: 2023-04-16

## 2023-04-13 RX ORDER — FAMOTIDINE 10 MG/ML
20 INJECTION INTRAVENOUS ONCE
Refills: 0 | Status: COMPLETED | OUTPATIENT
Start: 2023-04-13 | End: 2023-04-13

## 2023-04-13 RX ORDER — ONDANSETRON 8 MG/1
4 TABLET, FILM COATED ORAL EVERY 4 HOURS
Refills: 0 | Status: DISCONTINUED | OUTPATIENT
Start: 2023-04-13 | End: 2023-04-16

## 2023-04-13 RX ORDER — FOLIC ACID 0.8 MG
1 TABLET ORAL DAILY
Refills: 0 | Status: DISCONTINUED | OUTPATIENT
Start: 2023-04-13 | End: 2023-04-16

## 2023-04-13 RX ORDER — QUETIAPINE FUMARATE 200 MG/1
200 TABLET, FILM COATED ORAL AT BEDTIME
Refills: 0 | Status: DISCONTINUED | OUTPATIENT
Start: 2023-04-13 | End: 2023-04-16

## 2023-04-13 RX ORDER — SODIUM CHLORIDE 9 MG/ML
2000 INJECTION, SOLUTION INTRAVENOUS ONCE
Refills: 0 | Status: COMPLETED | OUTPATIENT
Start: 2023-04-13 | End: 2023-04-13

## 2023-04-13 RX ORDER — HYDROXYZINE HCL 10 MG
50 TABLET ORAL EVERY 8 HOURS
Refills: 0 | Status: DISCONTINUED | OUTPATIENT
Start: 2023-04-13 | End: 2023-04-16

## 2023-04-13 RX ORDER — KETOROLAC TROMETHAMINE 30 MG/ML
15 SYRINGE (ML) INJECTION ONCE
Refills: 0 | Status: DISCONTINUED | OUTPATIENT
Start: 2023-04-13 | End: 2023-04-13

## 2023-04-13 RX ORDER — ENOXAPARIN SODIUM 100 MG/ML
40 INJECTION SUBCUTANEOUS EVERY 12 HOURS
Refills: 0 | Status: DISCONTINUED | OUTPATIENT
Start: 2023-04-13 | End: 2023-04-16

## 2023-04-13 RX ORDER — FAMOTIDINE 10 MG/ML
20 INJECTION INTRAVENOUS
Refills: 0 | Status: DISCONTINUED | OUTPATIENT
Start: 2023-04-13 | End: 2023-04-16

## 2023-04-13 RX ORDER — THIAMINE MONONITRATE (VIT B1) 100 MG
100 TABLET ORAL DAILY
Refills: 0 | Status: DISCONTINUED | OUTPATIENT
Start: 2023-04-13 | End: 2023-04-16

## 2023-04-13 RX ORDER — SODIUM CHLORIDE 9 MG/ML
1000 INJECTION INTRAMUSCULAR; INTRAVENOUS; SUBCUTANEOUS
Refills: 0 | Status: DISCONTINUED | OUTPATIENT
Start: 2023-04-13 | End: 2023-04-13

## 2023-04-13 RX ORDER — ONDANSETRON 8 MG/1
4 TABLET, FILM COATED ORAL ONCE
Refills: 0 | Status: COMPLETED | OUTPATIENT
Start: 2023-04-13 | End: 2023-04-13

## 2023-04-13 RX ADMIN — Medication 50 MILLIGRAM(S): at 03:05

## 2023-04-13 RX ADMIN — Medication 2 MILLIGRAM(S): at 17:48

## 2023-04-13 RX ADMIN — Medication 1 MILLIGRAM(S): at 12:11

## 2023-04-13 RX ADMIN — Medication 2 MILLIGRAM(S): at 09:31

## 2023-04-13 RX ADMIN — FAMOTIDINE 20 MILLIGRAM(S): 10 INJECTION INTRAVENOUS at 01:15

## 2023-04-13 RX ADMIN — SODIUM CHLORIDE 125 MILLILITER(S): 9 INJECTION, SOLUTION INTRAVENOUS at 17:41

## 2023-04-13 RX ADMIN — Medication 2 MILLIGRAM(S): at 13:55

## 2023-04-13 RX ADMIN — FAMOTIDINE 20 MILLIGRAM(S): 10 INJECTION INTRAVENOUS at 17:48

## 2023-04-13 RX ADMIN — Medication 1 TABLET(S): at 12:10

## 2023-04-13 RX ADMIN — Medication 15 MILLIGRAM(S): at 03:58

## 2023-04-13 RX ADMIN — Medication 100 MILLIGRAM(S): at 12:10

## 2023-04-13 RX ADMIN — Medication 2 MILLIGRAM(S): at 06:03

## 2023-04-13 RX ADMIN — PANTOPRAZOLE SODIUM 40 MILLIGRAM(S): 20 TABLET, DELAYED RELEASE ORAL at 09:32

## 2023-04-13 RX ADMIN — Medication 15 MILLIGRAM(S): at 01:30

## 2023-04-13 RX ADMIN — Medication 2 MILLIGRAM(S): at 22:10

## 2023-04-13 RX ADMIN — Medication 50 MILLIGRAM(S): at 22:15

## 2023-04-13 RX ADMIN — ONDANSETRON 4 MILLIGRAM(S): 8 TABLET, FILM COATED ORAL at 01:11

## 2023-04-13 RX ADMIN — FAMOTIDINE 20 MILLIGRAM(S): 10 INJECTION INTRAVENOUS at 06:04

## 2023-04-13 RX ADMIN — Medication 50 MILLIGRAM(S): at 09:30

## 2023-04-13 RX ADMIN — SODIUM CHLORIDE 1000 MILLILITER(S): 9 INJECTION, SOLUTION INTRAVENOUS at 01:12

## 2023-04-13 NOTE — ED PROVIDER NOTE - PATIENT PORTAL LINK FT
You can access the FollowMyHealth Patient Portal offered by [facility] by registering at the following website: [email or web address]. By joining [employer]’s FollowMyHealth portal, you will also be able to view your health information using other applications (apps) compatible with our system.

## 2023-04-13 NOTE — PATIENT PROFILE ADULT - FALL HARM RISK - HARM RISK INTERVENTIONS

## 2023-04-13 NOTE — CONSULT NOTE ADULT - PROBLEM SELECTOR RECOMMENDATION 9
After evaluation at this time continue on Ativan protocol. .Pt should be on Thiamine and Folic acid. Pt will be monitored and supportive care provided.  Use of Vistaril for anxiety.  Consider adding gabapentin for anxiety standing order and follow up with PMD/Program for continuation of treatment.  Consider coordination of care if patient agreeable. Pt did not want to give name of ativan presciber.   Pt wants to continue on Ativan after discharge. Follow up with Dr. Puckett for continued rx.   Abdominal ultrasound AFP every 6 months for HCC screening  -EGD outpatient for esophageal varices screening   -Follow up with Private GI or our [facility] located at [address] [place] [address]. Phone Number: [phone]  -Alcohol counseling provided   CATCH team involved for aftercare and pt will follow up with aftercare

## 2023-04-13 NOTE — ED PROVIDER NOTE - CLINICAL SUMMARY MEDICAL DECISION MAKING FREE TEXT BOX
41 yo F, hx of GERD, Grave's disease, HLD autoimmune disease, substance use disorder, currently alcohol, previously alcohol and benzos here for assessment of not feeling well. Patient reports recent binge, drinking 1L vodka daily, multiple episodes of NBNB vomiting today. Has had AKA before and was concerned she was going into it again. No abdominal pain, chest pain, dyspnea, fever, chills, diarrhea.    VS normal, patient in no distress, no tremors, no tongue fasciculations. RRR, soft, NT, ND abdomen, clear lungs, no SI or HI, appropriate affect.    Labs reviewed, no signs of AKA. Has alkalosis, likely due to contraction but has no signs of severe dehydration and received 2L IVF in ED. Lactate 4 but could be related to alcohol use.     After fluids, zofran is now PO tolerant, feeling better.    Clinically without signs of acute intox or withdrawal.    Advised patient on need for outpatient detox follow up, hydration, sx monitoring, return precautions.

## 2023-04-13 NOTE — ED PROVIDER NOTE - OBJECTIVE STATEMENT
42 years old female history of alcohol abuse (reported history of AKA multiple times), high cholesterol present complaint of upper abdominal pain with nausea and vomiting multiple times and not feeling well tonight.  Reports she drinks 1 L of vodka daily.  Last drink few hours ago.  Reports she is not feeling well so she comes to ED for evaluation.  Otherwise denies fever, chills, bloody vomitus, chest pain, shortness of breath, flank pain, diarrhea, black and bloody stool and urinary symptoms.

## 2023-04-13 NOTE — H&P ADULT - NSHPLABSRESULTS_GEN_ALL_CORE
14.3   5.44  )-----------( 235      ( 2023 01:00 )             40.4     04-13    140  |  99  |  7<L>  ----------------------------<  95  3.9   |  24  |  0.6<L>    Ca    9.5      2023 01:00  Mg     1.9     04-13    TPro  7.7  /  Alb  4.8  /  TBili  0.7  /  DBili  x   /  AST  185<H>  /  ALT  82<H>  /  AlkPhos  80  04-13          Urinalysis Basic - ( 2023 01:00 )    Color: Yellow / Appearance: Clear / S.010 / pH: x  Gluc: x / Ketone: Negative  / Bili: Negative / Urobili: 0.2 mg/dL   Blood: x / Protein: Negative mg/dL / Nitrite: Negative   Leuk Esterase: Negative / RBC: 1-2 /HPF / WBC 3-5 /HPF   Sq Epi: x / Non Sq Epi: x / Bacteria: Few      PT/INR - ( 2023 01:00 )   PT: 11.40 sec;   INR: 1.00 ratio         PTT - ( 2023 01:00 )  PTT:36.8 sec  Lactate Trend    CARDIAC MARKERS ( 2023 01:00 )  x     / <0.01 ng/mL / x     / x     / x          CAPILLARY BLOOD GLUCOSE

## 2023-04-13 NOTE — ED PROVIDER NOTE - NSFOLLOWUPINSTRUCTIONS_ED_ALL_ED_FT
Our Emergency Department Referral Coordinators will be reaching out to you in the next 24-48 hours from 9:00am to 5:00pm with a follow up appointment. Please expect a phone call from the hospital in that time frame. If you do not receive a call or if you have any questions or concerns, you can reach them at   [phone]    Alcohol Abuse    Alcohol intoxication occurs when the amount of alcohol that a person has consumed impairs his or her ability to mentally and physically function. Chronic alcohol consumption can also lead to a variety of health issues including neurological disease, stomach disease, heart disease, liver disease, etc. Do not drive after drinking alcohol. Drinking enough alcohol to end up in an Emergency Room suggests you may have an alcohol abuse problem. Seek help at a drug addiction center.    SEEK IMMEDIATE MEDICAL CARE IF YOU HAVE ANY OF THE FOLLOWING SYMPTOMS: seizures, vomiting blood, blood in your stool, lightheadedness/dizziness, or becoming shaky to tremulous when you stop drinking.

## 2023-04-13 NOTE — CONSULT NOTE ADULT - SUBJECTIVE AND OBJECTIVE BOX
Pt interviewed, examined and EMR chart reviewed.  Pt admits to drinking about 1 liter of vodka per day for 2 months/ LAst drink day of admission   Hx of withdrawal tremors seizures and DTs. last one about 8 months ago.  variable periods of sobriety in the past.  Has been in detox before ___X__yes,   _____No  Pt states on ativan 1mg     ISTOPP   2023	lorazepam 1 mg tablet	60	30	Julio Cesar [id number]	Medicaid	[facility] #[id number]  2023	lorazepam 1 mg tablet	60	30	Julio Cesar [id number]	Medicaid	[facility] #[id number]  2023	lorazepam 1 mg tablet	60	30	Jluio Cesar [id number]	Medicaid	[facility] #[id number]  2022	lorazepam 1 mg tablet	60	30	Micah Jasso PA	[id number]	Medicaid	[facility] #[id number]  2022	lorazepam 1 mg tablet	60	30	Julio Cesar [id number]	Medicaid	[facility] #[id number]  2022	lorazepam 1 mg tablet	60	30	Julio Cesar [id number]	Medicaid	[facility] #[id number]  2022	10/03/2022	lorazepam 1 mg tablet	60	30	Julio Cesar Puckett DO	[id number]	Medicaid	[facility] #[id number]  2022	lorazepam 1 mg tablet	60	30	Julio Cesra [id number]	[facility] #[id number]  2022	lorazepam 1 mg tablet	60	30	Micah Jasso PA	[id number]	Medicaid	[facility] #[id number]  2022	lorazepam 1 mg tablet	60	30	Julio Cesar Puckett DO	[id number]	Medicaid	[facility] #[id number]  06/10/2022	2022	lorazepam 1 mg tablet	60	30	Julio Cesar [id number]	Medicaid	[facility] #[id number]  2022	chlordiazepoxide 10 mg capsule	27	9	Lamine, Tran A	[id number]	[facility]  2022	lorazepam 1 mg tablet	60	30	Julio Cesar Puckett DO	[id number]	Medicaid	[facility] #[id number]    SOCIAL HISTORY:    REVIEW OF SYSTEMS:    Constitutional: No fever, weight loss or fatigue  ENT:  No difficulty hearing, tinnitus, vertigo; No sinus or throat pain  Neck: No pain or stiffness  Respiratory: No cough, wheezing, chills or hemoptysis  Cardiovascular: No chest pain, palpitations, shortness of breath, dizziness or leg swelling  Gastrointestinal: No abdominal or epigastric pain. No nausea, vomiting or hematemesis; No diarrhea or constipation. No melena or hematochezia.  Neurological: No headaches, memory loss, loss of strength, numbness or tremors  Musculoskeletal: No joint pain or swelling; No muscle, back or extremity pain  Psychiatric: No depression, anxiety, mood swings or difficulty sleeping    MEDICATIONS  (STANDING):  enoxaparin Injectable 40 milliGRAM(s) SubCutaneous every 12 hours  famotidine    Tablet 20 milliGRAM(s) Oral two times a day  folic acid 1 milliGRAM(s) Oral daily  lactated ringers. 1000 milliLiter(s) (125 mL/Hr) IV Continuous <Continuous>  LORazepam     Tablet   Oral   LORazepam     Tablet 2 milliGRAM(s) Oral every 4 hours  multivitamin 1 Tablet(s) Oral daily  pantoprazole    Tablet 40 milliGRAM(s) Oral before breakfast  QUEtiapine 200 milliGRAM(s) Oral at bedtime  thiamine 100 milliGRAM(s) Oral daily    MEDICATIONS  (PRN):  aluminum hydroxide/magnesium hydroxide/simethicone Suspension 30 milliLiter(s) Oral every 6 hours PRN Dyspepsia  hydrOXYzine hydrochloride 50 milliGRAM(s) Oral every 8 hours PRN Anxiety  ondansetron  IVPB 4 milliGRAM(s) IV Intermittent every 4 hours PRN Nausea and/or Vomiting      Vital Signs Last 24 Hrs  T(C): 35.8 (2023 05:41), Max: 36.4 (2023 00:18)  T(F): 96.4 (2023 05:41), Max: 97.6 (2023 00:18)  HR: 91 (2023 05:41) (91 - 99)  BP: 150/64 (2023 05:41) (97/57 - 150/64)  BP(mean): --  RR: 20 (2023 00:18) (20 - 20)  SpO2: 92% (2023 03:03) (92% - 99%)        PHYSICAL EXAM:    Constitutional: NAD, well-groomed, well-developed  HEENT: PERRLA, EOMI, Normal Hearing, MMM  Neck: No LAD, No JVD  Back: Normal spine flexure, No CVA tenderness  Respiratory: CTAB/L  Cardiovascular: S1 and S2, RRR, no M/G/R  Gastrointestinal: BS+, soft, NT/ND  Extremities: No peripheral edema  Vascular: 2+ peripheral pulses  Neurological: A/O x 3, no focal deficits    LABS:                        14.3   5.44  )-----------( 235      ( 2023 01:00 )             40.4     04-13    140  |  99  |  7<L>  ----------------------------<  95  3.9   |  24  |  0.6<L>    Ca    9.5      2023 01:00  Mg     1.9     04-13    TPro  7.7  /  Alb  4.8  /  TBili  0.7  /  DBili  x   /  AST  185<H>  /  ALT  82<H>  /  AlkPhos  80  04-13    PT/INR - ( 2023 01:00 )   PT: 11.40 sec;   INR: 1.00 ratio         PTT - ( 2023 01:00 )  PTT:36.8 sec  Urinalysis Basic - ( 2023 01:00 )    Color: Yellow / Appearance: Clear / S.010 / pH: x  Gluc: x / Ketone: Negative  / Bili: Negative / Urobili: 0.2 mg/dL   Blood: x / Protein: Negative mg/dL / Nitrite: Negative   Leuk Esterase: Negative / RBC: 1-2 /HPF / WBC 3-5 /HPF   Sq Epi: x / Non Sq Epi: x / Bacteria: Few      Drug Screen Urine:  Alcohol Level  Alcohol, Blood: 286 mg/dL (23 @ 01:00)        RADIOLOGY & ADDITIONAL STUDIES:

## 2023-04-13 NOTE — ED PROVIDER NOTE - PROGRESS NOTE DETAILS
Patria: prior to discharge, ANAM Hernandez was able to contact catch/addiction team for outpatient follow up and they recommended admission and they will set the patient up for detox, rehab as she has previous hx of severe withdrawal.

## 2023-04-13 NOTE — H&P ADULT - HISTORY OF PRESENT ILLNESS
41yo female whose PMH includes GERD (recently had EGD, results not known to patient) and alcohol dependence with prior admissions for withdrawal (including AKA) presents to ER due to nausea, vomiting and abdominal pains for several days. Unable to tolerate food during this time. Notes that she drinks 1L vodka daily, last drink was about 6 hours ago  41yo female whose PMH includes GERD (recently had EGD, results not known to patient) and alcohol dependence with prior admissions for withdrawal (including AKA) presents to ER due to nausea, vomiting and abdominal pains for several days. Unable to tolerate food during this time. Notes that she drinks 1L vodka daily, last drink was about 6 hours ago. Patient also takes Ativan on regular basis 43yo female whose PMH includes GERD (recently had EGD, results not known to patient) and alcohol dependence with prior admissions for withdrawal (including AKA) presents to ER due to nausea, vomiting and abdominal pains for several days. Unable to tolerate food during this time. Notes that she drinks 1L vodka daily, last drink was about 6 hours ago. Patient also takes Ativan on regular basis. Upon my arrival and after initial ER interventions patient able to speak on her phone and eat a sandwich

## 2023-04-13 NOTE — ED PROVIDER NOTE - PHYSICAL EXAMINATION
CONSTITUTIONAL: Well-appearing; in no apparent distress.  Alcohol smell on breath  EYES: PERRL; EOM intact.   ENT: Dry lips and tongue  CARDIOVASCULAR: Normal S1, S2; no murmurs, rubs, or gallops.   RESPIRATORY: Normal chest excursion with respiration; breath sounds clear and equal bilaterally; no wheezes, rhonchi, or rales.  GI/: Normal bowel sounds; non-distended; non-tender; no palpable organomegaly.   MS: No calf swelling and tenderness.  SKIN: Normal for age and race; warm; dry; good turgor; no apparent lesions or exudate.   NEURO/PSYCH: A & O x 4; grossly unremarkable.  Denies SI HI and A/V hallucinations.  Minimal tremors to bilateral hands noted.  Anxious mood.

## 2023-04-13 NOTE — H&P ADULT - ASSESSMENT
Alcohol intoxication with history of severe withdrawal    elevated lactic acid level    Thiamine and folic acid deficiency    history of obesity    history of Grave's  Alcohol intoxication with history of severe withdrawal    elevated lactic acid level    transaminitis     Thiamine and folic acid deficiency    history of obesity    history of Grave's  Alcohol intoxication with history of severe withdrawal, alcoholic ketoacidosis     elevated lactic acid level    transaminitis     Thiamine and folic acid deficiency    history of obesity    history of Grave's  Alcohol intoxication with history of severe withdrawal, alcoholic ketoacidosis     elevated lactic acid level    transaminitis     Thiamine and folic acid deficiency    Ativan dependence - for now on Ativan tapering protocol but plan to continue home dose if appropriate      history of obesity    history of Grave's  Alcohol intoxication with history of severe withdrawal, alcoholic ketoacidosis - for now IV fluids, Ativan detox tapering protocol    elevated lactic acid level - IV fluids, repeat in am    transaminitis - chronic, suspect alcohol related    Thiamine and folic acid deficiency - continue supplement     Ativan dependence - for now on Ativan tapering protocol but plan to continue home dose if appropriate      history of obesity - noted,     history of Grave's     GERD - continue pepcid and PPI for now  Alcohol intoxication with history of severe withdrawal, alcoholic ketoacidosis - for now IV fluids, Ativan detox tapering protocol    elevated lactic acid level - IV fluids, repeat in am    transaminitis - chronic, suspect alcohol related (also  liver steatosis)    Thiamine and folic acid deficiency - continue supplement     Ativan dependence - for now on Ativan tapering protocol but plan to continue home dose if appropriate      history of obesity - noted,     history of Grave's     GERD - continue Pepcid and PPI for now

## 2023-04-14 ENCOUNTER — TRANSCRIPTION ENCOUNTER (OUTPATIENT)
Age: 43
End: 2023-04-14

## 2023-04-14 LAB
ALBUMIN SERPL ELPH-MCNC: 3.8 G/DL — SIGNIFICANT CHANGE UP (ref 3.5–5.2)
ALP SERPL-CCNC: 76 U/L — SIGNIFICANT CHANGE UP (ref 30–115)
ALT FLD-CCNC: 74 U/L — HIGH (ref 0–41)
ANION GAP SERPL CALC-SCNC: 10 MMOL/L — SIGNIFICANT CHANGE UP (ref 7–14)
AST SERPL-CCNC: 147 U/L — HIGH (ref 0–41)
BASOPHILS # BLD AUTO: 0.02 K/UL — SIGNIFICANT CHANGE UP (ref 0–0.2)
BASOPHILS NFR BLD AUTO: 0.4 % — SIGNIFICANT CHANGE UP (ref 0–1)
BILIRUB SERPL-MCNC: 1.3 MG/DL — HIGH (ref 0.2–1.2)
BUN SERPL-MCNC: 8 MG/DL — LOW (ref 10–20)
CALCIUM SERPL-MCNC: 9.4 MG/DL — SIGNIFICANT CHANGE UP (ref 8.4–10.5)
CHLORIDE SERPL-SCNC: 99 MMOL/L — SIGNIFICANT CHANGE UP (ref 98–110)
CO2 SERPL-SCNC: 27 MMOL/L — SIGNIFICANT CHANGE UP (ref 17–32)
CREAT SERPL-MCNC: 0.7 MG/DL — SIGNIFICANT CHANGE UP (ref 0.7–1.5)
EGFR: 112 ML/MIN/1.73M2 — SIGNIFICANT CHANGE UP
EOSINOPHIL # BLD AUTO: 0.09 K/UL — SIGNIFICANT CHANGE UP (ref 0–0.7)
EOSINOPHIL NFR BLD AUTO: 1.9 % — SIGNIFICANT CHANGE UP (ref 0–8)
GLUCOSE SERPL-MCNC: 113 MG/DL — HIGH (ref 70–99)
HCT VFR BLD CALC: 35.2 % — LOW (ref 37–47)
HGB BLD-MCNC: 12.3 G/DL — SIGNIFICANT CHANGE UP (ref 12–16)
IMM GRANULOCYTES NFR BLD AUTO: 0.2 % — SIGNIFICANT CHANGE UP (ref 0.1–0.3)
LYMPHOCYTES # BLD AUTO: 1.15 K/UL — LOW (ref 1.2–3.4)
LYMPHOCYTES # BLD AUTO: 24.4 % — SIGNIFICANT CHANGE UP (ref 20.5–51.1)
MCHC RBC-ENTMCNC: 34.9 G/DL — SIGNIFICANT CHANGE UP (ref 32–37)
MCHC RBC-ENTMCNC: 34.9 PG — HIGH (ref 27–31)
MCV RBC AUTO: 100 FL — HIGH (ref 81–99)
MONOCYTES # BLD AUTO: 0.3 K/UL — SIGNIFICANT CHANGE UP (ref 0.1–0.6)
MONOCYTES NFR BLD AUTO: 6.4 % — SIGNIFICANT CHANGE UP (ref 1.7–9.3)
NEUTROPHILS # BLD AUTO: 3.14 K/UL — SIGNIFICANT CHANGE UP (ref 1.4–6.5)
NEUTROPHILS NFR BLD AUTO: 66.7 % — SIGNIFICANT CHANGE UP (ref 42.2–75.2)
NRBC # BLD: 0 /100 WBCS — SIGNIFICANT CHANGE UP (ref 0–0)
PLATELET # BLD AUTO: 168 K/UL — SIGNIFICANT CHANGE UP (ref 130–400)
PMV BLD: 9.1 FL — SIGNIFICANT CHANGE UP (ref 7.4–10.4)
POTASSIUM SERPL-MCNC: 3.5 MMOL/L — SIGNIFICANT CHANGE UP (ref 3.5–5)
POTASSIUM SERPL-SCNC: 3.5 MMOL/L — SIGNIFICANT CHANGE UP (ref 3.5–5)
PROT SERPL-MCNC: 6.2 G/DL — SIGNIFICANT CHANGE UP (ref 6–8)
RBC # BLD: 3.52 M/UL — LOW (ref 4.2–5.4)
RBC # FLD: 14.7 % — HIGH (ref 11.5–14.5)
SODIUM SERPL-SCNC: 136 MMOL/L — SIGNIFICANT CHANGE UP (ref 135–146)
WBC # BLD: 4.71 K/UL — LOW (ref 4.8–10.8)
WBC # FLD AUTO: 4.71 K/UL — LOW (ref 4.8–10.8)

## 2023-04-14 PROCEDURE — 99231 SBSQ HOSP IP/OBS SF/LOW 25: CPT

## 2023-04-14 PROCEDURE — 74176 CT ABD & PELVIS W/O CONTRAST: CPT | Mod: 26

## 2023-04-14 RX ADMIN — QUETIAPINE FUMARATE 200 MILLIGRAM(S): 200 TABLET, FILM COATED ORAL at 23:09

## 2023-04-14 RX ADMIN — FAMOTIDINE 20 MILLIGRAM(S): 10 INJECTION INTRAVENOUS at 17:09

## 2023-04-14 RX ADMIN — Medication 100 MILLIGRAM(S): at 11:09

## 2023-04-14 RX ADMIN — Medication 1 TABLET(S): at 11:08

## 2023-04-14 RX ADMIN — SODIUM CHLORIDE 125 MILLILITER(S): 9 INJECTION, SOLUTION INTRAVENOUS at 10:09

## 2023-04-14 RX ADMIN — SODIUM CHLORIDE 75 MILLILITER(S): 9 INJECTION, SOLUTION INTRAVENOUS at 23:10

## 2023-04-14 RX ADMIN — QUETIAPINE FUMARATE 200 MILLIGRAM(S): 200 TABLET, FILM COATED ORAL at 00:20

## 2023-04-14 RX ADMIN — Medication 2 MILLIGRAM(S): at 01:31

## 2023-04-14 RX ADMIN — Medication 1.5 MILLIGRAM(S): at 14:13

## 2023-04-14 RX ADMIN — Medication 50 MILLIGRAM(S): at 23:23

## 2023-04-14 RX ADMIN — PANTOPRAZOLE SODIUM 40 MILLIGRAM(S): 20 TABLET, DELAYED RELEASE ORAL at 05:35

## 2023-04-14 RX ADMIN — FAMOTIDINE 20 MILLIGRAM(S): 10 INJECTION INTRAVENOUS at 05:35

## 2023-04-14 RX ADMIN — Medication 1.5 MILLIGRAM(S): at 10:17

## 2023-04-14 RX ADMIN — Medication 1.5 MILLIGRAM(S): at 17:09

## 2023-04-14 RX ADMIN — Medication 1.5 MILLIGRAM(S): at 05:36

## 2023-04-14 RX ADMIN — Medication 1 MILLIGRAM(S): at 11:09

## 2023-04-14 RX ADMIN — SODIUM CHLORIDE 75 MILLILITER(S): 9 INJECTION, SOLUTION INTRAVENOUS at 17:00

## 2023-04-14 RX ADMIN — SODIUM CHLORIDE 125 MILLILITER(S): 9 INJECTION, SOLUTION INTRAVENOUS at 00:21

## 2023-04-14 RX ADMIN — Medication 50 MILLIGRAM(S): at 10:18

## 2023-04-14 RX ADMIN — Medication 1.5 MILLIGRAM(S): at 23:09

## 2023-04-14 NOTE — PROGRESS NOTE ADULT - PROBLEM SELECTOR PLAN 1
After evaluation at this time continue on Ativan protocol.  Pt doing well. Pt should be on Thiamine and Folic acid. Pt will be monitored and supportive care provided.  Use of Vistaril for anxiety.  Consider adding gabapentin for anxiety standing order and follow up with PMD/Program for continuation of treatment.    Abdominal ultrasound AFP every 6 months for HCC screening  -EGD outpatient for esophageal varices screening   -Follow up with Private GI or our GI [facility] Clinic located at [address] [place] [address]. Phone Number: [phone]  -Alcohol counseling provided   CATCH team involved for aftercare and pt will follow up with aftercare

## 2023-04-15 ENCOUNTER — TRANSCRIPTION ENCOUNTER (OUTPATIENT)
Age: 43
End: 2023-04-15

## 2023-04-15 PROCEDURE — 99231 SBSQ HOSP IP/OBS SF/LOW 25: CPT

## 2023-04-15 RX ADMIN — FAMOTIDINE 20 MILLIGRAM(S): 10 INJECTION INTRAVENOUS at 06:23

## 2023-04-15 RX ADMIN — Medication 50 MILLIGRAM(S): at 10:30

## 2023-04-15 RX ADMIN — Medication 1 MILLIGRAM(S): at 06:23

## 2023-04-15 RX ADMIN — Medication 1 MILLIGRAM(S): at 21:26

## 2023-04-15 RX ADMIN — Medication 1 MILLIGRAM(S): at 13:13

## 2023-04-15 RX ADMIN — Medication 1 MILLIGRAM(S): at 17:32

## 2023-04-15 RX ADMIN — Medication 1 TABLET(S): at 10:23

## 2023-04-15 RX ADMIN — Medication 1 MILLIGRAM(S): at 10:23

## 2023-04-15 RX ADMIN — Medication 50 MILLIGRAM(S): at 23:03

## 2023-04-15 RX ADMIN — Medication 100 MILLIGRAM(S): at 13:13

## 2023-04-15 RX ADMIN — Medication 1.5 MILLIGRAM(S): at 02:08

## 2023-04-15 RX ADMIN — QUETIAPINE FUMARATE 200 MILLIGRAM(S): 200 TABLET, FILM COATED ORAL at 21:26

## 2023-04-15 RX ADMIN — PANTOPRAZOLE SODIUM 40 MILLIGRAM(S): 20 TABLET, DELAYED RELEASE ORAL at 06:23

## 2023-04-15 RX ADMIN — FAMOTIDINE 20 MILLIGRAM(S): 10 INJECTION INTRAVENOUS at 17:32

## 2023-04-15 NOTE — DISCHARGE NOTE PROVIDER - ATTENDING DISCHARGE PHYSICAL EXAMINATION:
GENERAL: No acute distress, well-developed  HEAD:  Atraumatic, Normocephalic  EYES: EOMI, PERRLA, conjunctiva and sclera clear  NECK: Supple, no lymphadenopathy, no JVD  CHEST/LUNG: CTAB; No wheezes, rales, or rhonchi  HEART: Regular rate and rhythm; No murmurs, rubs, or gallops  ABDOMEN: Soft, non-tender, non-distended; normal bowel sounds, no organomegaly  EXTREMITIES:  2+ peripheral pulses b/l, No clubbing, cyanosis, or edema  NEUROLOGY: A&O x 3, no focal deficits  SKIN: No rashes or lesions Attending

## 2023-04-15 NOTE — DISCHARGE NOTE PROVIDER - NSDCFUADDAPPT_GEN_ALL_CORE_FT
** Please follow up with your primary care doctor in 1-3 days for post-hospitalization medical follow-up**    ** Please call your GI doctor, or call the GI clinic to schedule an appointment to be seen in office in 2-3 weeks from the date of discharge for follow-up and further workup, including EGD and abdominal ultrasound AFP every 6 months for HCC screening  [facility] located at [address] [place] [address].   Phone Number: [phone]    ** Follow up with [facility]CD, as scheduled on Tuesday 4/18/23 at 11am with Janelle   *Please bring your photo ID & Insurance card

## 2023-04-15 NOTE — DISCHARGE NOTE PROVIDER - NSDCQMERRANDS_GEN_ALL_CORE
Bilateral HERNÁN Injections    Patient location during procedure: OR   Block not for primary anesthetic.  Reason for block: at surgeon's request and post-op pain management   Post-op Pain Location: Bilateral chest wall   Start time: 8/10/2022 8:18 AM  Timeout: 8/10/2022 8:17 AM   End time: 8/10/2022 8:28 AM    Staffing  Authorizing Provider: Tal Knight Jr., MD  Performing Provider: Darren Blair MD    Preanesthetic Checklist  Completed: patient identified, IV checked, site marked, risks and benefits discussed, surgical consent, monitors and equipment checked, pre-op evaluation and timeout performed  Peripheral Block  Patient position: sitting  Prep: ChloraPrep  Patient monitoring: heart rate, cardiac monitor, continuous pulse ox, continuous capnometry and frequent blood pressure checks  Block type: erector spinae plane  Laterality: bilateral  Injection technique: single shot  Interspace: T5-6    Needle  Needle type: Tuohy   Needle gauge: 17 G  Needle length: 3.5 in  Needle localization: anatomical landmarks and ultrasound guidance   -ultrasound image captured on disc.  Assessment  Injection assessment: negative aspiration, negative parasthesia and local visualized surrounding nerve  Paresthesia pain: none  Heart rate change: no  Slow fractionated injection: yes  Pain Tolerance: comfortable throughout block and no complaints  Medications:    Medications: bupivacaine (pf) (MARCAINE) injection 0.5% - Perineural   30 mL - 8/10/2022 8:25:00 AM    Additional Notes  Patient tolerated well.         No

## 2023-04-15 NOTE — DISCHARGE NOTE PROVIDER - HOSPITAL COURSE
FROM ADMISSION H+P:   HPI:  43yo female whose PMH includes GERD (recently had EGD, results not known to patient) and alcohol dependence with prior admissions for withdrawal (including AKA) presents to ER due to nausea, vomiting and abdominal pains for several days. Unable to tolerate food during this time. Notes that she drinks 1L vodka daily, last drink was about 6 hours ago. Patient also takes Ativan on regular basis. Upon my arrival and after initial ER interventions patient able to speak on her phone and eat a sandwich  (13 Apr 2023 03:47)      ---  HOSPITAL COURSE:   Alcohol intoxication with history of severe withdrawal and alcoholic ketoacidosis   Chronic Transaminitis: Likely ETOH related with liver Steatosis     Suspected Thiamine and folic acid deficiency   -Symptoms Improving, c/w thiamine, folate and MVI   -Addiction medicine consult appreciated   - completed Ativan taper  Elevated lactic acid level - Resolved with IVF     Anxiety: on Ativan at home   -Continue       DVT PPX: Lovenox   GERD + GI PPX: Pepcid and PPI   Full Code   Dispo: from Home             Patient was medically optimized and improved clinically throughout hospital course.      Patient examined on day of discharge and found to be medically stable for discharge by             with outpatient follow up.                    Vital Signs Last 24 Hrs  T(C): 35.7 (15 Apr 2023 14:01), Max: 36.7 (15 Apr 2023 11:00)  T(F): 96.3 (15 Apr 2023 14:01), Max: 98.1 (15 Apr 2023 11:00)  HR: 77 (15 Apr 2023 14:01) (75 - 80)  BP: 122/72 (15 Apr 2023 14:01) (117/70 - 134/83)  BP(mean): --  RR: 18 (15 Apr 2023 14:01) (18 - 19)  SpO2: 99% (15 Apr 2023 11:00) (97% - 99%)    Parameters below as of 15 Apr 2023 11:00  Patient On (Oxygen Delivery Method): room air         FROM ADMISSION H+P:   HPI:  41yo female whose PMH includes GERD (recently had EGD, results not known to patient) and alcohol dependence with prior admissions for withdrawal (including AKA) presents to ER due to nausea, vomiting and abdominal pains for several days. Unable to tolerate food during this time. Notes that she drinks 1L vodka daily, last drink was about 6 hours ago. Patient also takes Ativan on regular basis. Upon my arrival and after initial ER interventions patient able to speak on her phone and eat a sandwich  (13 Apr 2023 03:47)  ---  HOSPITAL COURSE:   Alcohol intoxication with history of severe withdrawal and alcoholic ketoacidosis   Chronic Transaminitis: Likely ETOH related with liver Steatosis     Suspected Thiamine and folic acid deficiency   -Symptoms Improving, c/w thiamine, folate and MVI   -Addiction medicine consult appreciated   - completed Ativan taper  Elevated lactic acid level - Resolved with IVF     Anxiety: on Ativan at home   -Continue       DVT PPX: Lovenox   GERD + GI PPX: Pepcid and PPI   Full Code   Dispo: from Home     Patient was medically optimized and improved clinically throughout hospital course.      Patient examined on day of discharge and found to be medically stable for discharge by       with outpatient follow up-    Vital Signs Last 24 Hrs  T(C): 35.7 (15 Apr 2023 14:01), Max: 36.7 (15 Apr 2023 11:00)  T(F): 96.3 (15 Apr 2023 14:01), Max: 98.1 (15 Apr 2023 11:00)  HR: 77 (15 Apr 2023 14:01) (75 - 80)  BP: 122/72 (15 Apr 2023 14:01) (117/70 - 134/83)  BP(mean): --  RR: 18 (15 Apr 2023 14:01) (18 - 19)  SpO2: 99% (15 Apr 2023 11:00) (97% - 99%)    Parameters below as of 15 Apr 2023 11:00  Patient On (Oxygen Delivery Method): room air

## 2023-04-15 NOTE — DISCHARGE NOTE PROVIDER - CARE PROVIDER_API CALL
Julio Cesar Puckett (DO)  Internal Medicine  [address] [place] [id number]  Phone: [phone]  Fax: [phone]  Established Patient  Follow Up Time: 1-3 days

## 2023-04-15 NOTE — DISCHARGE NOTE PROVIDER - NSDCCPCAREPLAN_GEN_ALL_CORE_FT
PRINCIPAL DISCHARGE DIAGNOSIS  Diagnosis: Moderate alcohol use disorder  Assessment and Plan of Treatment:      PRINCIPAL DISCHARGE DIAGNOSIS  Diagnosis: Alcohol dependence with withdrawal  Assessment and Plan of Treatment: You were admitted into the hospital because you were experiencing symptoms of alcohol withdrawal. You were given medications such as Librium and Ativan throughout your hospital course to prevent you from withdrawing further and to help alleviate your symptoms.  Continue to take your medications as directed and follow up with your primary care doctor in 10-14 days. It is also recommended that you follow-up with Psychiatry in order to enroll in a substance abuse program.

## 2023-04-15 NOTE — DISCHARGE NOTE PROVIDER - NSDCFUSCHEDAPPT_GEN_ALL_CORE_FT
Liset Boyer  [facility] PreAdmits  Scheduled Appointment: 04/18/2023    [facility] Physician [facility]  PSYCHIATRY [address]  Scheduled Appointment: 04/18/2023

## 2023-04-15 NOTE — DISCHARGE NOTE PROVIDER - NSDCMRMEDTOKEN_GEN_ALL_CORE_FT
Ativan 1 mg oral tablet: 1  orally 2 times a day  famotidine 20 mg oral tablet: 1 tab(s) orally 2 times a day  folic acid 1 mg oral tablet: 1 tab(s) orally once a day  Multiple Vitamins oral tablet: 1 tab(s) orally once a day  NexIUM 20 mg oral delayed release capsule: 1 orally 2 times a day  SEROquel 200 mg oral tablet: 1 tab(s) orally once a day (at bedtime)  thiamine 100 mg oral tablet: 1 tab(s) orally once a day   Ativan 1 mg oral tablet: 1  orally 2 times a day  famotidine 20 mg oral tablet: 1 tab(s) orally 2 times a day  folic acid 1 mg oral tablet: 1 tab(s) orally once a day  Multiple Vitamins oral tablet: 1 tab(s) orally once a day  NexIUM 20 mg oral delayed release capsule: 1 orally 2 times a day  ondansetron 4 mg oral tablet, disintegratin tab(s) orally 2 times a day as needed for  nausea  SEROquel 200 mg oral tablet: 1 tab(s) orally once a day (at bedtime)  thiamine 100 mg oral tablet: 1 tab(s) orally once a day

## 2023-04-16 ENCOUNTER — TRANSCRIPTION ENCOUNTER (OUTPATIENT)
Age: 43
End: 2023-04-16

## 2023-04-16 VITALS
HEART RATE: 81 BPM | SYSTOLIC BLOOD PRESSURE: 104 MMHG | RESPIRATION RATE: 18 BRPM | DIASTOLIC BLOOD PRESSURE: 66 MMHG | TEMPERATURE: 96 F

## 2023-04-16 PROCEDURE — 99239 HOSP IP/OBS DSCHRG MGMT >30: CPT

## 2023-04-16 RX ORDER — ONDANSETRON 8 MG/1
1 TABLET, FILM COATED ORAL
Qty: 1 | Refills: 0
Start: 2023-04-16 | End: 2023-04-22

## 2023-04-16 RX ADMIN — Medication 0.5 MILLIGRAM(S): at 05:23

## 2023-04-16 RX ADMIN — Medication 0.5 MILLIGRAM(S): at 09:57

## 2023-04-16 RX ADMIN — Medication 0.5 MILLIGRAM(S): at 21:17

## 2023-04-16 RX ADMIN — Medication 100 MILLIGRAM(S): at 11:16

## 2023-04-16 RX ADMIN — Medication 0.5 MILLIGRAM(S): at 17:41

## 2023-04-16 RX ADMIN — FAMOTIDINE 20 MILLIGRAM(S): 10 INJECTION INTRAVENOUS at 05:24

## 2023-04-16 RX ADMIN — PANTOPRAZOLE SODIUM 40 MILLIGRAM(S): 20 TABLET, DELAYED RELEASE ORAL at 07:56

## 2023-04-16 RX ADMIN — ONDANSETRON 104 MILLIGRAM(S): 8 TABLET, FILM COATED ORAL at 02:36

## 2023-04-16 RX ADMIN — Medication 1 MILLIGRAM(S): at 11:16

## 2023-04-16 RX ADMIN — Medication 1 TABLET(S): at 11:16

## 2023-04-16 RX ADMIN — FAMOTIDINE 20 MILLIGRAM(S): 10 INJECTION INTRAVENOUS at 17:41

## 2023-04-16 RX ADMIN — Medication 0.5 MILLIGRAM(S): at 14:56

## 2023-04-16 RX ADMIN — Medication 1 MILLIGRAM(S): at 02:14

## 2023-04-16 NOTE — PROGRESS NOTE ADULT - ASSESSMENT
Alcohol intoxication with history of severe withdrawal and alcoholic ketoacidosis   Chronic Transaminitis: Likely ETOH related with liver Steatosis     Suspected Thiamine and folic acid deficiency   -Symptoms Improving, c/w IVF, thiamine, folate and MVI   -Addiction medicine consult appreciated     Elevated lactic acid level - Resolved with IVF     Anxiety: on Ativan at home   -Continue       DVT PPX: Lovenox   GERD + GI PPX: Pepcid and PPI   Full Code   Dispo: from Home   Pending ativan taper, likely today as it end 4/17 at 2am   
Alcohol intoxication with history of severe withdrawal and alcoholic ketoacidosis   Chronic Transaminitis: Likely ETOH related with liver Steatosis     Suspected Thiamine and folic acid deficiency   -Symptoms Improving, c/w IVF, thiamine, folate and MVI   -Addiction medicine consult appreciated     Elevated lactic acid level - Resolved with IVF     Anxiety: on Ativan at home   -Continue       DVT PPX: Lovenox   GERD + GI PPX: Pepcid and PPI   Full Code   Dispo: from Home   Pending ativan taper      
Alcohol intoxication with history of severe withdrawal and alcoholic ketoacidosis   Chronic Transaminitis: Likely ETOH related with liver Steatosis     Suspected Thiamine and folic acid deficiency   -Symptoms Improving, c/w IVF, thiamine, folate and MVI   -Addiction medicine consult appreciated     Elevated lactic acid level - Resolved with IVF     Anxiety: on Ativan at home   -Continue       DVT PPX: Lovenox   GERD + GI PPX: Pepcid and PPI   Full Code   Dispo: from Home   Pending ativan taper

## 2023-04-16 NOTE — DISCHARGE NOTE NURSING/CASE MANAGEMENT/SOCIAL WORK - NSDCFUADDAPPT_GEN_ALL_CORE_FT
** Please follow up with your primary care doctor in 1-3 days for post-hospitalization medical follow-up**    ** Please call your GI doctor, or call the GI clinic to schedule an appointment to be seen in office in 2-3 weeks from the date of discharge for follow-up and further workup, including EGD and abdominal ultrasound AFP every 6 months for HCC screening  [facility] located at [address] [place] [address].   Phone Number: [phone]    ** Follow up with [facility]CD, as scheduled on Tuesday 4/18/23 at 11am with Janelle   *Please bring your photo ID & Insurance card

## 2023-04-16 NOTE — DISCHARGE NOTE NURSING/CASE MANAGEMENT/SOCIAL WORK - NSDCPEFALRISK_GEN_ALL_CORE
For information on Fall & Injury Prevention, visit: [email or web address] OR  [email or web address] OR  https://www.cdc.gov/steadi/patient.html

## 2023-04-16 NOTE — DISCHARGE NOTE NURSING/CASE MANAGEMENT/SOCIAL WORK - PATIENT PORTAL LINK FT
You can access the FollowMyHealth Patient Portal offered by [facility] by registering at the following website: [email or web address]. By joining [facility]’s FollowMyHealth portal, you will also be able to view your health information using other applications (apps) compatible with our system.

## 2023-04-16 NOTE — PROGRESS NOTE ADULT - SUBJECTIVE AND OBJECTIVE BOX
Patient is a 42y old  Female who presents with a chief complaint of ETOH withdrawal (14 Apr 2023 09:01)      MEDICATIONS  (STANDING):  enoxaparin Injectable 40 milliGRAM(s) SubCutaneous every 12 hours  famotidine    Tablet 20 milliGRAM(s) Oral two times a day  folic acid 1 milliGRAM(s) Oral daily  lactated ringers. 1000 milliLiter(s) (75 mL/Hr) IV Continuous <Continuous>  LORazepam     Tablet   Oral   LORazepam     Tablet 1 milliGRAM(s) Oral every 4 hours  multivitamin 1 Tablet(s) Oral daily  pantoprazole    Tablet 40 milliGRAM(s) Oral before breakfast  QUEtiapine 200 milliGRAM(s) Oral at bedtime  thiamine 100 milliGRAM(s) Oral daily    MEDICATIONS  (PRN):  aluminum hydroxide/magnesium hydroxide/simethicone Suspension 30 milliLiter(s) Oral every 6 hours PRN Dyspepsia  hydrOXYzine hydrochloride 50 milliGRAM(s) Oral every 8 hours PRN Anxiety  ondansetron  IVPB 4 milliGRAM(s) IV Intermittent every 4 hours PRN Nausea and/or Vomiting          PHYSICAL EXAM:  Vital Signs Last 24 Hrs  T(C): 36.4 (15 Apr 2023 04:35), Max: 36.4 (15 Apr 2023 04:35)  T(F): 97.5 (15 Apr 2023 04:35), Max: 97.5 (15 Apr 2023 04:35)  HR: 75 (15 Apr 2023 04:35) (75 - 80)  BP: 125/67 (15 Apr 2023 04:35) (121/69 - 134/83)  BP(mean): --  RR: 18 (15 Apr 2023 04:35) (18 - 18)  SpO2: 97% (15 Apr 2023 04:35) (96% - 97%)        GENERAL: No acute distress, well-developed  HEAD:  Atraumatic, Normocephalic  EYES: EOMI, PERRLA, conjunctiva and sclera clear  NECK: Supple, no lymphadenopathy, no JVD  CHEST/LUNG: CTAB; No wheezes, rales, or rhonchi  HEART: Regular rate and rhythm; No murmurs, rubs, or gallops  ABDOMEN: Soft, non-tender, non-distended; normal bowel sounds, no organomegaly  EXTREMITIES:  2+ peripheral pulses b/l, No clubbing, cyanosis, or edema  NEUROLOGY: A&O x 3, no focal deficits  SKIN: No rashes or lesions    LABS:                        12.3   4.71  )-----------( 168      ( 14 Apr 2023 11:34 )             35.2     04-14    136  |  99  |  8<L>  ----------------------------<  113<H>  3.5   |  27  |  0.7    Ca    9.4      14 Apr 2023 11:34    TPro  6.2  /  Alb  3.8  /  TBili  1.3<H>  /  DBili  x   /  AST  147<H>  /  ALT  74<H>  /  AlkPhos  76  04-14        RADIOLOGY & ADDITIONAL TESTS:    < from: CT Abdomen and Pelvis No Cont (04.14.23 @ 13:48) >    No evidence of abdominal or pelvic mass or inflammatory process.      
    Pt interviewed, examined and EMR chart reviewed.    Follow up of Alcohol Dependency. Pt is on Ativan protocol. Pt is doing well . Pt with complaint of mild anxiety      REVIEW OF SYSTEMS:    Constitutional: No fever, weight loss or fatigue  ENT:  No difficulty hearing, tinnitus, vertigo; No sinus or throat pain  Neck: No pain or stiffness  Respiratory: No cough, wheezing, chills or hemoptysis  Cardiovascular: No chest pain, palpitations, shortness of breath, dizziness or leg swelling  Gastrointestinal: No abdominal or epigastric pain. No nausea, vomiting or hematemesis; No diarrhea or constipation. No melena or hematochezia.  Neurological: No headaches, memory loss, loss of strength, numbness or tremors  Musculoskeletal: No joint pain or swelling; No muscle, back or extremity pain      MEDICATIONS  (STANDING):  enoxaparin Injectable 40 milliGRAM(s) SubCutaneous every 12 hours  famotidine    Tablet 20 milliGRAM(s) Oral two times a day  folic acid 1 milliGRAM(s) Oral daily  lactated ringers. 1000 milliLiter(s) (125 mL/Hr) IV Continuous <Continuous>  LORazepam     Tablet   Oral   LORazepam     Tablet 1.5 milliGRAM(s) Oral every 4 hours  multivitamin 1 Tablet(s) Oral daily  pantoprazole    Tablet 40 milliGRAM(s) Oral before breakfast  QUEtiapine 200 milliGRAM(s) Oral at bedtime  thiamine 100 milliGRAM(s) Oral daily    MEDICATIONS  (PRN):  aluminum hydroxide/magnesium hydroxide/simethicone Suspension 30 milliLiter(s) Oral every 6 hours PRN Dyspepsia  hydrOXYzine hydrochloride 50 milliGRAM(s) Oral every 8 hours PRN Anxiety  ondansetron  IVPB 4 milliGRAM(s) IV Intermittent every 4 hours PRN Nausea and/or Vomiting      Vital Signs Last 24 Hrs  T(C): 36.4 (2023 04:55), Max: 36.4 (2023 04:55)  T(F): 97.6 (2023 04:55), Max: 97.6 (2023 04:55)  HR: 89 (2023 04:55) (61 - 89)  BP: 105/66 (2023 04:55) (105/66 - 152/94)  BP(mean): --  RR: --  SpO2: 93% (2023 04:55) (93% - 94%)        PHYSICAL EXAM:    Constitutional: NAD, well-groomed, well-developed  HEENT: PERRLA, EOMI, Normal Hearing, MMM  Neck: No LAD, No JVD  Back: Normal spine flexure, No CVA tenderness  Respiratory: CTAB/L  Cardiovascular: S1 and S2, RRR, no M/G/R  Gastrointestinal: BS+, soft, NT/ND  Extremities: No peripheral edema  Vascular: 2+ peripheral pulses  Neurological: A/O x 3, no focal deficits    LABS:                        14.3   5.44  )-----------( 235      ( 2023 01:00 )             40.4     04-13    140  |  99  |  7<L>  ----------------------------<  95  3.9   |  24  |  0.6<L>    Ca    9.5      2023 01:00  Mg     1.9     04-13    TPro  7.7  /  Alb  4.8  /  TBili  0.7  /  DBili  x   /  AST  185<H>  /  ALT  82<H>  /  AlkPhos  80  04-13    PT/INR - ( 2023 01:00 )   PT: 11.40 sec;   INR: 1.00 ratio         PTT - ( 2023 01:00 )  PTT:36.8 sec  Urinalysis Basic - ( 2023 01:00 )    Color: Yellow / Appearance: Clear / S.010 / pH: x  Gluc: x / Ketone: Negative  / Bili: Negative / Urobili: 0.2 mg/dL   Blood: x / Protein: Negative mg/dL / Nitrite: Negative   Leuk Esterase: Negative / RBC: 1-2 /HPF / WBC 3-5 /HPF   Sq Epi: x / Non Sq Epi: x / Bacteria: Few      Drug Screen Urine:  Alcohol Level  Alcohol, Blood: 286 mg/dL (23 @ 01:00)        RADIOLOGY & ADDITIONAL STUDIES:  
Patient is a 42y old  Female who presents with a chief complaint of ETOH withdrawal       MEDICATIONS  (STANDING):  enoxaparin Injectable 40 milliGRAM(s) SubCutaneous every 12 hours  famotidine    Tablet 20 milliGRAM(s) Oral two times a day  folic acid 1 milliGRAM(s) Oral daily  lactated ringers. 1000 milliLiter(s) (125 mL/Hr) IV Continuous <Continuous>  LORazepam     Tablet   Oral   LORazepam     Tablet 1.5 milliGRAM(s) Oral every 4 hours  multivitamin 1 Tablet(s) Oral daily  pantoprazole    Tablet 40 milliGRAM(s) Oral before breakfast  QUEtiapine 200 milliGRAM(s) Oral at bedtime  thiamine 100 milliGRAM(s) Oral daily    MEDICATIONS  (PRN):  aluminum hydroxide/magnesium hydroxide/simethicone Suspension 30 milliLiter(s) Oral every 6 hours PRN Dyspepsia  hydrOXYzine hydrochloride 50 milliGRAM(s) Oral every 8 hours PRN Anxiety  ondansetron  IVPB 4 milliGRAM(s) IV Intermittent every 4 hours PRN Nausea and/or Vomiting      PHYSICAL EXAM:  Vital Signs Last 24 Hrs  T(C): 36.4 (2023 04:55), Max: 36.4 (2023 04:55)  T(F): 97.6 (2023 04:55), Max: 97.6 (2023 04:55)  HR: 89 (2023 04:55) (61 - 89)  BP: 105/66 (2023 04:55) (105/66 - 152/94)  BP(mean): --  RR: --  SpO2: 93% (2023 04:55) (93% - 94%)        GENERAL: No acute distress, well-developed  HEAD:  Atraumatic, Normocephalic  EYES: EOMI, PERRLA, conjunctiva and sclera clear  NECK: Supple, no lymphadenopathy, no JVD  CHEST/LUNG: CTAB; No wheezes, rales, or rhonchi  HEART: Regular rate and rhythm; No murmurs, rubs, or gallops  ABDOMEN: Soft, non-tender, non-distended; normal bowel sounds, no organomegaly  EXTREMITIES:  2+ peripheral pulses b/l, No clubbing, cyanosis, or edema  NEUROLOGY: A&O x 3, no focal deficits  SKIN: No rashes or lesions    LABS:                        14.3   5.44  )-----------( 235      ( 2023 01:00 )             40.4     04-13    140  |  99  |  7<L>  ----------------------------<  95  3.9   |  24  |  0.6<L>    Ca    9.5      2023 01:00  Mg     1.9     -13    TPro  7.7  /  Alb  4.8  /  TBili  0.7  /  DBili  x   /  AST  185<H>  /  ALT  82<H>  /  AlkPhos  80  04-13    PT/INR - ( 2023 01:00 )   PT: 11.40 sec;   INR: 1.00 ratio         PTT - ( 2023 01:00 )  PTT:36.8 sec  CARDIAC MARKERS ( 2023 01:00 )  x     / <0.01 ng/mL / x     / x     / x          Urinalysis Basic - ( 2023 01:00 )    Color: Yellow / Appearance: Clear / S.010 / pH: x  Gluc: x / Ketone: Negative  / Bili: Negative / Urobili: 0.2 mg/dL   Blood: x / Protein: Negative mg/dL / Nitrite: Negative   Leuk Esterase: Negative / RBC: 1-2 /HPF / WBC 3-5 /HPF   Sq Epi: x / Non Sq Epi: x / Bacteria: Few            
Patient is a 42y old  Female who presents with a chief complaint of ETOH intoxication     MEDICATIONS  (STANDING):  enoxaparin Injectable 40 milliGRAM(s) SubCutaneous every 12 hours  famotidine    Tablet 20 milliGRAM(s) Oral two times a day  folic acid 1 milliGRAM(s) Oral daily  lactated ringers. 1000 milliLiter(s) (75 mL/Hr) IV Continuous <Continuous>  LORazepam     Tablet   Oral   LORazepam     Tablet 0.5 milliGRAM(s) Oral every 4 hours  multivitamin 1 Tablet(s) Oral daily  pantoprazole    Tablet 40 milliGRAM(s) Oral before breakfast  QUEtiapine 200 milliGRAM(s) Oral at bedtime  thiamine 100 milliGRAM(s) Oral daily    MEDICATIONS  (PRN):  aluminum hydroxide/magnesium hydroxide/simethicone Suspension 30 milliLiter(s) Oral every 6 hours PRN Dyspepsia  hydrOXYzine hydrochloride 50 milliGRAM(s) Oral every 8 hours PRN Anxiety  ondansetron  IVPB 4 milliGRAM(s) IV Intermittent every 4 hours PRN Nausea and/or Vomiting      PHYSICAL EXAM:  Vital Signs Last 24 Hrs  T(C): 35.7 (15 Apr 2023 20:44), Max: 36.7 (15 Apr 2023 11:00)  T(F): 96.2 (15 Apr 2023 20:44), Max: 98.1 (15 Apr 2023 11:00)  HR: 94 (16 Apr 2023 05:40) (72 - 94)  BP: 157/90 (16 Apr 2023 05:40) (117/70 - 157/90)  BP(mean): --  RR: 18 (16 Apr 2023 05:40) (18 - 19)  SpO2: 99% (15 Apr 2023 11:00) (99% - 99%)    Parameters below as of 15 Apr 2023 11:00  Patient On (Oxygen Delivery Method): room air        GENERAL: No acute distress, well-developed  HEAD:  Atraumatic, Normocephalic  EYES: EOMI, PERRLA, conjunctiva and sclera clear  NECK: Supple, no lymphadenopathy, no JVD  CHEST/LUNG: CTAB; No wheezes, rales, or rhonchi  HEART: Regular rate and rhythm; No murmurs, rubs, or gallops  ABDOMEN: Soft, non-tender, non-distended; normal bowel sounds, no organomegaly  EXTREMITIES:  2+ peripheral pulses b/l, No clubbing, cyanosis, or edema  NEUROLOGY: A&O x 3, no focal deficits  SKIN: No rashes or lesions    LABS:                        12.3   4.71  )-----------( 168      ( 14 Apr 2023 11:34 )             35.2     04-14    136  |  99  |  8<L>  ----------------------------<  113<H>  3.5   |  27  |  0.7    Ca    9.4      14 Apr 2023 11:34    TPro  6.2  /  Alb  3.8  /  TBili  1.3<H>  /  DBili  x   /  AST  147<H>  /  ALT  74<H>  /  AlkPhos  76  04-14

## 2023-04-18 ENCOUNTER — OUTPATIENT (OUTPATIENT)
Dept: OUTPATIENT SERVICES | Facility: HOSPITAL | Age: 43
LOS: 1 days | End: 2023-04-18
Payer: MEDICAID

## 2023-04-18 ENCOUNTER — APPOINTMENT (OUTPATIENT)
Dept: PSYCHIATRY | Facility: CLINIC | Age: 43
End: 2023-04-18

## 2023-04-18 DIAGNOSIS — Z98.891 HISTORY OF UTERINE SCAR FROM PREVIOUS SURGERY: Chronic | ICD-10-CM

## 2023-04-18 DIAGNOSIS — Z98.890 OTHER SPECIFIED POSTPROCEDURAL STATES: Chronic | ICD-10-CM

## 2023-04-18 DIAGNOSIS — F10.20 ALCOHOL DEPENDENCE, UNCOMPLICATED: ICD-10-CM

## 2023-04-18 PROCEDURE — 90791 PSYCH DIAGNOSTIC EVALUATION: CPT

## 2023-04-19 ENCOUNTER — APPOINTMENT (OUTPATIENT)
Dept: PSYCHIATRY | Facility: CLINIC | Age: 43
End: 2023-04-19
Payer: MEDICAID

## 2023-04-19 ENCOUNTER — OUTPATIENT (OUTPATIENT)
Dept: OUTPATIENT SERVICES | Facility: HOSPITAL | Age: 43
LOS: 1 days | End: 2023-04-19
Payer: MEDICAID

## 2023-04-19 DIAGNOSIS — F10.20 ALCOHOL DEPENDENCE, UNCOMPLICATED: ICD-10-CM

## 2023-04-19 DIAGNOSIS — Z98.891 HISTORY OF UTERINE SCAR FROM PREVIOUS SURGERY: Chronic | ICD-10-CM

## 2023-04-19 LAB
A1C WITH ESTIMATED AVERAGE GLUCOSE RESULT: 4.6 % — SIGNIFICANT CHANGE UP (ref 4–5.6)
CHOLEST SERPL-MCNC: 274 MG/DL — HIGH
ESTIMATED AVERAGE GLUCOSE: 85 MG/DL — SIGNIFICANT CHANGE UP (ref 68–114)
HCG UR QL: NEGATIVE — SIGNIFICANT CHANGE UP
HDLC SERPL-MCNC: 61 MG/DL — SIGNIFICANT CHANGE UP
LIPID PNL WITH DIRECT LDL SERPL: 193 MG/DL — HIGH
NON HDL CHOLESTEROL: 213 MG/DL — HIGH
TRIGL SERPL-MCNC: 101 MG/DL — SIGNIFICANT CHANGE UP

## 2023-04-19 PROCEDURE — 99215 OFFICE O/P EST HI 40 MIN: CPT

## 2023-04-19 PROCEDURE — 81003 URINALYSIS AUTO W/O SCOPE: CPT

## 2023-04-19 PROCEDURE — 99205 OFFICE O/P NEW HI 60 MIN: CPT

## 2023-04-19 PROCEDURE — 86480 TB TEST CELL IMMUN MEASURE: CPT

## 2023-04-19 PROCEDURE — 87389 HIV-1 AG W/HIV-1&-2 AB AG IA: CPT

## 2023-04-19 PROCEDURE — 80074 ACUTE HEPATITIS PANEL: CPT

## 2023-04-19 PROCEDURE — 86780 TREPONEMA PALLIDUM: CPT

## 2023-04-19 PROCEDURE — 81025 URINE PREGNANCY TEST: CPT

## 2023-04-19 PROCEDURE — 83036 HEMOGLOBIN GLYCOSYLATED A1C: CPT

## 2023-04-19 PROCEDURE — 80061 LIPID PANEL: CPT

## 2023-04-20 ENCOUNTER — APPOINTMENT (OUTPATIENT)
Dept: PSYCHIATRY | Facility: CLINIC | Age: 43
End: 2023-04-20

## 2023-04-20 ENCOUNTER — OUTPATIENT (OUTPATIENT)
Dept: OUTPATIENT SERVICES | Facility: HOSPITAL | Age: 43
LOS: 1 days | End: 2023-04-20
Payer: MEDICAID

## 2023-04-20 DIAGNOSIS — M35.9 SYSTEMIC INVOLVEMENT OF CONNECTIVE TISSUE, UNSPECIFIED: ICD-10-CM

## 2023-04-20 DIAGNOSIS — Z98.891 HISTORY OF UTERINE SCAR FROM PREVIOUS SURGERY: Chronic | ICD-10-CM

## 2023-04-20 DIAGNOSIS — K21.9 GASTRO-ESOPHAGEAL REFLUX DISEASE WITHOUT ESOPHAGITIS: ICD-10-CM

## 2023-04-20 DIAGNOSIS — F10.20 ALCOHOL DEPENDENCE, UNCOMPLICATED: ICD-10-CM

## 2023-04-20 DIAGNOSIS — F10.229 ALCOHOL DEPENDENCE WITH INTOXICATION, UNSPECIFIED: ICD-10-CM

## 2023-04-20 DIAGNOSIS — E05.00 THYROTOXICOSIS WITH DIFFUSE GOITER WITHOUT THYROTOXIC CRISIS OR STORM: ICD-10-CM

## 2023-04-20 DIAGNOSIS — F13.20 SEDATIVE, HYPNOTIC OR ANXIOLYTIC DEPENDENCE, UNCOMPLICATED: ICD-10-CM

## 2023-04-20 DIAGNOSIS — E87.20 ACIDOSIS, UNSPECIFIED: ICD-10-CM

## 2023-04-20 DIAGNOSIS — E87.3 ALKALOSIS: ICD-10-CM

## 2023-04-20 DIAGNOSIS — E78.5 HYPERLIPIDEMIA, UNSPECIFIED: ICD-10-CM

## 2023-04-20 DIAGNOSIS — F17.210 NICOTINE DEPENDENCE, CIGARETTES, UNCOMPLICATED: ICD-10-CM

## 2023-04-20 DIAGNOSIS — E51.9 THIAMINE DEFICIENCY, UNSPECIFIED: ICD-10-CM

## 2023-04-20 DIAGNOSIS — E66.9 OBESITY, UNSPECIFIED: ICD-10-CM

## 2023-04-20 DIAGNOSIS — K76.0 FATTY (CHANGE OF) LIVER, NOT ELSEWHERE CLASSIFIED: ICD-10-CM

## 2023-04-20 DIAGNOSIS — F10.220 ALCOHOL DEPENDENCE WITH INTOXICATION, UNCOMPLICATED: ICD-10-CM

## 2023-04-20 DIAGNOSIS — Z98.890 OTHER SPECIFIED POSTPROCEDURAL STATES: Chronic | ICD-10-CM

## 2023-04-20 DIAGNOSIS — F41.9 ANXIETY DISORDER, UNSPECIFIED: ICD-10-CM

## 2023-04-20 DIAGNOSIS — E53.8 DEFICIENCY OF OTHER SPECIFIED B GROUP VITAMINS: ICD-10-CM

## 2023-04-20 DIAGNOSIS — F10.239 ALCOHOL DEPENDENCE WITH WITHDRAWAL, UNSPECIFIED: ICD-10-CM

## 2023-04-20 LAB
APPEARANCE UR: CLEAR — SIGNIFICANT CHANGE UP
BILIRUB UR-MCNC: NEGATIVE — SIGNIFICANT CHANGE UP
COLOR SPEC: YELLOW — SIGNIFICANT CHANGE UP
DIFF PNL FLD: NEGATIVE — SIGNIFICANT CHANGE UP
GLUCOSE UR QL: NEGATIVE MG/DL — SIGNIFICANT CHANGE UP
HAV IGM SER-ACNC: SIGNIFICANT CHANGE UP
HBV CORE IGM SER-ACNC: SIGNIFICANT CHANGE UP
HBV SURFACE AG SER-ACNC: SIGNIFICANT CHANGE UP
HCV AB S/CO SERPL IA: 0.08 S/CO — SIGNIFICANT CHANGE UP (ref 0–0.99)
HCV AB SERPL-IMP: SIGNIFICANT CHANGE UP
HIV 1+2 AB+HIV1 P24 AG SERPL QL IA: SIGNIFICANT CHANGE UP
KETONES UR-MCNC: NEGATIVE MG/DL — SIGNIFICANT CHANGE UP
LEUKOCYTE ESTERASE UR-ACNC: NEGATIVE — SIGNIFICANT CHANGE UP
NITRITE UR-MCNC: NEGATIVE — SIGNIFICANT CHANGE UP
PH UR: 6.5 — SIGNIFICANT CHANGE UP (ref 5–8)
PROT UR-MCNC: NEGATIVE MG/DL — SIGNIFICANT CHANGE UP
SP GR SPEC: 1 — LOW (ref 1–1.03)
T PALLIDUM AB TITR SER: NEGATIVE — SIGNIFICANT CHANGE UP
UROBILINOGEN FLD QL: 0.2 MG/DL — SIGNIFICANT CHANGE UP (ref 0.2–1)

## 2023-04-20 PROCEDURE — 90832 PSYTX W PT 30 MINUTES: CPT | Mod: 95

## 2023-04-21 DIAGNOSIS — F10.20 ALCOHOL DEPENDENCE, UNCOMPLICATED: ICD-10-CM

## 2023-04-21 LAB
GAMMA INTERFERON BACKGROUND BLD IA-ACNC: 0.03 IU/ML — SIGNIFICANT CHANGE UP
M TB IFN-G BLD-IMP: NEGATIVE — SIGNIFICANT CHANGE UP
M TB IFN-G CD4+ BCKGRND COR BLD-ACNC: -0.01 IU/ML — SIGNIFICANT CHANGE UP
M TB IFN-G CD4+CD8+ BCKGRND COR BLD-ACNC: -0.01 IU/ML — SIGNIFICANT CHANGE UP
QUANT TB PLUS MITOGEN MINUS NIL: 8.83 IU/ML — SIGNIFICANT CHANGE UP

## 2023-04-25 ENCOUNTER — OUTPATIENT (OUTPATIENT)
Dept: OUTPATIENT SERVICES | Facility: HOSPITAL | Age: 43
LOS: 1 days | End: 2023-04-25
Payer: MEDICAID

## 2023-04-25 ENCOUNTER — APPOINTMENT (OUTPATIENT)
Dept: PSYCHIATRY | Facility: CLINIC | Age: 43
End: 2023-04-25

## 2023-04-25 DIAGNOSIS — Z98.890 OTHER SPECIFIED POSTPROCEDURAL STATES: Chronic | ICD-10-CM

## 2023-04-25 DIAGNOSIS — F10.20 ALCOHOL DEPENDENCE, UNCOMPLICATED: ICD-10-CM

## 2023-04-25 DIAGNOSIS — Z98.891 HISTORY OF UTERINE SCAR FROM PREVIOUS SURGERY: Chronic | ICD-10-CM

## 2023-04-25 PROCEDURE — 90832 PSYTX W PT 30 MINUTES: CPT | Mod: 95

## 2023-04-26 DIAGNOSIS — F10.20 ALCOHOL DEPENDENCE, UNCOMPLICATED: ICD-10-CM

## 2023-05-02 ENCOUNTER — APPOINTMENT (OUTPATIENT)
Dept: PSYCHIATRY | Facility: CLINIC | Age: 43
End: 2023-05-02

## 2023-05-10 ENCOUNTER — APPOINTMENT (OUTPATIENT)
Dept: SURGERY | Facility: CLINIC | Age: 43
End: 2023-05-10

## 2023-05-18 ENCOUNTER — APPOINTMENT (OUTPATIENT)
Dept: PSYCHIATRY | Facility: CLINIC | Age: 43
End: 2023-05-18

## 2023-05-22 ENCOUNTER — APPOINTMENT (OUTPATIENT)
Dept: PSYCHIATRY | Facility: CLINIC | Age: 43
End: 2023-05-22

## 2023-06-06 ENCOUNTER — APPOINTMENT (OUTPATIENT)
Dept: PSYCHIATRY | Facility: CLINIC | Age: 43
End: 2023-06-06

## 2023-06-22 NOTE — ED PROVIDER NOTE - NS ED ATTENDING STATEMENT MOD
Patient: Nava Goldman    Procedure: Procedure(s):  BILATERAL NIPPLE SPARING MASTECTOMY WITH  LEFT SENTINEL LYMPH NODE BIOPSY  RECONSTRUCTION, BREAST, IMMEDIATE PERMANENT BREAST IMPLANT, BILATERAL CAPSULE ORIFICE       Diagnosis: Invasive ductal carcinoma of breast, left (H) [C50.912]  Diagnosis Additional Information: No value filed.    Anesthesia Type:   General     Note:    Oropharynx: oropharynx clear of all foreign objects and spontaneously breathing  Level of Consciousness: drowsy  Oxygen Supplementation: face mask  Level of Supplemental Oxygen (L/min / FiO2): 10  Independent Airway: airway patency satisfactory and stable  Dentition: dentition unchanged  Vital Signs Stable: post-procedure vital signs reviewed and stable  Report to RN Given: handoff report given  Patient transferred to: PACU    Handoff Report: Identifed the Patient, Identified the Reponsible Provider, Reviewed the pertinent medical history, Discussed the surgical course, Reviewed Intra-OP anesthesia mangement and issues during anesthesia, Set expectations for post-procedure period and Allowed opportunity for questions and acknowledgement of understanding      Vitals:  Vitals Value Taken Time   /74 06/22/23 1317   Temp 36.4  C (97.5  F) 06/22/23 1317   Pulse 88 06/22/23 1319   Resp 17 06/22/23 1319   SpO2 100 % 06/22/23 1319   Vitals shown include unvalidated device data.    Electronically Signed By: LINDA Medina CRNA  June 22, 2023  1:20 PM   Attending Only

## 2023-06-25 ENCOUNTER — INPATIENT (INPATIENT)
Facility: HOSPITAL | Age: 43
LOS: 3 days | Discharge: HOME | End: 2023-06-29
Attending: INTERNAL MEDICINE | Admitting: STUDENT IN AN ORGANIZED HEALTH CARE EDUCATION/TRAINING PROGRAM
Payer: MEDICAID

## 2023-06-25 VITALS
WEIGHT: 175.05 LBS | RESPIRATION RATE: 18 BRPM | TEMPERATURE: 97 F | SYSTOLIC BLOOD PRESSURE: 127 MMHG | DIASTOLIC BLOOD PRESSURE: 85 MMHG | HEART RATE: 122 BPM | OXYGEN SATURATION: 99 % | HEIGHT: 62 IN

## 2023-06-25 DIAGNOSIS — F10.239 ALCOHOL DEPENDENCE WITH WITHDRAWAL, UNSPECIFIED: ICD-10-CM

## 2023-06-25 DIAGNOSIS — Z98.890 OTHER SPECIFIED POSTPROCEDURAL STATES: Chronic | ICD-10-CM

## 2023-06-25 DIAGNOSIS — Z98.891 HISTORY OF UTERINE SCAR FROM PREVIOUS SURGERY: Chronic | ICD-10-CM

## 2023-06-25 LAB
ALBUMIN SERPL ELPH-MCNC: 4.2 G/DL — SIGNIFICANT CHANGE UP (ref 3.5–5.2)
ALP SERPL-CCNC: 168 U/L — HIGH (ref 30–115)
ALT FLD-CCNC: 176 U/L — HIGH (ref 0–41)
ANION GAP SERPL CALC-SCNC: 12 MMOL/L — SIGNIFICANT CHANGE UP (ref 7–14)
ANION GAP SERPL CALC-SCNC: 22 MMOL/L — HIGH (ref 7–14)
APAP SERPL-MCNC: <5 UG/ML — LOW (ref 10–30)
AST SERPL-CCNC: 228 U/L — HIGH (ref 0–41)
B-OH-BUTYR SERPL-SCNC: 1.5 MMOL/L — HIGH
BASE EXCESS BLDV CALC-SCNC: 6.5 MMOL/L — HIGH (ref -2–3)
BASE EXCESS BLDV CALC-SCNC: 6.7 MMOL/L — HIGH (ref -2–3)
BASOPHILS # BLD AUTO: 0.02 K/UL — SIGNIFICANT CHANGE UP (ref 0–0.2)
BASOPHILS NFR BLD AUTO: 0.2 % — SIGNIFICANT CHANGE UP (ref 0–1)
BILIRUB SERPL-MCNC: 3.2 MG/DL — HIGH (ref 0.2–1.2)
BUN SERPL-MCNC: 10 MG/DL — SIGNIFICANT CHANGE UP (ref 10–20)
BUN SERPL-MCNC: 11 MG/DL — SIGNIFICANT CHANGE UP (ref 10–20)
CA-I SERPL-SCNC: 1.08 MMOL/L — LOW (ref 1.15–1.33)
CA-I SERPL-SCNC: 1.09 MMOL/L — LOW (ref 1.15–1.33)
CALCIUM SERPL-MCNC: 8.3 MG/DL — LOW (ref 8.4–10.5)
CALCIUM SERPL-MCNC: 9.6 MG/DL — SIGNIFICANT CHANGE UP (ref 8.4–10.5)
CHLORIDE SERPL-SCNC: 81 MMOL/L — LOW (ref 98–110)
CHLORIDE SERPL-SCNC: 97 MMOL/L — LOW (ref 98–110)
CO2 SERPL-SCNC: 23 MMOL/L — SIGNIFICANT CHANGE UP (ref 17–32)
CO2 SERPL-SCNC: 25 MMOL/L — SIGNIFICANT CHANGE UP (ref 17–32)
CREAT SERPL-MCNC: 0.7 MG/DL — SIGNIFICANT CHANGE UP (ref 0.7–1.5)
CREAT SERPL-MCNC: 1.1 MG/DL — SIGNIFICANT CHANGE UP (ref 0.7–1.5)
EGFR: 111 ML/MIN/1.73M2 — SIGNIFICANT CHANGE UP
EGFR: 65 ML/MIN/1.73M2 — SIGNIFICANT CHANGE UP
EOSINOPHIL # BLD AUTO: 0.02 K/UL — SIGNIFICANT CHANGE UP (ref 0–0.7)
EOSINOPHIL NFR BLD AUTO: 0.2 % — SIGNIFICANT CHANGE UP (ref 0–8)
ETHANOL SERPL-MCNC: <10 MG/DL — SIGNIFICANT CHANGE UP
GAS PNL BLDV: 125 MMOL/L — LOW (ref 136–145)
GAS PNL BLDV: 127 MMOL/L — LOW (ref 136–145)
GAS PNL BLDV: SIGNIFICANT CHANGE UP
GAS PNL BLDV: SIGNIFICANT CHANGE UP
GLUCOSE SERPL-MCNC: 125 MG/DL — HIGH (ref 70–99)
GLUCOSE SERPL-MCNC: 92 MG/DL — SIGNIFICANT CHANGE UP (ref 70–99)
HCG SERPL QL: NEGATIVE — SIGNIFICANT CHANGE UP
HCO3 BLDV-SCNC: 29 MMOL/L — SIGNIFICANT CHANGE UP (ref 22–29)
HCO3 BLDV-SCNC: 32 MMOL/L — HIGH (ref 22–29)
HCT VFR BLD CALC: 38.9 % — SIGNIFICANT CHANGE UP (ref 37–47)
HCT VFR BLDA CALC: 44 % — SIGNIFICANT CHANGE UP (ref 39–51)
HCT VFR BLDA CALC: 51 % — SIGNIFICANT CHANGE UP (ref 39–51)
HGB BLD CALC-MCNC: 14.5 G/DL — SIGNIFICANT CHANGE UP (ref 12.6–17.4)
HGB BLD CALC-MCNC: 16.9 G/DL — SIGNIFICANT CHANGE UP (ref 12.6–17.4)
HGB BLD-MCNC: 14.1 G/DL — SIGNIFICANT CHANGE UP (ref 12–16)
IMM GRANULOCYTES NFR BLD AUTO: 0.6 % — HIGH (ref 0.1–0.3)
LACTATE BLDV-MCNC: 3.5 MMOL/L — HIGH (ref 0.5–2)
LACTATE BLDV-MCNC: 4.2 MMOL/L — CRITICAL HIGH (ref 0.5–2)
LACTATE SERPL-SCNC: 0.9 MMOL/L — SIGNIFICANT CHANGE UP (ref 0.7–2)
LIDOCAIN IGE QN: 58 U/L — SIGNIFICANT CHANGE UP (ref 7–60)
LYMPHOCYTES # BLD AUTO: 1.51 K/UL — SIGNIFICANT CHANGE UP (ref 1.2–3.4)
LYMPHOCYTES # BLD AUTO: 14.8 % — LOW (ref 20.5–51.1)
MAGNESIUM SERPL-MCNC: 1.3 MG/DL — LOW (ref 1.8–2.4)
MCHC RBC-ENTMCNC: 34.6 PG — HIGH (ref 27–31)
MCHC RBC-ENTMCNC: 36.2 G/DL — SIGNIFICANT CHANGE UP (ref 32–37)
MCV RBC AUTO: 95.3 FL — SIGNIFICANT CHANGE UP (ref 81–99)
MONOCYTES # BLD AUTO: 0.84 K/UL — HIGH (ref 0.1–0.6)
MONOCYTES NFR BLD AUTO: 8.2 % — SIGNIFICANT CHANGE UP (ref 1.7–9.3)
NEUTROPHILS # BLD AUTO: 7.74 K/UL — HIGH (ref 1.4–6.5)
NEUTROPHILS NFR BLD AUTO: 76 % — HIGH (ref 42.2–75.2)
NRBC # BLD: 0 /100 WBCS — SIGNIFICANT CHANGE UP (ref 0–0)
PCO2 BLDV: 35 MMHG — LOW (ref 39–42)
PCO2 BLDV: 46 MMHG — HIGH (ref 39–42)
PH BLDV: 7.45 — HIGH (ref 7.32–7.43)
PH BLDV: 7.53 — HIGH (ref 7.32–7.43)
PLATELET # BLD AUTO: 199 K/UL — SIGNIFICANT CHANGE UP (ref 130–400)
PMV BLD: 9.2 FL — SIGNIFICANT CHANGE UP (ref 7.4–10.4)
PO2 BLDV: 32 MMHG — SIGNIFICANT CHANGE UP
PO2 BLDV: 37 MMHG — SIGNIFICANT CHANGE UP
POTASSIUM BLDV-SCNC: 3 MMOL/L — LOW (ref 3.5–5.1)
POTASSIUM BLDV-SCNC: 3.3 MMOL/L — LOW (ref 3.5–5.1)
POTASSIUM SERPL-MCNC: 3.1 MMOL/L — LOW (ref 3.5–5)
POTASSIUM SERPL-MCNC: 3.3 MMOL/L — LOW (ref 3.5–5)
POTASSIUM SERPL-SCNC: 3.1 MMOL/L — LOW (ref 3.5–5)
POTASSIUM SERPL-SCNC: 3.3 MMOL/L — LOW (ref 3.5–5)
PROT SERPL-MCNC: 7.5 G/DL — SIGNIFICANT CHANGE UP (ref 6–8)
RBC # BLD: 4.08 M/UL — LOW (ref 4.2–5.4)
RBC # FLD: 13.2 % — SIGNIFICANT CHANGE UP (ref 11.5–14.5)
SALICYLATES SERPL-MCNC: <0.3 MG/DL — LOW (ref 4–30)
SAO2 % BLDV: 52.3 % — SIGNIFICANT CHANGE UP
SAO2 % BLDV: 57.8 % — SIGNIFICANT CHANGE UP
SODIUM SERPL-SCNC: 126 MMOL/L — LOW (ref 135–146)
SODIUM SERPL-SCNC: 134 MMOL/L — LOW (ref 135–146)
WBC # BLD: 10.19 K/UL — SIGNIFICANT CHANGE UP (ref 4.8–10.8)
WBC # FLD AUTO: 10.19 K/UL — SIGNIFICANT CHANGE UP (ref 4.8–10.8)

## 2023-06-25 PROCEDURE — 76700 US EXAM ABDOM COMPLETE: CPT

## 2023-06-25 PROCEDURE — 93005 ELECTROCARDIOGRAM TRACING: CPT

## 2023-06-25 PROCEDURE — 85027 COMPLETE CBC AUTOMATED: CPT

## 2023-06-25 PROCEDURE — 83605 ASSAY OF LACTIC ACID: CPT

## 2023-06-25 PROCEDURE — 36415 COLL VENOUS BLD VENIPUNCTURE: CPT

## 2023-06-25 PROCEDURE — 84100 ASSAY OF PHOSPHORUS: CPT

## 2023-06-25 PROCEDURE — 76705 ECHO EXAM OF ABDOMEN: CPT | Mod: 26

## 2023-06-25 PROCEDURE — 74019 RADEX ABDOMEN 2 VIEWS: CPT

## 2023-06-25 PROCEDURE — 71045 X-RAY EXAM CHEST 1 VIEW: CPT | Mod: 26

## 2023-06-25 PROCEDURE — 85025 COMPLETE CBC W/AUTO DIFF WBC: CPT

## 2023-06-25 PROCEDURE — 83735 ASSAY OF MAGNESIUM: CPT

## 2023-06-25 PROCEDURE — 99285 EMERGENCY DEPT VISIT HI MDM: CPT

## 2023-06-25 PROCEDURE — 93010 ELECTROCARDIOGRAM REPORT: CPT

## 2023-06-25 PROCEDURE — 80048 BASIC METABOLIC PNL TOTAL CA: CPT

## 2023-06-25 PROCEDURE — 80053 COMPREHEN METABOLIC PANEL: CPT

## 2023-06-25 PROCEDURE — 99222 1ST HOSP IP/OBS MODERATE 55: CPT

## 2023-06-25 RX ORDER — DIAZEPAM 5 MG
10 TABLET ORAL ONCE
Refills: 0 | Status: DISCONTINUED | OUTPATIENT
Start: 2023-06-25 | End: 2023-06-25

## 2023-06-25 RX ORDER — ACETAMINOPHEN 500 MG
650 TABLET ORAL EVERY 6 HOURS
Refills: 0 | Status: DISCONTINUED | OUTPATIENT
Start: 2023-06-25 | End: 2023-06-29

## 2023-06-25 RX ORDER — MAGNESIUM SULFATE 500 MG/ML
2 VIAL (ML) INJECTION ONCE
Refills: 0 | Status: COMPLETED | OUTPATIENT
Start: 2023-06-25 | End: 2023-06-25

## 2023-06-25 RX ORDER — SODIUM CHLORIDE 9 MG/ML
1000 INJECTION, SOLUTION INTRAVENOUS
Refills: 0 | Status: DISCONTINUED | OUTPATIENT
Start: 2023-06-25 | End: 2023-06-25

## 2023-06-25 RX ORDER — THIAMINE MONONITRATE (VIT B1) 100 MG
100 TABLET ORAL DAILY
Refills: 0 | Status: COMPLETED | OUTPATIENT
Start: 2023-06-25 | End: 2023-06-28

## 2023-06-25 RX ORDER — POTASSIUM CHLORIDE 20 MEQ
20 PACKET (EA) ORAL
Refills: 0 | Status: COMPLETED | OUTPATIENT
Start: 2023-06-25 | End: 2023-06-26

## 2023-06-25 RX ORDER — SODIUM CHLORIDE 9 MG/ML
1000 INJECTION INTRAMUSCULAR; INTRAVENOUS; SUBCUTANEOUS
Refills: 0 | Status: DISCONTINUED | OUTPATIENT
Start: 2023-06-25 | End: 2023-06-25

## 2023-06-25 RX ORDER — LANOLIN ALCOHOL/MO/W.PET/CERES
3 CREAM (GRAM) TOPICAL AT BEDTIME
Refills: 0 | Status: DISCONTINUED | OUTPATIENT
Start: 2023-06-25 | End: 2023-06-29

## 2023-06-25 RX ORDER — ENOXAPARIN SODIUM 100 MG/ML
40 INJECTION SUBCUTANEOUS EVERY 24 HOURS
Refills: 0 | Status: DISCONTINUED | OUTPATIENT
Start: 2023-06-25 | End: 2023-06-29

## 2023-06-25 RX ORDER — ONDANSETRON 8 MG/1
4 TABLET, FILM COATED ORAL EVERY 8 HOURS
Refills: 0 | Status: DISCONTINUED | OUTPATIENT
Start: 2023-06-25 | End: 2023-06-29

## 2023-06-25 RX ORDER — QUETIAPINE FUMARATE 200 MG/1
1 TABLET, FILM COATED ORAL
Qty: 0 | Refills: 0 | DISCHARGE

## 2023-06-25 RX ORDER — POTASSIUM CHLORIDE 20 MEQ
20 PACKET (EA) ORAL ONCE
Refills: 0 | Status: COMPLETED | OUTPATIENT
Start: 2023-06-25 | End: 2023-06-25

## 2023-06-25 RX ORDER — THIAMINE MONONITRATE (VIT B1) 100 MG
100 TABLET ORAL ONCE
Refills: 0 | Status: COMPLETED | OUTPATIENT
Start: 2023-06-25 | End: 2023-06-25

## 2023-06-25 RX ORDER — SODIUM CHLORIDE 9 MG/ML
1000 INJECTION, SOLUTION INTRAVENOUS
Refills: 0 | Status: DISCONTINUED | OUTPATIENT
Start: 2023-06-25 | End: 2023-06-27

## 2023-06-25 RX ORDER — CHLORHEXIDINE GLUCONATE 213 G/1000ML
1 SOLUTION TOPICAL
Refills: 0 | Status: DISCONTINUED | OUTPATIENT
Start: 2023-06-25 | End: 2023-06-29

## 2023-06-25 RX ORDER — FAMOTIDINE 10 MG/ML
20 INJECTION INTRAVENOUS
Refills: 0 | Status: DISCONTINUED | OUTPATIENT
Start: 2023-06-25 | End: 2023-06-29

## 2023-06-25 RX ORDER — ESOMEPRAZOLE MAGNESIUM 40 MG/1
1 CAPSULE, DELAYED RELEASE ORAL
Refills: 0 | DISCHARGE

## 2023-06-25 RX ORDER — FOLIC ACID 0.8 MG
1 TABLET ORAL DAILY
Refills: 0 | Status: DISCONTINUED | OUTPATIENT
Start: 2023-06-25 | End: 2023-06-29

## 2023-06-25 RX ORDER — FAMOTIDINE 10 MG/ML
20 INJECTION INTRAVENOUS ONCE
Refills: 0 | Status: COMPLETED | OUTPATIENT
Start: 2023-06-25 | End: 2023-06-25

## 2023-06-25 RX ADMIN — Medication 100 MILLIGRAM(S): at 09:47

## 2023-06-25 RX ADMIN — Medication 12.5 GRAM(S): at 17:44

## 2023-06-25 RX ADMIN — Medication 50 MILLIEQUIVALENT(S): at 10:55

## 2023-06-25 RX ADMIN — SODIUM CHLORIDE 500 MILLILITER(S): 9 INJECTION, SOLUTION INTRAVENOUS at 09:40

## 2023-06-25 RX ADMIN — Medication 10 MILLIGRAM(S): at 10:55

## 2023-06-25 RX ADMIN — Medication 2 MILLIGRAM(S): at 22:29

## 2023-06-25 RX ADMIN — Medication 25 GRAM(S): at 14:35

## 2023-06-25 RX ADMIN — Medication 10 MILLIGRAM(S): at 17:45

## 2023-06-25 RX ADMIN — SODIUM CHLORIDE 100 MILLILITER(S): 9 INJECTION INTRAMUSCULAR; INTRAVENOUS; SUBCUTANEOUS at 17:44

## 2023-06-25 RX ADMIN — SODIUM CHLORIDE 100 MILLILITER(S): 9 INJECTION INTRAMUSCULAR; INTRAVENOUS; SUBCUTANEOUS at 22:29

## 2023-06-25 RX ADMIN — FAMOTIDINE 20 MILLIGRAM(S): 10 INJECTION INTRAVENOUS at 09:41

## 2023-06-25 NOTE — H&P ADULT - NS ATTEND AMEND GEN_ALL_CORE FT
ETOH withdrawal with Alcoholic Ketoacidosis   Presenting with Abdominal pain, N/V and tremulousness   IVF, Ativan taper, addiction medicine

## 2023-06-25 NOTE — ED PROVIDER NOTE - CLINICAL SUMMARY MEDICAL DECISION MAKING FREE TEXT BOX
Patient here with alcohol withdrawal as well as associated hypokalemia in addition  elevated bilirubin.  Patient given Valium with improvement of withdrawal symptoms IV fluids also given with repeat lactate improving.

## 2023-06-25 NOTE — H&P ADULT - HISTORY OF PRESENT ILLNESS
Patient is a 42yo F w/ PMHx of ETOH dependence, GERD, gastritis, esophagitis presenting with generalized abdominal pain, nausea and NBNB vomiting Reports she drink 1.75L of vodka every 2 days and is unable to rest unless she finished it. Reports for almost 3weeks she has been having non-stop NBNB vomiting and non-bloody vomiting every 40-50mins. Reports today she noticed her whole body was shaking and the abdominal pain was unbearable so she decided to come to the ED. Reports takes lorazepam 1mg BID, however ran out of her pills a week ago. Reports last drink was around 10 pm last night and she threw up right afterwards. Currently denies fever, chills, headaches, dizziness, visual hallucination, auditory hallucination, chest pain, hematemesis, SOB, SI/HI, or any urinary sxs.

## 2023-06-25 NOTE — ED PROVIDER NOTE - PHYSICAL EXAMINATION
CONST: Well appearing in NAD  EYES: PERRL, EOMI, Sclera and conjunctiva clear.   ENT: No nasal discharge. Oropharynx normal appearing  NECK: Non-tender, no meningeal signs. normal ROM. supple   CARD: Normal S1 S2; Normal rate and rhythm  RESP: Equal BS B/L, No wheezes, rhonchi or rales. No distress  GI: Soft, non-tender, non-distended. no cva tenderness. normal BS  MS: Normal ROM in all extremities. No midline spinal tenderness. pulses 2 +. no calf tenderness or swelling  SKIN: Warm, dry, no acute rashes. Good turgor  NEURO: Mild tremors to bilateral hands, A&Ox3, No focal deficits.

## 2023-06-25 NOTE — H&P ADULT - NSHPSOCIALHISTORY_GEN_ALL_CORE
Patient denies smoking or any illicit drug use.  Drink 1.75L of vodka every 2 days  Resides alone in an apartment

## 2023-06-25 NOTE — CHART NOTE - NSCHARTNOTEFT_GEN_A_CORE
BMP reviewed, K= 3.1, will supplement. Sodium up to 134 (was 126, corrected somewhat faster then desired- will change IV fluid to D5W at slow rate) I was actually talking to patient about her CAT scan results when this blood sample was drawn and on mentation is normal.

## 2023-06-25 NOTE — ED PROVIDER NOTE - CARE PLAN
1 Principal Discharge DX:	Alcohol use with withdrawal  Secondary Diagnosis:	Hyponatremia  Secondary Diagnosis:	Hypokalemia  Secondary Diagnosis:	Lactic acidosis

## 2023-06-25 NOTE — H&P ADULT - NSHPPHYSICALEXAM_GEN_ALL_CORE
Vital Signs Last 24 Hrs  T(C): 36.3 (25 Jun 2023 09:12), Max: 36.3 (25 Jun 2023 09:12)  T(F): 97.4 (25 Jun 2023 09:12), Max: 97.4 (25 Jun 2023 09:12)  HR: 122 (25 Jun 2023 09:12) (122 - 122)  BP: 127/85 (25 Jun 2023 09:12) (127/85 - 127/85)  RR: 18 (25 Jun 2023 09:12) (18 - 18)  SpO2: 99% (25 Jun 2023 09:12) (99% - 99%)    Parameters below as of 25 Jun 2023 09:12  Patient On (Oxygen Delivery Method): room air    GENERAL:  42y/o Female NAD, resting comfortably.  HEAD:  Atraumatic, Normocephalic  EYES: EOMI, PERRLA, conjunctiva and sclera clear  NECK: Supple, No JVD, no cervical lymphadenopathy, non-tender  CHEST/LUNG: Clear to auscultation bilaterally; No wheeze, rhonchi, or rales  HEART: Regular rate and rhythm; S1&S2  ABDOMEN: Soft, generalized ttp, Bowel sounds present  EXTREMITIES:   Peripheral Pulses Present, No clubbing, no cyanosis, or no edema, no calf tenderness  PSYCH: AAOx3, cooperative, appropriate  SKIN: WNL

## 2023-06-25 NOTE — ED PROVIDER NOTE - OBJECTIVE STATEMENT
43 year old female with pmhx of gastritis, esophagitis, GERD and alcohol dependence with prior admissions for withdrawal, presents to ed with multiple complaints. Pt complaining of shaking, abd pain,  and n/v. states she is in alcohol withdrawal. last drink was yesterday. Pt also admits to benzo use, last benzo was 5-7 days ago. pt denies ha, dizziness, visual changes, chest pain, sob, or urinary symptoms. no si or hi.

## 2023-06-25 NOTE — H&P ADULT - ASSESSMENT
Patient is a 42yo F w/ PMHx of ETOH dependence, GERD, gastritis, esophagitis presenting with generalized abdominal pain, nausea and NBNB vomiting being admitted for ETOH withdrawal and etoh ketoacidosis     #ETOH Withdrawal   #ETOH Ketoacidosis   #Transaminitis   - NS 100ml/hr   - Ativan CIWA taper   - Folic Acid 1mg   - Thiamine 100mg   - Ativan 2mg IV push prn for ETOH withdrawal lasting >2mins or two consecutive seizure without return to baseline in between   - CATCH Consult  - FU abdominal US     #Hyponatremia 126  Hypomagnesemia 1.3  #Hypokalemia - 3.3 hemolyzed   - replenished  - FU AM albs    #GERD  #Gastritis  - c/w famotidine 20mg BID    Diet: Regular  Activity: OOB   VTE PPX: Lovenox  Code Status: Full Code      Plan Discussed and approved by attending on call.

## 2023-06-25 NOTE — H&P ADULT - NSHPLABSRESULTS_GEN_ALL_CORE
14.1   10.19 )-----------( 199      ( 25 Jun 2023 09:25 )             38.9       06-25    126<L>  |  81<L>  |  11  ----------------------------<  125<H>  3.3<L>   |  23  |  1.1    Ca    9.6      25 Jun 2023 09:25  Mg     1.3     06-25    TPro  7.5  /  Alb  4.2  /  TBili  3.2<H>  /  DBili  x   /  AST  228<H>  /  ALT  176<H>  /  AlkPhos  168<H>  06-25        Urinalysis Basic - ( 25 Jun 2023 09:25 )    Color: x / Appearance: x / SG: x / pH: x  Gluc: 125 mg/dL / Ketone: x  / Bili: x / Urobili: x   Blood: x / Protein: x / Nitrite: x   Leuk Esterase: x / RBC: x / WBC x   Sq Epi: x / Non Sq Epi: x / Bacteria: x    Blood Gas Profile - Venous (06.25.23 @ 13:08)   pH, Venous: 7.45  pCO2, Venous: 46 mmHg  pO2, Venous: 32 mmHg  HCO3, Venous: 32 mmol/L  Base Excess, Venous: 6.7 mmol/L  Oxygen Saturation, Venous: 52.3 %Blood Gas Venous - Lactate (06.25.23 @ 13:08) Alcohol, Blood (06.25.23 @ 09:25)   Alcohol, Blood: <10 mg/dL    < from: Xray Chest 1 View-PORTABLE IMMEDIATE (Xray Chest 1 View-PORTABLE IMMEDIATE .) (06.25.23 @ 09:37) >  Impression:  No radiographic evidence of acute cardiopulmonary disease.  --- End of Report ---

## 2023-06-25 NOTE — ED PROVIDER NOTE - ATTENDING APP SHARED VISIT CONTRIBUTION OF CARE
43-year-old female history of alcohol use gastritis presents for ration of abdominal vomiting and shaking states she is in alcohol withdrawal.  She also endorses benzodiazepine use but last use about 5 days ago.  She denies any fever chills back pain.  Patient's last drink was around 10 PM.  He on exam patient distress S1-S2 tachycardic clear to auscultation bilaterally soft nontender nondistended mild trauma to the bilateral upper extremities  Impression   Patient here with alcohol withdrawal as well as associated hypokalemia in addition  elevated bilirubin.  Patient given Valium with improvement of withdrawal symptoms IV fluids also given with repeat lactate improving.

## 2023-06-25 NOTE — ED PROVIDER NOTE - CONSIDERATION OF ADMISSION OBSERVATION
pt with alcohol withdrawal, no evidence of DT, elevated lft, and abnormal electrolytes admit for further eval Consideration of Admission/Observation

## 2023-06-26 DIAGNOSIS — F10.239 ALCOHOL DEPENDENCE WITH WITHDRAWAL, UNSPECIFIED: ICD-10-CM

## 2023-06-26 LAB
ANION GAP SERPL CALC-SCNC: 11 MMOL/L — SIGNIFICANT CHANGE UP (ref 7–14)
BUN SERPL-MCNC: 7 MG/DL — LOW (ref 10–20)
CALCIUM SERPL-MCNC: 8.8 MG/DL — SIGNIFICANT CHANGE UP (ref 8.4–10.5)
CHLORIDE SERPL-SCNC: 102 MMOL/L — SIGNIFICANT CHANGE UP (ref 98–110)
CO2 SERPL-SCNC: 25 MMOL/L — SIGNIFICANT CHANGE UP (ref 17–32)
CREAT SERPL-MCNC: 0.6 MG/DL — LOW (ref 0.7–1.5)
EGFR: 116 ML/MIN/1.73M2 — SIGNIFICANT CHANGE UP
GLUCOSE SERPL-MCNC: 94 MG/DL — SIGNIFICANT CHANGE UP (ref 70–99)
HCT VFR BLD CALC: 31.7 % — LOW (ref 37–47)
HGB BLD-MCNC: 11.1 G/DL — LOW (ref 12–16)
MAGNESIUM SERPL-MCNC: 2.1 MG/DL — SIGNIFICANT CHANGE UP (ref 1.8–2.4)
MAGNESIUM SERPL-MCNC: 2.6 MG/DL — HIGH (ref 1.8–2.4)
MCHC RBC-ENTMCNC: 34.5 PG — HIGH (ref 27–31)
MCHC RBC-ENTMCNC: 35 G/DL — SIGNIFICANT CHANGE UP (ref 32–37)
MCV RBC AUTO: 98.4 FL — SIGNIFICANT CHANGE UP (ref 81–99)
NRBC # BLD: 0 /100 WBCS — SIGNIFICANT CHANGE UP (ref 0–0)
PHOSPHATE SERPL-MCNC: 2.1 MG/DL — SIGNIFICANT CHANGE UP (ref 2.1–4.9)
PHOSPHATE SERPL-MCNC: 2.7 MG/DL — SIGNIFICANT CHANGE UP (ref 2.1–4.9)
PLATELET # BLD AUTO: 166 K/UL — SIGNIFICANT CHANGE UP (ref 130–400)
PMV BLD: 9 FL — SIGNIFICANT CHANGE UP (ref 7.4–10.4)
POTASSIUM SERPL-MCNC: 3.7 MMOL/L — SIGNIFICANT CHANGE UP (ref 3.5–5)
POTASSIUM SERPL-SCNC: 3.7 MMOL/L — SIGNIFICANT CHANGE UP (ref 3.5–5)
RBC # BLD: 3.22 M/UL — LOW (ref 4.2–5.4)
RBC # FLD: 13.5 % — SIGNIFICANT CHANGE UP (ref 11.5–14.5)
SODIUM SERPL-SCNC: 138 MMOL/L — SIGNIFICANT CHANGE UP (ref 135–146)
WBC # BLD: 5.41 K/UL — SIGNIFICANT CHANGE UP (ref 4.8–10.8)
WBC # FLD AUTO: 5.41 K/UL — SIGNIFICANT CHANGE UP (ref 4.8–10.8)

## 2023-06-26 PROCEDURE — 36000 PLACE NEEDLE IN VEIN: CPT

## 2023-06-26 PROCEDURE — 99232 SBSQ HOSP IP/OBS MODERATE 35: CPT

## 2023-06-26 PROCEDURE — 99252 IP/OBS CONSLTJ NEW/EST SF 35: CPT

## 2023-06-26 PROCEDURE — 76700 US EXAM ABDOM COMPLETE: CPT | Mod: 26

## 2023-06-26 RX ORDER — KETOROLAC TROMETHAMINE 30 MG/ML
30 SYRINGE (ML) INJECTION ONCE
Refills: 0 | Status: DISCONTINUED | OUTPATIENT
Start: 2023-06-26 | End: 2023-06-26

## 2023-06-26 RX ADMIN — Medication 20 MILLIEQUIVALENT(S): at 00:25

## 2023-06-26 RX ADMIN — Medication 1.5 MILLIGRAM(S): at 21:32

## 2023-06-26 RX ADMIN — Medication 1 MILLIGRAM(S): at 11:44

## 2023-06-26 RX ADMIN — Medication 20 MILLIEQUIVALENT(S): at 01:47

## 2023-06-26 RX ADMIN — ONDANSETRON 4 MILLIGRAM(S): 8 TABLET, FILM COATED ORAL at 21:33

## 2023-06-26 RX ADMIN — Medication 30 MILLILITER(S): at 11:59

## 2023-06-26 RX ADMIN — Medication 30 MILLIGRAM(S): at 20:57

## 2023-06-26 RX ADMIN — Medication 1.5 MILLIGRAM(S): at 17:59

## 2023-06-26 RX ADMIN — Medication 2 MILLIGRAM(S): at 06:22

## 2023-06-26 RX ADMIN — Medication 1.5 MILLIGRAM(S): at 14:20

## 2023-06-26 RX ADMIN — Medication 2 MILLIGRAM(S): at 10:27

## 2023-06-26 RX ADMIN — Medication 30 MILLIGRAM(S): at 21:43

## 2023-06-26 RX ADMIN — Medication 2 MILLIGRAM(S): at 01:46

## 2023-06-26 RX ADMIN — FAMOTIDINE 20 MILLIGRAM(S): 10 INJECTION INTRAVENOUS at 17:59

## 2023-06-26 RX ADMIN — FAMOTIDINE 20 MILLIGRAM(S): 10 INJECTION INTRAVENOUS at 06:24

## 2023-06-26 RX ADMIN — Medication 100 MILLIGRAM(S): at 11:45

## 2023-06-26 RX ADMIN — Medication 1 TABLET(S): at 11:45

## 2023-06-26 NOTE — CONSULT NOTE ADULT - SUBJECTIVE AND OBJECTIVE BOX
Pt interviewed, examined and EMR chart reviewed.  Pt admits to drinking 1 liter per vodka per day  x 2   months. Last Drink yesterday  Hx of withdrawal tremors and seizures...No DTs  variable periods of sobriety in the past.  Has been in detox before __X___yes,   _____No  Pt continues on RX for Ativan 1mg bid from PCP.    SOCIAL HISTORY:    REVIEW OF SYSTEMS:    Constitutional: No fever, weight loss or fatigue  ENT:  No difficulty hearing, tinnitus, vertigo; No sinus or throat pain  Neck: No pain or stiffness  Respiratory: No cough, wheezing, chills or hemoptysis  Cardiovascular: No chest pain, palpitations, shortness of breath, dizziness or leg swelling  Gastrointestinal: No abdominal or epigastric pain. No nausea, vomiting or hematemesis; No diarrhea or constipation. No melena or hematochezia.  Neurological: No headaches, memory loss, loss of strength, numbness or tremors  Musculoskeletal: No joint pain or swelling; No muscle, back or extremity pain  Psychiatric: No depression, anxiety, mood swings or difficulty sleeping    MEDICATIONS  (STANDING):  chlorhexidine 2% Cloths 1 Application(s) Topical <User Schedule>  dextrose 5%. 1000 milliLiter(s) (30 mL/Hr) IV Continuous <Continuous>  enoxaparin Injectable 40 milliGRAM(s) SubCutaneous every 24 hours  famotidine    Tablet 20 milliGRAM(s) Oral two times a day  folic acid 1 milliGRAM(s) Oral daily  LORazepam     Tablet 1.5 milliGRAM(s) Oral every 4 hours  LORazepam     Tablet   Oral   multivitamin 1 Tablet(s) Oral daily  thiamine 100 milliGRAM(s) Oral daily    MEDICATIONS  (PRN):  acetaminophen     Tablet .. 650 milliGRAM(s) Oral every 6 hours PRN Temp greater or equal to 38C (100.4F), Mild Pain (1 - 3)  aluminum hydroxide/magnesium hydroxide/simethicone Suspension 30 milliLiter(s) Oral every 4 hours PRN Dyspepsia  melatonin 3 milliGRAM(s) Oral at bedtime PRN Insomnia  ondansetron Injectable 4 milliGRAM(s) IV Push every 8 hours PRN Nausea and/or Vomiting      Vital Signs Last 24 Hrs  T(C): 36.4 (26 Jun 2023 05:20), Max: 36.9 (25 Jun 2023 21:49)  T(F): 97.5 (26 Jun 2023 05:20), Max: 98.4 (25 Jun 2023 21:49)  HR: 80 (26 Jun 2023 05:20) (80 - 88)  BP: 106/63 (26 Jun 2023 05:20) (106/63 - 129/79)  BP(mean): --  RR: 18 (26 Jun 2023 05:20) (18 - 18)  SpO2: 98% (26 Jun 2023 05:20) (98% - 98%)    Parameters below as of 26 Jun 2023 05:20  Patient On (Oxygen Delivery Method): room air        PHYSICAL EXAM:    Constitutional: NAD, well-groomed, well-developed  HEENT: PERRLA, EOMI, Normal Hearing, MMM  Neck: No LAD, No JVD  Back: Normal spine flexure, No CVA tenderness  Respiratory: CTAB/L  Cardiovascular: S1 and S2, RRR, no M/G/R  Gastrointestinal: BS+, soft, NT/ND  Extremities: No peripheral edema  Vascular: 2+ peripheral pulses  Neurological: A/O x 3, no focal deficits, mild tremors    LABS:                        11.1   5.41  )-----------( 166      ( 26 Jun 2023 09:15 )             31.7     06-26    138  |  102  |  7<L>  ----------------------------<  94  3.7   |  25  |  0.6<L>    Ca    8.8      26 Jun 2023 09:15  Phos  2.7     06-26  Mg     2.1     06-26    TPro  7.5  /  Alb  4.2  /  TBili  3.2<H>  /  DBili  x   /  AST  228<H>  /  ALT  176<H>  /  AlkPhos  168<H>  06-25      Urinalysis Basic - ( 26 Jun 2023 09:15 )    Color: x / Appearance: x / SG: x / pH: x  Gluc: 94 mg/dL / Ketone: x  / Bili: x / Urobili: x   Blood: x / Protein: x / Nitrite: x   Leuk Esterase: x / RBC: x / WBC x   Sq Epi: x / Non Sq Epi: x / Bacteria: x      Drug Screen Urine:  Alcohol Level  Alcohol, Blood: <10 mg/dL (06-25-23 @ 09:25)        RADIOLOGY & ADDITIONAL STUDIES:

## 2023-06-26 NOTE — CONSULT NOTE ADULT - PROBLEM SELECTOR RECOMMENDATION 9
After evaluation at this time would adjust ativan protocol. Pt should be on Thiamine and Folic acid. Pt will be monitored and supportive care provided.  Obtain UDS if not done already.  Use of Vistaril for anxiety.  Consider adding gabapentin for anxiety standing order and follow up with PMD/Program for continuation of treatment.    Pt should have coordination of care with PMD for ativan rx and alcohol dependency.     Abdominal ultrasound AFP every 6 months for HCC screening  -EGD outpatient for esophageal varices screening   -Follow up with Private GI or our [facility] located at [address] [place] [address]. Phone Number: [phone]  -Alcohol counseling provided   CATCH team involved for aftercare and pt will follow up with aftercare to possible outpatient and/or detention house.

## 2023-06-26 NOTE — PATIENT PROFILE ADULT - FALL HARM RISK - HARM RISK INTERVENTIONS
Assistance with ambulation/Assistance OOB with selected safe patient handling equipment/Communicate Risk of Fall with Harm to all staff/Monitor for mental status changes/Monitor gait and stability/Reinforce activity limits and safety measures with patient and family/Tailored Fall Risk Interventions/Toileting schedule using arm’s reach rule for commode and bathroom/Use of alarms - bed, chair and/or voice tab/Visual Cue: Yellow wristband and red socks/Bed in lowest position, wheels locked, appropriate side rails in place/Call bell, personal items and telephone in reach/Instruct patient to call for assistance before getting out of bed or chair/Non-slip footwear when patient is out of bed/[place] to call system/Physically safe environment - no spills, clutter or unnecessary equipment/Purposeful Proactive Rounding/Room/bathroom lighting operational, light cord in reach

## 2023-06-26 NOTE — CHART NOTE - NSCHARTNOTEFT_GEN_A_CORE
Called for magnesium level of greater then 2, will repeat in am Called for magnesium level of greater then 2 (2.6), will repeat in am

## 2023-06-27 LAB
ALBUMIN SERPL ELPH-MCNC: 3.1 G/DL — LOW (ref 3.5–5.2)
ALBUMIN SERPL ELPH-MCNC: 3.3 G/DL — LOW (ref 3.5–5.2)
ALP SERPL-CCNC: 106 U/L — SIGNIFICANT CHANGE UP (ref 30–115)
ALP SERPL-CCNC: 111 U/L — SIGNIFICANT CHANGE UP (ref 30–115)
ALT FLD-CCNC: 81 U/L — HIGH (ref 0–41)
ALT FLD-CCNC: 86 U/L — HIGH (ref 0–41)
ANION GAP SERPL CALC-SCNC: 10 MMOL/L — SIGNIFICANT CHANGE UP (ref 7–14)
ANION GAP SERPL CALC-SCNC: 14 MMOL/L — SIGNIFICANT CHANGE UP (ref 7–14)
AST SERPL-CCNC: 103 U/L — HIGH (ref 0–41)
AST SERPL-CCNC: 106 U/L — HIGH (ref 0–41)
BASOPHILS # BLD AUTO: 0.03 K/UL — SIGNIFICANT CHANGE UP (ref 0–0.2)
BASOPHILS NFR BLD AUTO: 0.6 % — SIGNIFICANT CHANGE UP (ref 0–1)
BILIRUB SERPL-MCNC: 0.6 MG/DL — SIGNIFICANT CHANGE UP (ref 0.2–1.2)
BILIRUB SERPL-MCNC: 0.7 MG/DL — SIGNIFICANT CHANGE UP (ref 0.2–1.2)
BUN SERPL-MCNC: 7 MG/DL — LOW (ref 10–20)
BUN SERPL-MCNC: 7 MG/DL — LOW (ref 10–20)
CALCIUM SERPL-MCNC: 8.7 MG/DL — SIGNIFICANT CHANGE UP (ref 8.4–10.5)
CALCIUM SERPL-MCNC: 8.8 MG/DL — SIGNIFICANT CHANGE UP (ref 8.4–10.5)
CHLORIDE SERPL-SCNC: 103 MMOL/L — SIGNIFICANT CHANGE UP (ref 98–110)
CHLORIDE SERPL-SCNC: 105 MMOL/L — SIGNIFICANT CHANGE UP (ref 98–110)
CO2 SERPL-SCNC: 21 MMOL/L — SIGNIFICANT CHANGE UP (ref 17–32)
CO2 SERPL-SCNC: 25 MMOL/L — SIGNIFICANT CHANGE UP (ref 17–32)
CREAT SERPL-MCNC: 0.6 MG/DL — LOW (ref 0.7–1.5)
CREAT SERPL-MCNC: 0.6 MG/DL — LOW (ref 0.7–1.5)
EGFR: 116 ML/MIN/1.73M2 — SIGNIFICANT CHANGE UP
EGFR: 116 ML/MIN/1.73M2 — SIGNIFICANT CHANGE UP
EOSINOPHIL # BLD AUTO: 0.09 K/UL — SIGNIFICANT CHANGE UP (ref 0–0.7)
EOSINOPHIL NFR BLD AUTO: 1.8 % — SIGNIFICANT CHANGE UP (ref 0–8)
GLUCOSE SERPL-MCNC: 80 MG/DL — SIGNIFICANT CHANGE UP (ref 70–99)
GLUCOSE SERPL-MCNC: 97 MG/DL — SIGNIFICANT CHANGE UP (ref 70–99)
HCT VFR BLD CALC: 29.3 % — LOW (ref 37–47)
HGB BLD-MCNC: 10 G/DL — LOW (ref 12–16)
IMM GRANULOCYTES NFR BLD AUTO: 0.4 % — HIGH (ref 0.1–0.3)
LYMPHOCYTES # BLD AUTO: 1.63 K/UL — SIGNIFICANT CHANGE UP (ref 1.2–3.4)
LYMPHOCYTES # BLD AUTO: 32.3 % — SIGNIFICANT CHANGE UP (ref 20.5–51.1)
MAGNESIUM SERPL-MCNC: 1.7 MG/DL — LOW (ref 1.8–2.4)
MCHC RBC-ENTMCNC: 34.1 G/DL — SIGNIFICANT CHANGE UP (ref 32–37)
MCHC RBC-ENTMCNC: 34.5 PG — HIGH (ref 27–31)
MCV RBC AUTO: 101 FL — HIGH (ref 81–99)
MONOCYTES # BLD AUTO: 0.54 K/UL — SIGNIFICANT CHANGE UP (ref 0.1–0.6)
MONOCYTES NFR BLD AUTO: 10.7 % — HIGH (ref 1.7–9.3)
NEUTROPHILS # BLD AUTO: 2.73 K/UL — SIGNIFICANT CHANGE UP (ref 1.4–6.5)
NEUTROPHILS NFR BLD AUTO: 54.2 % — SIGNIFICANT CHANGE UP (ref 42.2–75.2)
NRBC # BLD: 0 /100 WBCS — SIGNIFICANT CHANGE UP (ref 0–0)
PHOSPHATE SERPL-MCNC: 3.2 MG/DL — SIGNIFICANT CHANGE UP (ref 2.1–4.9)
PLATELET # BLD AUTO: 155 K/UL — SIGNIFICANT CHANGE UP (ref 130–400)
PMV BLD: 9.5 FL — SIGNIFICANT CHANGE UP (ref 7.4–10.4)
POTASSIUM SERPL-MCNC: 3.9 MMOL/L — SIGNIFICANT CHANGE UP (ref 3.5–5)
POTASSIUM SERPL-MCNC: 4.1 MMOL/L — SIGNIFICANT CHANGE UP (ref 3.5–5)
POTASSIUM SERPL-SCNC: 3.9 MMOL/L — SIGNIFICANT CHANGE UP (ref 3.5–5)
POTASSIUM SERPL-SCNC: 4.1 MMOL/L — SIGNIFICANT CHANGE UP (ref 3.5–5)
PROT SERPL-MCNC: 5.5 G/DL — LOW (ref 6–8)
PROT SERPL-MCNC: 5.7 G/DL — LOW (ref 6–8)
RBC # BLD: 2.9 M/UL — LOW (ref 4.2–5.4)
RBC # FLD: 13.9 % — SIGNIFICANT CHANGE UP (ref 11.5–14.5)
SODIUM SERPL-SCNC: 138 MMOL/L — SIGNIFICANT CHANGE UP (ref 135–146)
SODIUM SERPL-SCNC: 140 MMOL/L — SIGNIFICANT CHANGE UP (ref 135–146)
WBC # BLD: 5.04 K/UL — SIGNIFICANT CHANGE UP (ref 4.8–10.8)
WBC # FLD AUTO: 5.04 K/UL — SIGNIFICANT CHANGE UP (ref 4.8–10.8)

## 2023-06-27 PROCEDURE — 99232 SBSQ HOSP IP/OBS MODERATE 35: CPT

## 2023-06-27 PROCEDURE — 99231 SBSQ HOSP IP/OBS SF/LOW 25: CPT

## 2023-06-27 RX ADMIN — Medication 1 TABLET(S): at 12:58

## 2023-06-27 RX ADMIN — FAMOTIDINE 20 MILLIGRAM(S): 10 INJECTION INTRAVENOUS at 18:15

## 2023-06-27 RX ADMIN — FAMOTIDINE 20 MILLIGRAM(S): 10 INJECTION INTRAVENOUS at 06:50

## 2023-06-27 RX ADMIN — Medication 1.5 MILLIGRAM(S): at 01:38

## 2023-06-27 RX ADMIN — Medication 1 MILLIGRAM(S): at 12:57

## 2023-06-27 RX ADMIN — ONDANSETRON 4 MILLIGRAM(S): 8 TABLET, FILM COATED ORAL at 21:13

## 2023-06-27 RX ADMIN — Medication 1 MILLIGRAM(S): at 14:33

## 2023-06-27 RX ADMIN — Medication 100 MILLIGRAM(S): at 14:33

## 2023-06-27 RX ADMIN — Medication 30 MILLILITER(S): at 18:18

## 2023-06-27 RX ADMIN — Medication 3 MILLIGRAM(S): at 21:10

## 2023-06-27 RX ADMIN — Medication 1.5 MILLIGRAM(S): at 06:51

## 2023-06-27 RX ADMIN — Medication 1 MILLIGRAM(S): at 21:12

## 2023-06-27 RX ADMIN — Medication 1 MILLIGRAM(S): at 09:44

## 2023-06-27 RX ADMIN — Medication 1 MILLIGRAM(S): at 18:15

## 2023-06-27 NOTE — PROGRESS NOTE ADULT - PROBLEM SELECTOR PLAN 1
After evaluation at this time continue current protocol. Pts concerns addressed  Pt will be monitored and supportive care provided.  No other changes to medical care plan for withdrawals.  Monitor labs/electrolytes as needed.    -Counseling provided   CATCH team involved for aftercare and pt will follow up with aftercare. After evaluation at this time continue current protocol. Pts concerns addressed  Pt will be monitored and supportive care provided.  No other changes to medical care plan for withdrawals.  Monitor labs/electrolytes as needed.    -Counseling provided   CATCH team involved for aftercare and pt will follow up with aftercare to outpatient and then possibly rehab

## 2023-06-27 NOTE — PROGRESS NOTE ADULT - ASSESSMENT
Patient is a 44yo F w/ PMHx of ETOH dependence, GERD, gastritis, esophagitis presenting with generalized abdominal pain, nausea and NBNB vomiting being admitted for ETOH withdrawal and etoh ketoacidosis     Alchohol Withdrawal with Ketoacidosis   Complicated by Transaminitis  Electrolyte derangement:  Hypokalemia/hypomag/hyponatremia   Suspected Thaimine and Folic acid Deficiencey   Ativan Taper pending addiction medicine eval   Hyponatremia and lactate resolved with IVF   Supportive care, monitor for DT's   Monitor electrolytes and replete     Abdominal pain N/V: secondary  to above , Lactate wnl   H/O GERD with esophagitis: c/w H2 blocker      VTE PPX: Lovenox  GI PPX: H2 blocker   Code Status: Full Code    Dispo: From Home   ***States she is being evicted***  Pending Ativan Taper           
Patient is a 44yo F w/ PMHx of ETOH dependence, GERD, gastritis, esophagitis presenting with generalized abdominal pain, nausea and NBNB vomiting being admitted for ETOH withdrawal and etoh ketoacidosis     Alchohol Withdrawal with Ketoacidosis   Complicated by Transaminitis  Electrolyte derangement on admission: Hypokalemia/hypomag/hyponatremia   Suspected Thaimine and Folic acid Deficiency   Addiction medicine eval appreciated, c/w Ativan taper, ends 6/27  Hyponatremia and lactate resolved with IVF   RUQ Sono: Hepatic Steatosis   Supportive care, monitor for DT's   Monitor electrolytes and replete   ETOH Counselling provided    Abdominal pain N/V: secondary  to above , Lactate wnl   H/O GERD with esophagitis: c/w H2 blocker      VTE PPX: Lovenox  GI PPX: H2 blocker   Code Status: Full Code    Dispo: From Home   D/C Pending Ativan Taper completion 6/27

## 2023-06-28 LAB
ALBUMIN SERPL ELPH-MCNC: 3.2 G/DL — LOW (ref 3.5–5.2)
ALP SERPL-CCNC: 108 U/L — SIGNIFICANT CHANGE UP (ref 30–115)
ALT FLD-CCNC: 72 U/L — HIGH (ref 0–41)
ANION GAP SERPL CALC-SCNC: 11 MMOL/L — SIGNIFICANT CHANGE UP (ref 7–14)
AST SERPL-CCNC: 79 U/L — HIGH (ref 0–41)
BILIRUB SERPL-MCNC: 0.5 MG/DL — SIGNIFICANT CHANGE UP (ref 0.2–1.2)
BUN SERPL-MCNC: 6 MG/DL — LOW (ref 10–20)
CALCIUM SERPL-MCNC: 8.5 MG/DL — SIGNIFICANT CHANGE UP (ref 8.4–10.5)
CHLORIDE SERPL-SCNC: 102 MMOL/L — SIGNIFICANT CHANGE UP (ref 98–110)
CO2 SERPL-SCNC: 23 MMOL/L — SIGNIFICANT CHANGE UP (ref 17–32)
CREAT SERPL-MCNC: 0.7 MG/DL — SIGNIFICANT CHANGE UP (ref 0.7–1.5)
EGFR: 111 ML/MIN/1.73M2 — SIGNIFICANT CHANGE UP
GLUCOSE SERPL-MCNC: 89 MG/DL — SIGNIFICANT CHANGE UP (ref 70–99)
POTASSIUM SERPL-MCNC: 4 MMOL/L — SIGNIFICANT CHANGE UP (ref 3.5–5)
POTASSIUM SERPL-SCNC: 4 MMOL/L — SIGNIFICANT CHANGE UP (ref 3.5–5)
PROT SERPL-MCNC: 5.7 G/DL — LOW (ref 6–8)
SODIUM SERPL-SCNC: 136 MMOL/L — SIGNIFICANT CHANGE UP (ref 135–146)

## 2023-06-28 PROCEDURE — 99231 SBSQ HOSP IP/OBS SF/LOW 25: CPT

## 2023-06-28 PROCEDURE — 99232 SBSQ HOSP IP/OBS MODERATE 35: CPT

## 2023-06-28 RX ORDER — HYDROXYZINE HCL 10 MG
25 TABLET ORAL ONCE
Refills: 0 | Status: COMPLETED | OUTPATIENT
Start: 2023-06-28 | End: 2023-06-28

## 2023-06-28 RX ADMIN — Medication 30 MILLILITER(S): at 11:51

## 2023-06-28 RX ADMIN — Medication 0.5 MILLIGRAM(S): at 17:27

## 2023-06-28 RX ADMIN — Medication 1 TABLET(S): at 11:43

## 2023-06-28 RX ADMIN — Medication 0.5 MILLIGRAM(S): at 14:39

## 2023-06-28 RX ADMIN — FAMOTIDINE 20 MILLIGRAM(S): 10 INJECTION INTRAVENOUS at 05:17

## 2023-06-28 RX ADMIN — Medication 1 MILLIGRAM(S): at 05:17

## 2023-06-28 RX ADMIN — FAMOTIDINE 20 MILLIGRAM(S): 10 INJECTION INTRAVENOUS at 17:27

## 2023-06-28 RX ADMIN — Medication 0.5 MILLIGRAM(S): at 22:25

## 2023-06-28 RX ADMIN — Medication 1 MILLIGRAM(S): at 11:43

## 2023-06-28 RX ADMIN — Medication 25 MILLIGRAM(S): at 22:25

## 2023-06-28 RX ADMIN — Medication 0.5 MILLIGRAM(S): at 10:00

## 2023-06-28 RX ADMIN — Medication 100 MILLIGRAM(S): at 11:43

## 2023-06-28 RX ADMIN — ONDANSETRON 4 MILLIGRAM(S): 8 TABLET, FILM COATED ORAL at 09:10

## 2023-06-28 NOTE — PROGRESS NOTE ADULT - PROBLEM SELECTOR PLAN 1
After evaluation at this time continue current protocol. Pts concerns addressed  Pt will be monitored and supportive care provided.  No other changes to medical care plan for withdrawals.  Monitor labs/electrolytes as needed.    -Counseling provided   CATCH team involved for aftercare and pt will follow up with aftercare. for outpatient.

## 2023-06-28 NOTE — PROGRESS NOTE ADULT - SUBJECTIVE AND OBJECTIVE BOX
Progress note    INTERVAL HPI/OVERNIGHT EVENTS:    Patient seen and examined at bedside. No acute distress. She admits to nonspecific abd point. Passing flatus with loose BM's yesterday.      REVIEW OF SYSTEMS:  All other 13 Review of systems were reviewed and are negative    FAMILY HISTORY:  Benzodiazepine misuse      T(C): 35.9 (06-28-23 @ 14:45), Max: 36.2 (06-27-23 @ 21:30)  HR: 62 (06-28-23 @ 14:45) (62 - 81)  BP: 145/94 (06-28-23 @ 14:45) (131/90 - 145/94)  RR: 18 (06-28-23 @ 14:45) (18 - 18)  SpO2: 98% (06-28-23 @ 14:45) (97% - 98%)  Wt(kg): --Vital Signs Last 24 Hrs  T(C): 35.9 (28 Jun 2023 14:45), Max: 36.2 (27 Jun 2023 21:30)  T(F): 96.7 (28 Jun 2023 14:45), Max: 97.1 (27 Jun 2023 21:30)  HR: 62 (28 Jun 2023 14:45) (62 - 81)  BP: 145/94 (28 Jun 2023 14:45) (131/90 - 145/94)  BP(mean): --  RR: 18 (28 Jun 2023 14:45) (18 - 18)  SpO2: 98% (28 Jun 2023 14:45) (97% - 98%)    Parameters below as of 28 Jun 2023 14:45  Patient On (Oxygen Delivery Method): room air      No Known Allergies      PHYSICAL EXAM:  GENERAL: NAD, well-groomed, well-developed  HEAD:  Atraumatic, Normocephalic  EYES: EOMI, PERRLA, conjunctiva and sclera clear  ENMT: No tonsillar erythema, exudates, or enlargement; Moist mucous membranes, Good dentition, No lesions  NECK: Supple, No JVD, Normal thyroid  NERVOUS SYSTEM:  Alert & Oriented X3, Good concentration; Motor Strength 5/5 B/L upper and lower extremities; DTRs 2+ intact and symmetric  CHEST/LUNG: Clear to percussion bilaterally; No rales, rhonchi, wheezing, or rubs  HEART: Regular rate and rhythm; No murmurs, rubs, or gallops  ABDOMEN: Soft, Nontender, Nondistended; Bowel sounds present  EXTREMITIES:  2+ Peripheral Pulses, No clubbing, cyanosis, or edema  LYMPH: No lymphadenopathy noted  SKIN: No rashes or lesions    Consultant(s) Notes Reviewed:  [x ] YES  [ ] NO  Care Discussed with Consultants/Other Providers [ x] YES  [ ] NO    LABS:      RADIOLOGY & ADDITIONAL TESTS:    Imaging Personally Reviewed:  [ ] YES  [ ] NO  acetaminophen     Tablet .. 650 milliGRAM(s) Oral every 6 hours PRN  aluminum hydroxide/magnesium hydroxide/simethicone Suspension 30 milliLiter(s) Oral every 4 hours PRN  bismuth subsalicylate Liquid 30 milliLiter(s) Oral every 4 hours PRN  chlorhexidine 2% Cloths 1 Application(s) Topical <User Schedule>  enoxaparin Injectable 40 milliGRAM(s) SubCutaneous every 24 hours  famotidine    Tablet 20 milliGRAM(s) Oral two times a day  folic acid 1 milliGRAM(s) Oral daily  LORazepam     Tablet   Oral   LORazepam     Tablet 0.5 milliGRAM(s) Oral every 4 hours  melatonin 3 milliGRAM(s) Oral at bedtime PRN  multivitamin 1 Tablet(s) Oral daily  ondansetron Injectable 4 milliGRAM(s) IV Push every 8 hours PRN      HEALTH ISSUES - PROBLEM Dx:  Alcohol dependence with withdrawal    Patient is a 44yo F w/ PMHx of ETOH dependence, GERD, gastritis, esophagitis presenting with generalized abdominal pain, nausea and NBNB vomiting being admitted for ETOH withdrawal and etoh ketoacidosis     Alchohol Withdrawal with Ketoacidosis   Complicated by Transaminitis  Electrolyte derangement on admission: Hypokalemia/hypomag/hyponatremia   Suspected Thaimine and Folic acid Deficiency   Addiction medicine eval appreciated, c/w Ativan taper, ends 6/29  Hyponatremia and lactate resolved with IVF   RUQ Sono: Hepatic Steatosis   Supportive care, monitor for DT's   Monitor electrolytes and replete   ETOH Counselling provided    Abdominal pain N/V: secondary  to above , Lactate wnl   H/O GERD with esophagitis: c/w H2 blocker      VTE PPX: Lovenox  GI PPX: H2 blocker   Code Status: Full Code    Dispo: From Home   D/C Pending Ativan Taper completion 6/29    Total time spent to complete patient's bedside assessment, review medical chart, discuss medical plan of care with covering medical team was ____35____ with > 50% of time spent face to face w/ patient, discussion with patient/family and/or coordination of care
Patient is a 43y old  Female who presents with a chief complaint of ETOH Withdrawal      MEDICATIONS  (STANDING):  chlorhexidine 2% Cloths 1 Application(s) Topical <User Schedule>  dextrose 5%. 1000 milliLiter(s) (30 mL/Hr) IV Continuous <Continuous>  enoxaparin Injectable 40 milliGRAM(s) SubCutaneous every 24 hours  famotidine    Tablet 20 milliGRAM(s) Oral two times a day  folic acid 1 milliGRAM(s) Oral daily  LORazepam     Tablet 1.5 milliGRAM(s) Oral every 4 hours  LORazepam     Tablet   Oral   LORazepam     Tablet 2 milliGRAM(s) Oral every 4 hours  multivitamin 1 Tablet(s) Oral daily  thiamine 100 milliGRAM(s) Oral daily    MEDICATIONS  (PRN):  acetaminophen     Tablet .. 650 milliGRAM(s) Oral every 6 hours PRN Temp greater or equal to 38C (100.4F), Mild Pain (1 - 3)  aluminum hydroxide/magnesium hydroxide/simethicone Suspension 30 milliLiter(s) Oral every 4 hours PRN Dyspepsia  melatonin 3 milliGRAM(s) Oral at bedtime PRN Insomnia  ondansetron Injectable 4 milliGRAM(s) IV Push every 8 hours PRN Nausea and/or Vomiting      CAPILLARY BLOOD GLUCOSE  I&O's Summary      PHYSICAL EXAM:  Vital Signs Last 24 Hrs  T(C): 36.4 (26 Jun 2023 05:20), Max: 36.9 (25 Jun 2023 21:49)  T(F): 97.5 (26 Jun 2023 05:20), Max: 98.4 (25 Jun 2023 21:49)  HR: 80 (26 Jun 2023 05:20) (80 - 88)  BP: 106/63 (26 Jun 2023 05:20) (106/63 - 129/79)  BP(mean): --  RR: 18 (26 Jun 2023 05:20) (18 - 18)  SpO2: 98% (26 Jun 2023 05:20) (98% - 98%)    Parameters below as of 26 Jun 2023 05:20  Patient On (Oxygen Delivery Method): room air        GENERAL: No acute distress, well-developed  HEAD:  Atraumatic, Normocephalic  EYES: EOMI, PERRLA, conjunctiva and sclera clear  NECK: Supple, no lymphadenopathy, no JVD  CHEST/LUNG: CTAB; No wheezes, rales, or rhonchi  HEART: Regular rate and rhythm; No murmurs, rubs, or gallops  ABDOMEN: Soft, non-tender, non-distended; normal bowel sounds, no organomegaly  EXTREMITIES:  2+ peripheral pulses b/l, No clubbing, cyanosis, or edema  NEUROLOGY: A&O x 3, no focal deficits  SKIN: No rashes or lesions    LABS:                        14.1   10.19 )-----------( 199      ( 25 Jun 2023 09:25 )             38.9     06-25    134<L>  |  97<L>  |  10  ----------------------------<  92  3.1<L>   |  25  |  0.7    Ca    8.3<L>      25 Jun 2023 21:22  Phos  2.1     06-25  Mg     2.6     06-25    TPro  7.5  /  Alb  4.2  /  TBili  3.2<H>  /  DBili  x   /  AST  228<H>  /  ALT  176<H>  /  AlkPhos  168<H>  06-25      Urinalysis Basic - ( 25 Jun 2023 21:22 )    Color: x / Appearance: x / SG: x / pH: x  Gluc: 92 mg/dL / Ketone: x  / Bili: x / Urobili: x   Blood: x / Protein: x / Nitrite: x   Leuk Esterase: x / RBC: x / WBC x   Sq Epi: x / Non Sq Epi: x / Bacteria: x      
Patient is a 43y old  Female who presents with a chief complaint of ETOH Withdrawal      MEDICATIONS  (STANDING):  chlorhexidine 2% Cloths 1 Application(s) Topical <User Schedule>  enoxaparin Injectable 40 milliGRAM(s) SubCutaneous every 24 hours  famotidine    Tablet 20 milliGRAM(s) Oral two times a day  folic acid 1 milliGRAM(s) Oral daily  LORazepam     Tablet   Oral   LORazepam     Tablet 1 milliGRAM(s) Oral every 4 hours  multivitamin 1 Tablet(s) Oral daily  thiamine 100 milliGRAM(s) Oral daily    MEDICATIONS  (PRN):  acetaminophen     Tablet .. 650 milliGRAM(s) Oral every 6 hours PRN Temp greater or equal to 38C (100.4F), Mild Pain (1 - 3)  aluminum hydroxide/magnesium hydroxide/simethicone Suspension 30 milliLiter(s) Oral every 4 hours PRN Dyspepsia  melatonin 3 milliGRAM(s) Oral at bedtime PRN Insomnia  ondansetron Injectable 4 milliGRAM(s) IV Push every 8 hours PRN Nausea and/or Vomiting        I&O's Summary    26 Jun 2023 07:01  -  27 Jun 2023 07:00  --------------------------------------------------------  IN: 540 mL / OUT: 0 mL / NET: 540 mL        PHYSICAL EXAM:  Vital Signs Last 24 Hrs  T(C): 35.9 (27 Jun 2023 04:36), Max: 35.9 (26 Jun 2023 21:15)  T(F): 96.7 (27 Jun 2023 04:36), Max: 96.7 (26 Jun 2023 21:15)  HR: 69 (27 Jun 2023 04:36) (69 - 80)  BP: 105/62 (27 Jun 2023 04:36) (105/62 - 121/75)  BP(mean): --  RR: 16 (27 Jun 2023 04:36) (16 - 17)  SpO2: 96% (27 Jun 2023 04:36) (96% - 99%)    Parameters below as of 27 Jun 2023 04:36  Patient On (Oxygen Delivery Method): room air        GENERAL: No acute distress, well-developed  HEAD:  Atraumatic, Normocephalic  EYES: EOMI, PERRLA, conjunctiva and sclera clear  NECK: Supple, no lymphadenopathy, no JVD  CHEST/LUNG: CTAB; No wheezes, rales, or rhonchi  HEART: Regular rate and rhythm; No murmurs, rubs, or gallops  ABDOMEN: Soft, Improved pain   EXTREMITIES:  2+ peripheral pulses b/l, No clubbing, cyanosis, or edema  NEUROLOGY: A&O x 3, no focal deficits, tremulousness resolved   SKIN: No rashes or lesions    LABS:                        10.0   5.04  )-----------( 155      ( 27 Jun 2023 07:09 )             29.3     06-26    138  |  102  |  7<L>  ----------------------------<  94  3.7   |  25  |  0.6<L>    Ca    8.8      26 Jun 2023 09:15  Phos  2.7     06-26  Mg     2.1     06-26    TPro  7.5  /  Alb  4.2  /  TBili  3.2<H>  /  DBili  x   /  AST  228<H>  /  ALT  176<H>  /  AlkPhos  168<H>  06-25          Urinalysis Basic - ( 26 Jun 2023 09:15 )    Color: x / Appearance: x / SG: x / pH: x  Gluc: 94 mg/dL / Ketone: x  / Bili: x / Urobili: x   Blood: x / Protein: x / Nitrite: x   Leuk Esterase: x / RBC: x / WBC x   Sq Epi: x / Non Sq Epi: x / Bacteria: x          
    Pt interviewed, examined and EMR chart reviewed.    Follow up of Alcohol Dependency. Pt is on Ativan protocol. Pt is doing better.  Pt with complaint of some anxiety and continues with GI upset      REVIEW OF SYSTEMS:    Constitutional: No fever, weight loss or fatigue  ENT:  No difficulty hearing, tinnitus, vertigo; No sinus or throat pain  Neck: No pain or stiffness  Respiratory: No cough, wheezing, chills or hemoptysis  Cardiovascular: No chest pain, palpitations, shortness of breath, dizziness or leg swelling  Gastrointestinal: No abdominal or epigastric pain. No nausea, vomiting or hematemesis; No diarrhea or constipation. No melena or hematochezia.  Neurological: No headaches, memory loss, loss of strength, numbness or tremors  Musculoskeletal: No joint pain or swelling; No muscle, back or extremity pain      MEDICATIONS  (STANDING):  chlorhexidine 2% Cloths 1 Application(s) Topical <User Schedule>  enoxaparin Injectable 40 milliGRAM(s) SubCutaneous every 24 hours  famotidine    Tablet 20 milliGRAM(s) Oral two times a day  folic acid 1 milliGRAM(s) Oral daily  LORazepam     Tablet   Oral   LORazepam     Tablet 1 milliGRAM(s) Oral every 4 hours  multivitamin 1 Tablet(s) Oral daily  thiamine 100 milliGRAM(s) Oral daily    MEDICATIONS  (PRN):  acetaminophen     Tablet .. 650 milliGRAM(s) Oral every 6 hours PRN Temp greater or equal to 38C (100.4F), Mild Pain (1 - 3)  aluminum hydroxide/magnesium hydroxide/simethicone Suspension 30 milliLiter(s) Oral every 4 hours PRN Dyspepsia  melatonin 3 milliGRAM(s) Oral at bedtime PRN Insomnia  ondansetron Injectable 4 milliGRAM(s) IV Push every 8 hours PRN Nausea and/or Vomiting      Vital Signs Last 24 Hrs  T(C): 35.9 (27 Jun 2023 04:36), Max: 35.9 (26 Jun 2023 21:15)  T(F): 96.7 (27 Jun 2023 04:36), Max: 96.7 (26 Jun 2023 21:15)  HR: 69 (27 Jun 2023 04:36) (69 - 80)  BP: 105/62 (27 Jun 2023 04:36) (105/62 - 121/75)  BP(mean): --  RR: 16 (27 Jun 2023 04:36) (16 - 17)  SpO2: 96% (27 Jun 2023 04:36) (96% - 99%)    Parameters below as of 27 Jun 2023 04:36  Patient On (Oxygen Delivery Method): room air        PHYSICAL EXAM:    Constitutional: NAD, well-groomed, well-developed  HEENT: PERRLA, EOMI, Normal Hearing, MMM  Neck: No LAD, No JVD  Back: Normal spine flexure, No CVA tenderness  Respiratory: CTAB/L  Cardiovascular: S1 and S2, RRR, no M/G/R  Gastrointestinal: BS+, soft, NT/ND  Extremities: No peripheral edema  Vascular: 2+ peripheral pulses  Neurological: A/O x 3, no focal deficits    LABS:                        10.0   5.04  )-----------( 155      ( 27 Jun 2023 07:09 )             29.3     06-27    140  |  105  |  7<L>  ----------------------------<  97  3.9   |  25  |  0.6<L>    Ca    8.7      27 Jun 2023 07:09  Phos  2.7     06-26  Mg     2.1     06-26    TPro  5.5<L>  /  Alb  3.1<L>  /  TBili  0.6  /  DBili  x   /  AST  103<H>  /  ALT  81<H>  /  AlkPhos  106  06-27      Urinalysis Basic - ( 27 Jun 2023 07:09 )    Color: x / Appearance: x / SG: x / pH: x  Gluc: 97 mg/dL / Ketone: x  / Bili: x / Urobili: x   Blood: x / Protein: x / Nitrite: x   Leuk Esterase: x / RBC: x / WBC x   Sq Epi: x / Non Sq Epi: x / Bacteria: x      Drug Screen Urine:  Alcohol Level  Alcohol, Blood: <10 mg/dL (06-25-23 @ 09:25)        RADIOLOGY & ADDITIONAL STUDIES:  
    Pt interviewed, examined and EMR chart reviewed.    Follow up of Alcohol Dependency. Pt is on Ativan protocol. Pt is doing better. Pt with complaint of some anxiety      REVIEW OF SYSTEMS:    Constitutional: No fever, weight loss or fatigue  ENT:  No difficulty hearing, tinnitus, vertigo; No sinus or throat pain  Neck: No pain or stiffness  Respiratory: No cough, wheezing, chills or hemoptysis  Cardiovascular: No chest pain, palpitations, shortness of breath, dizziness or leg swelling  Gastrointestinal: No abdominal or epigastric pain. No nausea, vomiting or hematemesis; No diarrhea or constipation. No melena or hematochezia.  Neurological: No headaches, memory loss, loss of strength, numbness or tremors  Musculoskeletal: No joint pain or swelling; No muscle, back or extremity pain      MEDICATIONS  (STANDING):  chlorhexidine 2% Cloths 1 Application(s) Topical <User Schedule>  enoxaparin Injectable 40 milliGRAM(s) SubCutaneous every 24 hours  famotidine    Tablet 20 milliGRAM(s) Oral two times a day  folic acid 1 milliGRAM(s) Oral daily  LORazepam     Tablet   Oral   LORazepam     Tablet 0.5 milliGRAM(s) Oral every 4 hours  multivitamin 1 Tablet(s) Oral daily  thiamine 100 milliGRAM(s) Oral daily    MEDICATIONS  (PRN):  acetaminophen     Tablet .. 650 milliGRAM(s) Oral every 6 hours PRN Temp greater or equal to 38C (100.4F), Mild Pain (1 - 3)  aluminum hydroxide/magnesium hydroxide/simethicone Suspension 30 milliLiter(s) Oral every 4 hours PRN Dyspepsia  bismuth subsalicylate Liquid 30 milliLiter(s) Oral every 4 hours PRN Diarrhea  melatonin 3 milliGRAM(s) Oral at bedtime PRN Insomnia  ondansetron Injectable 4 milliGRAM(s) IV Push every 8 hours PRN Nausea and/or Vomiting      Vital Signs Last 24 Hrs  T(C): 35.8 (28 Jun 2023 04:35), Max: 36.2 (27 Jun 2023 21:30)  T(F): 96.5 (28 Jun 2023 04:35), Max: 97.1 (27 Jun 2023 21:30)  HR: 81 (28 Jun 2023 04:35) (77 - 83)  BP: 140/83 (28 Jun 2023 04:35) (131/90 - 140/83)  BP(mean): --  RR: 18 (28 Jun 2023 04:35) (16 - 18)  SpO2: 97% (28 Jun 2023 04:35) (97% - 99%)    Parameters below as of 27 Jun 2023 13:57  Patient On (Oxygen Delivery Method): room air        PHYSICAL EXAM:    Constitutional: NAD, well-groomed, well-developed  HEENT: PERRLA, EOMI, Normal Hearing, MMM  Neck: No LAD, No JVD  Back: Normal spine flexure, No CVA tenderness  Respiratory: CTAB/L  Cardiovascular: S1 and S2, RRR, no M/G/R  Gastrointestinal: BS+, soft, NT/ND  Extremities: No peripheral edema  Vascular: 2+ peripheral pulses  Neurological: A/O x 3, no focal deficits    LABS:                        10.0   5.04  )-----------( 155      ( 27 Jun 2023 07:09 )             29.3     06-28    136  |  102  |  6<L>  ----------------------------<  89  4.0   |  23  |  0.7    Ca    8.5      28 Jun 2023 07:21  Phos  3.2     06-27  Mg     1.7     06-27    TPro  5.7<L>  /  Alb  3.2<L>  /  TBili  0.5  /  DBili  x   /  AST  79<H>  /  ALT  72<H>  /  AlkPhos  108  06-28      Urinalysis Basic - ( 28 Jun 2023 07:21 )    Color: x / Appearance: x / SG: x / pH: x  Gluc: 89 mg/dL / Ketone: x  / Bili: x / Urobili: x   Blood: x / Protein: x / Nitrite: x   Leuk Esterase: x / RBC: x / WBC x   Sq Epi: x / Non Sq Epi: x / Bacteria: x      Drug Screen Urine:  Alcohol Level        RADIOLOGY & ADDITIONAL STUDIES:

## 2023-06-29 ENCOUNTER — TRANSCRIPTION ENCOUNTER (OUTPATIENT)
Age: 43
End: 2023-06-29

## 2023-06-29 VITALS
DIASTOLIC BLOOD PRESSURE: 80 MMHG | TEMPERATURE: 96 F | HEART RATE: 74 BPM | RESPIRATION RATE: 18 BRPM | SYSTOLIC BLOOD PRESSURE: 119 MMHG

## 2023-06-29 PROCEDURE — 99239 HOSP IP/OBS DSCHRG MGMT >30: CPT

## 2023-06-29 PROCEDURE — 74019 RADEX ABDOMEN 2 VIEWS: CPT | Mod: 26

## 2023-06-29 RX ORDER — ONDANSETRON 8 MG/1
1 TABLET, FILM COATED ORAL
Qty: 14 | Refills: 0
Start: 2023-06-29 | End: 2023-07-12

## 2023-06-29 RX ORDER — FAMOTIDINE 10 MG/ML
1 INJECTION INTRAVENOUS
Qty: 60 | Refills: 0
Start: 2023-06-29 | End: 2023-07-28

## 2023-06-29 RX ORDER — PANTOPRAZOLE SODIUM 20 MG/1
1 TABLET, DELAYED RELEASE ORAL
Qty: 30 | Refills: 0
Start: 2023-06-29 | End: 2023-07-28

## 2023-06-29 RX ORDER — THIAMINE MONONITRATE (VIT B1) 100 MG
1 TABLET ORAL
Qty: 30 | Refills: 0
Start: 2023-06-29 | End: 2023-07-28

## 2023-06-29 RX ORDER — FOLIC ACID 0.8 MG
1 TABLET ORAL
Qty: 30 | Refills: 0
Start: 2023-06-29 | End: 2023-07-28

## 2023-06-29 RX ADMIN — Medication 1 TABLET(S): at 11:51

## 2023-06-29 RX ADMIN — Medication 1 MILLIGRAM(S): at 11:51

## 2023-06-29 RX ADMIN — ONDANSETRON 4 MILLIGRAM(S): 8 TABLET, FILM COATED ORAL at 09:11

## 2023-06-29 RX ADMIN — FAMOTIDINE 20 MILLIGRAM(S): 10 INJECTION INTRAVENOUS at 05:55

## 2023-06-29 RX ADMIN — Medication 0.5 MILLIGRAM(S): at 05:55

## 2023-06-29 NOTE — DISCHARGE NOTE PROVIDER - HOSPITAL COURSE
Patient is a 44yo F w/ PMHx of ETOH dependence, GERD, gastritis, esophagitis presenting with generalized abdominal pain, nausea and NBNB vomiting Reports she drink 1.75L of vodka every 2 days and is unable to rest unless she finished it. Reports for almost 3weeks she has been having non-stop NBNB vomiting and non-bloody vomiting every 40-50mins. Reports today she noticed her whole body was shaking and the abdominal pain was unbearable so she decided to come to the ED. Reports takes lorazepam 1mg BID, however ran out of her pills a week ago. Reports last drink was around 10 pm last night and she threw up right afterwards. Currently denies fever, chills, headaches, dizziness, visual hallucination, auditory hallucination, chest pain, hematemesis, SOB, SI/HI, or any urinary sxs. Patient was diagnosed with ETOH withdrawal and etoh ketoacidosis. Electrolytes were repleted, supplementation with thaimine and folic acid was added. Addiction medicine was consulted. Patient completed an ativan taper.

## 2023-06-29 NOTE — DISCHARGE NOTE NURSING/CASE MANAGEMENT/SOCIAL WORK - PATIENT PORTAL LINK FT
You can access the FollowMyHealth Patient Portal offered by [facility] by registering at the following website: [email or web address]. By joining [facility]’s FollowMyHealth portal, you will also be able to view your health information using other applications (apps) compatible with our system.
Diabetes/Coronavirus/COVID19

## 2023-06-29 NOTE — DISCHARGE NOTE PROVIDER - PROVIDER TOKENS
PROVIDER:[TOKEN:[[id number]]] PROVIDER:[TOKEN:[[id number]]],PROVIDER:[TOKEN:[[id number]]]

## 2023-06-29 NOTE — DISCHARGE NOTE PROVIDER - NSDCCPCAREPLAN_GEN_ALL_CORE_FT
PRINCIPAL DISCHARGE DIAGNOSIS  Diagnosis: Alcohol use with withdrawal  Assessment and Plan of Treatment: You were diagnosed with ETOH withdrawal and ETOH ketoacidosis. Electrolytes were repleted, supplementation with thaimine and folic acid was added. Addiction medicine was consulted. You completed an ativan taper.

## 2023-06-29 NOTE — DISCHARGE NOTE PROVIDER - NSDCFUSCHEDAPPT_GEN_ALL_CORE_FT
Omar Blum  [facility] PreAdmits  Scheduled Appointment: 06/30/2023    [facility] Physician [facility]  PSYCHIATRY [address]  Scheduled Appointment: 06/30/2023

## 2023-06-29 NOTE — DISCHARGE NOTE NURSING/CASE MANAGEMENT/SOCIAL WORK - NSDCPEFALRISK_GEN_ALL_CORE
For information on Fall & Injury Prevention, visit: [email or web address] OR  [email or web address] OR  https://www.cdc.gov/steadi/patient.html

## 2023-06-29 NOTE — DISCHARGE NOTE PROVIDER - CARE PROVIDER_API CALL
[place]  Internal Medicine  [address] [place] [id number]  Phone: [phone]  Fax: [phone]  Follow Up Time:    Julio Cesar Puckett  Internal Medicine  [facility] [place] [address]  Phone: [phone]  Fax: [phone]  Follow Up Time:     Juaquin Paz  Gastroenterology  [address] [place] [address]  Phone: [phone]  Fax: [phone]  Follow Up Time:

## 2023-06-29 NOTE — DISCHARGE NOTE PROVIDER - NSDCMRMEDTOKEN_GEN_ALL_CORE_FT
famotidine 20 mg oral tablet: 1 tab(s) orally 2 times a day  folic acid 1 mg oral tablet: 1 tab(s) orally once a day  Multiple Vitamins oral tablet: 1 tab(s) orally once a day  thiamine 100 mg oral tablet: 1 tab(s) orally once a day   dicyclomine 10 mg oral capsule: 2 cap(s) orally 2 times a day as needed for  mild pain  folic acid 1 mg oral tablet: 1 tab(s) orally once a day  Multiple Vitamins oral tablet: 1 tab(s) orally once a day  ondansetron 4 mg oral tablet: 1 tab(s) orally once a day as needed for  nausea  pantoprazole 40 mg oral delayed release tablet: 1 tab(s) orally once a day  Pepcid 20 mg oral tablet: 1 tab(s) orally 2 times a day  thiamine 100 mg oral tablet: 1 tab(s) orally once a day   dicyclomine 10 mg oral capsule: 2 cap(s) orally 2 times a day as needed for  mild pain  folic acid 1 mg oral tablet: 1 tab(s) orally once a day  Multiple Vitamins oral tablet: 1 tab(s) orally once a day  ondansetron 4 mg oral tablet: 1 tab(s) orally once a day as needed for  nausea  pantoprazole 40 mg oral delayed release tablet: 1 tab(s) orally once a day  thiamine 100 mg oral tablet: 1 tab(s) orally once a day

## 2023-06-30 ENCOUNTER — OUTPATIENT (OUTPATIENT)
Dept: OUTPATIENT SERVICES | Facility: HOSPITAL | Age: 43
LOS: 1 days | End: 2023-06-30
Payer: MEDICAID

## 2023-06-30 ENCOUNTER — APPOINTMENT (OUTPATIENT)
Dept: PSYCHIATRY | Facility: CLINIC | Age: 43
End: 2023-06-30

## 2023-06-30 DIAGNOSIS — Z98.891 HISTORY OF UTERINE SCAR FROM PREVIOUS SURGERY: Chronic | ICD-10-CM

## 2023-06-30 DIAGNOSIS — F10.20 ALCOHOL DEPENDENCE, UNCOMPLICATED: ICD-10-CM

## 2023-06-30 DIAGNOSIS — Z98.890 OTHER SPECIFIED POSTPROCEDURAL STATES: Chronic | ICD-10-CM

## 2023-06-30 PROCEDURE — 90791 PSYCH DIAGNOSTIC EVALUATION: CPT

## 2023-07-01 ENCOUNTER — EMERGENCY (EMERGENCY)
Facility: HOSPITAL | Age: 43
LOS: 0 days | Discharge: HOME | End: 2023-07-02
Attending: EMERGENCY MEDICINE
Payer: MEDICAID

## 2023-07-01 VITALS
RESPIRATION RATE: 18 BRPM | WEIGHT: 171.52 LBS | DIASTOLIC BLOOD PRESSURE: 76 MMHG | SYSTOLIC BLOOD PRESSURE: 141 MMHG | HEART RATE: 96 BPM | OXYGEN SATURATION: 99 % | HEIGHT: 61 IN | TEMPERATURE: 99 F

## 2023-07-01 DIAGNOSIS — R53.1 WEAKNESS: ICD-10-CM

## 2023-07-01 DIAGNOSIS — E83.42 HYPOMAGNESEMIA: ICD-10-CM

## 2023-07-01 DIAGNOSIS — Z86.59 PERSONAL HISTORY OF OTHER MENTAL AND BEHAVIORAL DISORDERS: ICD-10-CM

## 2023-07-01 DIAGNOSIS — Z87.19 PERSONAL HISTORY OF OTHER DISEASES OF THE DIGESTIVE SYSTEM: ICD-10-CM

## 2023-07-01 DIAGNOSIS — K21.9 GASTRO-ESOPHAGEAL REFLUX DISEASE WITHOUT ESOPHAGITIS: ICD-10-CM

## 2023-07-01 DIAGNOSIS — F10.20 ALCOHOL DEPENDENCE, UNCOMPLICATED: ICD-10-CM

## 2023-07-01 DIAGNOSIS — Z98.891 HISTORY OF UTERINE SCAR FROM PREVIOUS SURGERY: Chronic | ICD-10-CM

## 2023-07-01 DIAGNOSIS — Z98.890 OTHER SPECIFIED POSTPROCEDURAL STATES: Chronic | ICD-10-CM

## 2023-07-01 DIAGNOSIS — Z87.898 PERSONAL HISTORY OF OTHER SPECIFIED CONDITIONS: ICD-10-CM

## 2023-07-01 DIAGNOSIS — Z86.79 PERSONAL HISTORY OF OTHER DISEASES OF THE CIRCULATORY SYSTEM: ICD-10-CM

## 2023-07-01 DIAGNOSIS — E78.00 PURE HYPERCHOLESTEROLEMIA, UNSPECIFIED: ICD-10-CM

## 2023-07-01 DIAGNOSIS — Z98.890 OTHER SPECIFIED POSTPROCEDURAL STATES: ICD-10-CM

## 2023-07-01 DIAGNOSIS — F41.9 ANXIETY DISORDER, UNSPECIFIED: ICD-10-CM

## 2023-07-01 DIAGNOSIS — Z87.59 PERSONAL HISTORY OF OTHER COMPLICATIONS OF PREGNANCY, CHILDBIRTH AND THE PUERPERIUM: ICD-10-CM

## 2023-07-01 LAB
APPEARANCE UR: CLEAR — SIGNIFICANT CHANGE UP
BILIRUB UR-MCNC: NEGATIVE — SIGNIFICANT CHANGE UP
COLOR SPEC: YELLOW — SIGNIFICANT CHANGE UP
DIFF PNL FLD: NEGATIVE — SIGNIFICANT CHANGE UP
GLUCOSE UR QL: NEGATIVE MG/DL — SIGNIFICANT CHANGE UP
HCG UR QL: NEGATIVE — SIGNIFICANT CHANGE UP
KETONES UR-MCNC: ABNORMAL
LEUKOCYTE ESTERASE UR-ACNC: NEGATIVE — SIGNIFICANT CHANGE UP
NITRITE UR-MCNC: NEGATIVE — SIGNIFICANT CHANGE UP
PH UR: 6 — SIGNIFICANT CHANGE UP (ref 5–8)
PROT UR-MCNC: NEGATIVE MG/DL — SIGNIFICANT CHANGE UP
SP GR SPEC: 1.01 — SIGNIFICANT CHANGE UP (ref 1.01–1.03)
UROBILINOGEN FLD QL: 0.2 MG/DL — SIGNIFICANT CHANGE UP

## 2023-07-01 PROCEDURE — 84703 CHORIONIC GONADOTROPIN ASSAY: CPT

## 2023-07-01 PROCEDURE — 36415 COLL VENOUS BLD VENIPUNCTURE: CPT

## 2023-07-01 PROCEDURE — 80053 COMPREHEN METABOLIC PANEL: CPT

## 2023-07-01 PROCEDURE — 99284 EMERGENCY DEPT VISIT MOD MDM: CPT

## 2023-07-01 PROCEDURE — 93005 ELECTROCARDIOGRAM TRACING: CPT

## 2023-07-01 PROCEDURE — 81025 URINE PREGNANCY TEST: CPT

## 2023-07-01 PROCEDURE — 83735 ASSAY OF MAGNESIUM: CPT

## 2023-07-01 PROCEDURE — 82550 ASSAY OF CK (CPK): CPT

## 2023-07-01 PROCEDURE — 93010 ELECTROCARDIOGRAM REPORT: CPT

## 2023-07-01 PROCEDURE — 81003 URINALYSIS AUTO W/O SCOPE: CPT

## 2023-07-01 PROCEDURE — 85027 COMPLETE CBC AUTOMATED: CPT

## 2023-07-01 PROCEDURE — 99284 EMERGENCY DEPT VISIT MOD MDM: CPT | Mod: 25

## 2023-07-01 PROCEDURE — 96374 THER/PROPH/DIAG INJ IV PUSH: CPT

## 2023-07-01 RX ORDER — SODIUM CHLORIDE 9 MG/ML
1000 INJECTION INTRAMUSCULAR; INTRAVENOUS; SUBCUTANEOUS ONCE
Refills: 0 | Status: COMPLETED | OUTPATIENT
Start: 2023-07-01 | End: 2023-07-01

## 2023-07-01 NOTE — ED ADULT TRIAGE NOTE - CHIEF COMPLAINT QUOTE
As per EMS, patient complaining of generalized muscle weakness, hip pain bilaterally and shoulder pain.

## 2023-07-01 NOTE — ED ADULT NURSE REASSESSMENT NOTE - NS ED NURSE REASSESS COMMENT FT1
Patient refused for RN to attempt for IV access. Stating she only wants ED PA to use sono machine. Explained to patient that it would cause a delay in care. Patient still refused

## 2023-07-02 VITALS
SYSTOLIC BLOOD PRESSURE: 111 MMHG | RESPIRATION RATE: 19 BRPM | DIASTOLIC BLOOD PRESSURE: 57 MMHG | HEART RATE: 65 BPM | OXYGEN SATURATION: 98 % | TEMPERATURE: 98 F

## 2023-07-02 LAB
ALBUMIN SERPL ELPH-MCNC: 3.8 G/DL — SIGNIFICANT CHANGE UP (ref 3.5–5.2)
ALP SERPL-CCNC: 85 U/L — SIGNIFICANT CHANGE UP (ref 30–115)
ALT FLD-CCNC: 55 U/L — HIGH (ref 0–41)
ANION GAP SERPL CALC-SCNC: 14 MMOL/L — SIGNIFICANT CHANGE UP (ref 7–14)
AST SERPL-CCNC: 72 U/L — HIGH (ref 0–41)
BILIRUB SERPL-MCNC: 0.7 MG/DL — SIGNIFICANT CHANGE UP (ref 0.2–1.2)
BUN SERPL-MCNC: 6 MG/DL — LOW (ref 10–20)
CALCIUM SERPL-MCNC: 9.5 MG/DL — SIGNIFICANT CHANGE UP (ref 8.4–10.5)
CHLORIDE SERPL-SCNC: 102 MMOL/L — SIGNIFICANT CHANGE UP (ref 98–110)
CK SERPL-CCNC: 71 U/L — SIGNIFICANT CHANGE UP (ref 0–225)
CO2 SERPL-SCNC: 21 MMOL/L — SIGNIFICANT CHANGE UP (ref 17–32)
CREAT SERPL-MCNC: 0.6 MG/DL — LOW (ref 0.7–1.5)
EGFR: 116 ML/MIN/1.73M2 — SIGNIFICANT CHANGE UP
GLUCOSE SERPL-MCNC: 95 MG/DL — SIGNIFICANT CHANGE UP (ref 70–99)
HCG SERPL QL: NEGATIVE — SIGNIFICANT CHANGE UP
HCT VFR BLD CALC: 33.9 % — LOW (ref 37–47)
HGB BLD-MCNC: 11.5 G/DL — LOW (ref 12–16)
MAGNESIUM SERPL-MCNC: 1.7 MG/DL — LOW (ref 1.8–2.4)
MCHC RBC-ENTMCNC: 33.9 G/DL — SIGNIFICANT CHANGE UP (ref 32–37)
MCHC RBC-ENTMCNC: 34.8 PG — HIGH (ref 27–31)
MCV RBC AUTO: 102.7 FL — HIGH (ref 81–99)
NRBC # BLD: 0 /100 WBCS — SIGNIFICANT CHANGE UP (ref 0–0)
PLATELET # BLD AUTO: 301 K/UL — SIGNIFICANT CHANGE UP (ref 130–400)
PMV BLD: 9.7 FL — SIGNIFICANT CHANGE UP (ref 7.4–10.4)
POTASSIUM SERPL-MCNC: 4.7 MMOL/L — SIGNIFICANT CHANGE UP (ref 3.5–5)
POTASSIUM SERPL-SCNC: 4.7 MMOL/L — SIGNIFICANT CHANGE UP (ref 3.5–5)
PROT SERPL-MCNC: 6.5 G/DL — SIGNIFICANT CHANGE UP (ref 6–8)
RBC # BLD: 3.3 M/UL — LOW (ref 4.2–5.4)
RBC # FLD: 14.3 % — SIGNIFICANT CHANGE UP (ref 11.5–14.5)
SODIUM SERPL-SCNC: 137 MMOL/L — SIGNIFICANT CHANGE UP (ref 135–146)
WBC # BLD: 8.42 K/UL — SIGNIFICANT CHANGE UP (ref 4.8–10.8)
WBC # FLD AUTO: 8.42 K/UL — SIGNIFICANT CHANGE UP (ref 4.8–10.8)

## 2023-07-02 RX ORDER — METHOCARBAMOL 500 MG/1
750 TABLET, FILM COATED ORAL ONCE
Refills: 0 | Status: COMPLETED | OUTPATIENT
Start: 2023-07-02 | End: 2023-07-02

## 2023-07-02 RX ORDER — METHYLPREDNISOLONE 4 MG
500 TABLET ORAL ONCE
Refills: 0 | Status: COMPLETED | OUTPATIENT
Start: 2023-07-02 | End: 2023-07-02

## 2023-07-02 RX ORDER — KETOROLAC TROMETHAMINE 30 MG/ML
1 SYRINGE (ML) INJECTION
Qty: 9 | Refills: 0
Start: 2023-07-02 | End: 2023-07-04

## 2023-07-02 RX ORDER — KETOROLAC TROMETHAMINE 30 MG/ML
30 SYRINGE (ML) INJECTION ONCE
Refills: 0 | Status: DISCONTINUED | OUTPATIENT
Start: 2023-07-02 | End: 2023-07-02

## 2023-07-02 RX ADMIN — Medication 30 MILLIGRAM(S): at 03:55

## 2023-07-02 RX ADMIN — METHOCARBAMOL 750 MILLIGRAM(S): 500 TABLET, FILM COATED ORAL at 03:55

## 2023-07-02 RX ADMIN — SODIUM CHLORIDE 1000 MILLILITER(S): 9 INJECTION INTRAMUSCULAR; INTRAVENOUS; SUBCUTANEOUS at 02:43

## 2023-07-02 RX ADMIN — Medication 500 MILLIGRAM(S): at 02:43

## 2023-07-02 RX ADMIN — Medication 30 MILLIGRAM(S): at 04:17

## 2023-07-02 NOTE — ED PROVIDER NOTE - PATIENT PORTAL LINK FT
You can access the FollowMyHealth Patient Portal offered by [facility] by registering at the following website: [email or web address]. By joining [employer]’s FollowMyHealth portal, you will also be able to view your health information using other applications (apps) compatible with our system.

## 2023-07-02 NOTE — ED PROVIDER NOTE - PHYSICAL EXAMINATION
On physical exam patient is normocephalic/atraumatic pupils equal round react light accommodation extraocular muscles intact oropharynx clear chest clear to auscultation abdomen soft nontender nondistended bowel sounds positive no guarding no rebound extremities full range of motion patient is able to ambulate from bed 12 into the restroom at AC 10 without any issues radial pulses 2+ pedal pulse 2+ full strength full sensation no rashes noted

## 2023-07-02 NOTE — ED PROVIDER NOTE - NSFOLLOWUPINSTRUCTIONS_ED_ALL_ED_FT
SEE YOUR DOCTOR IN THE NEXT 24-48 HOURS   RETURN FOR ANY FURTHER CONCERNS       SEEK IMMEDIATE MEDICAL CARE IF YOU HAVE ANY OF THE FOLLOWING SYMPTOMS: severe chest pain, fainting, vomiting blood, dark or bloody stools, or pain

## 2023-07-02 NOTE — ED PROVIDER NOTE - CLINICAL SUMMARY MEDICAL DECISION MAKING FREE TEXT BOX
Patient is a 43-year-old female presents for evaluation of generalized weakness and pain with exertion located throughout her body onset over the past 36 hours patient was recently admitted for alcohol withdrawal as well as electrolyte abnormalities including AKA she improved and was discharged doing well but noted pain with walking extensively denies any fevers chills denies any vomiting she denies any headache visual changes chest pain shortness of breath abdominal pain or back pain denies any dysuria hesitancy or frequency since she states that in the hospital she was hypomagnesemic and  that this is the case again        On physical exam patient is normocephalic/atraumatic pupils equal round react light accommodation extraocular muscles intact oropharynx clear chest clear to auscultation abdomen soft nontender nondistended bowel sounds positive no guarding no rebound extremities full range of motion patient is able to ambulate from bed 12 into the restroom at AC 10 without any issues radial pulses 2+ pedal pulse 2+ full strength full sensation no rashes noted    Assessment plan I reviewed prior admission records considering the patient's symptoms concern for electrolytes obtained potassium which is in the normal limits magnesium was 1.7 which is within training for the patient in addition because of the symptoms obtain CK which is normal less likely rhabdomyolysis patient given IV fluids IV pain medicine improved here in the emergency department in addition we obtained EKG per my independent evaluation is not consistent with STEMI not consistent with QT prolongation at this time I will discharge follow-up to PMD we discussed indications to return I repleted her magnesium

## 2023-07-02 NOTE — ED PROVIDER NOTE - OBJECTIVE STATEMENT
Patient is a 43-year-old female presents for evaluation of generalized weakness and pain with exertion located throughout her body onset over the past 36 hours patient was recently admitted for alcohol withdrawal as well as electrolyte abnormalities including AKA she improved and was discharged doing well but noted pain with walking extensively denies any fevers chills denies any vomiting she denies any headache visual changes chest pain shortness of breath abdominal pain or back pain denies any dysuria hesitancy or frequency since she states that in the hospital she was hypomagnesemic and  that this is the case again

## 2023-07-02 NOTE — ED ADULT NURSE NOTE - NSFALLUNIVINTERV_ED_ALL_ED
Bed/Stretcher in lowest position, wheels locked, appropriate side rails in place/Call bell, personal items and telephone in reach/Instruct patient to call for assistance before getting out of bed/chair/stretcher/Non-slip footwear applied when patient is off stretcher/[place] to call system/Physically safe environment - no spills, clutter or unnecessary equipment/Purposeful proactive rounding/Room/bathroom lighting operational, light cord in reach

## 2023-07-10 ENCOUNTER — APPOINTMENT (OUTPATIENT)
Dept: PSYCHIATRY | Facility: CLINIC | Age: 43
End: 2023-07-10

## 2023-07-10 ENCOUNTER — OUTPATIENT (OUTPATIENT)
Dept: OUTPATIENT SERVICES | Facility: HOSPITAL | Age: 43
LOS: 1 days | End: 2023-07-10
Payer: MEDICAID

## 2023-07-10 DIAGNOSIS — F10.20 ALCOHOL DEPENDENCE, UNCOMPLICATED: ICD-10-CM

## 2023-07-10 DIAGNOSIS — Z98.891 HISTORY OF UTERINE SCAR FROM PREVIOUS SURGERY: Chronic | ICD-10-CM

## 2023-07-10 DIAGNOSIS — Z98.890 OTHER SPECIFIED POSTPROCEDURAL STATES: Chronic | ICD-10-CM

## 2023-07-10 PROCEDURE — 90837 PSYTX W PT 60 MINUTES: CPT

## 2023-07-11 DIAGNOSIS — F10.20 ALCOHOL DEPENDENCE, UNCOMPLICATED: ICD-10-CM

## 2023-07-17 ENCOUNTER — OUTPATIENT (OUTPATIENT)
Dept: OUTPATIENT SERVICES | Facility: HOSPITAL | Age: 43
LOS: 1 days | End: 2023-07-17
Payer: MEDICAID

## 2023-07-17 ENCOUNTER — APPOINTMENT (OUTPATIENT)
Dept: PSYCHIATRY | Facility: CLINIC | Age: 43
End: 2023-07-17

## 2023-07-17 DIAGNOSIS — Z98.891 HISTORY OF UTERINE SCAR FROM PREVIOUS SURGERY: Chronic | ICD-10-CM

## 2023-07-17 DIAGNOSIS — F10.20 ALCOHOL DEPENDENCE, UNCOMPLICATED: ICD-10-CM

## 2023-07-17 DIAGNOSIS — Z98.890 OTHER SPECIFIED POSTPROCEDURAL STATES: Chronic | ICD-10-CM

## 2023-07-17 PROCEDURE — 90837 PSYTX W PT 60 MINUTES: CPT

## 2023-07-18 DIAGNOSIS — F10.20 ALCOHOL DEPENDENCE, UNCOMPLICATED: ICD-10-CM

## 2023-07-26 ENCOUNTER — APPOINTMENT (OUTPATIENT)
Dept: PSYCHIATRY | Facility: CLINIC | Age: 43
End: 2023-07-26

## 2023-07-26 ENCOUNTER — APPOINTMENT (OUTPATIENT)
Dept: CARDIOLOGY | Facility: CLINIC | Age: 43
End: 2023-07-26

## 2023-07-27 ENCOUNTER — APPOINTMENT (OUTPATIENT)
Dept: PAIN MANAGEMENT | Facility: CLINIC | Age: 43
End: 2023-07-27

## 2023-08-03 NOTE — H&P ADULT - CLICK TO LAUNCH ORM
. Carac Counseling:  I discussed with the patient the risks of Carac including but not limited to erythema, scaling, itching, weeping, crusting, and pain.

## 2023-08-14 ENCOUNTER — NON-APPOINTMENT (OUTPATIENT)
Age: 43
End: 2023-08-14

## 2023-08-18 ENCOUNTER — APPOINTMENT (OUTPATIENT)
Dept: PSYCHIATRY | Facility: CLINIC | Age: 43
End: 2023-08-18

## 2023-08-18 ENCOUNTER — OUTPATIENT (OUTPATIENT)
Dept: OUTPATIENT SERVICES | Facility: HOSPITAL | Age: 43
LOS: 1 days | End: 2023-08-18
Payer: MEDICAID

## 2023-08-18 DIAGNOSIS — F10.20 ALCOHOL DEPENDENCE, UNCOMPLICATED: ICD-10-CM

## 2023-08-18 DIAGNOSIS — Z98.890 OTHER SPECIFIED POSTPROCEDURAL STATES: Chronic | ICD-10-CM

## 2023-08-18 DIAGNOSIS — Z98.891 HISTORY OF UTERINE SCAR FROM PREVIOUS SURGERY: Chronic | ICD-10-CM

## 2023-08-18 PROCEDURE — 90834 PSYTX W PT 45 MINUTES: CPT

## 2023-08-19 DIAGNOSIS — F10.20 ALCOHOL DEPENDENCE, UNCOMPLICATED: ICD-10-CM

## 2023-08-22 ENCOUNTER — APPOINTMENT (OUTPATIENT)
Dept: PSYCHIATRY | Facility: CLINIC | Age: 43
End: 2023-08-22

## 2023-08-22 ENCOUNTER — OUTPATIENT (OUTPATIENT)
Dept: OUTPATIENT SERVICES | Facility: HOSPITAL | Age: 43
LOS: 1 days | End: 2023-08-22
Payer: MEDICAID

## 2023-08-22 DIAGNOSIS — Z98.891 HISTORY OF UTERINE SCAR FROM PREVIOUS SURGERY: Chronic | ICD-10-CM

## 2023-08-22 DIAGNOSIS — F10.20 ALCOHOL DEPENDENCE, UNCOMPLICATED: ICD-10-CM

## 2023-08-22 DIAGNOSIS — Z98.890 OTHER SPECIFIED POSTPROCEDURAL STATES: Chronic | ICD-10-CM

## 2023-08-22 PROCEDURE — 90834 PSYTX W PT 45 MINUTES: CPT | Mod: 95

## 2023-08-23 DIAGNOSIS — F10.20 ALCOHOL DEPENDENCE, UNCOMPLICATED: ICD-10-CM

## 2023-08-29 ENCOUNTER — INPATIENT (INPATIENT)
Facility: HOSPITAL | Age: 43
LOS: 1 days | Discharge: HOME | End: 2023-08-31
Attending: STUDENT IN AN ORGANIZED HEALTH CARE EDUCATION/TRAINING PROGRAM | Admitting: FAMILY MEDICINE
Payer: MEDICAID

## 2023-08-29 VITALS
RESPIRATION RATE: 18 BRPM | WEIGHT: 179.9 LBS | SYSTOLIC BLOOD PRESSURE: 142 MMHG | HEART RATE: 133 BPM | OXYGEN SATURATION: 97 % | DIASTOLIC BLOOD PRESSURE: 96 MMHG | HEIGHT: 61 IN | TEMPERATURE: 99 F

## 2023-08-29 DIAGNOSIS — F10.220 ALCOHOL DEPENDENCE WITH INTOXICATION, UNCOMPLICATED: ICD-10-CM

## 2023-08-29 DIAGNOSIS — Z98.891 HISTORY OF UTERINE SCAR FROM PREVIOUS SURGERY: Chronic | ICD-10-CM

## 2023-08-29 DIAGNOSIS — Z98.890 OTHER SPECIFIED POSTPROCEDURAL STATES: Chronic | ICD-10-CM

## 2023-08-29 DIAGNOSIS — F10.20 ALCOHOL DEPENDENCE, UNCOMPLICATED: ICD-10-CM

## 2023-08-29 DIAGNOSIS — F10.939 ALCOHOL USE, UNSPECIFIED WITH WITHDRAWAL, UNSPECIFIED: ICD-10-CM

## 2023-08-29 LAB
ALBUMIN SERPL ELPH-MCNC: 4.6 G/DL — SIGNIFICANT CHANGE UP (ref 3.5–5.2)
ALBUMIN SERPL ELPH-MCNC: 4.9 G/DL — SIGNIFICANT CHANGE UP (ref 3.5–5.2)
ALP SERPL-CCNC: 87 U/L — SIGNIFICANT CHANGE UP (ref 30–115)
ALP SERPL-CCNC: 94 U/L — SIGNIFICANT CHANGE UP (ref 30–115)
ALT FLD-CCNC: 45 U/L — HIGH (ref 0–41)
ALT FLD-CCNC: 49 U/L — HIGH (ref 0–41)
ANION GAP SERPL CALC-SCNC: 19 MMOL/L — HIGH (ref 7–14)
ANION GAP SERPL CALC-SCNC: 25 MMOL/L — HIGH (ref 7–14)
APAP SERPL-MCNC: <5 UG/ML — LOW (ref 10–30)
APPEARANCE UR: ABNORMAL
APTT BLD: 37.3 SEC — SIGNIFICANT CHANGE UP (ref 27–39.2)
AST SERPL-CCNC: 87 U/L — HIGH (ref 0–41)
AST SERPL-CCNC: 95 U/L — HIGH (ref 0–41)
BACTERIA # UR AUTO: ABNORMAL /HPF
BASE EXCESS BLDV CALC-SCNC: -0.7 MMOL/L — SIGNIFICANT CHANGE UP (ref -2–3)
BASOPHILS # BLD AUTO: 0.06 K/UL — SIGNIFICANT CHANGE UP (ref 0–0.2)
BASOPHILS NFR BLD AUTO: 0.8 % — SIGNIFICANT CHANGE UP (ref 0–1)
BILIRUB SERPL-MCNC: 0.4 MG/DL — SIGNIFICANT CHANGE UP (ref 0.2–1.2)
BILIRUB SERPL-MCNC: 0.5 MG/DL — SIGNIFICANT CHANGE UP (ref 0.2–1.2)
BILIRUB UR-MCNC: NEGATIVE — SIGNIFICANT CHANGE UP
BUN SERPL-MCNC: 5 MG/DL — LOW (ref 10–20)
BUN SERPL-MCNC: 5 MG/DL — LOW (ref 10–20)
CA-I SERPL-SCNC: 1.09 MMOL/L — LOW (ref 1.15–1.33)
CALCIUM SERPL-MCNC: 8.4 MG/DL — SIGNIFICANT CHANGE UP (ref 8.4–10.5)
CALCIUM SERPL-MCNC: 9 MG/DL — SIGNIFICANT CHANGE UP (ref 8.4–10.5)
CHLORIDE SERPL-SCNC: 96 MMOL/L — LOW (ref 98–110)
CHLORIDE SERPL-SCNC: 99 MMOL/L — SIGNIFICANT CHANGE UP (ref 98–110)
CO2 SERPL-SCNC: 20 MMOL/L — SIGNIFICANT CHANGE UP (ref 17–32)
CO2 SERPL-SCNC: 21 MMOL/L — SIGNIFICANT CHANGE UP (ref 17–32)
COLOR SPEC: SIGNIFICANT CHANGE UP
CREAT SERPL-MCNC: 0.5 MG/DL — LOW (ref 0.7–1.5)
CREAT SERPL-MCNC: 0.6 MG/DL — LOW (ref 0.7–1.5)
DIFF PNL FLD: NEGATIVE — SIGNIFICANT CHANGE UP
EGFR: 116 ML/MIN/1.73M2 — SIGNIFICANT CHANGE UP
EGFR: 121 ML/MIN/1.73M2 — SIGNIFICANT CHANGE UP
EOSINOPHIL # BLD AUTO: 0.14 K/UL — SIGNIFICANT CHANGE UP (ref 0–0.7)
EOSINOPHIL NFR BLD AUTO: 1.9 % — SIGNIFICANT CHANGE UP (ref 0–8)
EPI CELLS # UR: PRESENT
ETHANOL SERPL-MCNC: 277 MG/DL — HIGH
GAS PNL BLDV: 140 MMOL/L — SIGNIFICANT CHANGE UP (ref 136–145)
GAS PNL BLDV: SIGNIFICANT CHANGE UP
GLUCOSE SERPL-MCNC: 85 MG/DL — SIGNIFICANT CHANGE UP (ref 70–99)
GLUCOSE SERPL-MCNC: 98 MG/DL — SIGNIFICANT CHANGE UP (ref 70–99)
GLUCOSE UR QL: NEGATIVE MG/DL — SIGNIFICANT CHANGE UP
HCG SERPL QL: NEGATIVE — SIGNIFICANT CHANGE UP
HCO3 BLDV-SCNC: 23 MMOL/L — SIGNIFICANT CHANGE UP (ref 22–29)
HCT VFR BLD CALC: 40.3 % — SIGNIFICANT CHANGE UP (ref 37–47)
HCT VFR BLDA CALC: 33 % — LOW (ref 39–51)
HGB BLD CALC-MCNC: 10.9 G/DL — LOW (ref 12.6–17.4)
HGB BLD-MCNC: 14.1 G/DL — SIGNIFICANT CHANGE UP (ref 12–16)
IMM GRANULOCYTES NFR BLD AUTO: 0.3 % — SIGNIFICANT CHANGE UP (ref 0.1–0.3)
INR BLD: 0.97 RATIO — SIGNIFICANT CHANGE UP (ref 0.65–1.3)
KETONES UR-MCNC: 40 MG/DL
LACTATE BLDV-MCNC: 4.6 MMOL/L — CRITICAL HIGH (ref 0.5–2)
LACTATE SERPL-SCNC: 1.1 MMOL/L — SIGNIFICANT CHANGE UP (ref 0.7–2)
LACTATE SERPL-SCNC: 2.9 MMOL/L — HIGH (ref 0.7–2)
LEUKOCYTE ESTERASE UR-ACNC: NEGATIVE — SIGNIFICANT CHANGE UP
LIDOCAIN IGE QN: 24 U/L — SIGNIFICANT CHANGE UP (ref 7–60)
LIDOCAIN IGE QN: 28 U/L — SIGNIFICANT CHANGE UP (ref 7–60)
LYMPHOCYTES # BLD AUTO: 2.3 K/UL — SIGNIFICANT CHANGE UP (ref 1.2–3.4)
LYMPHOCYTES # BLD AUTO: 31 % — SIGNIFICANT CHANGE UP (ref 20.5–51.1)
MAGNESIUM SERPL-MCNC: 1.7 MG/DL — LOW (ref 1.8–2.4)
MAGNESIUM SERPL-MCNC: 1.8 MG/DL — SIGNIFICANT CHANGE UP (ref 1.8–2.4)
MCHC RBC-ENTMCNC: 34.6 PG — HIGH (ref 27–31)
MCHC RBC-ENTMCNC: 35 G/DL — SIGNIFICANT CHANGE UP (ref 32–37)
MCV RBC AUTO: 98.8 FL — SIGNIFICANT CHANGE UP (ref 81–99)
MONOCYTES # BLD AUTO: 0.46 K/UL — SIGNIFICANT CHANGE UP (ref 0.1–0.6)
MONOCYTES NFR BLD AUTO: 6.2 % — SIGNIFICANT CHANGE UP (ref 1.7–9.3)
NEUTROPHILS # BLD AUTO: 4.45 K/UL — SIGNIFICANT CHANGE UP (ref 1.4–6.5)
NEUTROPHILS NFR BLD AUTO: 59.8 % — SIGNIFICANT CHANGE UP (ref 42.2–75.2)
NITRITE UR-MCNC: NEGATIVE — SIGNIFICANT CHANGE UP
NRBC # BLD: 0 /100 WBCS — SIGNIFICANT CHANGE UP (ref 0–0)
PCO2 BLDV: 34 MMHG — LOW (ref 39–42)
PH BLDV: 7.44 — HIGH (ref 7.32–7.43)
PH UR: 6 — SIGNIFICANT CHANGE UP (ref 5–8)
PHOSPHATE SERPL-MCNC: 2.4 MG/DL — SIGNIFICANT CHANGE UP (ref 2.1–4.9)
PLATELET # BLD AUTO: 306 K/UL — SIGNIFICANT CHANGE UP (ref 130–400)
PMV BLD: 8.7 FL — SIGNIFICANT CHANGE UP (ref 7.4–10.4)
PO2 BLDV: 63 MMHG — SIGNIFICANT CHANGE UP
POTASSIUM BLDV-SCNC: 3.8 MMOL/L — SIGNIFICANT CHANGE UP (ref 3.5–5.1)
POTASSIUM SERPL-MCNC: 3.5 MMOL/L — SIGNIFICANT CHANGE UP (ref 3.5–5)
POTASSIUM SERPL-MCNC: 3.9 MMOL/L — SIGNIFICANT CHANGE UP (ref 3.5–5)
POTASSIUM SERPL-SCNC: 3.5 MMOL/L — SIGNIFICANT CHANGE UP (ref 3.5–5)
POTASSIUM SERPL-SCNC: 3.9 MMOL/L — SIGNIFICANT CHANGE UP (ref 3.5–5)
PROT SERPL-MCNC: 7.5 G/DL — SIGNIFICANT CHANGE UP (ref 6–8)
PROT SERPL-MCNC: 7.9 G/DL — SIGNIFICANT CHANGE UP (ref 6–8)
PROT UR-MCNC: 30 MG/DL
PROTHROM AB SERPL-ACNC: 11.1 SEC — SIGNIFICANT CHANGE UP (ref 9.95–12.87)
RBC # BLD: 4.08 M/UL — LOW (ref 4.2–5.4)
RBC # FLD: 13.3 % — SIGNIFICANT CHANGE UP (ref 11.5–14.5)
RBC CASTS # UR COMP ASSIST: 1 /HPF — SIGNIFICANT CHANGE UP (ref 0–4)
SALICYLATES SERPL-MCNC: <0.3 MG/DL — LOW (ref 4–30)
SAO2 % BLDV: 92.3 % — SIGNIFICANT CHANGE UP
SODIUM SERPL-SCNC: 139 MMOL/L — SIGNIFICANT CHANGE UP (ref 135–146)
SODIUM SERPL-SCNC: 141 MMOL/L — SIGNIFICANT CHANGE UP (ref 135–146)
SP GR SPEC: 1.03 — SIGNIFICANT CHANGE UP (ref 1–1.03)
T3 SERPL-MCNC: 109 NG/DL — SIGNIFICANT CHANGE UP (ref 80–200)
T4 AB SER-ACNC: 7.4 UG/DL — SIGNIFICANT CHANGE UP (ref 4.6–12)
TROPONIN T SERPL-MCNC: <0.01 NG/ML — SIGNIFICANT CHANGE UP
TSH SERPL-MCNC: 3.34 UIU/ML — SIGNIFICANT CHANGE UP (ref 0.27–4.2)
UROBILINOGEN FLD QL: 1 MG/DL — SIGNIFICANT CHANGE UP (ref 0.2–1)
WBC # BLD: 7.43 K/UL — SIGNIFICANT CHANGE UP (ref 4.8–10.8)
WBC # FLD AUTO: 7.43 K/UL — SIGNIFICANT CHANGE UP (ref 4.8–10.8)
WBC UR QL: 2 /HPF — SIGNIFICANT CHANGE UP (ref 0–5)

## 2023-08-29 PROCEDURE — 80307 DRUG TEST PRSMV CHEM ANLYZR: CPT

## 2023-08-29 PROCEDURE — C9113: CPT

## 2023-08-29 PROCEDURE — 84436 ASSAY OF TOTAL THYROXINE: CPT

## 2023-08-29 PROCEDURE — 93010 ELECTROCARDIOGRAM REPORT: CPT

## 2023-08-29 PROCEDURE — 83735 ASSAY OF MAGNESIUM: CPT

## 2023-08-29 PROCEDURE — 85027 COMPLETE CBC AUTOMATED: CPT

## 2023-08-29 PROCEDURE — 99252 IP/OBS CONSLTJ NEW/EST SF 35: CPT

## 2023-08-29 PROCEDURE — 99291 CRITICAL CARE FIRST HOUR: CPT

## 2023-08-29 PROCEDURE — 71045 X-RAY EXAM CHEST 1 VIEW: CPT | Mod: 26

## 2023-08-29 PROCEDURE — 76705 ECHO EXAM OF ABDOMEN: CPT

## 2023-08-29 PROCEDURE — 84480 ASSAY TRIIODOTHYRONINE (T3): CPT

## 2023-08-29 PROCEDURE — 84100 ASSAY OF PHOSPHORUS: CPT

## 2023-08-29 PROCEDURE — 84443 ASSAY THYROID STIM HORMONE: CPT

## 2023-08-29 PROCEDURE — 93010 ELECTROCARDIOGRAM REPORT: CPT | Mod: 76

## 2023-08-29 PROCEDURE — 99285 EMERGENCY DEPT VISIT HI MDM: CPT

## 2023-08-29 PROCEDURE — 76705 ECHO EXAM OF ABDOMEN: CPT | Mod: 26

## 2023-08-29 PROCEDURE — 99232 SBSQ HOSP IP/OBS MODERATE 35: CPT

## 2023-08-29 PROCEDURE — 84484 ASSAY OF TROPONIN QUANT: CPT

## 2023-08-29 PROCEDURE — 81001 URINALYSIS AUTO W/SCOPE: CPT

## 2023-08-29 PROCEDURE — 80048 BASIC METABOLIC PNL TOTAL CA: CPT

## 2023-08-29 PROCEDURE — 36415 COLL VENOUS BLD VENIPUNCTURE: CPT

## 2023-08-29 PROCEDURE — 80354 DRUG SCREENING FENTANYL: CPT

## 2023-08-29 PROCEDURE — G0378: CPT

## 2023-08-29 PROCEDURE — 83690 ASSAY OF LIPASE: CPT

## 2023-08-29 PROCEDURE — 93005 ELECTROCARDIOGRAM TRACING: CPT

## 2023-08-29 PROCEDURE — 80053 COMPREHEN METABOLIC PANEL: CPT

## 2023-08-29 PROCEDURE — 83605 ASSAY OF LACTIC ACID: CPT

## 2023-08-29 PROCEDURE — 93970 EXTREMITY STUDY: CPT | Mod: 26

## 2023-08-29 PROCEDURE — 93970 EXTREMITY STUDY: CPT

## 2023-08-29 RX ORDER — FOLIC ACID 0.8 MG
1 TABLET ORAL DAILY
Refills: 0 | Status: DISCONTINUED | OUTPATIENT
Start: 2023-08-29 | End: 2023-08-31

## 2023-08-29 RX ORDER — QUETIAPINE FUMARATE 200 MG/1
200 TABLET, FILM COATED ORAL AT BEDTIME
Refills: 0 | Status: DISCONTINUED | OUTPATIENT
Start: 2023-08-29 | End: 2023-08-31

## 2023-08-29 RX ORDER — ACETAMINOPHEN 500 MG
650 TABLET ORAL EVERY 6 HOURS
Refills: 0 | Status: DISCONTINUED | OUTPATIENT
Start: 2023-08-29 | End: 2023-08-31

## 2023-08-29 RX ORDER — MAGNESIUM SULFATE 500 MG/ML
2 VIAL (ML) INJECTION
Refills: 0 | Status: COMPLETED | OUTPATIENT
Start: 2023-08-29 | End: 2023-08-29

## 2023-08-29 RX ORDER — PANTOPRAZOLE SODIUM 20 MG/1
40 TABLET, DELAYED RELEASE ORAL DAILY
Refills: 0 | Status: DISCONTINUED | OUTPATIENT
Start: 2023-08-29 | End: 2023-08-30

## 2023-08-29 RX ORDER — CHLORHEXIDINE GLUCONATE 213 G/1000ML
1 SOLUTION TOPICAL
Refills: 0 | Status: DISCONTINUED | OUTPATIENT
Start: 2023-08-29 | End: 2023-08-31

## 2023-08-29 RX ORDER — KETOROLAC TROMETHAMINE 30 MG/ML
30 SYRINGE (ML) INJECTION ONCE
Refills: 0 | Status: DISCONTINUED | OUTPATIENT
Start: 2023-08-29 | End: 2023-08-29

## 2023-08-29 RX ORDER — ONDANSETRON 8 MG/1
4 TABLET, FILM COATED ORAL ONCE
Refills: 0 | Status: DISCONTINUED | OUTPATIENT
Start: 2023-08-29 | End: 2023-08-31

## 2023-08-29 RX ORDER — SODIUM CHLORIDE 9 MG/ML
1000 INJECTION INTRAMUSCULAR; INTRAVENOUS; SUBCUTANEOUS ONCE
Refills: 0 | Status: COMPLETED | OUTPATIENT
Start: 2023-08-29 | End: 2023-08-29

## 2023-08-29 RX ORDER — PANTOPRAZOLE SODIUM 20 MG/1
40 TABLET, DELAYED RELEASE ORAL ONCE
Refills: 0 | Status: COMPLETED | OUTPATIENT
Start: 2023-08-29 | End: 2023-08-29

## 2023-08-29 RX ORDER — DIAZEPAM 5 MG
5 TABLET ORAL ONCE
Refills: 0 | Status: DISCONTINUED | OUTPATIENT
Start: 2023-08-29 | End: 2023-08-29

## 2023-08-29 RX ORDER — SODIUM CHLORIDE 9 MG/ML
1000 INJECTION, SOLUTION INTRAVENOUS
Refills: 0 | Status: DISCONTINUED | OUTPATIENT
Start: 2023-08-29 | End: 2023-08-30

## 2023-08-29 RX ORDER — ONDANSETRON 8 MG/1
4 TABLET, FILM COATED ORAL EVERY 6 HOURS
Refills: 0 | Status: DISCONTINUED | OUTPATIENT
Start: 2023-08-29 | End: 2023-08-31

## 2023-08-29 RX ORDER — ONDANSETRON 8 MG/1
4 TABLET, FILM COATED ORAL ONCE
Refills: 0 | Status: COMPLETED | OUTPATIENT
Start: 2023-08-29 | End: 2023-08-29

## 2023-08-29 RX ORDER — THIAMINE MONONITRATE (VIT B1) 100 MG
100 TABLET ORAL ONCE
Refills: 0 | Status: COMPLETED | OUTPATIENT
Start: 2023-08-29 | End: 2023-08-29

## 2023-08-29 RX ORDER — DIAZEPAM 5 MG
10 TABLET ORAL ONCE
Refills: 0 | Status: DISCONTINUED | OUTPATIENT
Start: 2023-08-29 | End: 2023-08-29

## 2023-08-29 RX ORDER — ENOXAPARIN SODIUM 100 MG/ML
40 INJECTION SUBCUTANEOUS EVERY 12 HOURS
Refills: 0 | Status: DISCONTINUED | OUTPATIENT
Start: 2023-08-29 | End: 2023-08-31

## 2023-08-29 RX ORDER — GUAIFENESIN/DEXTROMETHORPHAN 600MG-30MG
10 TABLET, EXTENDED RELEASE 12 HR ORAL EVERY 6 HOURS
Refills: 0 | Status: DISCONTINUED | OUTPATIENT
Start: 2023-08-29 | End: 2023-08-31

## 2023-08-29 RX ADMIN — Medication 30 MILLIGRAM(S): at 07:31

## 2023-08-29 RX ADMIN — Medication 100 MILLIGRAM(S): at 04:04

## 2023-08-29 RX ADMIN — Medication 25 GRAM(S): at 09:23

## 2023-08-29 RX ADMIN — PANTOPRAZOLE SODIUM 40 MILLIGRAM(S): 20 TABLET, DELAYED RELEASE ORAL at 06:03

## 2023-08-29 RX ADMIN — QUETIAPINE FUMARATE 200 MILLIGRAM(S): 200 TABLET, FILM COATED ORAL at 22:27

## 2023-08-29 RX ADMIN — SODIUM CHLORIDE 2000 MILLILITER(S): 9 INJECTION INTRAMUSCULAR; INTRAVENOUS; SUBCUTANEOUS at 04:04

## 2023-08-29 RX ADMIN — Medication 2 MILLIGRAM(S): at 11:48

## 2023-08-29 RX ADMIN — SODIUM CHLORIDE 100 MILLILITER(S): 9 INJECTION, SOLUTION INTRAVENOUS at 17:13

## 2023-08-29 RX ADMIN — Medication 1 MILLIGRAM(S): at 11:40

## 2023-08-29 RX ADMIN — Medication 5 MILLIGRAM(S): at 07:31

## 2023-08-29 RX ADMIN — Medication 2 MILLIGRAM(S): at 22:27

## 2023-08-29 RX ADMIN — Medication 10 MILLIGRAM(S): at 04:04

## 2023-08-29 RX ADMIN — Medication 1 TABLET(S): at 11:40

## 2023-08-29 RX ADMIN — Medication 2 MILLIGRAM(S): at 05:58

## 2023-08-29 RX ADMIN — ENOXAPARIN SODIUM 40 MILLIGRAM(S): 100 INJECTION SUBCUTANEOUS at 17:16

## 2023-08-29 RX ADMIN — CHLORHEXIDINE GLUCONATE 1 APPLICATION(S): 213 SOLUTION TOPICAL at 09:23

## 2023-08-29 RX ADMIN — Medication 2 MILLIGRAM(S): at 17:51

## 2023-08-29 RX ADMIN — Medication 2 MILLIGRAM(S): at 14:15

## 2023-08-29 RX ADMIN — Medication 30 MILLIGRAM(S): at 08:30

## 2023-08-29 RX ADMIN — ONDANSETRON 4 MILLIGRAM(S): 8 TABLET, FILM COATED ORAL at 11:37

## 2023-08-29 RX ADMIN — ONDANSETRON 4 MILLIGRAM(S): 8 TABLET, FILM COATED ORAL at 05:56

## 2023-08-29 RX ADMIN — Medication 25 GRAM(S): at 11:39

## 2023-08-29 RX ADMIN — Medication 2 MILLIGRAM(S): at 06:03

## 2023-08-29 RX ADMIN — SODIUM CHLORIDE 100 MILLILITER(S): 9 INJECTION, SOLUTION INTRAVENOUS at 08:21

## 2023-08-29 NOTE — H&P ADULT - ASSESSMENT
1. ETOH WD/ w lactic acidosis / impending DT  2. Hx- alcohol abuse,gastritis, esophagitis, dyslipidemia    Plan:  adm to ICU  CIWA  protocol  addiction med consult  IVF hydration  folic acid, thiamine , MVI   monitor for electrolyte imbalance

## 2023-08-29 NOTE — CONSULT NOTE ADULT - ASSESSMENT
IMPRESSION:      PLAN:    CNS:    HEENT:    PULMONARY:    CARDIOVASCULAR:    GI: GI prophylaxis.  Feeding     RENAL:    INFECTIOUS DISEASE:    HEMATOLOGICAL:  DVT prophylaxis.    ENDOCRINE:  Follow up FS.  Insulin protocol if needed.    MUSCULOSKELETAL:       IMPRESSION:  alter mental status due to toxic / metabolic encephalopathy  alcohol withdrawal and delirium tremens      PLAN:    CNS: , thiamine / folate   MVI   precedex drip as needed   ativan PRN IV/PO    HEENT: oral care     PULMONARY: keep pox > 92 %   ETCO2 trending if very somnolent    CARDIOVASCULAR: echo   iv hydration     GI: GI prophylaxis.    NPO until fully awake    RENAL:   follow lytes   replace K and follow K closely   check iP    INFECTIOUS DISEASE: no abx     HEMATOLOGICAL:  DVT prophylaxis.    ENDOCRINE:  Follow up FS.  Insulin protocol as needed. check TSH    continue to monitor in the ICU

## 2023-08-29 NOTE — CONSULT NOTE ADULT - SUBJECTIVE AND OBJECTIVE BOX
FROM ADMISSION NOTE  44 yo W female, w/PMH: alcohol abuse, esophagitis, dyslipidemia, Graves disease, anxiety DO,  Pt drinks 1.75 l vodka daily.  Pt was trying to self detox at home but developed chest pains , sob.  pt is for adm to ICU for alcohol WD , impending DT        Pt interviewed, examined and EMR chart reviewed.  Pt admits to drinking ~1 liter vodka per day (1.75 every 2 days). x 3 weeks. Pt states she had 5 weeks sobriety after last admission the end of June.  Last Drink day of admission.Pt states she came in becasue of shortness of breath.   Pt with extensive history of withdrawal including DTs, tremors and seizures. Pt is currently still intoxicated since BAL was 277 at 4am. Pt was interviewed at 9:30. BAL ~ 100 at hat time.    variable periods of sobriety in the past.  Has been in detox before __X___yes,   _____No    SOCIAL HISTORY:    REVIEW OF SYSTEMS:    Constitutional: No fever, weight loss or fatigue  ENT:  No difficulty hearing, tinnitus, vertigo; No sinus or throat pain  Neck: No pain or stiffness  Respiratory: SEE HPI  Cardiovascular: SEE HPI  Gastrointestinal: No abdominal or epigastric pain. No nausea, vomiting or hematemesis; No diarrhea or constipation. No melena or hematochezia.  Neurological: No headaches, memory loss, loss of strength, numbness or tremors  Musculoskeletal: No joint pain or swelling; No muscle, back or extremity pain  Psychiatric: No depression, anxiety, mood swings or difficulty sleeping    MEDICATIONS  (STANDING):  chlorhexidine 2% Cloths 1 Application(s) Topical <User Schedule>  dextrose 5% + sodium chloride 0.9%. 1000 milliLiter(s) (100 mL/Hr) IV Continuous <Continuous>  enoxaparin Injectable 40 milliGRAM(s) SubCutaneous every 12 hours  folic acid 1 milliGRAM(s) Oral daily  magnesium sulfate  IVPB 2 Gram(s) IV Intermittent every 2 hours  multivitamin 1 Tablet(s) Oral daily  ondansetron Injectable 4 milliGRAM(s) IV Push once  pantoprazole  Injectable 40 milliGRAM(s) IV Push daily    MEDICATIONS  (PRN):  acetaminophen     Tablet .. 650 milliGRAM(s) Oral every 6 hours PRN Temp greater or equal to 38.5C (101.3F), Severe Pain (7 - 10)  LORazepam   Injectable 2 milliGRAM(s) IV Push every 2 hours PRN CIWA-Ar score increase by 2 points and a total score of 7 or less  ondansetron Injectable 4 milliGRAM(s) IV Push every 6 hours PRN Nausea and/or Vomiting      Vital Signs Last 24 Hrs  T(C): 36.4 (29 Aug 2023 11:00), Max: 37 (29 Aug 2023 03:11)  T(F): 97.6 (29 Aug 2023 11:00), Max: 98.6 (29 Aug 2023 03:11)  HR: 96 (29 Aug 2023 10:00) (96 - 133)  BP: 128/69 (29 Aug 2023 10:00) (128/61 - 142/96)  BP(mean): 92 (29 Aug 2023 10:00) (88 - 92)  RR: 30 (29 Aug 2023 11:00) (18 - 37)  SpO2: 99% (29 Aug 2023 10:00) (97% - 99%)    Parameters below as of 29 Aug 2023 08:07  Patient On (Oxygen Delivery Method): nasal cannula  O2 Flow (L/min): 2      PHYSICAL EXAM:    Constitutional: NAD, well-groomed, well-developed  HEENT: PERRLA, EOMI, Normal Hearing, MMM  Neck: No LAD, No JVD  Back: Normal spine flexure, No CVA tenderness  Respiratory: CTAB/L  Cardiovascular: S1 and S2, RRR, no M/G/R  Gastrointestinal: BS+, soft, NT/ND  Extremities: No peripheral edema  Vascular: 2+ peripheral pulses  Neurological: A/O x 3, no focal deficits    LABS:                        14.1   7.43  )-----------( 306      ( 29 Aug 2023 03:57 )             40.3     08-29    139  |  99  |  5<L>  ----------------------------<  85  3.9   |  21  |  0.5<L>    Ca    8.4      29 Aug 2023 07:00  Phos  2.4     08-29  Mg     1.7     08-29    TPro  7.5  /  Alb  4.6  /  TBili  0.5  /  DBili  x   /  AST  87<H>  /  ALT  45<H>  /  AlkPhos  87  08-29    PT/INR - ( 29 Aug 2023 03:57 )   PT: 11.10 sec;   INR: 0.97 ratio         PTT - ( 29 Aug 2023 03:57 )  PTT:37.3 sec  Urinalysis Basic - ( 29 Aug 2023 07:00 )    Color: x / Appearance: x / SG: x / pH: x  Gluc: 85 mg/dL / Ketone: x  / Bili: x / Urobili: x   Blood: x / Protein: x / Nitrite: x   Leuk Esterase: x / RBC: x / WBC x   Sq Epi: x / Non Sq Epi: x / Bacteria: x      Drug Screen Urine:  Alcohol Level  Alcohol, Blood: 277 mg/dL (08-29-23 @ 03:57)        RADIOLOGY & ADDITIONAL STUDIES:    
Patient is a 43y old  Female who presents with a chief complaint of alcohol WD (29 Aug 2023 06:21)      HPI:  44 yo W female, w/PMH: alcohol abuse, esophagitis, dyslipidemia, Graves disease, anxiety DO,  Pt drinks 1.75 l vodka daily.  Pt was trying to self detox at home but developed chest pains , sob.  pt is for adm to ICU for alcohol WD , impending DT (29 Aug 2023 06:21)      PAST MEDICAL & SURGICAL HISTORY:  Alcoholism      Anxiety      Graves' disease      GERD (gastroesophageal reflux disease)      Gastritis      MVP (mitral valve prolapse)      Nicotine dependence      Benzodiazepine misuse      Hypercholesterolemia      Obesity      Esophagitis      History of Graves' disease      Heart murmur      Hyperlipidemia, unspecified hyperlipidemia type      Hemochromatosis      Fatty liver      History of  section      S/P tendon repair  5th finger right hand          SOCIAL HX:   Smoking                         ETOH                            Other    FAMILY HISTORY:  Benzodiazepine misuse    :  No known cardiovacular family hisotry     Review Of Systems:     All ROS are negative except per HPI       Allergies    No Known Allergies    Intolerances      PHYSICAL EXAM    ICU Vital Signs Last 24 Hrs  T(C): 37 (29 Aug 2023 03:11), Max: 37 (29 Aug 2023 03:11)  T(F): 98.6 (29 Aug 2023 03:11), Max: 98.6 (29 Aug 2023 03:11)  HR: 113 (29 Aug 2023 06:06) (113 - 133)  BP: 136/80 (29 Aug 2023 06:06) (136/80 - 142/96)  BP(mean): --  ABP: --  ABP(mean): --  RR: 18 (29 Aug 2023 06:06) (18 - 18)  SpO2: 97% (29 Aug 2023 06:06) (97% - 97%)    O2 Parameters below as of 29 Aug 2023 06:06  Patient On (Oxygen Delivery Method): room air            CONSTITUTIONAL:  Well nourished.   NAD    ENT:   Airway patent,   Mouth with normal mucosa.   No thrush      CARDIAC:   Normal rate,   Regular rhythm.    No edema      Vascular:   normal systolic impulse  no bruits    RESPIRATORY:   No wheezing  Bilateral BS   Not tachypneic,  No use of accessory muscles    GASTROINTESTINAL:  Abdomen soft,   Non-tender,   No guarding,   + BS      NEUROLOGICAL:   Alert and oriented   No motor deficits.    SKIN:   Skin normal color for race,   No evidence of rash.      HEME LYMPH: .  No cervical  lymphadenopathy.  No inguinal lymphadenopathy              LABS:                          14.1   7.43  )-----------( 306      ( 29 Aug 2023 03:57 )             40.3                                                   139  |  99  |  5<L>  ----------------------------<  85  3.9   |  21  |  0.5<L>    Ca    8.4      29 Aug 2023 07:00  Phos  2.4       Mg     1.7         TPro  7.5  /  Alb  4.6  /  TBili  0.5  /  DBili  x   /  AST  87<H>  /  ALT  45<H>  /  AlkPhos  87        PT/INR - ( 29 Aug 2023 03:57 )   PT: 11.10 sec;   INR: 0.97 ratio         PTT - ( 29 Aug 2023 03:57 )  PTT:37.3 sec                                       Urinalysis Basic - ( 29 Aug 2023 07:00 )    Color: x / Appearance: x / SG: x / pH: x  Gluc: 85 mg/dL / Ketone: x  / Bili: x / Urobili: x   Blood: x / Protein: x / Nitrite: x   Leuk Esterase: x / RBC: x / WBC x   Sq Epi: x / Non Sq Epi: x / Bacteria: x        CARDIAC MARKERS ( 29 Aug 2023 03:57 )  x     / <0.01 ng/mL / x     / x     / x                                                LIVER FUNCTIONS - ( 29 Aug 2023 07:00 )  Alb: 4.6 g/dL / Pro: 7.5 g/dL / ALK PHOS: 87 U/L / ALT: 45 U/L / AST: 87 U/L / GGT: x                                                                                                                                       X-Rays reviewed                                                                                     ECHO    CXR interpreted by me     MEDICATIONS  (STANDING):  chlorhexidine 2% Cloths 1 Application(s) Topical <User Schedule>  dextrose 5% + sodium chloride 0.9%. 1000 milliLiter(s) (100 mL/Hr) IV Continuous <Continuous>  enoxaparin Injectable 40 milliGRAM(s) SubCutaneous every 12 hours  folic acid 1 milliGRAM(s) Oral daily  multivitamin 1 Tablet(s) Oral daily  ondansetron Injectable 4 milliGRAM(s) IV Push once  pantoprazole  Injectable 40 milliGRAM(s) IV Push daily    MEDICATIONS  (PRN):  LORazepam   Injectable 2 milliGRAM(s) IV Push every 2 hours PRN BISHNUWA-Ar score increase by 2 points and a total score of 7 or less

## 2023-08-29 NOTE — PATIENT PROFILE ADULT - FALL HARM RISK - HARM RISK INTERVENTIONS
Assistance with ambulation/Assistance OOB with selected safe patient handling equipment/Communicate Risk of Fall with Harm to all staff/Monitor for mental status changes/Monitor gait and stability/Reinforce activity limits and safety measures with patient and family/Tailored Fall Risk Interventions/Toileting schedule using arm’s reach rule for commode and bathroom/Use of alarms - bed, chair and/or voice tab/Visual Cue: Yellow wristband and red socks/Bed in lowest position, wheels locked, appropriate side rails in place/Call bell, personal items and telephone in reach/Instruct patient to call for assistance before getting out of bed or chair/Non-slip footwear when patient is out of bed/[place] to call system/Physically safe environment - no spills, clutter or unnecessary equipment/Purposeful Proactive Rounding/Room/bathroom lighting operational, light cord in reach

## 2023-08-29 NOTE — ED PROVIDER NOTE - CLINICAL SUMMARY MEDICAL DECISION MAKING FREE TEXT BOX
43yFalcohol abuse and  DT's, GERD, gastritis, esophagitis presented  to the emergency room intoxicated with an alcohol level of 277 reporting that she is withdrawing from alcohol  pt tachy 130 in ED   valium 10 given . Pt clinically improved and admitted to ICU

## 2023-08-29 NOTE — CONSULT NOTE ADULT - ATTENDING COMMENTS
Attending Statement: I have personally performed a face to face diagnostic evaluation on this patient. The patient is suffering from:  alter mental status due to toxic / metabolic encephalopathy  alcohol withdrawal and delirium tremens  I have made amendments to the documentation where necessary. I have personally seen and examined this patient.  I have fully participated in the care of this patient.  I have reviewed all pertinent clinical information, including history, physical exam, plan and note.

## 2023-08-29 NOTE — ED PROVIDER NOTE - NS ED MD DISPO SPECIAL CONSIDERATION1
How Severe Are Your Spot(S)?: mild What Is The Reason For Today's Visit?: Full Body Skin Examination What Is The Reason For Today's Visit? (Being Monitored For X): the development of a new lesion None

## 2023-08-29 NOTE — ED PROVIDER NOTE - PHYSICAL EXAMINATION
Physical Exam    Vital Signs: I have reviewed the initial vital signs.  Constitutional: no acute distress  Eyes: Conjunctiva pink, Sclera clear, PERRLA, EOMI.  Cardiovascular: S1 and S2, tachy regular rate, regular rhythm, well-perfused extremities, radial pulses equal and 2+  Respiratory: unlabored respiratory effort, clear to auscultation bilaterally no wheezing, rales and rhonchi  Gastrointestinal: soft, non-tender abdomen, no pulsatile mass, normal bowl sounds  Musculoskeletal: supple neck, no lower extremity edema, no midline tenderness  Integumentary: warm, diaphoretic no rash  Neurologic: awake, alert, cranial nerves II-XII grossly intact, extremities’ motor and sensory functions grossly intact  tremors and tongue fasticulations   Psychiatric: appropriate mood, appropriate affect

## 2023-08-29 NOTE — ED PROVIDER NOTE - OBJECTIVE STATEMENT
43 year old female past medical history of ETOH dependence history of DT in the past, GERD, gastritis, esophagitis comes to emergency room for alcohol with drawl and chest pain. patient states that she has been trying to self ween herself off of Alcohol with ativan pills that she has at home. patient states that despite that the withdrawl has gotten bad and prompted her to drink tonight. patient states she began to vomit and having chest pain and sweaing and came to the emergency room.

## 2023-08-29 NOTE — H&P ADULT - NSHPLABSRESULTS_GEN_ALL_CORE
14.1   7.43  )-----------( 306      ( 29 Aug 2023 03:57 )             40.3       08-29    141  |  96<L>  |  5<L>  ----------------------------<  98  3.5   |  20  |  0.6<L>    Ca    9.0      29 Aug 2023 03:57  Mg     1.8     08-29    TPro  7.9  /  Alb  4.9  /  TBili  0.4  /  DBili  x   /  AST  95<H>  /  ALT  49<H>  /  AlkPhos  94  08-29              Urinalysis Basic - ( 29 Aug 2023 03:57 )    Color: x / Appearance: x / SG: x / pH: x  Gluc: 98 mg/dL / Ketone: x  / Bili: x / Urobili: x   Blood: x / Protein: x / Nitrite: x   Leuk Esterase: x / RBC: x / WBC x   Sq Epi: x / Non Sq Epi: x / Bacteria: x        PT/INR - ( 29 Aug 2023 03:57 )   PT: 11.10 sec;   INR: 0.97 ratio         PTT - ( 29 Aug 2023 03:57 )  PTT:37.3 sec    Lactate Trend      CARDIAC MARKERS ( 29 Aug 2023 03:57 )  x     / <0.01 ng/mL / x     / x     / x            CAPILLARY BLOOD GLUCOSE

## 2023-08-29 NOTE — PATIENT PROFILE ADULT - FALL HARM RISK - TYPE OF ASSESSMENT
Admission Cantharidin Pregnancy And Lactation Text: The use of this medication during pregnancy or lactation is not recommended as there is insufficient data.

## 2023-08-29 NOTE — CHART NOTE - NSCHARTNOTEFT_GEN_A_CORE
From ICU to Floor    HPI    42 yo woman, w/ PMHx: EtOH misuse, esophagitis, dyslipidemia, Graves disease, anxiety DO,  Pt drinks 1.75 l vodka daily.  Pt was trying to self detox at home but developed chest pains , sob.  Pt presented w/ elevated EtOH.       Hospital Course  Pt on unit with CIWA between 5-10 on prn benzos. No complications. Home meds resumed as appropriate.    Pt downgraded to floor, see today's note for current A&P;
changed IV ativan pushes to PO CIWA protocol, addiction medicine to follow up tomorrow

## 2023-08-29 NOTE — CONSULT NOTE ADULT - PROBLEM SELECTOR PROBLEM 1
Alcohol dependency
Implemented All Fall with Harm Risk Interventions:  [place] to call system. Call bell, personal items and telephone within reach. Instruct patient to call for assistance. Room bathroom lighting operational. Non-slip footwear when patient is off stretcher. Physically safe environment: no spills, clutter or unnecessary equipment. Stretcher in lowest position, wheels locked, appropriate side rails in place. Provide visual cue, wrist band, yellow gown, etc. Monitor gait and stability. Monitor for mental status changes and reorient to person, place, and time. Review medications for side effects contributing to fall risk. Reinforce activity limits and safety measures with patient and family. Provide visual clues: red socks.

## 2023-08-29 NOTE — H&P ADULT - NSHPPHYSICALEXAM_GEN_ALL_CORE
GENERAL:  44y/o W Female pt's tremulous anxious,   HEAD:  Atraumatic, Normocephalic  EYES: EOMI, PERRLA, conjunctiva and sclera clear  NECK: Supple, No JVD, no cervical lymphadenopathy, non-tender  CHEST/LUNG: Clear to auscultation bilaterally; No wheeze, rhonchi, or rales  HEART: Regular rate and rhythm; S1&S2  ABDOMEN: Soft, Nontender, Nondistended x 4 quadrants; Bowel sounds present  EXTREMITIES:   Peripheral Pulses Present, No clubbing, no cyanosis, or no edema, no calf tenderness  PSYCH: AAOx3, cooperative, appropriate  NEUROLOGY: WNL  SKIN: WNL

## 2023-08-29 NOTE — H&P ADULT - CRITICAL CARE ATTENDING COMMENT
Pt seen w PA and agree w above.  Pt w Hx of etoh abuse and DT's as well as withdrawal sz.  Will monitor in ICU w CIWA protocol.  Thiamine folic acid b12. may start precedex as needed.

## 2023-08-29 NOTE — PATIENT PROFILE ADULT - PUBLIC BENEFITS - CHOOSE ALL APPLY
Pt met with clinical coordinator ([address] [place] [address]) in room with door closed  Pt attempted suicide by cutting self on left wrist that needed three stitches to close  Girlfriend called 911  Pt reported that he attempted due to losing job as a  and that he is currently homeless  Pt reported inpatient history at [facility] in past but does not currently have a mental health provider  Pt reported no legal or substance use  Pt reported smoking half of a pack of cigarettes a day  Pt denies homicidal ideation and denies symptoms of psychosis  Pt was informed of 201 vs 302 treatment options  Pt appeared frustrated and irritated  Pt was informed that per HIPPA hospital staff can not release information to outside entities without consent  Pt given time to think over options  food stamps (SNAP)

## 2023-08-29 NOTE — CONSULT NOTE ADULT - PROBLEM SELECTOR RECOMMENDATION 9
After evaluation at this time could continue on CIWA protocol, if patient needs doses with increased frequency would consider starting standing protocol with PRN-CIWA usage.  Pt should be on Thiamine and Folic acid. Pt will be monitored and supportive care provided.  Obtain UDS if not done already.  Use of Vistaril for anxiety.  Consider adding gabapentin for anxiety standing order and follow up with PMD/Program for continuation of treatment.    Abdominal ultrasound AFP every 6 months for HCC screening  -EGD outpatient for esophageal varices screening   -Follow up with Private GI or our [facility] located at [address] [place] [address]. Phone Number: [phone]  -Alcohol counseling provided   CATCH team involved for aftercare and pt will follow up with aftercare to REHAB. Pt is considering at this time.

## 2023-08-29 NOTE — H&P ADULT - HISTORY OF PRESENT ILLNESS
42 yo W female, w/PMH: alcohol abuse, esophagitis, dyslipidemia, Graves disease, anxiety DO,  Pt drinks 1.75 l vodka daily.  Pt was trying to self detox at home but developed chest pains , sob.  pt is for adm to ICU for alcohol WD , impending DT

## 2023-08-29 NOTE — PATIENT PROFILE ADULT - FUNCTIONAL ASSESSMENT - BASIC MOBILITY 6.
3-calculated by average/Not able to assess (calculate score using [facility] averaging method)

## 2023-08-30 DIAGNOSIS — K21.9 GASTRO-ESOPHAGEAL REFLUX DISEASE WITHOUT ESOPHAGITIS: ICD-10-CM

## 2023-08-30 DIAGNOSIS — F10.20 ALCOHOL DEPENDENCE, UNCOMPLICATED: ICD-10-CM

## 2023-08-30 DIAGNOSIS — F10.239 ALCOHOL DEPENDENCE WITH WITHDRAWAL, UNSPECIFIED: ICD-10-CM

## 2023-08-30 DIAGNOSIS — Z29.9 ENCOUNTER FOR PROPHYLACTIC MEASURES, UNSPECIFIED: ICD-10-CM

## 2023-08-30 LAB
AMPHET UR-MCNC: NEGATIVE — SIGNIFICANT CHANGE UP
ANION GAP SERPL CALC-SCNC: 14 MMOL/L — SIGNIFICANT CHANGE UP (ref 7–14)
BARBITURATES UR SCN-MCNC: NEGATIVE — SIGNIFICANT CHANGE UP
BENZODIAZ UR-MCNC: NEGATIVE — SIGNIFICANT CHANGE UP
BUN SERPL-MCNC: 6 MG/DL — LOW (ref 10–20)
CALCIUM SERPL-MCNC: 9.3 MG/DL — SIGNIFICANT CHANGE UP (ref 8.4–10.5)
CHLORIDE SERPL-SCNC: 104 MMOL/L — SIGNIFICANT CHANGE UP (ref 98–110)
CO2 SERPL-SCNC: 20 MMOL/L — SIGNIFICANT CHANGE UP (ref 17–32)
COCAINE METAB.OTHER UR-MCNC: NEGATIVE — SIGNIFICANT CHANGE UP
CREAT SERPL-MCNC: 0.6 MG/DL — LOW (ref 0.7–1.5)
DRUG SCREEN 1, URINE RESULT: SIGNIFICANT CHANGE UP
EGFR: 116 ML/MIN/1.73M2 — SIGNIFICANT CHANGE UP
FENTANYL UR QL: NEGATIVE — SIGNIFICANT CHANGE UP
GLUCOSE SERPL-MCNC: 106 MG/DL — HIGH (ref 70–99)
HCT VFR BLD CALC: 36.1 % — LOW (ref 37–47)
HGB BLD-MCNC: 12.2 G/DL — SIGNIFICANT CHANGE UP (ref 12–16)
LACTATE SERPL-SCNC: 1.1 MMOL/L — SIGNIFICANT CHANGE UP (ref 0.7–2)
MAGNESIUM SERPL-MCNC: 2.1 MG/DL — SIGNIFICANT CHANGE UP (ref 1.8–2.4)
MCHC RBC-ENTMCNC: 33.8 G/DL — SIGNIFICANT CHANGE UP (ref 32–37)
MCHC RBC-ENTMCNC: 34.8 PG — HIGH (ref 27–31)
MCV RBC AUTO: 102.8 FL — HIGH (ref 81–99)
METHADONE UR-MCNC: NEGATIVE — SIGNIFICANT CHANGE UP
NRBC # BLD: 0 /100 WBCS — SIGNIFICANT CHANGE UP (ref 0–0)
OPIATES UR-MCNC: NEGATIVE — SIGNIFICANT CHANGE UP
OXYCODONE UR-MCNC: NEGATIVE — SIGNIFICANT CHANGE UP
PCP UR-MCNC: NEGATIVE — SIGNIFICANT CHANGE UP
PHOSPHATE SERPL-MCNC: 3.1 MG/DL — SIGNIFICANT CHANGE UP (ref 2.1–4.9)
PLATELET # BLD AUTO: 179 K/UL — SIGNIFICANT CHANGE UP (ref 130–400)
PMV BLD: 9.7 FL — SIGNIFICANT CHANGE UP (ref 7.4–10.4)
POTASSIUM SERPL-MCNC: 4.8 MMOL/L — SIGNIFICANT CHANGE UP (ref 3.5–5)
POTASSIUM SERPL-SCNC: 4.8 MMOL/L — SIGNIFICANT CHANGE UP (ref 3.5–5)
PROPOXYPHENE QUALITATIVE URINE RESULT: NEGATIVE — SIGNIFICANT CHANGE UP
RBC # BLD: 3.51 M/UL — LOW (ref 4.2–5.4)
RBC # FLD: 13.6 % — SIGNIFICANT CHANGE UP (ref 11.5–14.5)
SODIUM SERPL-SCNC: 138 MMOL/L — SIGNIFICANT CHANGE UP (ref 135–146)
THC UR QL: NEGATIVE — SIGNIFICANT CHANGE UP
TROPONIN T SERPL-MCNC: <0.01 NG/ML — SIGNIFICANT CHANGE UP
WBC # BLD: 4.52 K/UL — LOW (ref 4.8–10.8)
WBC # FLD AUTO: 4.52 K/UL — LOW (ref 4.8–10.8)

## 2023-08-30 PROCEDURE — 99232 SBSQ HOSP IP/OBS MODERATE 35: CPT

## 2023-08-30 PROCEDURE — 99231 SBSQ HOSP IP/OBS SF/LOW 25: CPT

## 2023-08-30 RX ORDER — SIMETHICONE 80 MG/1
80 TABLET, CHEWABLE ORAL THREE TIMES A DAY
Refills: 0 | Status: DISCONTINUED | OUTPATIENT
Start: 2023-08-30 | End: 2023-08-31

## 2023-08-30 RX ORDER — PANTOPRAZOLE SODIUM 20 MG/1
40 TABLET, DELAYED RELEASE ORAL
Refills: 0 | Status: DISCONTINUED | OUTPATIENT
Start: 2023-08-30 | End: 2023-08-31

## 2023-08-30 RX ORDER — FAMOTIDINE 10 MG/ML
20 INJECTION INTRAVENOUS DAILY
Refills: 0 | Status: DISCONTINUED | OUTPATIENT
Start: 2023-08-30 | End: 2023-08-31

## 2023-08-30 RX ADMIN — Medication 2 MILLIGRAM(S): at 09:54

## 2023-08-30 RX ADMIN — PANTOPRAZOLE SODIUM 40 MILLIGRAM(S): 20 TABLET, DELAYED RELEASE ORAL at 11:50

## 2023-08-30 RX ADMIN — SIMETHICONE 80 MILLIGRAM(S): 80 TABLET, CHEWABLE ORAL at 10:47

## 2023-08-30 RX ADMIN — SIMETHICONE 80 MILLIGRAM(S): 80 TABLET, CHEWABLE ORAL at 23:31

## 2023-08-30 RX ADMIN — Medication 1 TABLET(S): at 11:55

## 2023-08-30 RX ADMIN — Medication 2 MILLIGRAM(S): at 15:07

## 2023-08-30 RX ADMIN — FAMOTIDINE 20 MILLIGRAM(S): 10 INJECTION INTRAVENOUS at 10:47

## 2023-08-30 RX ADMIN — Medication 1 MILLIGRAM(S): at 11:54

## 2023-08-30 RX ADMIN — QUETIAPINE FUMARATE 200 MILLIGRAM(S): 200 TABLET, FILM COATED ORAL at 23:31

## 2023-08-30 NOTE — PROGRESS NOTE ADULT - PROBLEM SELECTOR PLAN 1
[facility] should be on Thiamine and Folic acid. Pt will be monitored and supportive care provided.    Use of Vistaril for anxiety.  Consider adding gabapentin for anxiety standing order and follow up with PMD/Program for continuation of treatment.    Abdominal ultrasound AFP every 6 months for HCC screening  -EGD outpatient for esophageal varices screening   -Follow up with Private GI or our [facility] located at [address] [place] [address]. Phone Number: [phone]  -Alcohol counseling provided   CATCH team involved for aftercare and pt will follow up with aftercare to rehab vs outpatient
CHINYERE protocol  - treatment per addition medicine team  seroquel qhs

## 2023-08-30 NOTE — PROGRESS NOTE ADULT - ASSESSMENT
43 year old female with a  medical history of alcohol abuse and  DT's, GERD, gastritis, esophagitis, hx hyperthyroid? presented  to the emergency room intoxicated with an alcohol level of 277 reporting that she is withdrawing from alcohol and admitted to ICU no optimized and transferred to medicine floor to complete ciwa protocol  
43 year old female with a  medical history of alcohol abuse and  DT's, GERD, gastritis, esophagitis presented  to the emergency room intoxicated with an alcohol level of 277 reporting that she is withdrawing from alcohol and admitted to ICU    alcohol abuse with intoxication / suspected thiamine and folate deficiency      - may downgrade to floor   - supplement hypomagnesemia   - continue ativan prn for elevated CIWA score   - Protonix   - Zofran prn   - DVT prophylaxis

## 2023-08-31 ENCOUNTER — TRANSCRIPTION ENCOUNTER (OUTPATIENT)
Age: 43
End: 2023-08-31

## 2023-08-31 VITALS
SYSTOLIC BLOOD PRESSURE: 130 MMHG | RESPIRATION RATE: 18 BRPM | DIASTOLIC BLOOD PRESSURE: 78 MMHG | TEMPERATURE: 96 F | HEART RATE: 88 BPM

## 2023-08-31 LAB
ANION GAP SERPL CALC-SCNC: 11 MMOL/L — SIGNIFICANT CHANGE UP (ref 7–14)
BUN SERPL-MCNC: 6 MG/DL — LOW (ref 10–20)
CALCIUM SERPL-MCNC: 9.7 MG/DL — SIGNIFICANT CHANGE UP (ref 8.4–10.5)
CHLORIDE SERPL-SCNC: 102 MMOL/L — SIGNIFICANT CHANGE UP (ref 98–110)
CO2 SERPL-SCNC: 24 MMOL/L — SIGNIFICANT CHANGE UP (ref 17–32)
CREAT SERPL-MCNC: 0.5 MG/DL — LOW (ref 0.7–1.5)
EGFR: 121 ML/MIN/1.73M2 — SIGNIFICANT CHANGE UP
GLUCOSE SERPL-MCNC: 100 MG/DL — HIGH (ref 70–99)
MAGNESIUM SERPL-MCNC: 1.8 MG/DL — SIGNIFICANT CHANGE UP (ref 1.8–2.4)
POTASSIUM SERPL-MCNC: 4.1 MMOL/L — SIGNIFICANT CHANGE UP (ref 3.5–5)
POTASSIUM SERPL-SCNC: 4.1 MMOL/L — SIGNIFICANT CHANGE UP (ref 3.5–5)
SODIUM SERPL-SCNC: 137 MMOL/L — SIGNIFICANT CHANGE UP (ref 135–146)

## 2023-08-31 PROCEDURE — 99239 HOSP IP/OBS DSCHRG MGMT >30: CPT

## 2023-08-31 RX ORDER — PANTOPRAZOLE SODIUM 20 MG/1
1 TABLET, DELAYED RELEASE ORAL
Qty: 30 | Refills: 0
Start: 2023-08-31 | End: 2023-09-29

## 2023-08-31 RX ORDER — FAMOTIDINE 10 MG/ML
1 INJECTION INTRAVENOUS
Qty: 30 | Refills: 0
Start: 2023-08-31 | End: 2023-09-29

## 2023-08-31 RX ORDER — SIMETHICONE 80 MG/1
1 TABLET, CHEWABLE ORAL
Qty: 90 | Refills: 0
Start: 2023-08-31

## 2023-08-31 RX ORDER — PANTOPRAZOLE SODIUM 20 MG/1
1 TABLET, DELAYED RELEASE ORAL
Refills: 0 | DISCHARGE

## 2023-08-31 RX ORDER — FOLIC ACID 0.8 MG
1 TABLET ORAL
Qty: 30 | Refills: 0
Start: 2023-08-31 | End: 2023-09-29

## 2023-08-31 RX ADMIN — PANTOPRAZOLE SODIUM 40 MILLIGRAM(S): 20 TABLET, DELAYED RELEASE ORAL at 05:54

## 2023-08-31 RX ADMIN — Medication 1 MILLIGRAM(S): at 11:32

## 2023-08-31 RX ADMIN — Medication 1 TABLET(S): at 11:32

## 2023-08-31 RX ADMIN — FAMOTIDINE 20 MILLIGRAM(S): 10 INJECTION INTRAVENOUS at 11:32

## 2023-08-31 RX ADMIN — SIMETHICONE 80 MILLIGRAM(S): 80 TABLET, CHEWABLE ORAL at 05:54

## 2023-08-31 NOTE — DISCHARGE NOTE PROVIDER - NSDCFUSCHEDAPPT_GEN_ALL_CORE_FT
Unknown, Doctor  [facility] PreAdmits  Scheduled Appointment: 09/01/2023    [facility] Physician [facility]  ULTRASND SI [address]  Scheduled Appointment: 09/01/2023    Unknown, Doctor  [facility] PreAdmits  Scheduled Appointment: 09/01/2023    [facility]  BRSTIMAG SI [address]  Scheduled Appointment: 09/01/2023

## 2023-08-31 NOTE — DISCHARGE NOTE PROVIDER - HOSPITAL COURSE
43 year old female with a  medical history of alcohol abuse and  DT's, GERD, gastritis, esophagitis, hx hyperthyroid? presented  to the emergency room intoxicated with an alcohol level of 277 reporting that she is withdrawing from alcohol and admitted to ICU no optimized and transferred to medicine floor to complete ciwa protocol          · Problem: Alcohol dependence with withdrawal.   - CIWA protocol given  - treatment per addition medicine team  -  seroquel qhs.  - clinically improved and dc to home     Problem/Plan - 2:  ·  Problem: Chronic GERD.   ·  c/w pantoprazole  - given famotidine and simethicone also     Problem/Plan - 3:  ·  Problem: Prophylactic measure.   ·  Plan: DVT ppx: Lovenox  GOC: Full code   43 year old female with a  medical history of alcohol abuse and  DT's, GERD, gastritis, esophagitis, hx hyperthyroid? presented  to the emergency room intoxicated with an alcohol level of 277 reporting that she is withdrawing from alcohol and admitted to ICU no optimized and transferred to medicine floor to complete ciwa protocol          · Problem: Alcohol dependence with withdrawal.   - CIWA protocol given  - treatment per addition medicine team  -  seroquel qhs.  - clinically improved and dc to home     Problem/Plan - 2:  ·  Problem: Chronic GERD.   ·  c/w pantoprazole  - given famotidine and simethicone also     Problem/Plan - 3:  ·  Problem: Prophylactic measure.   ·  Plan: DVT ppx: Lovenox  GOC: Full code    attending attestation:  seen and examined on day of dc  labs and vitals reivewed  no symptoms dc today

## 2023-08-31 NOTE — DISCHARGE NOTE PROVIDER - NSDCMRMEDTOKEN_GEN_ALL_CORE_FT
folic acid 1 mg oral tablet: 1 tab(s) orally once a day  Multiple Vitamins oral tablet: 1 tab(s) orally once a day  Protonix 40 mg oral delayed release tablet: 1 tab(s) orally once a day  Seroquel 200 mg oral tablet: 1 tab(s) orally once a day (at bedtime)  simethicone 80 mg oral tablet, chewable: 1 tab(s) orally 3 times a day  thiamine 100 mg oral tablet: 1 orally once a day

## 2023-08-31 NOTE — DISCHARGE NOTE NURSING/CASE MANAGEMENT/SOCIAL WORK - NSDCPEFALRISK_GEN_ALL_CORE
For information on Fall & Injury Prevention, visit: [email or web address] OR  [email or web address] OR  https://www.cdc.gov/steadi/patient.html

## 2023-08-31 NOTE — DISCHARGE NOTE NURSING/CASE MANAGEMENT/SOCIAL WORK - PATIENT PORTAL LINK FT
You can access the FollowMyHealth Patient Portal offered by [facility] by registering at the following website: [email or web address]. By joining [employer]’s FollowMyHealth portal, you will also be able to view your health information using other applications (apps) compatible with our system.

## 2023-08-31 NOTE — DISCHARGE NOTE PROVIDER - NSDCCPCAREPLAN_GEN_ALL_CORE_FT
PRINCIPAL DISCHARGE DIAGNOSIS  Diagnosis: Alcohol withdrawal  Assessment and Plan of Treatment: Do not drink alcohol. recommend outpatient treatment program. Return to hospital if you have sever withdrawl symptoms such as seizure, hallucinations, vomiting. Continue folic acid, and vitamins.      SECONDARY DISCHARGE DIAGNOSES  Diagnosis: GERD (gastroesophageal reflux disease)  Assessment and Plan of Treatment: Continue pantoprzole and simethicone.

## 2023-08-31 NOTE — DISCHARGE NOTE PROVIDER - CARE PROVIDER_API CALL
Kenalog Preparation: Kenalog Italo Julio Cesar  Internal Medicine  [address] [place] [id number]  Phone: [phone]  Fax: [phone]  Follow Up Time: 1 week

## 2023-09-01 ENCOUNTER — OUTPATIENT (OUTPATIENT)
Dept: OUTPATIENT SERVICES | Facility: HOSPITAL | Age: 43
LOS: 1 days | End: 2023-09-01
Payer: MEDICAID

## 2023-09-01 DIAGNOSIS — Z98.891 HISTORY OF UTERINE SCAR FROM PREVIOUS SURGERY: Chronic | ICD-10-CM

## 2023-09-01 DIAGNOSIS — E05.00 THYROTOXICOSIS WITH DIFFUSE GOITER WITHOUT THYROTOXIC CRISIS OR STORM: ICD-10-CM

## 2023-09-01 DIAGNOSIS — Z98.890 OTHER SPECIFIED POSTPROCEDURAL STATES: Chronic | ICD-10-CM

## 2023-09-01 DIAGNOSIS — Z12.31 ENCOUNTER FOR SCREENING MAMMOGRAM FOR MALIGNANT NEOPLASM OF BREAST: ICD-10-CM

## 2023-09-01 PROCEDURE — 77063 BREAST TOMOSYNTHESIS BI: CPT | Mod: 26

## 2023-09-01 PROCEDURE — 77067 SCR MAMMO BI INCL CAD: CPT | Mod: 26

## 2023-09-01 PROCEDURE — 77063 BREAST TOMOSYNTHESIS BI: CPT

## 2023-09-01 PROCEDURE — 76536 US EXAM OF HEAD AND NECK: CPT

## 2023-09-01 PROCEDURE — 76536 US EXAM OF HEAD AND NECK: CPT | Mod: 26

## 2023-09-01 PROCEDURE — 77067 SCR MAMMO BI INCL CAD: CPT

## 2023-09-02 DIAGNOSIS — Z12.31 ENCOUNTER FOR SCREENING MAMMOGRAM FOR MALIGNANT NEOPLASM OF BREAST: ICD-10-CM

## 2023-09-02 DIAGNOSIS — E05.00 THYROTOXICOSIS WITH DIFFUSE GOITER WITHOUT THYROTOXIC CRISIS OR STORM: ICD-10-CM

## 2023-09-06 DIAGNOSIS — F10.231 ALCOHOL DEPENDENCE WITH WITHDRAWAL DELIRIUM: ICD-10-CM

## 2023-09-06 DIAGNOSIS — F10.220 ALCOHOL DEPENDENCE WITH INTOXICATION, UNCOMPLICATED: ICD-10-CM
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
## 2023-09-06 DIAGNOSIS — E05.00 THYROTOXICOSIS WITH DIFFUSE GOITER WITHOUT THYROTOXIC CRISIS OR STORM: ICD-10-CM

## 2023-09-06 DIAGNOSIS — E78.5 HYPERLIPIDEMIA, UNSPECIFIED: ICD-10-CM

## 2023-09-06 DIAGNOSIS — F41.9 ANXIETY DISORDER, UNSPECIFIED: ICD-10-CM

## 2023-09-06 DIAGNOSIS — K21.9 GASTRO-ESOPHAGEAL REFLUX DISEASE WITHOUT ESOPHAGITIS: ICD-10-CM

## 2023-09-06 DIAGNOSIS — G92.8 OTHER TOXIC ENCEPHALOPATHY: ICD-10-CM

## 2023-09-06 DIAGNOSIS — E87.20 ACIDOSIS, UNSPECIFIED: ICD-10-CM

## 2023-09-07 ENCOUNTER — APPOINTMENT (OUTPATIENT)
Dept: PAIN MANAGEMENT | Facility: CLINIC | Age: 43
End: 2023-09-07

## 2023-09-11 ENCOUNTER — APPOINTMENT (OUTPATIENT)
Dept: PSYCHIATRY | Facility: CLINIC | Age: 43
End: 2023-09-11

## 2023-09-18 ENCOUNTER — APPOINTMENT (OUTPATIENT)
Dept: PAIN MANAGEMENT | Facility: CLINIC | Age: 43
End: 2023-09-18

## 2023-09-25 ENCOUNTER — APPOINTMENT (OUTPATIENT)
Dept: PSYCHIATRY | Facility: CLINIC | Age: 43
End: 2023-09-25

## 2023-09-25 ENCOUNTER — OUTPATIENT (OUTPATIENT)
Dept: OUTPATIENT SERVICES | Facility: HOSPITAL | Age: 43
LOS: 1 days | End: 2023-09-25
Payer: MEDICAID

## 2023-09-25 DIAGNOSIS — Z98.890 OTHER SPECIFIED POSTPROCEDURAL STATES: Chronic | ICD-10-CM

## 2023-09-25 DIAGNOSIS — F10.20 ALCOHOL DEPENDENCE, UNCOMPLICATED: ICD-10-CM

## 2023-09-25 DIAGNOSIS — Z98.891 HISTORY OF UTERINE SCAR FROM PREVIOUS SURGERY: Chronic | ICD-10-CM

## 2023-09-25 PROCEDURE — 90832 PSYTX W PT 30 MINUTES: CPT

## 2023-09-26 ENCOUNTER — NON-APPOINTMENT (OUTPATIENT)
Age: 43
End: 2023-09-26

## 2023-09-26 DIAGNOSIS — F10.20 ALCOHOL DEPENDENCE, UNCOMPLICATED: ICD-10-CM

## 2023-09-28 ENCOUNTER — APPOINTMENT (OUTPATIENT)
Dept: PAIN MANAGEMENT | Facility: CLINIC | Age: 43
End: 2023-09-28

## 2023-10-05 ENCOUNTER — APPOINTMENT (OUTPATIENT)
Dept: PAIN MANAGEMENT | Facility: CLINIC | Age: 43
End: 2023-10-05

## 2023-10-06 ENCOUNTER — APPOINTMENT (OUTPATIENT)
Dept: PSYCHIATRY | Facility: CLINIC | Age: 43
End: 2023-10-06

## 2023-10-09 NOTE — ED ADULT NURSE NOTE - NS ED NURSE DISCH DISPOSITION
You are seen in the emergency room today for nausea and vomiting.  Please follow-up with your primary care provider soon as possible in regards to your TSH and thyroid hormones.  Also follow-up regarding your current GI upset.  Please return the emergency room if you have any swelling, tachycardia, abdominal pain, irretractable vomiting and irretractable diarrhea.  Also if you develop any fever please come back soon as possible.   Discharged

## 2023-10-10 ENCOUNTER — OUTPATIENT (OUTPATIENT)
Dept: OUTPATIENT SERVICES | Facility: HOSPITAL | Age: 43
LOS: 1 days | End: 2023-10-10
Payer: MEDICAID

## 2023-10-10 DIAGNOSIS — R92.8 OTHER ABNORMAL AND INCONCLUSIVE FINDINGS ON DIAGNOSTIC IMAGING OF BREAST: ICD-10-CM

## 2023-10-10 DIAGNOSIS — Z98.890 OTHER SPECIFIED POSTPROCEDURAL STATES: Chronic | ICD-10-CM

## 2023-10-10 PROCEDURE — 77065 DX MAMMO INCL CAD UNI: CPT | Mod: 26,RT

## 2023-10-10 PROCEDURE — 77065 DX MAMMO INCL CAD UNI: CPT | Mod: RT

## 2023-10-10 PROCEDURE — 76641 ULTRASOUND BREAST COMPLETE: CPT | Mod: RT

## 2023-10-10 PROCEDURE — 76641 ULTRASOUND BREAST COMPLETE: CPT | Mod: 26,RT

## 2023-10-10 PROCEDURE — 77061 BREAST TOMOSYNTHESIS UNI: CPT | Mod: 26

## 2023-10-10 PROCEDURE — G0279: CPT

## 2023-10-11 DIAGNOSIS — R92.8 OTHER ABNORMAL AND INCONCLUSIVE FINDINGS ON DIAGNOSTIC IMAGING OF BREAST: ICD-10-CM

## 2023-10-12 ENCOUNTER — OUTPATIENT (OUTPATIENT)
Dept: OUTPATIENT SERVICES | Facility: HOSPITAL | Age: 43
LOS: 1 days | End: 2023-10-12
Payer: MEDICAID

## 2023-10-12 ENCOUNTER — APPOINTMENT (OUTPATIENT)
Dept: PAIN MANAGEMENT | Facility: CLINIC | Age: 43
End: 2023-10-12
Payer: MEDICAID

## 2023-10-12 ENCOUNTER — APPOINTMENT (OUTPATIENT)
Dept: PSYCHIATRY | Facility: CLINIC | Age: 43
End: 2023-10-12

## 2023-10-12 VITALS — WEIGHT: 170 LBS | BODY MASS INDEX: 31.69 KG/M2 | HEIGHT: 61.5 IN

## 2023-10-12 DIAGNOSIS — M54.50 LOW BACK PAIN, UNSPECIFIED: ICD-10-CM

## 2023-10-12 DIAGNOSIS — Z98.891 HISTORY OF UTERINE SCAR FROM PREVIOUS SURGERY: Chronic | ICD-10-CM

## 2023-10-12 DIAGNOSIS — F10.20 ALCOHOL DEPENDENCE, UNCOMPLICATED: ICD-10-CM

## 2023-10-12 DIAGNOSIS — R20.0 ANESTHESIA OF SKIN: ICD-10-CM

## 2023-10-12 PROCEDURE — 90834 PSYTX W PT 45 MINUTES: CPT

## 2023-10-12 PROCEDURE — 99204 OFFICE O/P NEW MOD 45 MIN: CPT

## 2023-10-12 RX ORDER — HYDROXYZINE PAMOATE 100 MG/1
100 CAPSULE ORAL
Refills: 0 | Status: ACTIVE | COMMUNITY

## 2023-10-12 RX ORDER — FAMOTIDINE 40 MG/1
40 TABLET, FILM COATED ORAL
Refills: 0 | Status: ACTIVE | COMMUNITY

## 2023-10-13 DIAGNOSIS — F10.20 ALCOHOL DEPENDENCE, UNCOMPLICATED: ICD-10-CM

## 2023-10-15 PROBLEM — M54.50 LUMBAGO: Status: ACTIVE | Noted: 2023-10-13

## 2023-10-23 ENCOUNTER — OUTPATIENT (OUTPATIENT)
Dept: OUTPATIENT SERVICES | Facility: HOSPITAL | Age: 43
LOS: 1 days | Discharge: HOME | End: 2023-10-23
Payer: MEDICAID

## 2023-10-23 ENCOUNTER — TRANSCRIPTION ENCOUNTER (OUTPATIENT)
Age: 43
End: 2023-10-23

## 2023-10-23 VITALS
SYSTOLIC BLOOD PRESSURE: 147 MMHG | WEIGHT: 175.05 LBS | HEIGHT: 62 IN | TEMPERATURE: 97 F | DIASTOLIC BLOOD PRESSURE: 100 MMHG | HEART RATE: 88 BPM | RESPIRATION RATE: 18 BRPM

## 2023-10-23 VITALS
HEART RATE: 82 BPM | SYSTOLIC BLOOD PRESSURE: 145 MMHG | OXYGEN SATURATION: 98 % | DIASTOLIC BLOOD PRESSURE: 86 MMHG | RESPIRATION RATE: 18 BRPM

## 2023-10-23 DIAGNOSIS — Z98.890 OTHER SPECIFIED POSTPROCEDURAL STATES: Chronic | ICD-10-CM

## 2023-10-23 DIAGNOSIS — Z98.891 HISTORY OF UTERINE SCAR FROM PREVIOUS SURGERY: Chronic | ICD-10-CM

## 2023-10-23 DIAGNOSIS — R94.5 ABNORMAL RESULTS OF LIVER FUNCTION STUDIES: ICD-10-CM

## 2023-10-23 DIAGNOSIS — K21.00 GASTRO-ESOPHAGEAL REFLUX DISEASE WITH ESOPHAGITIS, WITHOUT BLEEDING: ICD-10-CM

## 2023-10-23 PROCEDURE — 81025 URINE PREGNANCY TEST: CPT

## 2023-10-23 RX ORDER — THIAMINE MONONITRATE (VIT B1) 100 MG
1 TABLET ORAL
Refills: 0 | DISCHARGE

## 2023-10-23 NOTE — ASU DISCHARGE PLAN (ADULT/PEDIATRIC) - PROCEDURE
OB LIMITED

 

History: Left lower quadrant pain. History of pregnancy.

 

Technique:Transabdominal imaging was performed. 

 

Comparison: None

 

Findings: There is a single intrauterine gestation in breech position. The

placenta is anterior in location without evidence of placental previa.

 

ALISON 

fetal weight 123 g

 

Overall, the estimated sonographic gestational age is 15 weeks 4 days.

 

Fetal movement was identified. Cardiac activity was identified with fetal 

heart rate 171 bpm. The stomach was seen.

 

Maternal left kidney appears normal. No hydronephrosis.

 

IMPRESSION:

1.  Single intrauterine gestation with gestational age 15 weeks 4 days and

fetal heart rate 171 bpm. Recommend standard fetal ultrasound survey at 

appropriate time interval.

 

Electronically signed by: Rosalino Hector DO (9/5/2019 12:12 PM) [email or web address] upper endoscopy and colonoscopy upper endoscopy

## 2023-10-23 NOTE — ASU DISCHARGE PLAN (ADULT/PEDIATRIC) - CARE PROVIDER_API CALL
Juaquin Paz  Gastroenterology  [address] [place] [address]  Phone: [phone]  Fax: [phone]  Follow Up Time:

## 2023-10-23 NOTE — ASU DISCHARGE PLAN (ADULT/PEDIATRIC) - NS MD DC FALL RISK RISK
For information on Fall & Injury Prevention, visit: [email or web address] OR  [email or web address] OR  https://www.cdc.gov/steadi/patient.html

## 2023-10-23 NOTE — ASU PATIENT PROFILE, ADULT - FALL HARM RISK - HARM RISK INTERVENTIONS
Assistance with ambulation/Assistance OOB with selected safe patient handling equipment/Communicate Risk of Fall with Harm to all staff/Monitor for mental status changes/Monitor gait and stability/Reinforce activity limits and safety measures with patient and family/Tailored Fall Risk Interventions/Toileting schedule using arm’s reach rule for commode and bathroom/Use of alarms - bed, chair and/or voice tab/Visual Cue: Yellow wristband and red socks/Bed in lowest position, wheels locked, appropriate side rails in place/Call bell, personal items and telephone in reach/Instruct patient to call for assistance before getting out of bed or chair/Non-slip footwear when patient is out of bed/[place] to call system/Physically safe environment - no spills, clutter or unnecessary equipment/Purposeful Proactive Rounding/Room/bathroom lighting operational, light cord in reach

## 2023-10-26 ENCOUNTER — OUTPATIENT (OUTPATIENT)
Dept: OUTPATIENT SERVICES | Facility: HOSPITAL | Age: 43
LOS: 1 days | End: 2023-10-26

## 2023-10-26 ENCOUNTER — RX RENEWAL (OUTPATIENT)
Age: 43
End: 2023-10-26

## 2023-10-26 ENCOUNTER — APPOINTMENT (OUTPATIENT)
Dept: PSYCHIATRY | Facility: CLINIC | Age: 43
End: 2023-10-26

## 2023-10-26 DIAGNOSIS — F10.20 ALCOHOL DEPENDENCE, UNCOMPLICATED: ICD-10-CM

## 2023-10-26 DIAGNOSIS — Z98.890 OTHER SPECIFIED POSTPROCEDURAL STATES: Chronic | ICD-10-CM

## 2023-10-26 DIAGNOSIS — Z98.891 HISTORY OF UTERINE SCAR FROM PREVIOUS SURGERY: Chronic | ICD-10-CM

## 2023-10-27 ENCOUNTER — APPOINTMENT (OUTPATIENT)
Dept: SURGERY | Facility: CLINIC | Age: 43
End: 2023-10-27

## 2023-10-27 DIAGNOSIS — F41.9 ANXIETY DISORDER, UNSPECIFIED: ICD-10-CM

## 2023-10-27 DIAGNOSIS — K76.0 FATTY (CHANGE OF) LIVER, NOT ELSEWHERE CLASSIFIED: ICD-10-CM

## 2023-10-27 DIAGNOSIS — Z80.0 FAMILY HISTORY OF MALIGNANT NEOPLASM OF DIGESTIVE ORGANS: ICD-10-CM

## 2023-10-27 DIAGNOSIS — K21.9 GASTRO-ESOPHAGEAL REFLUX DISEASE WITHOUT ESOPHAGITIS: ICD-10-CM

## 2023-10-27 DIAGNOSIS — E05.00 THYROTOXICOSIS WITH DIFFUSE GOITER WITHOUT THYROTOXIC CRISIS OR STORM: ICD-10-CM

## 2023-10-27 DIAGNOSIS — I34.1 NONRHEUMATIC MITRAL (VALVE) PROLAPSE: ICD-10-CM

## 2023-10-27 DIAGNOSIS — E78.5 HYPERLIPIDEMIA, UNSPECIFIED: ICD-10-CM

## 2023-10-27 DIAGNOSIS — K29.70 GASTRITIS, UNSPECIFIED, WITHOUT BLEEDING: ICD-10-CM

## 2023-10-27 DIAGNOSIS — F10.10 ALCOHOL ABUSE, UNCOMPLICATED: ICD-10-CM

## 2023-10-27 DIAGNOSIS — E66.9 OBESITY, UNSPECIFIED: ICD-10-CM

## 2023-10-27 DIAGNOSIS — Z53.09 PROCEDURE AND TREATMENT NOT CARRIED OUT BECAUSE OF OTHER CONTRAINDICATION: ICD-10-CM

## 2023-10-27 DIAGNOSIS — F10.20 ALCOHOL DEPENDENCE, UNCOMPLICATED: ICD-10-CM

## 2023-11-03 ENCOUNTER — APPOINTMENT (OUTPATIENT)
Dept: PSYCHIATRY | Facility: CLINIC | Age: 43
End: 2023-11-03

## 2023-11-03 ENCOUNTER — APPOINTMENT (OUTPATIENT)
Dept: NEUROLOGY | Facility: CLINIC | Age: 43
End: 2023-11-03

## 2023-11-07 ENCOUNTER — APPOINTMENT (OUTPATIENT)
Dept: PSYCHIATRY | Facility: CLINIC | Age: 43
End: 2023-11-07

## 2023-11-07 ENCOUNTER — OUTPATIENT (OUTPATIENT)
Dept: OUTPATIENT SERVICES | Facility: HOSPITAL | Age: 43
LOS: 1 days | End: 2023-11-07
Payer: MEDICAID

## 2023-11-07 DIAGNOSIS — Z98.890 OTHER SPECIFIED POSTPROCEDURAL STATES: Chronic | ICD-10-CM

## 2023-11-07 DIAGNOSIS — F10.20 ALCOHOL DEPENDENCE, UNCOMPLICATED: ICD-10-CM

## 2023-11-07 PROCEDURE — H0004: CPT | Mod: 95

## 2023-11-08 DIAGNOSIS — F10.20 ALCOHOL DEPENDENCE, UNCOMPLICATED: ICD-10-CM

## 2023-11-09 ENCOUNTER — APPOINTMENT (OUTPATIENT)
Dept: PAIN MANAGEMENT | Facility: CLINIC | Age: 43
End: 2023-11-09

## 2023-11-10 ENCOUNTER — APPOINTMENT (OUTPATIENT)
Dept: SURGERY | Facility: CLINIC | Age: 43
End: 2023-11-10

## 2023-11-24 ENCOUNTER — APPOINTMENT (OUTPATIENT)
Dept: PSYCHIATRY | Facility: CLINIC | Age: 43
End: 2023-11-24

## 2023-12-01 ENCOUNTER — APPOINTMENT (OUTPATIENT)
Dept: NEUROLOGY | Facility: CLINIC | Age: 43
End: 2023-12-01

## 2023-12-14 ENCOUNTER — APPOINTMENT (OUTPATIENT)
Dept: PAIN MANAGEMENT | Facility: CLINIC | Age: 43
End: 2023-12-14

## 2023-12-16 ENCOUNTER — INPATIENT (INPATIENT)
Facility: HOSPITAL | Age: 43
LOS: 5 days | Discharge: HOME | End: 2023-12-22
Attending: STUDENT IN AN ORGANIZED HEALTH CARE EDUCATION/TRAINING PROGRAM | Admitting: FAMILY MEDICINE
Payer: MEDICAID

## 2023-12-16 VITALS
DIASTOLIC BLOOD PRESSURE: 72 MMHG | HEIGHT: 62 IN | HEART RATE: 92 BPM | OXYGEN SATURATION: 100 % | TEMPERATURE: 99 F | RESPIRATION RATE: 22 BRPM | SYSTOLIC BLOOD PRESSURE: 124 MMHG | WEIGHT: 184.97 LBS

## 2023-12-16 DIAGNOSIS — Z98.891 HISTORY OF UTERINE SCAR FROM PREVIOUS SURGERY: Chronic | ICD-10-CM

## 2023-12-16 DIAGNOSIS — Z98.890 OTHER SPECIFIED POSTPROCEDURAL STATES: Chronic | ICD-10-CM

## 2023-12-16 PROCEDURE — 71045 X-RAY EXAM CHEST 1 VIEW: CPT | Mod: 26

## 2023-12-16 PROCEDURE — 99285 EMERGENCY DEPT VISIT HI MDM: CPT

## 2023-12-16 RX ORDER — SODIUM CHLORIDE 9 MG/ML
1000 INJECTION INTRAMUSCULAR; INTRAVENOUS; SUBCUTANEOUS ONCE
Refills: 0 | Status: COMPLETED | OUTPATIENT
Start: 2023-12-16 | End: 2023-12-16

## 2023-12-17 DIAGNOSIS — F10.939 ALCOHOL USE, UNSPECIFIED WITH WITHDRAWAL, UNSPECIFIED: ICD-10-CM

## 2023-12-17 LAB
ALBUMIN SERPL ELPH-MCNC: 4.2 G/DL — SIGNIFICANT CHANGE UP (ref 3.5–5.2)
ALBUMIN SERPL ELPH-MCNC: 4.2 G/DL — SIGNIFICANT CHANGE UP (ref 3.5–5.2)
ALBUMIN SERPL ELPH-MCNC: 4.9 G/DL — SIGNIFICANT CHANGE UP (ref 3.5–5.2)
ALBUMIN SERPL ELPH-MCNC: 4.9 G/DL — SIGNIFICANT CHANGE UP (ref 3.5–5.2)
ALP SERPL-CCNC: 76 U/L — SIGNIFICANT CHANGE UP (ref 30–115)
ALP SERPL-CCNC: 76 U/L — SIGNIFICANT CHANGE UP (ref 30–115)
ALP SERPL-CCNC: 90 U/L — SIGNIFICANT CHANGE UP (ref 30–115)
ALP SERPL-CCNC: 90 U/L — SIGNIFICANT CHANGE UP (ref 30–115)
ALT FLD-CCNC: 54 U/L — HIGH (ref 0–41)
ALT FLD-CCNC: 54 U/L — HIGH (ref 0–41)
ALT FLD-CCNC: 73 U/L — HIGH (ref 0–41)
ALT FLD-CCNC: 73 U/L — HIGH (ref 0–41)
ANION GAP SERPL CALC-SCNC: 13 MMOL/L — SIGNIFICANT CHANGE UP (ref 7–14)
ANION GAP SERPL CALC-SCNC: 13 MMOL/L — SIGNIFICANT CHANGE UP (ref 7–14)
ANION GAP SERPL CALC-SCNC: 20 MMOL/L — HIGH (ref 7–14)
ANION GAP SERPL CALC-SCNC: 20 MMOL/L — HIGH (ref 7–14)
APAP SERPL-MCNC: <5 UG/ML — LOW (ref 10–30)
APAP SERPL-MCNC: <5 UG/ML — LOW (ref 10–30)
APPEARANCE UR: ABNORMAL
APPEARANCE UR: ABNORMAL
AST SERPL-CCNC: 111 U/L — HIGH (ref 0–41)
AST SERPL-CCNC: 111 U/L — HIGH (ref 0–41)
AST SERPL-CCNC: 77 U/L — HIGH (ref 0–41)
AST SERPL-CCNC: 77 U/L — HIGH (ref 0–41)
B-OH-BUTYR SERPL-SCNC: 0.9 MMOL/L — HIGH
BACTERIA # UR AUTO: ABNORMAL /HPF
BACTERIA # UR AUTO: ABNORMAL /HPF
BASE EXCESS BLDV CALC-SCNC: 2 MMOL/L — SIGNIFICANT CHANGE UP (ref -2–3)
BASE EXCESS BLDV CALC-SCNC: 2 MMOL/L — SIGNIFICANT CHANGE UP (ref -2–3)
BASOPHILS # BLD AUTO: 0.07 K/UL — SIGNIFICANT CHANGE UP (ref 0–0.2)
BASOPHILS # BLD AUTO: 0.07 K/UL — SIGNIFICANT CHANGE UP (ref 0–0.2)
BASOPHILS NFR BLD AUTO: 1.2 % — HIGH (ref 0–1)
BASOPHILS NFR BLD AUTO: 1.2 % — HIGH (ref 0–1)
BILIRUB SERPL-MCNC: 0.5 MG/DL — SIGNIFICANT CHANGE UP (ref 0.2–1.2)
BILIRUB SERPL-MCNC: 0.5 MG/DL — SIGNIFICANT CHANGE UP (ref 0.2–1.2)
BILIRUB SERPL-MCNC: 0.9 MG/DL — SIGNIFICANT CHANGE UP (ref 0.2–1.2)
BILIRUB SERPL-MCNC: 0.9 MG/DL — SIGNIFICANT CHANGE UP (ref 0.2–1.2)
BILIRUB UR-MCNC: NEGATIVE — SIGNIFICANT CHANGE UP
BILIRUB UR-MCNC: NEGATIVE — SIGNIFICANT CHANGE UP
BUN SERPL-MCNC: 11 MG/DL — SIGNIFICANT CHANGE UP (ref 10–20)
BUN SERPL-MCNC: 11 MG/DL — SIGNIFICANT CHANGE UP (ref 10–20)
BUN SERPL-MCNC: 9 MG/DL — LOW (ref 10–20)
BUN SERPL-MCNC: 9 MG/DL — LOW (ref 10–20)
CALCIUM SERPL-MCNC: 8.9 MG/DL — SIGNIFICANT CHANGE UP (ref 8.4–10.5)
CALCIUM SERPL-MCNC: 8.9 MG/DL — SIGNIFICANT CHANGE UP (ref 8.4–10.5)
CALCIUM SERPL-MCNC: 9.4 MG/DL — SIGNIFICANT CHANGE UP (ref 8.4–10.5)
CALCIUM SERPL-MCNC: 9.4 MG/DL — SIGNIFICANT CHANGE UP (ref 8.4–10.5)
CHLORIDE SERPL-SCNC: 97 MMOL/L — LOW (ref 98–110)
CHLORIDE SERPL-SCNC: 97 MMOL/L — LOW (ref 98–110)
CHLORIDE SERPL-SCNC: 98 MMOL/L — SIGNIFICANT CHANGE UP (ref 98–110)
CHLORIDE SERPL-SCNC: 98 MMOL/L — SIGNIFICANT CHANGE UP (ref 98–110)
CHOLEST SERPL-MCNC: 323 MG/DL — HIGH
CHOLEST SERPL-MCNC: 323 MG/DL — HIGH
CO2 SERPL-SCNC: 22 MMOL/L — SIGNIFICANT CHANGE UP (ref 17–32)
CO2 SERPL-SCNC: 22 MMOL/L — SIGNIFICANT CHANGE UP (ref 17–32)
CO2 SERPL-SCNC: 25 MMOL/L — SIGNIFICANT CHANGE UP (ref 17–32)
CO2 SERPL-SCNC: 25 MMOL/L — SIGNIFICANT CHANGE UP (ref 17–32)
COLOR SPEC: YELLOW — SIGNIFICANT CHANGE UP
COLOR SPEC: YELLOW — SIGNIFICANT CHANGE UP
CREAT SERPL-MCNC: 0.6 MG/DL — LOW (ref 0.7–1.5)
DIFF PNL FLD: NEGATIVE — SIGNIFICANT CHANGE UP
DIFF PNL FLD: NEGATIVE — SIGNIFICANT CHANGE UP
EGFR: 116 ML/MIN/1.73M2 — SIGNIFICANT CHANGE UP
EOSINOPHIL # BLD AUTO: 0.03 K/UL — SIGNIFICANT CHANGE UP (ref 0–0.7)
EOSINOPHIL # BLD AUTO: 0.03 K/UL — SIGNIFICANT CHANGE UP (ref 0–0.7)
EOSINOPHIL NFR BLD AUTO: 0.5 % — SIGNIFICANT CHANGE UP (ref 0–8)
EOSINOPHIL NFR BLD AUTO: 0.5 % — SIGNIFICANT CHANGE UP (ref 0–8)
EPI CELLS # UR: PRESENT
EPI CELLS # UR: PRESENT
ETHANOL SERPL-MCNC: 317 MG/DL — HIGH
ETHANOL SERPL-MCNC: 317 MG/DL — HIGH
GLUCOSE SERPL-MCNC: 81 MG/DL — SIGNIFICANT CHANGE UP (ref 70–99)
GLUCOSE SERPL-MCNC: 81 MG/DL — SIGNIFICANT CHANGE UP (ref 70–99)
GLUCOSE SERPL-MCNC: 87 MG/DL — SIGNIFICANT CHANGE UP (ref 70–99)
GLUCOSE SERPL-MCNC: 87 MG/DL — SIGNIFICANT CHANGE UP (ref 70–99)
GLUCOSE UR QL: NEGATIVE MG/DL — SIGNIFICANT CHANGE UP
GLUCOSE UR QL: NEGATIVE MG/DL — SIGNIFICANT CHANGE UP
HCG SERPL QL: NEGATIVE — SIGNIFICANT CHANGE UP
HCG SERPL QL: NEGATIVE — SIGNIFICANT CHANGE UP
HCO3 BLDV-SCNC: 27 MMOL/L — SIGNIFICANT CHANGE UP (ref 22–29)
HCO3 BLDV-SCNC: 27 MMOL/L — SIGNIFICANT CHANGE UP (ref 22–29)
HCT VFR BLD CALC: 40.7 % — SIGNIFICANT CHANGE UP (ref 37–47)
HCT VFR BLD CALC: 40.7 % — SIGNIFICANT CHANGE UP (ref 37–47)
HDLC SERPL-MCNC: 143 MG/DL — SIGNIFICANT CHANGE UP
HDLC SERPL-MCNC: 143 MG/DL — SIGNIFICANT CHANGE UP
HGB BLD-MCNC: 14.2 G/DL — SIGNIFICANT CHANGE UP (ref 12–16)
HGB BLD-MCNC: 14.2 G/DL — SIGNIFICANT CHANGE UP (ref 12–16)
IMM GRANULOCYTES NFR BLD AUTO: 0.4 % — HIGH (ref 0.1–0.3)
IMM GRANULOCYTES NFR BLD AUTO: 0.4 % — HIGH (ref 0.1–0.3)
KETONES UR-MCNC: NEGATIVE MG/DL — SIGNIFICANT CHANGE UP
KETONES UR-MCNC: NEGATIVE MG/DL — SIGNIFICANT CHANGE UP
LACTATE SERPL-SCNC: 1.4 MMOL/L — SIGNIFICANT CHANGE UP (ref 0.7–2)
LACTATE SERPL-SCNC: 1.4 MMOL/L — SIGNIFICANT CHANGE UP (ref 0.7–2)
LEUKOCYTE ESTERASE UR-ACNC: NEGATIVE — SIGNIFICANT CHANGE UP
LEUKOCYTE ESTERASE UR-ACNC: NEGATIVE — SIGNIFICANT CHANGE UP
LIDOCAIN IGE QN: 30 U/L — SIGNIFICANT CHANGE UP (ref 7–60)
LIDOCAIN IGE QN: 30 U/L — SIGNIFICANT CHANGE UP (ref 7–60)
LIPID PNL WITH DIRECT LDL SERPL: 166 MG/DL — HIGH
LIPID PNL WITH DIRECT LDL SERPL: 166 MG/DL — HIGH
LYMPHOCYTES # BLD AUTO: 2.12 K/UL — SIGNIFICANT CHANGE UP (ref 1.2–3.4)
LYMPHOCYTES # BLD AUTO: 2.12 K/UL — SIGNIFICANT CHANGE UP (ref 1.2–3.4)
LYMPHOCYTES # BLD AUTO: 37.7 % — SIGNIFICANT CHANGE UP (ref 20.5–51.1)
LYMPHOCYTES # BLD AUTO: 37.7 % — SIGNIFICANT CHANGE UP (ref 20.5–51.1)
MAGNESIUM SERPL-MCNC: 1.8 MG/DL — SIGNIFICANT CHANGE UP (ref 1.8–2.4)
MAGNESIUM SERPL-MCNC: 1.8 MG/DL — SIGNIFICANT CHANGE UP (ref 1.8–2.4)
MAGNESIUM SERPL-MCNC: 2.1 MG/DL — SIGNIFICANT CHANGE UP (ref 1.8–2.4)
MAGNESIUM SERPL-MCNC: 2.1 MG/DL — SIGNIFICANT CHANGE UP (ref 1.8–2.4)
MCHC RBC-ENTMCNC: 34.9 G/DL — SIGNIFICANT CHANGE UP (ref 32–37)
MCHC RBC-ENTMCNC: 34.9 G/DL — SIGNIFICANT CHANGE UP (ref 32–37)
MCHC RBC-ENTMCNC: 35 PG — HIGH (ref 27–31)
MCHC RBC-ENTMCNC: 35 PG — HIGH (ref 27–31)
MCV RBC AUTO: 100.2 FL — HIGH (ref 81–99)
MCV RBC AUTO: 100.2 FL — HIGH (ref 81–99)
MONOCYTES # BLD AUTO: 0.42 K/UL — SIGNIFICANT CHANGE UP (ref 0.1–0.6)
MONOCYTES # BLD AUTO: 0.42 K/UL — SIGNIFICANT CHANGE UP (ref 0.1–0.6)
MONOCYTES NFR BLD AUTO: 7.5 % — SIGNIFICANT CHANGE UP (ref 1.7–9.3)
MONOCYTES NFR BLD AUTO: 7.5 % — SIGNIFICANT CHANGE UP (ref 1.7–9.3)
NEUTROPHILS # BLD AUTO: 2.96 K/UL — SIGNIFICANT CHANGE UP (ref 1.4–6.5)
NEUTROPHILS # BLD AUTO: 2.96 K/UL — SIGNIFICANT CHANGE UP (ref 1.4–6.5)
NEUTROPHILS NFR BLD AUTO: 52.7 % — SIGNIFICANT CHANGE UP (ref 42.2–75.2)
NEUTROPHILS NFR BLD AUTO: 52.7 % — SIGNIFICANT CHANGE UP (ref 42.2–75.2)
NITRITE UR-MCNC: NEGATIVE — SIGNIFICANT CHANGE UP
NITRITE UR-MCNC: NEGATIVE — SIGNIFICANT CHANGE UP
NON HDL CHOLESTEROL: 180 MG/DL — HIGH
NON HDL CHOLESTEROL: 180 MG/DL — HIGH
NRBC # BLD: 0 /100 WBCS — SIGNIFICANT CHANGE UP (ref 0–0)
NRBC # BLD: 0 /100 WBCS — SIGNIFICANT CHANGE UP (ref 0–0)
NT-PROBNP SERPL-SCNC: <36 PG/ML — SIGNIFICANT CHANGE UP (ref 0–300)
NT-PROBNP SERPL-SCNC: <36 PG/ML — SIGNIFICANT CHANGE UP (ref 0–300)
PCO2 BLDV: 44 MMHG — HIGH (ref 39–42)
PCO2 BLDV: 44 MMHG — HIGH (ref 39–42)
PH BLDV: 7.4 — SIGNIFICANT CHANGE UP (ref 7.32–7.43)
PH BLDV: 7.4 — SIGNIFICANT CHANGE UP (ref 7.32–7.43)
PH UR: 6.5 — SIGNIFICANT CHANGE UP (ref 5–8)
PH UR: 6.5 — SIGNIFICANT CHANGE UP (ref 5–8)
PLATELET # BLD AUTO: 293 K/UL — SIGNIFICANT CHANGE UP (ref 130–400)
PLATELET # BLD AUTO: 293 K/UL — SIGNIFICANT CHANGE UP (ref 130–400)
PMV BLD: 8.4 FL — SIGNIFICANT CHANGE UP (ref 7.4–10.4)
PMV BLD: 8.4 FL — SIGNIFICANT CHANGE UP (ref 7.4–10.4)
PO2 BLDV: 47 MMHG — HIGH (ref 25–45)
PO2 BLDV: 47 MMHG — HIGH (ref 25–45)
POTASSIUM SERPL-MCNC: 4.4 MMOL/L — SIGNIFICANT CHANGE UP (ref 3.5–5)
POTASSIUM SERPL-MCNC: 4.4 MMOL/L — SIGNIFICANT CHANGE UP (ref 3.5–5)
POTASSIUM SERPL-MCNC: 4.7 MMOL/L — SIGNIFICANT CHANGE UP (ref 3.5–5)
POTASSIUM SERPL-MCNC: 4.7 MMOL/L — SIGNIFICANT CHANGE UP (ref 3.5–5)
POTASSIUM SERPL-SCNC: 4.4 MMOL/L — SIGNIFICANT CHANGE UP (ref 3.5–5)
POTASSIUM SERPL-SCNC: 4.4 MMOL/L — SIGNIFICANT CHANGE UP (ref 3.5–5)
POTASSIUM SERPL-SCNC: 4.7 MMOL/L — SIGNIFICANT CHANGE UP (ref 3.5–5)
POTASSIUM SERPL-SCNC: 4.7 MMOL/L — SIGNIFICANT CHANGE UP (ref 3.5–5)
PROT SERPL-MCNC: 6.9 G/DL — SIGNIFICANT CHANGE UP (ref 6–8)
PROT SERPL-MCNC: 6.9 G/DL — SIGNIFICANT CHANGE UP (ref 6–8)
PROT SERPL-MCNC: 8.5 G/DL — HIGH (ref 6–8)
PROT SERPL-MCNC: 8.5 G/DL — HIGH (ref 6–8)
PROT UR-MCNC: SIGNIFICANT CHANGE UP MG/DL
PROT UR-MCNC: SIGNIFICANT CHANGE UP MG/DL
RBC # BLD: 4.06 M/UL — LOW (ref 4.2–5.4)
RBC # BLD: 4.06 M/UL — LOW (ref 4.2–5.4)
RBC # FLD: 13.7 % — SIGNIFICANT CHANGE UP (ref 11.5–14.5)
RBC # FLD: 13.7 % — SIGNIFICANT CHANGE UP (ref 11.5–14.5)
RBC CASTS # UR COMP ASSIST: 1 /HPF — SIGNIFICANT CHANGE UP (ref 0–4)
RBC CASTS # UR COMP ASSIST: 1 /HPF — SIGNIFICANT CHANGE UP (ref 0–4)
SALICYLATES SERPL-MCNC: <0.3 MG/DL — LOW (ref 4–30)
SALICYLATES SERPL-MCNC: <0.3 MG/DL — LOW (ref 4–30)
SAO2 % BLDV: 71 % — SIGNIFICANT CHANGE UP (ref 67–88)
SAO2 % BLDV: 71 % — SIGNIFICANT CHANGE UP (ref 67–88)
SODIUM SERPL-SCNC: 136 MMOL/L — SIGNIFICANT CHANGE UP (ref 135–146)
SODIUM SERPL-SCNC: 136 MMOL/L — SIGNIFICANT CHANGE UP (ref 135–146)
SODIUM SERPL-SCNC: 139 MMOL/L — SIGNIFICANT CHANGE UP (ref 135–146)
SODIUM SERPL-SCNC: 139 MMOL/L — SIGNIFICANT CHANGE UP (ref 135–146)
SP GR SPEC: 1.01 — SIGNIFICANT CHANGE UP (ref 1–1.03)
SP GR SPEC: 1.01 — SIGNIFICANT CHANGE UP (ref 1–1.03)
TRIGL SERPL-MCNC: 82 MG/DL — SIGNIFICANT CHANGE UP
TRIGL SERPL-MCNC: 82 MG/DL — SIGNIFICANT CHANGE UP
TROPONIN SAMPLING TIME: SIGNIFICANT CHANGE UP
TROPONIN SAMPLING TIME: SIGNIFICANT CHANGE UP
TROPONIN T, HIGH SENSITIVITY RESULT: <6 NG/L — SIGNIFICANT CHANGE UP (ref 6–13)
TROPONIN T, HIGH SENSITIVITY RESULT: <6 NG/L — SIGNIFICANT CHANGE UP (ref 6–13)
UROBILINOGEN FLD QL: 0.2 MG/DL — SIGNIFICANT CHANGE UP (ref 0.2–1)
UROBILINOGEN FLD QL: 0.2 MG/DL — SIGNIFICANT CHANGE UP (ref 0.2–1)
WBC # BLD: 5.62 K/UL — SIGNIFICANT CHANGE UP (ref 4.8–10.8)
WBC # BLD: 5.62 K/UL — SIGNIFICANT CHANGE UP (ref 4.8–10.8)
WBC # FLD AUTO: 5.62 K/UL — SIGNIFICANT CHANGE UP (ref 4.8–10.8)
WBC # FLD AUTO: 5.62 K/UL — SIGNIFICANT CHANGE UP (ref 4.8–10.8)
WBC UR QL: 1 /HPF — SIGNIFICANT CHANGE UP (ref 0–5)
WBC UR QL: 1 /HPF — SIGNIFICANT CHANGE UP (ref 0–5)

## 2023-12-17 PROCEDURE — 83735 ASSAY OF MAGNESIUM: CPT

## 2023-12-17 PROCEDURE — 93010 ELECTROCARDIOGRAM REPORT: CPT

## 2023-12-17 PROCEDURE — 84484 ASSAY OF TROPONIN QUANT: CPT

## 2023-12-17 PROCEDURE — 81001 URINALYSIS AUTO W/SCOPE: CPT

## 2023-12-17 PROCEDURE — 83036 HEMOGLOBIN GLYCOSYLATED A1C: CPT

## 2023-12-17 PROCEDURE — 82728 ASSAY OF FERRITIN: CPT

## 2023-12-17 PROCEDURE — 85379 FIBRIN DEGRADATION QUANT: CPT

## 2023-12-17 PROCEDURE — 84443 ASSAY THYROID STIM HORMONE: CPT

## 2023-12-17 PROCEDURE — 83605 ASSAY OF LACTIC ACID: CPT

## 2023-12-17 PROCEDURE — 85025 COMPLETE CBC W/AUTO DIFF WBC: CPT

## 2023-12-17 PROCEDURE — 80074 ACUTE HEPATITIS PANEL: CPT

## 2023-12-17 PROCEDURE — 82010 KETONE BODYS QUAN: CPT

## 2023-12-17 PROCEDURE — 84100 ASSAY OF PHOSPHORUS: CPT

## 2023-12-17 PROCEDURE — 93306 TTE W/DOPPLER COMPLETE: CPT

## 2023-12-17 PROCEDURE — 80354 DRUG SCREENING FENTANYL: CPT

## 2023-12-17 PROCEDURE — 99223 1ST HOSP IP/OBS HIGH 75: CPT

## 2023-12-17 PROCEDURE — 80307 DRUG TEST PRSMV CHEM ANLYZR: CPT

## 2023-12-17 PROCEDURE — 80053 COMPREHEN METABOLIC PANEL: CPT

## 2023-12-17 PROCEDURE — 93010 ELECTROCARDIOGRAM REPORT: CPT | Mod: 77

## 2023-12-17 PROCEDURE — 76705 ECHO EXAM OF ABDOMEN: CPT | Mod: 26

## 2023-12-17 PROCEDURE — 76705 ECHO EXAM OF ABDOMEN: CPT

## 2023-12-17 PROCEDURE — 71045 X-RAY EXAM CHEST 1 VIEW: CPT

## 2023-12-17 PROCEDURE — 80061 LIPID PANEL: CPT

## 2023-12-17 PROCEDURE — 36415 COLL VENOUS BLD VENIPUNCTURE: CPT

## 2023-12-17 PROCEDURE — 99291 CRITICAL CARE FIRST HOUR: CPT

## 2023-12-17 RX ORDER — PANTOPRAZOLE SODIUM 20 MG/1
40 TABLET, DELAYED RELEASE ORAL
Refills: 0 | Status: DISCONTINUED | OUTPATIENT
Start: 2023-12-17 | End: 2023-12-22

## 2023-12-17 RX ORDER — HYDROXYZINE HCL 10 MG
25 TABLET ORAL
Refills: 0 | Status: DISCONTINUED | OUTPATIENT
Start: 2023-12-17 | End: 2023-12-21

## 2023-12-17 RX ORDER — THIAMINE MONONITRATE (VIT B1) 100 MG
100 TABLET ORAL DAILY
Refills: 0 | Status: DISCONTINUED | OUTPATIENT
Start: 2023-12-17 | End: 2023-12-17

## 2023-12-17 RX ORDER — THIAMINE MONONITRATE (VIT B1) 100 MG
500 TABLET ORAL EVERY 8 HOURS
Refills: 0 | Status: COMPLETED | OUTPATIENT
Start: 2023-12-17 | End: 2023-12-19

## 2023-12-17 RX ORDER — ONDANSETRON 8 MG/1
4 TABLET, FILM COATED ORAL EVERY 8 HOURS
Refills: 0 | Status: DISCONTINUED | OUTPATIENT
Start: 2023-12-17 | End: 2023-12-22

## 2023-12-17 RX ORDER — MAGNESIUM SULFATE 500 MG/ML
2 VIAL (ML) INJECTION ONCE
Refills: 0 | Status: COMPLETED | OUTPATIENT
Start: 2023-12-17 | End: 2023-12-17

## 2023-12-17 RX ORDER — PANTOPRAZOLE SODIUM 20 MG/1
1 TABLET, DELAYED RELEASE ORAL
Refills: 0 | DISCHARGE

## 2023-12-17 RX ORDER — FOLIC ACID 0.8 MG
1 TABLET ORAL DAILY
Refills: 0 | Status: DISCONTINUED | OUTPATIENT
Start: 2023-12-17 | End: 2023-12-22

## 2023-12-17 RX ORDER — THIAMINE MONONITRATE (VIT B1) 100 MG
100 TABLET ORAL DAILY
Refills: 0 | Status: DISCONTINUED | OUTPATIENT
Start: 2023-12-19 | End: 2023-12-22

## 2023-12-17 RX ORDER — ENOXAPARIN SODIUM 100 MG/ML
40 INJECTION SUBCUTANEOUS EVERY 24 HOURS
Refills: 0 | Status: DISCONTINUED | OUTPATIENT
Start: 2023-12-17 | End: 2023-12-22

## 2023-12-17 RX ORDER — QUETIAPINE FUMARATE 200 MG/1
200 TABLET, FILM COATED ORAL AT BEDTIME
Refills: 0 | Status: DISCONTINUED | OUTPATIENT
Start: 2023-12-17 | End: 2023-12-21

## 2023-12-17 RX ADMIN — Medication 1 MILLIGRAM(S): at 12:49

## 2023-12-17 RX ADMIN — Medication 4 MILLIGRAM(S): at 06:24

## 2023-12-17 RX ADMIN — SODIUM CHLORIDE 1000 MILLILITER(S): 9 INJECTION INTRAMUSCULAR; INTRAVENOUS; SUBCUTANEOUS at 02:52

## 2023-12-17 RX ADMIN — PANTOPRAZOLE SODIUM 40 MILLIGRAM(S): 20 TABLET, DELAYED RELEASE ORAL at 09:28

## 2023-12-17 RX ADMIN — Medication 105 MILLIGRAM(S): at 12:50

## 2023-12-17 RX ADMIN — Medication 2 MILLIGRAM(S): at 03:59

## 2023-12-17 RX ADMIN — Medication 25 GRAM(S): at 20:11

## 2023-12-17 RX ADMIN — QUETIAPINE FUMARATE 200 MILLIGRAM(S): 200 TABLET, FILM COATED ORAL at 23:48

## 2023-12-17 RX ADMIN — Medication 105 MILLIGRAM(S): at 21:21

## 2023-12-17 RX ADMIN — Medication 4 MILLIGRAM(S): at 21:21

## 2023-12-17 RX ADMIN — Medication 4 MILLIGRAM(S): at 14:00

## 2023-12-17 RX ADMIN — Medication 4 MILLIGRAM(S): at 18:51

## 2023-12-17 RX ADMIN — Medication 4 MILLIGRAM(S): at 09:28

## 2023-12-17 NOTE — H&P ADULT - HISTORY OF PRESENT ILLNESS
42yo F hx of EtOH abuse, depression, GERD presenting for    At the ED, VS: 989.9F, BP: 124/72, HR: 92bpm, SpO2: 100% on RA, RR: 22bpm. Labs significant for AG (20), elevated AST/ALT (111/73), alcohol elvel 317.. lipase (-). VBG pH wnl.     Admitted to SDU for EtOH intoxication/CIWA protocol. Given 1L NS, ativan 2mg IVP x1, and librium 50mg PO x1.  42yo F hx of EtOH abuse, depression, GERD, MEHTA, hx of esophagitis presenting to the ED for generalized complaints. Patient drinks 1.7L of vodka every 2 days for a long time. Patient has been complaining of abdominal pain, chest pressure causing her to feel short of breath when she breathes, neuropathy, anxiety, goosebumps, nausea. Patient denies HI/SI, but she wants to join a detox program to get herself to stop drinking. She feels that she cannot keep drinking anymore.     At the ED, VS: 989.9F, BP: 124/72, HR: 92bpm, SpO2: 100% on RA, RR: 22bpm. Labs significant for AG (20), elevated AST/ALT (111/73), alcohol elvel 317.. lipase (-). VBG pH wnl. Lactate pending. EKG wnl without GRETA.     Admitted to SDU for EtOH intoxication/CIWA protocol. Given 1L NS, ativan 2mg IVP x1, and librium 50mg PO x1.  44yo F hx of EtOH abuse, depression, GERD, MEHTA, hx of esophagitis presenting to the ED for generalized complaints. Patient drinks 1.7L of vodka every 2 days for a long time. Patient has been complaining of abdominal pain, chest pressure causing her to feel short of breath when she breathes, neuropathy, anxiety, goosebumps, nausea. Patient denies HI/SI, but she wants to join a detox program to get herself to stop drinking. She feels that she cannot keep drinking anymore.     At the ED, VS: 989.9F, BP: 124/72, HR: 92bpm, SpO2: 100% on RA, RR: 22bpm. Labs significant for AG (20), elevated AST/ALT (111/73), alcohol elvel 317.. lipase (-). VBG pH wnl. Lactate pending. EKG wnl without GRETA.     Admitted to SDU for EtOH intoxication/CIWA protocol. Given 1L NS, ativan 2mg IVP x1, and librium 50mg PO x1.

## 2023-12-17 NOTE — H&P ADULT - ATTENDING COMMENTS
Pt seen w resident and agree w above.  Pt presents for etoh withdrawal, she drinks >1L Vodka daily.  Continue thiamine, folic acid b12, CIWA protocol. Precedex if needed. Monitor LFT's. Addiction eval.  Monitor in ICU.

## 2023-12-17 NOTE — ED PROVIDER NOTE - ATTENDING APP SHARED VISIT CONTRIBUTION OF CARE
43-year-old female, history of alcohol abuse, DTs, GERD, gastritis, presents to the ED and not feeling well and thinks she has lactic acidosis.  Last drink earlier today.  She had prior admissions to ICU for alcohol withdrawal.  Exam shows alert anxious patient in no distress, HEENT NCAT PERRL, neck supple, throat no exudates, lungs clear, RR S1S2, abdomen soft NT +BS, no CCE, fine tremors.

## 2023-12-17 NOTE — CONSULT NOTE ADULT - SUBJECTIVE AND OBJECTIVE BOX
Patient is a 43y old  Female who presents with a chief complaint of     HPI:  44yo F hx of EtOH abuse, depression, GERD, MEHTA, hx of esophagitis presenting to the ED for generalized complaints. Patient drinks 1.7L of vodka every 2 days for a long time. Patient has been complaining of abdominal pain, chest pressure causing her to feel short of breath when she breathes, neuropathy, anxiety, goosebumps, nausea. Patient denies HI/SI, but she wants to join a detox program to get herself to stop drinking. She feels that she cannot keep drinking anymore.     At the ED, VS: 989.9F, BP: 124/72, HR: 92bpm, SpO2: 100% on RA, RR: 22bpm. Labs significant for AG (20), elevated AST/ALT (111/73), alcohol elvel 317.. lipase (-). VBG pH wnl. Lactate pending. EKG wnl without GRETA.     Admitted to SDU for EtOH intoxication/CIWA protocol. Given 1L NS, ativan 2mg IVP x1, and librium 50mg PO x1.  (17 Dec 2023 04:10)      PAST MEDICAL & SURGICAL HISTORY:  Alcoholism      Anxiety      Graves' disease      GERD (gastroesophageal reflux disease)      Gastritis      MVP (mitral valve prolapse)      Nicotine dependence      Benzodiazepine misuse      Hypercholesterolemia      Obesity      Esophagitis      History of Graves' disease      Heart murmur      Hyperlipidemia, unspecified hyperlipidemia type      Hemochromatosis      Fatty liver      History of  section      S/P tendon repair  5th finger right hand          SOCIAL HX:   Smoking                         ETOH +++                           Other    FAMILY HISTORY:  Benzodiazepine misuse    :  No known cardiovacular family hisotry     ROS:  See HPI     Allergies    morphine (Unknown)    Intolerances          PHYSICAL EXAM    ICU Vital Signs Last 24 Hrs  T(C): 36.1 (17 Dec 2023 07:01), Max: 37.2 (16 Dec 2023 22:42)  T(F): 97 (17 Dec 2023 07:01), Max: 98.9 (16 Dec 2023 22:42)  HR: 92 (17 Dec 2023 07:01) (86 - 97)  BP: 111/57 (17 Dec 2023 07:01) (100/50 - 133/72)  BP(mean): 80 (17 Dec 2023 07:01) (80 - 96)  ABP: --  ABP(mean): --  RR: 20 (17 Dec 2023 07:01) (20 - 23)  SpO2: 91% (17 Dec 2023 07:01) (91% - 100%)    O2 Parameters below as of 17 Dec 2023 06:17  Patient On (Oxygen Delivery Method): room air            General: In NAD.Received ativan before exam calm   HEENT:  BERYL              Lymphatic system: No cervical LN   Lungs: Bilateral BS  Cardiovascular: Regular  Gastrointestinal: Soft, Positive BS  Musculoskeletal: No clubbing.  Moves all extremities.  Full range of motion   Skin: Warm.  Intact  Neurological: No motor or sensory deficit         LABS:                          14.2   5.62  )-----------( 293      ( 17 Dec 2023 02:41 )             40.7                                               12-    139  |  97<L>  |  9<L>  ----------------------------<  81  4.4   |  22  |  0.6<L>    Ca    9.4      17 Dec 2023 02:41  Mg     2.1         TPro  8.5<H>  /  Alb  4.9  /  TBili  0.5  /  DBili  x   /  AST  111<H>  /  ALT  73<H>  /  AlkPhos  90  12                                             Urinalysis Basic - ( 17 Dec 2023 05:36 )    Color: Yellow / Appearance: Cloudy / S.008 / pH: x  Gluc: x / Ketone: Negative mg/dL  / Bili: Negative / Urobili: 0.2 mg/dL   Blood: x / Protein: Trace mg/dL / Nitrite: Negative   Leuk Esterase: Negative / RBC: 1 /HPF / WBC 1 /HPF   Sq Epi: x / Non Sq Epi: x / Bacteria: Many /HPF                                                  LIVER FUNCTIONS - ( 17 Dec 2023 02:41 )  Alb: 4.9 g/dL / Pro: 8.5 g/dL / ALK PHOS: 90 U/L / ALT: 73 U/L / AST: 111 U/L / GGT: x                                                                                                                                       MEDICATIONS  (STANDING):  enoxaparin Injectable 40 milliGRAM(s) SubCutaneous every 24 hours  folic acid 1 milliGRAM(s) Oral daily  LORazepam   Injectable 4 milliGRAM(s) IV Push every 4 hours  LORazepam   Injectable   IV Push   pantoprazole    Tablet 40 milliGRAM(s) Oral before breakfast  QUEtiapine 200 milliGRAM(s) Oral at bedtime  thiamine IVPB 500 milliGRAM(s) IV Intermittent every 8 hours    MEDICATIONS  (PRN):  hydrOXYzine hydrochloride 25 milliGRAM(s) Oral two times a day PRN Anxiety  LORazepam     Tablet 2 milliGRAM(s) Oral every 2 hours PRN Symptom-triggered 2 point increase in CIWA-Ar  LORazepam   Injectable 2 milliGRAM(s) IV Push every 1 hour PRN Symptom-triggered: each CIWA -Ar score 8 or GREATER  ondansetron Injectable 4 milliGRAM(s) IV Push every 8 hours PRN Nausea and/or Vomiting

## 2023-12-17 NOTE — ED ADULT NURSE NOTE - NSFALLUNIVINTERV_ED_ALL_ED
Bed/Stretcher in lowest position, wheels locked, appropriate side rails in place/Call bell, personal items and telephone in reach/Instruct patient to call for assistance before getting out of bed/chair/stretcher/Non-slip footwear applied when patient is off stretcher/[place] to call system/Physically safe environment - no spills, clutter or unnecessary equipment/Purposeful proactive rounding/Room/bathroom lighting operational, light cord in reach Bed/Stretcher in lowest position, wheels locked, appropriate side rails in place/Call bell, personal items and telephone in reach/Instruct patient to call for assistance before getting out of bed/chair/stretcher/Non-slip footwear applied when patient is off stretcher/[place] to call system/Physically safe environment - no spills, clutter or unnecessary equipment/Purposeful proactive rounding/Room/bathroom lighting operational, light cord in reach

## 2023-12-17 NOTE — ED PROVIDER NOTE - OBJECTIVE STATEMENT
44 yo female hx of HLD/axneity/ alcohol and benzo abuse present c/o having withdrawal sxs. reported she is feeling nausea and tremors and similar to her prior "lactic acidosis" so she comes to Ed for evaluation. otherwise denies HA/chest pain/ abd pain/ vomiting and diarrhea. last drink was earlier this evening. denies other drug use.

## 2023-12-17 NOTE — H&P ADULT - NSHPLABSRESULTS_GEN_ALL_CORE
LABS:  cret                        14.2   5.62  )-----------( 293      ( 17 Dec 2023 02:41 )             40.7     12-17    139  |  97<L>  |  9<L>  ----------------------------<  81  4.4   |  22  |  0.6<L>    Ca    9.4      17 Dec 2023 02:41  Mg     2.1     12-17    TPro  8.5<H>  /  Alb  4.9  /  TBili  0.5  /  DBili  x   /  AST  111<H>  /  ALT  73<H>  /  AlkPhos  90  12-17      troponin (-)

## 2023-12-17 NOTE — ED PROVIDER NOTE - INTERPRETATION
normal sinus rhythm, Normal axis, Normal CA interval and QRS complex. There are no acute ischemic ST or T-wave changes. normal sinus rhythm, Normal axis, Normal NE interval and QRS complex. There are no acute ischemic ST or T-wave changes. normal sinus rhythm

## 2023-12-17 NOTE — H&P ADULT - ASSESSMENT
42yo F hx of EtOH abuse, depression, GERD, MEHTA, hx of esophagitis presenting to the ED for EtOH abuse/CIWA protocol    #EtOH abuse  #Abd pain  - EtOH level 300s  - pregnancy test (-)  - AG high. pH wnl. will check ketones  - f/u lactate level  - thiamine IVP x 5 days/folate  - CIWA protocol. will add precedex if patient becomes agitated  - Addiction team  - CATCH team  - seizure precautions (given hx of DTs)  - cont telemetry    #Transaminitis  - check RUQ US  - check lipid levels (patient states has hx of transaminitis from statins which was why she stopped. initially was on rosuvastatin 40mg)    #Depression/Anxiety  - cont seroquel    #Hx of thyroid problems?  - check TSH    #DVT ppx: lovenox 40mg QD  #GI ppx: protonix  #Diet: DASH/TLC  #Code: full  #Actvity: AAT  #Dispo: acute. SDU 42yo F hx of EtOH abuse, depression, GERD, MEHTA, hx of esophagitis presenting to the ED for EtOH abuse/CIWA protocol    #EtOH abuse  #Hx of DTs  #Abd pain  - EtOH level 300s  - pregnancy test (-)  - AG high. pH wnl. will check ketones  - f/u lactate level  - thiamine IVP x 5 days/folate  - CIWA protocol. will add precedex if patient becomes agitated  - Addiction team  - CATCH team  - seizure precautions (given hx of DTs)  - cont telemetry    #Transaminitis  - check RUQ US  - check lipid levels (patient states has hx of transaminitis from statins which was why she stopped. initially was on rosuvastatin 40mg)    #Neuropathy?- alcohol induced vs peripheral  - may need gabapentin if neuropathy persists despite CIWA protocol. will check A1c    #Depression/Anxiety  - cont seroquel    #Hx of thyroid problems?  - check TSH    #DVT ppx: lovenox 40mg QD  #GI ppx: protonix  #Diet: DASH/TLC  #Code: full  #Actvity: AAT  #Dispo: acute. SDU

## 2023-12-17 NOTE — ED PROVIDER NOTE - CLINICAL SUMMARY MEDICAL DECISION MAKING FREE TEXT BOX
43-year-old female, history of alcohol abuse, DTs, GERD, gastritis presents to the ED with withdrawal symptoms.  Labs noted for WBC 5.6, CO2 22, anion gap 20, BUN 9, creatinine 0.6, alcohol 317.  EKG normal sinus rhythm no acute changes.  Chest x-ray no acute disease.  Given IV fluids and Ativan.  Will admit to stepdown.

## 2023-12-17 NOTE — H&P ADULT - NSHPPHYSICALEXAM_GEN_ALL_CORE
PHYSICAL EXAM:  GENERAL: NAD, lying in bed comfortably  HEAD:  Atraumatic, Normocephalic  EYES: EOMI, PERRLA, conjunctiva and sclera clear  ENT: Moist mucous membranes  NECK: Supple, No JVD  CHEST/LUNG: Clear to auscultation bilaterally; No rales, rhonchi, wheezing, or rubs. Unlabored respirations  HEART: Regular rate and rhythm; No murmurs, rubs, or gallops  ABDOMEN: Bowel sounds present; Soft, epigastric tendernes to palpation, Nondistended. No hepatomegaly  EXTREMITIES:  2+ Peripheral Pulses, brisk capillary refill. No clubbing, cyanosis, or edema  NERVOUS SYSTEM:  Alert & Oriented X3, speech clear. No deficits   MSK: FROM all 4 extremities, full and equal strength  SKIN: No rashes or lesions

## 2023-12-17 NOTE — ED PROVIDER NOTE - PHYSICAL EXAMINATION
CONSTITUTIONAL: Well-appearing; in no apparent distress.   EYES: PERRL; EOM intact.   ENT: dry lips and tongue.   CARDIOVASCULAR: Normal S1, S2; no murmurs, rubs, or gallops.   RESPIRATORY: Normal chest excursion with respiration; breath sounds clear and equal bilaterally; no wheezes, rhonchi, or rales.  GI/: Normal bowel sounds; non-distended; non-tender; no palpable organomegaly.   MS: No calf swelling and tenderness.  SKIN: Normal for age and race; warm; dry; good turgor; no apparent lesions or exudate.   NEURO/PSYCH: A & O x 4; grossly unremarkable. mild hands tremors.

## 2023-12-17 NOTE — PATIENT PROFILE ADULT - FALL HARM RISK - HARM RISK INTERVENTIONS
Assistance with ambulation/Assistance OOB with selected safe patient handling equipment/Communicate Risk of Fall with Harm to all staff/Monitor for mental status changes/Monitor gait and stability/Reinforce activity limits and safety measures with patient and family/Reorient to person, place and time as needed/Review medications for side effects contributing to fall risk/Sit up slowly, dangle for a short time, stand at bedside before walking/Tailored Fall Risk Interventions/Toileting schedule using arm’s reach rule for commode and bathroom/Use of alarms - bed, chair and/or voice tab/Visual Cue: Yellow wristband and red socks/Bed in lowest position, wheels locked, appropriate side rails in place/Call bell, personal items and telephone in reach/Instruct patient to call for assistance before getting out of bed or chair/Non-slip footwear when patient is out of bed/[place] to call system/Physically safe environment - no spills, clutter or unnecessary equipment/Purposeful Proactive Rounding/Room/bathroom lighting operational, light cord in reach Assistance with ambulation/Assistance OOB with selected safe patient handling equipment/Communicate Risk of Fall with Harm to all staff/Monitor for mental status changes/Monitor gait and stability/Reinforce activity limits and safety measures with patient and family/Reorient to person, place and time as needed/Review medications for side effects contributing to fall risk/Sit up slowly, dangle for a short time, stand at bedside before walking/Tailored Fall Risk Interventions/Toileting schedule using arm’s reach rule for commode and bathroom/Use of alarms - bed, chair and/or voice tab/Visual Cue: Yellow wristband and red socks/Bed in lowest position, wheels locked, appropriate side rails in place/Call bell, personal items and telephone in reach/Instruct patient to call for assistance before getting out of bed or chair/Non-slip footwear when patient is out of bed/[place] to call system/Physically safe environment - no spills, clutter or unnecessary equipment/Purposeful Proactive Rounding/Room/bathroom lighting operational, light cord in reach

## 2023-12-18 DIAGNOSIS — F10.239 ALCOHOL DEPENDENCE WITH WITHDRAWAL, UNSPECIFIED: ICD-10-CM

## 2023-12-18 LAB
A1C WITH ESTIMATED AVERAGE GLUCOSE RESULT: 4.7 % — SIGNIFICANT CHANGE UP (ref 4–5.6)
A1C WITH ESTIMATED AVERAGE GLUCOSE RESULT: 4.7 % — SIGNIFICANT CHANGE UP (ref 4–5.6)
ALBUMIN SERPL ELPH-MCNC: 4.1 G/DL — SIGNIFICANT CHANGE UP (ref 3.5–5.2)
ALBUMIN SERPL ELPH-MCNC: 4.1 G/DL — SIGNIFICANT CHANGE UP (ref 3.5–5.2)
ALP SERPL-CCNC: 71 U/L — SIGNIFICANT CHANGE UP (ref 30–115)
ALP SERPL-CCNC: 71 U/L — SIGNIFICANT CHANGE UP (ref 30–115)
ALT FLD-CCNC: 46 U/L — HIGH (ref 0–41)
ALT FLD-CCNC: 46 U/L — HIGH (ref 0–41)
ANION GAP SERPL CALC-SCNC: 12 MMOL/L — SIGNIFICANT CHANGE UP (ref 7–14)
ANION GAP SERPL CALC-SCNC: 12 MMOL/L — SIGNIFICANT CHANGE UP (ref 7–14)
AST SERPL-CCNC: 59 U/L — HIGH (ref 0–41)
AST SERPL-CCNC: 59 U/L — HIGH (ref 0–41)
BASOPHILS # BLD AUTO: 0.03 K/UL — SIGNIFICANT CHANGE UP (ref 0–0.2)
BASOPHILS # BLD AUTO: 0.03 K/UL — SIGNIFICANT CHANGE UP (ref 0–0.2)
BASOPHILS NFR BLD AUTO: 0.7 % — SIGNIFICANT CHANGE UP (ref 0–1)
BASOPHILS NFR BLD AUTO: 0.7 % — SIGNIFICANT CHANGE UP (ref 0–1)
BILIRUB SERPL-MCNC: 1.2 MG/DL — SIGNIFICANT CHANGE UP (ref 0.2–1.2)
BILIRUB SERPL-MCNC: 1.2 MG/DL — SIGNIFICANT CHANGE UP (ref 0.2–1.2)
BUN SERPL-MCNC: 9 MG/DL — LOW (ref 10–20)
BUN SERPL-MCNC: 9 MG/DL — LOW (ref 10–20)
CALCIUM SERPL-MCNC: 9.4 MG/DL — SIGNIFICANT CHANGE UP (ref 8.4–10.5)
CALCIUM SERPL-MCNC: 9.4 MG/DL — SIGNIFICANT CHANGE UP (ref 8.4–10.5)
CHLORIDE SERPL-SCNC: 100 MMOL/L — SIGNIFICANT CHANGE UP (ref 98–110)
CHLORIDE SERPL-SCNC: 100 MMOL/L — SIGNIFICANT CHANGE UP (ref 98–110)
CO2 SERPL-SCNC: 26 MMOL/L — SIGNIFICANT CHANGE UP (ref 17–32)
CO2 SERPL-SCNC: 26 MMOL/L — SIGNIFICANT CHANGE UP (ref 17–32)
CREAT SERPL-MCNC: 0.6 MG/DL — LOW (ref 0.7–1.5)
CREAT SERPL-MCNC: 0.6 MG/DL — LOW (ref 0.7–1.5)
DRUG SCREEN 1, URINE RESULT: SIGNIFICANT CHANGE UP
DRUG SCREEN 1, URINE RESULT: SIGNIFICANT CHANGE UP
EGFR: 116 ML/MIN/1.73M2 — SIGNIFICANT CHANGE UP
EGFR: 116 ML/MIN/1.73M2 — SIGNIFICANT CHANGE UP
EOSINOPHIL # BLD AUTO: 0.04 K/UL — SIGNIFICANT CHANGE UP (ref 0–0.7)
EOSINOPHIL # BLD AUTO: 0.04 K/UL — SIGNIFICANT CHANGE UP (ref 0–0.7)
EOSINOPHIL NFR BLD AUTO: 0.9 % — SIGNIFICANT CHANGE UP (ref 0–8)
EOSINOPHIL NFR BLD AUTO: 0.9 % — SIGNIFICANT CHANGE UP (ref 0–8)
ESTIMATED AVERAGE GLUCOSE: 88 MG/DL — SIGNIFICANT CHANGE UP (ref 68–114)
ESTIMATED AVERAGE GLUCOSE: 88 MG/DL — SIGNIFICANT CHANGE UP (ref 68–114)
GLUCOSE SERPL-MCNC: 99 MG/DL — SIGNIFICANT CHANGE UP (ref 70–99)
GLUCOSE SERPL-MCNC: 99 MG/DL — SIGNIFICANT CHANGE UP (ref 70–99)
HAV IGM SER-ACNC: SIGNIFICANT CHANGE UP
HAV IGM SER-ACNC: SIGNIFICANT CHANGE UP
HBV CORE IGM SER-ACNC: SIGNIFICANT CHANGE UP
HBV CORE IGM SER-ACNC: SIGNIFICANT CHANGE UP
HBV SURFACE AG SER-ACNC: SIGNIFICANT CHANGE UP
HBV SURFACE AG SER-ACNC: SIGNIFICANT CHANGE UP
HCT VFR BLD CALC: 35.4 % — LOW (ref 37–47)
HCT VFR BLD CALC: 35.4 % — LOW (ref 37–47)
HCV AB S/CO SERPL IA: 0.09 S/CO — SIGNIFICANT CHANGE UP (ref 0–0.99)
HCV AB S/CO SERPL IA: 0.09 S/CO — SIGNIFICANT CHANGE UP (ref 0–0.99)
HCV AB SERPL-IMP: SIGNIFICANT CHANGE UP
HCV AB SERPL-IMP: SIGNIFICANT CHANGE UP
HGB BLD-MCNC: 12.6 G/DL — SIGNIFICANT CHANGE UP (ref 12–16)
HGB BLD-MCNC: 12.6 G/DL — SIGNIFICANT CHANGE UP (ref 12–16)
IMM GRANULOCYTES NFR BLD AUTO: 0.2 % — SIGNIFICANT CHANGE UP (ref 0.1–0.3)
IMM GRANULOCYTES NFR BLD AUTO: 0.2 % — SIGNIFICANT CHANGE UP (ref 0.1–0.3)
LYMPHOCYTES # BLD AUTO: 1.22 K/UL — SIGNIFICANT CHANGE UP (ref 1.2–3.4)
LYMPHOCYTES # BLD AUTO: 1.22 K/UL — SIGNIFICANT CHANGE UP (ref 1.2–3.4)
LYMPHOCYTES # BLD AUTO: 28.2 % — SIGNIFICANT CHANGE UP (ref 20.5–51.1)
LYMPHOCYTES # BLD AUTO: 28.2 % — SIGNIFICANT CHANGE UP (ref 20.5–51.1)
MAGNESIUM SERPL-MCNC: 2.2 MG/DL — SIGNIFICANT CHANGE UP (ref 1.8–2.4)
MAGNESIUM SERPL-MCNC: 2.2 MG/DL — SIGNIFICANT CHANGE UP (ref 1.8–2.4)
MCHC RBC-ENTMCNC: 35.3 PG — HIGH (ref 27–31)
MCHC RBC-ENTMCNC: 35.3 PG — HIGH (ref 27–31)
MCHC RBC-ENTMCNC: 35.6 G/DL — SIGNIFICANT CHANGE UP (ref 32–37)
MCHC RBC-ENTMCNC: 35.6 G/DL — SIGNIFICANT CHANGE UP (ref 32–37)
MCV RBC AUTO: 99.2 FL — HIGH (ref 81–99)
MCV RBC AUTO: 99.2 FL — HIGH (ref 81–99)
MONOCYTES # BLD AUTO: 0.47 K/UL — SIGNIFICANT CHANGE UP (ref 0.1–0.6)
MONOCYTES # BLD AUTO: 0.47 K/UL — SIGNIFICANT CHANGE UP (ref 0.1–0.6)
MONOCYTES NFR BLD AUTO: 10.9 % — HIGH (ref 1.7–9.3)
MONOCYTES NFR BLD AUTO: 10.9 % — HIGH (ref 1.7–9.3)
NEUTROPHILS # BLD AUTO: 2.56 K/UL — SIGNIFICANT CHANGE UP (ref 1.4–6.5)
NEUTROPHILS # BLD AUTO: 2.56 K/UL — SIGNIFICANT CHANGE UP (ref 1.4–6.5)
NEUTROPHILS NFR BLD AUTO: 59.1 % — SIGNIFICANT CHANGE UP (ref 42.2–75.2)
NEUTROPHILS NFR BLD AUTO: 59.1 % — SIGNIFICANT CHANGE UP (ref 42.2–75.2)
NRBC # BLD: 0 /100 WBCS — SIGNIFICANT CHANGE UP (ref 0–0)
NRBC # BLD: 0 /100 WBCS — SIGNIFICANT CHANGE UP (ref 0–0)
PHOSPHATE SERPL-MCNC: 3.7 MG/DL — SIGNIFICANT CHANGE UP (ref 2.1–4.9)
PHOSPHATE SERPL-MCNC: 3.7 MG/DL — SIGNIFICANT CHANGE UP (ref 2.1–4.9)
PLATELET # BLD AUTO: 215 K/UL — SIGNIFICANT CHANGE UP (ref 130–400)
PLATELET # BLD AUTO: 215 K/UL — SIGNIFICANT CHANGE UP (ref 130–400)
PMV BLD: 8.7 FL — SIGNIFICANT CHANGE UP (ref 7.4–10.4)
PMV BLD: 8.7 FL — SIGNIFICANT CHANGE UP (ref 7.4–10.4)
POTASSIUM SERPL-MCNC: 4.1 MMOL/L — SIGNIFICANT CHANGE UP (ref 3.5–5)
POTASSIUM SERPL-MCNC: 4.1 MMOL/L — SIGNIFICANT CHANGE UP (ref 3.5–5)
POTASSIUM SERPL-SCNC: 4.1 MMOL/L — SIGNIFICANT CHANGE UP (ref 3.5–5)
POTASSIUM SERPL-SCNC: 4.1 MMOL/L — SIGNIFICANT CHANGE UP (ref 3.5–5)
PROT SERPL-MCNC: 6.8 G/DL — SIGNIFICANT CHANGE UP (ref 6–8)
PROT SERPL-MCNC: 6.8 G/DL — SIGNIFICANT CHANGE UP (ref 6–8)
RBC # BLD: 3.57 M/UL — LOW (ref 4.2–5.4)
RBC # BLD: 3.57 M/UL — LOW (ref 4.2–5.4)
RBC # FLD: 13.2 % — SIGNIFICANT CHANGE UP (ref 11.5–14.5)
RBC # FLD: 13.2 % — SIGNIFICANT CHANGE UP (ref 11.5–14.5)
SODIUM SERPL-SCNC: 138 MMOL/L — SIGNIFICANT CHANGE UP (ref 135–146)
SODIUM SERPL-SCNC: 138 MMOL/L — SIGNIFICANT CHANGE UP (ref 135–146)
TROPONIN SAMPLING TIME: SIGNIFICANT CHANGE UP
TROPONIN SAMPLING TIME: SIGNIFICANT CHANGE UP
TROPONIN T, HIGH SENSITIVITY RESULT: <6 NG/L — SIGNIFICANT CHANGE UP (ref 6–13)
TROPONIN T, HIGH SENSITIVITY RESULT: <6 NG/L — SIGNIFICANT CHANGE UP (ref 6–13)
TSH SERPL-MCNC: 1 UIU/ML — SIGNIFICANT CHANGE UP (ref 0.27–4.2)
TSH SERPL-MCNC: 1 UIU/ML — SIGNIFICANT CHANGE UP (ref 0.27–4.2)
WBC # BLD: 4.33 K/UL — LOW (ref 4.8–10.8)
WBC # BLD: 4.33 K/UL — LOW (ref 4.8–10.8)
WBC # FLD AUTO: 4.33 K/UL — LOW (ref 4.8–10.8)
WBC # FLD AUTO: 4.33 K/UL — LOW (ref 4.8–10.8)

## 2023-12-18 PROCEDURE — 99252 IP/OBS CONSLTJ NEW/EST SF 35: CPT

## 2023-12-18 PROCEDURE — 99232 SBSQ HOSP IP/OBS MODERATE 35: CPT

## 2023-12-18 PROCEDURE — 99233 SBSQ HOSP IP/OBS HIGH 50: CPT

## 2023-12-18 RX ORDER — CHLORHEXIDINE GLUCONATE 213 G/1000ML
1 SOLUTION TOPICAL
Refills: 0 | Status: DISCONTINUED | OUTPATIENT
Start: 2023-12-18 | End: 2023-12-22

## 2023-12-18 RX ORDER — KETOROLAC TROMETHAMINE 30 MG/ML
10 SYRINGE (ML) INJECTION ONCE
Refills: 0 | Status: DISCONTINUED | OUTPATIENT
Start: 2023-12-18 | End: 2023-12-18

## 2023-12-18 RX ORDER — GABAPENTIN 400 MG/1
100 CAPSULE ORAL THREE TIMES A DAY
Refills: 0 | Status: DISCONTINUED | OUTPATIENT
Start: 2023-12-18 | End: 2023-12-22

## 2023-12-18 RX ORDER — PANTOPRAZOLE SODIUM 20 MG/1
40 TABLET, DELAYED RELEASE ORAL ONCE
Refills: 0 | Status: DISCONTINUED | OUTPATIENT
Start: 2023-12-18 | End: 2023-12-18

## 2023-12-18 RX ORDER — KETOROLAC TROMETHAMINE 30 MG/ML
15 SYRINGE (ML) INJECTION ONCE
Refills: 0 | Status: DISCONTINUED | OUTPATIENT
Start: 2023-12-18 | End: 2023-12-18

## 2023-12-18 RX ORDER — PANTOPRAZOLE SODIUM 20 MG/1
40 TABLET, DELAYED RELEASE ORAL ONCE
Refills: 0 | Status: COMPLETED | OUTPATIENT
Start: 2023-12-18 | End: 2023-12-18

## 2023-12-18 RX ADMIN — Medication 15 MILLIGRAM(S): at 21:11

## 2023-12-18 RX ADMIN — Medication 3 MILLIGRAM(S): at 09:48

## 2023-12-18 RX ADMIN — Medication 105 MILLIGRAM(S): at 16:47

## 2023-12-18 RX ADMIN — QUETIAPINE FUMARATE 200 MILLIGRAM(S): 200 TABLET, FILM COATED ORAL at 22:43

## 2023-12-18 RX ADMIN — Medication 3 MILLIGRAM(S): at 14:41

## 2023-12-18 RX ADMIN — Medication 3 MILLIGRAM(S): at 04:59

## 2023-12-18 RX ADMIN — Medication 1 MILLIGRAM(S): at 12:24

## 2023-12-18 RX ADMIN — GABAPENTIN 100 MILLIGRAM(S): 400 CAPSULE ORAL at 14:41

## 2023-12-18 RX ADMIN — Medication 105 MILLIGRAM(S): at 05:00

## 2023-12-18 RX ADMIN — Medication 4 MILLIGRAM(S): at 01:34

## 2023-12-18 RX ADMIN — Medication 2 MILLIGRAM(S): at 22:44

## 2023-12-18 RX ADMIN — Medication 3 MILLIGRAM(S): at 17:26

## 2023-12-18 RX ADMIN — Medication 15 MILLIGRAM(S): at 21:45

## 2023-12-18 RX ADMIN — PANTOPRAZOLE SODIUM 40 MILLIGRAM(S): 20 TABLET, DELAYED RELEASE ORAL at 23:51

## 2023-12-18 RX ADMIN — CHLORHEXIDINE GLUCONATE 1 APPLICATION(S): 213 SOLUTION TOPICAL at 14:49

## 2023-12-18 RX ADMIN — Medication 105 MILLIGRAM(S): at 22:44

## 2023-12-18 RX ADMIN — Medication 3 MILLIGRAM(S): at 20:34

## 2023-12-18 RX ADMIN — PANTOPRAZOLE SODIUM 40 MILLIGRAM(S): 20 TABLET, DELAYED RELEASE ORAL at 05:00

## 2023-12-18 RX ADMIN — GABAPENTIN 100 MILLIGRAM(S): 400 CAPSULE ORAL at 21:12

## 2023-12-18 NOTE — PROGRESS NOTE ADULT - ASSESSMENT
42 yo female hx of HLD/axneity/ alcohol and benzo abuse admitted to unit yesterday for alcohol withdrawal but was found intoxicated with an alcohol level >300    alcohol abuse     - no need for ICU care   - may downgrade to floor and continue benzo detox protocol   - DVT prophylaxis 44 yo female hx of HLD/axneity/ alcohol and benzo abuse admitted to unit yesterday for alcohol withdrawal but was found intoxicated with an alcohol level >300    alcohol abuse     - no need for ICU care   - may downgrade to floor and continue benzo detox protocol   - DVT prophylaxis

## 2023-12-18 NOTE — PROGRESS NOTE ADULT - SUBJECTIVE AND OBJECTIVE BOX
Patient feeling restless and has shaking.      ICU Vital Signs Last 24 Hrs  T(C): 36 (18 Dec 2023 07:01), Max: 37.4 (17 Dec 2023 15:50)  T(F): 96.8 (18 Dec 2023 07:01), Max: 99.3 (17 Dec 2023 15:50)  HR: 80 (18 Dec 2023 07:01) (80 - 104)  BP: 124/62 (18 Dec 2023 07:01) (121/71 - 144/76)  BP(mean): 88 (18 Dec 2023 07:01) (88 - 104)  ABP: --  ABP(mean): --  RR: 18 (18 Dec 2023 07:01) (18 - 23)  SpO2: 94% (18 Dec 2023 07:01) (94% - 98%)    O2 Parameters below as of 18 Dec 2023 07:01  Patient On (Oxygen Delivery Method): room air          I&O's Summary    17 Dec 2023 07:01  -  18 Dec 2023 07:00  --------------------------------------------------------  IN: 250 mL / OUT: 0 mL / NET: 250 mL    18 Dec 2023 07:01  -  18 Dec 2023 12:33  --------------------------------------------------------  IN: 420 mL / OUT: 0 mL / NET: 420 mL          LABS:                        12.6   4.33  )-----------( 215      ( 18 Dec 2023 06:18 )             35.4     12-18    138  |  100  |  9<L>  ----------------------------<  99  4.1   |  26  |  0.6<L>    Ca    9.4      18 Dec 2023 06:18  Phos  3.7     12-18  Mg     2.2     12-18    TPro  6.8  /  Alb  4.1  /  TBili  1.2  /  DBili  x   /  AST  59<H>  /  ALT  46<H>  /  AlkPhos  71  12-18      Urinalysis Basic - ( 18 Dec 2023 06:18 )    Color: x / Appearance: x / SG: x / pH: x  Gluc: 99 mg/dL / Ketone: x  / Bili: x / Urobili: x   Blood: x / Protein: x / Nitrite: x   Leuk Esterase: x / RBC: x / WBC x   Sq Epi: x / Non Sq Epi: x / Bacteria: x      CAPILLARY BLOOD GLUCOSE            RADIOLOGY & ADDITIONAL TESTS:    MEDICATIONS  (STANDING):  chlorhexidine 2% Cloths 1 Application(s) Topical <User Schedule>  enoxaparin Injectable 40 milliGRAM(s) SubCutaneous every 24 hours  folic acid 1 milliGRAM(s) Oral daily  LORazepam   Injectable 3 milliGRAM(s) IV Push every 4 hours  LORazepam   Injectable   IV Push   pantoprazole    Tablet 40 milliGRAM(s) Oral before breakfast  QUEtiapine 200 milliGRAM(s) Oral at bedtime  thiamine IVPB 500 milliGRAM(s) IV Intermittent every 8 hours    MEDICATIONS  (PRN):  hydrOXYzine hydrochloride 25 milliGRAM(s) Oral two times a day PRN Anxiety  LORazepam     Tablet 2 milliGRAM(s) Oral every 2 hours PRN Symptom-triggered 2 point increase in CIWA-Ar  LORazepam   Injectable 2 milliGRAM(s) IV Push every 1 hour PRN Symptom-triggered: each CIWA -Ar score 8 or GREATER  ondansetron Injectable 4 milliGRAM(s) IV Push every 8 hours PRN Nausea and/or Vomiting

## 2023-12-18 NOTE — PROGRESS NOTE ADULT - SUBJECTIVE AND OBJECTIVE BOX
Patient seen and evaluated this am, awake and alert, not agitated or confused     T(F): 97.6 (12-18-23 @ 15:00), Max: 99 (12-17-23 @ 23:35)  HR: 90 (12-18-23 @ 15:59)  BP: 134/67 (12-18-23 @ 15:59)  RR: 20  SpO2: 94% (12-18-23 @ 07:01) (94% - 98%)    PHYSICAL EXAM:  GENERAL: NAD  HEAD:  Atraumatic, Normocephalic  EYES: EOMI, PERRLA, conjunctiva and sclera clear  NERVOUS SYSTEM:  Alert & Oriented X3, no focal deficits   CHEST/LUNG: Clear to percussion bilaterally; No rales, rhonchi, wheezing, or rubs  HEART: Regular rate and rhythm; No murmurs, rubs, or gallops  ABDOMEN: Soft, Nontender, Nondistended; Bowel sounds present  EXTREMITIES:  2+ Peripheral Pulses, No clubbing, cyanosis, or edema    LABS  12-18    138  |  100  |  9<L>  ----------------------------<  99  4.1   |  26  |  0.6<L>    Ca    9.4      18 Dec 2023 06:18  Phos  3.7     12-18  Mg     2.2     12-18    TPro  6.8  /  Alb  4.1  /  TBili  1.2  /  DBili  x   /  AST  59<H>  /  ALT  46<H>  /  AlkPhos  71  12-18    Alcohol, Blood (12.17.23 @ 02:41)   Alcohol, Blood: 317 mg/dL                          12.6   4.33  )-----------( 215      ( 18 Dec 2023 06:18 )             35.4       RADIOLOGY  < from: US Abdomen Upper Quadrant Right (12.17.23 @ 10:53) >  IMPRESSION:  1. No sonographic evidence of acute cholecystitis or biliary ductal   dilatation.  2. Diffusely echogenic liver, possibly reflecting hepatic steatosis.    < end of copied text >  < from: Xray Chest 1 View- PORTABLE-Urgent (12.16.23 @ 23:07) >  Impression:    No radiographic evidence of acute cardiopulmonary disease.        < end of copied text >    MEDICATIONS  (STANDING):  chlorhexidine 2% Cloths 1 Application(s) Topical <User Schedule>  enoxaparin Injectable 40 milliGRAM(s) SubCutaneous every 24 hours  folic acid 1 milliGRAM(s) Oral daily  gabapentin 100 milliGRAM(s) Oral three times a day  LORazepam   Injectable 3 milliGRAM(s) IV Push every 4 hours  LORazepam   Injectable 1 milliGRAM(s) IV Push once  LORazepam   Injectable   IV Push   pantoprazole    Tablet 40 milliGRAM(s) Oral before breakfast  QUEtiapine 200 milliGRAM(s) Oral at bedtime  thiamine IVPB 500 milliGRAM(s) IV Intermittent every 8 hours    MEDICATIONS  (PRN):  hydrOXYzine hydrochloride 25 milliGRAM(s) Oral two times a day PRN Anxiety  LORazepam     Tablet 2 milliGRAM(s) Oral every 2 hours PRN Symptom-triggered 2 point increase in CIWA-Ar  LORazepam   Injectable 2 milliGRAM(s) IV Push every 1 hour PRN Symptom-triggered: each CIWA -Ar score 8 or GREATER  ondansetron Injectable 4 milliGRAM(s) IV Push every 8 hours PRN Nausea and/or Vomiting

## 2023-12-18 NOTE — CONSULT NOTE ADULT - PROBLEM SELECTOR RECOMMENDATION 9
After evaluation at this time protocol would continue on protocol and can transition to PO when downgraded from unit.  Pt should be on Thiamine and Folic acid. Pt will be monitored and supportive care provided.  Obtain UDS if not done already.  Use of Vistaril for anxiety.  Consider adding gabapentin for anxiety standing order and follow up with PMD/Program for continuation of treatment.    Abdominal ultrasound AFP every 6 months for HCC screening  -EGD outpatient for esophageal varices screening   -Follow up with Private GI or our [facility] located at [address] [place] [address]. Phone Number: [phone]  -Alcohol counseling provided   CATCH team involved for aftercare and pt will follow up with aftercare to possible inpatient rehab facility for alcohol treatment After evaluation at this time protocol would continue on protocol and can transition to PO when downgraded from unit.  Pt should be on Thiamine and Folic acid. Pt will be monitored and supportive care provided.  Obtain UDS if not done already.  Use of Vistaril for anxiety.  Consider adding gabapentin for anxiety standing order and follow up with PMD/Program for continuation of treatment.    Abdominal ultrasound AFP every 6 months for HCC screening  -EGD outpatient for esophageal varices screening   -Follow up with Private GI or our [facility] located at [address] [place] [address]. Phone Number: [phone]  -Alcohol counseling provided   CATCH team involved for aftercare and pt will follow up with aftercare to possible inpatient rehab facility for alcohol treatment

## 2023-12-18 NOTE — PROGRESS NOTE ADULT - ASSESSMENT
44yo F hx of EtOH abuse, depression, GERD, MEHTA, hx of esophagitis presenting to the ED for EtOH abuse/CIWA protocol    #EtOH abuse  #Hx of DTs  #Abd pain  - EtOH level 300s  - pregnancy test (-)  - ativan taper  - thiamine IVP x 5 days/folate  - Addiction team- recs appreciated, gabapentin 100 TID ordered and UDS ordered  - seizure precautions (given hx of DTs)  - cont telemetry    #Transaminitis  - RUQ US: hepatic steatosis  - - currently keep off statin until LFTs are wnl    #Neuropathy?- alcohol induced vs peripheral  - Gabapentin started    #Depression/Anxiety  - cont seroquel    #Hx of thyroid problems?  - TSH 3.54    #DVT ppx: lovenox 40mg QD  #GI ppx: protonix  #Diet: DASH/TLC  #Code: full  #Actvity: AAT  #Dispo: acute. SDU

## 2023-12-18 NOTE — PROGRESS NOTE ADULT - SUBJECTIVE AND OBJECTIVE BOX
Patient is a 43y old Female who presents with a chief complaint of       Over Night Events: Off oxygen. Off pressors        ROS:     All pertinent ROS are negative except HPI         PHYSICAL EXAM    ICU Vital Signs Last 24 Hrs  T(C): 36 (18 Dec 2023 07:01), Max: 37.4 (17 Dec 2023 15:50)  T(F): 96.8 (18 Dec 2023 07:01), Max: 99.3 (17 Dec 2023 15:50)  HR: 80 (18 Dec 2023 07:01) (80 - 104)  BP: 124/62 (18 Dec 2023 07:01) (121/71 - 144/76)  BP(mean): 88 (18 Dec 2023 07:01) (88 - 104)  RR: 18 (18 Dec 2023 07:01) (18 - 23)  SpO2: 94% (18 Dec 2023 07:01) (94% - 98%)    O2 Parameters below as of 18 Dec 2023 07:01  Patient On (Oxygen Delivery Method): room air            CONSTITUTIONAL:  NAD    ENT:   Airway patent    EYES:   Pupils equal,   Round and reactive to light.    CARDIAC:   Normal rate,   Regular rhythm.      RESPIRATORY:   No wheezing  Bilateral BS  Normal chest expansion  Not tachypneic,  No use of accessory muscles    GASTROINTESTINAL:  Abdomen soft,   Non-tender,   No guarding,   + BS    MUSCULOSKELETAL:   Range of motion is not limited,  No clubbing, cyanosis    NEUROLOGICAL:   Alert and oriented     SKIN:   Skin normal color for race,     PSYCHIATRIC:   No apparent risk to self or others.          12-17-23 @ 07:01  -  12-18-23 @ 07:00  --------------------------------------------------------  IN:    IV PiggyBack: 250 mL  Total IN: 250 mL    OUT:  Total OUT: 0 mL    Total NET: 250 mL          LABS:                            12.6   4.33  )-----------( 215      ( 18 Dec 2023 06:18 )             35.4                                               12-18    138  |  100  |  9<L>  ----------------------------<  99  4.1   |  26  |  0.6<L>    Ca    9.4      18 Dec 2023 06:18  Phos  3.7     12-18  Mg     2.2     12-18    TPro  6.8  /  Alb  4.1  /  TBili  1.2  /  DBili  x   /  AST  59<H>  /  ALT  46<H>  /  AlkPhos  71  12-18                                             Urinalysis Basic - ( 18 Dec 2023 06:18 )    Color: x / Appearance: x / SG: x / pH: x  Gluc: 99 mg/dL / Ketone: x  / Bili: x / Urobili: x   Blood: x / Protein: x / Nitrite: x   Leuk Esterase: x / RBC: x / WBC x   Sq Epi: x / Non Sq Epi: x / Bacteria: x                                                  LIVER FUNCTIONS - ( 18 Dec 2023 06:18 )  Alb: 4.1 g/dL / Pro: 6.8 g/dL / ALK PHOS: 71 U/L / ALT: 46 U/L / AST: 59 U/L / GGT: x                                                                                                                                       MEDICATIONS  (STANDING):  chlorhexidine 2% Cloths 1 Application(s) Topical <User Schedule>  enoxaparin Injectable 40 milliGRAM(s) SubCutaneous every 24 hours  folic acid 1 milliGRAM(s) Oral daily  LORazepam   Injectable 3 milliGRAM(s) IV Push every 4 hours  LORazepam   Injectable   IV Push   pantoprazole    Tablet 40 milliGRAM(s) Oral before breakfast  QUEtiapine 200 milliGRAM(s) Oral at bedtime  thiamine IVPB 500 milliGRAM(s) IV Intermittent every 8 hours    MEDICATIONS  (PRN):  hydrOXYzine hydrochloride 25 milliGRAM(s) Oral two times a day PRN Anxiety  LORazepam     Tablet 2 milliGRAM(s) Oral every 2 hours PRN Symptom-triggered 2 point increase in CIWA-Ar  LORazepam   Injectable 2 milliGRAM(s) IV Push every 1 hour PRN Symptom-triggered: each CIWA -Ar score 8 or GREATER  ondansetron Injectable 4 milliGRAM(s) IV Push every 8 hours PRN Nausea and/or Vomiting      New X-rays reviewed:                                                                                  ECHO    CXR interpreted by me:

## 2023-12-18 NOTE — PROGRESS NOTE ADULT - ASSESSMENT
Impression:     Heavy ETOH abuse   Alcohol withdrawal  Alcoholic hepatitis   history of DT   Hepatic steatosis     PLAN:    CNS: Ativan protocol symptom drive. CIWA protocol. Thiamine and Folate. CATCH team.     HEENT: Oral care.     PULMONARY:  HOB @ 45 degrees. CXR clear. on room air.     CARDIOVASCULAR: D5LR at 75cc/hr. Echocardiogram. Trend trop.     GI: GI prophylaxis: Protonix for GERD.  Feeding as tolerated when more awake. Mild elevation in AST isimproving. US RUQ, hepatitis panel noted    RENAL: Follow up lytes. Correct as needed. No jamison. Keep K more than 4 and Mg more than 2.     INFECTIOUS DISEASE: Follow up cultures. No fevers. No leukocytosis.     HEMATOLOGICAL:  DVT prophylaxis. Ambulate when stable.     ENDOCRINE:  Follow up FS.      MUSCULOSKELETAL: bed in chair for now.     SDU monitoring; history of DT.

## 2023-12-18 NOTE — CONSULT NOTE ADULT - SUBJECTIVE AND OBJECTIVE BOX
FROM ER/H&P  44yo F hx of EtOH abuse, depression, GERD, MEHTA, hx of esophagitis presenting to the ED for generalized complaints. Patient drinks 1.7L of vodka every 2 days for a long time. Patient has been complaining of abdominal pain, chest pressure causing her to feel short of breath when she breathes, neuropathy, anxiety, goosebumps, nausea. Patient denies HI/SI, but she wants to join a detox program to get herself to stop drinking. She feels that she cannot keep drinking anymore.     At the ED, VS: 989.9F, BP: 124/72, HR: 92bpm, SpO2: 100% on RA, RR: 22bpm. Labs significant for AG (20), elevated AST/ALT (111/73), alcohol elvel 317.. lipase (-). VBG pH wnl. Lactate pending. EKG wnl without GRETA.     Admitted to SDU for EtOH intoxication/CIWA protocol. Given 1L NS, ativan 2mg IVP x1, and librium 50mg PO x1.         Pt interviewed, examined and EMR chart reviewed.  Pt admits to drinking  1liter daily x  6  months. Last Drink day of admission   Hx of withdrawal tremors and seizures. Last one years ago.  variable periods of sobriety in the past.  Has been in detox before _____yes,   _____No    SOCIAL HISTORY:    REVIEW OF SYSTEMS:    Constitutional: No fever, weight loss or fatigue  ENT:  No difficulty hearing, tinnitus, vertigo; No sinus or throat pain  Neck: No pain or stiffness  Respiratory: No cough, wheezing, chills or hemoptysis  Cardiovascular: No chest pain, palpitations, shortness of breath, dizziness or leg swelling  Gastrointestinal: No abdominal or epigastric pain. No nausea, vomiting or hematemesis; No diarrhea or constipation. No melena or hematochezia.  Neurological: No headaches, memory loss, loss of strength, numbness or tremors  Musculoskeletal: No joint pain or swelling; No muscle, back or extremity pain  Psychiatric: No depression, anxiety, mood swings or difficulty sleeping    MEDICATIONS  (STANDING):  chlorhexidine 2% Cloths 1 Application(s) Topical <User Schedule>  enoxaparin Injectable 40 milliGRAM(s) SubCutaneous every 24 hours  folic acid 1 milliGRAM(s) Oral daily  LORazepam   Injectable 3 milliGRAM(s) IV Push every 4 hours  LORazepam   Injectable   IV Push   pantoprazole    Tablet 40 milliGRAM(s) Oral before breakfast  QUEtiapine 200 milliGRAM(s) Oral at bedtime  thiamine IVPB 500 milliGRAM(s) IV Intermittent every 8 hours    MEDICATIONS  (PRN):  hydrOXYzine hydrochloride 25 milliGRAM(s) Oral two times a day PRN Anxiety  LORazepam     Tablet 2 milliGRAM(s) Oral every 2 hours PRN Symptom-triggered 2 point increase in CIWA-Ar  LORazepam   Injectable 2 milliGRAM(s) IV Push every 1 hour PRN Symptom-triggered: each CIWA -Ar score 8 or GREATER  ondansetron Injectable 4 milliGRAM(s) IV Push every 8 hours PRN Nausea and/or Vomiting      Vital Signs Last 24 Hrs  T(C): 36 (18 Dec 2023 07:01), Max: 37.4 (17 Dec 2023 15:50)  T(F): 96.8 (18 Dec 2023 07:01), Max: 99.3 (17 Dec 2023 15:50)  HR: 80 (18 Dec 2023 07:01) (80 - 104)  BP: 124/62 (18 Dec 2023 07:01) (121/71 - 144/76)  BP(mean): 88 (18 Dec 2023 07:01) (88 - 104)  RR: 18 (18 Dec 2023 07:01) (18 - 23)  SpO2: 94% (18 Dec 2023 07:01) (94% - 98%)    Parameters below as of 18 Dec 2023 07:01  Patient On (Oxygen Delivery Method): room air        PHYSICAL EXAM:    Constitutional: NAD, well-groomed, well-developed  HEENT: PERRLA, EOMI, Normal Hearing,  Neck: No LAD, No JVD  Back: Normal spine flexure, No CVA tenderness  Respiratory: CTAB/L  Cardiovascular: S1 and S2, RRR, no M/G/R  Gastrointestinal: BS+, soft, NT/ND  Extremities: No peripheral edema  Neurological: A/O x 3, no focal deficits no tremors noted    LABS:                        12.6   4.33  )-----------( 215      ( 18 Dec 2023 06:18 )             35.4     12-18    138  |  100  |  9<L>  ----------------------------<  99  4.1   |  26  |  0.6<L>    Ca    9.4      18 Dec 2023 06:18  Phos  3.7     12-18  Mg     2.2     12-18    TPro  6.8  /  Alb  4.1  /  TBili  1.2  /  DBili  x   /  AST  59<H>  /  ALT  46<H>  /  AlkPhos  71  12-18      Urinalysis Basic - ( 18 Dec 2023 06:18 )    Color: x / Appearance: x / SG: x / pH: x  Gluc: 99 mg/dL / Ketone: x  / Bili: x / Urobili: x   Blood: x / Protein: x / Nitrite: x   Leuk Esterase: x / RBC: x / WBC x   Sq Epi: x / Non Sq Epi: x / Bacteria: x      Drug Screen Urine:  Alcohol Level  Alcohol, Blood: 317 mg/dL (12-17-23 @ 02:41)        RADIOLOGY & ADDITIONAL STUDIES:   FROM ER/H&P  42yo F hx of EtOH abuse, depression, GERD, MEHTA, hx of esophagitis presenting to the ED for generalized complaints. Patient drinks 1.7L of vodka every 2 days for a long time. Patient has been complaining of abdominal pain, chest pressure causing her to feel short of breath when she breathes, neuropathy, anxiety, goosebumps, nausea. Patient denies HI/SI, but she wants to join a detox program to get herself to stop drinking. She feels that she cannot keep drinking anymore.     At the ED, VS: 989.9F, BP: 124/72, HR: 92bpm, SpO2: 100% on RA, RR: 22bpm. Labs significant for AG (20), elevated AST/ALT (111/73), alcohol elvel 317.. lipase (-). VBG pH wnl. Lactate pending. EKG wnl without GRETA.     Admitted to SDU for EtOH intoxication/CIWA protocol. Given 1L NS, ativan 2mg IVP x1, and librium 50mg PO x1.         Pt interviewed, examined and EMR chart reviewed.  Pt admits to drinking  1liter daily x  6  months. Last Drink day of admission   Hx of withdrawal tremors and seizures. Last one years ago.  variable periods of sobriety in the past.  Has been in detox before _____yes,   _____No    SOCIAL HISTORY:    REVIEW OF SYSTEMS:    Constitutional: No fever, weight loss or fatigue  ENT:  No difficulty hearing, tinnitus, vertigo; No sinus or throat pain  Neck: No pain or stiffness  Respiratory: No cough, wheezing, chills or hemoptysis  Cardiovascular: No chest pain, palpitations, shortness of breath, dizziness or leg swelling  Gastrointestinal: No abdominal or epigastric pain. No nausea, vomiting or hematemesis; No diarrhea or constipation. No melena or hematochezia.  Neurological: No headaches, memory loss, loss of strength, numbness or tremors  Musculoskeletal: No joint pain or swelling; No muscle, back or extremity pain  Psychiatric: No depression, anxiety, mood swings or difficulty sleeping    MEDICATIONS  (STANDING):  chlorhexidine 2% Cloths 1 Application(s) Topical <User Schedule>  enoxaparin Injectable 40 milliGRAM(s) SubCutaneous every 24 hours  folic acid 1 milliGRAM(s) Oral daily  LORazepam   Injectable 3 milliGRAM(s) IV Push every 4 hours  LORazepam   Injectable   IV Push   pantoprazole    Tablet 40 milliGRAM(s) Oral before breakfast  QUEtiapine 200 milliGRAM(s) Oral at bedtime  thiamine IVPB 500 milliGRAM(s) IV Intermittent every 8 hours    MEDICATIONS  (PRN):  hydrOXYzine hydrochloride 25 milliGRAM(s) Oral two times a day PRN Anxiety  LORazepam     Tablet 2 milliGRAM(s) Oral every 2 hours PRN Symptom-triggered 2 point increase in CIWA-Ar  LORazepam   Injectable 2 milliGRAM(s) IV Push every 1 hour PRN Symptom-triggered: each CIWA -Ar score 8 or GREATER  ondansetron Injectable 4 milliGRAM(s) IV Push every 8 hours PRN Nausea and/or Vomiting      Vital Signs Last 24 Hrs  T(C): 36 (18 Dec 2023 07:01), Max: 37.4 (17 Dec 2023 15:50)  T(F): 96.8 (18 Dec 2023 07:01), Max: 99.3 (17 Dec 2023 15:50)  HR: 80 (18 Dec 2023 07:01) (80 - 104)  BP: 124/62 (18 Dec 2023 07:01) (121/71 - 144/76)  BP(mean): 88 (18 Dec 2023 07:01) (88 - 104)  RR: 18 (18 Dec 2023 07:01) (18 - 23)  SpO2: 94% (18 Dec 2023 07:01) (94% - 98%)    Parameters below as of 18 Dec 2023 07:01  Patient On (Oxygen Delivery Method): room air        PHYSICAL EXAM:    Constitutional: NAD, well-groomed, well-developed  HEENT: PERRLA, EOMI, Normal Hearing,  Neck: No LAD, No JVD  Back: Normal spine flexure, No CVA tenderness  Respiratory: CTAB/L  Cardiovascular: S1 and S2, RRR, no M/G/R  Gastrointestinal: BS+, soft, NT/ND  Extremities: No peripheral edema  Neurological: A/O x 3, no focal deficits no tremors noted    LABS:                        12.6   4.33  )-----------( 215      ( 18 Dec 2023 06:18 )             35.4     12-18    138  |  100  |  9<L>  ----------------------------<  99  4.1   |  26  |  0.6<L>    Ca    9.4      18 Dec 2023 06:18  Phos  3.7     12-18  Mg     2.2     12-18    TPro  6.8  /  Alb  4.1  /  TBili  1.2  /  DBili  x   /  AST  59<H>  /  ALT  46<H>  /  AlkPhos  71  12-18      Urinalysis Basic - ( 18 Dec 2023 06:18 )    Color: x / Appearance: x / SG: x / pH: x  Gluc: 99 mg/dL / Ketone: x  / Bili: x / Urobili: x   Blood: x / Protein: x / Nitrite: x   Leuk Esterase: x / RBC: x / WBC x   Sq Epi: x / Non Sq Epi: x / Bacteria: x      Drug Screen Urine:  Alcohol Level  Alcohol, Blood: 317 mg/dL (12-17-23 @ 02:41)        RADIOLOGY & ADDITIONAL STUDIES:

## 2023-12-19 LAB
ALBUMIN SERPL ELPH-MCNC: 4.1 G/DL — SIGNIFICANT CHANGE UP (ref 3.5–5.2)
ALBUMIN SERPL ELPH-MCNC: 4.1 G/DL — SIGNIFICANT CHANGE UP (ref 3.5–5.2)
ALP SERPL-CCNC: 74 U/L — SIGNIFICANT CHANGE UP (ref 30–115)
ALP SERPL-CCNC: 74 U/L — SIGNIFICANT CHANGE UP (ref 30–115)
ALT FLD-CCNC: 74 U/L — HIGH (ref 0–41)
ALT FLD-CCNC: 74 U/L — HIGH (ref 0–41)
ANION GAP SERPL CALC-SCNC: 9 MMOL/L — SIGNIFICANT CHANGE UP (ref 7–14)
ANION GAP SERPL CALC-SCNC: 9 MMOL/L — SIGNIFICANT CHANGE UP (ref 7–14)
AST SERPL-CCNC: 140 U/L — HIGH (ref 0–41)
AST SERPL-CCNC: 140 U/L — HIGH (ref 0–41)
BASOPHILS # BLD AUTO: 0.04 K/UL — SIGNIFICANT CHANGE UP (ref 0–0.2)
BASOPHILS # BLD AUTO: 0.04 K/UL — SIGNIFICANT CHANGE UP (ref 0–0.2)
BASOPHILS NFR BLD AUTO: 0.9 % — SIGNIFICANT CHANGE UP (ref 0–1)
BASOPHILS NFR BLD AUTO: 0.9 % — SIGNIFICANT CHANGE UP (ref 0–1)
BILIRUB SERPL-MCNC: 0.8 MG/DL — SIGNIFICANT CHANGE UP (ref 0.2–1.2)
BILIRUB SERPL-MCNC: 0.8 MG/DL — SIGNIFICANT CHANGE UP (ref 0.2–1.2)
BUN SERPL-MCNC: 10 MG/DL — SIGNIFICANT CHANGE UP (ref 10–20)
BUN SERPL-MCNC: 10 MG/DL — SIGNIFICANT CHANGE UP (ref 10–20)
CALCIUM SERPL-MCNC: 9.4 MG/DL — SIGNIFICANT CHANGE UP (ref 8.4–10.5)
CALCIUM SERPL-MCNC: 9.4 MG/DL — SIGNIFICANT CHANGE UP (ref 8.4–10.5)
CHLORIDE SERPL-SCNC: 101 MMOL/L — SIGNIFICANT CHANGE UP (ref 98–110)
CHLORIDE SERPL-SCNC: 101 MMOL/L — SIGNIFICANT CHANGE UP (ref 98–110)
CO2 SERPL-SCNC: 25 MMOL/L — SIGNIFICANT CHANGE UP (ref 17–32)
CO2 SERPL-SCNC: 25 MMOL/L — SIGNIFICANT CHANGE UP (ref 17–32)
CREAT SERPL-MCNC: 0.8 MG/DL — SIGNIFICANT CHANGE UP (ref 0.7–1.5)
CREAT SERPL-MCNC: 0.8 MG/DL — SIGNIFICANT CHANGE UP (ref 0.7–1.5)
D DIMER BLD IA.RAPID-MCNC: <150 NG/ML DDU — SIGNIFICANT CHANGE UP
D DIMER BLD IA.RAPID-MCNC: <150 NG/ML DDU — SIGNIFICANT CHANGE UP
EGFR: 95 ML/MIN/1.73M2 — SIGNIFICANT CHANGE UP
EGFR: 95 ML/MIN/1.73M2 — SIGNIFICANT CHANGE UP
EOSINOPHIL # BLD AUTO: 0.15 K/UL — SIGNIFICANT CHANGE UP (ref 0–0.7)
EOSINOPHIL # BLD AUTO: 0.15 K/UL — SIGNIFICANT CHANGE UP (ref 0–0.7)
EOSINOPHIL NFR BLD AUTO: 3.2 % — SIGNIFICANT CHANGE UP (ref 0–8)
EOSINOPHIL NFR BLD AUTO: 3.2 % — SIGNIFICANT CHANGE UP (ref 0–8)
GLUCOSE SERPL-MCNC: 107 MG/DL — HIGH (ref 70–99)
GLUCOSE SERPL-MCNC: 107 MG/DL — HIGH (ref 70–99)
HCT VFR BLD CALC: 38.3 % — SIGNIFICANT CHANGE UP (ref 37–47)
HCT VFR BLD CALC: 38.3 % — SIGNIFICANT CHANGE UP (ref 37–47)
HGB BLD-MCNC: 13 G/DL — SIGNIFICANT CHANGE UP (ref 12–16)
HGB BLD-MCNC: 13 G/DL — SIGNIFICANT CHANGE UP (ref 12–16)
IMM GRANULOCYTES NFR BLD AUTO: 0.4 % — HIGH (ref 0.1–0.3)
IMM GRANULOCYTES NFR BLD AUTO: 0.4 % — HIGH (ref 0.1–0.3)
LACTATE SERPL-SCNC: 1 MMOL/L — SIGNIFICANT CHANGE UP (ref 0.7–2)
LACTATE SERPL-SCNC: 1 MMOL/L — SIGNIFICANT CHANGE UP (ref 0.7–2)
LYMPHOCYTES # BLD AUTO: 1.35 K/UL — SIGNIFICANT CHANGE UP (ref 1.2–3.4)
LYMPHOCYTES # BLD AUTO: 1.35 K/UL — SIGNIFICANT CHANGE UP (ref 1.2–3.4)
LYMPHOCYTES # BLD AUTO: 28.8 % — SIGNIFICANT CHANGE UP (ref 20.5–51.1)
LYMPHOCYTES # BLD AUTO: 28.8 % — SIGNIFICANT CHANGE UP (ref 20.5–51.1)
MAGNESIUM SERPL-MCNC: 2 MG/DL — SIGNIFICANT CHANGE UP (ref 1.8–2.4)
MAGNESIUM SERPL-MCNC: 2 MG/DL — SIGNIFICANT CHANGE UP (ref 1.8–2.4)
MCHC RBC-ENTMCNC: 33.9 G/DL — SIGNIFICANT CHANGE UP (ref 32–37)
MCHC RBC-ENTMCNC: 33.9 G/DL — SIGNIFICANT CHANGE UP (ref 32–37)
MCHC RBC-ENTMCNC: 34.9 PG — HIGH (ref 27–31)
MCHC RBC-ENTMCNC: 34.9 PG — HIGH (ref 27–31)
MCV RBC AUTO: 103 FL — HIGH (ref 81–99)
MCV RBC AUTO: 103 FL — HIGH (ref 81–99)
MONOCYTES # BLD AUTO: 0.38 K/UL — SIGNIFICANT CHANGE UP (ref 0.1–0.6)
MONOCYTES # BLD AUTO: 0.38 K/UL — SIGNIFICANT CHANGE UP (ref 0.1–0.6)
MONOCYTES NFR BLD AUTO: 8.1 % — SIGNIFICANT CHANGE UP (ref 1.7–9.3)
MONOCYTES NFR BLD AUTO: 8.1 % — SIGNIFICANT CHANGE UP (ref 1.7–9.3)
NEUTROPHILS # BLD AUTO: 2.74 K/UL — SIGNIFICANT CHANGE UP (ref 1.4–6.5)
NEUTROPHILS # BLD AUTO: 2.74 K/UL — SIGNIFICANT CHANGE UP (ref 1.4–6.5)
NEUTROPHILS NFR BLD AUTO: 58.6 % — SIGNIFICANT CHANGE UP (ref 42.2–75.2)
NEUTROPHILS NFR BLD AUTO: 58.6 % — SIGNIFICANT CHANGE UP (ref 42.2–75.2)
NRBC # BLD: 0 /100 WBCS — SIGNIFICANT CHANGE UP (ref 0–0)
NRBC # BLD: 0 /100 WBCS — SIGNIFICANT CHANGE UP (ref 0–0)
PLATELET # BLD AUTO: 218 K/UL — SIGNIFICANT CHANGE UP (ref 130–400)
PLATELET # BLD AUTO: 218 K/UL — SIGNIFICANT CHANGE UP (ref 130–400)
PMV BLD: 9.3 FL — SIGNIFICANT CHANGE UP (ref 7.4–10.4)
PMV BLD: 9.3 FL — SIGNIFICANT CHANGE UP (ref 7.4–10.4)
POTASSIUM SERPL-MCNC: 4.4 MMOL/L — SIGNIFICANT CHANGE UP (ref 3.5–5)
POTASSIUM SERPL-MCNC: 4.4 MMOL/L — SIGNIFICANT CHANGE UP (ref 3.5–5)
POTASSIUM SERPL-SCNC: 4.4 MMOL/L — SIGNIFICANT CHANGE UP (ref 3.5–5)
POTASSIUM SERPL-SCNC: 4.4 MMOL/L — SIGNIFICANT CHANGE UP (ref 3.5–5)
PROT SERPL-MCNC: 6.7 G/DL — SIGNIFICANT CHANGE UP (ref 6–8)
PROT SERPL-MCNC: 6.7 G/DL — SIGNIFICANT CHANGE UP (ref 6–8)
RBC # BLD: 3.72 M/UL — LOW (ref 4.2–5.4)
RBC # BLD: 3.72 M/UL — LOW (ref 4.2–5.4)
RBC # FLD: 13 % — SIGNIFICANT CHANGE UP (ref 11.5–14.5)
RBC # FLD: 13 % — SIGNIFICANT CHANGE UP (ref 11.5–14.5)
SODIUM SERPL-SCNC: 135 MMOL/L — SIGNIFICANT CHANGE UP (ref 135–146)
SODIUM SERPL-SCNC: 135 MMOL/L — SIGNIFICANT CHANGE UP (ref 135–146)
WBC # BLD: 4.68 K/UL — LOW (ref 4.8–10.8)
WBC # BLD: 4.68 K/UL — LOW (ref 4.8–10.8)
WBC # FLD AUTO: 4.68 K/UL — LOW (ref 4.8–10.8)
WBC # FLD AUTO: 4.68 K/UL — LOW (ref 4.8–10.8)

## 2023-12-19 PROCEDURE — 99233 SBSQ HOSP IP/OBS HIGH 50: CPT

## 2023-12-19 PROCEDURE — 93306 TTE W/DOPPLER COMPLETE: CPT | Mod: 26

## 2023-12-19 PROCEDURE — 99231 SBSQ HOSP IP/OBS SF/LOW 25: CPT

## 2023-12-19 PROCEDURE — 99232 SBSQ HOSP IP/OBS MODERATE 35: CPT

## 2023-12-19 PROCEDURE — 71045 X-RAY EXAM CHEST 1 VIEW: CPT | Mod: 26

## 2023-12-19 RX ORDER — HYDROXYZINE HCL 10 MG
75 TABLET ORAL ONCE
Refills: 0 | Status: COMPLETED | OUTPATIENT
Start: 2023-12-19 | End: 2023-12-19

## 2023-12-19 RX ADMIN — Medication 2 MILLIGRAM(S): at 05:16

## 2023-12-19 RX ADMIN — GABAPENTIN 100 MILLIGRAM(S): 400 CAPSULE ORAL at 05:15

## 2023-12-19 RX ADMIN — Medication 3 MILLIGRAM(S): at 01:29

## 2023-12-19 RX ADMIN — Medication 2 MILLIGRAM(S): at 21:04

## 2023-12-19 RX ADMIN — GABAPENTIN 100 MILLIGRAM(S): 400 CAPSULE ORAL at 14:52

## 2023-12-19 RX ADMIN — Medication 2 MILLIGRAM(S): at 09:44

## 2023-12-19 RX ADMIN — Medication 100 MILLIGRAM(S): at 11:25

## 2023-12-19 RX ADMIN — GABAPENTIN 100 MILLIGRAM(S): 400 CAPSULE ORAL at 21:04

## 2023-12-19 RX ADMIN — QUETIAPINE FUMARATE 200 MILLIGRAM(S): 200 TABLET, FILM COATED ORAL at 23:16

## 2023-12-19 RX ADMIN — Medication 2 MILLIGRAM(S): at 23:16

## 2023-12-19 RX ADMIN — Medication 2 MILLIGRAM(S): at 12:19

## 2023-12-19 RX ADMIN — Medication 2 MILLIGRAM(S): at 18:13

## 2023-12-19 RX ADMIN — Medication 75 MILLIGRAM(S): at 23:26

## 2023-12-19 RX ADMIN — Medication 1 MILLIGRAM(S): at 11:26

## 2023-12-19 RX ADMIN — PANTOPRAZOLE SODIUM 40 MILLIGRAM(S): 20 TABLET, DELAYED RELEASE ORAL at 05:14

## 2023-12-19 RX ADMIN — Medication 105 MILLIGRAM(S): at 05:36

## 2023-12-19 RX ADMIN — Medication 25 MILLIGRAM(S): at 12:19

## 2023-12-19 RX ADMIN — Medication 2 MILLIGRAM(S): at 14:55

## 2023-12-19 NOTE — PROGRESS NOTE ADULT - ASSESSMENT
44 yo female hx of HLD/axneity/ alcohol and benzo abuse admitted to the unit 3 days ago  intoxicated with an  alcohol level of 317 now being treated for alcohol withdrawal    alcohol abuse     - no need for ICU care   - may downgrade to floor and continue benzo detox protocol   - supplement suspected thiamine and folate deficiency   - DC planning   - DVT prophylaxis

## 2023-12-19 NOTE — PROGRESS NOTE ADULT - SUBJECTIVE AND OBJECTIVE BOX
Patient is a 43y old  Female who presents with a chief complaint of       Over Night Events: Off pressors. Off oxygen.        ROS:     All pertinent ROS are negative except HPI         PHYSICAL EXAM    ICU Vital Signs Last 24 Hrs  T(C): 36.8 (19 Dec 2023 07:20), Max: 36.8 (19 Dec 2023 07:20)  T(F): 98.3 (19 Dec 2023 07:20), Max: 98.3 (19 Dec 2023 07:20)  HR: 81 (19 Dec 2023 07:20) (80 - 100)  BP: 120/65 (19 Dec 2023 07:20) (103/59 - 134/67)  BP(mean): 87 (19 Dec 2023 07:20) (74 - 105)  RR: 18 (19 Dec 2023 07:20) (18 - 28)  SpO2: 94% (19 Dec 2023 07:20) (94% - 98%)    O2 Parameters below as of 19 Dec 2023 05:30  Patient On (Oxygen Delivery Method): nasal cannula            CONSTITUTIONAL:  NAD    ENT:   Airway patent    EYES:   Pupils equal,   Round and reactive to light.    CARDIAC:   Normal rate,   Regular rhythm.    No edema    RESPIRATORY:   No wheezing  Bilateral BS  Normal chest expansion  Not tachypneic,  No use of accessory muscles    GASTROINTESTINAL:  Abdomen soft,   Non-tender,   No guarding,   + BS    MUSCULOSKELETAL:   No clubbing, cyanosis    NEUROLOGICAL:   Alert and oriented     SKIN:   Skin normal color for race    PSYCHIATRIC:   No apparent risk to self or others.          12-18-23 @ 07:01  -  12-19-23 @ 07:00  --------------------------------------------------------  IN:    Oral Fluid: 420 mL  Total IN: 420 mL    OUT:  Total OUT: 0 mL    Total NET: 420 mL          LABS:                            13.0   4.68  )-----------( 218      ( 19 Dec 2023 05:58 )             38.3                                               12-19    135  |  101  |  10  ----------------------------<  107<H>  4.4   |  25  |  0.8    Ca    9.4      19 Dec 2023 05:58  Phos  3.7     12-18  Mg     2.0     12-19    TPro  6.7  /  Alb  4.1  /  TBili  0.8  /  DBili  x   /  AST  140<H>  /  ALT  74<H>  /  AlkPhos  74  12-19                                             Urinalysis Basic - ( 19 Dec 2023 05:58 )    Color: x / Appearance: x / SG: x / pH: x  Gluc: 107 mg/dL / Ketone: x  / Bili: x / Urobili: x   Blood: x / Protein: x / Nitrite: x   Leuk Esterase: x / RBC: x / WBC x   Sq Epi: x / Non Sq Epi: x / Bacteria: x                                                  LIVER FUNCTIONS - ( 19 Dec 2023 05:58 )  Alb: 4.1 g/dL / Pro: 6.7 g/dL / ALK PHOS: 74 U/L / ALT: 74 U/L / AST: 140 U/L / GGT: x                                                                                                                                       MEDICATIONS  (STANDING):  chlorhexidine 2% Cloths 1 Application(s) Topical <User Schedule>  enoxaparin Injectable 40 milliGRAM(s) SubCutaneous every 24 hours  folic acid 1 milliGRAM(s) Oral daily  gabapentin 100 milliGRAM(s) Oral three times a day  LORazepam   Injectable   IV Push   LORazepam   Injectable 2 milliGRAM(s) IV Push every 4 hours  LORazepam   Injectable 1 milliGRAM(s) IV Push once  pantoprazole    Tablet 40 milliGRAM(s) Oral before breakfast  QUEtiapine 200 milliGRAM(s) Oral at bedtime  thiamine Injectable 100 milliGRAM(s) IV Push daily    MEDICATIONS  (PRN):  hydrOXYzine hydrochloride 25 milliGRAM(s) Oral two times a day PRN Anxiety  LORazepam     Tablet 2 milliGRAM(s) Oral every 2 hours PRN Symptom-triggered 2 point increase in CIWA-Ar  LORazepam   Injectable 2 milliGRAM(s) IV Push every 1 hour PRN Symptom-triggered: each CIWA -Ar score 8 or GREATER  ondansetron Injectable 4 milliGRAM(s) IV Push every 8 hours PRN Nausea and/or Vomiting      New X-rays reviewed:                                                                                  ECHO    CXR interpreted by me:

## 2023-12-19 NOTE — PROGRESS NOTE ADULT - ASSESSMENT
44yo F hx of EtOH abuse, depression, GERD, MEHTA, hx of esophagitis presenting to the ED for EtOH abuse/CIWA protocol    #SOB   - Will do CXR  - chest exam normal    #EtOH abuse  #Hx of DTs  #Abd pain  - EtOH level 300s  - pregnancy test (-)  - ativan taper  - thiamine IVP x 5 days/folate  - Addiction team- recs appreciated, gabapentin 100 TID ordered and UDS ordered  - seizure precautions (given hx of DTs)  - cont telemetry    #Transaminitis  - RUQ US: hepatic steatosis  - - currently keep off statin until LFTs are wnl    #Neuropathy?- alcohol induced vs peripheral  - Gabapentin started    #Depression/Anxiety  - cont seroquel    #Hx of thyroid problems?  - TSH 3.54    #DVT ppx: lovenox 40mg QD  #GI ppx: protonix  #Diet: DASH/TLC  #Code: full  #Actvity: AAT       42yo F hx of EtOH abuse, depression, GERD, MEHTA, hx of esophagitis presenting to the ED for EtOH abuse/CIWA protocol    #SOB   - Will do CXR  - chest exam normal    #EtOH abuse  #Hx of DTs  #Abd pain  - EtOH level 300s  - pregnancy test (-)  - ativan taper  - thiamine IVP x 5 days/folate  - Addiction team- recs appreciated, gabapentin 100 TID ordered and UDS ordered  - seizure precautions (given hx of DTs)  - cont telemetry    #Transaminitis  - RUQ US: hepatic steatosis  - - currently keep off statin until LFTs are wnl    #Neuropathy?- alcohol induced vs peripheral  - Gabapentin started    #Depression/Anxiety  - cont seroquel    #Hx of thyroid problems?  - TSH 3.54    #DVT ppx: lovenox 40mg QD  #GI ppx: protonix  #Diet: DASH/TLC  #Code: full  #Actvity: AAT

## 2023-12-19 NOTE — PROGRESS NOTE ADULT - ASSESSMENT
Impression:     Heavy ETOH abuse   Alcohol withdrawal  Alcoholic hepatitis   History of DT   Hepatic steatosis       PLAN:    CNS: Ativan protocol symptom drive. CIWA protocol. Thiamine and Folate. CATCH team.     HEENT: Oral care.     PULMONARY: HOB @ 45 degrees. CXR clear. On room air. D-dimer    CARDIOVASCULAR: D5LR at 75cc/hr. Echocardiogram. Trend trop.     GI: GI prophylaxis: Protonix for GERD.  Feeding as tolerated when more awake. Mild elevation in AST is improving. US RUQ, hepatitis panel noted    RENAL: Follow up lytes. Correct as needed. No jamison. Keep K more than 4 and Mg more than 2.     INFECTIOUS DISEASE: Follow up cultures. No fevers. No leukocytosis.     HEMATOLOGICAL:  DVT prophylaxis. Ambulate when stable.     ENDOCRINE: Follow up FS.      MUSCULOSKELETAL: bed in chair for now.     SDU monitoring; history of DT.

## 2023-12-19 NOTE — CHART NOTE - NSCHARTNOTEFT_GEN_A_CORE
MICU DOWN GRADE NOTE to MED/SURG        HPI:  42yo F hx of EtOH abuse, depression, GERD, MEHTA, hx of esophagitis presenting to the ED for generalized complaints. Patient drinks 1.7L of vodka every 2 days for a long time. Patient has been complaining of abdominal pain, chest pressure causing her to feel short of breath when she breathes, neuropathy, anxiety, goosebumps, nausea. Patient denies HI/SI, but she wants to join a detox program to get herself to stop drinking. She feels that she cannot keep drinking anymore.     At the ED, VS: 989.9F, BP: 124/72, HR: 92bpm, SpO2: 100% on RA, RR: 22bpm. Labs significant for AG (20), elevated AST/ALT (111/73), alcohol elvel 317.. lipase (-). VBG pH wnl. Lactate pending. EKG wnl without GRETA.     Admitted to SDU for EtOH intoxication/CIWA protocol. Given 1L NS, ativan 2mg IVP x1, and librium 50mg PO x1.     In the ICU, patient was on an ativan taper and gabapentin was started for anxiety as per Addiction.    INTERVAL HPI/OVERNIGHT EVENTS:        REVIEW OF SYSTEMS:  CONSTITUTIONAL: No fever, chills  HEENT:  No blurry vision No sinus or throat pain  NECK: No pain or stiffness  RESPIRATORY: No cough, wheezing, chills or hemoptysis; No shortness of breath  CARDIOVASCULAR: No chest pain, palpitations  GASTROINTESTINAL: No abdominal pain. No nausea, vomiting, or diarrhea  GENITOURINARY: No dysuria  NEUROLOGICAL: No HA, No focal weakness  SKIN: No itching, burning, rashes, or lesions   MUSCULOSKELETAL: No joint pain or swelling; No muscle, back, or extremity pain    MEDICATIONS:  chlorhexidine 2% Cloths 1 Application(s) Topical <User Schedule>  enoxaparin Injectable 40 milliGRAM(s) SubCutaneous every 24 hours  folic acid 1 milliGRAM(s) Oral daily  gabapentin 100 milliGRAM(s) Oral three times a day  hydrOXYzine hydrochloride 25 milliGRAM(s) Oral two times a day PRN  LORazepam     Tablet 2 milliGRAM(s) Oral every 2 hours PRN  LORazepam   Injectable 2 milliGRAM(s) IV Push every 4 hours  LORazepam   Injectable 1 milliGRAM(s) IV Push once  LORazepam   Injectable   IV Push   LORazepam   Injectable 2 milliGRAM(s) IV Push every 1 hour PRN  ondansetron Injectable 4 milliGRAM(s) IV Push every 8 hours PRN  pantoprazole    Tablet 40 milliGRAM(s) Oral before breakfast  QUEtiapine 200 milliGRAM(s) Oral at bedtime  thiamine Injectable 100 milliGRAM(s) IV Push daily      T(C): 36.8 (12-19-23 @ 07:20), Max: 36.8 (12-19-23 @ 07:20)  HR: 81 (12-19-23 @ 07:20) (80 - 100)  BP: 120/65 (12-19-23 @ 07:20) (103/59 - 134/67)  RR: 18 (12-19-23 @ 07:20) (18 - 28)  SpO2: 94% (12-19-23 @ 07:20) (94% - 98%)  Wt(kg): --Vital Signs Last 24 Hrs  T(C): 36.8 (19 Dec 2023 07:20), Max: 36.8 (19 Dec 2023 07:20)  T(F): 98.3 (19 Dec 2023 07:20), Max: 98.3 (19 Dec 2023 07:20)  HR: 81 (19 Dec 2023 07:20) (80 - 100)  BP: 120/65 (19 Dec 2023 07:20) (103/59 - 134/67)  BP(mean): 87 (19 Dec 2023 07:20) (74 - 105)  RR: 18 (19 Dec 2023 07:20) (18 - 28)  SpO2: 94% (19 Dec 2023 07:20) (94% - 98%)    Parameters below as of 19 Dec 2023 05:30  Patient On (Oxygen Delivery Method): nasal cannula        PHYSICAL EXAM:  GENERAL: NAD, well-groomed, well-developed  HEAD:  Atraumatic, Normocephalic  EYES: EOMI, PERRLA, conjunctiva and sclera clear  ENMT:  Moist mucous membranes  NECK: Supple, No JVD,  CHEST/LUNG: Clear to auscultation bilaterally; No rales, rhonchi, wheezing, or rubs  HEART: Regular rate and rhythm; No murmurs, rubs, or gallops  ABDOMEN: Soft, Nontender, Nondistended; Bowel sounds present  NEURO: Alert & Oriented X3  EXTREMITIES: No LE edema, no calf tenderness  LYMPH: No lymphadenopathy noted  SKIN: No rashes or lesions    Consultant(s) Notes Reviewed:  [x ] YES  [ ] NO  Care Discussed with Consultants/Other Providers [ x] YES  [ ] NO    LABS:                        13.0   4.68  )-----------( 218      ( 19 Dec 2023 05:58 )             38.3     12-19    135  |  101  |  10  ----------------------------<  107<H>  4.4   |  25  |  0.8    Ca    9.4      19 Dec 2023 05:58  Phos  3.7     12-18  Mg     2.0     12-19    TPro  6.7  /  Alb  4.1  /  TBili  0.8  /  DBili  x   /  AST  140<H>  /  ALT  74<H>  /  AlkPhos  74  12-19      Urinalysis Basic - ( 19 Dec 2023 05:58 )    Color: x / Appearance: x / SG: x / pH: x  Gluc: 107 mg/dL / Ketone: x  / Bili: x / Urobili: x   Blood: x / Protein: x / Nitrite: x   Leuk Esterase: x / RBC: x / WBC x   Sq Epi: x / Non Sq Epi: x / Bacteria: x      CAPILLARY BLOOD GLUCOSE            Urinalysis Basic - ( 19 Dec 2023 05:58 )    Color: x / Appearance: x / SG: x / pH: x  Gluc: 107 mg/dL / Ketone: x  / Bili: x / Urobili: x   Blood: x / Protein: x / Nitrite: x   Leuk Esterase: x / RBC: x / WBC x   Sq Epi: x / Non Sq Epi: x / Bacteria: x        RADIOLOGY & ADDITIONAL TESTS:    Imaging Personally Reviewed:  [x ] YES  [ ] NO    42yo F hx of EtOH abuse, depression, GERD, MEHTA, hx of esophagitis presenting to the ED for EtOH abuse/CIWA protocol    #SOB   - Will do CXR, D-dimer  - chest exam normal    #EtOH abuse  #Hx of DTs  #Abd pain  - EtOH level 300s  - pregnancy test (-)  - ativan taper  - thiamine IVP x 5 days/folate  - Addiction team- recs appreciated, gabapentin 100 TID ordered and UDS ordered  - seizure precautions (given hx of DTs)  - cont telemetry    #Transaminitis  - RUQ US: hepatic steatosis  - - currently keep off statin until LFTs are wnl    #Neuropathy?- alcohol induced vs peripheral  - Gabapentin started    #Depression/Anxiety  - cont seroquel    #Hx of thyroid problems?  - TSH 3.54    #DVT ppx: lovenox 40mg QD  #GI ppx: protonix  #Diet: DASH/TLC  #Code: full  #Actvity: AAT  #Dispo: MED/SURG

## 2023-12-19 NOTE — PROGRESS NOTE ADULT - SUBJECTIVE AND OBJECTIVE BOX
Patient says she is SOB today, no other complaints    ICU Vital Signs Last 24 Hrs  T(C): 36.8 (19 Dec 2023 07:20), Max: 36.8 (19 Dec 2023 07:20)  T(F): 98.3 (19 Dec 2023 07:20), Max: 98.3 (19 Dec 2023 07:20)  HR: 81 (19 Dec 2023 07:20) (80 - 100)  BP: 120/65 (19 Dec 2023 07:20) (103/59 - 134/67)  BP(mean): 87 (19 Dec 2023 07:20) (74 - 105)  ABP: --  ABP(mean): --  RR: 18 (19 Dec 2023 07:20) (18 - 28)  SpO2: 94% (19 Dec 2023 07:20) (94% - 98%)    O2 Parameters below as of 19 Dec 2023 05:30  Patient On (Oxygen Delivery Method): nasal cannula          I&O's Summary    18 Dec 2023 07:01  -  19 Dec 2023 07:00  --------------------------------------------------------  IN: 420 mL / OUT: 0 mL / NET: 420 mL          LABS:                        13.0   4.68  )-----------( 218      ( 19 Dec 2023 05:58 )             38.3     12-19    135  |  101  |  10  ----------------------------<  107<H>  4.4   |  25  |  0.8    Ca    9.4      19 Dec 2023 05:58  Phos  3.7     12-18  Mg     2.0     12-19    TPro  6.7  /  Alb  4.1  /  TBili  0.8  /  DBili  x   /  AST  140<H>  /  ALT  74<H>  /  AlkPhos  74  12-19      Urinalysis Basic - ( 19 Dec 2023 05:58 )    Color: x / Appearance: x / SG: x / pH: x  Gluc: 107 mg/dL / Ketone: x  / Bili: x / Urobili: x   Blood: x / Protein: x / Nitrite: x   Leuk Esterase: x / RBC: x / WBC x   Sq Epi: x / Non Sq Epi: x / Bacteria: x      CAPILLARY BLOOD GLUCOSE            RADIOLOGY & ADDITIONAL TESTS:    MEDICATIONS  (STANDING):  chlorhexidine 2% Cloths 1 Application(s) Topical <User Schedule>  enoxaparin Injectable 40 milliGRAM(s) SubCutaneous every 24 hours  folic acid 1 milliGRAM(s) Oral daily  gabapentin 100 milliGRAM(s) Oral three times a day  LORazepam   Injectable 2 milliGRAM(s) IV Push every 4 hours  LORazepam   Injectable 1 milliGRAM(s) IV Push once  LORazepam   Injectable   IV Push   pantoprazole    Tablet 40 milliGRAM(s) Oral before breakfast  QUEtiapine 200 milliGRAM(s) Oral at bedtime  thiamine Injectable 100 milliGRAM(s) IV Push daily    MEDICATIONS  (PRN):  hydrOXYzine hydrochloride 25 milliGRAM(s) Oral two times a day PRN Anxiety  LORazepam     Tablet 2 milliGRAM(s) Oral every 2 hours PRN Symptom-triggered 2 point increase in CIWA-Ar  LORazepam   Injectable 2 milliGRAM(s) IV Push every 1 hour PRN Symptom-triggered: each CIWA -Ar score 8 or GREATER  ondansetron Injectable 4 milliGRAM(s) IV Push every 8 hours PRN Nausea and/or Vomiting

## 2023-12-19 NOTE — PROGRESS NOTE ADULT - SUBJECTIVE AND OBJECTIVE BOX
Patient seen and evaluated this am, calm in bed, no agitation or confusion    T(F): 97.7 (12-19-23 @ 16:05), Max: 98.3 (12-19-23 @ 07:20)  HR: 104 (12-19-23 @ 20:45)  BP: 120/67 (12-19-23 @ 20:45)  RR: 30 (12-19-23 @ 20:45)  SpO2: 97% (12-19-23 @ 20:45) (94% - 98%)    PHYSICAL EXAM:  GENERAL: NAD  HEAD:  Atraumatic, Normocephalic  EYES: EOMI, PERRLA, conjunctiva and sclera clear  NERVOUS SYSTEM:  Alert & Oriented X3, no focal deficits   CHEST/LUNG: Clear to percussion bilaterally; No rales, rhonchi, wheezing, or rubs  HEART: Regular rate and rhythm; No murmurs, rubs, or gallops  ABDOMEN: Soft, Nontender, Nondistended; Bowel sounds present  EXTREMITIES:  2+ Peripheral Pulses, No clubbing, cyanosis, or edema    LABS  12-19    135  |  101  |  10  ----------------------------<  107<H>  4.4   |  25  |  0.8    Ca    9.4      19 Dec 2023 05:58  Phos  3.7     12-18  Mg     2.0     12-19    TPro  6.7  /  Alb  4.1  /  TBili  0.8  /  DBili  x   /  AST  140<H>  /  ALT  74<H>  /  AlkPhos  74  12-19                          13.0   4.68  )-----------( 218      ( 19 Dec 2023 05:58 )             38.3       RADIOLOGY  < from: Xray Chest 1 View-PORTABLE IMMEDIATE (Xray Chest 1 View-PORTABLE IMMEDIATE .) (12.19.23 @ 09:32) >    Impression:    No radiographic evidence of acute cardiopulmonary disease.    < end of copied text >  < from: US Abdomen Upper Quadrant Right (12.17.23 @ 10:53) >  IMPRESSION:  1. No sonographic evidence of acute cholecystitis or biliary ductal   dilatation.  2. Diffusely echogenic liver, possibly reflecting hepatic steatosis.    < end of copied text >    MEDICATIONS  (STANDING):  chlorhexidine 2% Cloths 1 Application(s) Topical <User Schedule>  enoxaparin Injectable 40 milliGRAM(s) SubCutaneous every 24 hours  folic acid 1 milliGRAM(s) Oral daily  gabapentin 100 milliGRAM(s) Oral three times a day  LORazepam   Injectable 1 milliGRAM(s) IV Push once  LORazepam   Injectable 2 milliGRAM(s) IV Push every 4 hours  LORazepam   Injectable   IV Push   pantoprazole    Tablet 40 milliGRAM(s) Oral before breakfast  QUEtiapine 200 milliGRAM(s) Oral at bedtime  thiamine Injectable 100 milliGRAM(s) IV Push daily    MEDICATIONS  (PRN):  hydrOXYzine hydrochloride 25 milliGRAM(s) Oral two times a day PRN Anxiety  LORazepam     Tablet 2 milliGRAM(s) Oral every 2 hours PRN Symptom-triggered 2 point increase in CIWA-Ar  LORazepam   Injectable 2 milliGRAM(s) IV Push every 1 hour PRN Symptom-triggered: each CIWA -Ar score 8 or GREATER  ondansetron Injectable 4 milliGRAM(s) IV Push every 8 hours PRN Nausea and/or Vomiting

## 2023-12-19 NOTE — PROGRESS NOTE ADULT - SUBJECTIVE AND OBJECTIVE BOX
Pt interviewed, examined and EMR chart reviewed.    Follow up of Alcohol Dependency. Pt is on Ativan protocol. Pt is doing better . Pt with complaint of some tremors and withdrawal.      REVIEW OF SYSTEMS:    Constitutional: No fever, weight loss or fatigue  ENT:  No difficulty hearing, tinnitus, vertigo; No sinus or throat pain  Neck: No pain or stiffness  Respiratory: No cough, wheezing, chills or hemoptysis  Cardiovascular: No chest pain, palpitations, shortness of breath, dizziness or leg swelling  Gastrointestinal: No abdominal or epigastric pain. No nausea, vomiting or hematemesis; No diarrhea or constipation. No melena or hematochezia.  Neurological: No headaches, memory loss, loss of strength, numbness or tremors  Musculoskeletal: No joint pain or swelling; No muscle, back or extremity pain      MEDICATIONS  (STANDING):  chlorhexidine 2% Cloths 1 Application(s) Topical <User Schedule>  enoxaparin Injectable 40 milliGRAM(s) SubCutaneous every 24 hours  folic acid 1 milliGRAM(s) Oral daily  gabapentin 100 milliGRAM(s) Oral three times a day  LORazepam   Injectable 2 milliGRAM(s) IV Push every 4 hours  LORazepam   Injectable   IV Push   LORazepam   Injectable 1 milliGRAM(s) IV Push once  pantoprazole    Tablet 40 milliGRAM(s) Oral before breakfast  QUEtiapine 200 milliGRAM(s) Oral at bedtime  thiamine Injectable 100 milliGRAM(s) IV Push daily    MEDICATIONS  (PRN):  hydrOXYzine hydrochloride 25 milliGRAM(s) Oral two times a day PRN Anxiety  LORazepam     Tablet 2 milliGRAM(s) Oral every 2 hours PRN Symptom-triggered 2 point increase in CIWA-Ar  LORazepam   Injectable 2 milliGRAM(s) IV Push every 1 hour PRN Symptom-triggered: each CIWA -Ar score 8 or GREATER  ondansetron Injectable 4 milliGRAM(s) IV Push every 8 hours PRN Nausea and/or Vomiting      Vital Signs Last 24 Hrs  T(C): 36.8 (19 Dec 2023 07:20), Max: 36.8 (19 Dec 2023 07:20)  T(F): 98.3 (19 Dec 2023 07:20), Max: 98.3 (19 Dec 2023 07:20)  HR: 81 (19 Dec 2023 07:20) (80 - 100)  BP: 120/65 (19 Dec 2023 07:20) (103/59 - 134/67)  BP(mean): 87 (19 Dec 2023 07:20) (74 - 105)  RR: 18 (19 Dec 2023 07:20) (18 - 28)  SpO2: 94% (19 Dec 2023 07:20) (94% - 98%)    Parameters below as of 19 Dec 2023 05:30  Patient On (Oxygen Delivery Method): nasal cannula        PHYSICAL EXAM:    Constitutional: NAD, well-groomed, well-developed  HEENT: PERRLA, EOMI, Normal Hearing,   Neck: No LAD, No JVD  Back: Normal spine flexure, No CVA tenderness  Respiratory: CTAB/L  Cardiovascular: S1 and S2, RRR, no M/G/R  Gastrointestinal: BS+, soft, NT/ND  Extremities: No peripheral edema  Neurological: A/O x 3, no focal deficits    LABS:                        13.0   4.68  )-----------( 218      ( 19 Dec 2023 05:58 )             38.3     12-19    135  |  101  |  10  ----------------------------<  107<H>  4.4   |  25  |  0.8    Ca    9.4      19 Dec 2023 05:58  Phos  3.7     12-18  Mg     2.0     12-19    TPro  6.7  /  Alb  4.1  /  TBili  0.8  /  DBili  x   /  AST  140<H>  /  ALT  74<H>  /  AlkPhos  74  12-19      Urinalysis Basic - ( 19 Dec 2023 05:58 )    Color: x / Appearance: x / SG: x / pH: x  Gluc: 107 mg/dL / Ketone: x  / Bili: x / Urobili: x   Blood: x / Protein: x / Nitrite: x   Leuk Esterase: x / RBC: x / WBC x   Sq Epi: x / Non Sq Epi: x / Bacteria: x      Drug Screen Urine:  Alcohol Level  Alcohol, Blood: 317 mg/dL (12-17-23 @ 02:41)        RADIOLOGY & ADDITIONAL STUDIES:

## 2023-12-20 LAB
ALBUMIN SERPL ELPH-MCNC: 4.3 G/DL — SIGNIFICANT CHANGE UP (ref 3.5–5.2)
ALBUMIN SERPL ELPH-MCNC: 4.3 G/DL — SIGNIFICANT CHANGE UP (ref 3.5–5.2)
ALP SERPL-CCNC: 76 U/L — SIGNIFICANT CHANGE UP (ref 30–115)
ALP SERPL-CCNC: 76 U/L — SIGNIFICANT CHANGE UP (ref 30–115)
ALT FLD-CCNC: 95 U/L — HIGH (ref 0–41)
ALT FLD-CCNC: 95 U/L — HIGH (ref 0–41)
ANION GAP SERPL CALC-SCNC: 15 MMOL/L — HIGH (ref 7–14)
ANION GAP SERPL CALC-SCNC: 15 MMOL/L — HIGH (ref 7–14)
AST SERPL-CCNC: 147 U/L — HIGH (ref 0–41)
AST SERPL-CCNC: 147 U/L — HIGH (ref 0–41)
BASOPHILS # BLD AUTO: 0.04 K/UL — SIGNIFICANT CHANGE UP (ref 0–0.2)
BASOPHILS # BLD AUTO: 0.04 K/UL — SIGNIFICANT CHANGE UP (ref 0–0.2)
BASOPHILS NFR BLD AUTO: 0.6 % — SIGNIFICANT CHANGE UP (ref 0–1)
BASOPHILS NFR BLD AUTO: 0.6 % — SIGNIFICANT CHANGE UP (ref 0–1)
BILIRUB SERPL-MCNC: 0.6 MG/DL — SIGNIFICANT CHANGE UP (ref 0.2–1.2)
BILIRUB SERPL-MCNC: 0.6 MG/DL — SIGNIFICANT CHANGE UP (ref 0.2–1.2)
BUN SERPL-MCNC: 13 MG/DL — SIGNIFICANT CHANGE UP (ref 10–20)
BUN SERPL-MCNC: 13 MG/DL — SIGNIFICANT CHANGE UP (ref 10–20)
CALCIUM SERPL-MCNC: 9.5 MG/DL — SIGNIFICANT CHANGE UP (ref 8.4–10.5)
CALCIUM SERPL-MCNC: 9.5 MG/DL — SIGNIFICANT CHANGE UP (ref 8.4–10.5)
CHLORIDE SERPL-SCNC: 98 MMOL/L — SIGNIFICANT CHANGE UP (ref 98–110)
CHLORIDE SERPL-SCNC: 98 MMOL/L — SIGNIFICANT CHANGE UP (ref 98–110)
CO2 SERPL-SCNC: 21 MMOL/L — SIGNIFICANT CHANGE UP (ref 17–32)
CO2 SERPL-SCNC: 21 MMOL/L — SIGNIFICANT CHANGE UP (ref 17–32)
CREAT SERPL-MCNC: 0.7 MG/DL — SIGNIFICANT CHANGE UP (ref 0.7–1.5)
CREAT SERPL-MCNC: 0.7 MG/DL — SIGNIFICANT CHANGE UP (ref 0.7–1.5)
EGFR: 111 ML/MIN/1.73M2 — SIGNIFICANT CHANGE UP
EGFR: 111 ML/MIN/1.73M2 — SIGNIFICANT CHANGE UP
EOSINOPHIL # BLD AUTO: 0.11 K/UL — SIGNIFICANT CHANGE UP (ref 0–0.7)
EOSINOPHIL # BLD AUTO: 0.11 K/UL — SIGNIFICANT CHANGE UP (ref 0–0.7)
EOSINOPHIL NFR BLD AUTO: 1.5 % — SIGNIFICANT CHANGE UP (ref 0–8)
EOSINOPHIL NFR BLD AUTO: 1.5 % — SIGNIFICANT CHANGE UP (ref 0–8)
GLUCOSE SERPL-MCNC: 115 MG/DL — HIGH (ref 70–99)
GLUCOSE SERPL-MCNC: 115 MG/DL — HIGH (ref 70–99)
HCT VFR BLD CALC: 37.6 % — SIGNIFICANT CHANGE UP (ref 37–47)
HCT VFR BLD CALC: 37.6 % — SIGNIFICANT CHANGE UP (ref 37–47)
HGB BLD-MCNC: 12.9 G/DL — SIGNIFICANT CHANGE UP (ref 12–16)
HGB BLD-MCNC: 12.9 G/DL — SIGNIFICANT CHANGE UP (ref 12–16)
IMM GRANULOCYTES NFR BLD AUTO: 0.3 % — SIGNIFICANT CHANGE UP (ref 0.1–0.3)
IMM GRANULOCYTES NFR BLD AUTO: 0.3 % — SIGNIFICANT CHANGE UP (ref 0.1–0.3)
LYMPHOCYTES # BLD AUTO: 1.45 K/UL — SIGNIFICANT CHANGE UP (ref 1.2–3.4)
LYMPHOCYTES # BLD AUTO: 1.45 K/UL — SIGNIFICANT CHANGE UP (ref 1.2–3.4)
LYMPHOCYTES # BLD AUTO: 20.4 % — LOW (ref 20.5–51.1)
LYMPHOCYTES # BLD AUTO: 20.4 % — LOW (ref 20.5–51.1)
MAGNESIUM SERPL-MCNC: 1.6 MG/DL — LOW (ref 1.8–2.4)
MAGNESIUM SERPL-MCNC: 1.6 MG/DL — LOW (ref 1.8–2.4)
MCHC RBC-ENTMCNC: 34.3 G/DL — SIGNIFICANT CHANGE UP (ref 32–37)
MCHC RBC-ENTMCNC: 34.3 G/DL — SIGNIFICANT CHANGE UP (ref 32–37)
MCHC RBC-ENTMCNC: 34.5 PG — HIGH (ref 27–31)
MCHC RBC-ENTMCNC: 34.5 PG — HIGH (ref 27–31)
MCV RBC AUTO: 100.5 FL — HIGH (ref 81–99)
MCV RBC AUTO: 100.5 FL — HIGH (ref 81–99)
MONOCYTES # BLD AUTO: 0.52 K/UL — SIGNIFICANT CHANGE UP (ref 0.1–0.6)
MONOCYTES # BLD AUTO: 0.52 K/UL — SIGNIFICANT CHANGE UP (ref 0.1–0.6)
MONOCYTES NFR BLD AUTO: 7.3 % — SIGNIFICANT CHANGE UP (ref 1.7–9.3)
MONOCYTES NFR BLD AUTO: 7.3 % — SIGNIFICANT CHANGE UP (ref 1.7–9.3)
NEUTROPHILS # BLD AUTO: 4.98 K/UL — SIGNIFICANT CHANGE UP (ref 1.4–6.5)
NEUTROPHILS # BLD AUTO: 4.98 K/UL — SIGNIFICANT CHANGE UP (ref 1.4–6.5)
NEUTROPHILS NFR BLD AUTO: 69.9 % — SIGNIFICANT CHANGE UP (ref 42.2–75.2)
NEUTROPHILS NFR BLD AUTO: 69.9 % — SIGNIFICANT CHANGE UP (ref 42.2–75.2)
NRBC # BLD: 0 /100 WBCS — SIGNIFICANT CHANGE UP (ref 0–0)
NRBC # BLD: 0 /100 WBCS — SIGNIFICANT CHANGE UP (ref 0–0)
PLATELET # BLD AUTO: 204 K/UL — SIGNIFICANT CHANGE UP (ref 130–400)
PLATELET # BLD AUTO: 204 K/UL — SIGNIFICANT CHANGE UP (ref 130–400)
PMV BLD: 9.7 FL — SIGNIFICANT CHANGE UP (ref 7.4–10.4)
PMV BLD: 9.7 FL — SIGNIFICANT CHANGE UP (ref 7.4–10.4)
POTASSIUM SERPL-MCNC: 4.2 MMOL/L — SIGNIFICANT CHANGE UP (ref 3.5–5)
POTASSIUM SERPL-MCNC: 4.2 MMOL/L — SIGNIFICANT CHANGE UP (ref 3.5–5)
POTASSIUM SERPL-SCNC: 4.2 MMOL/L — SIGNIFICANT CHANGE UP (ref 3.5–5)
POTASSIUM SERPL-SCNC: 4.2 MMOL/L — SIGNIFICANT CHANGE UP (ref 3.5–5)
PROT SERPL-MCNC: 7.1 G/DL — SIGNIFICANT CHANGE UP (ref 6–8)
PROT SERPL-MCNC: 7.1 G/DL — SIGNIFICANT CHANGE UP (ref 6–8)
RBC # BLD: 3.74 M/UL — LOW (ref 4.2–5.4)
RBC # BLD: 3.74 M/UL — LOW (ref 4.2–5.4)
RBC # FLD: 13.2 % — SIGNIFICANT CHANGE UP (ref 11.5–14.5)
RBC # FLD: 13.2 % — SIGNIFICANT CHANGE UP (ref 11.5–14.5)
SODIUM SERPL-SCNC: 134 MMOL/L — LOW (ref 135–146)
SODIUM SERPL-SCNC: 134 MMOL/L — LOW (ref 135–146)
WBC # BLD: 7.12 K/UL — SIGNIFICANT CHANGE UP (ref 4.8–10.8)
WBC # BLD: 7.12 K/UL — SIGNIFICANT CHANGE UP (ref 4.8–10.8)
WBC # FLD AUTO: 7.12 K/UL — SIGNIFICANT CHANGE UP (ref 4.8–10.8)
WBC # FLD AUTO: 7.12 K/UL — SIGNIFICANT CHANGE UP (ref 4.8–10.8)

## 2023-12-20 PROCEDURE — 99233 SBSQ HOSP IP/OBS HIGH 50: CPT

## 2023-12-20 RX ORDER — MAGNESIUM SULFATE 500 MG/ML
2 VIAL (ML) INJECTION ONCE
Refills: 0 | Status: COMPLETED | OUTPATIENT
Start: 2023-12-20 | End: 2023-12-20

## 2023-12-20 RX ADMIN — Medication 2 MILLIGRAM(S): at 01:39

## 2023-12-20 RX ADMIN — Medication 1 MILLIGRAM(S): at 14:14

## 2023-12-20 RX ADMIN — Medication 25 MILLIGRAM(S): at 11:01

## 2023-12-20 RX ADMIN — Medication 2 MILLIGRAM(S): at 20:48

## 2023-12-20 RX ADMIN — Medication 1 MILLIGRAM(S): at 11:01

## 2023-12-20 RX ADMIN — Medication 1 MILLIGRAM(S): at 23:03

## 2023-12-20 RX ADMIN — GABAPENTIN 100 MILLIGRAM(S): 400 CAPSULE ORAL at 23:03

## 2023-12-20 RX ADMIN — GABAPENTIN 100 MILLIGRAM(S): 400 CAPSULE ORAL at 14:14

## 2023-12-20 RX ADMIN — GABAPENTIN 100 MILLIGRAM(S): 400 CAPSULE ORAL at 06:08

## 2023-12-20 RX ADMIN — PANTOPRAZOLE SODIUM 40 MILLIGRAM(S): 20 TABLET, DELAYED RELEASE ORAL at 06:08

## 2023-12-20 RX ADMIN — Medication 100 MILLIGRAM(S): at 14:14

## 2023-12-20 RX ADMIN — Medication 1 MILLIGRAM(S): at 06:08

## 2023-12-20 RX ADMIN — Medication 25 GRAM(S): at 18:38

## 2023-12-20 RX ADMIN — QUETIAPINE FUMARATE 200 MILLIGRAM(S): 200 TABLET, FILM COATED ORAL at 23:04

## 2023-12-20 RX ADMIN — Medication 1 MILLIGRAM(S): at 18:37

## 2023-12-20 NOTE — PROGRESS NOTE ADULT - SUBJECTIVE AND OBJECTIVE BOX
SUBJECTIVE:    Patient is a 43y old Female who presents with a chief complaint of   Currently admitted to medicine with the primary diagnosis of Alcohol use with withdrawal       Today is hospital day 3d. This morning she is resting comfortably in bed , reports anxiety     ROS:   CONSTITUTIONAL: No weakness, fevers or chills   EYES/ENT: No visual changes; No vertigo or throat pain   NECK: No pain or stiffness   RESPIRATORY: No cough, wheezing, hemoptysis; No shortness of breath   CARDIOVASCULAR: No chest pain or palpitations   GASTROINTESTINAL: No abdominal or epigastric pain. No nausea, vomiting, or hematemesis; No diarrhea or constipation. No melena or hematochezia.  GENITOURINARY: No dysuria, frequency or hematuria          PAST MEDICAL & SURGICAL HISTORY  Alcoholism    Anxiety    Graves' disease    GERD (gastroesophageal reflux disease)    Gastritis    MVP (mitral valve prolapse)    Nicotine dependence    Benzodiazepine misuse    Hypercholesterolemia    Obesity    Esophagitis    History of Graves' disease    Heart murmur    Hyperlipidemia, unspecified hyperlipidemia type    Hemochromatosis    Fatty liver    History of  section    S/P tendon repair  5th finger right hand      SOCIAL HISTORY:    ALLERGIES:  morphine (Unknown)    MEDICATIONS:  STANDING MEDICATIONS  chlorhexidine 2% Cloths 1 Application(s) Topical <User Schedule>  enoxaparin Injectable 40 milliGRAM(s) SubCutaneous every 24 hours  folic acid 1 milliGRAM(s) Oral daily  gabapentin 100 milliGRAM(s) Oral three times a day  LORazepam     Tablet 1 milliGRAM(s) Oral every 4 hours  magnesium sulfate  IVPB 2 Gram(s) IV Intermittent once  pantoprazole    Tablet 40 milliGRAM(s) Oral before breakfast  QUEtiapine 200 milliGRAM(s) Oral at bedtime  thiamine Injectable 100 milliGRAM(s) IV Push daily    PRN MEDICATIONS  hydrOXYzine hydrochloride 25 milliGRAM(s) Oral two times a day PRN  LORazepam     Tablet 2 milliGRAM(s) Oral every 2 hours PRN  LORazepam   Injectable 2 milliGRAM(s) IV Push every 1 hour PRN  ondansetron Injectable 4 milliGRAM(s) IV Push every 8 hours PRN    VITALS:   T(F): 97.4  HR: 95  BP: 122/64  RR: 16  SpO2: 98%    LABS:  Negative for smoking/alcohol/drug use.                         12.9   7.12  )-----------( 204      ( 20 Dec 2023 10:48 )             37.6     12-20    134<L>  |  98  |  13  ----------------------------<  115<H>  4.2   |  21  |  0.7    Ca    9.5      20 Dec 2023 10:48  Mg     1.6     12-20    TPro  7.1  /  Alb  4.3  /  TBili  0.6  /  DBili  x   /  AST  147<H>  /  ALT  95<H>  /  AlkPhos  76  12-20      Urinalysis Basic - ( 20 Dec 2023 10:48 )    Color: x / Appearance: x / SG: x / pH: x  Gluc: 115 mg/dL / Ketone: x  / Bili: x / Urobili: x   Blood: x / Protein: x / Nitrite: x   Leuk Esterase: x / RBC: x / WBC x   Sq Epi: x / Non Sq Epi: x / Bacteria: x      RADIOLOGY:    PHYSICAL EXAM:  GEN: No acute distress  HEENT: normocephalic, atraumatic, aniceteric  LUNGS: bl breath sounds   HEART: S1/S2 present. RRR, no murmurs  ABD: Soft, non-tender, non-distended. Bowel sounds present  EXT: warm   NEURO: AAOX3, normal affect      ASSESSMENT AND PLAN:    42 yo female hx of HLD/axneity/ alcohol and benzo abuse admitted to the unit 3 days ago  intoxicated with an  alcohol level of 317 now being treated for alcohol withdrawal    42yo F hx of EtOH abuse, depression, GERD, MEHTA, hx of esophagitis presenting to the ED for EtOH abuse/CIWA protocol     #H/O EtOH abuse  #Hx of DTs  #Suspected thiamine and folate deficiency  # Hypomagnasemia   - EtOH level 300s  - pregnancy test (-)  - thiamine IVP x 5 days/folate  - Addiction team- recs appreciated, gabapentin 100 TID ordered and UDS ordered  - seizure precautions (given hx of DTs)  - ativan taper ( to complete )   - supplement and monitor electrolytes       #SOB related to anxiety - now resolved   patient notes gets these symptoms when has anxiety   - CXR neg,  D-dimer neg   - chest exam normal  - Psych eval - pt interested in starting a maintenance medication     #Transaminitis  - RUQ US: hepatic steatosis  - - currently keep off statin until LFTs are wnl    #Neuropathy?- alcohol induced vs peripheral  - Gabapentin started    #Depression/Anxiety- on seroquel ; psych eval     #Hx of thyroid problems? - TSH 3.54      # dvt/ gi ppx/diet   # dispo: acute  # handoff: complete detox    SUBJECTIVE:    Patient is a 43y old Female who presents with a chief complaint of   Currently admitted to medicine with the primary diagnosis of Alcohol use with withdrawal       Today is hospital day 3d. This morning she is resting comfortably in bed , reports anxiety     ROS:   CONSTITUTIONAL: No weakness, fevers or chills   EYES/ENT: No visual changes; No vertigo or throat pain   NECK: No pain or stiffness   RESPIRATORY: No cough, wheezing, hemoptysis; No shortness of breath   CARDIOVASCULAR: No chest pain or palpitations   GASTROINTESTINAL: No abdominal or epigastric pain. No nausea, vomiting, or hematemesis; No diarrhea or constipation. No melena or hematochezia.  GENITOURINARY: No dysuria, frequency or hematuria          PAST MEDICAL & SURGICAL HISTORY  Alcoholism    Anxiety    Graves' disease    GERD (gastroesophageal reflux disease)    Gastritis    MVP (mitral valve prolapse)    Nicotine dependence    Benzodiazepine misuse    Hypercholesterolemia    Obesity    Esophagitis    History of Graves' disease    Heart murmur    Hyperlipidemia, unspecified hyperlipidemia type    Hemochromatosis    Fatty liver    History of  section    S/P tendon repair  5th finger right hand      SOCIAL HISTORY:    ALLERGIES:  morphine (Unknown)    MEDICATIONS:  STANDING MEDICATIONS  chlorhexidine 2% Cloths 1 Application(s) Topical <User Schedule>  enoxaparin Injectable 40 milliGRAM(s) SubCutaneous every 24 hours  folic acid 1 milliGRAM(s) Oral daily  gabapentin 100 milliGRAM(s) Oral three times a day  LORazepam     Tablet 1 milliGRAM(s) Oral every 4 hours  magnesium sulfate  IVPB 2 Gram(s) IV Intermittent once  pantoprazole    Tablet 40 milliGRAM(s) Oral before breakfast  QUEtiapine 200 milliGRAM(s) Oral at bedtime  thiamine Injectable 100 milliGRAM(s) IV Push daily    PRN MEDICATIONS  hydrOXYzine hydrochloride 25 milliGRAM(s) Oral two times a day PRN  LORazepam     Tablet 2 milliGRAM(s) Oral every 2 hours PRN  LORazepam   Injectable 2 milliGRAM(s) IV Push every 1 hour PRN  ondansetron Injectable 4 milliGRAM(s) IV Push every 8 hours PRN    VITALS:   T(F): 97.4  HR: 95  BP: 122/64  RR: 16  SpO2: 98%    LABS:  Negative for smoking/alcohol/drug use.                         12.9   7.12  )-----------( 204      ( 20 Dec 2023 10:48 )             37.6     12-20    134<L>  |  98  |  13  ----------------------------<  115<H>  4.2   |  21  |  0.7    Ca    9.5      20 Dec 2023 10:48  Mg     1.6     12-20    TPro  7.1  /  Alb  4.3  /  TBili  0.6  /  DBili  x   /  AST  147<H>  /  ALT  95<H>  /  AlkPhos  76  12-20      Urinalysis Basic - ( 20 Dec 2023 10:48 )    Color: x / Appearance: x / SG: x / pH: x  Gluc: 115 mg/dL / Ketone: x  / Bili: x / Urobili: x   Blood: x / Protein: x / Nitrite: x   Leuk Esterase: x / RBC: x / WBC x   Sq Epi: x / Non Sq Epi: x / Bacteria: x      RADIOLOGY:    PHYSICAL EXAM:  GEN: No acute distress  HEENT: normocephalic, atraumatic, aniceteric  LUNGS: bl breath sounds   HEART: S1/S2 present. RRR, no murmurs  ABD: Soft, non-tender, non-distended. Bowel sounds present  EXT: warm   NEURO: AAOX3, normal affect      ASSESSMENT AND PLAN:    42 yo female hx of HLD/axneity/ alcohol and benzo abuse admitted to the unit 3 days ago  intoxicated with an  alcohol level of 317 now being treated for alcohol withdrawal    44yo F hx of EtOH abuse, depression, GERD, MEHTA, hx of esophagitis presenting to the ED for EtOH abuse/CIWA protocol     #H/O EtOH abuse  #Hx of DTs  #Suspected thiamine and folate deficiency  # Hypomagnasemia   - EtOH level 300s  - pregnancy test (-)  - thiamine IVP x 5 days/folate  - Addiction team- recs appreciated, gabapentin 100 TID ordered and UDS ordered  - seizure precautions (given hx of DTs)  - ativan taper ( to complete )   - supplement and monitor electrolytes       #SOB related to anxiety - now resolved   patient notes gets these symptoms when has anxiety   - CXR neg,  D-dimer neg   - chest exam normal  - Psych eval - pt interested in starting a maintenance medication     #Transaminitis  - RUQ US: hepatic steatosis  - - currently keep off statin until LFTs are wnl    #Neuropathy?- alcohol induced vs peripheral  - Gabapentin started    #Depression/Anxiety- on seroquel ; psych eval     #Hx of thyroid problems? - TSH 3.54      # dvt/ gi ppx/diet   # dispo: acute  # handoff: complete detox

## 2023-12-21 DIAGNOSIS — F41.9 ANXIETY DISORDER, UNSPECIFIED: ICD-10-CM

## 2023-12-21 LAB
ALBUMIN SERPL ELPH-MCNC: 4.3 G/DL — SIGNIFICANT CHANGE UP (ref 3.5–5.2)
ALBUMIN SERPL ELPH-MCNC: 4.3 G/DL — SIGNIFICANT CHANGE UP (ref 3.5–5.2)
ALP SERPL-CCNC: 78 U/L — SIGNIFICANT CHANGE UP (ref 30–115)
ALP SERPL-CCNC: 78 U/L — SIGNIFICANT CHANGE UP (ref 30–115)
ALT FLD-CCNC: 95 U/L — HIGH (ref 0–41)
ALT FLD-CCNC: 95 U/L — HIGH (ref 0–41)
ANION GAP SERPL CALC-SCNC: 18 MMOL/L — HIGH (ref 7–14)
ANION GAP SERPL CALC-SCNC: 18 MMOL/L — HIGH (ref 7–14)
AST SERPL-CCNC: 110 U/L — HIGH (ref 0–41)
AST SERPL-CCNC: 110 U/L — HIGH (ref 0–41)
BASOPHILS # BLD AUTO: 0.05 K/UL — SIGNIFICANT CHANGE UP (ref 0–0.2)
BASOPHILS # BLD AUTO: 0.05 K/UL — SIGNIFICANT CHANGE UP (ref 0–0.2)
BASOPHILS NFR BLD AUTO: 0.7 % — SIGNIFICANT CHANGE UP (ref 0–1)
BASOPHILS NFR BLD AUTO: 0.7 % — SIGNIFICANT CHANGE UP (ref 0–1)
BILIRUB SERPL-MCNC: 0.6 MG/DL — SIGNIFICANT CHANGE UP (ref 0.2–1.2)
BILIRUB SERPL-MCNC: 0.6 MG/DL — SIGNIFICANT CHANGE UP (ref 0.2–1.2)
BUN SERPL-MCNC: 12 MG/DL — SIGNIFICANT CHANGE UP (ref 10–20)
BUN SERPL-MCNC: 12 MG/DL — SIGNIFICANT CHANGE UP (ref 10–20)
CALCIUM SERPL-MCNC: 9.4 MG/DL — SIGNIFICANT CHANGE UP (ref 8.4–10.5)
CALCIUM SERPL-MCNC: 9.4 MG/DL — SIGNIFICANT CHANGE UP (ref 8.4–10.5)
CHLORIDE SERPL-SCNC: 102 MMOL/L — SIGNIFICANT CHANGE UP (ref 98–110)
CHLORIDE SERPL-SCNC: 102 MMOL/L — SIGNIFICANT CHANGE UP (ref 98–110)
CO2 SERPL-SCNC: 20 MMOL/L — SIGNIFICANT CHANGE UP (ref 17–32)
CO2 SERPL-SCNC: 20 MMOL/L — SIGNIFICANT CHANGE UP (ref 17–32)
CREAT SERPL-MCNC: 0.7 MG/DL — SIGNIFICANT CHANGE UP (ref 0.7–1.5)
CREAT SERPL-MCNC: 0.7 MG/DL — SIGNIFICANT CHANGE UP (ref 0.7–1.5)
EGFR: 111 ML/MIN/1.73M2 — SIGNIFICANT CHANGE UP
EGFR: 111 ML/MIN/1.73M2 — SIGNIFICANT CHANGE UP
EOSINOPHIL # BLD AUTO: 0.14 K/UL — SIGNIFICANT CHANGE UP (ref 0–0.7)
EOSINOPHIL # BLD AUTO: 0.14 K/UL — SIGNIFICANT CHANGE UP (ref 0–0.7)
EOSINOPHIL NFR BLD AUTO: 2 % — SIGNIFICANT CHANGE UP (ref 0–8)
EOSINOPHIL NFR BLD AUTO: 2 % — SIGNIFICANT CHANGE UP (ref 0–8)
FERRITIN SERPL-MCNC: 361 NG/ML — HIGH (ref 15–150)
FERRITIN SERPL-MCNC: 361 NG/ML — HIGH (ref 15–150)
GLUCOSE SERPL-MCNC: 111 MG/DL — HIGH (ref 70–99)
GLUCOSE SERPL-MCNC: 111 MG/DL — HIGH (ref 70–99)
HCT VFR BLD CALC: 40.1 % — SIGNIFICANT CHANGE UP (ref 37–47)
HCT VFR BLD CALC: 40.1 % — SIGNIFICANT CHANGE UP (ref 37–47)
HGB BLD-MCNC: 13.4 G/DL — SIGNIFICANT CHANGE UP (ref 12–16)
HGB BLD-MCNC: 13.4 G/DL — SIGNIFICANT CHANGE UP (ref 12–16)
IMM GRANULOCYTES NFR BLD AUTO: 0.4 % — HIGH (ref 0.1–0.3)
IMM GRANULOCYTES NFR BLD AUTO: 0.4 % — HIGH (ref 0.1–0.3)
LYMPHOCYTES # BLD AUTO: 1.97 K/UL — SIGNIFICANT CHANGE UP (ref 1.2–3.4)
LYMPHOCYTES # BLD AUTO: 1.97 K/UL — SIGNIFICANT CHANGE UP (ref 1.2–3.4)
LYMPHOCYTES # BLD AUTO: 28.8 % — SIGNIFICANT CHANGE UP (ref 20.5–51.1)
LYMPHOCYTES # BLD AUTO: 28.8 % — SIGNIFICANT CHANGE UP (ref 20.5–51.1)
MAGNESIUM SERPL-MCNC: 2.3 MG/DL — SIGNIFICANT CHANGE UP (ref 1.8–2.4)
MAGNESIUM SERPL-MCNC: 2.3 MG/DL — SIGNIFICANT CHANGE UP (ref 1.8–2.4)
MCHC RBC-ENTMCNC: 33.4 G/DL — SIGNIFICANT CHANGE UP (ref 32–37)
MCHC RBC-ENTMCNC: 33.4 G/DL — SIGNIFICANT CHANGE UP (ref 32–37)
MCHC RBC-ENTMCNC: 34.6 PG — HIGH (ref 27–31)
MCHC RBC-ENTMCNC: 34.6 PG — HIGH (ref 27–31)
MCV RBC AUTO: 103.6 FL — HIGH (ref 81–99)
MCV RBC AUTO: 103.6 FL — HIGH (ref 81–99)
MONOCYTES # BLD AUTO: 0.74 K/UL — HIGH (ref 0.1–0.6)
MONOCYTES # BLD AUTO: 0.74 K/UL — HIGH (ref 0.1–0.6)
MONOCYTES NFR BLD AUTO: 10.8 % — HIGH (ref 1.7–9.3)
MONOCYTES NFR BLD AUTO: 10.8 % — HIGH (ref 1.7–9.3)
NEUTROPHILS # BLD AUTO: 3.92 K/UL — SIGNIFICANT CHANGE UP (ref 1.4–6.5)
NEUTROPHILS # BLD AUTO: 3.92 K/UL — SIGNIFICANT CHANGE UP (ref 1.4–6.5)
NEUTROPHILS NFR BLD AUTO: 57.3 % — SIGNIFICANT CHANGE UP (ref 42.2–75.2)
NEUTROPHILS NFR BLD AUTO: 57.3 % — SIGNIFICANT CHANGE UP (ref 42.2–75.2)
NRBC # BLD: 0 /100 WBCS — SIGNIFICANT CHANGE UP (ref 0–0)
NRBC # BLD: 0 /100 WBCS — SIGNIFICANT CHANGE UP (ref 0–0)
PLATELET # BLD AUTO: 220 K/UL — SIGNIFICANT CHANGE UP (ref 130–400)
PLATELET # BLD AUTO: 220 K/UL — SIGNIFICANT CHANGE UP (ref 130–400)
PMV BLD: 10 FL — SIGNIFICANT CHANGE UP (ref 7.4–10.4)
PMV BLD: 10 FL — SIGNIFICANT CHANGE UP (ref 7.4–10.4)
POTASSIUM SERPL-MCNC: 4.5 MMOL/L — SIGNIFICANT CHANGE UP (ref 3.5–5)
POTASSIUM SERPL-MCNC: 4.5 MMOL/L — SIGNIFICANT CHANGE UP (ref 3.5–5)
POTASSIUM SERPL-SCNC: 4.5 MMOL/L — SIGNIFICANT CHANGE UP (ref 3.5–5)
POTASSIUM SERPL-SCNC: 4.5 MMOL/L — SIGNIFICANT CHANGE UP (ref 3.5–5)
PROT SERPL-MCNC: 7.2 G/DL — SIGNIFICANT CHANGE UP (ref 6–8)
PROT SERPL-MCNC: 7.2 G/DL — SIGNIFICANT CHANGE UP (ref 6–8)
RBC # BLD: 3.87 M/UL — LOW (ref 4.2–5.4)
RBC # BLD: 3.87 M/UL — LOW (ref 4.2–5.4)
RBC # FLD: 13.4 % — SIGNIFICANT CHANGE UP (ref 11.5–14.5)
RBC # FLD: 13.4 % — SIGNIFICANT CHANGE UP (ref 11.5–14.5)
SODIUM SERPL-SCNC: 140 MMOL/L — SIGNIFICANT CHANGE UP (ref 135–146)
SODIUM SERPL-SCNC: 140 MMOL/L — SIGNIFICANT CHANGE UP (ref 135–146)
WBC # BLD: 6.85 K/UL — SIGNIFICANT CHANGE UP (ref 4.8–10.8)
WBC # BLD: 6.85 K/UL — SIGNIFICANT CHANGE UP (ref 4.8–10.8)
WBC # FLD AUTO: 6.85 K/UL — SIGNIFICANT CHANGE UP (ref 4.8–10.8)
WBC # FLD AUTO: 6.85 K/UL — SIGNIFICANT CHANGE UP (ref 4.8–10.8)

## 2023-12-21 PROCEDURE — 99232 SBSQ HOSP IP/OBS MODERATE 35: CPT

## 2023-12-21 PROCEDURE — 90792 PSYCH DIAG EVAL W/MED SRVCS: CPT | Mod: 95

## 2023-12-21 RX ORDER — SENNA PLUS 8.6 MG/1
2 TABLET ORAL AT BEDTIME
Refills: 0 | Status: DISCONTINUED | OUTPATIENT
Start: 2023-12-21 | End: 2023-12-22

## 2023-12-21 RX ORDER — MOMETASONE FUROATE 220 UG/1
1 INHALANT RESPIRATORY (INHALATION) DAILY
Refills: 0 | Status: DISCONTINUED | OUTPATIENT
Start: 2023-12-21 | End: 2023-12-22

## 2023-12-21 RX ORDER — MIRTAZAPINE 45 MG/1
7.5 TABLET, ORALLY DISINTEGRATING ORAL AT BEDTIME
Refills: 0 | Status: DISCONTINUED | OUTPATIENT
Start: 2023-12-21 | End: 2023-12-22

## 2023-12-21 RX ORDER — HYDROXYZINE HCL 10 MG
50 TABLET ORAL EVERY 6 HOURS
Refills: 0 | Status: DISCONTINUED | OUTPATIENT
Start: 2023-12-21 | End: 2023-12-22

## 2023-12-21 RX ORDER — ESCITALOPRAM OXALATE 10 MG/1
10 TABLET, FILM COATED ORAL DAILY
Refills: 0 | Status: DISCONTINUED | OUTPATIENT
Start: 2023-12-21 | End: 2023-12-22

## 2023-12-21 RX ADMIN — Medication 1 MILLIGRAM(S): at 11:48

## 2023-12-21 RX ADMIN — GABAPENTIN 100 MILLIGRAM(S): 400 CAPSULE ORAL at 05:38

## 2023-12-21 RX ADMIN — MIRTAZAPINE 7.5 MILLIGRAM(S): 45 TABLET, ORALLY DISINTEGRATING ORAL at 21:41

## 2023-12-21 RX ADMIN — Medication 25 MILLIGRAM(S): at 00:03

## 2023-12-21 RX ADMIN — GABAPENTIN 100 MILLIGRAM(S): 400 CAPSULE ORAL at 21:41

## 2023-12-21 RX ADMIN — GABAPENTIN 100 MILLIGRAM(S): 400 CAPSULE ORAL at 14:05

## 2023-12-21 RX ADMIN — ESCITALOPRAM OXALATE 10 MILLIGRAM(S): 10 TABLET, FILM COATED ORAL at 17:12

## 2023-12-21 RX ADMIN — Medication 1 MILLIGRAM(S): at 02:33

## 2023-12-21 RX ADMIN — Medication 100 MILLIGRAM(S): at 11:48

## 2023-12-21 RX ADMIN — Medication 1 MILLIGRAM(S): at 17:12

## 2023-12-21 RX ADMIN — PANTOPRAZOLE SODIUM 40 MILLIGRAM(S): 20 TABLET, DELAYED RELEASE ORAL at 05:39

## 2023-12-21 RX ADMIN — Medication 1 MILLIGRAM(S): at 05:39

## 2023-12-21 RX ADMIN — SENNA PLUS 2 TABLET(S): 8.6 TABLET ORAL at 21:41

## 2023-12-21 NOTE — PROGRESS NOTE ADULT - SUBJECTIVE AND OBJECTIVE BOX
SUBJECTIVE:    Patient is a 43y old Female who presents with a chief complaint of etoh detox (21 Dec 2023 11:52)    Currently admitted to medicine with the primary diagnosis of Alcohol use with withdrawal       Today is hospital day 4d. This morning she is resting comfortably in bed and reports no new issues or overnight events.     ROS:   CONSTITUTIONAL: No weakness, fevers or chills   EYES/ENT: No visual changes; No vertigo or throat pain   NECK: No pain or stiffness   RESPIRATORY: No cough, wheezing, hemoptysis; No shortness of breath   CARDIOVASCULAR: No chest pain or palpitations   GASTROINTESTINAL: No abdominal or epigastric pain. No nausea, vomiting, or hematemesis; No diarrhea or constipation. No melena or hematochezia.  GENITOURINARY: No dysuria, frequency or hematuria  NEUROLOGICAL: No numbness or weakness  SKIN: No itching, rashes      PAST MEDICAL & SURGICAL HISTORY  Alcoholism    Anxiety    Graves' disease    GERD (gastroesophageal reflux disease)    Gastritis    MVP (mitral valve prolapse)    Nicotine dependence    Benzodiazepine misuse    Hypercholesterolemia    Obesity    Esophagitis    History of Graves' disease    Heart murmur    Hyperlipidemia, unspecified hyperlipidemia type    Hemochromatosis    Fatty liver    History of  section    S/P tendon repair  5th finger right hand      SOCIAL HISTORY:    ALLERGIES:  morphine (Unknown)    MEDICATIONS:  STANDING MEDICATIONS  chlorhexidine 2% Cloths 1 Application(s) Topical <User Schedule>  enoxaparin Injectable 40 milliGRAM(s) SubCutaneous every 24 hours  folic acid 1 milliGRAM(s) Oral daily  gabapentin 100 milliGRAM(s) Oral three times a day  LORazepam     Tablet 1 milliGRAM(s) Oral every 12 hours  pantoprazole    Tablet 40 milliGRAM(s) Oral before breakfast  QUEtiapine 200 milliGRAM(s) Oral at bedtime  thiamine Injectable 100 milliGRAM(s) IV Push daily    PRN MEDICATIONS  hydrOXYzine hydrochloride 25 milliGRAM(s) Oral two times a day PRN  LORazepam     Tablet 2 milliGRAM(s) Oral every 2 hours PRN  LORazepam   Injectable 2 milliGRAM(s) IV Push every 1 hour PRN  ondansetron Injectable 4 milliGRAM(s) IV Push every 8 hours PRN    VITALS:   T(F): 97.8  HR: 92  BP: 96/52  RR: 18  SpO2: 97%    LABS:  Negative for smoking/alcohol/drug use.                         13.4   6.85  )-----------( 220      ( 21 Dec 2023 04:30 )             40.1         140  |  102  |  12  ----------------------------<  111<H>  4.5   |  20  |  0.7    Ca    9.4      21 Dec 2023 04:30  Mg     2.3         TPro  7.2  /  Alb  4.3  /  TBili  0.6  /  DBili  x   /  AST  110<H>  /  ALT  95<H>  /  AlkPhos  78        Urinalysis Basic - ( 21 Dec 2023 04:30 )    Color: x / Appearance: x / SG: x / pH: x  Gluc: 111 mg/dL / Ketone: x  / Bili: x / Urobili: x   Blood: x / Protein: x / Nitrite: x   Leuk Esterase: x / RBC: x / WBC x   Sq Epi: x / Non Sq Epi: x / Bacteria: x                RADIOLOGY:    PHYSICAL EXAM:  GEN: No acute distress  HEENT: normocephalic, atraumatic, aniceteric  LUNGS: bl breath sounds   HEART: S1/S2 present. RRR, no murmurs  ABD: Soft, non-tender, non-distended. Bowel sounds present  EXT: warm   NEURO: AAOX3, normal affect      ASSESSMENT AND PLAN:    44 yo female hx of HLD/axneity/ alcohol and benzo abuse admitted to the unit 3 days ago  intoxicated with an  alcohol level of 317 now being treated for alcohol withdrawal      #H/O EtOH abuse  #Hx of DTs  #Suspected thiamine and folate deficiency  # Hypomagnasemia   - EtOH level 300s  - pregnancy test (-)  - thiamine IVP x 5 days/folate  - Addiction team- recs appreciated, gabapentin 100 TID ordered and UDS ordered  - seizure precautions (given hx of DTs)  - ativan taper ( to complete ) - per addiciton Dr. Maradiaga , cannot be safely discharged prior to complete full protocol   - I had detailed discussion with patient advising against leaving prior to completing the taper including discussing the risks of not completing the taper which can include seizure and death   - supplement and monitor electrolytes       #SOB related to anxiety - now resolved   patient notes gets these symptoms when has anxiety   - CXR neg,  D-dimer neg   - chest exam normal  - Psych eval appreciated -   plan to   D/C SEROQUEL (ineffective for sleep, per patient)  START LEXAPRO 10MG PO QDAY for depression/anxiety  START MIRTAZAPINE 7.5MG PO QHS for sleep  2. hydroxyzine 25-50mg po q6h prn anxiety  3. consider increasing gabapentin dose to 200mg tid for further anxiolysis (would discuss with addiction consult  who started this medication)    #Transaminitis  - RUQ US: hepatic steatosis  - - currently keep off statin until LFTs are wnl    #Neuropathy?- alcohol induced vs peripheral  - Gabapentin started    #Depression/Anxiety- on seroquel ; psych eval     #Hx of thyroid problems? - TSH 3.54    # dvt/ gi ppx/diet   # dispo: acute - 48hrs   # handoff: complete detox

## 2023-12-21 NOTE — BH CONSULTATION LIAISON ASSESSMENT NOTE - SUMMARY
43F admitted for etoh w/d, psych consult for depression, anxiety and med mgt.  Patient has a hx of depression, anxiety; no prior psychiatric hospitalizations or suicidality; has an 8 y o son, is not primary  caretaker; domiciled; unemployed; with a hx of severe alcohol use disorder. She consumes 1 L vodka per day. She is in treatment  at the [facility] outpatient chem dependency clinic ([facility]) and followed by [facility] CATCH team.    Patient reports depressed mood, poor sleep, generalized anxiety and panic attacks, in setting of alcohol use and   psychosocial stressors.    She is not suicidal or an acute risk or in need of IPP.     Would recommend:    1. held detailed discussion with patient about prior med trials, what worked, etc. we came up with the plan to   D/C SEROQUEL (ineffective for sleep, per patient)  START LEXAPRO 10MG PO QDAY for depression/anxiety  START MIRTAZAPINE 7.5MG PO QHS for sleep  2. hydroxyzine 25-50mg po q6h prn anxiety  3. consider increasing gabapentin dose to 200mg tid for further anxiolysis (would discuss with addiction consult  who started this medication)  4. pt may f/u with psychiatrist at [facility] where she is an active patient. It is my team's understanding that   she can be seen by Dr. Maradiaga, an addiction psychiatrist, in this clinic for followup.  43F admitted for etoh w/d, psych consult for depression, anxiety and med mgt.  Patient has a hx of depression, anxiety; no prior psychiatric hospitalizations or suicidality; has an 8 y o son, is not primary  caretaker; domiciled; unemployed; with a hx of severe alcohol use disorder. She consumes 1 L vodka per day. She is in treatment  at the [facility] outpatient chem dependency clinic ([facility]) and followed by [facility] CATCH team.    Patient reports depressed mood, poor sleep, generalized anxiety and panic attacks, in setting of alcohol use and   psychosocial stressors.    She is not suicidal or an acute risk or in need of IPP.     Would recommend:    1. held detailed discussion with patient about prior med trials, what worked, etc. we came up with the plan to   D/C SEROQUEL (ineffective for sleep, per patient)  START LEXAPRO 10MG PO QDAY for depression/anxiety  START MIRTAZAPINE 7.5MG PO QHS for sleep  2. hydroxyzine 25-50mg po q6h prn anxiety  3. consider increasing gabapentin dose to 200mg tid for further anxiolysis (would discuss with addiction consult  who started this medication)  4. pt may f/u with psychiatrist at [facility] where she is an active patient. It is my team's understanding that   she can be seen by Dr. Maradiaga, an addiction psychiatrist, in this clinic for followup.

## 2023-12-21 NOTE — BH CONSULTATION LIAISON ASSESSMENT NOTE - NSICDXBHPRIMARYDX_PSY_ALL_CORE
Alcohol use with alcohol-induced mood disorder   F10.47   Alcohol use with alcohol-induced mood disorder   F10.79

## 2023-12-21 NOTE — BH CONSULTATION LIAISON ASSESSMENT NOTE - HPI (INCLUDE ILLNESS QUALITY, SEVERITY, DURATION, TIMING, CONTEXT, MODIFYING FACTORS, ASSOCIATED SIGNS AND SYMPTOMS)
43F admitted for etoh w/d, psych consult for depression, anxiety and med mgt.    Patient has a hx of depression, anxiety; no prior psychiatric hospitalizations or suicidality; has an 8 y o son, is not primary  caretaker; domiciled; unemployed; with a hx of severe alcohol use disorder. She consumes 1 L vodka per day. She is in treatment  at the [facility] outpatient chem dependency [facility] ([facility]) and followed by [facility] CATCH team.     Patient reports depressed mood and poor sleep, generalized anxiety and panic attacks. Describes several current stressors  including conflicts with father/sisters; possible pending eviction. She feels at times overwhelmed, with prominent crying and feelings   of helplessness, but she is clear in denying anhedonia or any suicidal ideation intent plan. Wants to live, cites son as protective factor,  wants help. She is looking for a 30d inpatient rehab that does not have a prohibition against her use of ativan which she is prescribed  for years as an outpatient (see istop below) and which she does not want to stop taking.  43F admitted for etoh w/d, psych consult for depression, anxiety and med mgt.    Patient has a hx of depression, anxiety; no prior psychiatric hospitalizations or suicidality; has an 8 y o son, is not primary  caretaker; domiciled; unemployed; with a hx of severe alcohol use disorder. She consumes 1 L vodka per day. She is in treatment  at the [facility] outpatient chem dependency clinic ([facility]) and followed by [facility] CATCH team.     Patient reports depressed mood and poor sleep, generalized anxiety and panic attacks. Describes several current stressors  including conflicts with father/sisters; possible pending eviction. She feels at times overwhelmed, with prominent crying and feelings   of helplessness, but she is clear in denying anhedonia or any suicidal ideation intent plan. Wants to live, cites son as protective factor,  wants help. She is looking for a 30d inpatient rehab that does not have a prohibition against her use of ativan which she is prescribed  for years as an outpatient (see istop below) and which she does not want to stop taking.

## 2023-12-21 NOTE — BH CONSULTATION LIAISON ASSESSMENT NOTE - CURRENT MEDICATION
MEDICATIONS  (STANDING):  chlorhexidine 2% Cloths 1 Application(s) Topical <User Schedule>  enoxaparin Injectable 40 milliGRAM(s) SubCutaneous every 24 hours  folic acid 1 milliGRAM(s) Oral daily  gabapentin 100 milliGRAM(s) Oral three times a day  LORazepam     Tablet 1 milliGRAM(s) Oral every 12 hours  pantoprazole    Tablet 40 milliGRAM(s) Oral before breakfast  QUEtiapine 200 milliGRAM(s) Oral at bedtime  thiamine Injectable 100 milliGRAM(s) IV Push daily    MEDICATIONS  (PRN):  hydrOXYzine hydrochloride 25 milliGRAM(s) Oral two times a day PRN Anxiety  LORazepam     Tablet 2 milliGRAM(s) Oral every 2 hours PRN Symptom-triggered 2 point increase in CIWA-Ar  LORazepam   Injectable 2 milliGRAM(s) IV Push every 1 hour PRN Symptom-triggered: each CIWA -Ar score 8 or GREATER  ondansetron Injectable 4 milliGRAM(s) IV Push every 8 hours PRN Nausea and/or Vomiting

## 2023-12-21 NOTE — BH CONSULTATION LIAISON ASSESSMENT NOTE - NSBHCHARTREVIEWVS_PSY_A_CORE FT
Vital Signs Last 24 Hrs  T(C): 36.6 (21 Dec 2023 04:23), Max: 36.6 (21 Dec 2023 04:23)  T(F): 97.8 (21 Dec 2023 04:23), Max: 97.8 (21 Dec 2023 04:23)  HR: 92 (21 Dec 2023 04:23) (91 - 92)  BP: 96/52 (21 Dec 2023 04:23) (96/52 - 113/59)  BP(mean): --  RR: 18 (21 Dec 2023 04:23) (17 - 18)  SpO2: 97% (21 Dec 2023 04:23) (95% - 97%)    Parameters below as of 21 Dec 2023 04:23  Patient On (Oxygen Delivery Method): room air

## 2023-12-21 NOTE — CONSULT NOTE ADULT - ASSESSMENT
Impression:     Heavy ETOH abuse   Alcohol withdrawal  Alcoholic hepatitis   history of DT   Hepatic steatosis     PLAN:    CNS: Ativan protocol symptom drive. CIWA protocol. Thiamine and Folate. CATCH team.     HEENT: Oral care.     PULMONARY:  HOB @ 45 degrees. CXR clear. on room air.     CARDIOVASCULAR: D5LR at 75cc/hr. Echocardiogram. Trend trop.     GI: GI prophylaxis: Protonix for GERD.  Feeding as tolerated when more awake. Mild elevation in AST noted. Lipase is normal. Check US RUQ, hepatitis panel.     RENAL:  Follow up lytes.  Correct as needed. No jamison. Keep K more than 4 and Mg more than 2.     INFECTIOUS DISEASE: Follow up cultures. No fevers. No leukocytosis.     HEMATOLOGICAL:  DVT prophylaxis. Ambulate when stable.     ENDOCRINE:  Follow up FS.      MUSCULOSKELETAL: bed in chair for now.     SDU monitoring; history of DT.         
42yo w with etoh use d/o admitted for alcohol "detox", currently with stable VS and objectively without w/d sx, but still c/o discomfort and not being on baseline yet.    Taking into account her Hx, especially h/o DT, I highly advise against premature discharge. Patient should finish the taper prior to discharge.    - continue lorazepam taper  - thiamine, folate.

## 2023-12-21 NOTE — BH CONSULTATION LIAISON ASSESSMENT NOTE - NSBHCONSULTFOLLOWAFTERCARE_PSY_A_CORE FT
f/u at [facility] ILIR Amaral (counselor), Dr. Maradiaga    [address] [place], [place] [address] f/u at [facility] ILIR Amaral (counselor), Dr. Maradiaga    [address] [place], [place] [address]

## 2023-12-21 NOTE — BH CONSULTATION LIAISON ASSESSMENT NOTE - RISK ASSESSMENT
denies suicidal ideation or anhedonia. future oriented. feels responsible to son. wants help.   no prior psych hospitalizations or suciidality

## 2023-12-21 NOTE — CONSULT NOTE ADULT - SUBJECTIVE AND OBJECTIVE BOX
FROM ER/H&P  44yo F hx of EtOH abuse, depression, GERD, MEHTA, hx of esophagitis presenting to the ED for generalized complaints. Patient drinks 1.7L of vodka every 2 days for a long time. Patient has been complaining of abdominal pain, chest pressure causing her to feel short of breath when she breathes, neuropathy, anxiety, goosebumps, nausea. Patient denies HI/SI, but she wants to join a detox program to get herself to stop drinking. She feels that she cannot keep drinking anymore.     At the ED, VS: 989.9F, BP: 124/72, HR: 92bpm, SpO2: 100% on RA, RR: 22bpm. Labs significant for AG (20), elevated AST/ALT (111/73), alcohol elvel 317.. lipase (-). VBG pH wnl. Lactate pending. EKG wnl without GRETA.     Patient was interviewed in her room. She presents resting in bed comfortably, without obvious objective sx of withdrawal, however reports still experiencing some residual sx of withdrawal: "chest tightness, headache, hot/cold sweats, insomnia, anxiety". She is considering rehab, but first wants to address issues at home.     Vital Signs Last 24 Hrs  T(C): 36.6 (21 Dec 2023 04:23), Max: 36.6 (21 Dec 2023 04:23)  T(F): 97.8 (21 Dec 2023 04:23), Max: 97.8 (21 Dec 2023 04:23)  HR: 92 (21 Dec 2023 04:23) (91 - 92)  BP: 96/52 (21 Dec 2023 04:23) (96/52 - 113/59)  BP(mean): --  RR: 18 (21 Dec 2023 04:23) (17 - 18)  SpO2: 97% (21 Dec 2023 04:23) (95% - 97%)    Parameters below as of 21 Dec 2023 04:23  Patient On (Oxygen Delivery Method): room air    LABS:                      13.4   6.85  )-----------( 220      ( 21 Dec 2023 04:30 )             40.1       12-21    140  |  102  |  12  ----------------------------<  111<H>  4.5   |  20  |  0.7    Ca    9.4      21 Dec 2023 04:30  Mg     2.3     12-21    TPro  7.2  /  Alb  4.3  /  TBili  0.6  /  DBili  x   /  AST  110<H>  /  ALT  95<H>  /  AlkPhos  78  12-21    MEDICATIONS  (STANDING):  chlorhexidine 2% Cloths 1 Application(s) Topical <User Schedule>  enoxaparin Injectable 40 milliGRAM(s) SubCutaneous every 24 hours  folic acid 1 milliGRAM(s) Oral daily  gabapentin 100 milliGRAM(s) Oral three times a day  LORazepam     Tablet 1 milliGRAM(s) Oral every 12 hours  pantoprazole    Tablet 40 milliGRAM(s) Oral before breakfast  QUEtiapine 200 milliGRAM(s) Oral at bedtime  thiamine Injectable 100 milliGRAM(s) IV Push daily    MEDICATIONS  (PRN):  hydrOXYzine hydrochloride 25 milliGRAM(s) Oral two times a day PRN Anxiety  LORazepam     Tablet 2 milliGRAM(s) Oral every 2 hours PRN Symptom-triggered 2 point increase in CIWA-Ar  LORazepam   Injectable 2 milliGRAM(s) IV Push every 1 hour PRN Symptom-triggered: each CIWA -Ar score 8 or GREATER  ondansetron Injectable 4 milliGRAM(s) IV Push every 8 hours PRN Nausea and/or Vomiting   FROM ER/H&P  42yo F hx of EtOH abuse, depression, GERD, MEHTA, hx of esophagitis presenting to the ED for generalized complaints. Patient drinks 1.7L of vodka every 2 days for a long time. Patient has been complaining of abdominal pain, chest pressure causing her to feel short of breath when she breathes, neuropathy, anxiety, goosebumps, nausea. Patient denies HI/SI, but she wants to join a detox program to get herself to stop drinking. She feels that she cannot keep drinking anymore.     At the ED, VS: 989.9F, BP: 124/72, HR: 92bpm, SpO2: 100% on RA, RR: 22bpm. Labs significant for AG (20), elevated AST/ALT (111/73), alcohol elvel 317.. lipase (-). VBG pH wnl. Lactate pending. EKG wnl without GRETA.     Patient was interviewed in her room. She presents resting in bed comfortably, without obvious objective sx of withdrawal, however reports still experiencing some residual sx of withdrawal: "chest tightness, headache, hot/cold sweats, insomnia, anxiety". She is considering rehab, but first wants to address issues at home.     Vital Signs Last 24 Hrs  T(C): 36.6 (21 Dec 2023 04:23), Max: 36.6 (21 Dec 2023 04:23)  T(F): 97.8 (21 Dec 2023 04:23), Max: 97.8 (21 Dec 2023 04:23)  HR: 92 (21 Dec 2023 04:23) (91 - 92)  BP: 96/52 (21 Dec 2023 04:23) (96/52 - 113/59)  BP(mean): --  RR: 18 (21 Dec 2023 04:23) (17 - 18)  SpO2: 97% (21 Dec 2023 04:23) (95% - 97%)    Parameters below as of 21 Dec 2023 04:23  Patient On (Oxygen Delivery Method): room air    LABS:                      13.4   6.85  )-----------( 220      ( 21 Dec 2023 04:30 )             40.1       12-21    140  |  102  |  12  ----------------------------<  111<H>  4.5   |  20  |  0.7    Ca    9.4      21 Dec 2023 04:30  Mg     2.3     12-21    TPro  7.2  /  Alb  4.3  /  TBili  0.6  /  DBili  x   /  AST  110<H>  /  ALT  95<H>  /  AlkPhos  78  12-21    MEDICATIONS  (STANDING):  chlorhexidine 2% Cloths 1 Application(s) Topical <User Schedule>  enoxaparin Injectable 40 milliGRAM(s) SubCutaneous every 24 hours  folic acid 1 milliGRAM(s) Oral daily  gabapentin 100 milliGRAM(s) Oral three times a day  LORazepam     Tablet 1 milliGRAM(s) Oral every 12 hours  pantoprazole    Tablet 40 milliGRAM(s) Oral before breakfast  QUEtiapine 200 milliGRAM(s) Oral at bedtime  thiamine Injectable 100 milliGRAM(s) IV Push daily    MEDICATIONS  (PRN):  hydrOXYzine hydrochloride 25 milliGRAM(s) Oral two times a day PRN Anxiety  LORazepam     Tablet 2 milliGRAM(s) Oral every 2 hours PRN Symptom-triggered 2 point increase in CIWA-Ar  LORazepam   Injectable 2 milliGRAM(s) IV Push every 1 hour PRN Symptom-triggered: each CIWA -Ar score 8 or GREATER  ondansetron Injectable 4 milliGRAM(s) IV Push every 8 hours PRN Nausea and/or Vomiting

## 2023-12-21 NOTE — BH CONSULTATION LIAISON ASSESSMENT NOTE - MARITAL STATUS
History   Chief Complaint:  Seizures       HPI   Valentine Vu is a 64 year old female with history of non-epileptic seizure, ORESTES, ADHD, mild dementia, hypertension who presents from group home with seizures. Per patient's , patient saw a neurologist this March who diagnosed her with non-epileptic seizures and stopped her Keppra. Staff manager reports that her episodes are due to panic attacks that usually lasted about 30 sec-2 minutes with unresponsiveness. Today, group home staff found patient with another episode with unresponsiveness that lasted for 5 minutes. Staff noticed patient opened her eyes and then closed, and they called EMS due to the longer episode. Patient vomited before the episode. Staff think that they saw some blood in the vomit, although patient has tongue bite on the right side. Here, patient reports feeling warm. She has had a rash to left forearm after taking a shower the other day. Otherwise, she denies any fever, chills, dysuria, or any pain. Patient's sister is concerned for a TIA due to a family history.     Review of Systems   Constitutional: Negative for chills and fever.   Gastrointestinal: Positive for abdominal pain and vomiting.   Genitourinary: Negative for dysuria.   Skin: Positive for rash.   Neurological: Positive for seizures.   All other systems reviewed and are negative.    Allergies:  No Known Allergies    Medications:  cetirizine (ZYRTEC) 10 MG tablet  citalopram (CELEXA) 40 MG tablet  cycloSPORINE modified (GENERIC EQUIVALENT) 100 MG capsule  donepezil (ARICEPT) 10 MG tablet  levETIRAcetam (KEPPRA) 250 MG tablet  psyllium (METAMUCIL/KONSYL) 58.6 % powder    Past Medical History:     ADHD  Dementia  Depression  Mild dementia  ORESTES  Hypertension  Seasonal allergic rhinitis      Past Surgical History:    Colonoscopy x2  Joint replacement  Knee replacement  Bilateral TKA  Proctoscopy     Family History:    Brother: leukemia    Social History:  Presents via EMS  with .     Physical Exam     Patient Vitals for the past 24 hrs:   BP Pulse Resp SpO2   05/05/22 2000 134/80 88 -- 92 %   05/05/22 1945 117/61 82 -- 94 %   05/05/22 1930 118/85 85 -- 96 %   05/05/22 1915 -- 78 -- 96 %   05/05/22 1900 134/84 82 -- 94 %   05/05/22 1845 117/69 78 -- 95 %   05/05/22 1830 (!) 103/93 81 -- 97 %   05/05/22 1730 136/82 79 -- 97 %   05/05/22 1715 120/69 82 -- 97 %   05/05/22 1705 (!) 140/97 82 16 99 %       Physical Exam  Constitutional: Well appearing.  HEENT: Abrasion to the right distal tongue with no laceration or active bleeding.  PERRL.  EOMI.  Moist mucous membranes.  Neck: Soft.  Supple.   Cardiac: Regular rate and rhythm.  No murmur or rub.  Respiratory: Clear to auscultation bilaterally.  No respiratory distress.    Abdomen: Soft and nontender.  No rebound or guarding.  Nondistended.  Musculoskeletal: No edema.  Normal range of motion.  Neurologic: Alert and oriented x3.  Normal tone and bulk.  No facial drooping.  Normal speech 5/5 strength in bilateral upper and lower extremities.  Sensation to light touch intact throughout.    Skin: No rashes.  No edema.  Psych: Normal affect.  Normal behavior.      Emergency Department Course   ECG  ECG obtained at 1740, ECG read at 1741  Normal sinus rhythm.   No significant changes as compared to 4/21/19  Rate 76 bpm. NV interval 180 ms. QRS duration 84 ms. QT/QTc 398/447 ms. P-R-T axes 24 37 47.     Imaging:  CT Head w/o Contrast   Final Result   IMPRESSION:   1.  Normal head CT.        Report per radiology    Laboratory:  Labs Ordered and Resulted from Time of ED Arrival to Time of ED Departure   COMPREHENSIVE METABOLIC PANEL - Abnormal       Result Value    Sodium 135      Potassium 3.5      Chloride 105      Carbon Dioxide (CO2) 22      Anion Gap 8      Urea Nitrogen 17      Creatinine 0.57      Calcium 9.0      Glucose 112 (*)     Alkaline Phosphatase 72      AST 22      ALT 32      Protein Total 7.4      Albumin 3.9       Bilirubin Total 0.3      GFR Estimate >90     ROUTINE UA WITH MICROSCOPIC REFLEX TO CULTURE - Abnormal    Color Urine Yellow      Appearance Urine Clear      Glucose Urine Negative      Bilirubin Urine Negative      Ketones Urine Trace (*)     Specific Gravity Urine 1.026      Blood Urine Negative      pH Urine 5.0      Protein Albumin Urine 30  (*)     Urobilinogen Urine Normal      Nitrite Urine Negative      Leukocyte Esterase Urine Moderate (*)     Mucus Urine Present (*)     RBC Urine 2      WBC Urine 16 (*)     Squamous Epithelials Urine <1      Hyaline Casts Urine 22 (*)    CBC WITH PLATELETS AND DIFFERENTIAL - Abnormal    WBC Count 6.8      RBC Count 5.51 (*)     Hemoglobin 15.7      Hematocrit 47.3 (*)     MCV 86      MCH 28.5      MCHC 33.2      RDW 13.0      Platelet Count 307      % Neutrophils 50      % Lymphocytes 30      % Monocytes 13      % Eosinophils 6      % Basophils 1      % Immature Granulocytes 0      NRBCs per 100 WBC 0      Absolute Neutrophils 3.4      Absolute Lymphocytes 2.0      Absolute Monocytes 0.9      Absolute Eosinophils 0.4      Absolute Basophils 0.1      Absolute Immature Granulocytes 0.0      Absolute NRBCs 0.0     LACTIC ACID WHOLE BLOOD - Normal    Lactic Acid 1.5     URINE CULTURE      Emergency Department Course:     Reviewed:  I reviewed nursing notes, vitals, past medical history and Care Everywhere    Assessments:  1711 I obtained history and examined the patient as noted above.    I rechecked the patient and explained findings.   1950 I called patient's sister Indiana and updated patient findings. Sister feels comfortable having patient going home and continue holding off on seizure medication Keppra.    Interventions:  1955 Cephalexin 500mg po    Disposition:  The patient was discharged to home.     Impression & Plan     Medical Decision Making:  Valentine Vu is a 64-year-old woman who is afebrile and hemodynamically stable.  She appears at her neurologic baseline  according to staff.  She exhibits no focal deficits.  She has no headache or meningismus.  No fever.  I have exceedingly low suspicion for meningitis/encephalitis at this time.  CT scan of the head is unremarkable for acute abnormalities.  She does have a lesion on the right side of the tongue that may have been a bite kinjal.  Is unclear if this is seizure with etiology being nonepileptic epileptic versus epileptic.  She apparently has a history of nonepileptic seizures and recently stopped her Keppra.  EKG and cardiac work-up is unremarkable.  Her urine is questionable for UTI and she was given Keflex.  No evidence of sepsis.  She feels back to her baseline and staff feels like she is back to her baseline.  I spoke with her Sister Indiana, who is guardian over the telephone.  We discussed observation versus discharge home and Indiana feels comfortable with her going home given that she is back to her baseline and has a history of this.  I discussed restarting the Keppra versus following up with neurology and she would like to hold off on Keppra at this time and follow-up with neurology.  I discussed plan for home with Keflex while awaiting urine culture and we discussed supportive care at home and strict return precautions were given.  Her questions were answered and she was in no distress at time of discharge.    Diagnosis:    ICD-10-CM    1. Seizure-like activity (H)  R56.9        Scribe Disclosure:  I, Sharif Urena, am serving as a scribe at 5:11 PM on 5/5/2022 to document services personally performed by Que Flores MD based on my observations and the provider's statements to me.          Que Flores MD  05/06/22 5811     Single

## 2023-12-21 NOTE — CONSULT NOTE ADULT - CONSULT REASON
ETOH withdrawal
Alcohol Dependency
etoh use d/o,   can her detox protocol be shortened and can she be d/c'd early?

## 2023-12-21 NOTE — BH CONSULTATION LIAISON ASSESSMENT NOTE - NSBHATTESTBILLING_PSY_A_CORE
[id number] diagnostic evaluation with medical services [id number] diagnostic evaluation with medical services

## 2023-12-22 ENCOUNTER — TRANSCRIPTION ENCOUNTER (OUTPATIENT)
Age: 43
End: 2023-12-22

## 2023-12-22 VITALS
HEART RATE: 84 BPM | DIASTOLIC BLOOD PRESSURE: 62 MMHG | RESPIRATION RATE: 18 BRPM | TEMPERATURE: 98 F | SYSTOLIC BLOOD PRESSURE: 98 MMHG

## 2023-12-22 PROCEDURE — 99231 SBSQ HOSP IP/OBS SF/LOW 25: CPT

## 2023-12-22 PROCEDURE — 99239 HOSP IP/OBS DSCHRG MGMT >30: CPT

## 2023-12-22 RX ORDER — THIAMINE MONONITRATE (VIT B1) 100 MG
100 TABLET ORAL DAILY
Refills: 0 | Status: DISCONTINUED | OUTPATIENT
Start: 2023-12-22 | End: 2023-12-22

## 2023-12-22 RX ORDER — FOLIC ACID 0.8 MG
1 TABLET ORAL
Qty: 30 | Refills: 0
Start: 2023-12-22 | End: 2024-01-20

## 2023-12-22 RX ORDER — QUETIAPINE FUMARATE 200 MG/1
1 TABLET, FILM COATED ORAL
Refills: 0 | DISCHARGE

## 2023-12-22 RX ORDER — THIAMINE MONONITRATE (VIT B1) 100 MG
1 TABLET ORAL
Qty: 30 | Refills: 0
Start: 2023-12-22 | End: 2024-01-20

## 2023-12-22 RX ORDER — MIRTAZAPINE 45 MG/1
1 TABLET, ORALLY DISINTEGRATING ORAL
Qty: 30 | Refills: 0
Start: 2023-12-22 | End: 2024-01-20

## 2023-12-22 RX ORDER — ESCITALOPRAM OXALATE 10 MG/1
1 TABLET, FILM COATED ORAL
Qty: 30 | Refills: 0
Start: 2023-12-22 | End: 2024-01-20

## 2023-12-22 RX ORDER — GABAPENTIN 400 MG/1
1 CAPSULE ORAL
Qty: 42 | Refills: 0
Start: 2023-12-22 | End: 2024-01-04

## 2023-12-22 RX ADMIN — Medication 100 MILLIGRAM(S): at 17:59

## 2023-12-22 RX ADMIN — PANTOPRAZOLE SODIUM 40 MILLIGRAM(S): 20 TABLET, DELAYED RELEASE ORAL at 05:41

## 2023-12-22 RX ADMIN — Medication 50 MILLIGRAM(S): at 00:14

## 2023-12-22 RX ADMIN — ESCITALOPRAM OXALATE 10 MILLIGRAM(S): 10 TABLET, FILM COATED ORAL at 13:15

## 2023-12-22 RX ADMIN — GABAPENTIN 100 MILLIGRAM(S): 400 CAPSULE ORAL at 13:15

## 2023-12-22 RX ADMIN — Medication 0.5 MILLIGRAM(S): at 00:14

## 2023-12-22 RX ADMIN — Medication 1 MILLIGRAM(S): at 12:29

## 2023-12-22 RX ADMIN — Medication 50 MILLIGRAM(S): at 12:29

## 2023-12-22 RX ADMIN — GABAPENTIN 100 MILLIGRAM(S): 400 CAPSULE ORAL at 05:41

## 2023-12-22 RX ADMIN — Medication 0.5 MILLIGRAM(S): at 12:29

## 2023-12-22 NOTE — PROGRESS NOTE ADULT - PROBLEM SELECTOR PLAN 1
After evaluation at this time continue current protocol. Pts concerns addressed  Pt will be monitored and supportive care provided.  No other changes to medical care plan for withdrawals.  Monitor labs/electrolytes as needed.    -Counseling provided   CATCH team involved for aftercare and pt will follow up with aftercare.
After evaluation at this time continue current protocol. Still recommend transfer to PO dosing. Would also  length of protocol.  Pts concerns addressed  Pt will be monitored and supportive care provided.  No other changes to medical care plan for withdrawals.  Monitor labs/electrolytes as needed.    -Counseling provided   CATCH team involved for aftercare and pt will follow up with aftercare.

## 2023-12-22 NOTE — DISCHARGE NOTE PROVIDER - HOSPITAL COURSE
42 yo female hx of HLD/axneity/ alcohol and benzo abuse admitted to the unit 3 days ago  intoxicated with an  alcohol level of 317 now being treated for alcohol withdrawal    #H/O EtOH abuse  #Hx of DTs  #Suspected thiamine and folate deficiency  # Hypomagnesemia   - EtOH level 300s  - pregnancy test (-)  - Addiction team- recs appreciated, started gabapentin 100 TID ordered and UDS negative  - ativan taper completed  - c/w supplements  - Mag repleted     #SOB related to anxiety - now resolved   patient notes gets these symptoms when has anxiety   - CXR neg,  D-dimer neg   - chest exam normal  - Psych eval appreciated -   plan to   D/C SEROQUEL (ineffective for sleep, per patient)  START LEXAPRO 10MG PO QDAY for depression/anxiety  START MIRTAZAPINE 7.5MG PO QHS for sleep  2. hydroxyzine 25-50mg po q6h prn anxiety    #Transaminitis  - RUQ US: hepatic steatosis  - - currently keep off statin until LFTs are wnl    #Neuropathy?- alcohol induced vs peripheral  - Gabapentin started    #Depression/Anxiety- d/c seroquel, start Mirtazapine and Lexapro    #Hx of thyroid problems? - TSH 3.54       44 yo female hx of HLD/axneity/ alcohol and benzo abuse admitted to the unit 3 days ago  intoxicated with an  alcohol level of 317 now being treated for alcohol withdrawal    #H/O EtOH abuse  #Hx of DTs  #Suspected thiamine and folate deficiency  # Hypomagnesemia   - EtOH level 300s  - pregnancy test (-)  - Addiction team- recs appreciated, started gabapentin 100 TID ordered and UDS negative  - ativan taper completed  - c/w supplements  - Mag repleted     #SOB related to anxiety - now resolved   patient notes gets these symptoms when has anxiety   - CXR neg,  D-dimer neg   - chest exam normal  - Psych eval appreciated -   plan to   D/C SEROQUEL (ineffective for sleep, per patient)  START LEXAPRO 10MG PO QDAY for depression/anxiety  START MIRTAZAPINE 7.5MG PO QHS for sleep  2. hydroxyzine 25-50mg po q6h prn anxiety    #Transaminitis  - RUQ US: hepatic steatosis  - - currently keep off statin until LFTs are wnl    #Neuropathy?- alcohol induced vs peripheral  - Gabapentin started    #Depression/Anxiety- d/c seroquel, start Mirtazapine and Lexapro    #Hx of thyroid problems? - TSH 3.54

## 2023-12-22 NOTE — PROGRESS NOTE ADULT - PROVIDER SPECIALTY LIST ADULT
Hospitalist
Internal Medicine
Internal Medicine
Hospitalist
Addiction Medicine
Critical Care
Critical Care
Addiction Medicine

## 2023-12-22 NOTE — DISCHARGE NOTE PROVIDER - NSDCCPCAREPLAN_GEN_ALL_CORE_FT
PRINCIPAL DISCHARGE DIAGNOSIS  Diagnosis: Alcohol use with withdrawal  Assessment and Plan of Treatment: You were treated with Ativan and your withdrawal symptoms improved.  PLEASE ABSTAIN FROM ALCOHOL.  FU with your PMD and Psych MD.  Take your supplements as ordered      SECONDARY DISCHARGE DIAGNOSES  Diagnosis: Anxiety  Assessment and Plan of Treatment: Your Seroquel was stopped and you were started on Mirtazapine and Lexapro.  Take these medicaitons and FU outpatient with your Psych doctor.     PRINCIPAL DISCHARGE DIAGNOSIS  Diagnosis: Alcohol use with withdrawal  Assessment and Plan of Treatment: You were treated with Ativan and your withdrawal symptoms improved.  PLEASE ABSTAIN FROM ALCOHOL.  FU with your PMD and Psych MD.  Take your supplements as ordered; continue thiamine and folate supplementation      SECONDARY DISCHARGE DIAGNOSES  Diagnosis: Anxiety  Assessment and Plan of Treatment: Your Seroquel was stopped and you were started on Mirtazapine and Lexapro.  Take these medicaitons and FU outpatient with your Psych doctor. Gabapentin was started for anxiety by addiction team.

## 2023-12-22 NOTE — DISCHARGE NOTE PROVIDER - PROVIDER TOKENS
PROVIDER:[TOKEN:[[id number]]],PROVIDER:[TOKEN:[[id number]]] PROVIDER:[TOKEN:[[id number]]],PROVIDER:[TOKEN:[[id number]]]

## 2023-12-22 NOTE — PROGRESS NOTE ADULT - SUBJECTIVE AND OBJECTIVE BOX
Pt interviewed, examined and EMR chart reviewed.    Follow up of Alcohol Dependency. Pt is on Ativan protocol. Pt is doing much better . Pt with no complaint of withdrawal      REVIEW OF SYSTEMS:    Constitutional: No fever, weight loss or fatigue  ENT:  No difficulty hearing, tinnitus, vertigo; No sinus or throat pain  Neck: No pain or stiffness  Respiratory: No cough, wheezing, chills or hemoptysis  Cardiovascular: No chest pain, palpitations, shortness of breath, dizziness or leg swelling  Gastrointestinal: No abdominal or epigastric pain. No nausea, vomiting or hematemesis; No diarrhea or constipation. No melena or hematochezia.  Neurological: No headaches, memory loss, loss of strength, numbness or tremors  Musculoskeletal: No joint pain or swelling; No muscle, back or extremity pain      MEDICATIONS  (STANDING):  chlorhexidine 2% Cloths 1 Application(s) Topical <User Schedule>  enoxaparin Injectable 40 milliGRAM(s) SubCutaneous every 24 hours  escitalopram 10 milliGRAM(s) Oral daily  folic acid 1 milliGRAM(s) Oral daily  gabapentin 100 milliGRAM(s) Oral three times a day  LORazepam     Tablet 0.5 milliGRAM(s) Oral every 12 hours  mirtazapine 7.5 milliGRAM(s) Oral at bedtime  mometasone 220 MICROgram(s) Inhaler 1 Puff(s) Inhalation daily  pantoprazole    Tablet 40 milliGRAM(s) Oral before breakfast  senna 2 Tablet(s) Oral at bedtime  thiamine Injectable 100 milliGRAM(s) IV Push daily    MEDICATIONS  (PRN):  hydrOXYzine hydrochloride 50 milliGRAM(s) Oral every 6 hours PRN Anxiety  LORazepam     Tablet 2 milliGRAM(s) Oral every 2 hours PRN Symptom-triggered 2 point increase in CIWA-Ar  LORazepam   Injectable 2 milliGRAM(s) IV Push every 1 hour PRN Symptom-triggered: each CIWA -Ar score 8 or GREATER  ondansetron Injectable 4 milliGRAM(s) IV Push every 8 hours PRN Nausea and/or Vomiting      Vital Signs Last 24 Hrs  T(C): 36.9 (22 Dec 2023 05:36), Max: 36.9 (21 Dec 2023 20:18)  T(F): 98.4 (22 Dec 2023 05:36), Max: 98.5 (21 Dec 2023 20:18)  HR: 84 (22 Dec 2023 05:36) (81 - 96)  BP: 98/62 (22 Dec 2023 05:36) (98/62 - 118/77)  BP(mean): 74 (22 Dec 2023 05:36) (74 - 74)  RR: 18 (22 Dec 2023 05:36) (18 - 18)  SpO2: 96% (21 Dec 2023 20:18) (96% - 98%)    Parameters below as of 21 Dec 2023 20:18  Patient On (Oxygen Delivery Method): room air        PHYSICAL EXAM:    Constitutional: NAD, well-groomed, well-developed  HEENT: PERRLA, EOMI, Normal Hearing,   Neck: No LAD, No JVD  Back: Normal spine flexure, No CVA tenderness  Respiratory: CTAB/L  Cardiovascular: S1 and S2, RRR, no M/G/R  Gastrointestinal: BS+, soft, NT/ND  Extremities: No peripheral edema  Neurological: A/O x 3, no focal deficits    LABS:                        13.4   6.85  )-----------( 220      ( 21 Dec 2023 04:30 )             40.1     12-21    140  |  102  |  12  ----------------------------<  111<H>  4.5   |  20  |  0.7    Ca    9.4      21 Dec 2023 04:30  Mg     2.3     12-21    TPro  7.2  /  Alb  4.3  /  TBili  0.6  /  DBili  x   /  AST  110<H>  /  ALT  95<H>  /  AlkPhos  78  12-21      Urinalysis Basic - ( 21 Dec 2023 04:30 )    Color: x / Appearance: x / SG: x / pH: x  Gluc: 111 mg/dL / Ketone: x  / Bili: x / Urobili: x   Blood: x / Protein: x / Nitrite: x   Leuk Esterase: x / RBC: x / WBC x   Sq Epi: x / Non Sq Epi: x / Bacteria: x      Drug Screen Urine:  Alcohol Level        RADIOLOGY & ADDITIONAL STUDIES:

## 2023-12-22 NOTE — DISCHARGE NOTE PROVIDER - CARE PROVIDER_API CALL
Julio Cesar Puckett  Internal Medicine  [address] [place] [id number]  Phone: [phone]  Fax: [phone]  Follow Up Time:     Sukhwinder Maradiaga  Psychiatry  [address] [place] [id number]  Phone: [phone]  Fax: [phone]  Follow Up Time:    Julio Cesar Puckett  Internal Medicine  [address] [place] [address]  Phone: [phone]  Fax: [phone]  Follow Up Time:     Sukhwinder Maradiaga  Psychiatry  [address] [place] [address]  Phone: [phone]  Fax: [phone]  Follow Up Time:

## 2023-12-22 NOTE — DISCHARGE NOTE NURSING/CASE MANAGEMENT/SOCIAL WORK - NSDCPEFALRISK_GEN_ALL_CORE
For information on Fall & Injury Prevention, visit: [email or web address] OR  [email or web address] OR  https://www.cdc.gov/steadi/patient.html For information on Fall & Injury Prevention, visit: [email or web address] OR  [email or web address] OR  https://www.cdc.gov/steadi/patient.html

## 2023-12-22 NOTE — DISCHARGE NOTE NURSING/CASE MANAGEMENT/SOCIAL WORK - PATIENT PORTAL LINK FT
You can access the FollowMyHealth Patient Portal offered by [facility] by registering at the following website: [email or web address]. By joining [employer]’s FollowMyHealth portal, you will also be able to view your health information using other applications (apps) compatible with our system. You can access the FollowMyHealth Patient Portal offered by [facility] by registering at the following website: [email or web address]. By joining [email or web address]’s FollowMyHealth portal, you will also be able to view your health information using other applications (apps) compatible with our system.

## 2023-12-22 NOTE — DISCHARGE NOTE PROVIDER - NSDCMRMEDTOKEN_GEN_ALL_CORE_FT
Ativan 1 mg oral tablet: 1 tab(s) orally 2 times a day  famotidine 40 mg oral tablet: 1 tab(s) orally once a day  Protonix 40 mg oral delayed release tablet: 1 tab(s) orally once a day  Seroquel 200 mg oral tablet: 1 tab(s) orally once a day (at bedtime)  Vistaril 100 mg oral capsule: 1 cap(s) orally   Ativan 1 mg oral tablet: 1 tab(s) orally 2 times a day  escitalopram 10 mg oral tablet: 1 tab(s) orally once a day  famotidine 40 mg oral tablet: 1 tab(s) orally once a day  folic acid 1 mg oral tablet: 1 tab(s) orally once a day  gabapentin 100 mg oral capsule: 1 cap(s) orally 3 times a day  mirtazapine 7.5 mg oral tablet: 1 tab(s) orally once a day (at bedtime)  Protonix 40 mg oral delayed release tablet: 1 tab(s) orally once a day  thiamine 100 mg oral tablet: 1 tab(s) orally once a day  Vistaril 100 mg oral capsule: 1 cap(s) orally

## 2023-12-27 ENCOUNTER — RX RENEWAL (OUTPATIENT)
Age: 43
End: 2023-12-27

## 2023-12-27 RX ORDER — METHOCARBAMOL 750 MG/1
750 TABLET, FILM COATED ORAL
Qty: 30 | Refills: 0 | Status: ACTIVE | COMMUNITY
Start: 2023-10-13 | End: 1900-01-01

## 2023-12-29 DIAGNOSIS — K76.0 FATTY (CHANGE OF) LIVER, NOT ELSEWHERE CLASSIFIED: ICD-10-CM

## 2023-12-29 DIAGNOSIS — F13.19 SEDATIVE, HYPNOTIC OR ANXIOLYTIC ABUSE WITH UNSPECIFIED SEDATIVE, HYPNOTIC OR ANXIOLYTIC-INDUCED DISORDER: ICD-10-CM

## 2023-12-29 DIAGNOSIS — F17.210 NICOTINE DEPENDENCE, CIGARETTES, UNCOMPLICATED: ICD-10-CM

## 2023-12-29 DIAGNOSIS — F10.239 ALCOHOL DEPENDENCE WITH WITHDRAWAL, UNSPECIFIED: ICD-10-CM

## 2023-12-29 DIAGNOSIS — F41.9 ANXIETY DISORDER, UNSPECIFIED: ICD-10-CM

## 2023-12-29 DIAGNOSIS — E83.42 HYPOMAGNESEMIA: ICD-10-CM

## 2023-12-29 DIAGNOSIS — F10.229 ALCOHOL DEPENDENCE WITH INTOXICATION, UNSPECIFIED: ICD-10-CM

## 2023-12-29 DIAGNOSIS — F32.A DEPRESSION, UNSPECIFIED: ICD-10-CM

## 2023-12-29 DIAGNOSIS — K70.10 ALCOHOLIC HEPATITIS WITHOUT ASCITES: ICD-10-CM

## 2023-12-29 DIAGNOSIS — G62.1 ALCOHOLIC POLYNEUROPATHY: ICD-10-CM

## 2023-12-29 DIAGNOSIS — K21.00 GASTRO-ESOPHAGEAL REFLUX DISEASE WITH ESOPHAGITIS, WITHOUT BLEEDING: ICD-10-CM

## 2023-12-29 DIAGNOSIS — E51.9 THIAMINE DEFICIENCY, UNSPECIFIED: ICD-10-CM
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
## 2024-01-02 ENCOUNTER — OUTPATIENT (OUTPATIENT)
Dept: OUTPATIENT SERVICES | Facility: HOSPITAL | Age: 44
LOS: 1 days | End: 2024-01-02
Payer: MEDICAID

## 2024-01-02 ENCOUNTER — APPOINTMENT (OUTPATIENT)
Dept: PSYCHIATRY | Facility: CLINIC | Age: 44
End: 2024-01-02

## 2024-01-02 DIAGNOSIS — Z98.890 OTHER SPECIFIED POSTPROCEDURAL STATES: Chronic | ICD-10-CM

## 2024-01-02 DIAGNOSIS — F10.20 ALCOHOL DEPENDENCE, UNCOMPLICATED: ICD-10-CM

## 2024-01-02 DIAGNOSIS — Z98.891 HISTORY OF UTERINE SCAR FROM PREVIOUS SURGERY: Chronic | ICD-10-CM

## 2024-01-02 PROCEDURE — H0004: CPT | Mod: 95

## 2024-01-03 DIAGNOSIS — F10.20 ALCOHOL DEPENDENCE, UNCOMPLICATED: ICD-10-CM

## 2024-01-08 ENCOUNTER — APPOINTMENT (OUTPATIENT)
Dept: PSYCHIATRY | Facility: CLINIC | Age: 44
End: 2024-01-08

## 2024-01-11 ENCOUNTER — INPATIENT (INPATIENT)
Facility: HOSPITAL | Age: 44
LOS: 3 days | Discharge: HOME | End: 2024-01-15
Attending: INTERNAL MEDICINE | Admitting: STUDENT IN AN ORGANIZED HEALTH CARE EDUCATION/TRAINING PROGRAM
Payer: MEDICAID

## 2024-01-11 VITALS — WEIGHT: 190.04 LBS | HEIGHT: 62 IN

## 2024-01-11 DIAGNOSIS — F10.929 ALCOHOL USE, UNSPECIFIED WITH INTOXICATION, UNSPECIFIED: ICD-10-CM

## 2024-01-11 DIAGNOSIS — Z98.891 HISTORY OF UTERINE SCAR FROM PREVIOUS SURGERY: Chronic | ICD-10-CM

## 2024-01-11 DIAGNOSIS — Z98.890 OTHER SPECIFIED POSTPROCEDURAL STATES: Chronic | ICD-10-CM

## 2024-01-11 DIAGNOSIS — E83.42 HYPOMAGNESEMIA: ICD-10-CM

## 2024-01-11 LAB
ALBUMIN SERPL ELPH-MCNC: 4 G/DL — SIGNIFICANT CHANGE UP (ref 3.5–5.2)
ALBUMIN SERPL ELPH-MCNC: 4 G/DL — SIGNIFICANT CHANGE UP (ref 3.5–5.2)
ALBUMIN SERPL ELPH-MCNC: 4.4 G/DL — SIGNIFICANT CHANGE UP (ref 3.5–5.2)
ALBUMIN SERPL ELPH-MCNC: 4.4 G/DL — SIGNIFICANT CHANGE UP (ref 3.5–5.2)
ALP SERPL-CCNC: 66 U/L — SIGNIFICANT CHANGE UP (ref 30–115)
ALP SERPL-CCNC: 66 U/L — SIGNIFICANT CHANGE UP (ref 30–115)
ALP SERPL-CCNC: 76 U/L — SIGNIFICANT CHANGE UP (ref 30–115)
ALP SERPL-CCNC: 76 U/L — SIGNIFICANT CHANGE UP (ref 30–115)
ALT FLD-CCNC: 39 U/L — SIGNIFICANT CHANGE UP (ref 0–41)
ALT FLD-CCNC: 39 U/L — SIGNIFICANT CHANGE UP (ref 0–41)
ALT FLD-CCNC: 42 U/L — HIGH (ref 0–41)
ALT FLD-CCNC: 42 U/L — HIGH (ref 0–41)
AMMONIA BLD-MCNC: 39 UMOL/L — SIGNIFICANT CHANGE UP (ref 11–55)
AMMONIA BLD-MCNC: 39 UMOL/L — SIGNIFICANT CHANGE UP (ref 11–55)
ANION GAP SERPL CALC-SCNC: 12 MMOL/L — SIGNIFICANT CHANGE UP (ref 7–14)
ANION GAP SERPL CALC-SCNC: 12 MMOL/L — SIGNIFICANT CHANGE UP (ref 7–14)
ANION GAP SERPL CALC-SCNC: 14 MMOL/L — SIGNIFICANT CHANGE UP (ref 7–14)
ANION GAP SERPL CALC-SCNC: 14 MMOL/L — SIGNIFICANT CHANGE UP (ref 7–14)
APAP SERPL-MCNC: <5 UG/ML — LOW (ref 10–30)
APAP SERPL-MCNC: <5 UG/ML — LOW (ref 10–30)
APPEARANCE UR: ABNORMAL
APPEARANCE UR: ABNORMAL
AST SERPL-CCNC: 52 U/L — HIGH (ref 0–41)
AST SERPL-CCNC: 52 U/L — HIGH (ref 0–41)
AST SERPL-CCNC: 63 U/L — HIGH (ref 0–41)
AST SERPL-CCNC: 63 U/L — HIGH (ref 0–41)
BASOPHILS # BLD AUTO: 0.03 K/UL — SIGNIFICANT CHANGE UP (ref 0–0.2)
BASOPHILS # BLD AUTO: 0.03 K/UL — SIGNIFICANT CHANGE UP (ref 0–0.2)
BASOPHILS # BLD AUTO: 0.04 K/UL — SIGNIFICANT CHANGE UP (ref 0–0.2)
BASOPHILS # BLD AUTO: 0.04 K/UL — SIGNIFICANT CHANGE UP (ref 0–0.2)
BASOPHILS NFR BLD AUTO: 0.6 % — SIGNIFICANT CHANGE UP (ref 0–1)
BILIRUB SERPL-MCNC: 0.3 MG/DL — SIGNIFICANT CHANGE UP (ref 0.2–1.2)
BILIRUB SERPL-MCNC: 0.3 MG/DL — SIGNIFICANT CHANGE UP (ref 0.2–1.2)
BILIRUB SERPL-MCNC: <0.2 MG/DL — SIGNIFICANT CHANGE UP (ref 0.2–1.2)
BILIRUB SERPL-MCNC: <0.2 MG/DL — SIGNIFICANT CHANGE UP (ref 0.2–1.2)
BILIRUB UR-MCNC: NEGATIVE — SIGNIFICANT CHANGE UP
BILIRUB UR-MCNC: NEGATIVE — SIGNIFICANT CHANGE UP
BUN SERPL-MCNC: 10 MG/DL — SIGNIFICANT CHANGE UP (ref 10–20)
BUN SERPL-MCNC: 10 MG/DL — SIGNIFICANT CHANGE UP (ref 10–20)
BUN SERPL-MCNC: 9 MG/DL — LOW (ref 10–20)
BUN SERPL-MCNC: 9 MG/DL — LOW (ref 10–20)
CALCIUM SERPL-MCNC: 8.7 MG/DL — SIGNIFICANT CHANGE UP (ref 8.4–10.5)
CALCIUM SERPL-MCNC: 8.7 MG/DL — SIGNIFICANT CHANGE UP (ref 8.4–10.5)
CALCIUM SERPL-MCNC: 9.2 MG/DL — SIGNIFICANT CHANGE UP (ref 8.4–10.5)
CALCIUM SERPL-MCNC: 9.2 MG/DL — SIGNIFICANT CHANGE UP (ref 8.4–10.5)
CHLORIDE SERPL-SCNC: 102 MMOL/L — SIGNIFICANT CHANGE UP (ref 98–110)
CO2 SERPL-SCNC: 21 MMOL/L — SIGNIFICANT CHANGE UP (ref 17–32)
CO2 SERPL-SCNC: 21 MMOL/L — SIGNIFICANT CHANGE UP (ref 17–32)
CO2 SERPL-SCNC: 23 MMOL/L — SIGNIFICANT CHANGE UP (ref 17–32)
CO2 SERPL-SCNC: 23 MMOL/L — SIGNIFICANT CHANGE UP (ref 17–32)
COLOR SPEC: YELLOW — SIGNIFICANT CHANGE UP
COLOR SPEC: YELLOW — SIGNIFICANT CHANGE UP
CREAT SERPL-MCNC: 0.6 MG/DL — LOW (ref 0.7–1.5)
DIFF PNL FLD: NEGATIVE — SIGNIFICANT CHANGE UP
DIFF PNL FLD: NEGATIVE — SIGNIFICANT CHANGE UP
EGFR: 116 ML/MIN/1.73M2 — SIGNIFICANT CHANGE UP
EOSINOPHIL # BLD AUTO: 0.12 K/UL — SIGNIFICANT CHANGE UP (ref 0–0.7)
EOSINOPHIL # BLD AUTO: 0.12 K/UL — SIGNIFICANT CHANGE UP (ref 0–0.7)
EOSINOPHIL # BLD AUTO: 0.15 K/UL — SIGNIFICANT CHANGE UP (ref 0–0.7)
EOSINOPHIL # BLD AUTO: 0.15 K/UL — SIGNIFICANT CHANGE UP (ref 0–0.7)
EOSINOPHIL NFR BLD AUTO: 2.3 % — SIGNIFICANT CHANGE UP (ref 0–8)
ETHANOL SERPL-MCNC: 294 MG/DL — HIGH
ETHANOL SERPL-MCNC: 294 MG/DL — HIGH
ETHANOL SERPL-MCNC: 66 MG/DL — HIGH
ETHANOL SERPL-MCNC: 66 MG/DL — HIGH
FLUAV AG NPH QL: SIGNIFICANT CHANGE UP
FLUAV AG NPH QL: SIGNIFICANT CHANGE UP
FLUBV AG NPH QL: SIGNIFICANT CHANGE UP
FLUBV AG NPH QL: SIGNIFICANT CHANGE UP
GGT SERPL-CCNC: 113 U/L — HIGH (ref 1–40)
GGT SERPL-CCNC: 113 U/L — HIGH (ref 1–40)
GLUCOSE SERPL-MCNC: 120 MG/DL — HIGH (ref 70–99)
GLUCOSE SERPL-MCNC: 120 MG/DL — HIGH (ref 70–99)
GLUCOSE SERPL-MCNC: 87 MG/DL — SIGNIFICANT CHANGE UP (ref 70–99)
GLUCOSE SERPL-MCNC: 87 MG/DL — SIGNIFICANT CHANGE UP (ref 70–99)
GLUCOSE UR QL: NEGATIVE MG/DL — SIGNIFICANT CHANGE UP
GLUCOSE UR QL: NEGATIVE MG/DL — SIGNIFICANT CHANGE UP
HCG SERPL QL: NEGATIVE — SIGNIFICANT CHANGE UP
HCG SERPL QL: NEGATIVE — SIGNIFICANT CHANGE UP
HCT VFR BLD CALC: 34.9 % — LOW (ref 37–47)
HCT VFR BLD CALC: 34.9 % — LOW (ref 37–47)
HCT VFR BLD CALC: 37.9 % — SIGNIFICANT CHANGE UP (ref 37–47)
HCT VFR BLD CALC: 37.9 % — SIGNIFICANT CHANGE UP (ref 37–47)
HGB BLD-MCNC: 12.2 G/DL — SIGNIFICANT CHANGE UP (ref 12–16)
HGB BLD-MCNC: 12.2 G/DL — SIGNIFICANT CHANGE UP (ref 12–16)
HGB BLD-MCNC: 13.1 G/DL — SIGNIFICANT CHANGE UP (ref 12–16)
HGB BLD-MCNC: 13.1 G/DL — SIGNIFICANT CHANGE UP (ref 12–16)
IMM GRANULOCYTES NFR BLD AUTO: 0.2 % — SIGNIFICANT CHANGE UP (ref 0.1–0.3)
KETONES UR-MCNC: NEGATIVE MG/DL — SIGNIFICANT CHANGE UP
KETONES UR-MCNC: NEGATIVE MG/DL — SIGNIFICANT CHANGE UP
LACTATE SERPL-SCNC: 1.9 MMOL/L — SIGNIFICANT CHANGE UP (ref 0.7–2)
LACTATE SERPL-SCNC: 1.9 MMOL/L — SIGNIFICANT CHANGE UP (ref 0.7–2)
LEUKOCYTE ESTERASE UR-ACNC: NEGATIVE — SIGNIFICANT CHANGE UP
LEUKOCYTE ESTERASE UR-ACNC: NEGATIVE — SIGNIFICANT CHANGE UP
LIDOCAIN IGE QN: 46 U/L — SIGNIFICANT CHANGE UP (ref 7–60)
LIDOCAIN IGE QN: 46 U/L — SIGNIFICANT CHANGE UP (ref 7–60)
LYMPHOCYTES # BLD AUTO: 2.4 K/UL — SIGNIFICANT CHANGE UP (ref 1.2–3.4)
LYMPHOCYTES # BLD AUTO: 2.4 K/UL — SIGNIFICANT CHANGE UP (ref 1.2–3.4)
LYMPHOCYTES # BLD AUTO: 3.09 K/UL — SIGNIFICANT CHANGE UP (ref 1.2–3.4)
LYMPHOCYTES # BLD AUTO: 3.09 K/UL — SIGNIFICANT CHANGE UP (ref 1.2–3.4)
LYMPHOCYTES # BLD AUTO: 46.3 % — SIGNIFICANT CHANGE UP (ref 20.5–51.1)
LYMPHOCYTES # BLD AUTO: 46.3 % — SIGNIFICANT CHANGE UP (ref 20.5–51.1)
LYMPHOCYTES # BLD AUTO: 47.8 % — SIGNIFICANT CHANGE UP (ref 20.5–51.1)
LYMPHOCYTES # BLD AUTO: 47.8 % — SIGNIFICANT CHANGE UP (ref 20.5–51.1)
MAGNESIUM SERPL-MCNC: 1.7 MG/DL — LOW (ref 1.8–2.4)
MAGNESIUM SERPL-MCNC: 1.7 MG/DL — LOW (ref 1.8–2.4)
MAGNESIUM SERPL-MCNC: 1.9 MG/DL — SIGNIFICANT CHANGE UP (ref 1.8–2.4)
MAGNESIUM SERPL-MCNC: 1.9 MG/DL — SIGNIFICANT CHANGE UP (ref 1.8–2.4)
MCHC RBC-ENTMCNC: 34.2 PG — HIGH (ref 27–31)
MCHC RBC-ENTMCNC: 34.2 PG — HIGH (ref 27–31)
MCHC RBC-ENTMCNC: 34.6 G/DL — SIGNIFICANT CHANGE UP (ref 32–37)
MCHC RBC-ENTMCNC: 34.6 G/DL — SIGNIFICANT CHANGE UP (ref 32–37)
MCHC RBC-ENTMCNC: 34.7 PG — HIGH (ref 27–31)
MCHC RBC-ENTMCNC: 34.7 PG — HIGH (ref 27–31)
MCHC RBC-ENTMCNC: 35 G/DL — SIGNIFICANT CHANGE UP (ref 32–37)
MCHC RBC-ENTMCNC: 35 G/DL — SIGNIFICANT CHANGE UP (ref 32–37)
MCV RBC AUTO: 100.3 FL — HIGH (ref 81–99)
MCV RBC AUTO: 100.3 FL — HIGH (ref 81–99)
MCV RBC AUTO: 97.8 FL — SIGNIFICANT CHANGE UP (ref 81–99)
MCV RBC AUTO: 97.8 FL — SIGNIFICANT CHANGE UP (ref 81–99)
MONOCYTES # BLD AUTO: 0.46 K/UL — SIGNIFICANT CHANGE UP (ref 0.1–0.6)
MONOCYTES # BLD AUTO: 0.46 K/UL — SIGNIFICANT CHANGE UP (ref 0.1–0.6)
MONOCYTES # BLD AUTO: 0.64 K/UL — HIGH (ref 0.1–0.6)
MONOCYTES # BLD AUTO: 0.64 K/UL — HIGH (ref 0.1–0.6)
MONOCYTES NFR BLD AUTO: 8.9 % — SIGNIFICANT CHANGE UP (ref 1.7–9.3)
MONOCYTES NFR BLD AUTO: 8.9 % — SIGNIFICANT CHANGE UP (ref 1.7–9.3)
MONOCYTES NFR BLD AUTO: 9.9 % — HIGH (ref 1.7–9.3)
MONOCYTES NFR BLD AUTO: 9.9 % — HIGH (ref 1.7–9.3)
NEUTROPHILS # BLD AUTO: 2.16 K/UL — SIGNIFICANT CHANGE UP (ref 1.4–6.5)
NEUTROPHILS # BLD AUTO: 2.16 K/UL — SIGNIFICANT CHANGE UP (ref 1.4–6.5)
NEUTROPHILS # BLD AUTO: 2.53 K/UL — SIGNIFICANT CHANGE UP (ref 1.4–6.5)
NEUTROPHILS # BLD AUTO: 2.53 K/UL — SIGNIFICANT CHANGE UP (ref 1.4–6.5)
NEUTROPHILS NFR BLD AUTO: 39.2 % — LOW (ref 42.2–75.2)
NEUTROPHILS NFR BLD AUTO: 39.2 % — LOW (ref 42.2–75.2)
NEUTROPHILS NFR BLD AUTO: 41.7 % — LOW (ref 42.2–75.2)
NEUTROPHILS NFR BLD AUTO: 41.7 % — LOW (ref 42.2–75.2)
NITRITE UR-MCNC: NEGATIVE — SIGNIFICANT CHANGE UP
NITRITE UR-MCNC: NEGATIVE — SIGNIFICANT CHANGE UP
NRBC # BLD: 0 /100 WBCS — SIGNIFICANT CHANGE UP (ref 0–0)
PH UR: 6.5 — SIGNIFICANT CHANGE UP (ref 5–8)
PH UR: 6.5 — SIGNIFICANT CHANGE UP (ref 5–8)
PLATELET # BLD AUTO: 213 K/UL — SIGNIFICANT CHANGE UP (ref 130–400)
PLATELET # BLD AUTO: 213 K/UL — SIGNIFICANT CHANGE UP (ref 130–400)
PLATELET # BLD AUTO: 257 K/UL — SIGNIFICANT CHANGE UP (ref 130–400)
PLATELET # BLD AUTO: 257 K/UL — SIGNIFICANT CHANGE UP (ref 130–400)
PMV BLD: 8.7 FL — SIGNIFICANT CHANGE UP (ref 7.4–10.4)
POTASSIUM SERPL-MCNC: 4.4 MMOL/L — SIGNIFICANT CHANGE UP (ref 3.5–5)
POTASSIUM SERPL-MCNC: 4.4 MMOL/L — SIGNIFICANT CHANGE UP (ref 3.5–5)
POTASSIUM SERPL-MCNC: 4.5 MMOL/L — SIGNIFICANT CHANGE UP (ref 3.5–5)
POTASSIUM SERPL-MCNC: 4.5 MMOL/L — SIGNIFICANT CHANGE UP (ref 3.5–5)
POTASSIUM SERPL-SCNC: 4.4 MMOL/L — SIGNIFICANT CHANGE UP (ref 3.5–5)
POTASSIUM SERPL-SCNC: 4.4 MMOL/L — SIGNIFICANT CHANGE UP (ref 3.5–5)
POTASSIUM SERPL-SCNC: 4.5 MMOL/L — SIGNIFICANT CHANGE UP (ref 3.5–5)
POTASSIUM SERPL-SCNC: 4.5 MMOL/L — SIGNIFICANT CHANGE UP (ref 3.5–5)
PROT SERPL-MCNC: 6.8 G/DL — SIGNIFICANT CHANGE UP (ref 6–8)
PROT SERPL-MCNC: 6.8 G/DL — SIGNIFICANT CHANGE UP (ref 6–8)
PROT SERPL-MCNC: 7.5 G/DL — SIGNIFICANT CHANGE UP (ref 6–8)
PROT SERPL-MCNC: 7.5 G/DL — SIGNIFICANT CHANGE UP (ref 6–8)
PROT UR-MCNC: NEGATIVE MG/DL — SIGNIFICANT CHANGE UP
PROT UR-MCNC: NEGATIVE MG/DL — SIGNIFICANT CHANGE UP
RBC # BLD: 3.57 M/UL — LOW (ref 4.2–5.4)
RBC # BLD: 3.57 M/UL — LOW (ref 4.2–5.4)
RBC # BLD: 3.78 M/UL — LOW (ref 4.2–5.4)
RBC # BLD: 3.78 M/UL — LOW (ref 4.2–5.4)
RBC # FLD: 13.6 % — SIGNIFICANT CHANGE UP (ref 11.5–14.5)
RBC # FLD: 13.6 % — SIGNIFICANT CHANGE UP (ref 11.5–14.5)
RBC # FLD: 13.7 % — SIGNIFICANT CHANGE UP (ref 11.5–14.5)
RBC # FLD: 13.7 % — SIGNIFICANT CHANGE UP (ref 11.5–14.5)
RSV RNA NPH QL NAA+NON-PROBE: SIGNIFICANT CHANGE UP
RSV RNA NPH QL NAA+NON-PROBE: SIGNIFICANT CHANGE UP
SALICYLATES SERPL-MCNC: <0.3 MG/DL — LOW (ref 4–30)
SALICYLATES SERPL-MCNC: <0.3 MG/DL — LOW (ref 4–30)
SARS-COV-2 RNA SPEC QL NAA+PROBE: SIGNIFICANT CHANGE UP
SARS-COV-2 RNA SPEC QL NAA+PROBE: SIGNIFICANT CHANGE UP
SODIUM SERPL-SCNC: 137 MMOL/L — SIGNIFICANT CHANGE UP (ref 135–146)
SP GR SPEC: 1.01 — SIGNIFICANT CHANGE UP (ref 1–1.03)
SP GR SPEC: 1.01 — SIGNIFICANT CHANGE UP (ref 1–1.03)
TROPONIN T, HIGH SENSITIVITY RESULT: <6 NG/L — SIGNIFICANT CHANGE UP (ref 6–13)
TROPONIN T, HIGH SENSITIVITY RESULT: <6 NG/L — SIGNIFICANT CHANGE UP (ref 6–13)
UROBILINOGEN FLD QL: 0.2 MG/DL — SIGNIFICANT CHANGE UP (ref 0.2–1)
UROBILINOGEN FLD QL: 0.2 MG/DL — SIGNIFICANT CHANGE UP (ref 0.2–1)
WBC # BLD: 5.18 K/UL — SIGNIFICANT CHANGE UP (ref 4.8–10.8)
WBC # BLD: 5.18 K/UL — SIGNIFICANT CHANGE UP (ref 4.8–10.8)
WBC # BLD: 6.46 K/UL — SIGNIFICANT CHANGE UP (ref 4.8–10.8)
WBC # BLD: 6.46 K/UL — SIGNIFICANT CHANGE UP (ref 4.8–10.8)
WBC # FLD AUTO: 5.18 K/UL — SIGNIFICANT CHANGE UP (ref 4.8–10.8)
WBC # FLD AUTO: 5.18 K/UL — SIGNIFICANT CHANGE UP (ref 4.8–10.8)
WBC # FLD AUTO: 6.46 K/UL — SIGNIFICANT CHANGE UP (ref 4.8–10.8)
WBC # FLD AUTO: 6.46 K/UL — SIGNIFICANT CHANGE UP (ref 4.8–10.8)

## 2024-01-11 PROCEDURE — G0378: CPT

## 2024-01-11 PROCEDURE — 82977 ASSAY OF GGT: CPT

## 2024-01-11 PROCEDURE — 99222 1ST HOSP IP/OBS MODERATE 55: CPT

## 2024-01-11 PROCEDURE — 93010 ELECTROCARDIOGRAM REPORT: CPT

## 2024-01-11 PROCEDURE — 83735 ASSAY OF MAGNESIUM: CPT

## 2024-01-11 PROCEDURE — 80307 DRUG TEST PRSMV CHEM ANLYZR: CPT

## 2024-01-11 PROCEDURE — 84100 ASSAY OF PHOSPHORUS: CPT

## 2024-01-11 PROCEDURE — 82728 ASSAY OF FERRITIN: CPT

## 2024-01-11 PROCEDURE — 74177 CT ABD & PELVIS W/CONTRAST: CPT | Mod: 26,MA

## 2024-01-11 PROCEDURE — 80074 ACUTE HEPATITIS PANEL: CPT

## 2024-01-11 PROCEDURE — 82140 ASSAY OF AMMONIA: CPT

## 2024-01-11 PROCEDURE — 36415 COLL VENOUS BLD VENIPUNCTURE: CPT

## 2024-01-11 PROCEDURE — 87389 HIV-1 AG W/HIV-1&-2 AB AG IA: CPT

## 2024-01-11 PROCEDURE — 86480 TB TEST CELL IMMUN MEASURE: CPT

## 2024-01-11 PROCEDURE — 85025 COMPLETE CBC W/AUTO DIFF WBC: CPT

## 2024-01-11 PROCEDURE — 86780 TREPONEMA PALLIDUM: CPT

## 2024-01-11 PROCEDURE — 80053 COMPREHEN METABOLIC PANEL: CPT

## 2024-01-11 PROCEDURE — 99285 EMERGENCY DEPT VISIT HI MDM: CPT

## 2024-01-11 RX ORDER — HYDROXYZINE HCL 10 MG
1 TABLET ORAL
Refills: 0 | DISCHARGE

## 2024-01-11 RX ORDER — THIAMINE MONONITRATE (VIT B1) 100 MG
100 TABLET ORAL ONCE
Refills: 0 | Status: COMPLETED | OUTPATIENT
Start: 2024-01-11 | End: 2024-01-11

## 2024-01-11 RX ORDER — ONDANSETRON 8 MG/1
4 TABLET, FILM COATED ORAL EVERY 6 HOURS
Refills: 0 | Status: DISCONTINUED | OUTPATIENT
Start: 2024-01-11 | End: 2024-01-15

## 2024-01-11 RX ORDER — ESCITALOPRAM OXALATE 10 MG/1
10 TABLET, FILM COATED ORAL DAILY
Refills: 0 | Status: DISCONTINUED | OUTPATIENT
Start: 2024-01-11 | End: 2024-01-15

## 2024-01-11 RX ORDER — GABAPENTIN 400 MG/1
100 CAPSULE ORAL THREE TIMES A DAY
Refills: 0 | Status: DISCONTINUED | OUTPATIENT
Start: 2024-01-11 | End: 2024-01-15

## 2024-01-11 RX ORDER — PANTOPRAZOLE SODIUM 20 MG/1
40 TABLET, DELAYED RELEASE ORAL ONCE
Refills: 0 | Status: COMPLETED | OUTPATIENT
Start: 2024-01-11 | End: 2024-01-11

## 2024-01-11 RX ORDER — HYDROXYZINE HCL 10 MG
50 TABLET ORAL EVERY 6 HOURS
Refills: 0 | Status: DISCONTINUED | OUTPATIENT
Start: 2024-01-11 | End: 2024-01-15

## 2024-01-11 RX ORDER — ENOXAPARIN SODIUM 100 MG/ML
40 INJECTION SUBCUTANEOUS EVERY 24 HOURS
Refills: 0 | Status: DISCONTINUED | OUTPATIENT
Start: 2024-01-11 | End: 2024-01-15

## 2024-01-11 RX ORDER — FOLIC ACID 0.8 MG
1 TABLET ORAL DAILY
Refills: 0 | Status: DISCONTINUED | OUTPATIENT
Start: 2024-01-11 | End: 2024-01-15

## 2024-01-11 RX ORDER — FOLIC ACID 0.8 MG
1 TABLET ORAL ONCE
Refills: 0 | Status: COMPLETED | OUTPATIENT
Start: 2024-01-11 | End: 2024-01-11

## 2024-01-11 RX ORDER — MIRTAZAPINE 45 MG/1
7.5 TABLET, ORALLY DISINTEGRATING ORAL AT BEDTIME
Refills: 0 | Status: DISCONTINUED | OUTPATIENT
Start: 2024-01-11 | End: 2024-01-15

## 2024-01-11 RX ORDER — SODIUM CHLORIDE 9 MG/ML
1000 INJECTION, SOLUTION INTRAVENOUS
Refills: 0 | Status: DISCONTINUED | OUTPATIENT
Start: 2024-01-11 | End: 2024-01-15

## 2024-01-11 RX ORDER — GUAIFENESIN/DEXTROMETHORPHAN 600MG-30MG
5 TABLET, EXTENDED RELEASE 12 HR ORAL EVERY 4 HOURS
Refills: 0 | Status: DISCONTINUED | OUTPATIENT
Start: 2024-01-11 | End: 2024-01-15

## 2024-01-11 RX ORDER — THIAMINE MONONITRATE (VIT B1) 100 MG
100 TABLET ORAL DAILY
Refills: 0 | Status: DISCONTINUED | OUTPATIENT
Start: 2024-01-11 | End: 2024-01-15

## 2024-01-11 RX ORDER — MAGNESIUM SULFATE 500 MG/ML
2 VIAL (ML) INJECTION ONCE
Refills: 0 | Status: COMPLETED | OUTPATIENT
Start: 2024-01-11 | End: 2024-01-11

## 2024-01-11 RX ORDER — SODIUM CHLORIDE 9 MG/ML
1000 INJECTION INTRAMUSCULAR; INTRAVENOUS; SUBCUTANEOUS
Refills: 0 | Status: DISCONTINUED | OUTPATIENT
Start: 2024-01-11 | End: 2024-01-15

## 2024-01-11 RX ORDER — IBUPROFEN 200 MG
400 TABLET ORAL EVERY 6 HOURS
Refills: 0 | Status: DISCONTINUED | OUTPATIENT
Start: 2024-01-11 | End: 2024-01-15

## 2024-01-11 RX ORDER — DIAZEPAM 5 MG
5 TABLET ORAL ONCE
Refills: 0 | Status: DISCONTINUED | OUTPATIENT
Start: 2024-01-11 | End: 2024-01-11

## 2024-01-11 RX ORDER — MULTIVIT-MIN/FERROUS GLUCONATE 9 MG/15 ML
1 LIQUID (ML) ORAL DAILY
Refills: 0 | Status: DISCONTINUED | OUTPATIENT
Start: 2024-01-11 | End: 2024-01-15

## 2024-01-11 RX ORDER — ACETAMINOPHEN 500 MG
650 TABLET ORAL EVERY 4 HOURS
Refills: 0 | Status: DISCONTINUED | OUTPATIENT
Start: 2024-01-11 | End: 2024-01-15

## 2024-01-11 RX ORDER — MAGNESIUM HYDROXIDE 400 MG/1
30 TABLET, CHEWABLE ORAL ONCE
Refills: 0 | Status: COMPLETED | OUTPATIENT
Start: 2024-01-11 | End: 2024-01-14

## 2024-01-11 RX ADMIN — Medication 50 MILLIGRAM(S): at 12:08

## 2024-01-11 RX ADMIN — Medication 4 MILLIGRAM(S): at 17:52

## 2024-01-11 RX ADMIN — ESCITALOPRAM OXALATE 10 MILLIGRAM(S): 10 TABLET, FILM COATED ORAL at 12:10

## 2024-01-11 RX ADMIN — Medication 4 MILLIGRAM(S): at 12:10

## 2024-01-11 RX ADMIN — SODIUM CHLORIDE 125 MILLILITER(S): 9 INJECTION INTRAMUSCULAR; INTRAVENOUS; SUBCUTANEOUS at 10:18

## 2024-01-11 RX ADMIN — Medication 100 MILLIGRAM(S): at 04:32

## 2024-01-11 RX ADMIN — Medication 100 MILLIGRAM(S): at 12:10

## 2024-01-11 RX ADMIN — Medication 50 MILLIGRAM(S): at 17:52

## 2024-01-11 RX ADMIN — ONDANSETRON 4 MILLIGRAM(S): 8 TABLET, FILM COATED ORAL at 12:08

## 2024-01-11 RX ADMIN — Medication 1 MILLIGRAM(S): at 12:10

## 2024-01-11 RX ADMIN — Medication 5 MILLIGRAM(S): at 04:32

## 2024-01-11 RX ADMIN — Medication 25 GRAM(S): at 17:53

## 2024-01-11 RX ADMIN — Medication 1 TABLET(S): at 12:10

## 2024-01-11 RX ADMIN — GABAPENTIN 100 MILLIGRAM(S): 400 CAPSULE ORAL at 13:07

## 2024-01-11 RX ADMIN — Medication 50 MILLIGRAM(S): at 06:01

## 2024-01-11 RX ADMIN — GABAPENTIN 100 MILLIGRAM(S): 400 CAPSULE ORAL at 21:22

## 2024-01-11 RX ADMIN — Medication 1 MILLIGRAM(S): at 05:43

## 2024-01-11 RX ADMIN — ONDANSETRON 4 MILLIGRAM(S): 8 TABLET, FILM COATED ORAL at 22:01

## 2024-01-11 RX ADMIN — SODIUM CHLORIDE 125 MILLILITER(S): 9 INJECTION INTRAMUSCULAR; INTRAVENOUS; SUBCUTANEOUS at 23:57

## 2024-01-11 RX ADMIN — SODIUM CHLORIDE 2000 MILLILITER(S): 9 INJECTION, SOLUTION INTRAVENOUS at 04:48

## 2024-01-11 RX ADMIN — Medication 4 MILLIGRAM(S): at 22:05

## 2024-01-11 RX ADMIN — PANTOPRAZOLE SODIUM 40 MILLIGRAM(S): 20 TABLET, DELAYED RELEASE ORAL at 04:32

## 2024-01-11 RX ADMIN — Medication 50 MILLIGRAM(S): at 23:56

## 2024-01-11 NOTE — PATIENT PROFILE ADULT - FALL HARM RISK - HARM RISK INTERVENTIONS
Assistance with ambulation/Assistance OOB with selected safe patient handling equipment/Communicate Risk of Fall with Harm to all staff/Monitor for mental status changes/Monitor gait and stability/Reinforce activity limits and safety measures with patient and family/Review medications for side effects contributing to fall risk/Sit up slowly, dangle for a short time, stand at bedside before walking/Tailored Fall Risk Interventions/Toileting schedule using arm’s reach rule for commode and bathroom/Use of alarms - bed, chair and/or voice tab/Visual Cue: Yellow wristband and red socks/Bed in lowest position, wheels locked, appropriate side rails in place/Call bell, personal items and telephone in reach/Instruct patient to call for assistance before getting out of bed or chair/Non-slip footwear when patient is out of bed/[place] to call system/Physically safe environment - no spills, clutter or unnecessary equipment/Purposeful Proactive Rounding/Room/bathroom lighting operational, light cord in reach Assistance with ambulation/Assistance OOB with selected safe patient handling equipment/Communicate Risk of Fall with Harm to all staff/Monitor for mental status changes/Monitor gait and stability/Reinforce activity limits and safety measures with patient and family/Review medications for side effects contributing to fall risk/Sit up slowly, dangle for a short time, stand at bedside before walking/Tailored Fall Risk Interventions/Toileting schedule using arm’s reach rule for commode and bathroom/Use of alarms - bed, chair and/or voice tab/Visual Cue: Yellow wristband and red socks/Bed in lowest position, wheels locked, appropriate side rails in place/Call bell, personal items and telephone in reach/Instruct patient to call for assistance before getting out of bed or chair/Non-slip footwear when patient is out of bed/[place] to call system/Physically safe environment - no spills, clutter or unnecessary equipment/Purposeful Proactive Rounding/Room/bathroom lighting operational, light cord in reach

## 2024-01-11 NOTE — PATIENT PROFILE ADULT - NSTOBACCOCESSATIONEDU4_GEN_A_NUR
Assisted Dr. Garza with bronchoscopy.    Medications used:  2% Lidocaine 8 cc    Scopes used:  H190 SN [id number]  EBUS SN [id number]        Additional comments: Specimens obtained, secured properly and brought to lab.   Smoking even a single puff increases the likelihood of a full relapse, withdrawal symptoms peak within 1-2 weeks, but can persist for months

## 2024-01-11 NOTE — H&P ADULT - NSHPPHYSICALEXAM_GEN_ALL_CORE
T(C): --  HR: 101 (01-11-24 @ 07:41) (87 - 101)  BP: 111/67 (01-11-24 @ 07:41) (109/63 - 111/67)  RR: 18 (01-11-24 @ 07:41) (18 - 18)  SpO2: 98% (01-11-24 @ 07:41) (93% - 98%)    PHYSICAL EXAM:  GENERAL: NAD, well-developed, well nourished, looks stated age, anxious  HEAD:  Atraumatic, Normocephalic  EYES: EOMI, PERRLA, conjunctiva and sclera clear  NECK: Supple, No JVD, no bruits, no masses, no thyroid enlargement  ENMT: No tonsillar erythema, exudated or enlargement, moist mucous membranes  CHEST/LUNG: Clear to auscultation bilaterally; No rales, rhonchi or wheezing, no dullness to percussion  HEART: S1,S2 Regular rate and rhythm; No murmurs, rubs, or gallops  ABDOMEN: Soft, ++ epigastric tenderness, nondistended, no rebound tenderness; No palpable masses, no hernias, no organomegaly; Bowel sounds present and normoactive  EXTREMITIES:  2+ peripheral pulses bilaterally and symmetrically, no clubbing, cyanosis, or edema  LYMPH: No lymphadenopathy  PSYCH: AAOx3, normal mood and affect  NEUROLOGY: non-focal, muscle strength 5/5 all extremities, DTRs 2+ symmetrically  SKIN: No rashes or lesions

## 2024-01-11 NOTE — ED ADULT NURSE NOTE - NSFALLUNIVINTERV_ED_ALL_ED
Bed/Stretcher in lowest position, wheels locked, appropriate side rails in place/Call bell, personal items and telephone in reach/Instruct patient to call for assistance before getting out of bed/chair/stretcher/Non-slip footwear applied when patient is off stretcher/[place] to call system/Physically safe environment - no spills, clutter or unnecessary equipment/Purposeful proactive rounding/Room/bathroom lighting operational, light cord in reach Bed/Stretcher in lowest position, wheels locked, appropriate side rails in place/Call bell, personal items and telephone in reach/Instruct patient to call for assistance before getting out of bed/chair/stretcher/Non-slip footwear applied when patient is off stretcher/[place] to call system/Physically safe environment - no spills, clutter or unnecessary equipment/Purposeful proactive rounding/Room/bathroom lighting operational, light cord in reach

## 2024-01-11 NOTE — ED PROVIDER NOTE - PHYSICAL EXAMINATION
Alert nontoxic, anxious perrl eomi, no pallor  no jaundice  CVS tachy 120 Resp CTA  Abd soft ttp  upper abdomen no peritoneal signs nondistended , No cva tenderness, No le edema, no skin lesions

## 2024-01-11 NOTE — ED PROVIDER NOTE - PROGRESS NOTE DETAILS
Juancarlos (preattending resident): EtOH withdrawal v anxiety v nausea/vomiting/abd pain 2/2 infectious etiology. Recently discharged 12/2 from ICU, admitted for EtOH withdrawal. PT nontoxic appearing. No signs or symptoms of acute abdomen.  Labs appreciated, grossly unremarkable, CT A/P radiology read appreciated, no acute abdominal pathology. s/p valium, libium CIWA low. ICU admission considered, pt not require ICU care at this time. Admit to medicine floor

## 2024-01-11 NOTE — H&P ADULT - NS ATTEND AMEND GEN_ALL_CORE FT
Admitted for ETOH detox, continue CIWA protocol, follow with CATCH team, patient wishes for ETOH rehab on DC.  Continue home meds.

## 2024-01-11 NOTE — ED PROVIDER NOTE - NS ED ROS FT
ROS  Constitutional:  See HPI.  Respiratory:  No cough+ sob . No hemoptysis.  GI:  +nausea, vomiting, no diarrhea + abdominal pain.  :  No dysuria, frequency or burning.  MS:  No myalgia, muscle weakness, joint pain or back pain.  Neuro:  No focal neurological complaints.  Skin:  No skin rash.  Except as documented in the HPI,  all other systems are negative.

## 2024-01-11 NOTE — H&P ADULT - HISTORY OF PRESENT ILLNESS
42 yo female with PMHx depression, anxiety and ETOH abuse presents with c/o withdrawal. Pt states she started drinking  a few weeks ago and was trying to detox herself with Lorazepam at home but had increased tremors and anxiety with diaphoresis, abdominal pain, nausea and vomiting.     Last drink 1:30 AM. Drinks 1.75L vodka Q48h by taking 2oz shots every few hours.     Pt denies any seizure  Was recently admitted to ICU 12/23 for severe WD. 44 yo female with PMHx depression, anxiety and ETOH abuse presents with c/o withdrawal. Pt states she started drinking  a few weeks ago and was trying to detox herself with Lorazepam at home but had increased tremors and anxiety with diaphoresis, abdominal pain, nausea and vomiting.     Last drink 1:30 AM. Drinks 1.75L vodka Q48h by taking 2oz shots every few hours.     Pt denies any seizure  Was recently admitted to ICU 12/23 for severe WD.

## 2024-01-11 NOTE — ED PROVIDER NOTE - CLINICAL SUMMARY MEDICAL DECISION MAKING FREE TEXT BOX
42yo F hx of EtOH abuse, depression, GERD, MEHTA, hx of esophagitis presenting to the EDvomiting  chest pain upper abdominal pain   PT  VS improved after  bzd.   librium given  ,  labs reviewed  etoh 294  no acidosis  hco3  21,   electrolytes  wnl ,  heart rate  85 troponin <6  CT AP 44yo F hx of EtOH abuse, depression, GERD, MEHTA, hx of esophagitis presenting to the EDvomiting  chest pain upper abdominal pain   PT  VS improved after  bzd.   librium given  ,  labs reviewed  etoh 294  no acidosis  hco3  21,   electrolytes  wnl ,  heart rate  85 troponin <6  CT AP 44yo F hx of EtOH abuse, depression, GERD, MEHTA, hx of esophagitis presenting to the EDvomiting  chest pain upper abdominal pain   PT  VS improved after  bzd.   librium given  ,  labs reviewed  etoh 294  no acidosis  hco3  21,   electrolytes  wnl ,  heart rate  85 troponin <6  patient is concerned for potential EtOH withdrawal  nausea/vomiting/abd pain. Recently discharged 12/2 from ICU, admitted for EtOH withdrawal. PT nontoxic appearing. No signs or symptoms of acute abdomen.  Labs appreciated, grossly unremarkable, CT A/P radiology read appreciated, no acute abdominal pathology. s/p valium, libium CIWA low. ICU admission considered, pt does not require ICU care at this time. Admit to medicine floor considering her recent admission and her history and physical at this time

## 2024-01-11 NOTE — ED PROVIDER NOTE - OBJECTIVE STATEMENT
42yo F hx of EtOH abuse, depression, GERD, MEHTA, hx of esophagitis presenting to the EDvomiting  chest pain upper abdominal pain  Patient drinks 1.7L of vodka every 2 days for a long time. Pt dcd from ICU 12/2 for  withdrawal

## 2024-01-11 NOTE — H&P ADULT - NSHPLABSRESULTS_GEN_ALL_CORE
13.1   6.46  )-----------( 257      ( 11 Jan 2024 04:47 )             37.9       01-11    137  |  102  |  10  ----------------------------<  120<H>  4.4   |  21  |  0.6<L>    Ca    9.2      11 Jan 2024 04:47  Mg     1.9     01-11    TPro  7.5  /  Alb  4.4  /  TBili  <0.2  /  DBili  x   /  AST  63<H>  /  ALT  42<H>  /  AlkPhos  76  01-11          Magnesium: 1.9 mg/dL (01-11-24 @ 04:47)          Urinalysis Basic - ( 11 Jan 2024 04:47 )    Color: x / Appearance: x / SG: x / pH: x  Gluc: 120 mg/dL / Ketone: x  / Bili: x / Urobili: x   Blood: x / Protein: x / Nitrite: x   Leuk Esterase: x / RBC: x / WBC x   Sq Epi: x / Non Sq Epi: x / Bacteria: x      Lipase: 46 U/L (01-11-24 @ 04:47)        Lactate Trend  01-11 @ 04:47 Lactate:1.9               Serum Pregnancy: Negative (01-11-24 @ 04:47)

## 2024-01-11 NOTE — H&P ADULT - ASSESSMENT
42 yo female with PMHX Depression, anxiety and ETOH abuse presents for detox.      Plan:  1. ETOH abuse  - asmit to medicine  - Ativan CIWA    - Addicition eval  - Visteril PRN anxiety  - hold Lorazepam standing while on taper    2. Depression/Anxiety  - pt has not been compliant with meds  - cont Lexapro  - cont Gabapentin  - cont Mirtazipine PRN    3. ?H/O Graves disease  - check thyroid panel    4. DVT proph  - encourage ambulation    5. GERD/Gastritis  - Protonix IV      All above D/W Dr Tan 44 yo female with PMHX Depression, anxiety and ETOH abuse presents for detox.      Plan:  1. ETOH abuse  - asmit to medicine  - Ativan CIWA    - Addicition eval  - Visteril PRN anxiety  - hold Lorazepam standing while on taper    2. Depression/Anxiety  - pt has not been compliant with meds  - cont Lexapro  - cont Gabapentin  - cont Mirtazipine PRN    3. H/O Graves disease  - last TSH 1 month ago WNL    4. DVT proph  - encourage ambulation    5. GERD/Gastritis  - Protonix IV      All above D/W Dr Tan 42 yo female with PMHX Depression, anxiety and ETOH abuse presents for detox.      Plan:  1. ETOH abuse  - asmit to medicine  - Ativan CIWA    - Addicition eval  - Visteril PRN anxiety  - hold Lorazepam standing while on taper    2. Depression/Anxiety  - pt has not been compliant with meds  - cont Lexapro  - cont Gabapentin  - cont Mirtazipine PRN    3. H/O Graves disease  - last TSH 1 month ago WNL    4. DVT proph  - encourage ambulation    5. GERD/Gastritis  - Protonix IV     44 yo female with PMHX Depression, anxiety and ETOH abuse presents for detox.      Plan:  1. ETOH abuse  - asmit to medicine  - Ativan CIWA    - Addicition eval  - Visteril PRN anxiety  - hold Lorazepam standing while on taper    2. Depression/Anxiety  - pt has not been compliant with meds  - cont Lexapro  - cont Gabapentin  - cont Mirtazipine PRN    3. H/O Graves disease  - last TSH 1 month ago WNL    4. DVT proph  - encourage ambulation    5. GERD/Gastritis  - Protonix IV

## 2024-01-12 DIAGNOSIS — F10.239 ALCOHOL DEPENDENCE WITH WITHDRAWAL, UNSPECIFIED: ICD-10-CM

## 2024-01-12 LAB
ALBUMIN SERPL ELPH-MCNC: 4.1 G/DL — SIGNIFICANT CHANGE UP (ref 3.5–5.2)
ALBUMIN SERPL ELPH-MCNC: 4.1 G/DL — SIGNIFICANT CHANGE UP (ref 3.5–5.2)
ALP SERPL-CCNC: 70 U/L — SIGNIFICANT CHANGE UP (ref 30–115)
ALP SERPL-CCNC: 70 U/L — SIGNIFICANT CHANGE UP (ref 30–115)
ALT FLD-CCNC: 32 U/L — SIGNIFICANT CHANGE UP (ref 0–41)
ALT FLD-CCNC: 32 U/L — SIGNIFICANT CHANGE UP (ref 0–41)
ANION GAP SERPL CALC-SCNC: 10 MMOL/L — SIGNIFICANT CHANGE UP (ref 7–14)
ANION GAP SERPL CALC-SCNC: 10 MMOL/L — SIGNIFICANT CHANGE UP (ref 7–14)
AST SERPL-CCNC: 42 U/L — HIGH (ref 0–41)
AST SERPL-CCNC: 42 U/L — HIGH (ref 0–41)
BASOPHILS # BLD AUTO: 0.04 K/UL — SIGNIFICANT CHANGE UP (ref 0–0.2)
BASOPHILS # BLD AUTO: 0.04 K/UL — SIGNIFICANT CHANGE UP (ref 0–0.2)
BASOPHILS NFR BLD AUTO: 0.7 % — SIGNIFICANT CHANGE UP (ref 0–1)
BASOPHILS NFR BLD AUTO: 0.7 % — SIGNIFICANT CHANGE UP (ref 0–1)
BILIRUB SERPL-MCNC: 0.8 MG/DL — SIGNIFICANT CHANGE UP (ref 0.2–1.2)
BILIRUB SERPL-MCNC: 0.8 MG/DL — SIGNIFICANT CHANGE UP (ref 0.2–1.2)
BUN SERPL-MCNC: 9 MG/DL — LOW (ref 10–20)
BUN SERPL-MCNC: 9 MG/DL — LOW (ref 10–20)
CALCIUM SERPL-MCNC: 9 MG/DL — SIGNIFICANT CHANGE UP (ref 8.4–10.5)
CALCIUM SERPL-MCNC: 9 MG/DL — SIGNIFICANT CHANGE UP (ref 8.4–10.5)
CHLORIDE SERPL-SCNC: 103 MMOL/L — SIGNIFICANT CHANGE UP (ref 98–110)
CHLORIDE SERPL-SCNC: 103 MMOL/L — SIGNIFICANT CHANGE UP (ref 98–110)
CO2 SERPL-SCNC: 26 MMOL/L — SIGNIFICANT CHANGE UP (ref 17–32)
CO2 SERPL-SCNC: 26 MMOL/L — SIGNIFICANT CHANGE UP (ref 17–32)
CREAT SERPL-MCNC: 0.6 MG/DL — LOW (ref 0.7–1.5)
CREAT SERPL-MCNC: 0.6 MG/DL — LOW (ref 0.7–1.5)
EGFR: 116 ML/MIN/1.73M2 — SIGNIFICANT CHANGE UP
EGFR: 116 ML/MIN/1.73M2 — SIGNIFICANT CHANGE UP
EOSINOPHIL # BLD AUTO: 0.12 K/UL — SIGNIFICANT CHANGE UP (ref 0–0.7)
EOSINOPHIL # BLD AUTO: 0.12 K/UL — SIGNIFICANT CHANGE UP (ref 0–0.7)
EOSINOPHIL NFR BLD AUTO: 2.2 % — SIGNIFICANT CHANGE UP (ref 0–8)
EOSINOPHIL NFR BLD AUTO: 2.2 % — SIGNIFICANT CHANGE UP (ref 0–8)
GLUCOSE SERPL-MCNC: 76 MG/DL — SIGNIFICANT CHANGE UP (ref 70–99)
GLUCOSE SERPL-MCNC: 76 MG/DL — SIGNIFICANT CHANGE UP (ref 70–99)
HAV IGM SER-ACNC: SIGNIFICANT CHANGE UP
HAV IGM SER-ACNC: SIGNIFICANT CHANGE UP
HBV CORE IGM SER-ACNC: SIGNIFICANT CHANGE UP
HBV CORE IGM SER-ACNC: SIGNIFICANT CHANGE UP
HBV SURFACE AG SER-ACNC: SIGNIFICANT CHANGE UP
HBV SURFACE AG SER-ACNC: SIGNIFICANT CHANGE UP
HCT VFR BLD CALC: 39.2 % — SIGNIFICANT CHANGE UP (ref 37–47)
HCT VFR BLD CALC: 39.2 % — SIGNIFICANT CHANGE UP (ref 37–47)
HCV AB S/CO SERPL IA: 0.08 S/CO — SIGNIFICANT CHANGE UP (ref 0–0.99)
HCV AB S/CO SERPL IA: 0.08 S/CO — SIGNIFICANT CHANGE UP (ref 0–0.99)
HCV AB SERPL-IMP: SIGNIFICANT CHANGE UP
HCV AB SERPL-IMP: SIGNIFICANT CHANGE UP
HGB BLD-MCNC: 13.3 G/DL — SIGNIFICANT CHANGE UP (ref 12–16)
HGB BLD-MCNC: 13.3 G/DL — SIGNIFICANT CHANGE UP (ref 12–16)
HIV 1+2 AB+HIV1 P24 AG SERPL QL IA: SIGNIFICANT CHANGE UP
HIV 1+2 AB+HIV1 P24 AG SERPL QL IA: SIGNIFICANT CHANGE UP
IMM GRANULOCYTES NFR BLD AUTO: 0.4 % — HIGH (ref 0.1–0.3)
IMM GRANULOCYTES NFR BLD AUTO: 0.4 % — HIGH (ref 0.1–0.3)
LYMPHOCYTES # BLD AUTO: 1.93 K/UL — SIGNIFICANT CHANGE UP (ref 1.2–3.4)
LYMPHOCYTES # BLD AUTO: 1.93 K/UL — SIGNIFICANT CHANGE UP (ref 1.2–3.4)
LYMPHOCYTES # BLD AUTO: 34.8 % — SIGNIFICANT CHANGE UP (ref 20.5–51.1)
LYMPHOCYTES # BLD AUTO: 34.8 % — SIGNIFICANT CHANGE UP (ref 20.5–51.1)
MAGNESIUM SERPL-MCNC: 2.2 MG/DL — SIGNIFICANT CHANGE UP (ref 1.8–2.4)
MAGNESIUM SERPL-MCNC: 2.2 MG/DL — SIGNIFICANT CHANGE UP (ref 1.8–2.4)
MCHC RBC-ENTMCNC: 33.9 G/DL — SIGNIFICANT CHANGE UP (ref 32–37)
MCHC RBC-ENTMCNC: 33.9 G/DL — SIGNIFICANT CHANGE UP (ref 32–37)
MCHC RBC-ENTMCNC: 33.9 PG — HIGH (ref 27–31)
MCHC RBC-ENTMCNC: 33.9 PG — HIGH (ref 27–31)
MCV RBC AUTO: 100 FL — HIGH (ref 81–99)
MCV RBC AUTO: 100 FL — HIGH (ref 81–99)
MONOCYTES # BLD AUTO: 0.51 K/UL — SIGNIFICANT CHANGE UP (ref 0.1–0.6)
MONOCYTES # BLD AUTO: 0.51 K/UL — SIGNIFICANT CHANGE UP (ref 0.1–0.6)
MONOCYTES NFR BLD AUTO: 9.2 % — SIGNIFICANT CHANGE UP (ref 1.7–9.3)
MONOCYTES NFR BLD AUTO: 9.2 % — SIGNIFICANT CHANGE UP (ref 1.7–9.3)
NEUTROPHILS # BLD AUTO: 2.93 K/UL — SIGNIFICANT CHANGE UP (ref 1.4–6.5)
NEUTROPHILS # BLD AUTO: 2.93 K/UL — SIGNIFICANT CHANGE UP (ref 1.4–6.5)
NEUTROPHILS NFR BLD AUTO: 52.7 % — SIGNIFICANT CHANGE UP (ref 42.2–75.2)
NEUTROPHILS NFR BLD AUTO: 52.7 % — SIGNIFICANT CHANGE UP (ref 42.2–75.2)
NRBC # BLD: 0 /100 WBCS — SIGNIFICANT CHANGE UP (ref 0–0)
NRBC # BLD: 0 /100 WBCS — SIGNIFICANT CHANGE UP (ref 0–0)
PHOSPHATE SERPL-MCNC: 4 MG/DL — SIGNIFICANT CHANGE UP (ref 2.1–4.9)
PHOSPHATE SERPL-MCNC: 4 MG/DL — SIGNIFICANT CHANGE UP (ref 2.1–4.9)
PLATELET # BLD AUTO: 247 K/UL — SIGNIFICANT CHANGE UP (ref 130–400)
PLATELET # BLD AUTO: 247 K/UL — SIGNIFICANT CHANGE UP (ref 130–400)
PMV BLD: 9.2 FL — SIGNIFICANT CHANGE UP (ref 7.4–10.4)
PMV BLD: 9.2 FL — SIGNIFICANT CHANGE UP (ref 7.4–10.4)
POTASSIUM SERPL-MCNC: 5 MMOL/L — SIGNIFICANT CHANGE UP (ref 3.5–5)
POTASSIUM SERPL-MCNC: 5 MMOL/L — SIGNIFICANT CHANGE UP (ref 3.5–5)
POTASSIUM SERPL-SCNC: 5 MMOL/L — SIGNIFICANT CHANGE UP (ref 3.5–5)
POTASSIUM SERPL-SCNC: 5 MMOL/L — SIGNIFICANT CHANGE UP (ref 3.5–5)
PROT SERPL-MCNC: 7 G/DL — SIGNIFICANT CHANGE UP (ref 6–8)
PROT SERPL-MCNC: 7 G/DL — SIGNIFICANT CHANGE UP (ref 6–8)
RBC # BLD: 3.92 M/UL — LOW (ref 4.2–5.4)
RBC # BLD: 3.92 M/UL — LOW (ref 4.2–5.4)
RBC # FLD: 13.5 % — SIGNIFICANT CHANGE UP (ref 11.5–14.5)
RBC # FLD: 13.5 % — SIGNIFICANT CHANGE UP (ref 11.5–14.5)
SODIUM SERPL-SCNC: 139 MMOL/L — SIGNIFICANT CHANGE UP (ref 135–146)
SODIUM SERPL-SCNC: 139 MMOL/L — SIGNIFICANT CHANGE UP (ref 135–146)
T PALLIDUM AB TITR SER: NEGATIVE — SIGNIFICANT CHANGE UP
T PALLIDUM AB TITR SER: NEGATIVE — SIGNIFICANT CHANGE UP
WBC # BLD: 5.55 K/UL — SIGNIFICANT CHANGE UP (ref 4.8–10.8)
WBC # BLD: 5.55 K/UL — SIGNIFICANT CHANGE UP (ref 4.8–10.8)
WBC # FLD AUTO: 5.55 K/UL — SIGNIFICANT CHANGE UP (ref 4.8–10.8)
WBC # FLD AUTO: 5.55 K/UL — SIGNIFICANT CHANGE UP (ref 4.8–10.8)

## 2024-01-12 PROCEDURE — 99232 SBSQ HOSP IP/OBS MODERATE 35: CPT

## 2024-01-12 RX ADMIN — Medication 1 TABLET(S): at 09:05

## 2024-01-12 RX ADMIN — Medication 4 MILLIGRAM(S): at 23:23

## 2024-01-12 RX ADMIN — MIRTAZAPINE 7.5 MILLIGRAM(S): 45 TABLET, ORALLY DISINTEGRATING ORAL at 00:13

## 2024-01-12 RX ADMIN — Medication 50 MILLIGRAM(S): at 14:00

## 2024-01-12 RX ADMIN — Medication 1 MILLIGRAM(S): at 09:08

## 2024-01-12 RX ADMIN — GABAPENTIN 100 MILLIGRAM(S): 400 CAPSULE ORAL at 21:39

## 2024-01-12 RX ADMIN — ESCITALOPRAM OXALATE 10 MILLIGRAM(S): 10 TABLET, FILM COATED ORAL at 09:05

## 2024-01-12 RX ADMIN — GABAPENTIN 100 MILLIGRAM(S): 400 CAPSULE ORAL at 05:00

## 2024-01-12 RX ADMIN — GABAPENTIN 100 MILLIGRAM(S): 400 CAPSULE ORAL at 14:00

## 2024-01-12 RX ADMIN — Medication 4 MILLIGRAM(S): at 14:00

## 2024-01-12 RX ADMIN — Medication 100 MILLIGRAM(S): at 09:05

## 2024-01-12 RX ADMIN — Medication 4 MILLIGRAM(S): at 18:30

## 2024-01-12 RX ADMIN — Medication 4 MILLIGRAM(S): at 08:30

## 2024-01-12 RX ADMIN — Medication 50 MILLIGRAM(S): at 08:30

## 2024-01-12 NOTE — CONSULT NOTE ADULT - SUBJECTIVE AND OBJECTIVE BOX
FROMER/H&P:    44 yo female with PMHx depression, anxiety and ETOH abuse presents with c/o withdrawal. Pt states she started drinking  a few weeks ago and was trying to detox herself with Lorazepam at home but had increased tremors and anxiety with diaphoresis, abdominal pain, nausea and vomiting.     Last drink 1:30 AM. Drinks 1.75L vodka Q48h by taking 2oz shots every few hours.       Pt interviewed, examined and EMR chart reviewed.  Pt admits to drinking  1liter daily x  8  months. Pt has no true sobriety she starts drinking the moment she leaves hospital. Last Drink day of admission   Hx of withdrawal tremors and seizures. Last one years ago.  variable periods of sobriety in the past.  Has been in detox before ___X__yes,   _____No        SOCIAL HISTORY:    REVIEW OF SYSTEMS:    Constitutional: No fever, weight loss or fatigue  ENT:  No difficulty hearing, tinnitus, vertigo; No sinus or throat pain  Neck: No pain or stiffness  Respiratory: No cough, wheezing, chills or hemoptysis  Cardiovascular: No chest pain, palpitations, shortness of breath, dizziness or leg swelling  Gastrointestinal: No abdominal or epigastric pain. No nausea, vomiting or hematemesis; No diarrhea or constipation. No melena or hematochezia.  Neurological: No headaches, memory loss, loss of strength, numbness or tremors  Musculoskeletal: No joint pain or swelling; No muscle, back or extremity pain  Psychiatric: No depression, anxiety, mood swings or difficulty sleeping    MEDICATIONS  (STANDING):  enoxaparin Injectable 40 milliGRAM(s) SubCutaneous every 24 hours  escitalopram 10 milliGRAM(s) Oral daily  folic acid 1 milliGRAM(s) Oral daily  gabapentin 100 milliGRAM(s) Oral three times a day  lactated ringers. 1000 milliLiter(s) (2000 mL/Hr) IV Continuous <Continuous>  multivitamin/minerals 1 Tablet(s) Oral daily  sodium chloride 0.9%. 1000 milliLiter(s) (125 mL/Hr) IV Continuous <Continuous>  thiamine 100 milliGRAM(s) Oral daily    MEDICATIONS  (PRN):  acetaminophen     Tablet .. 650 milliGRAM(s) Oral every 4 hours PRN Mild Pain (1 - 3)  aluminum hydroxide/magnesium hydroxide/simethicone Suspension 30 milliLiter(s) Oral every 6 hours PRN Heartburn  bismuth subsalicylate Liquid 30 milliLiter(s) Oral every 6 hours PRN Diarrhea  guaifenesin/dextromethorphan Oral Liquid 5 milliLiter(s) Oral every 4 hours PRN Cough  hydrOXYzine hydrochloride 50 milliGRAM(s) Oral every 6 hours PRN Anxiety  ibuprofen  Tablet. 400 milliGRAM(s) Oral every 6 hours PRN Mild Pain (1 - 3)  LORazepam     Tablet 4 milliGRAM(s) Oral every 4 hours PRN Alcohol withdrawal symptoms  magnesium hydroxide Suspension 30 milliLiter(s) Oral once PRN Constipation  mirtazapine 7.5 milliGRAM(s) Oral at bedtime PRN insominia  ondansetron   Disintegrating Tablet 4 milliGRAM(s) Oral every 6 hours PRN Nausea and/or Vomiting      Vital Signs Last 24 Hrs  T(C): 35.8 (12 Jan 2024 14:38), Max: 36.6 (11 Jan 2024 15:50)  T(F): 96.4 (12 Jan 2024 14:38), Max: 97.9 (11 Jan 2024 15:50)  HR: 77 (12 Jan 2024 14:38) (77 - 92)  BP: 126/77 (12 Jan 2024 14:38) (123/77 - 136/88)  BP(mean): --  RR: 18 (12 Jan 2024 14:38) (17 - 18)  SpO2: 96% (12 Jan 2024 05:19) (94% - 96%)    Parameters below as of 12 Jan 2024 05:19  Patient On (Oxygen Delivery Method): room air        PHYSICAL EXAM:    Constitutional: NAD, well-groomed, well-developed  HEENT: PERRLA, EOMI, Normal Hearing,  Neck: No LAD, No JVD  Back: Normal spine flexure, No CVA tenderness  Respiratory: CTAB/L  Cardiovascular: S1 and S2, RRR, no M/G/R  Gastrointestinal: BS+, soft, NT/ND  Extremities: No peripheral edema  Neurological: A/O x 3, no focal deficits    LABS:                        13.3   5.55  )-----------( 247      ( 12 Jan 2024 04:30 )             39.2     01-12    139  |  103  |  9<L>  ----------------------------<  76  5.0   |  26  |  0.6<L>    Ca    9.0      12 Jan 2024 04:30  Phos  4.0     01-12  Mg     2.2     01-12    TPro  7.0  /  Alb  4.1  /  TBili  0.8  /  DBili  x   /  AST  42<H>  /  ALT  32  /  AlkPhos  70  01-12      Urinalysis Basic - ( 12 Jan 2024 04:30 )    Color: x / Appearance: x / SG: x / pH: x  Gluc: 76 mg/dL / Ketone: x  / Bili: x / Urobili: x   Blood: x / Protein: x / Nitrite: x   Leuk Esterase: x / RBC: x / WBC x   Sq Epi: x / Non Sq Epi: x / Bacteria: x      Drug Screen Urine:  Alcohol Level  Alcohol, Blood: 66 mg/dL (01-11-24 @ 11:53)  Alcohol, Blood: 294 mg/dL (01-11-24 @ 04:47)        RADIOLOGY & ADDITIONAL STUDIES:

## 2024-01-12 NOTE — CONSULT NOTE ADULT - PROBLEM SELECTOR RECOMMENDATION 9
After evaluation at this time on CIWA protocol. Can consider switching to standing protocol if patient receives too many scoring doses.  Pt should be on Thiamine and Folic acid. Pt will be monitored and supportive care provided.  Obtain UDS if not done already.  Use of Vistaril for anxiety.  Consider adding gabapentin for anxiety standing order and follow up with PMD/Program for continuation of treatment.    Abdominal ultrasound AFP every 6 months for HCC screening  -EGD outpatient for esophageal varices screening   -Follow up with Private GI or our [facility] located at [address] [place] [address]. Phone Number: [phone]  -Alcohol counseling provided   CATCH team involved for aftercare and pt will follow up with aftercare to outpatient then to rehab. After evaluation at this time on CIWA protocol. Can consider switching to standing protocol if patient receives too many scoring doses.  Pt should be on Thiamine and Folic acid. Pt will be monitored and supportive care provided.  Obtain UDS if not done already.  Use of Vistaril for anxiety.  Consider adding gabapentin for anxiety standing order and follow up with PMD/Program for continuation of treatment.    Abdominal ultrasound AFP every 6 months for HCC screening  -EGD outpatient for esophageal varices screening   -Follow up with Private GI or our [facility] located at [address] [place] [address]. Phone Number: [phone]  -Alcohol counseling provided   CATCH team involved for aftercare and pt will follow up with aftercare to outpatient then to rehab.

## 2024-01-13 LAB
FERRITIN SERPL-MCNC: 92 NG/ML — SIGNIFICANT CHANGE UP (ref 15–150)
FERRITIN SERPL-MCNC: 92 NG/ML — SIGNIFICANT CHANGE UP (ref 15–150)

## 2024-01-13 PROCEDURE — 99232 SBSQ HOSP IP/OBS MODERATE 35: CPT

## 2024-01-13 RX ORDER — SIMETHICONE 80 MG/1
80 TABLET, CHEWABLE ORAL EVERY 8 HOURS
Refills: 0 | Status: DISCONTINUED | OUTPATIENT
Start: 2024-01-13 | End: 2024-01-15

## 2024-01-13 RX ADMIN — GABAPENTIN 100 MILLIGRAM(S): 400 CAPSULE ORAL at 22:01

## 2024-01-13 RX ADMIN — Medication 50 MILLIGRAM(S): at 08:49

## 2024-01-13 RX ADMIN — SODIUM CHLORIDE 125 MILLILITER(S): 9 INJECTION INTRAMUSCULAR; INTRAVENOUS; SUBCUTANEOUS at 16:15

## 2024-01-13 RX ADMIN — Medication 50 MILLIGRAM(S): at 02:10

## 2024-01-13 RX ADMIN — Medication 4 MILLIGRAM(S): at 14:34

## 2024-01-13 RX ADMIN — SIMETHICONE 80 MILLIGRAM(S): 80 TABLET, CHEWABLE ORAL at 16:13

## 2024-01-13 RX ADMIN — Medication 4 MILLIGRAM(S): at 08:49

## 2024-01-13 RX ADMIN — Medication 4 MILLIGRAM(S): at 23:56

## 2024-01-13 RX ADMIN — GABAPENTIN 100 MILLIGRAM(S): 400 CAPSULE ORAL at 05:45

## 2024-01-13 RX ADMIN — MIRTAZAPINE 7.5 MILLIGRAM(S): 45 TABLET, ORALLY DISINTEGRATING ORAL at 02:10

## 2024-01-13 RX ADMIN — ESCITALOPRAM OXALATE 10 MILLIGRAM(S): 10 TABLET, FILM COATED ORAL at 12:23

## 2024-01-13 RX ADMIN — Medication 1 TABLET(S): at 12:24

## 2024-01-13 RX ADMIN — GABAPENTIN 100 MILLIGRAM(S): 400 CAPSULE ORAL at 14:34

## 2024-01-13 RX ADMIN — Medication 1 MILLIGRAM(S): at 12:23

## 2024-01-13 RX ADMIN — Medication 100 MILLIGRAM(S): at 12:24

## 2024-01-13 NOTE — PROGRESS NOTE ADULT - ASSESSMENT
44 yo female with PMHX Depression, anxiety and ETOH abuse presents for detox.      Plan:  1. ETOH abuse  - Ativan CIWA    - Addicition eval  - Visteril PRN anxiety    2. Depression/Anxiety  - pt has not been compliant with meds  - cont Lexapro  - cont Gabapentin  - cont Mirtazipine PRN    3. H/O Graves disease  - last TSH 1 month ago WNL    4. DVT proph  - encourage ambulation    5. GERD/Gastritis  - Protonix IV    DC once no longer requiring PRN on CIWA scale
44 yo female with PMHX Depression, anxiety and ETOH abuse presents for detox.      Plan:  1. ETOH abuse  - Ativan CIWA    - Addicition eval  - Visteril PRN anxiety    2. Depression/Anxiety  - pt has not been compliant with meds  - cont Lexapro  - cont Gabapentin  - cont Mirtazipine PRN    3. H/O Graves disease  - last TSH 1 month ago WNL    4. DVT proph  - encourage ambulation    5. GERD/Gastritis  - Protonix IV

## 2024-01-14 PROCEDURE — 99232 SBSQ HOSP IP/OBS MODERATE 35: CPT

## 2024-01-14 RX ADMIN — GABAPENTIN 100 MILLIGRAM(S): 400 CAPSULE ORAL at 04:45

## 2024-01-14 RX ADMIN — Medication 1 MILLIGRAM(S): at 11:13

## 2024-01-14 RX ADMIN — ESCITALOPRAM OXALATE 10 MILLIGRAM(S): 10 TABLET, FILM COATED ORAL at 11:13

## 2024-01-14 RX ADMIN — GABAPENTIN 100 MILLIGRAM(S): 400 CAPSULE ORAL at 21:21

## 2024-01-14 RX ADMIN — Medication 100 MILLIGRAM(S): at 11:13

## 2024-01-14 RX ADMIN — SODIUM CHLORIDE 125 MILLILITER(S): 9 INJECTION INTRAMUSCULAR; INTRAVENOUS; SUBCUTANEOUS at 23:27

## 2024-01-14 RX ADMIN — MIRTAZAPINE 7.5 MILLIGRAM(S): 45 TABLET, ORALLY DISINTEGRATING ORAL at 01:25

## 2024-01-14 RX ADMIN — SODIUM CHLORIDE 125 MILLILITER(S): 9 INJECTION INTRAMUSCULAR; INTRAVENOUS; SUBCUTANEOUS at 15:41

## 2024-01-14 RX ADMIN — Medication 50 MILLIGRAM(S): at 01:25

## 2024-01-14 RX ADMIN — Medication 1 TABLET(S): at 11:13

## 2024-01-14 RX ADMIN — ENOXAPARIN SODIUM 40 MILLIGRAM(S): 100 INJECTION SUBCUTANEOUS at 04:47

## 2024-01-14 RX ADMIN — GABAPENTIN 100 MILLIGRAM(S): 400 CAPSULE ORAL at 13:08

## 2024-01-14 RX ADMIN — SIMETHICONE 80 MILLIGRAM(S): 80 TABLET, CHEWABLE ORAL at 22:18

## 2024-01-14 RX ADMIN — SODIUM CHLORIDE 125 MILLILITER(S): 9 INJECTION INTRAMUSCULAR; INTRAVENOUS; SUBCUTANEOUS at 04:48

## 2024-01-14 RX ADMIN — Medication 50 MILLIGRAM(S): at 09:36

## 2024-01-14 RX ADMIN — MAGNESIUM HYDROXIDE 30 MILLILITER(S): 400 TABLET, CHEWABLE ORAL at 16:17

## 2024-01-14 NOTE — PROGRESS NOTE ADULT - SUBJECTIVE AND OBJECTIVE BOX
Progress note    INTERVAL HPI/OVERNIGHT EVENTS:    Patient seen and examined at bedside. She does not have any tremors and does not appear to be anxious or having signs of withdrawals      REVIEW OF SYSTEMS:  All other 13 Review of systems were reviewed and are negative    FAMILY HISTORY:  Benzodiazepine misuse      T(C): 36.5 (01-14-24 @ 05:00), Max: 36.5 (01-14-24 @ 05:00)  HR: 67 (01-14-24 @ 05:00) (67 - 77)  BP: 111/57 (01-14-24 @ 05:00) (111/57 - 122/77)  RR: 18 (01-14-24 @ 05:00) (16 - 18)  SpO2: 97% (01-14-24 @ 05:00) (96% - 97%)  Wt(kg): --Vital Signs Last 24 Hrs  T(C): 36.5 (14 Jan 2024 05:00), Max: 36.5 (14 Jan 2024 05:00)  T(F): 97.7 (14 Jan 2024 05:00), Max: 97.7 (14 Jan 2024 05:00)  HR: 67 (14 Jan 2024 05:00) (67 - 77)  BP: 111/57 (14 Jan 2024 05:00) (111/57 - 122/77)  BP(mean): --  RR: 18 (14 Jan 2024 05:00) (16 - 18)  SpO2: 97% (14 Jan 2024 05:00) (96% - 97%)    Parameters below as of 14 Jan 2024 05:00  Patient On (Oxygen Delivery Method): room air      Robaxin (Unknown)  morphine (Unknown)      PHYSICAL EXAM:    GENERAL: NAD, well-groomed, well-developed  HEAD:  Atraumatic, Normocephalic  EYES: conjunctiva and sclera clear  NERVOUS SYSTEM:  Alert & Oriented    Consultant(s) Notes Reviewed:  [x ] YES  [ ] NO  Care Discussed with Consultants/Other Providers [ x] YES  [ ] NO    LABS:      RADIOLOGY & ADDITIONAL TESTS:    Imaging Personally Reviewed:  [ ] YES  [ ] NO  acetaminophen     Tablet .. 650 milliGRAM(s) Oral every 4 hours PRN  aluminum hydroxide/magnesium hydroxide/simethicone Suspension 30 milliLiter(s) Oral every 6 hours PRN  bismuth subsalicylate Liquid 30 milliLiter(s) Oral every 6 hours PRN  enoxaparin Injectable 40 milliGRAM(s) SubCutaneous every 24 hours  escitalopram 10 milliGRAM(s) Oral daily  folic acid 1 milliGRAM(s) Oral daily  gabapentin 100 milliGRAM(s) Oral three times a day  guaifenesin/dextromethorphan Oral Liquid 5 milliLiter(s) Oral every 4 hours PRN  hydrOXYzine hydrochloride 50 milliGRAM(s) Oral every 6 hours PRN  ibuprofen  Tablet. 400 milliGRAM(s) Oral every 6 hours PRN  lactated ringers. 1000 milliLiter(s) IV Continuous <Continuous>  LORazepam     Tablet 4 milliGRAM(s) Oral every 4 hours PRN  magnesium hydroxide Suspension 30 milliLiter(s) Oral once PRN  mirtazapine 7.5 milliGRAM(s) Oral at bedtime PRN  multivitamin/minerals 1 Tablet(s) Oral daily  ondansetron   Disintegrating Tablet 4 milliGRAM(s) Oral every 6 hours PRN  simethicone 80 milliGRAM(s) Chew every 8 hours PRN  sodium chloride 0.9%. 1000 milliLiter(s) IV Continuous <Continuous>  thiamine 100 milliGRAM(s) Oral daily      HEALTH ISSUES - PROBLEM Dx:    42 yo female with PMHX Depression, anxiety and ETOH abuse presents for detox.    Plan:  1. ETOH abuse  - Ativan CIWA    - Addicition eval  - Visteril PRN anxiety  - She takes 2mg qd a baseline    2. Depression/Anxiety  - pt has not been compliant with meds  - cont Lexapro  - cont Gabapentin  - cont Mirtazipine PRN    3. H/O Graves disease  - last TSH 1 month ago WNL    4. DVT proph  - encourage ambulation    5. GERD/Gastritis  - Protonix IV    DC once no longer requiring PRN on CIWA scale  
44 yo female with PMHX Depression, anxiety and ETOH abuse presents for detox.    Today:  Seen at bedside, no complaints.        REVIEW OF SYSTEMS:  No complaints      MEDICATIONS  (STANDING):  enoxaparin Injectable 40 milliGRAM(s) SubCutaneous every 24 hours  escitalopram 10 milliGRAM(s) Oral daily  folic acid 1 milliGRAM(s) Oral daily  gabapentin 100 milliGRAM(s) Oral three times a day  lactated ringers. 1000 milliLiter(s) (2000 mL/Hr) IV Continuous <Continuous>  multivitamin/minerals 1 Tablet(s) Oral daily  sodium chloride 0.9%. 1000 milliLiter(s) (125 mL/Hr) IV Continuous <Continuous>  thiamine 100 milliGRAM(s) Oral daily    MEDICATIONS  (PRN):  acetaminophen     Tablet .. 650 milliGRAM(s) Oral every 4 hours PRN Mild Pain (1 - 3)  aluminum hydroxide/magnesium hydroxide/simethicone Suspension 30 milliLiter(s) Oral every 6 hours PRN Heartburn  bismuth subsalicylate Liquid 30 milliLiter(s) Oral every 6 hours PRN Diarrhea  guaifenesin/dextromethorphan Oral Liquid 5 milliLiter(s) Oral every 4 hours PRN Cough  hydrOXYzine hydrochloride 50 milliGRAM(s) Oral every 6 hours PRN Anxiety  ibuprofen  Tablet. 400 milliGRAM(s) Oral every 6 hours PRN Mild Pain (1 - 3)  LORazepam     Tablet 4 milliGRAM(s) Oral every 4 hours PRN Alcohol withdrawal symptoms  magnesium hydroxide Suspension 30 milliLiter(s) Oral once PRN Constipation  mirtazapine 7.5 milliGRAM(s) Oral at bedtime PRN insominia  ondansetron   Disintegrating Tablet 4 milliGRAM(s) Oral every 6 hours PRN Nausea and/or Vomiting  simethicone 80 milliGRAM(s) Chew every 8 hours PRN Gas      Allergies  Robaxin (Unknown)  morphine (Unknown)        FAMILY HISTORY:  Benzodiazepine misuse        Vital Signs Last 24 Hrs  T(C): 36.3 (13 Jan 2024 14:24), Max: 36.3 (13 Jan 2024 14:24)  T(F): 97.4 (13 Jan 2024 14:24), Max: 97.4 (13 Jan 2024 14:24)  HR: 77 (13 Jan 2024 14:24) (74 - 78)  BP: 114/55 (13 Jan 2024 14:24) (111/85 - 126/78)  RR: 16 (13 Jan 2024 14:24) (16 - 18)  SpO2: 96% (13 Jan 2024 04:49) (95% - 96%)    Parameters below as of 13 Jan 2024 14:24  Patient On (Oxygen Delivery Method): room air  O2 Flow (L/min): 93      PHYSICAL EXAM:  GENERAL: NAD, well-groomed, well-developed  HEAD:  Atraumatic, Normocephalic  EYES: conjunctiva and sclera clear  NERVOUS SYSTEM:  Alert & Oriented    LABS:                        13.3   5.55  )-----------( 247      ( 12 Jan 2024 04:30 )             39.2     01-12    139  |  103  |  9<L>  ----------------------------<  76  5.0   |  26  |  0.6<L>    Ca    9.0      12 Jan 2024 04:30  Phos  4.0     01-12  Mg     2.2     01-12    TPro  7.0  /  Alb  4.1  /  TBili  0.8  /  DBili  x   /  AST  42<H>  /  ALT  32  /  AlkPhos  70  01-12      Urinalysis Basic - ( 12 Jan 2024 04:30 )    Color: x / Appearance: x / SG: x / pH: x  Gluc: 76 mg/dL / Ketone: x  / Bili: x / Urobili: x   Blood: x / Protein: x / Nitrite: x   Leuk Esterase: x / RBC: x / WBC x   Sq Epi: x / Non Sq Epi: x / Bacteria: x      
42 yo female with PMHX Depression, anxiety and ETOH abuse presents for detox.    Today:  Seen at bedside, no new complaints.          REVIEW OF SYSTEMS:  no new complaints      MEDICATIONS  (STANDING):  enoxaparin Injectable 40 milliGRAM(s) SubCutaneous every 24 hours  escitalopram 10 milliGRAM(s) Oral daily  folic acid 1 milliGRAM(s) Oral daily  gabapentin 100 milliGRAM(s) Oral three times a day  lactated ringers. 1000 milliLiter(s) (2000 mL/Hr) IV Continuous <Continuous>  multivitamin/minerals 1 Tablet(s) Oral daily  sodium chloride 0.9%. 1000 milliLiter(s) (125 mL/Hr) IV Continuous <Continuous>  thiamine 100 milliGRAM(s) Oral daily    MEDICATIONS  (PRN):  acetaminophen     Tablet .. 650 milliGRAM(s) Oral every 4 hours PRN Mild Pain (1 - 3)  aluminum hydroxide/magnesium hydroxide/simethicone Suspension 30 milliLiter(s) Oral every 6 hours PRN Heartburn  bismuth subsalicylate Liquid 30 milliLiter(s) Oral every 6 hours PRN Diarrhea  guaifenesin/dextromethorphan Oral Liquid 5 milliLiter(s) Oral every 4 hours PRN Cough  hydrOXYzine hydrochloride 50 milliGRAM(s) Oral every 6 hours PRN Anxiety  ibuprofen  Tablet. 400 milliGRAM(s) Oral every 6 hours PRN Mild Pain (1 - 3)  LORazepam     Tablet 4 milliGRAM(s) Oral every 4 hours PRN Alcohol withdrawal symptoms  magnesium hydroxide Suspension 30 milliLiter(s) Oral once PRN Constipation  mirtazapine 7.5 milliGRAM(s) Oral at bedtime PRN insominia  ondansetron   Disintegrating Tablet 4 milliGRAM(s) Oral every 6 hours PRN Nausea and/or Vomiting      Allergies  Robaxin (Unknown)  morphine (Unknown)      FAMILY HISTORY:  Benzodiazepine misuse        Vital Signs Last 24 Hrs  T(C): 35.9 (12 Jan 2024 05:19), Max: 36.6 (11 Jan 2024 15:50)  T(F): 96.7 (12 Jan 2024 05:19), Max: 97.9 (11 Jan 2024 15:50)  HR: 84 (12 Jan 2024 05:19) (84 - 92)  BP: 127/67 (12 Jan 2024 05:19) (123/77 - 136/88)  RR: 18 (12 Jan 2024 05:19) (17 - 18)  SpO2: 96% (12 Jan 2024 05:19) (94% - 96%)    Parameters below as of 12 Jan 2024 05:19  Patient On (Oxygen Delivery Method): room air        PHYSICAL EXAM:  GENERAL: NAD, well-groomed, well-developed  HEAD:  Atraumatic, Normocephalic  EYES: conjunctiva and sclera clear  NERVOUS SYSTEM:  Alert & Oriented X3  SKIN: No rashes or lesions      LABS:                        13.3   5.55  )-----------( 247      ( 12 Jan 2024 04:30 )             39.2     01-12    139  |  103  |  9<L>  ----------------------------<  76  5.0   |  26  |  0.6<L>    Ca    9.0      12 Jan 2024 04:30  Phos  4.0     01-12  Mg     2.2     01-12    TPro  7.0  /  Alb  4.1  /  TBili  0.8  /  DBili  x   /  AST  42<H>  /  ALT  32  /  AlkPhos  70  01-12      Urinalysis Basic - ( 12 Jan 2024 04:30 )    Color: x / Appearance: x / SG: x / pH: x  Gluc: 76 mg/dL / Ketone: x  / Bili: x / Urobili: x   Blood: x / Protein: x / Nitrite: x   Leuk Esterase: x / RBC: x / WBC x   Sq Epi: x / Non Sq Epi: x / Bacteria: x

## 2024-01-15 ENCOUNTER — TRANSCRIPTION ENCOUNTER (OUTPATIENT)
Age: 44
End: 2024-01-15

## 2024-01-15 VITALS
TEMPERATURE: 96 F | HEART RATE: 65 BPM | RESPIRATION RATE: 18 BRPM | SYSTOLIC BLOOD PRESSURE: 98 MMHG | OXYGEN SATURATION: 97 % | DIASTOLIC BLOOD PRESSURE: 55 MMHG

## 2024-01-15 LAB
GAMMA INTERFERON BACKGROUND BLD IA-ACNC: 0.02 IU/ML — SIGNIFICANT CHANGE UP
M TB IFN-G BLD-IMP: NEGATIVE — SIGNIFICANT CHANGE UP
M TB IFN-G CD4+ BCKGRND COR BLD-ACNC: 0 IU/ML — SIGNIFICANT CHANGE UP
M TB IFN-G CD4+CD8+ BCKGRND COR BLD-ACNC: 0 IU/ML — SIGNIFICANT CHANGE UP
QUANT TB PLUS MITOGEN MINUS NIL: 3.88 IU/ML — SIGNIFICANT CHANGE UP

## 2024-01-15 PROCEDURE — 99239 HOSP IP/OBS DSCHRG MGMT >30: CPT

## 2024-01-15 RX ORDER — ESCITALOPRAM OXALATE 10 MG/1
1 TABLET, FILM COATED ORAL
Qty: 30 | Refills: 0
Start: 2024-01-15 | End: 2024-02-13

## 2024-01-15 RX ORDER — SIMETHICONE 80 MG/1
1 TABLET, CHEWABLE ORAL
Qty: 90 | Refills: 0
Start: 2024-01-15 | End: 2024-02-13

## 2024-01-15 RX ORDER — HYDROXYZINE HCL 10 MG
1 TABLET ORAL
Qty: 30 | Refills: 0
Start: 2024-01-15 | End: 2024-02-13

## 2024-01-15 RX ORDER — ONDANSETRON 8 MG/1
1 TABLET, FILM COATED ORAL
Qty: 30 | Refills: 0
Start: 2024-01-15 | End: 2024-02-13

## 2024-01-15 RX ORDER — MIRTAZAPINE 45 MG/1
1 TABLET, ORALLY DISINTEGRATING ORAL
Qty: 30 | Refills: 0
Start: 2024-01-15 | End: 2024-02-13

## 2024-01-15 RX ORDER — MULTIVIT-MIN/FERROUS GLUCONATE 9 MG/15 ML
1 LIQUID (ML) ORAL
Qty: 30 | Refills: 0
Start: 2024-01-15 | End: 2024-02-13

## 2024-01-15 RX ADMIN — Medication 1 TABLET(S): at 11:16

## 2024-01-15 RX ADMIN — Medication 100 MILLIGRAM(S): at 11:16

## 2024-01-15 RX ADMIN — Medication 1 MILLIGRAM(S): at 11:16

## 2024-01-15 RX ADMIN — Medication 50 MILLIGRAM(S): at 01:34

## 2024-01-15 RX ADMIN — Medication 650 MILLIGRAM(S): at 03:00

## 2024-01-15 RX ADMIN — GABAPENTIN 100 MILLIGRAM(S): 400 CAPSULE ORAL at 06:18

## 2024-01-15 RX ADMIN — ESCITALOPRAM OXALATE 10 MILLIGRAM(S): 10 TABLET, FILM COATED ORAL at 11:16

## 2024-01-15 RX ADMIN — Medication 650 MILLIGRAM(S): at 02:03

## 2024-01-15 RX ADMIN — MIRTAZAPINE 7.5 MILLIGRAM(S): 45 TABLET, ORALLY DISINTEGRATING ORAL at 02:05

## 2024-01-15 NOTE — DISCHARGE NOTE PROVIDER - NSDCMRMEDTOKEN_GEN_ALL_CORE_FT
Ativan 1 mg oral tablet: 1 tab(s) orally 2 times a day  escitalopram 10 mg oral tablet: 1 tab(s) orally once a day  folic acid 1 mg oral tablet: 1 tab(s) orally once a day  gabapentin 100 mg oral capsule: 1 cap(s) orally 3 times a day  mirtazapine 7.5 mg oral tablet: 1 tab(s) orally once a day (at bedtime)  Multiple Vitamins with Minerals oral tablet: 1 tab(s) orally once a day  Protonix 40 mg oral delayed release tablet: 1 tab(s) orally once a day  simethicone 80 mg oral tablet, chewable: 1 tab(s) orally every 8 hours as needed for As needed for Gas  thiamine 100 mg oral tablet: 1 tab(s) orally once a day   Ativan 1 mg oral tablet: 1 tab(s) orally 2 times a day  escitalopram 10 mg oral tablet: 1 tab(s) orally once a day  folic acid 1 mg oral tablet: 1 tab(s) orally once a day  gabapentin 100 mg oral capsule: 1 cap(s) orally 3 times a day  hydrOXYzine hydrochloride 50 mg oral tablet: 1 tab(s) orally once a day as needed for  anxiety  mirtazapine 7.5 mg oral tablet: 1 tab(s) orally once a day (at bedtime)  Multiple Vitamins with Minerals oral tablet: 1 tab(s) orally once a day  ondansetron 4 mg oral tablet: 1 tab(s) orally once a day as needed for  nausea  Protonix 40 mg oral delayed release tablet: 1 tab(s) orally once a day  simethicone 80 mg oral tablet, chewable: 1 tab(s) orally every 8 hours as needed for As needed for Gas  thiamine 100 mg oral tablet: 1 tab(s) orally once a day

## 2024-01-15 NOTE — DISCHARGE NOTE NURSING/CASE MANAGEMENT/SOCIAL WORK - PATIENT PORTAL LINK FT
You can access the FollowMyHealth Patient Portal offered by [facility] by registering at the following website: [email or web address]. By joining [employer]’s FollowMyHealth portal, you will also be able to view your health information using other applications (apps) compatible with our system. You can access the FollowMyHealth Patient Portal offered by [facility] by registering at the following website: [email or web address]. By joining [facility]’s FollowMyHealth portal, you will also be able to view your health information using other applications (apps) compatible with our system. You can access the FollowMyHealth Patient Portal offered by [facility] by registering at the following website: [email or web address]. By joining [employer]’s FollowMyHealth portal, you will also be able to view your health information using other applications (apps) compatible with our system.

## 2024-01-15 NOTE — DISCHARGE NOTE PROVIDER - PROVIDER TOKENS
PROVIDER:[TOKEN:[[id number]]],PROVIDER:[TOKEN:[[id number]]] PROVIDER:[TOKEN:[[id number]]],PROVIDER:[TOKEN:[[id number]]] PROVIDER:[TOKEN:[[id number]]],PROVIDER:[TOKEN:[[id number]]]

## 2024-01-15 NOTE — DISCHARGE NOTE PROVIDER - HOSPITAL COURSE
42 yo female with PMHx depression, anxiety and ETOH abuse presents with c/o withdrawal. Pt states she started drinking  a few weeks ago and was trying to detox herself with Lorazepam at home but had increased tremors and anxiety with diaphoresis, abdominal pain, nausea and vomiting.     42 yo female with PMHX Depression, anxiety and ETOH abuse presents for detox.    Plan:  1. ETOH abuse  - Ativan CIWA    - Addicition eval  - Visteril PRN anxiety  - She takes 2mg qd a baseline, no extra ativan day prior to discharge    2. Depression/Anxiety  - pt has not been compliant with meds  - cont Lexapro  - cont Gabapentin  - cont Mirtazipine PRN    3. H/O Graves disease  - last TSH 1 month ago WNL    4. DVT proph  - encourage ambulation    5. GERD/Gastritis  - Protonix IV    dc today 44 yo female with PMHx depression, anxiety and ETOH abuse presents with c/o withdrawal. Pt states she started drinking  a few weeks ago and was trying to detox herself with Lorazepam at home but had increased tremors and anxiety with diaphoresis, abdominal pain, nausea and vomiting.     44 yo female with PMHX Depression, anxiety and ETOH abuse presents for detox.    Plan:  1. ETOH abuse  - Ativan CIWA    - Addicition eval  - Visteril PRN anxiety  - She takes 2mg qd a baseline, no extra ativan day prior to discharge    2. Depression/Anxiety  - pt has not been compliant with meds  - cont Lexapro  - cont Gabapentin  - cont Mirtazipine PRN    3. H/O Graves disease  - last TSH 1 month ago WNL    4. DVT proph  - encourage ambulation    5. GERD/Gastritis  - Protonix IV    dc today

## 2024-01-15 NOTE — DISCHARGE NOTE PROVIDER - NSDCCPCAREPLAN_GEN_ALL_CORE_FT
PRINCIPAL DISCHARGE DIAGNOSIS  Diagnosis: Alcohol withdrawal  Assessment and Plan of Treatment: You were admitted for alcohol withdrawal. You did not require supplemental ativan anymore and were discharged from the hospital. Follow up with Private GI or our [facility] located at [address] [place] [address]. Phone Number: [phone]       PRINCIPAL DISCHARGE DIAGNOSIS  Diagnosis: Alcohol withdrawal  Assessment and Plan of Treatment: You were admitted for alcohol withdrawal. You did not require supplemental ativan anymore and were discharged from the hospital. Follow up with Private GI or our [facility] located at [address] [place] [address]. Phone Number: [phone]       PRINCIPAL DISCHARGE DIAGNOSIS  Diagnosis: Alcohol withdrawal  Assessment and Plan of Treatment: You were admitted for alcohol withdrawal. You did not require supplemental ativan anymore and were discharged from the hospital. Follow up with Private GI or our [facility] located at [address] [place] [address]. Phone Number: [phone]

## 2024-01-15 NOTE — DISCHARGE NOTE PROVIDER - NSDCDCMDCOMP_GEN_ALL_CORE
Head Injury in Children    Your child was seen today in the Emergency Department for a head injury.    It has been determined that your child’s head injury is not serious or dangerous.    General tips for taking care of a child who had a head injury:  -If your child has a headache, you can give acetaminophen every 4 hours or ibuprofen every 6 hours as needed for pain.  Aspirin is not recommended for children.  -Have your child rest, avoid activities that are hard or tiring, and make sure your child gets enough sleep.  -Temporarily keep your child from activities that could cause another head injury  -Tell all of your child's teachers and other caregivers about your child's injury, symptoms, and activity restrictions. Have them report any problems that are new or getting worse.  -Most problems from a head injury come in the first 24 hours. However, your child may still have side effects up to 7–10 days after the injury. It is important to watch your child's condition for any changes.    Follow up with your pediatrician in 1-2 days to make sure that your child is doing better.    Return to the Emergency Department if your child has:  -A very bad (severe) headache that is not helped by medicine.  -Clear or bloody fluid coming from his or her nose or ears.  -Changes in his or her seeing (vision).  -Jerky movements that he or she cannot control (seizure).  -Your child's symptoms get worse.  -Your child throws up (vomits).  -Your child's dizziness gets worse.  -Your child cannot walk or does not have control over his or her arms or legs.  -Your child will not stop crying.  -Your child passes out.  -Your child is sleepier and has trouble staying awake.  -Your child will not eat or nurse.    These symptoms may be an emergency. Do not wait to see if the symptoms will go away. Get medical help right away. Call your local emergency services (911 in the [place]    Some tips to try to prevent head injury:  -Your child should wear a seatbelt or use the right-sized car seat or booster when he or she is in a moving vehicle.  -Wear a helmet when: riding a bicycle, skiing, or doing any other sport or activity that has a serious risk of head injury.  -You can childproof any dangerous parts of your home, install window guards and safety leyva, and make sure the playground that your child uses is safe.
This document is complete and the patient is ready for discharge.

## 2024-01-15 NOTE — DISCHARGE NOTE PROVIDER - CARE PROVIDERS DIRECT ADDRESSES
[email or web address]

## 2024-01-15 NOTE — DISCHARGE NOTE PROVIDER - CARE PROVIDER_API CALL
Julio Cesar Puckett  Internal Medicine  [address] [place] [address]  Phone: [phone]  Fax: [phone]  Follow Up Time:     Herman Mcgrath  Physician Assistant Services  [address] [place] [address]  Phone: [phone]  Fax: [phone]  Follow Up Time:    Julio Cesar Puckett  Internal Medicine  [address] [place] [address]  Phone: [phone]  Fax: [phone]  Follow Up Time:     Herman Mcgrath  Physician Assistant Services  [address] [place] [address]  Phone: [phone]  Fax: [phone]  Follow Up Time:    Julio Cesar Puckett  Internal Medicine  [address] [place] [address]  Phone: [phone]  Fax: [phone]  Follow Up Time:     Herman Mcgrath  Physician Assistant Services  [address] [place] [address]  Phone: [phone]  Fax: [phone]  Follow Up Time:

## 2024-01-15 NOTE — DISCHARGE NOTE NURSING/CASE MANAGEMENT/SOCIAL WORK - NSDCPEFALRISK_GEN_ALL_CORE
For information on Fall & Injury Prevention, visit: [email or web address]/[email or web address]-prevention-[email or web address]-and-maintains-health-and-mobility OR  https://www.[facility]/news/fall-prevention-tips-[email or web address]-injury OR  https://www.cdc.gov/steadi/patient.html For information on Fall & Injury Prevention, visit: https://www.[facility]/news/fall-prevention-protects-and-maintains-health-and-mobility OR  https://www.[facility]/news/fall-prevention-tips-to-avoid-injury OR  https://www.cdc.gov/steadi/patient.html For information on Fall & Injury Prevention, visit: https://www.[facility]/news/fall-prevention-protects-and-maintains-health-and-mobility OR  https://www.[facility]/news/fall-prevention-tips-to-avoid-injury OR  https://www.cdc.gov/steadi/patient.html

## 2024-01-19 DIAGNOSIS — Z71.41 ALCOHOL ABUSE COUNSELING AND SURVEILLANCE OF ALCOHOLIC: ICD-10-CM

## 2024-01-19 DIAGNOSIS — K75.81 NONALCOHOLIC STEATOHEPATITIS (NASH): ICD-10-CM

## 2024-01-19 DIAGNOSIS — E66.9 OBESITY, UNSPECIFIED: ICD-10-CM

## 2024-01-19 DIAGNOSIS — F41.9 ANXIETY DISORDER, UNSPECIFIED: ICD-10-CM

## 2024-01-19 DIAGNOSIS — E78.00 PURE HYPERCHOLESTEROLEMIA, UNSPECIFIED: ICD-10-CM

## 2024-01-19 DIAGNOSIS — F10.239 ALCOHOL DEPENDENCE WITH WITHDRAWAL, UNSPECIFIED: ICD-10-CM
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
## 2024-01-19 DIAGNOSIS — Z88.8 ALLERGY STATUS TO OTHER DRUGS, MEDICAMENTS AND BIOLOGICAL SUBSTANCES: ICD-10-CM

## 2024-01-19 DIAGNOSIS — Z87.891 PERSONAL HISTORY OF NICOTINE DEPENDENCE: ICD-10-CM

## 2024-01-19 DIAGNOSIS — Z91.148 PATIENT'S OTHER NONCOMPLIANCE WITH MEDICATION REGIMEN FOR OTHER REASON: ICD-10-CM

## 2024-01-19 DIAGNOSIS — K29.70 GASTRITIS, UNSPECIFIED, WITHOUT BLEEDING: ICD-10-CM

## 2024-01-19 DIAGNOSIS — F32.A DEPRESSION, UNSPECIFIED: ICD-10-CM

## 2024-01-19 DIAGNOSIS — K21.9 GASTRO-ESOPHAGEAL REFLUX DISEASE WITHOUT ESOPHAGITIS: ICD-10-CM

## 2024-01-19 DIAGNOSIS — Z56.0 UNEMPLOYMENT, UNSPECIFIED: ICD-10-CM

## 2024-01-19 DIAGNOSIS — E05.00 THYROTOXICOSIS WITH DIFFUSE GOITER WITHOUT THYROTOXIC CRISIS OR STORM: ICD-10-CM

## 2024-01-19 SDOH — ECONOMIC STABILITY - INCOME SECURITY: UNEMPLOYMENT, UNSPECIFIED: Z56.0

## 2024-01-26 ENCOUNTER — OUTPATIENT (OUTPATIENT)
Dept: OUTPATIENT SERVICES | Facility: HOSPITAL | Age: 44
LOS: 1 days | End: 2024-01-26
Payer: MEDICAID

## 2024-01-26 ENCOUNTER — APPOINTMENT (OUTPATIENT)
Dept: PSYCHIATRY | Facility: CLINIC | Age: 44
End: 2024-01-26

## 2024-01-26 DIAGNOSIS — F10.20 ALCOHOL DEPENDENCE, UNCOMPLICATED: ICD-10-CM

## 2024-01-26 DIAGNOSIS — Z98.891 HISTORY OF UTERINE SCAR FROM PREVIOUS SURGERY: Chronic | ICD-10-CM

## 2024-01-26 DIAGNOSIS — Z98.890 OTHER SPECIFIED POSTPROCEDURAL STATES: Chronic | ICD-10-CM

## 2024-01-26 PROCEDURE — 90832 PSYTX W PT 30 MINUTES: CPT | Mod: 95

## 2024-01-27 DIAGNOSIS — F10.20 ALCOHOL DEPENDENCE, UNCOMPLICATED: ICD-10-CM

## 2024-01-27 NOTE — PATIENT PROFILE ADULT - WAS YOUR LAST COVID-19 VACCINE GREATER THAN OR EQUAL TO TWO MONTHS AGO?
----- Message from LIA Reese sent at 1/24/2024  7:05 AM CST -----  Labs are stable, check same labs in 6 months  
MCM sent.  Repeat labs ordered.  Added CBC per verbal from Rosa.  
Patient needing refill on mounjaro  Patient completed labs    LOV: 23 for obesity    Tirzepatide (MOUNJARO) 15 MG/0.5ML Subcutaneous Solution Pen-injector ()  Inject 15 mg into the skin once a week. Dispense: 6 mL, Refills: 0 ordered   10/23/2023     No future appointments.    
Yes

## 2024-01-30 ENCOUNTER — APPOINTMENT (OUTPATIENT)
Dept: PSYCHIATRY | Facility: CLINIC | Age: 44
End: 2024-01-30

## 2024-02-01 NOTE — ED ADULT TRIAGE NOTE - BIRTH SEX
Here to discuss colonoscopy Having 1-2  bm per day without bleeding.  Last exam: 2018 with single adenoma  Family history of colon cancer: NONE  Denies history of stroke, heart attack, pacemaker, defibrillator or stents. Denies COPD, asthma or sleep apnea. Denies heartburn, nausea, vomiting or dysphagia.
Female

## 2024-02-02 ENCOUNTER — APPOINTMENT (OUTPATIENT)
Dept: PSYCHIATRY | Facility: CLINIC | Age: 44
End: 2024-02-02

## 2024-02-06 ENCOUNTER — APPOINTMENT (OUTPATIENT)
Dept: PSYCHIATRY | Facility: CLINIC | Age: 44
End: 2024-02-06

## 2024-02-13 ENCOUNTER — APPOINTMENT (OUTPATIENT)
Dept: PSYCHIATRY | Facility: CLINIC | Age: 44
End: 2024-02-13

## 2024-02-13 NOTE — ASU PATIENT PROFILE, ADULT - WHEN WAS YOUR LAST VACCINATION? YEAR
"Patient Name: Brice Alejandra \"tj\"  MRN: [id number]  Follow Up In Physical Therapy      3. Swelling of joint of right knee        4. Weakness of right leg            Visit 12/16    Subjective:  Change in pain/ pain level: 3/10  Change in function: improving daily  Patient comments: swells up in the am     Objective: 0-123    Treatment:    Therapeutic exercise (44277):    Bike 5 min    stretches HS, GS, Quad  tiltboard  lev 3   4 way cable walkout 29# x10 ea   Manual Therapy (44398):  Patella mobs proximal-distal, distal-proximal, lateral diagonals into stiffness  Tibial Femoral A-P flexion and A-P extension multiple knee flexion angles  Stretching into flexion and extension  DTM quadriceps, ITBand/ Hamstring      Assessment:  Patient notes improved pain levels resultant from activities in therapy session. Improved tissue quality noted as well as body mechanics demonstrated after cueing and corrections. Patient continues to require active therapy to progress functional capabilities with decreasing pain levels and improving mechanics with functional activities including progression of Home exercise program. Tolerance to today's treatment was good. Muscle fatigue noted.  Patient appears motivated and compliant this date, demonstrating a working understanding of principals instructed and home program.    Plan:  Progress with functional strength as tolerated with respect to tissue healing. Manual therapy PRN to improve/maintain ROM, prevent adhesion, reduce pain.  "
2021

## 2024-02-20 ENCOUNTER — APPOINTMENT (OUTPATIENT)
Dept: PSYCHIATRY | Facility: CLINIC | Age: 44
End: 2024-02-20

## 2024-02-27 ENCOUNTER — APPOINTMENT (OUTPATIENT)
Dept: PSYCHIATRY | Facility: CLINIC | Age: 44
End: 2024-02-27

## 2024-03-03 ENCOUNTER — INPATIENT (INPATIENT)
Facility: HOSPITAL | Age: 44
LOS: 7 days | Discharge: HOME | End: 2024-03-11
Attending: HOSPITALIST | Admitting: INTERNAL MEDICINE
Payer: MEDICAID

## 2024-03-03 VITALS
RESPIRATION RATE: 20 BRPM | DIASTOLIC BLOOD PRESSURE: 84 MMHG | OXYGEN SATURATION: 98 % | SYSTOLIC BLOOD PRESSURE: 159 MMHG | HEART RATE: 109 BPM | HEIGHT: 62 IN

## 2024-03-03 DIAGNOSIS — Z98.890 OTHER SPECIFIED POSTPROCEDURAL STATES: Chronic | ICD-10-CM

## 2024-03-03 DIAGNOSIS — Z98.891 HISTORY OF UTERINE SCAR FROM PREVIOUS SURGERY: Chronic | ICD-10-CM

## 2024-03-03 LAB
BASOPHILS # BLD AUTO: 0.04 K/UL — SIGNIFICANT CHANGE UP (ref 0–0.2)
BASOPHILS NFR BLD AUTO: 0.7 % — SIGNIFICANT CHANGE UP (ref 0–1)
EOSINOPHIL # BLD AUTO: 0.02 K/UL — SIGNIFICANT CHANGE UP (ref 0–0.7)
EOSINOPHIL NFR BLD AUTO: 0.4 % — SIGNIFICANT CHANGE UP (ref 0–8)
HCT VFR BLD CALC: 39.2 % — SIGNIFICANT CHANGE UP (ref 37–47)
HGB BLD-MCNC: 13.2 G/DL — SIGNIFICANT CHANGE UP (ref 12–16)
IMM GRANULOCYTES NFR BLD AUTO: 0.7 % — HIGH (ref 0.1–0.3)
INR BLD: 1.02 RATIO — SIGNIFICANT CHANGE UP (ref 0.65–1.3)
LYMPHOCYTES # BLD AUTO: 0.69 K/UL — LOW (ref 1.2–3.4)
LYMPHOCYTES # BLD AUTO: 12.3 % — LOW (ref 20.5–51.1)
MCHC RBC-ENTMCNC: 33.7 G/DL — SIGNIFICANT CHANGE UP (ref 32–37)
MCHC RBC-ENTMCNC: 33.7 PG — HIGH (ref 27–31)
MCV RBC AUTO: 100 FL — HIGH (ref 81–99)
MONOCYTES # BLD AUTO: 0.46 K/UL — SIGNIFICANT CHANGE UP (ref 0.1–0.6)
MONOCYTES NFR BLD AUTO: 8.2 % — SIGNIFICANT CHANGE UP (ref 1.7–9.3)
NEUTROPHILS # BLD AUTO: 4.36 K/UL — SIGNIFICANT CHANGE UP (ref 1.4–6.5)
NEUTROPHILS NFR BLD AUTO: 77.7 % — HIGH (ref 42.2–75.2)
NRBC # BLD: 0 /100 WBCS — SIGNIFICANT CHANGE UP (ref 0–0)
PLATELET # BLD AUTO: 153 K/UL — SIGNIFICANT CHANGE UP (ref 130–400)
PMV BLD: 8.8 FL — SIGNIFICANT CHANGE UP (ref 7.4–10.4)
PROTHROM AB SERPL-ACNC: 11.6 SEC — SIGNIFICANT CHANGE UP (ref 9.95–12.87)
RBC # BLD: 3.92 M/UL — LOW (ref 4.2–5.4)
RBC # FLD: 13.8 % — SIGNIFICANT CHANGE UP (ref 11.5–14.5)
WBC # BLD: 5.61 K/UL — SIGNIFICANT CHANGE UP (ref 4.8–10.8)
WBC # FLD AUTO: 5.61 K/UL — SIGNIFICANT CHANGE UP (ref 4.8–10.8)

## 2024-03-03 PROCEDURE — 71045 X-RAY EXAM CHEST 1 VIEW: CPT | Mod: 26

## 2024-03-03 PROCEDURE — 99291 CRITICAL CARE FIRST HOUR: CPT

## 2024-03-03 RX ORDER — DIAZEPAM 5 MG
5 TABLET ORAL ONCE
Refills: 0 | Status: DISCONTINUED | OUTPATIENT
Start: 2024-03-03 | End: 2024-03-03

## 2024-03-03 RX ORDER — FAMOTIDINE 10 MG/ML
20 INJECTION INTRAVENOUS ONCE
Refills: 0 | Status: COMPLETED | OUTPATIENT
Start: 2024-03-03 | End: 2024-03-03

## 2024-03-03 RX ORDER — SODIUM CHLORIDE 9 MG/ML
1000 INJECTION, SOLUTION INTRAVENOUS ONCE
Refills: 0 | Status: COMPLETED | OUTPATIENT
Start: 2024-03-03 | End: 2024-03-03

## 2024-03-03 RX ORDER — METOCLOPRAMIDE HCL 10 MG
10 TABLET ORAL ONCE
Refills: 0 | Status: COMPLETED | OUTPATIENT
Start: 2024-03-03 | End: 2024-03-03

## 2024-03-03 RX ADMIN — FAMOTIDINE 20 MILLIGRAM(S): 10 INJECTION INTRAVENOUS at 23:33

## 2024-03-03 RX ADMIN — Medication 10 MILLIGRAM(S): at 23:32

## 2024-03-03 RX ADMIN — SODIUM CHLORIDE 1000 MILLILITER(S): 9 INJECTION, SOLUTION INTRAVENOUS at 23:35

## 2024-03-03 NOTE — ED PROVIDER NOTE - OBJECTIVE STATEMENT
43-year-old female history of depression, anxiety, alcohol abuse, GERD, last admission for detox was in January 15, 2024, now presents by ambulance for evaluation of "I am withdrawing from alcohol" patient still drinks.  Patient complains of vomiting.  And epigastric pain.  No chest pain or shortness of breath..  Previous history of alcohol withdrawal, including ICU admission.

## 2024-03-03 NOTE — ED PROVIDER NOTE - WR INTERPRETATION 1
ED CXR prelim, my independent interpretation - Dr. Dimas Urrutia: No PTX, No infiltrates, No Free air

## 2024-03-03 NOTE — ED PROVIDER NOTE - PHYSICAL EXAMINATION
VITAL SIGNS: I have reviewed nursing notes and confirm.  CONSTITUTIONAL: non-toxic, anxious, no respiratory distress  SKIN: no rash, no petechiae.  EYES: [PERRL, EOMI,] pink conjunctiva, anicteric  ENT: tongue midline, MMM  NECK: Supple; no meningismus no crepitus over neck or chest, no JVD  CARD: RRR, no murmurs, equal radial pulses bilaterally 2+  RESP: CTAB, no respiratory distress  ABD: Soft, non-tender, non-distended, no peritoneal signs, no HSM, no CVA tenderness    EXT: Normal ROM x4. No edema. No calves tenderness  NEURO: Alert, oriented. CN2-12 intact, equal strength bilaterally, positive tremor  PSYCH: Cooperative, appropriate.  Anxious, (+) tremor

## 2024-03-03 NOTE — ED PROVIDER NOTE - CLINICAL SUMMARY MEDICAL DECISION MAKING FREE TEXT BOX
Alcohol withdrawal.  Known history.  Labs and imaging reviewed.  Medicated.  Reassessed. Alcohol withdrawal.  Known history.  Labs and imaging reviewed.  Medicated.  Reassessed.    43-year-old female, history of alcohol use disorder brought in by EMS for withdrawal symptoms, abdominal pain and vomiting.  Exam unremarkable.  EKG sinus tach no ST elevation.  Chest x-ray no acute disease.  Labs noted for WBC 5.6, hemoglobin 13, sodium 130, CO2 9, anion gap 36, BUN 10, creatinine 0.7, , , lactate 4.8, lipase 75, alcohol 273.  Given LR and D5 1/2NS, Valium, Pepcid and Reglan.  Will admit to stepdown.

## 2024-03-03 NOTE — ED PROVIDER NOTE - INTERPRETATION
ED EKG: my independent interpretation - Dr. Dimas Urrutia : Sinus tachycardia at 103, normal axis, no ST elevations, T inversion in III

## 2024-03-03 NOTE — ED ADULT TRIAGE NOTE - CHIEF COMPLAINT QUOTE
Discharge Instructions for  Section    Patient ID:  Hailey Gomez  [id number]  72 y.o.  1993    Continue taking your prenatal vitamins if you are breastfeeding. Follow-up care is a key part of your treatment and safety. Be sure to keep all your scheduled appointments    Activity  1. Avoid heavy lifting greater than 10lbs for 2 weeks after your surgery  2. Pelvic rest for 6 weeks, ie, nothing in your vagina for 6 weeks  (no intercourse, tampons, or douching). No tubs for 6 weeks. 3. No driving for 2 weeks and until you are no longer taking prescription pain medications and you can put your foot on the break in a hurry   4. Limit climbing stairs to only when necessary the first 1-2 weeks. Hold the railing and do not carry your baby up and downstairs at first for safety   5. You may walk as tolerated, though be care to not over-do it. Walking will assist in overall healing, decrease constipation and bloating. Letitia Porter feel better more quickly with some daily movement. Diet  Regular diet as tolerated    Wound care  There are several types of incision closures. 1. If you have metal staples in place when you leave the hospital, please call your doctors office to schedule the staple removal as directed at discharge. 2. If you have steri strips covering your incision, you may remove them as they fall off or be sure to remove them about one week after your surgery. Removing them in the shower may be easier. 3. If you have Dermabond, or skin glue, covering your incision, it will fall off slowly in the next few weeks. You may remove it as it begins to peel off. Additional wound care  1. Clear or reddish drainage from your incision is normal.  It's best to leave it open to the air, but if there is drainage, you may cover the incision lightly with gauze, preferably without tape. 2.  Keep the incision clean and dry.     3. Numbness of the skin at or around your incision is normal and the feeling usually returns gradually. 4. Call your doctor if you have increasing drainage from your incision, an unpleasant odor, red streaks, an increase in pain, or if it appears to open. Pain Management  1. Continue prescription pain medication as written by discharging physician  2. Over the counter medications such as Tylenol and ibuprofen (Motrin or Advil) are also ideal.  These may be taken together or in alternating doses. You may  take the maximum dose:  Motrin or Advil (generic ibuprofen), either 3 tablets every 6 hours or 4 tablets every 8 hours. Your prescription medication conntains a narcotic mixed with Tylenol,so  you should not take any extra Tylenol or acetaminophen until you have reduced your prescription pain medication. The maximum dose is Tylenol or acetominophen 1000 mg every 6 hours (equivalent to 2 Extra Strength Tylenols every 6 hours). 3. After a few days, begin to replace the prescription medication with over the counter medications. Use the prescription medication if needed for more severe pain or at night. The prescription medication can be addictive if overused. 4. Add heating pad or sitz baths as needed. Constipation  1. Constipation is normal after surgery, especially while taking prescription narcotic pain medication. 2. Over the counter remedies including ducosate (Colace), take 1-2 capsules 1-2 times daily for soft stool as needed. You may also add/ try milk of magnesia or rectal remedies such as Dulcolax or Fleets enema. Recovery: What to Expect at Home  1. Fatigue is expected. Try to rest when you can and don't worry about doing housework or other tasks which can wait. 2. The soreness in your incision will improve significantly over the first 2 weeks, but it may take 6-8 weeks before you are completely recovered. 3. Back pain or general body aches or muscle soreness are expected and should improve with acetominophen or ibuprofen.   4. Leg swelling due to pregnancy and/or IV fluids given in the hospital will take about two weeks to resolve. 5. Most women experience some form of the \"Baby Blues\" after having a baby. Feeling emotional, tearful, frustrated, anxious, sad, and irritable some of the time is normal and go away after about 2 weeks. Adequate rest and help from your family will help. Take breaks from caring for the baby. Call your doctor if your symptoms seem severe, last more than 2 weeks, or seem to be getting worse instead of better. Get help immediately if you have thoughts of wanting to hurt yourself or others! Call your doctor or seek immediate medical care if you have:  Heavy vaginal bleeding, soaking through one or more pads an hour for several hours. Foul-smelling discharge from your vagina or incision. Consistent nausea and vomiting and cannot keep fluids down. Consistent pain that does not get better after you take pain medicine.   Sudden chest pain and shortness of breath  Signs of a blood clot: pain/ swelling/ increasing redness in your lower extremeties  Signs of infection: increased pain in your abdomen or vaginal area; red streaks, warmth, or tenderness of your breasts; fever of 100.5 F or greater BIBA for alcohol withdrawal.

## 2024-03-03 NOTE — ED PROVIDER NOTE - PROGRESS NOTE DETAILS
Authored by Dr. Urrutia: Patient with difficult IV access.  Sono-guided IV placed. Garrick Authored by Dr. Urrutia: Patient requesting another 5 mg of Valium.  Informed that there is no Ativan IV due to shortage Authored by Dr. Urrutia: Informed by the lab bicarb of 9 lactate is 4.8.  (+) Anion gap.  VBG added.  Beta hydroxybutyrate added.  Likely AKA.  Continue IV fluid with glucose.    I, Dr. Dimas Urrutia, discussed the case with ICU attending -Dr. Horta, agrees with management.  Will call for eval by 5554. Note authored by Dr. Urrutia: At this time, patient signout to Dr. Mccauley to follow up on VBG, beta-hydroxybutyrate, completion of consultation for ICU and dispo

## 2024-03-04 ENCOUNTER — APPOINTMENT (OUTPATIENT)
Dept: PSYCHIATRY | Facility: CLINIC | Age: 44
End: 2024-03-04

## 2024-03-04 DIAGNOSIS — F10.239 ALCOHOL DEPENDENCE WITH WITHDRAWAL, UNSPECIFIED: ICD-10-CM

## 2024-03-04 DIAGNOSIS — E87.29 OTHER ACIDOSIS: ICD-10-CM

## 2024-03-04 LAB
A1C WITH ESTIMATED AVERAGE GLUCOSE RESULT: 5 % — SIGNIFICANT CHANGE UP (ref 4–5.6)
ALBUMIN SERPL ELPH-MCNC: 4.1 G/DL — SIGNIFICANT CHANGE UP (ref 3.5–5.2)
ALBUMIN SERPL ELPH-MCNC: 4.3 G/DL — SIGNIFICANT CHANGE UP (ref 3.5–5.2)
ALBUMIN SERPL ELPH-MCNC: 5 G/DL — SIGNIFICANT CHANGE UP (ref 3.5–5.2)
ALP SERPL-CCNC: 100 U/L — SIGNIFICANT CHANGE UP (ref 30–115)
ALP SERPL-CCNC: 114 U/L — SIGNIFICANT CHANGE UP (ref 30–115)
ALP SERPL-CCNC: 77 U/L — SIGNIFICANT CHANGE UP (ref 30–115)
ALT FLD-CCNC: 100 U/L — HIGH (ref 0–41)
ALT FLD-CCNC: 132 U/L — HIGH (ref 0–41)
ALT FLD-CCNC: 158 U/L — HIGH (ref 0–41)
ANION GAP SERPL CALC-SCNC: 13 MMOL/L — SIGNIFICANT CHANGE UP (ref 7–14)
ANION GAP SERPL CALC-SCNC: 15 MMOL/L — HIGH (ref 7–14)
ANION GAP SERPL CALC-SCNC: 17 MMOL/L — HIGH (ref 7–14)
ANION GAP SERPL CALC-SCNC: 26 MMOL/L — HIGH (ref 7–14)
ANION GAP SERPL CALC-SCNC: 35 MMOL/L — HIGH (ref 7–14)
ANION GAP SERPL CALC-SCNC: 36 MMOL/L — HIGH (ref 7–14)
APPEARANCE UR: ABNORMAL
APTT BLD: 29.6 SEC — SIGNIFICANT CHANGE UP (ref 27–39.2)
AST SERPL-CCNC: 149 U/L — HIGH (ref 0–41)
AST SERPL-CCNC: 207 U/L — HIGH (ref 0–41)
AST SERPL-CCNC: 267 U/L — HIGH (ref 0–41)
B-OH-BUTYR SERPL-SCNC: >9 MMOL/L — HIGH
BASE EXCESS BLDV CALC-SCNC: -17.1 MMOL/L — LOW (ref -2–3)
BILIRUB DIRECT SERPL-MCNC: 0.8 MG/DL — HIGH (ref 0–0.3)
BILIRUB DIRECT SERPL-MCNC: 1 MG/DL — HIGH (ref 0–0.3)
BILIRUB INDIRECT FLD-MCNC: 0.7 MG/DL — SIGNIFICANT CHANGE UP (ref 0.2–1.2)
BILIRUB INDIRECT FLD-MCNC: 1.2 MG/DL — SIGNIFICANT CHANGE UP (ref 0.2–1.2)
BILIRUB SERPL-MCNC: 1.7 MG/DL — HIGH (ref 0.2–1.2)
BILIRUB SERPL-MCNC: 1.9 MG/DL — HIGH (ref 0.2–1.2)
BILIRUB SERPL-MCNC: 2 MG/DL — HIGH (ref 0.2–1.2)
BILIRUB UR-MCNC: NEGATIVE — SIGNIFICANT CHANGE UP
BUN SERPL-MCNC: 10 MG/DL — SIGNIFICANT CHANGE UP (ref 10–20)
BUN SERPL-MCNC: 7 MG/DL — LOW (ref 10–20)
BUN SERPL-MCNC: 8 MG/DL — LOW (ref 10–20)
BUN SERPL-MCNC: 8 MG/DL — LOW (ref 10–20)
BUN SERPL-MCNC: 9 MG/DL — LOW (ref 10–20)
BUN SERPL-MCNC: 9 MG/DL — LOW (ref 10–20)
CALCIUM SERPL-MCNC: 8.2 MG/DL — LOW (ref 8.4–10.5)
CALCIUM SERPL-MCNC: 8.2 MG/DL — LOW (ref 8.4–10.5)
CALCIUM SERPL-MCNC: 8.3 MG/DL — LOW (ref 8.4–10.5)
CALCIUM SERPL-MCNC: 8.5 MG/DL — SIGNIFICANT CHANGE UP (ref 8.4–10.5)
CALCIUM SERPL-MCNC: 8.8 MG/DL — SIGNIFICANT CHANGE UP (ref 8.4–10.5)
CALCIUM SERPL-MCNC: 8.8 MG/DL — SIGNIFICANT CHANGE UP (ref 8.4–10.5)
CHLORIDE SERPL-SCNC: 85 MMOL/L — LOW (ref 98–110)
CHLORIDE SERPL-SCNC: 89 MMOL/L — LOW (ref 98–110)
CHLORIDE SERPL-SCNC: 91 MMOL/L — LOW (ref 98–110)
CHLORIDE SERPL-SCNC: 95 MMOL/L — LOW (ref 98–110)
CHLORIDE SERPL-SCNC: 96 MMOL/L — LOW (ref 98–110)
CHLORIDE SERPL-SCNC: 96 MMOL/L — LOW (ref 98–110)
CO2 SERPL-SCNC: 10 MMOL/L — LOW (ref 17–32)
CO2 SERPL-SCNC: 16 MMOL/L — LOW (ref 17–32)
CO2 SERPL-SCNC: 21 MMOL/L — SIGNIFICANT CHANGE UP (ref 17–32)
CO2 SERPL-SCNC: 23 MMOL/L — SIGNIFICANT CHANGE UP (ref 17–32)
CO2 SERPL-SCNC: 23 MMOL/L — SIGNIFICANT CHANGE UP (ref 17–32)
CO2 SERPL-SCNC: 9 MMOL/L — CRITICAL LOW (ref 17–32)
COLOR SPEC: YELLOW — SIGNIFICANT CHANGE UP
CREAT ?TM UR-MCNC: 54 MG/DL — SIGNIFICANT CHANGE UP
CREAT SERPL-MCNC: 0.7 MG/DL — SIGNIFICANT CHANGE UP (ref 0.7–1.5)
CREAT SERPL-MCNC: 0.8 MG/DL — SIGNIFICANT CHANGE UP (ref 0.7–1.5)
DIFF PNL FLD: NEGATIVE — SIGNIFICANT CHANGE UP
EGFR: 111 ML/MIN/1.73M2 — SIGNIFICANT CHANGE UP
EGFR: 95 ML/MIN/1.73M2 — SIGNIFICANT CHANGE UP
ESTIMATED AVERAGE GLUCOSE: 97 MG/DL — SIGNIFICANT CHANGE UP (ref 68–114)
ETHANOL SERPL-MCNC: 273 MG/DL — HIGH
GAS PNL BLDA: SIGNIFICANT CHANGE UP
GLUCOSE BLDC GLUCOMTR-MCNC: 103 MG/DL — HIGH (ref 70–99)
GLUCOSE BLDC GLUCOMTR-MCNC: 114 MG/DL — HIGH (ref 70–99)
GLUCOSE BLDC GLUCOMTR-MCNC: 121 MG/DL — HIGH (ref 70–99)
GLUCOSE BLDC GLUCOMTR-MCNC: 125 MG/DL — HIGH (ref 70–99)
GLUCOSE BLDC GLUCOMTR-MCNC: 128 MG/DL — HIGH (ref 70–99)
GLUCOSE BLDC GLUCOMTR-MCNC: 128 MG/DL — HIGH (ref 70–99)
GLUCOSE BLDC GLUCOMTR-MCNC: 134 MG/DL — HIGH (ref 70–99)
GLUCOSE BLDC GLUCOMTR-MCNC: 138 MG/DL — HIGH (ref 70–99)
GLUCOSE BLDC GLUCOMTR-MCNC: 146 MG/DL — HIGH (ref 70–99)
GLUCOSE BLDC GLUCOMTR-MCNC: 160 MG/DL — HIGH (ref 70–99)
GLUCOSE BLDC GLUCOMTR-MCNC: 160 MG/DL — HIGH (ref 70–99)
GLUCOSE BLDC GLUCOMTR-MCNC: 161 MG/DL — HIGH (ref 70–99)
GLUCOSE BLDC GLUCOMTR-MCNC: 168 MG/DL — HIGH (ref 70–99)
GLUCOSE BLDC GLUCOMTR-MCNC: 171 MG/DL — HIGH (ref 70–99)
GLUCOSE BLDC GLUCOMTR-MCNC: 188 MG/DL — HIGH (ref 70–99)
GLUCOSE BLDC GLUCOMTR-MCNC: 225 MG/DL — HIGH (ref 70–99)
GLUCOSE BLDC GLUCOMTR-MCNC: 86 MG/DL — SIGNIFICANT CHANGE UP (ref 70–99)
GLUCOSE BLDC GLUCOMTR-MCNC: 91 MG/DL — SIGNIFICANT CHANGE UP (ref 70–99)
GLUCOSE SERPL-MCNC: 121 MG/DL — HIGH (ref 70–99)
GLUCOSE SERPL-MCNC: 125 MG/DL — HIGH (ref 70–99)
GLUCOSE SERPL-MCNC: 181 MG/DL — HIGH (ref 70–99)
GLUCOSE SERPL-MCNC: 65 MG/DL — LOW (ref 70–99)
GLUCOSE SERPL-MCNC: 65 MG/DL — LOW (ref 70–99)
GLUCOSE SERPL-MCNC: 96 MG/DL — SIGNIFICANT CHANGE UP (ref 70–99)
GLUCOSE UR QL: 100 MG/DL
HCG SERPL QL: NEGATIVE — SIGNIFICANT CHANGE UP
HCO3 BLDV-SCNC: 10 MMOL/L — CRITICAL LOW (ref 22–29)
HCT VFR BLD CALC: 35.5 % — LOW (ref 37–47)
HGB BLD-MCNC: 12 G/DL — SIGNIFICANT CHANGE UP (ref 12–16)
KETONES UR-MCNC: 80 MG/DL
LACTATE BLDV-MCNC: 5.5 MMOL/L — CRITICAL HIGH (ref 0.5–2)
LACTATE SERPL-SCNC: 2 MMOL/L — SIGNIFICANT CHANGE UP (ref 0.7–2)
LACTATE SERPL-SCNC: 2.8 MMOL/L — HIGH (ref 0.7–2)
LACTATE SERPL-SCNC: 4.8 MMOL/L — CRITICAL HIGH (ref 0.7–2)
LEUKOCYTE ESTERASE UR-ACNC: NEGATIVE — SIGNIFICANT CHANGE UP
LIDOCAIN IGE QN: 113 U/L — HIGH (ref 7–60)
LIDOCAIN IGE QN: 75 U/L — HIGH (ref 7–60)
MAGNESIUM SERPL-MCNC: 1.5 MG/DL — LOW (ref 1.8–2.4)
MAGNESIUM SERPL-MCNC: 1.9 MG/DL — SIGNIFICANT CHANGE UP (ref 1.8–2.4)
MCHC RBC-ENTMCNC: 33.8 G/DL — SIGNIFICANT CHANGE UP (ref 32–37)
MCHC RBC-ENTMCNC: 34.1 PG — HIGH (ref 27–31)
MCV RBC AUTO: 100.9 FL — HIGH (ref 81–99)
NITRITE UR-MCNC: NEGATIVE — SIGNIFICANT CHANGE UP
NRBC # BLD: 0 /100 WBCS — SIGNIFICANT CHANGE UP (ref 0–0)
OSMOLALITY SERPL: 299 MOS/KG — SIGNIFICANT CHANGE UP (ref 275–300)
OSMOLALITY UR: 327 MOS/KG — SIGNIFICANT CHANGE UP (ref 50–1200)
PCO2 BLDV: 30 MMHG — LOW (ref 39–42)
PH BLDV: 7.15 — CRITICAL LOW (ref 7.32–7.43)
PH UR: 6.5 — SIGNIFICANT CHANGE UP (ref 5–8)
PHOSPHATE SERPL-MCNC: 2.3 MG/DL — SIGNIFICANT CHANGE UP (ref 2.1–4.9)
PHOSPHATE SERPL-MCNC: <0.3 MG/DL — LOW (ref 2.1–4.9)
PHOSPHATE SERPL-MCNC: <0.3 MG/DL — LOW (ref 2.1–4.9)
PLATELET # BLD AUTO: 116 K/UL — LOW (ref 130–400)
PMV BLD: 9.7 FL — SIGNIFICANT CHANGE UP (ref 7.4–10.4)
PO2 BLDV: 47 MMHG — HIGH (ref 25–45)
POTASSIUM SERPL-MCNC: 3.4 MMOL/L — LOW (ref 3.5–5)
POTASSIUM SERPL-MCNC: 3.7 MMOL/L — SIGNIFICANT CHANGE UP (ref 3.5–5)
POTASSIUM SERPL-MCNC: 3.8 MMOL/L — SIGNIFICANT CHANGE UP (ref 3.5–5)
POTASSIUM SERPL-MCNC: 4 MMOL/L — SIGNIFICANT CHANGE UP (ref 3.5–5)
POTASSIUM SERPL-MCNC: 4 MMOL/L — SIGNIFICANT CHANGE UP (ref 3.5–5)
POTASSIUM SERPL-MCNC: 4.5 MMOL/L — SIGNIFICANT CHANGE UP (ref 3.5–5)
POTASSIUM SERPL-SCNC: 3.4 MMOL/L — LOW (ref 3.5–5)
POTASSIUM SERPL-SCNC: 3.7 MMOL/L — SIGNIFICANT CHANGE UP (ref 3.5–5)
POTASSIUM SERPL-SCNC: 3.8 MMOL/L — SIGNIFICANT CHANGE UP (ref 3.5–5)
POTASSIUM SERPL-SCNC: 4 MMOL/L — SIGNIFICANT CHANGE UP (ref 3.5–5)
POTASSIUM SERPL-SCNC: 4 MMOL/L — SIGNIFICANT CHANGE UP (ref 3.5–5)
POTASSIUM SERPL-SCNC: 4.5 MMOL/L — SIGNIFICANT CHANGE UP (ref 3.5–5)
POTASSIUM UR-SCNC: 22 MMOL/L — SIGNIFICANT CHANGE UP
PROT ?TM UR-MCNC: 19 MG/DLG/24H — SIGNIFICANT CHANGE UP
PROT SERPL-MCNC: 6.8 G/DL — SIGNIFICANT CHANGE UP (ref 6–8)
PROT SERPL-MCNC: 7.8 G/DL — SIGNIFICANT CHANGE UP (ref 6–8)
PROT SERPL-MCNC: 8.8 G/DL — HIGH (ref 6–8)
PROT UR-MCNC: SIGNIFICANT CHANGE UP MG/DL
PROT/CREAT UR-RTO: 0.4 RATIO — HIGH (ref 0–0.2)
RBC # BLD: 3.52 M/UL — LOW (ref 4.2–5.4)
RBC # FLD: 14.1 % — SIGNIFICANT CHANGE UP (ref 11.5–14.5)
SAO2 % BLDV: 63.3 % — LOW (ref 67–88)
SODIUM SERPL-SCNC: 130 MMOL/L — LOW (ref 135–146)
SODIUM SERPL-SCNC: 132 MMOL/L — LOW (ref 135–146)
SODIUM SERPL-SCNC: 133 MMOL/L — LOW (ref 135–146)
SODIUM SERPL-SCNC: 133 MMOL/L — LOW (ref 135–146)
SODIUM SERPL-SCNC: 134 MMOL/L — LOW (ref 135–146)
SODIUM SERPL-SCNC: 134 MMOL/L — LOW (ref 135–146)
SODIUM UR-SCNC: <20 MMOL/L — SIGNIFICANT CHANGE UP
SP GR SPEC: 1.02 — SIGNIFICANT CHANGE UP (ref 1–1.03)
T3 SERPL-MCNC: 32 NG/DL — LOW (ref 80–200)
T4 AB SER-ACNC: 3.9 UG/DL — LOW (ref 4.6–12)
T4 FREE SERPL-MCNC: 0.7 NG/DL — LOW (ref 0.9–1.8)
T4 FREE+ TSH PNL SERPL: 1.13 UIU/ML — SIGNIFICANT CHANGE UP (ref 0.27–4.2)
TROPONIN T, HIGH SENSITIVITY RESULT: 7 NG/L — SIGNIFICANT CHANGE UP (ref 6–13)
UROBILINOGEN FLD QL: 1 MG/DL — SIGNIFICANT CHANGE UP (ref 0.2–1)
UUN UR-MCNC: 292 MG/DL — SIGNIFICANT CHANGE UP
WBC # BLD: 4.72 K/UL — LOW (ref 4.8–10.8)
WBC # FLD AUTO: 4.72 K/UL — LOW (ref 4.8–10.8)

## 2024-03-04 PROCEDURE — 82962 GLUCOSE BLOOD TEST: CPT

## 2024-03-04 PROCEDURE — 83605 ASSAY OF LACTIC ACID: CPT

## 2024-03-04 PROCEDURE — 74177 CT ABD & PELVIS W/CONTRAST: CPT | Mod: 26

## 2024-03-04 PROCEDURE — 83930 ASSAY OF BLOOD OSMOLALITY: CPT

## 2024-03-04 PROCEDURE — 76705 ECHO EXAM OF ABDOMEN: CPT

## 2024-03-04 PROCEDURE — 93010 ELECTROCARDIOGRAM REPORT: CPT

## 2024-03-04 PROCEDURE — 99252 IP/OBS CONSLTJ NEW/EST SF 35: CPT

## 2024-03-04 PROCEDURE — 88312 SPECIAL STAINS GROUP 1: CPT

## 2024-03-04 PROCEDURE — 85018 HEMOGLOBIN: CPT

## 2024-03-04 PROCEDURE — 87040 BLOOD CULTURE FOR BACTERIA: CPT

## 2024-03-04 PROCEDURE — 88305 TISSUE EXAM BY PATHOLOGIST: CPT

## 2024-03-04 PROCEDURE — 84443 ASSAY THYROID STIM HORMONE: CPT

## 2024-03-04 PROCEDURE — 82330 ASSAY OF CALCIUM: CPT

## 2024-03-04 PROCEDURE — C9113: CPT

## 2024-03-04 PROCEDURE — 84156 ASSAY OF PROTEIN URINE: CPT

## 2024-03-04 PROCEDURE — 80074 ACUTE HEPATITIS PANEL: CPT

## 2024-03-04 PROCEDURE — 84540 ASSAY OF URINE/UREA-N: CPT

## 2024-03-04 PROCEDURE — 82803 BLOOD GASES ANY COMBINATION: CPT

## 2024-03-04 PROCEDURE — 85027 COMPLETE CBC AUTOMATED: CPT

## 2024-03-04 PROCEDURE — 85014 HEMATOCRIT: CPT

## 2024-03-04 PROCEDURE — 84295 ASSAY OF SERUM SODIUM: CPT

## 2024-03-04 PROCEDURE — 83935 ASSAY OF URINE OSMOLALITY: CPT

## 2024-03-04 PROCEDURE — 84480 ASSAY TRIIODOTHYRONINE (T3): CPT

## 2024-03-04 PROCEDURE — 99233 SBSQ HOSP IP/OBS HIGH 50: CPT

## 2024-03-04 PROCEDURE — 86022 PLATELET ANTIBODIES: CPT

## 2024-03-04 PROCEDURE — 84436 ASSAY OF TOTAL THYROXINE: CPT

## 2024-03-04 PROCEDURE — 85384 FIBRINOGEN ACTIVITY: CPT

## 2024-03-04 PROCEDURE — 84100 ASSAY OF PHOSPHORUS: CPT

## 2024-03-04 PROCEDURE — 99223 1ST HOSP IP/OBS HIGH 75: CPT

## 2024-03-04 PROCEDURE — 84439 ASSAY OF FREE THYROXINE: CPT

## 2024-03-04 PROCEDURE — 87640 STAPH A DNA AMP PROBE: CPT

## 2024-03-04 PROCEDURE — 83036 HEMOGLOBIN GLYCOSYLATED A1C: CPT

## 2024-03-04 PROCEDURE — 85025 COMPLETE CBC W/AUTO DIFF WBC: CPT

## 2024-03-04 PROCEDURE — 0225U NFCT DS DNA&RNA 21 SARSCOV2: CPT

## 2024-03-04 PROCEDURE — 74177 CT ABD & PELVIS W/CONTRAST: CPT | Mod: MC

## 2024-03-04 PROCEDURE — 70450 CT HEAD/BRAIN W/O DYE: CPT | Mod: MC

## 2024-03-04 PROCEDURE — 85379 FIBRIN DEGRADATION QUANT: CPT

## 2024-03-04 PROCEDURE — 80053 COMPREHEN METABOLIC PANEL: CPT

## 2024-03-04 PROCEDURE — 76770 US EXAM ABDO BACK WALL COMP: CPT

## 2024-03-04 PROCEDURE — 80076 HEPATIC FUNCTION PANEL: CPT

## 2024-03-04 PROCEDURE — 84132 ASSAY OF SERUM POTASSIUM: CPT

## 2024-03-04 PROCEDURE — 84300 ASSAY OF URINE SODIUM: CPT

## 2024-03-04 PROCEDURE — 93005 ELECTROCARDIOGRAM TRACING: CPT

## 2024-03-04 PROCEDURE — 87641 MR-STAPH DNA AMP PROBE: CPT

## 2024-03-04 PROCEDURE — 83690 ASSAY OF LIPASE: CPT

## 2024-03-04 PROCEDURE — 83735 ASSAY OF MAGNESIUM: CPT

## 2024-03-04 PROCEDURE — 80048 BASIC METABOLIC PNL TOTAL CA: CPT

## 2024-03-04 PROCEDURE — 85610 PROTHROMBIN TIME: CPT

## 2024-03-04 PROCEDURE — 82570 ASSAY OF URINE CREATININE: CPT

## 2024-03-04 PROCEDURE — 82607 VITAMIN B-12: CPT

## 2024-03-04 PROCEDURE — 84145 PROCALCITONIN (PCT): CPT

## 2024-03-04 PROCEDURE — 74019 RADEX ABDOMEN 2 VIEWS: CPT

## 2024-03-04 PROCEDURE — 76705 ECHO EXAM OF ABDOMEN: CPT | Mod: 26

## 2024-03-04 PROCEDURE — 84133 ASSAY OF URINE POTASSIUM: CPT

## 2024-03-04 PROCEDURE — 80307 DRUG TEST PRSMV CHEM ANLYZR: CPT

## 2024-03-04 PROCEDURE — 99291 CRITICAL CARE FIRST HOUR: CPT

## 2024-03-04 PROCEDURE — 84484 ASSAY OF TROPONIN QUANT: CPT

## 2024-03-04 PROCEDURE — 36415 COLL VENOUS BLD VENIPUNCTURE: CPT

## 2024-03-04 PROCEDURE — 81025 URINE PREGNANCY TEST: CPT

## 2024-03-04 PROCEDURE — 85730 THROMBOPLASTIN TIME PARTIAL: CPT

## 2024-03-04 RX ORDER — HEPARIN SODIUM 5000 [USP'U]/ML
5000 INJECTION INTRAVENOUS; SUBCUTANEOUS EVERY 8 HOURS
Refills: 0 | Status: DISCONTINUED | OUTPATIENT
Start: 2024-03-04 | End: 2024-03-05

## 2024-03-04 RX ORDER — FOLIC ACID 0.8 MG
1 TABLET ORAL DAILY
Refills: 0 | Status: DISCONTINUED | OUTPATIENT
Start: 2024-03-04 | End: 2024-03-11

## 2024-03-04 RX ORDER — POTASSIUM CHLORIDE 20 MEQ
40 PACKET (EA) ORAL ONCE
Refills: 0 | Status: COMPLETED | OUTPATIENT
Start: 2024-03-04 | End: 2024-03-04

## 2024-03-04 RX ORDER — GABAPENTIN 400 MG/1
100 CAPSULE ORAL THREE TIMES A DAY
Refills: 0 | Status: DISCONTINUED | OUTPATIENT
Start: 2024-03-04 | End: 2024-03-11

## 2024-03-04 RX ORDER — THIAMINE MONONITRATE (VIT B1) 100 MG
100 TABLET ORAL DAILY
Refills: 0 | Status: DISCONTINUED | OUTPATIENT
Start: 2024-03-04 | End: 2024-03-06

## 2024-03-04 RX ORDER — SODIUM CHLORIDE 9 MG/ML
1000 INJECTION, SOLUTION INTRAVENOUS ONCE
Refills: 0 | Status: COMPLETED | OUTPATIENT
Start: 2024-03-04 | End: 2024-03-04

## 2024-03-04 RX ORDER — INSULIN HUMAN 100 [IU]/ML
4 INJECTION, SOLUTION SUBCUTANEOUS
Qty: 100 | Refills: 0 | Status: DISCONTINUED | OUTPATIENT
Start: 2024-03-04 | End: 2024-03-05

## 2024-03-04 RX ORDER — INSULIN HUMAN 100 [IU]/ML
5 INJECTION, SOLUTION SUBCUTANEOUS
Qty: 50 | Refills: 0 | Status: DISCONTINUED | OUTPATIENT
Start: 2024-03-04 | End: 2024-03-04

## 2024-03-04 RX ORDER — PANTOPRAZOLE SODIUM 20 MG/1
40 TABLET, DELAYED RELEASE ORAL DAILY
Refills: 0 | Status: DISCONTINUED | OUTPATIENT
Start: 2024-03-04 | End: 2024-03-07

## 2024-03-04 RX ORDER — DILTIAZEM HCL 120 MG
0 CAPSULE, EXT RELEASE 24 HR ORAL
Qty: 0 | Refills: 0 | DISCHARGE

## 2024-03-04 RX ORDER — ESCITALOPRAM OXALATE 10 MG/1
10 TABLET, FILM COATED ORAL DAILY
Refills: 0 | Status: DISCONTINUED | OUTPATIENT
Start: 2024-03-04 | End: 2024-03-11

## 2024-03-04 RX ORDER — SODIUM CHLORIDE 9 MG/ML
1000 INJECTION, SOLUTION INTRAVENOUS
Refills: 0 | Status: DISCONTINUED | OUTPATIENT
Start: 2024-03-04 | End: 2024-03-05

## 2024-03-04 RX ORDER — POTASSIUM PHOSPHATE, MONOBASIC POTASSIUM PHOSPHATE, DIBASIC 236; 224 MG/ML; MG/ML
30 INJECTION, SOLUTION INTRAVENOUS ONCE
Refills: 0 | Status: COMPLETED | OUTPATIENT
Start: 2024-03-04 | End: 2024-03-04

## 2024-03-04 RX ORDER — MAGNESIUM SULFATE 500 MG/ML
2 VIAL (ML) INJECTION ONCE
Refills: 0 | Status: COMPLETED | OUTPATIENT
Start: 2024-03-04 | End: 2024-03-04

## 2024-03-04 RX ORDER — ATORVASTATIN CALCIUM 80 MG/1
1 TABLET, FILM COATED ORAL
Qty: 0 | Refills: 0 | DISCHARGE

## 2024-03-04 RX ORDER — MULTIVIT-MIN/FERROUS GLUCONATE 9 MG/15 ML
1 LIQUID (ML) ORAL DAILY
Refills: 0 | Status: DISCONTINUED | OUTPATIENT
Start: 2024-03-04 | End: 2024-03-11

## 2024-03-04 RX ORDER — DEXTROSE MONOHYDRATE, SODIUM CHLORIDE, AND POTASSIUM CHLORIDE 50; .745; 4.5 G/1000ML; G/1000ML; G/1000ML
1000 INJECTION, SOLUTION INTRAVENOUS
Refills: 0 | Status: DISCONTINUED | OUTPATIENT
Start: 2024-03-04 | End: 2024-03-04

## 2024-03-04 RX ORDER — DEXTROSE 50 % IN WATER 50 %
50 SYRINGE (ML) INTRAVENOUS
Refills: 0 | Status: DISCONTINUED | OUTPATIENT
Start: 2024-03-04 | End: 2024-03-06

## 2024-03-04 RX ORDER — ACETAMINOPHEN 500 MG
650 TABLET ORAL ONCE
Refills: 0 | Status: COMPLETED | OUTPATIENT
Start: 2024-03-04 | End: 2024-03-04

## 2024-03-04 RX ORDER — ONDANSETRON 8 MG/1
4 TABLET, FILM COATED ORAL EVERY 4 HOURS
Refills: 0 | Status: DISCONTINUED | OUTPATIENT
Start: 2024-03-04 | End: 2024-03-11

## 2024-03-04 RX ORDER — DIAZEPAM 5 MG
10 TABLET ORAL EVERY 4 HOURS
Refills: 0 | Status: DISCONTINUED | OUTPATIENT
Start: 2024-03-04 | End: 2024-03-04

## 2024-03-04 RX ORDER — DIAZEPAM 5 MG
10 TABLET ORAL EVERY 6 HOURS
Refills: 0 | Status: DISCONTINUED | OUTPATIENT
Start: 2024-03-04 | End: 2024-03-04

## 2024-03-04 RX ORDER — DEXTROSE 10 % IN WATER 10 %
1000 INTRAVENOUS SOLUTION INTRAVENOUS
Refills: 0 | Status: DISCONTINUED | OUTPATIENT
Start: 2024-03-04 | End: 2024-03-05

## 2024-03-04 RX ORDER — HYDROMORPHONE HYDROCHLORIDE 2 MG/ML
1 INJECTION INTRAMUSCULAR; INTRAVENOUS; SUBCUTANEOUS EVERY 4 HOURS
Refills: 0 | Status: DISCONTINUED | OUTPATIENT
Start: 2024-03-04 | End: 2024-03-04

## 2024-03-04 RX ORDER — SODIUM CHLORIDE 9 MG/ML
1000 INJECTION, SOLUTION INTRAVENOUS
Refills: 0 | Status: DISCONTINUED | OUTPATIENT
Start: 2024-03-04 | End: 2024-03-04

## 2024-03-04 RX ORDER — FAMOTIDINE 10 MG/ML
1 INJECTION INTRAVENOUS
Refills: 0 | DISCHARGE

## 2024-03-04 RX ORDER — CHLORHEXIDINE GLUCONATE 213 G/1000ML
1 SOLUTION TOPICAL
Refills: 0 | Status: DISCONTINUED | OUTPATIENT
Start: 2024-03-04 | End: 2024-03-11

## 2024-03-04 RX ORDER — DEXTROSE 50 % IN WATER 50 %
25 SYRINGE (ML) INTRAVENOUS ONCE
Refills: 0 | Status: COMPLETED | OUTPATIENT
Start: 2024-03-04 | End: 2024-03-04

## 2024-03-04 RX ORDER — DIAZEPAM 5 MG
TABLET ORAL
Refills: 0 | Status: DISCONTINUED | OUTPATIENT
Start: 2024-03-04 | End: 2024-03-04

## 2024-03-04 RX ORDER — DEXTROSE 50 % IN WATER 50 %
50 SYRINGE (ML) INTRAVENOUS ONCE
Refills: 0 | Status: COMPLETED | OUTPATIENT
Start: 2024-03-04 | End: 2024-03-04

## 2024-03-04 RX ORDER — THIAMINE MONONITRATE (VIT B1) 100 MG
100 TABLET ORAL DAILY
Refills: 0 | Status: DISCONTINUED | OUTPATIENT
Start: 2024-03-04 | End: 2024-03-04

## 2024-03-04 RX ADMIN — SODIUM CHLORIDE 1000 MILLILITER(S): 9 INJECTION, SOLUTION INTRAVENOUS at 11:19

## 2024-03-04 RX ADMIN — Medication 1 MILLIGRAM(S): at 11:20

## 2024-03-04 RX ADMIN — SODIUM CHLORIDE 75 MILLILITER(S): 9 INJECTION, SOLUTION INTRAVENOUS at 20:24

## 2024-03-04 RX ADMIN — ESCITALOPRAM OXALATE 10 MILLIGRAM(S): 10 TABLET, FILM COATED ORAL at 11:19

## 2024-03-04 RX ADMIN — Medication 650 MILLIGRAM(S): at 12:42

## 2024-03-04 RX ADMIN — Medication 4 MILLIGRAM(S): at 18:22

## 2024-03-04 RX ADMIN — Medication 650 MILLIGRAM(S): at 14:49

## 2024-03-04 RX ADMIN — INSULIN HUMAN 4 UNIT(S)/HR: 100 INJECTION, SOLUTION SUBCUTANEOUS at 20:27

## 2024-03-04 RX ADMIN — GABAPENTIN 100 MILLIGRAM(S): 400 CAPSULE ORAL at 23:02

## 2024-03-04 RX ADMIN — GABAPENTIN 100 MILLIGRAM(S): 400 CAPSULE ORAL at 14:13

## 2024-03-04 RX ADMIN — Medication 5 MILLIGRAM(S): at 00:06

## 2024-03-04 RX ADMIN — Medication 4 MILLIGRAM(S): at 23:15

## 2024-03-04 RX ADMIN — Medication 50 MILLILITER(S): at 16:50

## 2024-03-04 RX ADMIN — Medication 25 GRAM(S): at 17:34

## 2024-03-04 RX ADMIN — CHLORHEXIDINE GLUCONATE 1 APPLICATION(S): 213 SOLUTION TOPICAL at 05:08

## 2024-03-04 RX ADMIN — SODIUM CHLORIDE 100 MILLILITER(S): 9 INJECTION, SOLUTION INTRAVENOUS at 04:00

## 2024-03-04 RX ADMIN — Medication 2 MILLIGRAM(S): at 06:25

## 2024-03-04 RX ADMIN — INSULIN HUMAN 5 UNIT(S)/HR: 100 INJECTION, SOLUTION SUBCUTANEOUS at 08:59

## 2024-03-04 RX ADMIN — Medication 40 MILLIEQUIVALENT(S): at 12:42

## 2024-03-04 RX ADMIN — Medication 100 MILLILITER(S): at 09:18

## 2024-03-04 RX ADMIN — Medication 40 MILLIEQUIVALENT(S): at 23:18

## 2024-03-04 RX ADMIN — Medication 10 MILLIGRAM(S): at 02:49

## 2024-03-04 RX ADMIN — Medication 2 MILLIGRAM(S): at 09:18

## 2024-03-04 RX ADMIN — Medication 2 MILLIGRAM(S): at 04:50

## 2024-03-04 RX ADMIN — Medication 25 MILLILITER(S): at 09:00

## 2024-03-04 RX ADMIN — Medication 1 TABLET(S): at 11:19

## 2024-03-04 RX ADMIN — POTASSIUM PHOSPHATE, MONOBASIC POTASSIUM PHOSPHATE, DIBASIC 83.33 MILLIMOLE(S): 236; 224 INJECTION, SOLUTION INTRAVENOUS at 23:16

## 2024-03-04 RX ADMIN — PANTOPRAZOLE SODIUM 40 MILLIGRAM(S): 20 TABLET, DELAYED RELEASE ORAL at 11:20

## 2024-03-04 RX ADMIN — Medication 2 MILLIGRAM(S): at 11:18

## 2024-03-04 RX ADMIN — Medication 75 MILLILITER(S): at 20:25

## 2024-03-04 RX ADMIN — ONDANSETRON 4 MILLIGRAM(S): 8 TABLET, FILM COATED ORAL at 04:00

## 2024-03-04 RX ADMIN — Medication 25 MILLILITER(S): at 11:20

## 2024-03-04 RX ADMIN — Medication 2 MILLIGRAM(S): at 14:14

## 2024-03-04 RX ADMIN — Medication 5 MILLIGRAM(S): at 00:05

## 2024-03-04 RX ADMIN — Medication 100 MILLIGRAM(S): at 11:56

## 2024-03-04 NOTE — PROGRESS NOTE ADULT - SUBJECTIVE AND OBJECTIVE BOX
Patient seen and evaluated this am, c/o N/V and abdominal pain      T(F): 100.2 (03-04-24 @ 08:00), Max: 100.2 (03-04-24 @ 08:00)  HR: 129 (03-04-24 @ 08:00)  BP: 138/83 (03-04-24 @ 08:00)  RR: 22  SpO2: 99% (03-04-24 @ 08:00) (98% - 100%)    PHYSICAL EXAM:  GENERAL: NAD  HEAD:  Atraumatic, Normocephalic  EYES: EOMI, PERRLA, conjunctiva and sclera clear  NERVOUS SYSTEM:  Alert & Oriented X3, no focal deficits   CHEST/LUNG: Clear to percussion bilaterally; No rales, rhonchi, wheezing, or rubs  HEART: Regular rate and rhythm; No murmurs, rubs, or gallops  ABDOMEN: Soft, diffuse tenderness, no rebound   EXTREMITIES:  2+ Peripheral Pulses, No clubbing, cyanosis, or edema    LABS  03-04    134<L>  |  89<L>  |  9<L>  ----------------------------<  65<L>  4.5   |  10<L>  |  0.7    Ca    8.2<L>      04 Mar 2024 05:56  Phos  2.3     03-04  Mg     1.9     03-04    TPro  7.8  /  Alb  4.3  /  TBili  1.7<H>  /  DBili  1.0<H>  /  AST  207<H>  /  ALT  132<H>  /  AlkPhos  100  03-04                          12.0   4.72  )-----------( 116      ( 04 Mar 2024 05:56 )             35.5     PT/INR - ( 03 Mar 2024 23:37 )   PT: 11.60 sec;   INR: 1.02 ratio         PTT - ( 03 Mar 2024 23:37 )  PTT:29.6 sec    Alcohol, Blood (03.03.24 @ 23:37)   Alcohol, Blood: 273 mg/dL    Lipase (03.04.24 @ 05:56)   Lipase: 113 U/L    RADIOLOGY  < from: Xray Chest 1 View- PORTABLE-Urgent (03.03.24 @ 23:12) >  Impression:    No radiographic evidence of acute cardiopulmonary disease.      < end of copied text >    MEDICATIONS  (STANDING):  chlorhexidine 2% Cloths 1 Application(s) Topical <User Schedule>  dextrose 10%. 1000 milliLiter(s) (100 mL/Hr) IV Continuous <Continuous>  dextrose 50% Injectable 50 milliLiter(s) IV Push every 15 minutes  escitalopram 10 milliGRAM(s) Oral daily  folic acid 1 milliGRAM(s) Oral daily  gabapentin 100 milliGRAM(s) Oral three times a day  heparin   Injectable 5000 Unit(s) SubCutaneous every 8 hours  insulin regular Infusion 5 Unit(s)/Hr (5 mL/Hr) IV Continuous <Continuous>  lactated ringers Bolus 1000 milliLiter(s) IV Bolus once  lactated ringers. 1000 milliLiter(s) (150 mL/Hr) IV Continuous <Continuous>  LORazepam   Injectable 2 milliGRAM(s) IV Push every 4 hours  LORazepam   Injectable   IV Push   multivitamin/minerals 1 Tablet(s) Oral daily  pantoprazole  Injectable 40 milliGRAM(s) IV Push daily  sodium chloride 0.45% with potassium chloride 20 mEq/L 1000 milliLiter(s) (150 mL/Hr) IV Continuous <Continuous>  thiamine Injectable 100 milliGRAM(s) IntraMuscular daily    MEDICATIONS  (PRN):  LORazepam   Injectable 2 milliGRAM(s) IV Push every 1 hour PRN Symptom-triggered: each CIWA -Ar score 8 or GREATER  ondansetron Injectable 4 milliGRAM(s) IV Push every 4 hours PRN Nausea and/or Vomiting

## 2024-03-04 NOTE — ED ADULT NURSE REASSESSMENT NOTE - NS ED NURSE REASSESS COMMENT FT1
US IV placed by ICU BOBBI Barahona for CT scan. Pt medicated with prn IV Diazapam as per ICU PA Naveen. Pt to go to assigned stepdown room after CT scan.

## 2024-03-04 NOTE — H&P ADULT - ASSESSMENT
middle aged W female w/ acute alcohol WD/ HAGMA secondary to alcoholic ketoacidosis  Hx- alcohol use disorder, anxiety, depression, pancreatitis, graves disease, noncompliance to medication,     Plan:  adm to ICU stepdown  IVF hydration  npo for now  f/u:CT A/P r/o acute pancreatitis  treat alcohol Wd w/ IV valium taper   F/U repeat labs supplement electrolyre imbalance ( Mg, PO4)  zofran IV prn  dilaudid IV prn for possible pancreatitis  psyche consult  addiction med consult  discussed w/ ICU MD Horta

## 2024-03-04 NOTE — H&P ADULT - HISTORY OF PRESENT ILLNESS
44 yo W female w/ PMH - chronic alcohol use disorder plus other medical history as noted below.  Pt come to hospital after being discharged about 6 weeks ago for alcohol WD.  Pt is in ER w/ alcohol WD, severe abdominal pains , nausea, vomiting , HAGMA,  w/ elevated: LFT, lipase, bilirubin. Case discussed w/ ICU MD .Plan to adm to ICU stepdown

## 2024-03-04 NOTE — ED ADULT NURSE NOTE - NSFALLRISKINTERV_ED_ALL_ED

## 2024-03-04 NOTE — CONSULT NOTE ADULT - ASSESSMENT
IMPRESSION:    HAGMA likely secondary to DKA vs AKA with lactic acidosis   EtOH abuse - last drink 3/3   Alcohol withdrawal     PLAN:    CNS: CIWA taper. Thiamine and folic acid. CW home psych meds.     HEENT: Oral care    PULMONARY:  HOB @ 45 degrees. On room air.     CARDIOVASCULAR: Recent ECHO noted with EF 57%. 1L LR bolus now. Switch fluids to D10 LR at 150. CHEETAH. EKG. Trend lactate.     GI: GI prophylaxis.  Feeding as tolerated. Zofran PRN.     RENAL:  Follow up lytes. Monitor BMP with Mg and Phos closely while on insulin gtt. Correct as needed.     INFECTIOUS DISEASE: Monitor off abx for now. Check procal.     HEMATOLOGICAL:  DVT prophylaxis.    ENDOCRINE:  Monitor FS closely. Start insulin gtt. Check TSH, T3, T4. A1c.     MUSCULOSKELETAL: Bed rest for now.     ICU monitoring.      IMPRESSION:    HAGMA likely secondary to DKA vs AKA with lactic acidosis   EtOH abuse - last drink 3/3   Alcohol withdrawal   ?? pancreatitis   PLAN:    CNS: CIWA taper. Thiamine and folic acid. CW home psych meds.     HEENT: Oral care    PULMONARY:  HOB @ 45 degrees. On room air.     CARDIOVASCULAR: Recent ECHO noted with EF 57%. 1L LR bolus now. Switch fluids to D10 LR at 150. CHEETAH. EKG. Trend lactate.     GI: GI prophylaxis.  Feeding as tolerated. Zofran PRN.     RENAL:  Follow up lytes. Monitor BMP with Mg and Phos closely while on insulin gtt. Correct as needed.     INFECTIOUS DISEASE: Monitor off abx for now. Check procal.     HEMATOLOGICAL:  DVT prophylaxis.    ENDOCRINE:  Monitor FS closely. Start insulin gtt. Check TSH, T3, T4. A1c.     MUSCULOSKELETAL: Bed rest for now.     ICU monitoring.

## 2024-03-04 NOTE — ED ADULT NURSE NOTE - NS ED PATIENT SAFETY CONCERN
No
PAST MEDICAL HISTORY:  Atrial fibrillation     Breast cancer     Colon cancer     HTN (hypertension)     Pacemaker     Renal insufficiency

## 2024-03-04 NOTE — CONSULT NOTE ADULT - PROBLEM SELECTOR RECOMMENDATION 9
After evaluation at this time Ativan protocol can transition to oral when stable and downgraded to floor......Pt should be on Thiamine and Folic acid. Pt will be monitored and supportive care provided.  Obtain UDS if not done already.  Use of Vistaril for anxiety.  Consider adding gabapentin for anxiety standing order and follow up with PMD/Program for continuation of treatment.    Abdominal ultrasound AFP every 6 months for HCC screening  -EGD outpatient for esophageal varices screening   -Follow up with Private GI or our [facility] located at [address] [place] [address]. Phone Number: [phone]  -Alcohol counseling provided   CATCH team involved for aftercare and pt will follow up with aftercare

## 2024-03-04 NOTE — PATIENT PROFILE ADULT - FALL HARM RISK - HARM RISK INTERVENTIONS
Assistance with ambulation/Assistance OOB with selected safe patient handling equipment/Communicate Risk of Fall with Harm to all staff/Monitor for mental status changes/Monitor gait and stability/Reinforce activity limits and safety measures with patient and family/Review medications for side effects contributing to fall risk/Sit up slowly, dangle for a short time, stand at bedside before walking/Tailored Fall Risk Interventions/Toileting schedule using arm’s reach rule for commode and bathroom/Use of alarms - bed, chair and/or voice tab/Visual Cue: Yellow wristband and red socks/Bed in lowest position, wheels locked, appropriate side rails in place/Call bell, personal items and telephone in reach/Instruct patient to call for assistance before getting out of bed or chair/Non-slip footwear when patient is out of bed/[place] to call system/Physically safe environment - no spills, clutter or unnecessary equipment/Purposeful Proactive Rounding/Room/bathroom lighting operational, light cord in reach

## 2024-03-04 NOTE — H&P ADULT - NSHPLABSRESULTS_GEN_ALL_CORE
13.2   5.61  )-----------( 153      ( 03 Mar 2024 23:37 )             39.2       03-03    130<L>  |  85<L>  |  10  ----------------------------<  65<L>  4.0   |  9<LL>  |  0.7    Ca    8.8      03 Mar 2024 23:37    TPro  8.8<H>  /  Alb  5.0  /  TBili  1.9<H>  /  DBili  x   /  AST  267<H>  /  ALT  158<H>  /  AlkPhos  114  03-03              Urinalysis Basic - ( 03 Mar 2024 23:37 )    Color: x / Appearance: x / SG: x / pH: x  Gluc: 65 mg/dL / Ketone: x  / Bili: x / Urobili: x   Blood: x / Protein: x / Nitrite: x   Leuk Esterase: x / RBC: x / WBC x   Sq Epi: x / Non Sq Epi: x / Bacteria: x        PT/INR - ( 03 Mar 2024 23:37 )   PT: 11.60 sec;   INR: 1.02 ratio         PTT - ( 03 Mar 2024 23:37 )  PTT:29.6 sec    Lactate Trend  03-03 @ 23:37 Lactate:4.8             CAPILLARY BLOOD GLUCOSE

## 2024-03-04 NOTE — H&P ADULT - NSHPPHYSICALEXAM_GEN_ALL_CORE
GENERAL:  42y/o obese W  Female , c/o abdominal pain  HEAD:  Atraumatic, Normocephalic  EYES: EOMI, PERRLA, conjunctiva and sclera clear  NECK: Supple, No JVD, no cervical lymphadenopathy, non-tender  CHEST/LUNG: Clear to auscultation bilaterally; No wheeze, rhonchi, or rales  HEART: Regular rate and rhythm; S1&S2  ABDOMEN: Soft, generalized tenderness , Nondistended x 4 quadrants; Bowel sounds present  EXTREMITIES:   Peripheral Pulses Present, No clubbing, no cyanosis, or no edema, no calf tenderness  PSYCH: AAOx3, cooperative, appropriate  NEUROLOGY: WNL  SKIN: WNL 208.7

## 2024-03-04 NOTE — PROGRESS NOTE ADULT - ASSESSMENT
43-year-old female with a medical history of depression, anxiety, alcohol abuse, GERD, last admission for detox was in January 15, 2024 presented to ED yesterday intoxicated c/o abdominal pain associated with nausea and vomiting.     alcoholic ketoacidosis / alcoholic pancreatitis / suspected thiamine and folate deficiency     - give D51/2ns   - follow serial chemistries    - check CT abdomen results   - DC alcohol   - may give benzo detox protocol to prevent withdrawal   - thiamine and folate   - DVT prophylaxis

## 2024-03-04 NOTE — CONSULT NOTE ADULT - SUBJECTIVE AND OBJECTIVE BOX
Patient is a 43y old  Female who presents with a chief complaint of severe HAGMA w/ etoh abuse disorder (04 Mar 2024 01:45)      HPI:  44 yo W female w/ PMH - chronic alcohol use disorder plus other medical history as noted below.  Pt come to hospital after being discharged about 6 weeks ago for alcohol WD.  Pt is in ER w/ alcohol WD, severe abdominal pains , nausea, vomiting , HAGMA,  w/ elevated: LFT, lipase, bilirubin. Case discussed w/ ICU MD .Plan to adm to ICU stepdown (04 Mar 2024 01:45)      PAST MEDICAL & SURGICAL HISTORY:  Alcoholism      Anxiety      Graves' disease      GERD (gastroesophageal reflux disease)      Gastritis      MVP (mitral valve prolapse)      Nicotine dependence      Benzodiazepine misuse      Hypercholesterolemia      Obesity      Esophagitis      History of Graves' disease      Heart murmur      Hyperlipidemia, unspecified hyperlipidemia type      Hemochromatosis      Fatty liver      History of  section      S/P tendon repair  5th finger right hand          SOCIAL HX:   Smoking                         ETOH    Drinks 1.75L vodka daily.                   Other    FAMILY HISTORY:  Benzodiazepine misuse    :  No known cardiovacular family hisotry     Review Of Systems:     All ROS are negative except per HPI       Allergies    Robaxin (Unknown)  morphine (Unknown)    Intolerances          PHYSICAL EXAM    ICU Vital Signs Last 24 Hrs  T(C): 37.2 (04 Mar 2024 03:23), Max: 37.3 (04 Mar 2024 01:57)  T(F): 99 (04 Mar 2024 03:23), Max: 99.2 (04 Mar 2024 01:57)  HR: 118 (04 Mar 2024 03:23) (105 - 118)  BP: 145/86 (04 Mar 2024 03:23) (145/86 - 173/93)  BP(mean): 111 (04 Mar 2024 03:23) (111 - 111)  ABP: --  ABP(mean): --  RR: 30 (04 Mar 2024 03:23) (18 - 30)  SpO2: 100% (04 Mar 2024 03:23) (98% - 100%)    O2 Parameters below as of 04 Mar 2024 03:23  Patient On (Oxygen Delivery Method): room air            CONSTITUTIONAL:  ill appearing  NAD    ENT:   Airway patent,   Mouth with normal mucosa.     CARDIAC:   Tachycardic   No edema    RESPIRATORY:   No wheezing  Bilateral BS   Not tachypneic,  No use of accessory muscles    GASTROINTESTINAL:  Abdomen soft,   Non-tender,   No guarding,   + BS    NEUROLOGICAL:   Alert and oriented   No motor deficits.    SKIN:   Skin normal color for race,   No evidence of rash.            24 @ 07:01  -  24 @ 07:00  --------------------------------------------------------  IN:    dextrose 5% + sodium chloride 0.45%: 400 mL  Total IN: 400 mL    OUT:    Voided (mL): 350 mL  Total OUT: 350 mL    Total NET: 50 mL          LABS:                          12.0   4.72  )-----------( 116      ( 04 Mar 2024 05:56 )             35.5                                               -    134<L>  |  89<L>  |  9<L>  ----------------------------<  65<L>  4.5   |  10<L>  |  0.7    Ca    8.2<L>      04 Mar 2024 05:56  Phos  2.3     -  Mg     1.9     -    TPro  7.8  /  Alb  4.3  /  TBili  1.7<H>  /  DBili  1.0<H>  /  AST  207<H>  /  ALT  132<H>  /  AlkPhos  100  03-04      PT/INR - ( 03 Mar 2024 23:37 )   PT: 11.60 sec;   INR: 1.02 ratio         PTT - ( 03 Mar 2024 23:37 )  PTT:29.6 sec                                       Urinalysis Basic - ( 04 Mar 2024 05:56 )    Color: x / Appearance: x / SG: x / pH: x  Gluc: 65 mg/dL / Ketone: x  / Bili: x / Urobili: x   Blood: x / Protein: x / Nitrite: x   Leuk Esterase: x / RBC: x / WBC x   Sq Epi: x / Non Sq Epi: x / Bacteria: x                                                  LIVER FUNCTIONS - ( 04 Mar 2024 05:56 )  Alb: 4.3 g/dL / Pro: 7.8 g/dL / ALK PHOS: 100 U/L / ALT: 132 U/L / AST: 207 U/L / GGT: x                                                                                                                                       X-Rays reviewed                                                                                     ECHO      MEDICATIONS  (STANDING):  chlorhexidine 2% Cloths 1 Application(s) Topical <User Schedule>  dextrose 5% + sodium chloride 0.45%. 1000 milliLiter(s) (100 mL/Hr) IV Continuous <Continuous>  dextrose 50% Injectable 25 milliLiter(s) IV Push once  dextrose 50% Injectable 50 milliLiter(s) IV Push every 15 minutes  escitalopram 10 milliGRAM(s) Oral daily  folic acid 1 milliGRAM(s) Oral daily  gabapentin 100 milliGRAM(s) Oral three times a day  heparin   Injectable 5000 Unit(s) SubCutaneous every 8 hours  insulin regular Infusion 5 Unit(s)/Hr (5 mL/Hr) IV Continuous <Continuous>  lactated ringers Bolus 1000 milliLiter(s) IV Bolus once  LORazepam   Injectable 2 milliGRAM(s) IV Push every 4 hours  LORazepam   Injectable   IV Push   multivitamin/minerals 1 Tablet(s) Oral daily  pantoprazole  Injectable 40 milliGRAM(s) IV Push daily  thiamine Injectable 100 milliGRAM(s) IntraMuscular daily    MEDICATIONS  (PRN):  LORazepam   Injectable 2 milliGRAM(s) IV Push every 1 hour PRN Symptom-triggered: each CIWA -Ar score 8 or GREATER  ondansetron Injectable 4 milliGRAM(s) IV Push every 4 hours PRN Nausea and/or Vomiting

## 2024-03-04 NOTE — H&P ADULT - NS ATTEND AMEND GEN_ALL_CORE FT
middle aged W female w/ acute alcohol WD/ HAGMA secondary to alcoholic ketoacidosis  Hx- alcohol use disorder, anxiety, depression, pancreatitis, graves disease, noncompliance to medication,     Plan:  adm to ICU stepdown  IVF hydration  npo for now  f/u:CT A/P r/o acute pancreatitis  treat alcohol Wd w/ IV valium taper   F/U repeat labs supplement electrolyre imbalance ( Mg, PO4)  zofran IV prn  dilaudid IV prn for possible pancreatitis  psyche consult  addiction med consult

## 2024-03-04 NOTE — CONSULT NOTE ADULT - ATTENDING COMMENTS
patient seen and examined agree above note   start insulin drip for DKA   follow FS Q30 min for now   will start D10 to cover for hypoglycemia if FS >150 switch to D5   follow BMP Q4 hrs   send serum osmolarity and calculate osmolar gap   repeat VBG in 2 hrs follow LA , bicarb lytes   ciwa protocol   thiamine folate   endo consult   TSH. t3 , t4   HbA1c   procal   follow LFt   follow ct abd result   bolus 1 liter LR now

## 2024-03-05 LAB
ALBUMIN SERPL ELPH-MCNC: 3.7 G/DL — SIGNIFICANT CHANGE UP (ref 3.5–5.2)
ALP SERPL-CCNC: 75 U/L — SIGNIFICANT CHANGE UP (ref 30–115)
ALT FLD-CCNC: 83 U/L — HIGH (ref 0–41)
AMPHET UR-MCNC: NEGATIVE — SIGNIFICANT CHANGE UP
ANION GAP SERPL CALC-SCNC: 10 MMOL/L — SIGNIFICANT CHANGE UP (ref 7–14)
ANION GAP SERPL CALC-SCNC: 10 MMOL/L — SIGNIFICANT CHANGE UP (ref 7–14)
ANION GAP SERPL CALC-SCNC: 12 MMOL/L — SIGNIFICANT CHANGE UP (ref 7–14)
ANION GAP SERPL CALC-SCNC: 12 MMOL/L — SIGNIFICANT CHANGE UP (ref 7–14)
ANION GAP SERPL CALC-SCNC: 15 MMOL/L — HIGH (ref 7–14)
APTT BLD: 29.3 SEC — SIGNIFICANT CHANGE UP (ref 27–39.2)
AST SERPL-CCNC: 110 U/L — HIGH (ref 0–41)
BARBITURATES UR SCN-MCNC: NEGATIVE — SIGNIFICANT CHANGE UP
BASOPHILS # BLD AUTO: 0 K/UL — SIGNIFICANT CHANGE UP (ref 0–0.2)
BASOPHILS # BLD AUTO: 0.01 K/UL — SIGNIFICANT CHANGE UP (ref 0–0.2)
BASOPHILS NFR BLD AUTO: 0 % — SIGNIFICANT CHANGE UP (ref 0–1)
BASOPHILS NFR BLD AUTO: 0.4 % — SIGNIFICANT CHANGE UP (ref 0–1)
BENZODIAZ UR-MCNC: NEGATIVE — SIGNIFICANT CHANGE UP
BILIRUB DIRECT SERPL-MCNC: 0.6 MG/DL — HIGH (ref 0–0.3)
BILIRUB INDIRECT FLD-MCNC: 0.8 MG/DL — SIGNIFICANT CHANGE UP (ref 0.2–1.2)
BILIRUB SERPL-MCNC: 1.4 MG/DL — HIGH (ref 0.2–1.2)
BUN SERPL-MCNC: 4 MG/DL — LOW (ref 10–20)
BUN SERPL-MCNC: 4 MG/DL — LOW (ref 10–20)
BUN SERPL-MCNC: 5 MG/DL — LOW (ref 10–20)
CALCIUM SERPL-MCNC: 8.3 MG/DL — LOW (ref 8.4–10.5)
CALCIUM SERPL-MCNC: 8.4 MG/DL — SIGNIFICANT CHANGE UP (ref 8.4–10.5)
CALCIUM SERPL-MCNC: 8.5 MG/DL — SIGNIFICANT CHANGE UP (ref 8.4–10.5)
CALCIUM SERPL-MCNC: 8.6 MG/DL — SIGNIFICANT CHANGE UP (ref 8.4–10.5)
CALCIUM SERPL-MCNC: 8.8 MG/DL — SIGNIFICANT CHANGE UP (ref 8.4–10.5)
CHLORIDE SERPL-SCNC: 100 MMOL/L — SIGNIFICANT CHANGE UP (ref 98–110)
CHLORIDE SERPL-SCNC: 101 MMOL/L — SIGNIFICANT CHANGE UP (ref 98–110)
CHLORIDE SERPL-SCNC: 102 MMOL/L — SIGNIFICANT CHANGE UP (ref 98–110)
CO2 SERPL-SCNC: 22 MMOL/L — SIGNIFICANT CHANGE UP (ref 17–32)
CO2 SERPL-SCNC: 25 MMOL/L — SIGNIFICANT CHANGE UP (ref 17–32)
CO2 SERPL-SCNC: 26 MMOL/L — SIGNIFICANT CHANGE UP (ref 17–32)
COCAINE METAB.OTHER UR-MCNC: NEGATIVE — SIGNIFICANT CHANGE UP
CREAT SERPL-MCNC: 0.6 MG/DL — LOW (ref 0.7–1.5)
CREAT SERPL-MCNC: 0.7 MG/DL — SIGNIFICANT CHANGE UP (ref 0.7–1.5)
CREAT SERPL-MCNC: 0.8 MG/DL — SIGNIFICANT CHANGE UP (ref 0.7–1.5)
D DIMER BLD IA.RAPID-MCNC: <150 NG/ML DDU — SIGNIFICANT CHANGE UP
EGFR: 111 ML/MIN/1.73M2 — SIGNIFICANT CHANGE UP
EGFR: 116 ML/MIN/1.73M2 — SIGNIFICANT CHANGE UP
EGFR: 95 ML/MIN/1.73M2 — SIGNIFICANT CHANGE UP
EOSINOPHIL # BLD AUTO: 0.07 K/UL — SIGNIFICANT CHANGE UP (ref 0–0.7)
EOSINOPHIL NFR BLD AUTO: 0 % — SIGNIFICANT CHANGE UP (ref 0–8)
EOSINOPHIL NFR BLD AUTO: 2.8 % — SIGNIFICANT CHANGE UP (ref 0–8)
FIBRINOGEN PPP-MCNC: 215 MG/DL — SIGNIFICANT CHANGE UP (ref 200–435)
GLUCOSE BLDC GLUCOMTR-MCNC: 100 MG/DL — HIGH (ref 70–99)
GLUCOSE BLDC GLUCOMTR-MCNC: 100 MG/DL — HIGH (ref 70–99)
GLUCOSE BLDC GLUCOMTR-MCNC: 101 MG/DL — HIGH (ref 70–99)
GLUCOSE BLDC GLUCOMTR-MCNC: 101 MG/DL — HIGH (ref 70–99)
GLUCOSE BLDC GLUCOMTR-MCNC: 102 MG/DL — HIGH (ref 70–99)
GLUCOSE BLDC GLUCOMTR-MCNC: 104 MG/DL — HIGH (ref 70–99)
GLUCOSE BLDC GLUCOMTR-MCNC: 104 MG/DL — HIGH (ref 70–99)
GLUCOSE BLDC GLUCOMTR-MCNC: 105 MG/DL — HIGH (ref 70–99)
GLUCOSE BLDC GLUCOMTR-MCNC: 106 MG/DL — HIGH (ref 70–99)
GLUCOSE BLDC GLUCOMTR-MCNC: 107 MG/DL — HIGH (ref 70–99)
GLUCOSE BLDC GLUCOMTR-MCNC: 114 MG/DL — HIGH (ref 70–99)
GLUCOSE BLDC GLUCOMTR-MCNC: 119 MG/DL — HIGH (ref 70–99)
GLUCOSE BLDC GLUCOMTR-MCNC: 131 MG/DL — HIGH (ref 70–99)
GLUCOSE BLDC GLUCOMTR-MCNC: 85 MG/DL — SIGNIFICANT CHANGE UP (ref 70–99)
GLUCOSE BLDC GLUCOMTR-MCNC: 92 MG/DL — SIGNIFICANT CHANGE UP (ref 70–99)
GLUCOSE SERPL-MCNC: 100 MG/DL — HIGH (ref 70–99)
GLUCOSE SERPL-MCNC: 104 MG/DL — HIGH (ref 70–99)
GLUCOSE SERPL-MCNC: 105 MG/DL — HIGH (ref 70–99)
GLUCOSE SERPL-MCNC: 108 MG/DL — HIGH (ref 70–99)
GLUCOSE SERPL-MCNC: 120 MG/DL — HIGH (ref 70–99)
HCT VFR BLD CALC: 28.6 % — LOW (ref 37–47)
HCT VFR BLD CALC: 29.6 % — LOW (ref 37–47)
HGB BLD-MCNC: 10.4 G/DL — LOW (ref 12–16)
HGB BLD-MCNC: 9.9 G/DL — LOW (ref 12–16)
HYPOCHROMIA BLD QL: SLIGHT — SIGNIFICANT CHANGE UP
IMM GRANULOCYTES NFR BLD AUTO: 0.4 % — HIGH (ref 0.1–0.3)
INR BLD: 1.07 RATIO — SIGNIFICANT CHANGE UP (ref 0.65–1.3)
LYMPHOCYTES # BLD AUTO: 0.88 K/UL — LOW (ref 1.2–3.4)
LYMPHOCYTES # BLD AUTO: 1.06 K/UL — LOW (ref 1.2–3.4)
LYMPHOCYTES # BLD AUTO: 35.5 % — SIGNIFICANT CHANGE UP (ref 20.5–51.1)
LYMPHOCYTES # BLD AUTO: 40 % — SIGNIFICANT CHANGE UP (ref 20.5–51.1)
MAGNESIUM SERPL-MCNC: 2.1 MG/DL — SIGNIFICANT CHANGE UP (ref 1.8–2.4)
MANUAL SMEAR VERIFICATION: SIGNIFICANT CHANGE UP
MCHC RBC-ENTMCNC: 33.4 PG — HIGH (ref 27–31)
MCHC RBC-ENTMCNC: 34 PG — HIGH (ref 27–31)
MCHC RBC-ENTMCNC: 34.6 G/DL — SIGNIFICANT CHANGE UP (ref 32–37)
MCHC RBC-ENTMCNC: 35.1 G/DL — SIGNIFICANT CHANGE UP (ref 32–37)
MCV RBC AUTO: 98 FL — SIGNIFICANT CHANGE UP (ref 81–99)
MCV RBC AUTO: 98.3 FL — SIGNIFICANT CHANGE UP (ref 81–99)
METHADONE UR-MCNC: NEGATIVE — SIGNIFICANT CHANGE UP
MONOCYTES # BLD AUTO: 0.27 K/UL — SIGNIFICANT CHANGE UP (ref 0.1–0.6)
MONOCYTES # BLD AUTO: 0.31 K/UL — SIGNIFICANT CHANGE UP (ref 0.1–0.6)
MONOCYTES NFR BLD AUTO: 10 % — HIGH (ref 1.7–9.3)
MONOCYTES NFR BLD AUTO: 12.5 % — HIGH (ref 1.7–9.3)
MRSA PCR RESULT.: NEGATIVE — SIGNIFICANT CHANGE UP
NEUTROPHILS # BLD AUTO: 1.2 K/UL — LOW (ref 1.4–6.5)
NEUTROPHILS # BLD AUTO: 1.33 K/UL — LOW (ref 1.4–6.5)
NEUTROPHILS NFR BLD AUTO: 48 % — SIGNIFICANT CHANGE UP (ref 42.2–75.2)
NEUTROPHILS NFR BLD AUTO: 48.4 % — SIGNIFICANT CHANGE UP (ref 42.2–75.2)
NEUTS BAND # BLD: 2 % — SIGNIFICANT CHANGE UP (ref 0–6)
NRBC # BLD: 0 /100 WBCS — SIGNIFICANT CHANGE UP (ref 0–0)
NRBC # BLD: 0 /100 WBCS — SIGNIFICANT CHANGE UP (ref 0–0)
NRBC # BLD: SIGNIFICANT CHANGE UP /100 WBCS (ref 0–0)
OPIATES UR-MCNC: NEGATIVE — SIGNIFICANT CHANGE UP
PCP SPEC-MCNC: SIGNIFICANT CHANGE UP
PHOSPHATE SERPL-MCNC: 1.1 MG/DL — LOW (ref 2.1–4.9)
PLAT MORPH BLD: NORMAL — SIGNIFICANT CHANGE UP
PLATELET # BLD AUTO: 78 K/UL — LOW (ref 130–400)
PLATELET # BLD AUTO: 93 K/UL — LOW (ref 130–400)
PLATELET CLUMP BLD QL SMEAR: SIGNIFICANT CHANGE UP
PLATELET COUNT - ESTIMATE: ABNORMAL
PMV BLD: 9.3 FL — SIGNIFICANT CHANGE UP (ref 7.4–10.4)
PMV BLD: 9.5 FL — SIGNIFICANT CHANGE UP (ref 7.4–10.4)
POTASSIUM SERPL-MCNC: 4 MMOL/L — SIGNIFICANT CHANGE UP (ref 3.5–5)
POTASSIUM SERPL-MCNC: 4 MMOL/L — SIGNIFICANT CHANGE UP (ref 3.5–5)
POTASSIUM SERPL-MCNC: 4.1 MMOL/L — SIGNIFICANT CHANGE UP (ref 3.5–5)
POTASSIUM SERPL-MCNC: 4.2 MMOL/L — SIGNIFICANT CHANGE UP (ref 3.5–5)
POTASSIUM SERPL-MCNC: 4.3 MMOL/L — SIGNIFICANT CHANGE UP (ref 3.5–5)
POTASSIUM SERPL-SCNC: 4 MMOL/L — SIGNIFICANT CHANGE UP (ref 3.5–5)
POTASSIUM SERPL-SCNC: 4 MMOL/L — SIGNIFICANT CHANGE UP (ref 3.5–5)
POTASSIUM SERPL-SCNC: 4.1 MMOL/L — SIGNIFICANT CHANGE UP (ref 3.5–5)
POTASSIUM SERPL-SCNC: 4.2 MMOL/L — SIGNIFICANT CHANGE UP (ref 3.5–5)
POTASSIUM SERPL-SCNC: 4.3 MMOL/L — SIGNIFICANT CHANGE UP (ref 3.5–5)
PROCALCITONIN SERPL-MCNC: 0.41 NG/ML — HIGH (ref 0.02–0.1)
PROCALCITONIN SERPL-MCNC: 0.5 NG/ML — HIGH (ref 0.02–0.1)
PROPOXYPHENE QUALITATIVE URINE RESULT: NEGATIVE — SIGNIFICANT CHANGE UP
PROT SERPL-MCNC: 6 G/DL — SIGNIFICANT CHANGE UP (ref 6–8)
PROTHROM AB SERPL-ACNC: 12.2 SEC — SIGNIFICANT CHANGE UP (ref 9.95–12.87)
RBC # BLD: 2.91 M/UL — LOW (ref 4.2–5.4)
RBC # BLD: 3.11 M/UL — LOW (ref 4.2–5.4)
RBC # FLD: 14.1 % — SIGNIFICANT CHANGE UP (ref 11.5–14.5)
RBC # FLD: 14.2 % — SIGNIFICANT CHANGE UP (ref 11.5–14.5)
RBC BLD AUTO: ABNORMAL
SODIUM SERPL-SCNC: 136 MMOL/L — SIGNIFICANT CHANGE UP (ref 135–146)
SODIUM SERPL-SCNC: 137 MMOL/L — SIGNIFICANT CHANGE UP (ref 135–146)
SODIUM SERPL-SCNC: 137 MMOL/L — SIGNIFICANT CHANGE UP (ref 135–146)
SODIUM SERPL-SCNC: 138 MMOL/L — SIGNIFICANT CHANGE UP (ref 135–146)
SODIUM SERPL-SCNC: 139 MMOL/L — SIGNIFICANT CHANGE UP (ref 135–146)
STOMATOCYTES BLD QL SMEAR: SLIGHT — SIGNIFICANT CHANGE UP
WBC # BLD: 2.48 K/UL — LOW (ref 4.8–10.8)
WBC # BLD: 2.65 K/UL — LOW (ref 4.8–10.8)
WBC # FLD AUTO: 2.48 K/UL — LOW (ref 4.8–10.8)
WBC # FLD AUTO: 2.65 K/UL — LOW (ref 4.8–10.8)

## 2024-03-05 PROCEDURE — 99233 SBSQ HOSP IP/OBS HIGH 50: CPT

## 2024-03-05 PROCEDURE — 99253 IP/OBS CNSLTJ NEW/EST LOW 45: CPT

## 2024-03-05 PROCEDURE — 99231 SBSQ HOSP IP/OBS SF/LOW 25: CPT

## 2024-03-05 PROCEDURE — 70450 CT HEAD/BRAIN W/O DYE: CPT | Mod: 26

## 2024-03-05 PROCEDURE — 99291 CRITICAL CARE FIRST HOUR: CPT

## 2024-03-05 RX ORDER — INSULIN LISPRO 100/ML
3 VIAL (ML) SUBCUTANEOUS
Refills: 0 | Status: DISCONTINUED | OUTPATIENT
Start: 2024-03-05 | End: 2024-03-05

## 2024-03-05 RX ORDER — SODIUM,POTASSIUM PHOSPHATES 278-250MG
2 POWDER IN PACKET (EA) ORAL
Refills: 0 | Status: DISCONTINUED | OUTPATIENT
Start: 2024-03-05 | End: 2024-03-11

## 2024-03-05 RX ORDER — GLUCAGON INJECTION, SOLUTION 0.5 MG/.1ML
1 INJECTION, SOLUTION SUBCUTANEOUS ONCE
Refills: 0 | Status: DISCONTINUED | OUTPATIENT
Start: 2024-03-05 | End: 2024-03-06

## 2024-03-05 RX ORDER — INSULIN GLARGINE 100 [IU]/ML
10 INJECTION, SOLUTION SUBCUTANEOUS EVERY MORNING
Refills: 0 | Status: DISCONTINUED | OUTPATIENT
Start: 2024-03-05 | End: 2024-03-05

## 2024-03-05 RX ORDER — SODIUM CHLORIDE 9 MG/ML
1000 INJECTION, SOLUTION INTRAVENOUS
Refills: 0 | Status: DISCONTINUED | OUTPATIENT
Start: 2024-03-05 | End: 2024-03-06

## 2024-03-05 RX ORDER — DEXTROSE 50 % IN WATER 50 %
15 SYRINGE (ML) INTRAVENOUS ONCE
Refills: 0 | Status: DISCONTINUED | OUTPATIENT
Start: 2024-03-05 | End: 2024-03-06

## 2024-03-05 RX ADMIN — GABAPENTIN 100 MILLIGRAM(S): 400 CAPSULE ORAL at 21:40

## 2024-03-05 RX ADMIN — Medication 4 MILLIGRAM(S): at 06:00

## 2024-03-05 RX ADMIN — Medication 100 MILLIGRAM(S): at 12:05

## 2024-03-05 RX ADMIN — Medication 1 MILLIGRAM(S): at 12:04

## 2024-03-05 RX ADMIN — Medication 2 PACKET(S): at 17:15

## 2024-03-05 RX ADMIN — ESCITALOPRAM OXALATE 10 MILLIGRAM(S): 10 TABLET, FILM COATED ORAL at 12:03

## 2024-03-05 RX ADMIN — PANTOPRAZOLE SODIUM 40 MILLIGRAM(S): 20 TABLET, DELAYED RELEASE ORAL at 12:02

## 2024-03-05 RX ADMIN — GABAPENTIN 100 MILLIGRAM(S): 400 CAPSULE ORAL at 13:34

## 2024-03-05 RX ADMIN — Medication 4 MILLIGRAM(S): at 10:06

## 2024-03-05 RX ADMIN — Medication 3 MILLIGRAM(S): at 17:16

## 2024-03-05 RX ADMIN — Medication 1 TABLET(S): at 12:03

## 2024-03-05 RX ADMIN — SODIUM CHLORIDE 75 MILLILITER(S): 9 INJECTION, SOLUTION INTRAVENOUS at 06:01

## 2024-03-05 RX ADMIN — CHLORHEXIDINE GLUCONATE 1 APPLICATION(S): 213 SOLUTION TOPICAL at 06:00

## 2024-03-05 RX ADMIN — Medication 75 MILLILITER(S): at 06:02

## 2024-03-05 RX ADMIN — Medication 3 MILLIGRAM(S): at 13:34

## 2024-03-05 RX ADMIN — INSULIN HUMAN 4 UNIT(S)/HR: 100 INJECTION, SOLUTION SUBCUTANEOUS at 06:02

## 2024-03-05 RX ADMIN — Medication 3 MILLIGRAM(S): at 21:39

## 2024-03-05 RX ADMIN — GABAPENTIN 100 MILLIGRAM(S): 400 CAPSULE ORAL at 06:00

## 2024-03-05 NOTE — PROGRESS NOTE ADULT - ASSESSMENT
IMPRESSION:    HAGMA likely secondary to DKA vs AKA with lactic acidosis   EtOH abuse - last drink 3/3   Alcohol withdrawal   ?? pancreatitis     PLAN:    CNS: CIWA taper. Thiamine and folic acid. CW home psych meds.     HEENT: Oral care    PULMONARY:  HOB @ 45 degrees. On room air.     CARDIOVASCULAR: Recent ECHO noted with EF 57%. Lactic acidosis improved. LR at 75 cc/hr.     GI: GI prophylaxis.  Feeding as tolerated, encourage PO diet. Zofran PRN.  RUQ US with hepatic steatosis, otherwise unremarkable.     RENAL:  Follow up lytes. Correct as needed. Repeat BMP.     INFECTIOUS DISEASE: Procal 0.50 noted. UA negative. CXR clear. Check RVP.      HEMATOLOGICAL:  DVT prophylaxis.    ENDOCRINE:  Monitor FS closely. Hold insulin gtt. Thyroid panel noted. Endo consult pending.     MUSCULOSKELETAL: OOBTC.     SDU IMPRESSION:    HAGMA likely secondary to DKA vs AKA with lactic acidosis   EtOH abuse - last drink 3/3   Alcohol withdrawal   ?? pancreatitis     PLAN:    CNS: CIWA taper. Thiamine and folic acid. CW home psych meds.     HEENT: Oral care    PULMONARY:  HOB @ 45 degrees. On room air.     CARDIOVASCULAR: Recent ECHO noted with EF 57%. Lactic acidosis improved. LR at 75 cc/hr.     GI: GI prophylaxis.  Feeding as tolerated, encourage PO diet. Zofran PRN.  RUQ US with hepatic steatosis, otherwise unremarkable.     RENAL:  Follow up lytes. Correct as needed. Repeat BMP.     INFECTIOUS DISEASE: Procal 0.50 noted. UA negative. CXR clear. Check RVP.      HEMATOLOGICAL:  DVT prophylaxis. HIT and DIC.     ENDOCRINE:  Monitor FS closely. Hold insulin gtt. Thyroid panel noted. Endo consult pending.     MUSCULOSKELETAL: OOBTC.     icu

## 2024-03-05 NOTE — CHART NOTE - NSCHARTNOTEFT_GEN_A_CORE
pt w/ low:  PO4 & K. I ordered KPO4 rider last night, additionally pt was on IV insulin for presumed euglycemic DKA. G= anion gap has closed this am , will d/c IV insulin, endo consult pnd pt w/ low:  PO4 & K. I ordered KPO4 rider last night, additionally pt was on IV insulin for presumed euglycemic DKA.   pt's anion gap has closed this am , will d/c IV insulin,  start diet . endo consult pnd pt w/ low:  PO4 & K. I ordered KPO4 rider last night, additionally pt was on IV insulin for presumed euglycemic DKA. vs starvation alcoholic ketosis.    pt's anion gap has closed this am , will d/c IV insulin,  start diet . endo consult pnd

## 2024-03-05 NOTE — PROGRESS NOTE ADULT - ASSESSMENT
43-year-old female with a medical history of depression, anxiety, alcohol abuse, GERD, last admission for detox was in January 15, 2024 presented to ED yesterday intoxicated c/o abdominal pain associated with nausea and vomiting.     alcoholic ketoacidosis - resolved / severe hypophosphatemia / suspected thiamine and folate deficiency     - continue ativan detox protocol   - supplement and re check electrolytes   - DC alcohol   - advance diet   - may DGTF   - thiamine and folate   - DVT prophylaxis

## 2024-03-05 NOTE — PROGRESS NOTE ADULT - SUBJECTIVE AND OBJECTIVE BOX
Patient is a 43y old  Female who presents with a chief complaint of severe HAGMA w/ etoh abuse disorder (04 Mar 2024 11:06)        Over Night Events:  Now off insulin gtt. On room air. Tmax 100.4F noted yesterday.       ROS:     All ROS are negative except HPI         PHYSICAL EXAM    ICU Vital Signs Last 24 Hrs  T(C): 36.6 (05 Mar 2024 03:00), Max: 38 (04 Mar 2024 12:00)  T(F): 97.9 (05 Mar 2024 03:00), Max: 100.4 (04 Mar 2024 12:00)  HR: 90 (05 Mar 2024 07:00) (86 - 129)  BP: 92/51 (05 Mar 2024 07:00) (88/44 - 126/59)  BP(mean): 68 (05 Mar 2024 07:00) (63 - 86)  ABP: --  ABP(mean): --  RR: 18 (05 Mar 2024 07:00) (18 - 30)  SpO2: 96% (05 Mar 2024 07:00) (94% - 97%)    O2 Parameters below as of 05 Mar 2024 06:13  Patient On (Oxygen Delivery Method): room air            CONSTITUTIONAL:  in NAD    ENT:   Airway patent,   Mouth with normal mucosa.     EYES:   Pupils equal,   Round and reactive to light.    CARDIAC:   Normal rate,   Regular rhythm.    No edema    RESPIRATORY:   No wheezing  Bilateral BS  Normal chest expansion  Not tachypneic,  No use of accessory muscles    GASTROINTESTINAL:  Abdomen soft,   Non-tender,   No guarding,   + BS    MUSCULOSKELETAL:   Range of motion is not limited,  No clubbing, cyanosis    NEUROLOGICAL:   Alert and oriented   No motor  deficits.    SKIN:   Skin normal color for race,   Warm and dry and intact.   No evidence of rash.    03-04-24 @ 07:01  -  03-05-24 @ 07:00  --------------------------------------------------------  IN:    dextrose 10%: 1775 mL    dextrose 5% + sodium chloride 0.45%: 100 mL    Insulin: 35 mL    Insulin: 34 mL    IV PiggyBack: 499.8 mL    Lactated Ringers: 1750 mL    Lactated Ringers Bolus: 1000 mL    Oral Fluid: 150 mL  Total IN: 5343.8 mL    OUT:    Voided (mL): 600 mL  Total OUT: 600 mL    Total NET: 4743.8 mL          LABS:                            10.4   2.65  )-----------( 93       ( 05 Mar 2024 06:09 )             29.6                                               03-05    137  |  101  |  5<L>  ----------------------------<  104<H>  4.0   |  26  |  0.7    Ca    8.3<L>      05 Mar 2024 06:09  Phos  1.1     03-05  Mg     2.1     03-05    TPro  6.0  /  Alb  3.7  /  TBili  1.4<H>  /  DBili  0.6<H>  /  AST  110<H>  /  ALT  83<H>  /  AlkPhos  75  03-05      PT/INR - ( 03 Mar 2024 23:37 )   PT: 11.60 sec;   INR: 1.02 ratio         PTT - ( 03 Mar 2024 23:37 )  PTT:29.6 sec                                       Urinalysis Basic - ( 05 Mar 2024 06:09 )    Color: x / Appearance: x / SG: x / pH: x  Gluc: 104 mg/dL / Ketone: x  / Bili: x / Urobili: x   Blood: x / Protein: x / Nitrite: x   Leuk Esterase: x / RBC: x / WBC x   Sq Epi: x / Non Sq Epi: x / Bacteria: x                                                  LIVER FUNCTIONS - ( 05 Mar 2024 06:09 )  Alb: 3.7 g/dL / Pro: 6.0 g/dL / ALK PHOS: 75 U/L / ALT: 83 U/L / AST: 110 U/L / GGT: x                                                                                                                                   ABG - ( 04 Mar 2024 11:37 )  pH, Arterial: 7.37  pH, Blood: x     /  pCO2: 27    /  pO2: 54    / HCO3: 16    / Base Excess: -8.4  /  SaO2: 85.5                MEDICATIONS  (STANDING):  chlorhexidine 2% Cloths 1 Application(s) Topical <User Schedule>  dextrose 5%. 1000 milliLiter(s) (50 mL/Hr) IV Continuous <Continuous>  dextrose 5%. 1000 milliLiter(s) (100 mL/Hr) IV Continuous <Continuous>  dextrose 50% Injectable 50 milliLiter(s) IV Push every 15 minutes  escitalopram 10 milliGRAM(s) Oral daily  folic acid 1 milliGRAM(s) Oral daily  gabapentin 100 milliGRAM(s) Oral three times a day  glucagon  Injectable 1 milliGRAM(s) IntraMuscular once  heparin   Injectable 5000 Unit(s) SubCutaneous every 8 hours  lactated ringers. 1000 milliLiter(s) (75 mL/Hr) IV Continuous <Continuous>  LORazepam   Injectable 3 milliGRAM(s) IV Push every 4 hours  LORazepam   Injectable   IV Push   LORazepam   Injectable 4 milliGRAM(s) IV Push every 4 hours  multivitamin/minerals 1 Tablet(s) Oral daily  pantoprazole  Injectable 40 milliGRAM(s) IV Push daily  thiamine Injectable 100 milliGRAM(s) IV Push daily    MEDICATIONS  (PRN):  dextrose Oral Gel 15 Gram(s) Oral once PRN Blood Glucose LESS THAN 70 milliGRAM(s)/deciliter  ondansetron Injectable 4 milliGRAM(s) IV Push every 4 hours PRN Nausea and/or Vomiting      New X-rays reviewed:                                                                                  ECHO

## 2024-03-05 NOTE — CONSULT NOTE ADULT - SUBJECTIVE AND OBJECTIVE BOX
HPI:  42 yo W female w/ PMH - chronic alcohol use disorder plus other medical history as noted below.  Pt come to hospital after being discharged about 6 weeks ago for alcohol WD.  Pt is in ER w/ alcohol WD, severe abdominal pains , nausea, vomiting , HAGMA,  w/ elevated: LFT, lipase, bilirubin. Case discussed w/ ICU MD .Plan to adm to ICU stepdown (04 Mar 2024 01:45)      PAST MEDICAL & SURGICAL HISTORY  Alcoholism    Anxiety    Graves' disease    GERD (gastroesophageal reflux disease)    Gastritis    MVP (mitral valve prolapse)    Nicotine dependence    Benzodiazepine misuse    Hypercholesterolemia    Obesity    Esophagitis    History of Graves' disease    Heart murmur    Hyperlipidemia, unspecified hyperlipidemia type    Hemochromatosis    Fatty liver    History of  section    S/P tendon repair  5th finger right hand        FAMILY HISTORY:  FAMILY HISTORY:  Benzodiazepine misuse        SOCIAL HISTORY:  []smoker  []Alcohol  []Drug    ALLERGIES:  Robaxin (Unknown)  morphine (Unknown)      MEDICATIONS:  MEDICATIONS  (STANDING):  chlorhexidine 2% Cloths 1 Application(s) Topical <User Schedule>  dextrose 5%. 1000 milliLiter(s) (50 mL/Hr) IV Continuous <Continuous>  dextrose 5%. 1000 milliLiter(s) (100 mL/Hr) IV Continuous <Continuous>  dextrose 50% Injectable 50 milliLiter(s) IV Push every 15 minutes  escitalopram 10 milliGRAM(s) Oral daily  folic acid 1 milliGRAM(s) Oral daily  gabapentin 100 milliGRAM(s) Oral three times a day  glucagon  Injectable 1 milliGRAM(s) IntraMuscular once  heparin   Injectable 5000 Unit(s) SubCutaneous every 8 hours  lactated ringers. 1000 milliLiter(s) (75 mL/Hr) IV Continuous <Continuous>  LORazepam   Injectable 3 milliGRAM(s) IV Push every 4 hours  LORazepam   Injectable   IV Push   multivitamin/minerals 1 Tablet(s) Oral daily  pantoprazole  Injectable 40 milliGRAM(s) IV Push daily  thiamine Injectable 100 milliGRAM(s) IV Push daily    MEDICATIONS  (PRN):  dextrose Oral Gel 15 Gram(s) Oral once PRN Blood Glucose LESS THAN 70 milliGRAM(s)/deciliter  ondansetron Injectable 4 milliGRAM(s) IV Push every 4 hours PRN Nausea and/or Vomiting      HOME MEDICATIONS:  Home Medications:      VITALS:   T(F): 98.1 ( @ 11:05), Max: 100.4 ( @ 12:00)  HR: 95 ( @ 10:00) (86 - 129)  BP: 89/54 ( @ 10:00) (88/44 - 173/93)  BP(mean): 67 ( @ 10:00) (63 - 111)  RR: 21 ( @ 11:05) (18 - 31)  SpO2: 97% ( @ 10:00) (94% - 100%)    I&O's Summary    04 Mar 2024 07:  -  05 Mar 2024 07:00  --------------------------------------------------------  IN: 5343.8 mL / OUT: 800 mL / NET: 4543.8 mL    05 Mar 2024 07:01  -  05 Mar 2024 12:02  --------------------------------------------------------  IN: 300 mL / OUT: 600 mL / NET: -300 mL        REVIEW OF SYSTEMS:  CONSTITUTIONAL: No weakness, fevers or chills  EYES: No visual changes  ENT: No vertigo or throat pain   NECK: No pain or stiffness  RESPIRATORY: No cough, wheezing, hemoptysis; No shortness of breath  CARDIOVASCULAR: No chest pain or palpitations  GASTROINTESTINAL: No abdominal or epigastric pain. No nausea, vomiting, or hematemesis; No diarrhea or constipation. No melena or hematochezia.  GENITOURINARY: No Polyuria  NEUROLOGICAL:  No tremors, no Weakness or numbness  SKIN: No itching, no rashes  MSK: no joint pain    PHYSICAL EXAM:  GENERAL: Patient is awake , alert and oriented,  not in acute distress  EYES: No proptosis, no lid lag  NECK: No thyroid enlargement, no palpable nodules , no bruit  LUNGS: Clear to auscultation bilaterally   CARDIOVASCULAR: S1/S2 present, RRR , no murmurs or rubs  ABD: Soft, non-tender, non-distended, +BS  EXT: No SONI  SKIN: No abdominal striae  NEURO: No tremors, DTR 2+    LABS:                        9.9    2.48  )-----------( 78       ( 05 Mar 2024 11:30 )             28.6         137  |  101  |  5<L>  ----------------------------<  104<H>  4.0   |  26  |  0.7    Ca    8.3<L>      05 Mar 2024 06:09  Phos  1.1       Mg     2.1         TPro  6.0  /  Alb  3.7  /  TBili  1.4<H>  /  DBili  0.6<H>  /  AST  110<H>  /  ALT  83<H>  /  AlkPhos  75      PT/INR - ( 03 Mar 2024 23:37 )   PT: 11.60 sec;   INR: 1.02 ratio         PTT - ( 03 Mar 2024 23:37 )  PTT:29.6 sec  Lactate, Blood: 2.0 mmol/L (24 @ 16:00)          POCT Blood Glucose.: 119 mg/dL (24 @ 10:38)  POCT Blood Glucose.: 92 mg/dL (24 @ 09:03)  POCT Blood Glucose.: 85 mg/dL (24 @ 07:52)  POCT Blood Glucose.: 100 mg/dL (24 @ 06:04)  POCT Blood Glucose.: 100 mg/dL (24 @ 04:49)  POCT Blood Glucose.: 107 mg/dL (24 @ 02:44)  POCT Blood Glucose.: 101 mg/dL (24 @ 02:17)  POCT Blood Glucose.: 104 mg/dL (24 @ 01:05)  POCT Blood Glucose.: 102 mg/dL (24 @ 00:17)  POCT Blood Glucose.: 128 mg/dL (24 @ 23:03)  POCT Blood Glucose.: 138 mg/dL (24 @ 22:07)  POCT Blood Glucose.: 125 mg/dL (24 @ 21:07)  POCT Blood Glucose.: 121 mg/dL (24 @ 20:04)  POCT Blood Glucose.: 171 mg/dL (24 @ 18:46)  POCT Blood Glucose.: 225 mg/dL (24 @ 17:46)  POCT Blood Glucose.: 86 mg/dL (24 @ 16:46)  POCT Blood Glucose.: 103 mg/dL (24 @ 15:44)  POCT Blood Glucose.: 128 mg/dL (24 @ 14:45)  POCT Blood Glucose.: 146 mg/dL (24 @ 13:40)  POCT Blood Glucose.: 160 mg/dL (24 @ 12:40)  POCT Blood Glucose.: 168 mg/dL (24 @ 12:07)  POCT Blood Glucose.: 188 mg/dL (24 @ 11:35)  POCT Blood Glucose.: 114 mg/dL (24 @ 11:03)  POCT Blood Glucose.: 134 mg/dL (24 @ 10:29)  POCT Blood Glucose.: 161 mg/dL (24 @ 10:00)  POCT Blood Glucose.: 160 mg/dL (24 @ 09:31)  POCT Blood Glucose.: 91 mg/dL (24 @ 07:46)    TSH --; Total T3 32; Free T4 --  2023: TSH 1    3/2024: TSH 1.13  Ft4 0.7    < from: US Thyroid (23 @ 14:59) >  Heterogeneous thyroid gland. No focal nodule.    < end of copied text >   HPI:  44 yo W female w/ PMH - chronic alcohol use disorder plus other medical history as noted below.  Pt come to hospital after being discharged about 6 weeks ago for alcohol WD.  Pt is in ER w/ alcohol WD, severe abdominal pains , nausea, vomiting , HAGMA,  w/ elevated: LFT, lipase, bilirubin. Case discussed w/ ICU MD .Plan to adm to ICU stepdown (04 Mar 2024 01:45)      PAST MEDICAL & SURGICAL HISTORY  Alcoholism    Anxiety    Graves' disease    GERD (gastroesophageal reflux disease)    Gastritis    MVP (mitral valve prolapse)    Nicotine dependence    Benzodiazepine misuse    Hypercholesterolemia    Obesity    Esophagitis    History of Graves' disease    Heart murmur    Hyperlipidemia, unspecified hyperlipidemia type    Hemochromatosis    Fatty liver    History of  section    S/P tendon repair  5th finger right hand        FAMILY HISTORY:  FAMILY HISTORY:  Benzodiazepine misuse        SOCIAL HISTORY:  []smoker  []Alcohol  []Drug    ALLERGIES:  Robaxin (Unknown)  morphine (Unknown)      MEDICATIONS:  MEDICATIONS  (STANDING):  chlorhexidine 2% Cloths 1 Application(s) Topical <User Schedule>  dextrose 5%. 1000 milliLiter(s) (50 mL/Hr) IV Continuous <Continuous>  dextrose 5%. 1000 milliLiter(s) (100 mL/Hr) IV Continuous <Continuous>  dextrose 50% Injectable 50 milliLiter(s) IV Push every 15 minutes  escitalopram 10 milliGRAM(s) Oral daily  folic acid 1 milliGRAM(s) Oral daily  gabapentin 100 milliGRAM(s) Oral three times a day  glucagon  Injectable 1 milliGRAM(s) IntraMuscular once  heparin   Injectable 5000 Unit(s) SubCutaneous every 8 hours  lactated ringers. 1000 milliLiter(s) (75 mL/Hr) IV Continuous <Continuous>  LORazepam   Injectable 3 milliGRAM(s) IV Push every 4 hours  LORazepam   Injectable   IV Push   multivitamin/minerals 1 Tablet(s) Oral daily  pantoprazole  Injectable 40 milliGRAM(s) IV Push daily  thiamine Injectable 100 milliGRAM(s) IV Push daily    MEDICATIONS  (PRN):  dextrose Oral Gel 15 Gram(s) Oral once PRN Blood Glucose LESS THAN 70 milliGRAM(s)/deciliter  ondansetron Injectable 4 milliGRAM(s) IV Push every 4 hours PRN Nausea and/or Vomiting      HOME MEDICATIONS:  Home Medications:      VITALS:   T(F): 98.1 ( @ 11:05), Max: 100.4 ( @ 12:00)  HR: 95 ( @ 10:00) (86 - 129)  BP: 89/54 ( @ 10:00) (88/44 - 173/93)  BP(mean): 67 ( @ 10:00) (63 - 111)  RR: 21 ( @ 11:05) (18 - 31)  SpO2: 97% ( @ 10:00) (94% - 100%)    I&O's Summary    04 Mar 2024 07:  -  05 Mar 2024 07:00  --------------------------------------------------------  IN: 5343.8 mL / OUT: 800 mL / NET: 4543.8 mL    05 Mar 2024 07:01  -  05 Mar 2024 12:02  --------------------------------------------------------  IN: 300 mL / OUT: 600 mL / NET: -300 mL        REVIEW OF SYSTEMS:  CONSTITUTIONAL: No weakness, fevers or chills  EYES: blurry vision   RESPIRATORY: No cough, wheezing, hemoptysis; No shortness of breath  CARDIOVASCULAR: No chest pain or palpitations  GASTROINTESTINAL: No abdominal or epigastric pain. No nausea, vomiting, or hematemesis; No diarrhea or constipation  GENITOURINARY: No Polyuria  NEUROLOGICAL:  No tremors, no Weakness or numbness      PHYSICAL EXAM:  GENERAL: Patient is awake , alert and oriented,  not in acute distress, going to CT scan   EYES: proptosis   NECK: No thyroid enlargement  LUNGS: Clear to auscultation bilaterally   ABD: Soft, non-tender, non-distended, +BS  EXT: No SONI      LABS:                        9.9    2.48  )-----------( 78       ( 05 Mar 2024 11:30 )             28.6     03-    137  |  101  |  5<L>  ----------------------------<  104<H>  4.0   |  26  |  0.7    Ca    8.3<L>      05 Mar 2024 06:09  Phos  1.1       Mg     2.1         TPro  6.0  /  Alb  3.7  /  TBili  1.4<H>  /  DBili  0.6<H>  /  AST  110<H>  /  ALT  83<H>  /  AlkPhos  75  -    PT/INR - ( 03 Mar 2024 23:37 )   PT: 11.60 sec;   INR: 1.02 ratio         PTT - ( 03 Mar 2024 23:37 )  PTT:29.6 sec  Lactate, Blood: 2.0 mmol/L (24 @ 16:00)          POCT Blood Glucose.: 119 mg/dL (24 @ 10:38)  POCT Blood Glucose.: 92 mg/dL (24 @ 09:03)  POCT Blood Glucose.: 85 mg/dL (24 @ 07:52)  POCT Blood Glucose.: 100 mg/dL (24 @ 06:04)  POCT Blood Glucose.: 100 mg/dL (24 @ 04:49)  POCT Blood Glucose.: 107 mg/dL (24 @ 02:44)  POCT Blood Glucose.: 101 mg/dL (24 @ 02:17)  POCT Blood Glucose.: 104 mg/dL (24 @ 01:05)  POCT Blood Glucose.: 102 mg/dL (24 @ 00:17)  POCT Blood Glucose.: 128 mg/dL (24 @ 23:03)  POCT Blood Glucose.: 138 mg/dL (24 @ 22:07)  POCT Blood Glucose.: 125 mg/dL (24 @ 21:07)  POCT Blood Glucose.: 121 mg/dL (24 @ 20:04)  POCT Blood Glucose.: 171 mg/dL (24 @ 18:46)  POCT Blood Glucose.: 225 mg/dL (24 @ 17:46)  POCT Blood Glucose.: 86 mg/dL (24 @ 16:46)  POCT Blood Glucose.: 103 mg/dL (24 @ 15:44)  POCT Blood Glucose.: 128 mg/dL (24 @ 14:45)  POCT Blood Glucose.: 146 mg/dL (24 @ 13:40)  POCT Blood Glucose.: 160 mg/dL (24 @ 12:40)  POCT Blood Glucose.: 168 mg/dL (24 @ 12:07)  POCT Blood Glucose.: 188 mg/dL (24 @ 11:35)  POCT Blood Glucose.: 114 mg/dL (24 @ 11:03)  POCT Blood Glucose.: 134 mg/dL (24 @ 10:29)  POCT Blood Glucose.: 161 mg/dL (24 @ 10:00)  POCT Blood Glucose.: 160 mg/dL (24 @ 09:31)  POCT Blood Glucose.: 91 mg/dL (24 @ 07:46)    TSH --; Total T3 32; Free T4 --  2023: TSH 1    3/2024: TSH 1.13  Ft4 0.7    < from: US Thyroid (23 @ 14:59) >  Heterogeneous thyroid gland. No focal nodule.    < end of copied text >

## 2024-03-05 NOTE — PROGRESS NOTE ADULT - SUBJECTIVE AND OBJECTIVE BOX
Pt interviewed, examined and EMR chart reviewed.    Follow up of Alcohol Dependency. Pt is on Ativan protocol. Pt is doing better. Pt with complaint of anxiety/irritability. pt still with headaches            MEDICATIONS  (STANDING):  chlorhexidine 2% Cloths 1 Application(s) Topical <User Schedule>  dextrose 5%. 1000 milliLiter(s) (50 mL/Hr) IV Continuous <Continuous>  dextrose 5%. 1000 milliLiter(s) (100 mL/Hr) IV Continuous <Continuous>  dextrose 50% Injectable 50 milliLiter(s) IV Push every 15 minutes  escitalopram 10 milliGRAM(s) Oral daily  folic acid 1 milliGRAM(s) Oral daily  gabapentin 100 milliGRAM(s) Oral three times a day  glucagon  Injectable 1 milliGRAM(s) IntraMuscular once  lactated ringers. 1000 milliLiter(s) (75 mL/Hr) IV Continuous <Continuous>  LORazepam   Injectable 3 milliGRAM(s) IV Push every 4 hours  LORazepam   Injectable   IV Push   multivitamin/minerals 1 Tablet(s) Oral daily  pantoprazole  Injectable 40 milliGRAM(s) IV Push daily  potassium phosphate / sodium phosphate Powder (PHOS-NaK) 2 Packet(s) Oral three times a day before meals  thiamine Injectable 100 milliGRAM(s) IV Push daily    MEDICATIONS  (PRN):  dextrose Oral Gel 15 Gram(s) Oral once PRN Blood Glucose LESS THAN 70 milliGRAM(s)/deciliter  ondansetron Injectable 4 milliGRAM(s) IV Push every 4 hours PRN Nausea and/or Vomiting      Vital Signs Last 24 Hrs  T(C): 36.7 (05 Mar 2024 11:05), Max: 36.9 (04 Mar 2024 19:14)  T(F): 98.1 (05 Mar 2024 11:05), Max: 98.4 (04 Mar 2024 19:14)  HR: 98 (05 Mar 2024 12:00) (86 - 122)  BP: 102/50 (05 Mar 2024 12:00) (88/44 - 120/57)  BP(mean): 71 (05 Mar 2024 12:00) (63 - 85)  RR: 24 (05 Mar 2024 12:00) (18 - 28)  SpO2: 95% (05 Mar 2024 12:00) (94% - 98%)    Parameters below as of 05 Mar 2024 12:00  Patient On (Oxygen Delivery Method): room air        PHYSICAL EXAM:    Constitutional: NAD, well-groomed, well-developed  HEENT: PERRLA, EOMI, Normal Hearing,   Neck: No LAD, No JVD  Back: Normal spine flexure, No CVA tenderness  Respiratory: CTAB/L  Cardiovascular: S1 and S2, RRR, no M/G/R  Gastrointestinal: BS+, soft, NT/ND  Extremities: No peripheral edema  Neurological: A/O x 3, no focal deficits    LABS:                        9.9    2.48  )-----------( 78       ( 05 Mar 2024 11:30 )             28.6     03-05    137  |  100  |  5<L>  ----------------------------<  108<H>  4.0   |  25  |  0.6<L>    Ca    8.4      05 Mar 2024 11:30  Phos  1.1     03-05  Mg     2.1     03-05    TPro  6.0  /  Alb  3.7  /  TBili  1.4<H>  /  DBili  0.6<H>  /  AST  110<H>  /  ALT  83<H>  /  AlkPhos  75  03-05    PT/INR - ( 05 Mar 2024 11:30 )   PT: 12.20 sec;   INR: 1.07 ratio         PTT - ( 05 Mar 2024 11:30 )  PTT:29.3 sec  Urinalysis Basic - ( 05 Mar 2024 11:30 )    Color: x / Appearance: x / SG: x / pH: x  Gluc: 108 mg/dL / Ketone: x  / Bili: x / Urobili: x   Blood: x / Protein: x / Nitrite: x   Leuk Esterase: x / RBC: x / WBC x   Sq Epi: x / Non Sq Epi: x / Bacteria: x      Drug Screen Urine:  Alcohol Level  Alcohol, Blood: 273 mg/dL (03-03-24 @ 23:37)        RADIOLOGY & ADDITIONAL STUDIES:

## 2024-03-05 NOTE — PROGRESS NOTE ADULT - SUBJECTIVE AND OBJECTIVE BOX
Patient seen and evaluated this am, feeling improved      T(F): 98.1 (03-05-24 @ 11:05), Max: 98.4 (03-04-24 @ 19:14)  HR: 98 (03-05-24 @ 12:00)  BP: 102/50 (03-05-24 @ 12:00)  RR: 18  SpO2: 95% (03-05-24 @ 12:00) (94% - 98%)    PHYSICAL EXAM:  GENERAL: NAD  HEAD:  Atraumatic, Normocephalic  EYES: EOMI, PERRLA, conjunctiva and sclera clear  NERVOUS SYSTEM:  Alert & Oriented X3, no focal deficits   CHEST/LUNG: Clear to percussion bilaterally; No rales, rhonchi, wheezing, or rubs  HEART: Regular rate and rhythm; No murmurs, rubs, or gallops  ABDOMEN: Soft, Nontender, Nondistended; Bowel sounds present  EXTREMITIES:  2+ Peripheral Pulses, No clubbing, cyanosis, or edema    LABS  03-05    137  |  100  |  5<L>  ----------------------------<  108<H>  4.0   |  25  |  0.6<L>    Ca    8.4      05 Mar 2024 11:30  Phos  1.1     03-05  Mg     2.1     03-05    TPro  6.0  /  Alb  3.7  /  TBili  1.4<H>  /  DBili  0.6<H>  /  AST  110<H>  /  ALT  83<H>  /  AlkPhos  75  03-05  Alcohol, Blood (03.03.24 @ 23:37)   Alcohol, Blood: 273 mg/dL                          9.9    2.48  )-----------( 78       ( 05 Mar 2024 11:30 )             28.6     PT/INR - ( 05 Mar 2024 11:30 )   PT: 12.20 sec;   INR: 1.07 ratio         PTT - ( 05 Mar 2024 11:30 )  PTT:29.3 sec    RADIOLOGY  < from: US Abdomen Upper Quadrant Right (03.04.24 @ 15:16) >    IMPRESSION:    No acute sonographic abnormality.      < end of copied text >  < from: CT Abdomen and Pelvis w/ IV Cont (03.04.24 @ 03:14) >      IMPRESSION:    Severe hepatic steatosis, significantly increased from prior CT. Clinical   correlation needed.    No CT evidence of acute abdominal pathology.    < end of copied text >    MEDICATIONS  (STANDING):  chlorhexidine 2% Cloths 1 Application(s) Topical <User Schedule>  escitalopram 10 milliGRAM(s) Oral daily  folic acid 1 milliGRAM(s) Oral daily  gabapentin 100 milliGRAM(s) Oral three times a day  lactated ringers. 1000 milliLiter(s) (75 mL/Hr) IV Continuous <Continuous>  LORazepam   Injectable 3 milliGRAM(s) IV Push every 4 hours  LORazepam   Injectable   IV Push   multivitamin/minerals 1 Tablet(s) Oral daily  pantoprazole  Injectable 40 milliGRAM(s) IV Push daily  potassium phosphate / sodium phosphate Powder (PHOS-NaK) 2 Packet(s) Oral three times a day before meals  sodium phosphate 30 milliMole(s)/500 mL IVPB 30 milliMole(s) IV Intermittent once  thiamine Injectable 100 milliGRAM(s) IV Push daily    MEDICATIONS  (PRN):  dextrose Oral Gel 15 Gram(s) Oral once PRN Blood Glucose LESS THAN 70 milliGRAM(s)/deciliter  ondansetron Injectable 4 milliGRAM(s) IV Push every 4 hours PRN Nausea and/or Vomiting

## 2024-03-06 LAB
ALBUMIN SERPL ELPH-MCNC: 3.6 G/DL — SIGNIFICANT CHANGE UP (ref 3.5–5.2)
ALBUMIN SERPL ELPH-MCNC: 3.8 G/DL — SIGNIFICANT CHANGE UP (ref 3.5–5.2)
ALP SERPL-CCNC: 75 U/L — SIGNIFICANT CHANGE UP (ref 30–115)
ALP SERPL-CCNC: 84 U/L — SIGNIFICANT CHANGE UP (ref 30–115)
ALT FLD-CCNC: 75 U/L — HIGH (ref 0–41)
ALT FLD-CCNC: 83 U/L — HIGH (ref 0–41)
ANION GAP SERPL CALC-SCNC: 10 MMOL/L — SIGNIFICANT CHANGE UP (ref 7–14)
ANION GAP SERPL CALC-SCNC: 11 MMOL/L — SIGNIFICANT CHANGE UP (ref 7–14)
AST SERPL-CCNC: 108 U/L — HIGH (ref 0–41)
AST SERPL-CCNC: 133 U/L — HIGH (ref 0–41)
BASOPHILS # BLD AUTO: 0.02 K/UL — SIGNIFICANT CHANGE UP (ref 0–0.2)
BASOPHILS NFR BLD AUTO: 0.7 % — SIGNIFICANT CHANGE UP (ref 0–1)
BILIRUB DIRECT SERPL-MCNC: 0.4 MG/DL — HIGH (ref 0–0.3)
BILIRUB INDIRECT FLD-MCNC: 0.6 MG/DL — SIGNIFICANT CHANGE UP (ref 0.2–1.2)
BILIRUB SERPL-MCNC: 0.9 MG/DL — SIGNIFICANT CHANGE UP (ref 0.2–1.2)
BILIRUB SERPL-MCNC: 1 MG/DL — SIGNIFICANT CHANGE UP (ref 0.2–1.2)
BUN SERPL-MCNC: 3 MG/DL — LOW (ref 10–20)
BUN SERPL-MCNC: <3 MG/DL — LOW (ref 10–20)
CALCIUM SERPL-MCNC: 8.4 MG/DL — SIGNIFICANT CHANGE UP (ref 8.4–10.5)
CALCIUM SERPL-MCNC: 9 MG/DL — SIGNIFICANT CHANGE UP (ref 8.4–10.5)
CHLORIDE SERPL-SCNC: 100 MMOL/L — SIGNIFICANT CHANGE UP (ref 98–110)
CHLORIDE SERPL-SCNC: 99 MMOL/L — SIGNIFICANT CHANGE UP (ref 98–110)
CO2 SERPL-SCNC: 28 MMOL/L — SIGNIFICANT CHANGE UP (ref 17–32)
CO2 SERPL-SCNC: 28 MMOL/L — SIGNIFICANT CHANGE UP (ref 17–32)
CREAT SERPL-MCNC: 0.5 MG/DL — LOW (ref 0.7–1.5)
CREAT SERPL-MCNC: 0.6 MG/DL — LOW (ref 0.7–1.5)
EGFR: 116 ML/MIN/1.73M2 — SIGNIFICANT CHANGE UP
EGFR: 121 ML/MIN/1.73M2 — SIGNIFICANT CHANGE UP
EOSINOPHIL # BLD AUTO: 0.07 K/UL — SIGNIFICANT CHANGE UP (ref 0–0.7)
EOSINOPHIL NFR BLD AUTO: 2.6 % — SIGNIFICANT CHANGE UP (ref 0–8)
GLUCOSE BLDC GLUCOMTR-MCNC: 101 MG/DL — HIGH (ref 70–99)
GLUCOSE BLDC GLUCOMTR-MCNC: 102 MG/DL — HIGH (ref 70–99)
GLUCOSE BLDC GLUCOMTR-MCNC: 104 MG/DL — HIGH (ref 70–99)
GLUCOSE SERPL-MCNC: 107 MG/DL — HIGH (ref 70–99)
GLUCOSE SERPL-MCNC: 127 MG/DL — HIGH (ref 70–99)
HCT VFR BLD CALC: 29 % — LOW (ref 37–47)
HEPARIN-PF4 AB RESULT: <0.6 U/ML — SIGNIFICANT CHANGE UP (ref 0–0.9)
HGB BLD-MCNC: 10 G/DL — LOW (ref 12–16)
IMM GRANULOCYTES NFR BLD AUTO: 1.1 % — HIGH (ref 0.1–0.3)
LIDOCAIN IGE QN: 107 U/L — HIGH (ref 7–60)
LYMPHOCYTES # BLD AUTO: 1 K/UL — LOW (ref 1.2–3.4)
LYMPHOCYTES # BLD AUTO: 36.9 % — SIGNIFICANT CHANGE UP (ref 20.5–51.1)
MAGNESIUM SERPL-MCNC: 1.7 MG/DL — LOW (ref 1.8–2.4)
MAGNESIUM SERPL-MCNC: 2 MG/DL — SIGNIFICANT CHANGE UP (ref 1.8–2.4)
MCHC RBC-ENTMCNC: 33.8 PG — HIGH (ref 27–31)
MCHC RBC-ENTMCNC: 34.5 G/DL — SIGNIFICANT CHANGE UP (ref 32–37)
MCV RBC AUTO: 98 FL — SIGNIFICANT CHANGE UP (ref 81–99)
MONOCYTES # BLD AUTO: 0.3 K/UL — SIGNIFICANT CHANGE UP (ref 0.1–0.6)
MONOCYTES NFR BLD AUTO: 11.1 % — HIGH (ref 1.7–9.3)
NEUTROPHILS # BLD AUTO: 1.29 K/UL — LOW (ref 1.4–6.5)
NEUTROPHILS NFR BLD AUTO: 47.6 % — SIGNIFICANT CHANGE UP (ref 42.2–75.2)
NRBC # BLD: 0 /100 WBCS — SIGNIFICANT CHANGE UP (ref 0–0)
PF4 HEPARIN CMPLX AB SER-ACNC: NEGATIVE — SIGNIFICANT CHANGE UP
PHOSPHATE SERPL-MCNC: 1.6 MG/DL — LOW (ref 2.1–4.9)
PHOSPHATE SERPL-MCNC: 2.2 MG/DL — SIGNIFICANT CHANGE UP (ref 2.1–4.9)
PLATELET # BLD AUTO: 95 K/UL — LOW (ref 130–400)
PMV BLD: 10.3 FL — SIGNIFICANT CHANGE UP (ref 7.4–10.4)
POTASSIUM SERPL-MCNC: 3.7 MMOL/L — SIGNIFICANT CHANGE UP (ref 3.5–5)
POTASSIUM SERPL-MCNC: 4.2 MMOL/L — SIGNIFICANT CHANGE UP (ref 3.5–5)
POTASSIUM SERPL-SCNC: 3.7 MMOL/L — SIGNIFICANT CHANGE UP (ref 3.5–5)
POTASSIUM SERPL-SCNC: 4.2 MMOL/L — SIGNIFICANT CHANGE UP (ref 3.5–5)
PROT SERPL-MCNC: 6.1 G/DL — SIGNIFICANT CHANGE UP (ref 6–8)
PROT SERPL-MCNC: 6.6 G/DL — SIGNIFICANT CHANGE UP (ref 6–8)
RAPID RVP RESULT: SIGNIFICANT CHANGE UP
RBC # BLD: 2.96 M/UL — LOW (ref 4.2–5.4)
RBC # FLD: 14.3 % — SIGNIFICANT CHANGE UP (ref 11.5–14.5)
SARS-COV-2 RNA SPEC QL NAA+PROBE: SIGNIFICANT CHANGE UP
SODIUM SERPL-SCNC: 137 MMOL/L — SIGNIFICANT CHANGE UP (ref 135–146)
SODIUM SERPL-SCNC: 139 MMOL/L — SIGNIFICANT CHANGE UP (ref 135–146)
T3 SERPL-MCNC: 70 NG/DL — LOW (ref 80–200)
T4 AB SER-ACNC: 5.1 UG/DL — SIGNIFICANT CHANGE UP (ref 4.6–12)
TSH SERPL-MCNC: 3.49 UIU/ML — SIGNIFICANT CHANGE UP (ref 0.27–4.2)
WBC # BLD: 2.71 K/UL — LOW (ref 4.8–10.8)
WBC # FLD AUTO: 2.71 K/UL — LOW (ref 4.8–10.8)

## 2024-03-06 PROCEDURE — 99233 SBSQ HOSP IP/OBS HIGH 50: CPT

## 2024-03-06 RX ORDER — HEPARIN SODIUM 5000 [USP'U]/ML
5000 INJECTION INTRAVENOUS; SUBCUTANEOUS EVERY 8 HOURS
Refills: 0 | Status: DISCONTINUED | OUTPATIENT
Start: 2024-03-06 | End: 2024-03-11

## 2024-03-06 RX ORDER — MIRTAZAPINE 45 MG/1
7.5 TABLET, ORALLY DISINTEGRATING ORAL AT BEDTIME
Refills: 0 | Status: DISCONTINUED | OUTPATIENT
Start: 2024-03-06 | End: 2024-03-11

## 2024-03-06 RX ORDER — ACETAMINOPHEN 500 MG
650 TABLET ORAL ONCE
Refills: 0 | Status: DISCONTINUED | OUTPATIENT
Start: 2024-03-06 | End: 2024-03-11

## 2024-03-06 RX ORDER — MAGNESIUM SULFATE 500 MG/ML
2 VIAL (ML) INJECTION ONCE
Refills: 0 | Status: COMPLETED | OUTPATIENT
Start: 2024-03-06 | End: 2024-03-06

## 2024-03-06 RX ORDER — PREGABALIN 225 MG/1
1000 CAPSULE ORAL DAILY
Refills: 0 | Status: DISCONTINUED | OUTPATIENT
Start: 2024-03-06 | End: 2024-03-11

## 2024-03-06 RX ORDER — SENNA PLUS 8.6 MG/1
2 TABLET ORAL AT BEDTIME
Refills: 0 | Status: DISCONTINUED | OUTPATIENT
Start: 2024-03-06 | End: 2024-03-11

## 2024-03-06 RX ORDER — THIAMINE MONONITRATE (VIT B1) 100 MG
100 TABLET ORAL DAILY
Refills: 0 | Status: DISCONTINUED | OUTPATIENT
Start: 2024-03-06 | End: 2024-03-11

## 2024-03-06 RX ORDER — MAGNESIUM OXIDE 400 MG ORAL TABLET 241.3 MG
400 TABLET ORAL
Refills: 0 | Status: DISCONTINUED | OUTPATIENT
Start: 2024-03-06 | End: 2024-03-11

## 2024-03-06 RX ORDER — POTASSIUM PHOSPHATE, MONOBASIC POTASSIUM PHOSPHATE, DIBASIC 236; 224 MG/ML; MG/ML
30 INJECTION, SOLUTION INTRAVENOUS ONCE
Refills: 0 | Status: COMPLETED | OUTPATIENT
Start: 2024-03-06 | End: 2024-03-06

## 2024-03-06 RX ORDER — POLYETHYLENE GLYCOL 3350 17 G/17G
17 POWDER, FOR SOLUTION ORAL DAILY
Refills: 0 | Status: DISCONTINUED | OUTPATIENT
Start: 2024-03-06 | End: 2024-03-10

## 2024-03-06 RX ADMIN — Medication 3 MILLIGRAM(S): at 09:15

## 2024-03-06 RX ADMIN — GABAPENTIN 100 MILLIGRAM(S): 400 CAPSULE ORAL at 22:17

## 2024-03-06 RX ADMIN — Medication 2 PACKET(S): at 18:43

## 2024-03-06 RX ADMIN — Medication 2 PACKET(S): at 06:34

## 2024-03-06 RX ADMIN — Medication 2 MILLIGRAM(S): at 15:38

## 2024-03-06 RX ADMIN — Medication 1 MILLIGRAM(S): at 11:41

## 2024-03-06 RX ADMIN — PANTOPRAZOLE SODIUM 40 MILLIGRAM(S): 20 TABLET, DELAYED RELEASE ORAL at 11:41

## 2024-03-06 RX ADMIN — MAGNESIUM OXIDE 400 MG ORAL TABLET 400 MILLIGRAM(S): 241.3 TABLET ORAL at 11:41

## 2024-03-06 RX ADMIN — Medication 100 MILLIGRAM(S): at 11:37

## 2024-03-06 RX ADMIN — Medication 1 TABLET(S): at 11:36

## 2024-03-06 RX ADMIN — Medication 2 MILLIGRAM(S): at 22:17

## 2024-03-06 RX ADMIN — GABAPENTIN 100 MILLIGRAM(S): 400 CAPSULE ORAL at 06:34

## 2024-03-06 RX ADMIN — Medication 2 PACKET(S): at 11:54

## 2024-03-06 RX ADMIN — Medication 2 MILLIGRAM(S): at 13:18

## 2024-03-06 RX ADMIN — Medication 25 GRAM(S): at 09:14

## 2024-03-06 RX ADMIN — Medication 3 MILLIGRAM(S): at 06:34

## 2024-03-06 RX ADMIN — Medication 2 MILLIGRAM(S): at 01:01

## 2024-03-06 RX ADMIN — Medication 2 MILLIGRAM(S): at 18:44

## 2024-03-06 RX ADMIN — GABAPENTIN 100 MILLIGRAM(S): 400 CAPSULE ORAL at 15:38

## 2024-03-06 RX ADMIN — MAGNESIUM OXIDE 400 MG ORAL TABLET 400 MILLIGRAM(S): 241.3 TABLET ORAL at 18:44

## 2024-03-06 RX ADMIN — MIRTAZAPINE 7.5 MILLIGRAM(S): 45 TABLET, ORALLY DISINTEGRATING ORAL at 22:17

## 2024-03-06 RX ADMIN — Medication 3 MILLIGRAM(S): at 03:41

## 2024-03-06 RX ADMIN — CHLORHEXIDINE GLUCONATE 1 APPLICATION(S): 213 SOLUTION TOPICAL at 06:27

## 2024-03-06 RX ADMIN — ESCITALOPRAM OXALATE 10 MILLIGRAM(S): 10 TABLET, FILM COATED ORAL at 11:36

## 2024-03-06 NOTE — PROGRESS NOTE ADULT - ASSESSMENT
IMPRESSION:    HAGMA likely secondary to DKA vs AKA with lactic acidosis   EtOH abuse - last drink 3/3   Alcohol withdrawal   ?? pancreatitis     PLAN:    CNS: CIWA taper. Thiamine and folic acid. CW home psych meds.     HEENT: Oral care    PULMONARY:  HOB @ 45 degrees. On room air.     CARDIOVASCULAR: Recent ECHO noted with EF 57%. Lactic acidosis improved.    GI: GI prophylaxis.  Feeding as tolerated, encourage PO diet. Zofran PRN.  RUQ US with hepatic steatosis, otherwise unremarkable.    RENAL:  Follow up lytes. Correct as needed.     INFECTIOUS DISEASE: Procal 0.50 noted. UA negative. CXR clear. RVP negative.    HEMATOLOGICAL:  DVT prophylaxis. HIT and DIC negative. Monitor PLTS.     ENDOCRINE:  Monitor FS closely. Hold insulin gtt. Thyroid panel noted. Endo consult appreciated.    MUSCULOSKELETAL: OOBTC.     icu  IMPRESSION:    HAGMA likely secondary to DKA vs AKA with lactic acidosis   EtOH abuse - last drink 3/3   Alcohol withdrawal   ?? pancreatitis     PLAN:    CNS: CIWA taper. Thiamine and folic acid. CW home psych meds. CT head noted.     HEENT: Oral care    PULMONARY:  HOB @ 45 degrees. On room air.     CARDIOVASCULAR: Recent ECHO noted with EF 57%. Lactic acidosis improved.    GI: GI prophylaxis.  Feeding as tolerated, encourage PO diet. Zofran PRN.  RUQ US with hepatic steatosis, otherwise unremarkable. Repeat lipase.     RENAL:  Follow up lytes. Correct as needed.     INFECTIOUS DISEASE: Procal 0.50 noted. UA negative. CXR clear. RVP negative.    HEMATOLOGICAL:  DVT prophylaxis. HIT and DIC negative. Monitor CBC.    ENDOCRINE:  Monitor FS. Off insulin gtt. Thyroid panel noted. Endo consult appreciated. Repeat thyroid panel.     MUSCULOSKELETAL: OOBTC.     SDU

## 2024-03-06 NOTE — PROGRESS NOTE ADULT - SUBJECTIVE AND OBJECTIVE BOX
Patient seen and evaluated this am, tolerating oral diet, reports her "brain tingles"      T(F): 98.6 (03-06-24 @ 07:05), Max: 98.6 (03-06-24 @ 07:05)  HR: 85 (03-06-24 @ 07:22)  BP: 117/60 (03-06-24 @ 07:22)  RR: 18  SpO2: 94% (03-06-24 @ 07:22) (94% - 97%)    PHYSICAL EXAM:  GENERAL: NAD  HEAD:  Atraumatic, Normocephalic  EYES: EOMI, PERRLA, conjunctiva and sclera clear  NERVOUS SYSTEM:  Alert & Oriented X3, no focal deficits   CHEST/LUNG: Clear to percussion bilaterally; No rales, rhonchi, wheezing, or rubs  HEART: Regular rate and rhythm; No murmurs, rubs, or gallops  ABDOMEN: Soft, Nontender, Nondistended; Bowel sounds present  EXTREMITIES:  2+ Peripheral Pulses, No clubbing, cyanosis, or edema    LABS  03-06    137  |  99  |  3<L>  ----------------------------<  107<H>  3.7   |  28  |  0.6<L>    Ca    8.4      06 Mar 2024 06:10  Phos  1.6     03-06  Mg     1.7     03-06    TPro  6.1  /  Alb  3.6  /  TBili  1.0  /  DBili  0.4<H>  /  AST  108<H>  /  ALT  75<H>  /  AlkPhos  75  03-06    Alcohol, Blood (03.03.24 @ 23:37)   Alcohol, Blood: 273 mg/dL                          10.0   2.71  )-----------( 95       ( 06 Mar 2024 06:10 )             29.0     PT/INR - ( 05 Mar 2024 11:30 )   PT: 12.20 sec;   INR: 1.07 ratio         PTT - ( 05 Mar 2024 11:30 )  PTT:29.3 sec    Culture Results:   No growth at 24 hours (03-04-24)  Culture Results:   No growth at 24 hours (03-04-24)    RADIOLOGY  < from: CT Head No Cont (03.05.24 @ 13:24) >  IMPRESSION:    In comparison with the prior CT scan of the brain dated July 30, 2022:    No acute intracranial hemorrhage or acute large territorial infarct.    Interval development of cerebral and cerebellar atrophy.    < end of copied text >  < from: US Abdomen Upper Quadrant Right (03.04.24 @ 15:16) >  IMPRESSION:    No acute sonographic abnormality.    Hepatic steatosis.    < end of copied text >  < from: CT Abdomen and Pelvis w/ IV Cont (03.04.24 @ 03:14) >      IMPRESSION:    Severe hepatic steatosis, significantly increased from prior CT. Clinical   correlation needed.    No CT evidence of acute abdominal pathology.    < end of copied text >    MEDICATIONS  (STANDING):  chlorhexidine 2% Cloths 1 Application(s) Topical <User Schedule>  escitalopram 10 milliGRAM(s) Oral daily  folic acid 1 milliGRAM(s) Oral daily  gabapentin 100 milliGRAM(s) Oral three times a day  heparin   Injectable 5000 Unit(s) SubCutaneous every 8 hours  LORazepam   Injectable   IV Push   LORazepam   Injectable 2 milliGRAM(s) IV Push every 4 hours  magnesium oxide 400 milliGRAM(s) Oral three times a day with meals  mirtazapine 7.5 milliGRAM(s) Oral at bedtime  multivitamin/minerals 1 Tablet(s) Oral daily  pantoprazole  Injectable 40 milliGRAM(s) IV Push daily  potassium phosphate / sodium phosphate Powder (PHOS-NaK) 2 Packet(s) Oral three times a day before meals  potassium phosphate IVPB 30 milliMole(s) IV Intermittent once  thiamine Injectable 100 milliGRAM(s) IV Push daily    MEDICATIONS  (PRN):  LORazepam   Injectable 2 milliGRAM(s) IV Push every 6 hours PRN Anxiety  ondansetron Injectable 4 milliGRAM(s) IV Push every 4 hours PRN Nausea and/or Vomiting

## 2024-03-06 NOTE — CONSULT NOTE ADULT - SUBJECTIVE AND OBJECTIVE BOX
MAGALISAURELIA     43y     Female    MRN-[id number]                                                           CC:Patient is a 43y old  Female who presents with a chief complaint of severe HAGMA w/ etoh abuse disorder (06 Mar 2024 10:08)    Neuro; Reports regular heavy alcohol use for 17 years, also reports cigarette smoking.    HPI:  44 yo W female w/ PMH - chronic alcohol use disorder plus other medical history as noted below.  Pt come to hospital after being discharged about 6 weeks ago for alcohol WD.  Pt is in ER w/ alcohol WD, severe abdominal pains , nausea, vomiting , HAGMA,  w/ elevated: LFT, lipase, bilirubin. Case discussed w/ ICU MD .Plan to adm to ICU stepdown (04 Mar 2024 01:45)      ROS:  Constitutional, Neurological, Psychiatric, Eyes, ENT, Cardiovascular, Respiratory, Gastrointestinal, Genitourinary, Musculoskeletal, Integumentary, Endocrine and Heme/Lymph are otherwise negative. Except for    Social History:  No drug use    FAMILY HISTORY:  Benzodiazepine misuse        HEALTH ISSUES - PROBLEM Dx:  Alcohol dependence with withdrawal            Vital Signs Last 24 Hrs  T(C): 37 (06 Mar 2024 07:05), Max: 37 (06 Mar 2024 07:05)  T(F): 98.6 (06 Mar 2024 07:05), Max: 98.6 (06 Mar 2024 07:05)  HR: 85 (06 Mar 2024 07:22) (85 - 99)  BP: 117/60 (06 Mar 2024 07:22) (102/50 - 125/66)  BP(mean): 80 (06 Mar 2024 07:22) (71 - 91)  RR: 21 (06 Mar 2024 07:22) (17 - 32)  SpO2: 94% (06 Mar 2024 07:22) (94% - 97%)    Parameters below as of 06 Mar 2024 07:22  Patient On (Oxygen Delivery Method): room air          Neuro Exam:  Orientation: oriented to person, oriented to place and oriented to time.   Attention: impaired  Language: no aphasia, no dysarthria  Fund of knowledge: displays adequate knowledge of personal past history.   Cranial Nerves: visual acuity intact bilaterally, visual fields full to confrontation, pupils equal round and reactive to light, extraocular motion intact, facial sensation intact symmetrically, face symmetrical, hearing was intact bilaterally, tongue and palate midline, head turning and shoulder shrug symmetric and there was no tongue deviation with protrusion.   Motor: muscle tone was normal in all four extremities, muscle strength was normal in all four extremities and normal bulk in all four extremities.   Sensory exam: light touch was intact.   Coordination:.  balance was intact. there was no past-pointing. no tremor present.     Allergies    Robaxin (Unknown)  morphine (Unknown)           Home Medications:      MEDICATIONS  (STANDING):  chlorhexidine 2% Cloths 1 Application(s) Topical <User Schedule>  escitalopram 10 milliGRAM(s) Oral daily  folic acid 1 milliGRAM(s) Oral daily  gabapentin 100 milliGRAM(s) Oral three times a day  heparin   Injectable 5000 Unit(s) SubCutaneous every 8 hours  LORazepam   Injectable 2 milliGRAM(s) IV Push every 4 hours  LORazepam   Injectable   IV Push   magnesium oxide 400 milliGRAM(s) Oral three times a day with meals  mirtazapine 7.5 milliGRAM(s) Oral at bedtime  multivitamin/minerals 1 Tablet(s) Oral daily  pantoprazole  Injectable 40 milliGRAM(s) IV Push daily  potassium phosphate / sodium phosphate Powder (PHOS-NaK) 2 Packet(s) Oral three times a day before meals  potassium phosphate IVPB 30 milliMole(s) IV Intermittent once  thiamine Injectable 100 milliGRAM(s) IV Push daily    MEDICATIONS  (PRN):  LORazepam   Injectable 2 milliGRAM(s) IV Push every 6 hours PRN Anxiety  ondansetron Injectable 4 milliGRAM(s) IV Push every 4 hours PRN Nausea and/or Vomiting      LABS:                        10.0   2.71  )-----------( 95       ( 06 Mar 2024 06:10 )             29.0     03-06    137  |  99  |  3<L>  ----------------------------<  107<H>  3.7   |  28  |  0.6<L>    Ca    8.4      06 Mar 2024 06:10  Phos  1.6     03-06  Mg     1.7     03-06    TPro  6.1  /  Alb  3.6  /  TBili  1.0  /  DBili  0.4<H>  /  AST  108<H>  /  ALT  75<H>  /  AlkPhos  75  03-06    PT/INR - ( 05 Mar 2024 11:30 )   PT: 12.20 sec;   INR: 1.07 ratio         PTT - ( 05 Mar 2024 11:30 )  PTT:29.3 sec      CT Head No Cont (03.05.24 @ 13:24)   IMPRESSION:    In comparison with the prior CT scan of the brain dated July 30, 2022:    No acute intracranial hemorrhage or acute large territorial infarct.    Interval development of cerebral and cerebellar atrophy.

## 2024-03-06 NOTE — PROGRESS NOTE ADULT - SUBJECTIVE AND OBJECTIVE BOX
Patient is a 43y old  Female who presents with a chief complaint of severe HAGMA w/ etoh abuse disorder (05 Mar 2024 13:45)      Over Night Events:  Patient seen and examined.       ROS:  See HPI    PHYSICAL EXAM    ICU Vital Signs Last 24 Hrs  T(C): 36.7 (06 Mar 2024 03:31), Max: 36.7 (05 Mar 2024 11:05)  T(F): 98.1 (06 Mar 2024 03:31), Max: 98.1 (05 Mar 2024 11:05)  HR: 85 (06 Mar 2024 07:22) (85 - 99)  BP: 117/60 (06 Mar 2024 07:22) (89/54 - 125/66)  BP(mean): 80 (06 Mar 2024 07:22) (67 - 91)  ABP: --  ABP(mean): --  RR: 21 (06 Mar 2024 07:22) (17 - 32)  SpO2: 94% (06 Mar 2024 07:22) (94% - 98%)    O2 Parameters below as of 06 Mar 2024 07:22  Patient On (Oxygen Delivery Method): room air            General: NAD   HEENT:          NCAT      Lymph Nodes: NO cervical LN   Lungs: Bilateral BS  Cardiovascular: Regular   Abdomen: Soft, Positive BS  Extremities: No clubbing   Skin: warm   Neurological:   Musculoskeletal: move all ext     I&O's Detail    05 Mar 2024 07:01  -  06 Mar 2024 07:00  --------------------------------------------------------  IN:    Lactated Ringers: 750 mL    Oral Fluid: 470 mL  Total IN: 1220 mL    OUT:    Voided (mL): 950 mL  Total OUT: 950 mL    Total NET: 270 mL          LABS:                          10.0   2.71  )-----------( 95       ( 06 Mar 2024 06:10 )             29.0         06 Mar 2024 06:10    137    |  99     |  3      ----------------------------<  107    3.7     |  28     |  0.6      Ca    8.4        06 Mar 2024 06:10  Phos  1.1       05 Mar 2024 06:09  Mg     2.1       05 Mar 2024 06:09    TPro  6.1    /  Alb  3.6    /  TBili  1.0    /  DBili  0.4    /  AST  108    /  ALT  75     /  AlkPhos  75     06 Mar 2024 06:10  Amylase x     lipase x                                                 PT/INR - ( 05 Mar 2024 11:30 )   PT: 12.20 sec;   INR: 1.07 ratio         PTT - ( 05 Mar 2024 11:30 )  PTT:29.3 sec                                       Urinalysis Basic - ( 06 Mar 2024 06:10 )    Color: x / Appearance: x / SG: x / pH: x  Gluc: 107 mg/dL / Ketone: x  / Bili: x / Urobili: x   Blood: x / Protein: x / Nitrite: x   Leuk Esterase: x / RBC: x / WBC x   Sq Epi: x / Non Sq Epi: x / Bacteria: x        Lactate, Blood: 2.0 mmol/L (03-04-24 @ 16:00)  Lactate, Blood: 2.8 mmol/L (03-04-24 @ 11:34)  Lactate, Blood: 4.8 mmol/L (03-03-24 @ 23:37)                                                          Culture - Blood (collected 04 Mar 2024 16:01)  Source: .Blood None  Preliminary Report (05 Mar 2024 23:02):    No growth at 24 hours    Culture - Blood (collected 04 Mar 2024 16:00)  Source: .Blood None  Preliminary Report (05 Mar 2024 23:02):    No growth at 24 hours                                                                                       ABG - ( 04 Mar 2024 11:37 )  pH, Arterial: 7.37  pH, Blood: x     /  pCO2: 27    /  pO2: 54    / HCO3: 16    / Base Excess: -8.4  /  SaO2: 85.5                MEDICATIONS  (STANDING):  chlorhexidine 2% Cloths 1 Application(s) Topical <User Schedule>  dextrose 5%. 1000 milliLiter(s) (100 mL/Hr) IV Continuous <Continuous>  dextrose 5%. 1000 milliLiter(s) (50 mL/Hr) IV Continuous <Continuous>  dextrose 50% Injectable 50 milliLiter(s) IV Push every 15 minutes  escitalopram 10 milliGRAM(s) Oral daily  folic acid 1 milliGRAM(s) Oral daily  gabapentin 100 milliGRAM(s) Oral three times a day  glucagon  Injectable 1 milliGRAM(s) IntraMuscular once  heparin   Injectable 5000 Unit(s) SubCutaneous every 8 hours  LORazepam   Injectable 3 milliGRAM(s) IV Push every 4 hours  LORazepam   Injectable   IV Push   LORazepam   Injectable 2 milliGRAM(s) IV Push every 4 hours  mirtazapine 7.5 milliGRAM(s) Oral at bedtime  multivitamin/minerals 1 Tablet(s) Oral daily  pantoprazole  Injectable 40 milliGRAM(s) IV Push daily  potassium phosphate / sodium phosphate Powder (PHOS-NaK) 2 Packet(s) Oral three times a day before meals  thiamine Injectable 100 milliGRAM(s) IV Push daily    MEDICATIONS  (PRN):  dextrose Oral Gel 15 Gram(s) Oral once PRN Blood Glucose LESS THAN 70 milliGRAM(s)/deciliter  LORazepam   Injectable 2 milliGRAM(s) IV Push every 6 hours PRN Anxiety  ondansetron Injectable 4 milliGRAM(s) IV Push every 4 hours PRN Nausea and/or Vomiting               Patient is a 43y old  Female who presents with a chief complaint of severe HAGMA w/ etoh abuse disorder (05 Mar 2024 13:45)      Over Night Events:  Patient seen and examined. On room air.      ROS:  See HPI    PHYSICAL EXAM    ICU Vital Signs Last 24 Hrs  T(C): 36.7 (06 Mar 2024 03:31), Max: 36.7 (05 Mar 2024 11:05)  T(F): 98.1 (06 Mar 2024 03:31), Max: 98.1 (05 Mar 2024 11:05)  HR: 85 (06 Mar 2024 07:22) (85 - 99)  BP: 117/60 (06 Mar 2024 07:22) (89/54 - 125/66)  BP(mean): 80 (06 Mar 2024 07:22) (67 - 91)  ABP: --  ABP(mean): --  RR: 21 (06 Mar 2024 07:22) (17 - 32)  SpO2: 94% (06 Mar 2024 07:22) (94% - 98%)    O2 Parameters below as of 06 Mar 2024 07:22  Patient On (Oxygen Delivery Method): room air            General: NAD   HEENT:          NCAT      Lymph Nodes: NO cervical LN   Lungs: Bilateral BS  Cardiovascular: Regular   Abdomen: Soft, Positive BS  Extremities: No clubbing   Skin: warm   Neurological: AO x3   Musculoskeletal: move all ext     I&O's Detail    05 Mar 2024 07:01  -  06 Mar 2024 07:00  --------------------------------------------------------  IN:    Lactated Ringers: 750 mL    Oral Fluid: 470 mL  Total IN: 1220 mL    OUT:    Voided (mL): 950 mL  Total OUT: 950 mL    Total NET: 270 mL          LABS:                          10.0   2.71  )-----------( 95       ( 06 Mar 2024 06:10 )             29.0         06 Mar 2024 06:10    137    |  99     |  3      ----------------------------<  107    3.7     |  28     |  0.6      Ca    8.4        06 Mar 2024 06:10  Phos  1.1       05 Mar 2024 06:09  Mg     2.1       05 Mar 2024 06:09    TPro  6.1    /  Alb  3.6    /  TBili  1.0    /  DBili  0.4    /  AST  108    /  ALT  75     /  AlkPhos  75     06 Mar 2024 06:10  Amylase x     lipase x                                                 PT/INR - ( 05 Mar 2024 11:30 )   PT: 12.20 sec;   INR: 1.07 ratio         PTT - ( 05 Mar 2024 11:30 )  PTT:29.3 sec                                       Urinalysis Basic - ( 06 Mar 2024 06:10 )    Color: x / Appearance: x / SG: x / pH: x  Gluc: 107 mg/dL / Ketone: x  / Bili: x / Urobili: x   Blood: x / Protein: x / Nitrite: x   Leuk Esterase: x / RBC: x / WBC x   Sq Epi: x / Non Sq Epi: x / Bacteria: x        Lactate, Blood: 2.0 mmol/L (03-04-24 @ 16:00)  Lactate, Blood: 2.8 mmol/L (03-04-24 @ 11:34)  Lactate, Blood: 4.8 mmol/L (03-03-24 @ 23:37)                                                          Culture - Blood (collected 04 Mar 2024 16:01)  Source: .Blood None  Preliminary Report (05 Mar 2024 23:02):    No growth at 24 hours    Culture - Blood (collected 04 Mar 2024 16:00)  Source: .Blood None  Preliminary Report (05 Mar 2024 23:02):    No growth at 24 hours                                                                                       ABG - ( 04 Mar 2024 11:37 )  pH, Arterial: 7.37  pH, Blood: x     /  pCO2: 27    /  pO2: 54    / HCO3: 16    / Base Excess: -8.4  /  SaO2: 85.5                MEDICATIONS  (STANDING):  chlorhexidine 2% Cloths 1 Application(s) Topical <User Schedule>  dextrose 5%. 1000 milliLiter(s) (100 mL/Hr) IV Continuous <Continuous>  dextrose 5%. 1000 milliLiter(s) (50 mL/Hr) IV Continuous <Continuous>  dextrose 50% Injectable 50 milliLiter(s) IV Push every 15 minutes  escitalopram 10 milliGRAM(s) Oral daily  folic acid 1 milliGRAM(s) Oral daily  gabapentin 100 milliGRAM(s) Oral three times a day  glucagon  Injectable 1 milliGRAM(s) IntraMuscular once  heparin   Injectable 5000 Unit(s) SubCutaneous every 8 hours  LORazepam   Injectable 3 milliGRAM(s) IV Push every 4 hours  LORazepam   Injectable   IV Push   LORazepam   Injectable 2 milliGRAM(s) IV Push every 4 hours  mirtazapine 7.5 milliGRAM(s) Oral at bedtime  multivitamin/minerals 1 Tablet(s) Oral daily  pantoprazole  Injectable 40 milliGRAM(s) IV Push daily  potassium phosphate / sodium phosphate Powder (PHOS-NaK) 2 Packet(s) Oral three times a day before meals  thiamine Injectable 100 milliGRAM(s) IV Push daily    MEDICATIONS  (PRN):  dextrose Oral Gel 15 Gram(s) Oral once PRN Blood Glucose LESS THAN 70 milliGRAM(s)/deciliter  LORazepam   Injectable 2 milliGRAM(s) IV Push every 6 hours PRN Anxiety  ondansetron Injectable 4 milliGRAM(s) IV Push every 4 hours PRN Nausea and/or Vomiting               Patient is a 43y old  Female who presents with a chief complaint of severe HAGMA w/ etoh abuse disorder (05 Mar 2024 13:45)      Over Night Events:  Patient seen and examined. On room air.  off IV fluid tolerate diet     ROS:  See HPI    PHYSICAL EXAM    ICU Vital Signs Last 24 Hrs  T(C): 36.7 (06 Mar 2024 03:31), Max: 36.7 (05 Mar 2024 11:05)  T(F): 98.1 (06 Mar 2024 03:31), Max: 98.1 (05 Mar 2024 11:05)  HR: 85 (06 Mar 2024 07:22) (85 - 99)  BP: 117/60 (06 Mar 2024 07:22) (89/54 - 125/66)  BP(mean): 80 (06 Mar 2024 07:22) (67 - 91)  ABP: --  ABP(mean): --  RR: 21 (06 Mar 2024 07:22) (17 - 32)  SpO2: 94% (06 Mar 2024 07:22) (94% - 98%)    O2 Parameters below as of 06 Mar 2024 07:22  Patient On (Oxygen Delivery Method): room air            General: NAD   HEENT:          NCAT      Lymph Nodes: NO cervical LN   Lungs: Bilateral BS  Cardiovascular: Regular   Abdomen: Soft, Positive BS  Extremities: No clubbing   Skin: warm   Neurological: AO x3   Musculoskeletal: move all ext     I&O's Detail    05 Mar 2024 07:01  -  06 Mar 2024 07:00  --------------------------------------------------------  IN:    Lactated Ringers: 750 mL    Oral Fluid: 470 mL  Total IN: 1220 mL    OUT:    Voided (mL): 950 mL  Total OUT: 950 mL    Total NET: 270 mL          LABS:                          10.0   2.71  )-----------( 95       ( 06 Mar 2024 06:10 )             29.0         06 Mar 2024 06:10    137    |  99     |  3      ----------------------------<  107    3.7     |  28     |  0.6      Ca    8.4        06 Mar 2024 06:10  Phos  1.1       05 Mar 2024 06:09  Mg     2.1       05 Mar 2024 06:09    TPro  6.1    /  Alb  3.6    /  TBili  1.0    /  DBili  0.4    /  AST  108    /  ALT  75     /  AlkPhos  75     06 Mar 2024 06:10  Amylase x     lipase x                                                 PT/INR - ( 05 Mar 2024 11:30 )   PT: 12.20 sec;   INR: 1.07 ratio         PTT - ( 05 Mar 2024 11:30 )  PTT:29.3 sec                                       Urinalysis Basic - ( 06 Mar 2024 06:10 )    Color: x / Appearance: x / SG: x / pH: x  Gluc: 107 mg/dL / Ketone: x  / Bili: x / Urobili: x   Blood: x / Protein: x / Nitrite: x   Leuk Esterase: x / RBC: x / WBC x   Sq Epi: x / Non Sq Epi: x / Bacteria: x        Lactate, Blood: 2.0 mmol/L (03-04-24 @ 16:00)  Lactate, Blood: 2.8 mmol/L (03-04-24 @ 11:34)  Lactate, Blood: 4.8 mmol/L (03-03-24 @ 23:37)                                                          Culture - Blood (collected 04 Mar 2024 16:01)  Source: .Blood None  Preliminary Report (05 Mar 2024 23:02):    No growth at 24 hours    Culture - Blood (collected 04 Mar 2024 16:00)  Source: .Blood None  Preliminary Report (05 Mar 2024 23:02):    No growth at 24 hours                                                                                       ABG - ( 04 Mar 2024 11:37 )  pH, Arterial: 7.37  pH, Blood: x     /  pCO2: 27    /  pO2: 54    / HCO3: 16    / Base Excess: -8.4  /  SaO2: 85.5                MEDICATIONS  (STANDING):  chlorhexidine 2% Cloths 1 Application(s) Topical <User Schedule>  dextrose 5%. 1000 milliLiter(s) (100 mL/Hr) IV Continuous <Continuous>  dextrose 5%. 1000 milliLiter(s) (50 mL/Hr) IV Continuous <Continuous>  dextrose 50% Injectable 50 milliLiter(s) IV Push every 15 minutes  escitalopram 10 milliGRAM(s) Oral daily  folic acid 1 milliGRAM(s) Oral daily  gabapentin 100 milliGRAM(s) Oral three times a day  glucagon  Injectable 1 milliGRAM(s) IntraMuscular once  heparin   Injectable 5000 Unit(s) SubCutaneous every 8 hours  LORazepam   Injectable 3 milliGRAM(s) IV Push every 4 hours  LORazepam   Injectable   IV Push   LORazepam   Injectable 2 milliGRAM(s) IV Push every 4 hours  mirtazapine 7.5 milliGRAM(s) Oral at bedtime  multivitamin/minerals 1 Tablet(s) Oral daily  pantoprazole  Injectable 40 milliGRAM(s) IV Push daily  potassium phosphate / sodium phosphate Powder (PHOS-NaK) 2 Packet(s) Oral three times a day before meals  thiamine Injectable 100 milliGRAM(s) IV Push daily    MEDICATIONS  (PRN):  dextrose Oral Gel 15 Gram(s) Oral once PRN Blood Glucose LESS THAN 70 milliGRAM(s)/deciliter  LORazepam   Injectable 2 milliGRAM(s) IV Push every 6 hours PRN Anxiety  ondansetron Injectable 4 milliGRAM(s) IV Push every 4 hours PRN Nausea and/or Vomiting

## 2024-03-06 NOTE — PROGRESS NOTE ADULT - ASSESSMENT
43-year-old female with a medical history of depression, anxiety, alcohol abuse, GERD, last admission for detox was in January 15, 2024 presented to ED yesterday intoxicated c/o abdominal pain associated with nausea and vomiting.     alcoholic ketoacidosis - resolved / severe hypophosphatemia / hypomagnesemia / suspected thiamine and folate deficiency / alcohol withdrawal     - continue ativan detox protocol - may change to oral   - supplement and re check electrolytes   - DC alcohol   - tolerating diet   - may DGTF   - thiamine and folate   - check fs qshift    - DVT prophylaxis

## 2024-03-06 NOTE — CONSULT NOTE ADULT - ASSESSMENT
Patient is a 43F with hx of alcohol dependence, tobacco use, benzo misuse presenting intoxicated with abdominal pain, admitted with [facility] and detox. Neurology consulted for cerebral atrophy seen on CT. Patient reports regular heavy alcohol use for 17 years, also reports cigarette smoking. Reports short term memory loss.    cerebral atrophy very likely 2/2 long standing alcohol abuse  - alcohol cessation  - smoking cessation  - [facility] protocol  - thiamine, folic acid, vitamin b12  - no further neurologic intervention, please recall if new neurologic concerns arise

## 2024-03-07 LAB
ALBUMIN SERPL ELPH-MCNC: 3.7 G/DL — SIGNIFICANT CHANGE UP (ref 3.5–5.2)
ALP SERPL-CCNC: 81 U/L — SIGNIFICANT CHANGE UP (ref 30–115)
ALT FLD-CCNC: 86 U/L — HIGH (ref 0–41)
ANION GAP SERPL CALC-SCNC: 10 MMOL/L — SIGNIFICANT CHANGE UP (ref 7–14)
AST SERPL-CCNC: 145 U/L — HIGH (ref 0–41)
BASOPHILS # BLD AUTO: 0.03 K/UL — SIGNIFICANT CHANGE UP (ref 0–0.2)
BASOPHILS NFR BLD AUTO: 1 % — SIGNIFICANT CHANGE UP (ref 0–1)
BILIRUB SERPL-MCNC: 0.7 MG/DL — SIGNIFICANT CHANGE UP (ref 0.2–1.2)
BUN SERPL-MCNC: 3 MG/DL — LOW (ref 10–20)
CALCIUM SERPL-MCNC: 8.8 MG/DL — SIGNIFICANT CHANGE UP (ref 8.4–10.5)
CHLORIDE SERPL-SCNC: 101 MMOL/L — SIGNIFICANT CHANGE UP (ref 98–110)
CO2 SERPL-SCNC: 28 MMOL/L — SIGNIFICANT CHANGE UP (ref 17–32)
CREAT SERPL-MCNC: 0.5 MG/DL — LOW (ref 0.7–1.5)
EGFR: 121 ML/MIN/1.73M2 — SIGNIFICANT CHANGE UP
EOSINOPHIL # BLD AUTO: 0.11 K/UL — SIGNIFICANT CHANGE UP (ref 0–0.7)
EOSINOPHIL NFR BLD AUTO: 3.8 % — SIGNIFICANT CHANGE UP (ref 0–8)
GLUCOSE BLDC GLUCOMTR-MCNC: 103 MG/DL — HIGH (ref 70–99)
GLUCOSE BLDC GLUCOMTR-MCNC: 103 MG/DL — HIGH (ref 70–99)
GLUCOSE BLDC GLUCOMTR-MCNC: 107 MG/DL — HIGH (ref 70–99)
GLUCOSE BLDC GLUCOMTR-MCNC: 117 MG/DL — HIGH (ref 70–99)
GLUCOSE SERPL-MCNC: 99 MG/DL — SIGNIFICANT CHANGE UP (ref 70–99)
HCT VFR BLD CALC: 31.9 % — LOW (ref 37–47)
HGB BLD-MCNC: 10.5 G/DL — LOW (ref 12–16)
IMM GRANULOCYTES NFR BLD AUTO: 1 % — HIGH (ref 0.1–0.3)
LYMPHOCYTES # BLD AUTO: 1.22 K/UL — SIGNIFICANT CHANGE UP (ref 1.2–3.4)
LYMPHOCYTES # BLD AUTO: 42.5 % — SIGNIFICANT CHANGE UP (ref 20.5–51.1)
MAGNESIUM SERPL-MCNC: 2 MG/DL — SIGNIFICANT CHANGE UP (ref 1.8–2.4)
MCHC RBC-ENTMCNC: 32.9 G/DL — SIGNIFICANT CHANGE UP (ref 32–37)
MCHC RBC-ENTMCNC: 33.4 PG — HIGH (ref 27–31)
MCV RBC AUTO: 101.6 FL — HIGH (ref 81–99)
MONOCYTES # BLD AUTO: 0.34 K/UL — SIGNIFICANT CHANGE UP (ref 0.1–0.6)
MONOCYTES NFR BLD AUTO: 11.8 % — HIGH (ref 1.7–9.3)
NEUTROPHILS # BLD AUTO: 1.14 K/UL — LOW (ref 1.4–6.5)
NEUTROPHILS NFR BLD AUTO: 39.9 % — LOW (ref 42.2–75.2)
NRBC # BLD: 0 /100 WBCS — SIGNIFICANT CHANGE UP (ref 0–0)
PHOSPHATE SERPL-MCNC: 3.4 MG/DL — SIGNIFICANT CHANGE UP (ref 2.1–4.9)
PLATELET # BLD AUTO: 135 K/UL — SIGNIFICANT CHANGE UP (ref 130–400)
PMV BLD: 9.9 FL — SIGNIFICANT CHANGE UP (ref 7.4–10.4)
POTASSIUM SERPL-MCNC: 3.9 MMOL/L — SIGNIFICANT CHANGE UP (ref 3.5–5)
POTASSIUM SERPL-SCNC: 3.9 MMOL/L — SIGNIFICANT CHANGE UP (ref 3.5–5)
PROT SERPL-MCNC: 6.3 G/DL — SIGNIFICANT CHANGE UP (ref 6–8)
RBC # BLD: 3.14 M/UL — LOW (ref 4.2–5.4)
RBC # FLD: 14.6 % — HIGH (ref 11.5–14.5)
SODIUM SERPL-SCNC: 139 MMOL/L — SIGNIFICANT CHANGE UP (ref 135–146)
VIT B12 SERPL-MCNC: 778 PG/ML — SIGNIFICANT CHANGE UP (ref 232–1245)
WBC # BLD: 2.87 K/UL — LOW (ref 4.8–10.8)
WBC # FLD AUTO: 2.87 K/UL — LOW (ref 4.8–10.8)

## 2024-03-07 PROCEDURE — 99232 SBSQ HOSP IP/OBS MODERATE 35: CPT

## 2024-03-07 PROCEDURE — 99231 SBSQ HOSP IP/OBS SF/LOW 25: CPT

## 2024-03-07 PROCEDURE — 99233 SBSQ HOSP IP/OBS HIGH 50: CPT

## 2024-03-07 RX ORDER — KETOROLAC TROMETHAMINE 30 MG/ML
15 SYRINGE (ML) INJECTION EVERY 6 HOURS
Refills: 0 | Status: DISCONTINUED | OUTPATIENT
Start: 2024-03-07 | End: 2024-03-08

## 2024-03-07 RX ORDER — PANTOPRAZOLE SODIUM 20 MG/1
40 TABLET, DELAYED RELEASE ORAL
Refills: 0 | Status: DISCONTINUED | OUTPATIENT
Start: 2024-03-07 | End: 2024-03-08

## 2024-03-07 RX ORDER — HYDROMORPHONE HYDROCHLORIDE 2 MG/ML
0.5 INJECTION INTRAMUSCULAR; INTRAVENOUS; SUBCUTANEOUS ONCE
Refills: 0 | Status: DISCONTINUED | OUTPATIENT
Start: 2024-03-07 | End: 2024-03-07

## 2024-03-07 RX ORDER — PANTOPRAZOLE SODIUM 20 MG/1
40 TABLET, DELAYED RELEASE ORAL
Refills: 0 | Status: DISCONTINUED | OUTPATIENT
Start: 2024-03-07 | End: 2024-03-07

## 2024-03-07 RX ADMIN — PANTOPRAZOLE SODIUM 40 MILLIGRAM(S): 20 TABLET, DELAYED RELEASE ORAL at 12:23

## 2024-03-07 RX ADMIN — GABAPENTIN 100 MILLIGRAM(S): 400 CAPSULE ORAL at 13:08

## 2024-03-07 RX ADMIN — MAGNESIUM OXIDE 400 MG ORAL TABLET 400 MILLIGRAM(S): 241.3 TABLET ORAL at 12:13

## 2024-03-07 RX ADMIN — MIRTAZAPINE 7.5 MILLIGRAM(S): 45 TABLET, ORALLY DISINTEGRATING ORAL at 21:09

## 2024-03-07 RX ADMIN — Medication 100 MILLIGRAM(S): at 12:14

## 2024-03-07 RX ADMIN — Medication 1 MILLIGRAM(S): at 21:07

## 2024-03-07 RX ADMIN — Medication 1 MILLIGRAM(S): at 18:09

## 2024-03-07 RX ADMIN — Medication 1 TABLET(S): at 12:13

## 2024-03-07 RX ADMIN — Medication 15 MILLIGRAM(S): at 20:59

## 2024-03-07 RX ADMIN — Medication 2 PACKET(S): at 06:07

## 2024-03-07 RX ADMIN — MAGNESIUM OXIDE 400 MG ORAL TABLET 400 MILLIGRAM(S): 241.3 TABLET ORAL at 08:02

## 2024-03-07 RX ADMIN — Medication 2 PACKET(S): at 12:12

## 2024-03-07 RX ADMIN — HEPARIN SODIUM 5000 UNIT(S): 5000 INJECTION INTRAVENOUS; SUBCUTANEOUS at 21:06

## 2024-03-07 RX ADMIN — Medication 2 PACKET(S): at 18:09

## 2024-03-07 RX ADMIN — GABAPENTIN 100 MILLIGRAM(S): 400 CAPSULE ORAL at 06:07

## 2024-03-07 RX ADMIN — Medication 15 MILLIGRAM(S): at 20:53

## 2024-03-07 RX ADMIN — ESCITALOPRAM OXALATE 10 MILLIGRAM(S): 10 TABLET, FILM COATED ORAL at 12:13

## 2024-03-07 RX ADMIN — MAGNESIUM OXIDE 400 MG ORAL TABLET 400 MILLIGRAM(S): 241.3 TABLET ORAL at 18:09

## 2024-03-07 RX ADMIN — Medication 1 MILLIGRAM(S): at 13:07

## 2024-03-07 RX ADMIN — Medication 2 MILLIGRAM(S): at 10:02

## 2024-03-07 RX ADMIN — GABAPENTIN 100 MILLIGRAM(S): 400 CAPSULE ORAL at 21:08

## 2024-03-07 RX ADMIN — POLYETHYLENE GLYCOL 3350 17 GRAM(S): 17 POWDER, FOR SOLUTION ORAL at 12:12

## 2024-03-07 RX ADMIN — CHLORHEXIDINE GLUCONATE 1 APPLICATION(S): 213 SOLUTION TOPICAL at 06:12

## 2024-03-07 RX ADMIN — HYDROMORPHONE HYDROCHLORIDE 0.5 MILLIGRAM(S): 2 INJECTION INTRAMUSCULAR; INTRAVENOUS; SUBCUTANEOUS at 22:12

## 2024-03-07 RX ADMIN — Medication 2 MILLIGRAM(S): at 06:06

## 2024-03-07 RX ADMIN — Medication 2 MILLIGRAM(S): at 00:29

## 2024-03-07 RX ADMIN — Medication 1 MILLIGRAM(S): at 12:13

## 2024-03-07 RX ADMIN — HYDROMORPHONE HYDROCHLORIDE 0.5 MILLIGRAM(S): 2 INJECTION INTRAMUSCULAR; INTRAVENOUS; SUBCUTANEOUS at 22:22

## 2024-03-07 NOTE — PROGRESS NOTE ADULT - SUBJECTIVE AND OBJECTIVE BOX
Pt interviewed, examined and EMR chart reviewed.    Follow up of Alcohol Dependency. Pt is on Ativan protocol. Pt is doing better. Pt with complaint of continued headaches, muscle aches, paresthesias. Pt does not feel any withdrawal from alcohol.       MEDICATIONS  (STANDING):  chlorhexidine 2% Cloths 1 Application(s) Topical <User Schedule>  cyanocobalamin 1000 MICROGram(s) Oral daily  escitalopram 10 milliGRAM(s) Oral daily  folic acid 1 milliGRAM(s) Oral daily  gabapentin 100 milliGRAM(s) Oral three times a day  heparin   Injectable 5000 Unit(s) SubCutaneous every 8 hours  LORazepam   Injectable   IV Push   LORazepam   Injectable 2 milliGRAM(s) IV Push every 4 hours  LORazepam   Injectable 1 milliGRAM(s) IV Push every 4 hours  magnesium oxide 400 milliGRAM(s) Oral three times a day with meals  mirtazapine 7.5 milliGRAM(s) Oral at bedtime  multivitamin/minerals 1 Tablet(s) Oral daily  pantoprazole  Injectable 40 milliGRAM(s) IV Push daily  polyethylene glycol 3350 17 Gram(s) Oral daily  potassium phosphate / sodium phosphate Powder (PHOS-NaK) 2 Packet(s) Oral three times a day before meals  senna 2 Tablet(s) Oral at bedtime  thiamine 100 milliGRAM(s) Oral daily    MEDICATIONS  (PRN):  acetaminophen     Tablet .. 650 milliGRAM(s) Oral once PRN Moderate Pain (4 - 6)  LORazepam   Injectable 2 milliGRAM(s) IV Push every 6 hours PRN Anxiety  ondansetron Injectable 4 milliGRAM(s) IV Push every 4 hours PRN Nausea and/or Vomiting      Vital Signs Last 24 Hrs  T(C): 36.2 (07 Mar 2024 07:05), Max: 36.6 (06 Mar 2024 15:05)  T(F): 97.2 (07 Mar 2024 07:05), Max: 97.9 (06 Mar 2024 15:05)  HR: 77 (07 Mar 2024 07:10) (75 - 113)  BP: 88/54 (07 Mar 2024 07:10) (88/54 - 111/60)  BP(mean): 65 (07 Mar 2024 07:10) (65 - 80)  RR: 16 (07 Mar 2024 07:10) (16 - 26)  SpO2: 93% (07 Mar 2024 07:10) (93% - 96%)    Parameters below as of 07 Mar 2024 07:10  Patient On (Oxygen Delivery Method): room air        PHYSICAL EXAM:    Constitutional: NAD, well-groomed, well-developed  HEENT: PERRLA, EOMI, Normal Hearing,   Neck: No LAD, No JVD  Back: Normal spine flexure, No CVA tenderness  Respiratory: CTAB/L  Cardiovascular: S1 and S2, RRR, no M/G/R  Gastrointestinal: BS+, soft, NT/ND  Extremities: No peripheral edema  Neurological: A/O x 3, no focal deficits    LABS:                        10.5   2.87  )-----------( 135      ( 07 Mar 2024 06:21 )             31.9     03-07    139  |  101  |  3<L>  ----------------------------<  99  3.9   |  28  |  0.5<L>    Ca    8.8      07 Mar 2024 06:21  Phos  3.4     03-07  Mg     2.0     03-07    TPro  6.3  /  Alb  3.7  /  TBili  0.7  /  DBili  x   /  AST  145<H>  /  ALT  86<H>  /  AlkPhos  81  03-07    PT/INR - ( 05 Mar 2024 11:30 )   PT: 12.20 sec;   INR: 1.07 ratio         PTT - ( 05 Mar 2024 11:30 )  PTT:29.3 sec  Urinalysis Basic - ( 07 Mar 2024 06:21 )    Color: x / Appearance: x / SG: x / pH: x  Gluc: 99 mg/dL / Ketone: x  / Bili: x / Urobili: x   Blood: x / Protein: x / Nitrite: x   Leuk Esterase: x / RBC: x / WBC x   Sq Epi: x / Non Sq Epi: x / Bacteria: x      Drug Screen Urine:  Alcohol Level        RADIOLOGY & ADDITIONAL STUDIES:

## 2024-03-07 NOTE — CHART NOTE - NSCHARTNOTEFT_GEN_A_CORE
downgrade note  micu -> stepdown -> floor    43 year old female with alcohol use disorder presents with abdominal pain, nausea/vomiting, decreased PO intake  found to have severe metabolic acidosis with HAGMA likely starvation ketosis/ alcoholic ketosis/ possible DKA  CT abd showing severe hepatic steatosis, no cholecystitis on RUQ US  treated with fluids/ IV insulin/ sugar infusion/ electrolyte replacement  a1c normal  improvement in metabolic abnormalities, pt started on PO diet, no longer on insulin gtt or fluids    pt developed leukopenia/thrombocytopenia  no signs of infection/ HIT panel negative/ DIC negative  possible etiology alcoholism    pt c/o blurry vision/ falls prior to admission  CT head showing cerebellar atrophy  seen by neuro, no further w/u  likely alcohol related    currently on [facility] protocol for alcohol w/d    PLAN  - complete [facility] protocol  - monitor bmp, LFTs  - DC planning  - o/p optho f/u, o/p neuro f/u, o/p liver clinic f/u  - see addiction medicine consult note on admission:   Abdominal ultrasound AFP every 6 months for HCC screening  -EGD outpatient for esophageal varices screening   -Follow up with Private GI or our GI [facility] Clinic located at [address] [place] [address]. Phone Number: [phone]

## 2024-03-07 NOTE — PROGRESS NOTE ADULT - ATTENDING COMMENTS
patient seen and examined agree above note   tolerate diet   ciwa protocol   follow endo   repeat lipase reviewed   neurology eval    downgrade to floor
patient today more awake follow command   said complaining of headache   do ct brain now   off insulin since 6:30 am   follow bmp Q4 hrs follow AG if start to increase resume insulin Doubt DKA   follow endo   FS Q 1 hr if less then 100 add D5 to LR   encourage PO intake   ciwa protocol   thiamine folate   RVP panel with covid   bldc x  if spike temp again start broad spectrum abx   nasal mrsa
patient seen and examined agree above note   tolerate diet   ciwa protocol   FS Q6 hrs   follow endo   repeat lipase   SDU   neurology eval for finding on ct scan ?? alcohol related

## 2024-03-07 NOTE — PROGRESS NOTE ADULT - ASSESSMENT
IMPRESSION:    HAGMA likely secondary to DKA vs AKA with lactic acidosis   EtOH abuse - last drink 3/3   Alcohol withdrawal   ?? pancreatitis     PLAN:    CNS: CIWA taper. Thiamine and folic acid. CW home psych meds. CT head noted. Neuro eval appreciated.     HEENT: Oral care    PULMONARY:  HOB @ 45 degrees. On room air.     CARDIOVASCULAR: Recent ECHO noted with EF 57%. Lactic acidosis improved.    GI: GI prophylaxis.  Feeding as tolerated, tolerating PO diet. Zofran PRN.  RUQ US with hepatic steatosis, otherwise unremarkable. Repeat lipase.     RENAL:  Follow up lytes. Correct as needed.     INFECTIOUS DISEASE: Procal 0.50 noted. UA negative. CXR clear. RVP negative.    HEMATOLOGICAL:  DVT prophylaxis. HIT and DIC negative. Monitor CBC.    ENDOCRINE:  Monitor FS. Off insulin gtt. Thyroid panel noted. Repeat thyroid panel. Endo follow up.    MUSCULOSKELETAL: AAT. PT.     General medical floor

## 2024-03-07 NOTE — CHART NOTE - NSCHARTNOTEFT_GEN_A_CORE
pt c/o LUQ pain graded 8/10 sharp, localized to LUQ  no overlying skin changes or ecchymosis    Tender to deep palpation to LUQ   lipase downtrending (113 --> 107)   will repeat lipase and order pain control   CT abd from 3/4 notes severe hepatic steatosis, significantly increased from prior CT

## 2024-03-07 NOTE — PROGRESS NOTE ADULT - ASSESSMENT
43-year-old female with a medical history of depression, anxiety, alcohol abuse, GERD, last admission for detox was in January 15, 2024 presented to ED yesterday intoxicated c/o abdominal pain associated with nausea and vomiting.     alcoholic ketoacidosis - resolved / severe hypophosphatemia / hypomagnesemia / suspected thiamine and folate deficiency / alcohol withdrawal     - complete  ativan detox protocol - please change to oral   - electrolytes improved s/p supplementation   - DC alcohol   - tolerating diet   - may DGTF   - thiamine and folate   - check fs qshift    - DVT prophylaxis

## 2024-03-07 NOTE — PROGRESS NOTE ADULT - PROBLEM SELECTOR PLAN 1
After evaluation at this time continue current protocol. Pts concerns addressed  Pt will be monitored and supportive care provided.  No other changes to medical care plan for withdrawals.  Monitor labs/electrolytes as needed.    -Counseling provided   CATCH team involved for aftercare and pt will follow up with aftercare.
After evaluation at this time continue current protocol. Pts concerns addressed  Pt will be monitored and supportive care provided.  No other changes to medical care plan for withdrawals.  Monitor labs/electrolytes as needed.    -Counseling provided   CATCH team involved for aftercare and pt will follow up with aftercare. Pt still does not want to talk or give information about her recovery post discharge. Counselors will follow up daily.

## 2024-03-07 NOTE — PROGRESS NOTE ADULT - SUBJECTIVE AND OBJECTIVE BOX
Patient is a 43y old  Female who presents with a chief complaint of severe HAGMA w/ etoh abuse disorder (07 Mar 2024 08:58)      T(F): 97.2 (03-07-24 @ 07:05), Max: 97.9 (03-06-24 @ 15:05)  HR: 84 (03-07-24 @ 09:00)  BP: 100/61 (03-07-24 @ 09:00)  RR: 18 (03-07-24 @ 09:00)  SpO2: 94% (03-07-24 @ 09:00) (93% - 96%)    PHYSICAL EXAM:  GENERAL: NAD  HEAD:  Atraumatic, Normocephalic  EYES: EOMI, PERRLA, conjunctiva and sclera clear  NERVOUS SYSTEM:  Alert & Oriented X3, no focal deficits   CHEST/LUNG: Clear to percussion bilaterally; No rales, rhonchi, wheezing, or rubs  HEART: Regular rate and rhythm; No murmurs, rubs, or gallops  ABDOMEN: Soft, Nontender, Nondistended; Bowel sounds present  EXTREMITIES:  2+ Peripheral Pulses, No clubbing, cyanosis, or edema    LABS  03-07    139  |  101  |  3<L>  ----------------------------<  99  3.9   |  28  |  0.5<L>    Ca    8.8      07 Mar 2024 06:21  Phos  3.4     03-07  Mg     2.0     03-07    TPro  6.3  /  Alb  3.7  /  TBili  0.7  /  DBili  x   /  AST  145<H>  /  ALT  86<H>  /  AlkPhos  81  03-07                          10.5   2.87  )-----------( 135      ( 07 Mar 2024 06:21 )             31.9     Culture Results:   No growth at 48 Hours (03-04-24)  Culture Results:   No growth at 48 Hours (03-04-24)    RADIOLOGY    MEDICATIONS  (STANDING):  chlorhexidine 2% Cloths 1 Application(s) Topical <User Schedule>  cyanocobalamin 1000 MICROGram(s) Oral daily  escitalopram 10 milliGRAM(s) Oral daily  folic acid 1 milliGRAM(s) Oral daily  gabapentin 100 milliGRAM(s) Oral three times a day  heparin   Injectable 5000 Unit(s) SubCutaneous every 8 hours  LORazepam     Tablet 1 milliGRAM(s) Oral every 4 hours  magnesium oxide 400 milliGRAM(s) Oral three times a day with meals  mirtazapine 7.5 milliGRAM(s) Oral at bedtime  multivitamin/minerals 1 Tablet(s) Oral daily  pantoprazole  Injectable 40 milliGRAM(s) IV Push daily  polyethylene glycol 3350 17 Gram(s) Oral daily  potassium phosphate / sodium phosphate Powder (PHOS-NaK) 2 Packet(s) Oral three times a day before meals  senna 2 Tablet(s) Oral at bedtime  thiamine 100 milliGRAM(s) Oral daily    MEDICATIONS  (PRN):  acetaminophen     Tablet .. 650 milliGRAM(s) Oral once PRN Moderate Pain (4 - 6)  ondansetron Injectable 4 milliGRAM(s) IV Push every 4 hours PRN Nausea and/or Vomiting

## 2024-03-07 NOTE — PROGRESS NOTE ADULT - SUBJECTIVE AND OBJECTIVE BOX
O:Vital Signs Last 24 Hrs  T(C): 36.2 (07 Mar 2024 07:05), Max: 36.6 (06 Mar 2024 15:05)  T(F): 97.2 (07 Mar 2024 07:05), Max: 97.9 (06 Mar 2024 15:05)  HR: 84 (07 Mar 2024 13:45) (75 - 113)  BP: 93/56 (07 Mar 2024 13:45) (88/54 - 105/65)  BP(mean): 68 (07 Mar 2024 13:45) (65 - 80)  RR: 16 (07 Mar 2024 13:45) (16 - 22)  SpO2: 94% (07 Mar 2024 09:00) (93% - 96%)    Parameters below as of 07 Mar 2024 09:00  Patient On (Oxygen Delivery Method): room air    03-07    139  |  101  |  3<L>  ----------------------------<  99  3.9   |  28  |  0.5<L>    Ca    8.8      07 Mar 2024 06:21  Phos  3.4     03-07  Mg     2.0     03-07    TPro  6.3  /  Alb  3.7  /  TBili  0.7  /  DBili  x   /  AST  145<H>  /  ALT  86<H>  /  AlkPhos  81  03-07      CAPILLARY BLOOD GLUCOSE      POCT Blood Glucose.: 107 mg/dL (07 Mar 2024 08:00)  POCT Blood Glucose.: 103 mg/dL (07 Mar 2024 00:15)  POCT Blood Glucose.: 104 mg/dL (06 Mar 2024 16:19)        MEDICATIONS  (STANDING):  chlorhexidine 2% Cloths 1 Application(s) Topical <User Schedule>  cyanocobalamin 1000 MICROGram(s) Oral daily  escitalopram 10 milliGRAM(s) Oral daily  folic acid 1 milliGRAM(s) Oral daily  gabapentin 100 milliGRAM(s) Oral three times a day  heparin   Injectable 5000 Unit(s) SubCutaneous every 8 hours  LORazepam     Tablet 1 milliGRAM(s) Oral every 4 hours  magnesium oxide 400 milliGRAM(s) Oral three times a day with meals  mirtazapine 7.5 milliGRAM(s) Oral at bedtime  multivitamin/minerals 1 Tablet(s) Oral daily  pantoprazole    Tablet 40 milliGRAM(s) Oral before breakfast  polyethylene glycol 3350 17 Gram(s) Oral daily  potassium phosphate / sodium phosphate Powder (PHOS-NaK) 2 Packet(s) Oral three times a day before meals  senna 2 Tablet(s) Oral at bedtime  thiamine 100 milliGRAM(s) Oral daily    MEDICATIONS  (PRN):  acetaminophen     Tablet .. 650 milliGRAM(s) Oral once PRN Moderate Pain (4 - 6)  ondansetron Injectable 4 milliGRAM(s) IV Push every 4 hours PRN Nausea and/or Vomiting

## 2024-03-07 NOTE — PROGRESS NOTE ADULT - SUBJECTIVE AND OBJECTIVE BOX
Patient is a 43y old  Female who presents with a chief complaint of severe HAGMA w/ etoh abuse disorder (06 Mar 2024 10:30)        Over Night Events:  On room air. Afebrile.       ROS:     All ROS are negative except HPI         PHYSICAL EXAM    ICU Vital Signs Last 24 Hrs  T(C): 36.2 (07 Mar 2024 07:05), Max: 36.6 (06 Mar 2024 15:05)  T(F): 97.2 (07 Mar 2024 07:05), Max: 97.9 (06 Mar 2024 15:05)  HR: 77 (07 Mar 2024 07:10) (75 - 113)  BP: 88/54 (07 Mar 2024 07:10) (88/54 - 111/60)  BP(mean): 65 (07 Mar 2024 07:10) (65 - 80)  ABP: --  ABP(mean): --  RR: 16 (07 Mar 2024 07:10) (16 - 26)  SpO2: 93% (07 Mar 2024 07:10) (93% - 96%)    O2 Parameters below as of 07 Mar 2024 07:10  Patient On (Oxygen Delivery Method): room air            CONSTITUTIONAL:  in NAD    ENT:   Airway patent,   Mouth with normal mucosa.   No thrush    EYES:   Pupils equal,   Round and reactive to light.    CARDIAC:   Normal rate,   Regular rhythm.    No edema    RESPIRATORY:   Bilateral BS  Normal chest expansion  Not tachypneic,  No use of accessory muscles    GASTROINTESTINAL:  Abdomen soft,   Non-tender,   No guarding,   + BS    MUSCULOSKELETAL:   Range of motion is not limited,  No clubbing, cyanosis    NEUROLOGICAL:   Alert and oriented   No motor  deficits.    SKIN:   Skin normal color for race,   Warm and dry and intact.   No evidence of rash.      03-06-24 @ 07:01  -  03-07-24 @ 07:00  --------------------------------------------------------  IN:    IV PiggyBack: 50 mL    Oral Fluid: 620 mL  Total IN: 670 mL    OUT:    Voided (mL): 700 mL  Total OUT: 700 mL    Total NET: -30 mL          LABS:                            10.5   2.87  )-----------( 135      ( 07 Mar 2024 06:21 )             31.9                                               03-07    139  |  101  |  3<L>  ----------------------------<  99  3.9   |  28  |  0.5<L>    Ca    8.8      07 Mar 2024 06:21  Phos  3.4     03-07  Mg     2.0     03-07    TPro  6.3  /  Alb  3.7  /  TBili  0.7  /  DBili  x   /  AST  145<H>  /  ALT  86<H>  /  AlkPhos  81  03-07      PT/INR - ( 05 Mar 2024 11:30 )   PT: 12.20 sec;   INR: 1.07 ratio         PTT - ( 05 Mar 2024 11:30 )  PTT:29.3 sec                                       Urinalysis Basic - ( 07 Mar 2024 06:21 )    Color: x / Appearance: x / SG: x / pH: x  Gluc: 99 mg/dL / Ketone: x  / Bili: x / Urobili: x   Blood: x / Protein: x / Nitrite: x   Leuk Esterase: x / RBC: x / WBC x   Sq Epi: x / Non Sq Epi: x / Bacteria: x                                                  LIVER FUNCTIONS - ( 07 Mar 2024 06:21 )  Alb: 3.7 g/dL / Pro: 6.3 g/dL / ALK PHOS: 81 U/L / ALT: 86 U/L / AST: 145 U/L / GGT: x                                                  Culture - Blood (collected 04 Mar 2024 16:01)  Source: .Blood None  Preliminary Report (06 Mar 2024 23:01):    No growth at 48 Hours    Culture - Blood (collected 04 Mar 2024 16:00)  Source: .Blood None  Preliminary Report (06 Mar 2024 23:01):    No growth at 48 Hours                                                                                           MEDICATIONS  (STANDING):  chlorhexidine 2% Cloths 1 Application(s) Topical <User Schedule>  cyanocobalamin 1000 MICROGram(s) Oral daily  escitalopram 10 milliGRAM(s) Oral daily  folic acid 1 milliGRAM(s) Oral daily  gabapentin 100 milliGRAM(s) Oral three times a day  heparin   Injectable 5000 Unit(s) SubCutaneous every 8 hours  LORazepam   Injectable   IV Push   LORazepam   Injectable 2 milliGRAM(s) IV Push every 4 hours  LORazepam   Injectable 1 milliGRAM(s) IV Push every 4 hours  magnesium oxide 400 milliGRAM(s) Oral three times a day with meals  mirtazapine 7.5 milliGRAM(s) Oral at bedtime  multivitamin/minerals 1 Tablet(s) Oral daily  pantoprazole  Injectable 40 milliGRAM(s) IV Push daily  polyethylene glycol 3350 17 Gram(s) Oral daily  potassium phosphate / sodium phosphate Powder (PHOS-NaK) 2 Packet(s) Oral three times a day before meals  senna 2 Tablet(s) Oral at bedtime  thiamine 100 milliGRAM(s) Oral daily    MEDICATIONS  (PRN):  acetaminophen     Tablet .. 650 milliGRAM(s) Oral once PRN Moderate Pain (4 - 6)  LORazepam   Injectable 2 milliGRAM(s) IV Push every 6 hours PRN Anxiety  ondansetron Injectable 4 milliGRAM(s) IV Push every 4 hours PRN Nausea and/or Vomiting      New X-rays reviewed:                                                                                  ECHO    CXR interpreted by me:       Patient is a 43y old  Female who presents with a chief complaint of severe HAGMA w/ etoh abuse disorder (06 Mar 2024 10:30)        Over Night Events:  On room air. Afebrile.   on diet     ROS:     All ROS are negative except HPI         PHYSICAL EXAM    ICU Vital Signs Last 24 Hrs  T(C): 36.2 (07 Mar 2024 07:05), Max: 36.6 (06 Mar 2024 15:05)  T(F): 97.2 (07 Mar 2024 07:05), Max: 97.9 (06 Mar 2024 15:05)  HR: 77 (07 Mar 2024 07:10) (75 - 113)  BP: 88/54 (07 Mar 2024 07:10) (88/54 - 111/60)  BP(mean): 65 (07 Mar 2024 07:10) (65 - 80)  ABP: --  ABP(mean): --  RR: 16 (07 Mar 2024 07:10) (16 - 26)  SpO2: 93% (07 Mar 2024 07:10) (93% - 96%)    O2 Parameters below as of 07 Mar 2024 07:10  Patient On (Oxygen Delivery Method): room air            CONSTITUTIONAL:  in NAD    ENT:   Airway patent,   Mouth with normal mucosa.   No thrush    EYES:   Pupils equal,   Round and reactive to light.    CARDIAC:   Normal rate,   Regular rhythm.    No edema    RESPIRATORY:   Bilateral BS  Normal chest expansion  Not tachypneic,  No use of accessory muscles    GASTROINTESTINAL:  Abdomen soft,   Non-tender,   No guarding,   + BS    MUSCULOSKELETAL:   Range of motion is not limited,  No clubbing, cyanosis    NEUROLOGICAL:   Alert and oriented   No motor  deficits.    SKIN:   Skin normal color for race,   Warm and dry and intact.   No evidence of rash.      03-06-24 @ 07:01  -  03-07-24 @ 07:00  --------------------------------------------------------  IN:    IV PiggyBack: 50 mL    Oral Fluid: 620 mL  Total IN: 670 mL    OUT:    Voided (mL): 700 mL  Total OUT: 700 mL    Total NET: -30 mL          LABS:                            10.5   2.87  )-----------( 135      ( 07 Mar 2024 06:21 )             31.9                                               03-07    139  |  101  |  3<L>  ----------------------------<  99  3.9   |  28  |  0.5<L>    Ca    8.8      07 Mar 2024 06:21  Phos  3.4     03-07  Mg     2.0     03-07    TPro  6.3  /  Alb  3.7  /  TBili  0.7  /  DBili  x   /  AST  145<H>  /  ALT  86<H>  /  AlkPhos  81  03-07      PT/INR - ( 05 Mar 2024 11:30 )   PT: 12.20 sec;   INR: 1.07 ratio         PTT - ( 05 Mar 2024 11:30 )  PTT:29.3 sec                                       Urinalysis Basic - ( 07 Mar 2024 06:21 )    Color: x / Appearance: x / SG: x / pH: x  Gluc: 99 mg/dL / Ketone: x  / Bili: x / Urobili: x   Blood: x / Protein: x / Nitrite: x   Leuk Esterase: x / RBC: x / WBC x   Sq Epi: x / Non Sq Epi: x / Bacteria: x                                                  LIVER FUNCTIONS - ( 07 Mar 2024 06:21 )  Alb: 3.7 g/dL / Pro: 6.3 g/dL / ALK PHOS: 81 U/L / ALT: 86 U/L / AST: 145 U/L / GGT: x                                                  Culture - Blood (collected 04 Mar 2024 16:01)  Source: .Blood None  Preliminary Report (06 Mar 2024 23:01):    No growth at 48 Hours    Culture - Blood (collected 04 Mar 2024 16:00)  Source: .Blood None  Preliminary Report (06 Mar 2024 23:01):    No growth at 48 Hours                                                                                           MEDICATIONS  (STANDING):  chlorhexidine 2% Cloths 1 Application(s) Topical <User Schedule>  cyanocobalamin 1000 MICROGram(s) Oral daily  escitalopram 10 milliGRAM(s) Oral daily  folic acid 1 milliGRAM(s) Oral daily  gabapentin 100 milliGRAM(s) Oral three times a day  heparin   Injectable 5000 Unit(s) SubCutaneous every 8 hours  LORazepam   Injectable   IV Push   LORazepam   Injectable 2 milliGRAM(s) IV Push every 4 hours  LORazepam   Injectable 1 milliGRAM(s) IV Push every 4 hours  magnesium oxide 400 milliGRAM(s) Oral three times a day with meals  mirtazapine 7.5 milliGRAM(s) Oral at bedtime  multivitamin/minerals 1 Tablet(s) Oral daily  pantoprazole  Injectable 40 milliGRAM(s) IV Push daily  polyethylene glycol 3350 17 Gram(s) Oral daily  potassium phosphate / sodium phosphate Powder (PHOS-NaK) 2 Packet(s) Oral three times a day before meals  senna 2 Tablet(s) Oral at bedtime  thiamine 100 milliGRAM(s) Oral daily    MEDICATIONS  (PRN):  acetaminophen     Tablet .. 650 milliGRAM(s) Oral once PRN Moderate Pain (4 - 6)  LORazepam   Injectable 2 milliGRAM(s) IV Push every 6 hours PRN Anxiety  ondansetron Injectable 4 milliGRAM(s) IV Push every 4 hours PRN Nausea and/or Vomiting      New X-rays reviewed:                                                                                  ECHO    CXR interpreted by me:

## 2024-03-07 NOTE — PROGRESS NOTE ADULT - ASSESSMENT
# HAGMA , concern for DKA   - A1c 5% in setting of anemia, but SMBG are ok and recent admission glucose ok , less likely DKA  - off insulin FS ok     #history of Grave's disease   - had Grave's disease now in remission since 2015 was followed by Dr Lemons and used methimazole , has been off for years   - TSH normal , Ft4 , TT4 and T3 low , possible sick euthyroid vs contrast effect, less likely central etiology  - she did receive IV contrast on admission   - 3/6/23 TSH and Ft4 normal T3 remain low but improving , likely sick euthyroid   no need for any meds, can be monitor in 8-12 weeks with repeat tfts as outpatient by PCP     will sign off please call for any new questions

## 2024-03-08 LAB
ALBUMIN SERPL ELPH-MCNC: 4 G/DL — SIGNIFICANT CHANGE UP (ref 3.5–5.2)
ALP SERPL-CCNC: 86 U/L — SIGNIFICANT CHANGE UP (ref 30–115)
ALT FLD-CCNC: 122 U/L — HIGH (ref 0–41)
ANION GAP SERPL CALC-SCNC: 13 MMOL/L — SIGNIFICANT CHANGE UP (ref 7–14)
AST SERPL-CCNC: 219 U/L — HIGH (ref 0–41)
BILIRUB DIRECT SERPL-MCNC: 0.3 MG/DL — SIGNIFICANT CHANGE UP (ref 0–0.3)
BILIRUB INDIRECT FLD-MCNC: 0.4 MG/DL — SIGNIFICANT CHANGE UP (ref 0.2–1.2)
BILIRUB SERPL-MCNC: 0.7 MG/DL — SIGNIFICANT CHANGE UP (ref 0.2–1.2)
BUN SERPL-MCNC: 6 MG/DL — LOW (ref 10–20)
CALCIUM SERPL-MCNC: 9.2 MG/DL — SIGNIFICANT CHANGE UP (ref 8.4–10.5)
CHLORIDE SERPL-SCNC: 98 MMOL/L — SIGNIFICANT CHANGE UP (ref 98–110)
CO2 SERPL-SCNC: 27 MMOL/L — SIGNIFICANT CHANGE UP (ref 17–32)
CREAT SERPL-MCNC: 1 MG/DL — SIGNIFICANT CHANGE UP (ref 0.7–1.5)
EGFR: 73 ML/MIN/1.73M2 — SIGNIFICANT CHANGE UP
GLUCOSE BLDC GLUCOMTR-MCNC: 100 MG/DL — HIGH (ref 70–99)
GLUCOSE BLDC GLUCOMTR-MCNC: 108 MG/DL — HIGH (ref 70–99)
GLUCOSE BLDC GLUCOMTR-MCNC: 113 MG/DL — HIGH (ref 70–99)
GLUCOSE BLDC GLUCOMTR-MCNC: 121 MG/DL — HIGH (ref 70–99)
GLUCOSE SERPL-MCNC: 125 MG/DL — HIGH (ref 70–99)
HCG UR QL: NEGATIVE — SIGNIFICANT CHANGE UP
HCT VFR BLD CALC: 34.3 % — LOW (ref 37–47)
HGB BLD-MCNC: 11.3 G/DL — LOW (ref 12–16)
LIDOCAIN IGE QN: 129 U/L — HIGH (ref 7–60)
MCHC RBC-ENTMCNC: 32.9 G/DL — SIGNIFICANT CHANGE UP (ref 32–37)
MCHC RBC-ENTMCNC: 34.1 PG — HIGH (ref 27–31)
MCV RBC AUTO: 103.6 FL — HIGH (ref 81–99)
NRBC # BLD: 0 /100 WBCS — SIGNIFICANT CHANGE UP (ref 0–0)
PLATELET # BLD AUTO: 184 K/UL — SIGNIFICANT CHANGE UP (ref 130–400)
PMV BLD: 9.9 FL — SIGNIFICANT CHANGE UP (ref 7.4–10.4)
POTASSIUM SERPL-MCNC: 4.4 MMOL/L — SIGNIFICANT CHANGE UP (ref 3.5–5)
POTASSIUM SERPL-SCNC: 4.4 MMOL/L — SIGNIFICANT CHANGE UP (ref 3.5–5)
PROT SERPL-MCNC: 6.9 G/DL — SIGNIFICANT CHANGE UP (ref 6–8)
RBC # BLD: 3.31 M/UL — LOW (ref 4.2–5.4)
RBC # FLD: 14.7 % — HIGH (ref 11.5–14.5)
SODIUM SERPL-SCNC: 138 MMOL/L — SIGNIFICANT CHANGE UP (ref 135–146)
WBC # BLD: 3.73 K/UL — LOW (ref 4.8–10.8)
WBC # FLD AUTO: 3.73 K/UL — LOW (ref 4.8–10.8)

## 2024-03-08 PROCEDURE — 99232 SBSQ HOSP IP/OBS MODERATE 35: CPT

## 2024-03-08 PROCEDURE — 74019 RADEX ABDOMEN 2 VIEWS: CPT | Mod: 26

## 2024-03-08 PROCEDURE — 99254 IP/OBS CNSLTJ NEW/EST MOD 60: CPT

## 2024-03-08 PROCEDURE — 76770 US EXAM ABDO BACK WALL COMP: CPT | Mod: 26

## 2024-03-08 RX ORDER — KETOROLAC TROMETHAMINE 30 MG/ML
15 SYRINGE (ML) INJECTION EVERY 12 HOURS
Refills: 0 | Status: DISCONTINUED | OUTPATIENT
Start: 2024-03-08 | End: 2024-03-08

## 2024-03-08 RX ORDER — TRAMADOL HYDROCHLORIDE 50 MG/1
50 TABLET ORAL ONCE
Refills: 0 | Status: DISCONTINUED | OUTPATIENT
Start: 2024-03-08 | End: 2024-03-08

## 2024-03-08 RX ORDER — HYDROMORPHONE HYDROCHLORIDE 2 MG/ML
0.5 INJECTION INTRAMUSCULAR; INTRAVENOUS; SUBCUTANEOUS ONCE
Refills: 0 | Status: DISCONTINUED | OUTPATIENT
Start: 2024-03-08 | End: 2024-03-08

## 2024-03-08 RX ORDER — PANTOPRAZOLE SODIUM 20 MG/1
40 TABLET, DELAYED RELEASE ORAL
Refills: 0 | Status: DISCONTINUED | OUTPATIENT
Start: 2024-03-08 | End: 2024-03-11

## 2024-03-08 RX ORDER — SODIUM CHLORIDE 9 MG/ML
1000 INJECTION, SOLUTION INTRAVENOUS
Refills: 0 | Status: DISCONTINUED | OUTPATIENT
Start: 2024-03-08 | End: 2024-03-11

## 2024-03-08 RX ORDER — TRAMADOL HYDROCHLORIDE 50 MG/1
25 TABLET ORAL ONCE
Refills: 0 | Status: DISCONTINUED | OUTPATIENT
Start: 2024-03-08 | End: 2024-03-08

## 2024-03-08 RX ADMIN — GABAPENTIN 100 MILLIGRAM(S): 400 CAPSULE ORAL at 05:07

## 2024-03-08 RX ADMIN — HYDROMORPHONE HYDROCHLORIDE 0.5 MILLIGRAM(S): 2 INJECTION INTRAMUSCULAR; INTRAVENOUS; SUBCUTANEOUS at 01:25

## 2024-03-08 RX ADMIN — POLYETHYLENE GLYCOL 3350 17 GRAM(S): 17 POWDER, FOR SOLUTION ORAL at 11:22

## 2024-03-08 RX ADMIN — HYDROMORPHONE HYDROCHLORIDE 0.5 MILLIGRAM(S): 2 INJECTION INTRAMUSCULAR; INTRAVENOUS; SUBCUTANEOUS at 05:09

## 2024-03-08 RX ADMIN — TRAMADOL HYDROCHLORIDE 50 MILLIGRAM(S): 50 TABLET ORAL at 23:03

## 2024-03-08 RX ADMIN — PANTOPRAZOLE SODIUM 40 MILLIGRAM(S): 20 TABLET, DELAYED RELEASE ORAL at 18:21

## 2024-03-08 RX ADMIN — GABAPENTIN 100 MILLIGRAM(S): 400 CAPSULE ORAL at 21:32

## 2024-03-08 RX ADMIN — TRAMADOL HYDROCHLORIDE 50 MILLIGRAM(S): 50 TABLET ORAL at 11:30

## 2024-03-08 RX ADMIN — Medication 2 PACKET(S): at 12:40

## 2024-03-08 RX ADMIN — Medication 1 MILLIGRAM(S): at 05:07

## 2024-03-08 RX ADMIN — GABAPENTIN 100 MILLIGRAM(S): 400 CAPSULE ORAL at 13:57

## 2024-03-08 RX ADMIN — PANTOPRAZOLE SODIUM 40 MILLIGRAM(S): 20 TABLET, DELAYED RELEASE ORAL at 05:05

## 2024-03-08 RX ADMIN — ESCITALOPRAM OXALATE 10 MILLIGRAM(S): 10 TABLET, FILM COATED ORAL at 11:21

## 2024-03-08 RX ADMIN — HEPARIN SODIUM 5000 UNIT(S): 5000 INJECTION INTRAVENOUS; SUBCUTANEOUS at 05:05

## 2024-03-08 RX ADMIN — Medication 1 MILLIGRAM(S): at 11:21

## 2024-03-08 RX ADMIN — SENNA PLUS 2 TABLET(S): 8.6 TABLET ORAL at 21:31

## 2024-03-08 RX ADMIN — TRAMADOL HYDROCHLORIDE 50 MILLIGRAM(S): 50 TABLET ORAL at 10:59

## 2024-03-08 RX ADMIN — SODIUM CHLORIDE 100 MILLILITER(S): 9 INJECTION, SOLUTION INTRAVENOUS at 13:57

## 2024-03-08 RX ADMIN — Medication 2 PACKET(S): at 05:05

## 2024-03-08 RX ADMIN — MIRTAZAPINE 7.5 MILLIGRAM(S): 45 TABLET, ORALLY DISINTEGRATING ORAL at 21:31

## 2024-03-08 RX ADMIN — Medication 2 PACKET(S): at 18:22

## 2024-03-08 RX ADMIN — Medication 1 TABLET(S): at 11:21

## 2024-03-08 RX ADMIN — HYDROMORPHONE HYDROCHLORIDE 0.5 MILLIGRAM(S): 2 INJECTION INTRAMUSCULAR; INTRAVENOUS; SUBCUTANEOUS at 05:06

## 2024-03-08 RX ADMIN — Medication 100 MILLIGRAM(S): at 11:21

## 2024-03-08 RX ADMIN — TRAMADOL HYDROCHLORIDE 50 MILLIGRAM(S): 50 TABLET ORAL at 18:21

## 2024-03-08 RX ADMIN — MAGNESIUM OXIDE 400 MG ORAL TABLET 400 MILLIGRAM(S): 241.3 TABLET ORAL at 12:40

## 2024-03-08 RX ADMIN — Medication 1 MILLIGRAM(S): at 09:45

## 2024-03-08 RX ADMIN — Medication 1 MILLIGRAM(S): at 01:25

## 2024-03-08 RX ADMIN — PREGABALIN 1000 MICROGRAM(S): 225 CAPSULE ORAL at 11:22

## 2024-03-08 RX ADMIN — HEPARIN SODIUM 5000 UNIT(S): 5000 INJECTION INTRAVENOUS; SUBCUTANEOUS at 21:33

## 2024-03-08 RX ADMIN — MAGNESIUM OXIDE 400 MG ORAL TABLET 400 MILLIGRAM(S): 241.3 TABLET ORAL at 08:37

## 2024-03-08 RX ADMIN — MAGNESIUM OXIDE 400 MG ORAL TABLET 400 MILLIGRAM(S): 241.3 TABLET ORAL at 18:21

## 2024-03-08 NOTE — PROGRESS NOTE ADULT - SUBJECTIVE AND OBJECTIVE BOX
AURELIA BLANCAS  43y  [facility] [address]      Patient is a 43y old  Female who presents with a chief complaint of severe HAGMA w/ etoh abuse disorder (07 Mar 2024 13:55)      INTERVAL HPI/OVERNIGHT EVENTS:        REVIEW OF SYSTEMS:        FAMILY HISTORY:  Benzodiazepine misuse      T(C): 35.7 (03-08-24 @ 05:06), Max: 36.1 (03-07-24 @ 14:00)  HR: 85 (03-08-24 @ 05:06) (77 - 85)  BP: 107/55 (03-08-24 @ 05:06) (93/56 - 107/55)  RR: 18 (03-08-24 @ 05:06) (16 - 18)  SpO2: 95% (03-08-24 @ 05:06) (94% - 98%)  Wt(kg): --Vital Signs Last 24 Hrs  T(C): 35.7 (08 Mar 2024 05:06), Max: 36.1 (07 Mar 2024 14:00)  T(F): 96.3 (08 Mar 2024 05:06), Max: 97 (07 Mar 2024 14:00)  HR: 85 (08 Mar 2024 05:06) (77 - 85)  BP: 107/55 (08 Mar 2024 05:06) (93/56 - 107/55)  BP(mean): 68 (07 Mar 2024 13:45) (68 - 75)  RR: 18 (08 Mar 2024 05:06) (16 - 18)  SpO2: 95% (08 Mar 2024 05:06) (94% - 98%)    Parameters below as of 08 Mar 2024 05:06  Patient On (Oxygen Delivery Method): room air        PHYSICAL EXAM:  GENERAL: NAD, well-groomed, well-developed  HEAD:  Atraumatic, Normocephalic  EYES: EOMI, PERRLA, conjunctiva and sclera clear  ENMT: No tonsillar erythema, exudates, or enlargement; Moist mucous membranes, Good dentition, No lesions  NECK: Supple, No JVD, Normal thyroid  NERVOUS SYSTEM:  Alert & Oriented X3, Good concentration; Motor Strength 5/5 B/L upper and lower extremities; DTRs 2+ intact and symmetric  PULM: Clear to auscultation bilaterally  CARDIAC: Regular rate and rhythm; No murmurs, rubs, or gallops  GI: Soft, Nontender, Nondistended; Bowel sounds present  EXTREMITIES:  2+ Peripheral Pulses, No clubbing, cyanosis, or edema  LYMPH: No lymphadenopathy noted  SKIN: No rashes or lesions    Consultant(s) Notes Reviewed:  [x ] YES  [ ] NO  Care Discussed with Consultants/Other Providers [ x] YES  [ ] NO    LABS:                            10.5   2.87  )-----------( 135      ( 07 Mar 2024 06:21 )             31.9   03-07    139  |  101  |  3<L>  ----------------------------<  99  3.9   |  28  |  0.5<L>    Ca    8.8      07 Mar 2024 06:21  Phos  3.4     03-07  Mg     2.0     03-07    TPro  6.3  /  Alb  3.7  /  TBili  0.7  /  DBili  x   /  AST  145<H>  /  ALT  86<H>  /  AlkPhos  81  03-07            acetaminophen     Tablet .. 650 milliGRAM(s) Oral once PRN  chlorhexidine 2% Cloths 1 Application(s) Topical <User Schedule>  cyanocobalamin 1000 MICROGram(s) Oral daily  escitalopram 10 milliGRAM(s) Oral daily  folic acid 1 milliGRAM(s) Oral daily  gabapentin 100 milliGRAM(s) Oral three times a day  heparin   Injectable 5000 Unit(s) SubCutaneous every 8 hours  ketorolac   Injectable 15 milliGRAM(s) IV Push every 6 hours PRN  LORazepam     Tablet 1 milliGRAM(s) Oral every 4 hours  magnesium oxide 400 milliGRAM(s) Oral three times a day with meals  mirtazapine 7.5 milliGRAM(s) Oral at bedtime  multivitamin/minerals 1 Tablet(s) Oral daily  ondansetron Injectable 4 milliGRAM(s) IV Push every 4 hours PRN  pantoprazole    Tablet 40 milliGRAM(s) Oral before breakfast  polyethylene glycol 3350 17 Gram(s) Oral daily  potassium phosphate / sodium phosphate Powder (PHOS-NaK) 2 Packet(s) Oral three times a day before meals  senna 2 Tablet(s) Oral at bedtime  thiamine 100 milliGRAM(s) Oral daily      43 year old female with alcohol use disorder presents with abdominal pain, nausea/vomiting, decreased PO intake  found to have severe metabolic acidosis with HAGMA likely starvation ketosis/ alcoholic ketosis/ possible DKA.      1. Abdominal pain likely due to alcoholic ketoacidosis   * no evidence of DKA ashli with normal hgba1c   - Admitted to the ICU initially   - Was started on dextrose   - CT abdomen pelvis:  a) Severe hepatic steatosis, significantly increased from prior CT. Clinical correlation needed.  b) No CT evidence of acute abdominal pathology.  - Endocrine consult:  a) 3/6/23 TSH and Ft4 normal T3 remain low but improving , likely sick euthyroid   no need for any meds, can be monitor in 8-12 weeks with repeat tfts as outpatient by PCP     2. Alcohol abuse complicated by thrombocytopenia  - CTH:  a)No acute intracranial hemorrhage or acute large territorial infarct.  b) Interval development of cerebral and cerebellar atrophy.  - HIT panel negative/ DIC negative  - CIWA protocol         3.  c/o blurry vision/ falls prior to admission  CT head showing cerebellar atrophy  seen by neuro, no further w/u  likely alcohol related       AURELIA BLANCAS  43y  [facility]-S 4[facility] [address] A      Patient is a 43y old  Female who presents with a chief complaint of severe HAGMA w/ etoh abuse disorder (07 Mar 2024 13:55)      INTERVAL HPI/OVERNIGHT EVENTS:    Patient still complaining of pain diffuse  passing gas.   no other complains reported          FAMILY HISTORY:  Benzodiazepine misuse      T(C): 35.7 (03-08-24 @ 05:06), Max: 36.1 (03-07-24 @ 14:00)  HR: 85 (03-08-24 @ 05:06) (77 - 85)  BP: 107/55 (03-08-24 @ 05:06) (93/56 - 107/55)  RR: 18 (03-08-24 @ 05:06) (16 - 18)  SpO2: 95% (03-08-24 @ 05:06) (94% - 98%)  Wt(kg): --Vital Signs Last 24 Hrs  T(C): 35.7 (08 Mar 2024 05:06), Max: 36.1 (07 Mar 2024 14:00)  T(F): 96.3 (08 Mar 2024 05:06), Max: 97 (07 Mar 2024 14:00)  HR: 85 (08 Mar 2024 05:06) (77 - 85)  BP: 107/55 (08 Mar 2024 05:06) (93/56 - 107/55)  BP(mean): 68 (07 Mar 2024 13:45) (68 - 75)  RR: 18 (08 Mar 2024 05:06) (16 - 18)  SpO2: 95% (08 Mar 2024 05:06) (94% - 98%)    Parameters below as of 08 Mar 2024 05:06  Patient On (Oxygen Delivery Method): room air        PHYSICAL EXAM:  GENERAL: NAD, well-groomed, well-developed  NERVOUS SYSTEM:  Alert & Oriented X3,  PULM: Clear to auscultation bilaterally  CARDIAC: Regular rate and rhythm; No murmurs, rubs, or gallops  GI: Soft, non-distended,   EXTREMITIES:  2+ Peripheral Pulses,     Consultant(s) Notes Reviewed:  [x ] YES  [ ] NO  Care Discussed with Consultants/Other Providers [ x] YES  [ ] NO    LABS:                            10.5   2.87  )-----------( 135      ( 07 Mar 2024 06:21 )             31.9   03-07    139  |  101  |  3<L>  ----------------------------<  99  3.9   |  28  |  0.5<L>    Ca    8.8      07 Mar 2024 06:21  Phos  3.4     03-07  Mg     2.0     03-07    TPro  6.3  /  Alb  3.7  /  TBili  0.7  /  DBili  x   /  AST  145<H>  /  ALT  86<H>  /  AlkPhos  81  03-07            acetaminophen     Tablet .. 650 milliGRAM(s) Oral once PRN  chlorhexidine 2% Cloths 1 Application(s) Topical <User Schedule>  cyanocobalamin 1000 MICROGram(s) Oral daily  escitalopram 10 milliGRAM(s) Oral daily  folic acid 1 milliGRAM(s) Oral daily  gabapentin 100 milliGRAM(s) Oral three times a day  heparin   Injectable 5000 Unit(s) SubCutaneous every 8 hours  ketorolac   Injectable 15 milliGRAM(s) IV Push every 6 hours PRN  LORazepam     Tablet 1 milliGRAM(s) Oral every 4 hours  magnesium oxide 400 milliGRAM(s) Oral three times a day with meals  mirtazapine 7.5 milliGRAM(s) Oral at bedtime  multivitamin/minerals 1 Tablet(s) Oral daily  ondansetron Injectable 4 milliGRAM(s) IV Push every 4 hours PRN  pantoprazole    Tablet 40 milliGRAM(s) Oral before breakfast  polyethylene glycol 3350 17 Gram(s) Oral daily  potassium phosphate / sodium phosphate Powder (PHOS-NaK) 2 Packet(s) Oral three times a day before meals  senna 2 Tablet(s) Oral at bedtime  thiamine 100 milliGRAM(s) Oral daily      43 year old female with alcohol use disorder presents with abdominal pain, nausea/vomiting, decreased PO intake  found to have severe metabolic acidosis with HAGMA likely starvation ketosis/ alcoholic ketosis/ possible DKA.      1. Abdominal pain likely due to alcoholic ketoacidosis persistnet  * no evidence of DKA ashli with normal hgba1c   - Admitted to the ICU initially      -u/a:wnl          - procalcitonin elevated but no focus of infection. Repeat:pending (if elevated send blood cx)        - KUB:WNL   - Urine tox:WNL          - urine hcg:pending (was never sent)   - Was started on dextrose . now on LR at 100 ml/hr   - Increase PPI to bid   - pt still complaining of abdominal pain diffuse severe in nature (looks comfortable when I spoke to her)   - US retro:WNL   - CT abdomen pelvis:  a) Severe hepatic steatosis, significantly increased from prior CT. Clinical correlation needed.  b) No CT evidence of acute abdominal pathology.  - Endocrine consult:  a) 3/6/23 TSH and Ft4 normal T3 remain low but improving , likely sick euthyroid   no need for any meds, can be monitor in 8-12 weeks with repeat tfts as outpatient by PCP   - GI consult:pending     2. Alcohol abuse complicated by thrombocytopenia  - CTH:  a)No acute intracranial hemorrhage or acute large territorial infarct.  b) Interval development of cerebral and cerebellar atrophy.  - HIT panel negative/ DIC negative  - CIWA protocol     3.  c/o blurry vision/ falls prior to admission  CT head showing cerebellar atrophy  seen by neuro, no further w/u  likely alcohol related    4. DVT/GI px

## 2024-03-08 NOTE — CONSULT NOTE ADULT - ASSESSMENT
43yFemale pmh chronic ETOH abuse, GERD, MVP presents for ETOH abuse and epigastric pain. GI consulted for abdominal pain, epigastric pain persisting.    #ETOH use  #AST>ALT  #persistent epigastric pain  #severe hepatic steatosis   Rec  -EGD Monday, npo aftermidnight Sunday into Monday  -Tobacco and Alcohol Cessation Strongly Advised and Encouraged   -PPI BID  -The benefits of endoscopy as well as the risks, such as GI bleeding, aspiration pneumonia, perforation, damage or loss of teeth, reaction to medication, or death  were reviewed with the patient.   -Dietary and lifestyle modifications advised   -outpatient Fibroscan   -Can Check acute hepatitis panel   -outpatient workup  - Follow up with our [facility] located at [address] [place] [address]. Phone Number: [phone].     #constipation  Rec  -Miralax daily  -high fiber diet    #anemia no gross GI bleeding  #family history of colon cancer  Rec  - Follow up with our [facility] located at [address] [place] [address]. Phone Number: [phone] for outpatient Colonoscopy  43yFemale pmh chronic ETOH abuse, GERD, MVP presents for ETOH abuse and epigastric pain. GI consulted for abdominal pain, epigastric pain persisting.    #ETOH use  #AST>ALT  #persistent epigastric pain  #severe hepatic steatosis   Rec  -EGD Monday, npo aftermidnight Sunday into Monday  -Tobacco and Alcohol Cessation Strongly Advised and Encouraged   -PPI BID  -The benefits of endoscopy as well as the risks, such as GI bleeding, aspiration pneumonia, perforation, damage or loss of teeth, reaction to medication, or death  were reviewed with the patient.   -Dietary and lifestyle modifications advised   -outpatient Fibroscan   -Can Check acute hepatitis panel   -outpatient workup  - Follow up with our [facility] located at [address] [place] [address]. Phone Number: [phone] or patient can followup with her private GI Dr. Paz    #constipation  Rec  -Miralax daily  -high fiber diet    #anemia no gross GI bleeding  #family history of colon cancer  #high MCV   Rec  -check vitamin B12, folate  - Follow up with our [facility] located at [address] [place] [address]. Phone Number: [phone] for outpatient Colonoscopy or patient can followup with her private GI Dr. Paz 43yFemale pmh chronic ETOH abuse, GERD, MVP presents for ETOH abuse and epigastric pain. GI consulted for abdominal pain, epigastric pain persisting.    #ETOH use  #AST>ALT  #persistent epigastric pain  #severe hepatic steatosis   Rec  -EGD Monday, npo after midnight Sunday into Monday  -Tobacco and Alcohol Cessation Strongly Advised and Encouraged   -PPI BID  -The benefits of endoscopy as well as the risks, such as GI bleeding, aspiration pneumonia, perforation, damage or loss of teeth, reaction to medication, or death  were reviewed with the patient.   -Dietary and lifestyle modifications advised   -outpatient Fibroscan   -Can Check acute hepatitis panel   -outpatient workup  - Follow up with our [facility] located at [address] [place] [address]. Phone Number: [phone] or patient can followup with her private GI Dr. Paz    #constipation  Rec  -Miralax daily  -high fiber diet    #anemia no gross GI bleeding  #family history of colon cancer  #high MCV   Rec  -check vitamin B12, folate  - Follow up with our [facility] located at [address] [place] [address]. Phone Number: [phone] for outpatient Colonoscopy or patient can followup with her private GI Dr. Paz

## 2024-03-08 NOTE — CONSULT NOTE ADULT - NS ATTEND AMEND GEN_ALL_CORE FT
Agree with exam and plan.   No further workup.   Will sign off.
42yo female h/o ETOH abuse with persisting epigastric pain. Labs suggestive of etoh hepatitis. Monitor LFTs/INR. Recommend PPI bid. Plan for EGD on Monday.

## 2024-03-08 NOTE — CONSULT NOTE ADULT - SUBJECTIVE AND OBJECTIVE BOX
Chief complaint/Reason for consult: abdominal pain    HPI:  42 yo W female w/ PMH - chronic alcohol use disorder plus other medical history as noted below.  Pt come to hospital after being discharged about 6 weeks ago for alcohol WD.  Pt is in ER w/ alcohol WD, severe abdominal pains , nausea, vomiting , HAGMA,  w/ elevated: LFT, lipase, bilirubin. Case discussed w/ ICU MD .Plan to adm to ICU stepdown (04 Mar 2024 01:45)    43yFemale      PAST MEDICAL & SURGICAL HISTORY:  Alcoholism      Anxiety      Graves' disease      GERD (gastroesophageal reflux disease)      Gastritis      MVP (mitral valve prolapse)      Nicotine dependence      Benzodiazepine misuse      Hypercholesterolemia      Obesity      Esophagitis      History of Graves' disease      Heart murmur      Hyperlipidemia, unspecified hyperlipidemia type      Hemochromatosis      Fatty liver      History of  section      S/P tendon repair  5th finger right hand            Family history:  FAMILY HISTORY:  Benzodiazepine misuse      No GI cancers in first or second degree relatives    Social History: No smoking. No alcohol. No illegal drug use.    Allergies:   Robaxin (Unknown)  morphine (Unknown)  Intolerances        MEDICATIONS  (STANDING):  chlorhexidine 2% Cloths 1 Application(s) Topical <User Schedule>  cyanocobalamin 1000 MICROGram(s) Oral daily  escitalopram 10 milliGRAM(s) Oral daily  folic acid 1 milliGRAM(s) Oral daily  gabapentin 100 milliGRAM(s) Oral three times a day  heparin   Injectable 5000 Unit(s) SubCutaneous every 8 hours  lactated ringers. 1000 milliLiter(s) (100 mL/Hr) IV Continuous <Continuous>  magnesium oxide 400 milliGRAM(s) Oral three times a day with meals  mirtazapine 7.5 milliGRAM(s) Oral at bedtime  multivitamin/minerals 1 Tablet(s) Oral daily  pantoprazole    Tablet 40 milliGRAM(s) Oral two times a day  polyethylene glycol 3350 17 Gram(s) Oral daily  potassium phosphate / sodium phosphate Powder (PHOS-NaK) 2 Packet(s) Oral three times a day before meals  senna 2 Tablet(s) Oral at bedtime  thiamine 100 milliGRAM(s) Oral daily    MEDICATIONS  (PRN):  acetaminophen     Tablet .. 650 milliGRAM(s) Oral once PRN Moderate Pain (4 - 6)  ondansetron Injectable 4 milliGRAM(s) IV Push every 4 hours PRN Nausea and/or Vomiting        REVIEW OF SYSTEMS  General:  No weight loss, fevers, or chills.  Eyes:  No reported pain or visual changes  ENT:  No sore throat or runny nose.  NECK: No stiffness or lymphadenopathy  CV:  No chest pain or palpitations.  Resp:  No shortness of breath, cough, wheezing or hemoptysis  GI:  No abdominal pain, nausea, vomiting, dysphagia, diarrhea or constipation. No rectal bleeding, melena, or hematemesis.  Muscle:  No aches or weakness  Neuro:  No tingling, numbness       VITALS:   T(F): 96.3 (24 @ 05:06), Max: 96.4 (24 @ 20:25)  HR: 85 (24 @ 05:06) (79 - 85)  BP: 107/55 (24 @ 05:06) (100/51 - 107/55)  RR: 18 (24 @ 05:06) (18 - 18)  SpO2: 95% (24 @ 05:06) (95% - 98%)    PHYSICAL EXAM:  GENERAL: AAOx3, no acute distress.  HEAD:  Atraumatic, Normocephalic  EYES: conjunctiva and sclera clear  NECK: Supple, No thyromegaly   CHEST/LUNG: Clear to auscultation bilaterally; No wheeze, rhonchi, or rales  HEART: Regular rate and rhythm; normal S1, S2, No murmurs.  ABDOMEN: Soft, nontender, nondistended; Bowel sounds present  NEUROLOGY: No asterixis or tremor  SKIN: Intact, no jaundice          LABS:      138  |  98  |  6<L>  ----------------------------<  125<H>  4.4   |  27  |  1.0    Ca    9.2      08 Mar 2024 08:29  Phos  3.4       Mg     2.0     -    TPro  6.9  /  Alb  4.0  /  TBili  0.7  /  DBili  0.3  /  AST  219<H>  /  ALT  122<H>  /  AlkPhos  86                            11.3   3.73  )-----------( 184      ( 08 Mar 2024 08:29 )             34.3     LIVER FUNCTIONS - ( 08 Mar 2024 08:29 )  Alb: 4.0 g/dL / Pro: 6.9 g/dL / ALK PHOS: 86 U/L / ALT: 122 U/L / AST: 219 U/L / GGT: x               IMAGING:    < from: CT Abdomen and Pelvis w/ IV Cont (24 @ 03:14) >    ACC: [id number] EXAM:  CT ABDOMEN AND PELVIS IC   ORDERED BY: JAY VALE     PROCEDURE DATE:  2024          INTERPRETATION:  CLINICAL HISTORY: Abdominal pain.    TECHNIQUE: Contiguous axial CT images were obtained from the lower chest   to the pubic symphysis following administration of intravenous contrast.   95 cc Omnipaque 350. Oral contrast was not administered.  Reformatted   images in the coronal and sagittal planes were acquired.    COMPARISON: 2024.      FINDINGS:    LOWER CHEST: Unremarkable.    HEPATOBILIARY: Severe hepatic steatosis, significantly increased from   prior CT.    SPLEEN: Unremarkable.    PANCREAS: Unremarkable.    ADRENAL GLANDS: Unremarkable.    KIDNEYS: No hydronephrosis    ABDOMINOPELVIC NODES: No lymphadenopathy.    PELVIC ORGANS: Unremarkable.    PERITONEUM/MESENTERY/BOWEL:  No evidence of bowel obstruction ascites or   free air. Unremarkable appendix.    BONES/SOFT TISSUES: Unremarkable.          IMPRESSION:    Severe hepatic steatosis, significantly increased from prior CT. Clinical   correlation needed.    No CT evidence of acute abdominal pathology.    --- End of Report ---            ANA MORALES MD; Attending Radiologist  This document has been electronically signed. Mar  4 2024 12:24PM    < end of copied text >        < from: US Abdomen Upper Quadrant Right (24 @ 15:16) >    ACC: [id number] EXAM:  US ABDOMEN RT UPR QUADRANT   ORDERED BY: MICHAEL OBRIEN     PROCEDURE DATE:  2024          INTERPRETATION:  CLINICAL INFORMATION: Abdominal pain, transaminitis.    COMPARISON: CT abdomen pelvis 3/4/2024. Abdominal ultrasound 2023.    TECHNIQUE: Sonography of the right upper quadrant.    FINDINGS:  Liver: Increased echogenicity.  Bile ducts: Normal caliber. Common bile duct measures 4 mm.  Gallbladder: Within normal limits. Negative sonographic Nicole's sign.  Pancreas: Poorly visualized.  Right kidney: 11.0 cm. No hydronephrosis.  Ascites: None.  IVC: Visualized portions are within normal limits.    IMPRESSION:    No acute sonographic abnormality.    Hepatic steatosis.    --- End of Report ---          URIEL TREVIZO MD; Resident Radiologist  This document has been electronically signed.  NASSIER HARFOUCH MD; Attending Radiologist  This document has been electronically signed. Mar  4 2024  4:27PM    < end of copied text >       Chief complaint/Reason for consult: abdominal pain    HPI:  42 yo W female w/ PMH - chronic alcohol use disorder plus other medical history as noted below.  Pt come to hospital after being discharged about 6 weeks ago for alcohol WD.  Pt is in ER w/ alcohol WD, severe abdominal pains , nausea, vomiting , HAGMA,  w/ elevated: LFT, lipase, bilirubin. Case discussed w/ ICU MD .Plan to adm to ICU stepdown (04 Mar 2024 01:45)    GI Updates: 43yFemale pmh chronic ETOH abuse, GERD, MVP presents for ETOH abuse and epigastric pain. GI consulted for abdominal pain, epigastric pain persisting. Currently Patient denies nausea, vomiting, hematemesis, melena, blood in stool, diarrhea, . +epigastric pain. +constipation. Patient reports EGD several years ago with Dr. Paz with gastritis and esophagitis at which point she did not stop ETOH.      PAST MEDICAL & SURGICAL HISTORY:  Alcoholism      Anxiety      Graves' disease      GERD (gastroesophageal reflux disease)      Gastritis      MVP (mitral valve prolapse)      Nicotine dependence      Benzodiazepine misuse      Hypercholesterolemia      Obesity      Esophagitis      History of Graves' disease      Heart murmur      Hyperlipidemia, unspecified hyperlipidemia type      Hemochromatosis      Fatty liver      History of  section      S/P tendon repair  5th finger right hand            Family history:  FAMILY HISTORY:  Benzodiazepine misuse  reports mother had Colon cancer    Social History: + smoking. + alcohol. No illegal drug use.    Allergies:   Robaxin (Unknown)  morphine (Unknown)  Intolerances        MEDICATIONS  (STANDING):  chlorhexidine 2% Cloths 1 Application(s) Topical <User Schedule>  cyanocobalamin 1000 MICROGram(s) Oral daily  escitalopram 10 milliGRAM(s) Oral daily  folic acid 1 milliGRAM(s) Oral daily  gabapentin 100 milliGRAM(s) Oral three times a day  heparin   Injectable 5000 Unit(s) SubCutaneous every 8 hours  lactated ringers. 1000 milliLiter(s) (100 mL/Hr) IV Continuous <Continuous>  magnesium oxide 400 milliGRAM(s) Oral three times a day with meals  mirtazapine 7.5 milliGRAM(s) Oral at bedtime  multivitamin/minerals 1 Tablet(s) Oral daily  pantoprazole    Tablet 40 milliGRAM(s) Oral two times a day  polyethylene glycol 3350 17 Gram(s) Oral daily  potassium phosphate / sodium phosphate Powder (PHOS-NaK) 2 Packet(s) Oral three times a day before meals  senna 2 Tablet(s) Oral at bedtime  thiamine 100 milliGRAM(s) Oral daily    MEDICATIONS  (PRN):  acetaminophen     Tablet .. 650 milliGRAM(s) Oral once PRN Moderate Pain (4 - 6)  ondansetron Injectable 4 milliGRAM(s) IV Push every 4 hours PRN Nausea and/or Vomiting        REVIEW OF SYSTEMS  General:  No weight loss, fevers, or chills.  Eyes:  No reported pain or visual changes  ENT:  No sore throat or runny nose.  NECK: No stiffness or lymphadenopathy  CV:  No chest pain or palpitations.  Resp:  No shortness of breath, cough, wheezing or hemoptysis  GI:  +epigastric abdominal pain, no nausea, vomiting, dysphagia, diarrhea + constipation. No rectal bleeding, melena, or hematemesis.  Neuro:  No tingling, numbness       VITALS:   T(F): 96.3 (24 @ 05:06), Max: 96.4 (24 @ 20:25)  HR: 85 (24 @ 05:06) (79 - 85)  BP: 107/55 (24 @ 05:06) (100/51 - 107/55)  RR: 18 (24 @ 05:06) (18 - 18)  SpO2: 95% (24 @ 05:06) (95% - 98%)    PHYSICAL EXAM:  GENERAL: AAOx3, no acute distress.  HEAD:  Atraumatic, Normocephalic  EYES: conjunctiva and sclera clear  NECK: Supple, No thyromegaly   CHEST/LUNG: Clear to auscultation bilaterally; No wheeze, rhonchi, or rales  HEART: Regular rate and rhythm; normal S1, S2, No murmurs.  ABDOMEN: Soft, +epigastric tenderness, nondistended; Bowel sounds present  NEUROLOGY: No asterixis or tremor  SKIN: Intact, no jaundice          LABS:      138  |  98  |  6<L>  ----------------------------<  125<H>  4.4   |  27  |  1.0    Ca    9.2      08 Mar 2024 08:29  Phos  3.4     -  Mg     2.0     -    TPro  6.9  /  Alb  4.0  /  TBili  0.7  /  DBili  0.3  /  AST  219<H>  /  ALT  122<H>  /  AlkPhos  86  -08                          11.3   3.73  )-----------( 184      ( 08 Mar 2024 08:29 )             34.3     LIVER FUNCTIONS - ( 08 Mar 2024 08:29 )  Alb: 4.0 g/dL / Pro: 6.9 g/dL / ALK PHOS: 86 U/L / ALT: 122 U/L / AST: 219 U/L / GGT: x               IMAGING:    < from: CT Abdomen and Pelvis w/ IV Cont (24 @ 03:14) >    ACC: [id number] EXAM:  CT ABDOMEN AND PELVIS IC   ORDERED BY: JAY VALE     PROCEDURE DATE:  2024          INTERPRETATION:  CLINICAL HISTORY: Abdominal pain.    TECHNIQUE: Contiguous axial CT images were obtained from the lower chest   to the pubic symphysis following administration of intravenous contrast.   95 cc Omnipaque 350. Oral contrast was not administered.  Reformatted   images in the coronal and sagittal planes were acquired.    COMPARISON: 2024.      FINDINGS:    LOWER CHEST: Unremarkable.    HEPATOBILIARY: Severe hepatic steatosis, significantly increased from   prior CT.    SPLEEN: Unremarkable.    PANCREAS: Unremarkable.    ADRENAL GLANDS: Unremarkable.    KIDNEYS: No hydronephrosis    ABDOMINOPELVIC NODES: No lymphadenopathy.    PELVIC ORGANS: Unremarkable.    PERITONEUM/MESENTERY/BOWEL:  No evidence of bowel obstruction ascites or   free air. Unremarkable appendix.    BONES/SOFT TISSUES: Unremarkable.          IMPRESSION:    Severe hepatic steatosis, significantly increased from prior CT. Clinical   correlation needed.    No CT evidence of acute abdominal pathology.    --- End of Report ---            ANA MORALES MD; Attending Radiologist  This document has been electronically signed. Mar  4 2024 12:24PM    < end of copied text >        < from: US Abdomen Upper Quadrant Right (24 @ 15:16) >    ACC: [id number] EXAM:  US ABDOMEN RT UPR QUADRANT   ORDERED BY: MICHAEL OBRIEN     PROCEDURE DATE:  2024          INTERPRETATION:  CLINICAL INFORMATION: Abdominal pain, transaminitis.    COMPARISON: CT abdomen pelvis 3/4/2024. Abdominal ultrasound 2023.    TECHNIQUE: Sonography of the right upper quadrant.    FINDINGS:  Liver: Increased echogenicity.  Bile ducts: Normal caliber. Common bile duct measures 4 mm.  Gallbladder: Within normal limits. Negative sonographic Nicole's sign.  Pancreas: Poorly visualized.  Right kidney: 11.0 cm. No hydronephrosis.  Ascites: None.  IVC: Visualized portions are within normal limits.    IMPRESSION:    No acute sonographic abnormality.    Hepatic steatosis.    --- End of Report ---          URIEL TREVIZO MD; Resident Radiologist  This document has been electronically signed.  EVAN OTERO MD; Attending Radiologist  This document has been electronically signed. Mar  4 2024  4:27PM    < end of copied text >

## 2024-03-08 NOTE — CONSULT NOTE ADULT - REASON FOR ADMISSION
severe HAGMA w/ etoh abuse disorder

## 2024-03-09 LAB
ALBUMIN SERPL ELPH-MCNC: 3.4 G/DL — LOW (ref 3.5–5.2)
ALP SERPL-CCNC: 77 U/L — SIGNIFICANT CHANGE UP (ref 30–115)
ALT FLD-CCNC: 108 U/L — HIGH (ref 0–41)
ANION GAP SERPL CALC-SCNC: 10 MMOL/L — SIGNIFICANT CHANGE UP (ref 7–14)
AST SERPL-CCNC: 152 U/L — HIGH (ref 0–41)
BILIRUB SERPL-MCNC: 0.5 MG/DL — SIGNIFICANT CHANGE UP (ref 0.2–1.2)
BUN SERPL-MCNC: 6 MG/DL — LOW (ref 10–20)
CALCIUM SERPL-MCNC: 8.7 MG/DL — SIGNIFICANT CHANGE UP (ref 8.4–10.5)
CHLORIDE SERPL-SCNC: 99 MMOL/L — SIGNIFICANT CHANGE UP (ref 98–110)
CO2 SERPL-SCNC: 29 MMOL/L — SIGNIFICANT CHANGE UP (ref 17–32)
CREAT SERPL-MCNC: 0.9 MG/DL — SIGNIFICANT CHANGE UP (ref 0.7–1.5)
CULTURE RESULTS: SIGNIFICANT CHANGE UP
CULTURE RESULTS: SIGNIFICANT CHANGE UP
EGFR: 82 ML/MIN/1.73M2 — SIGNIFICANT CHANGE UP
GLUCOSE BLDC GLUCOMTR-MCNC: 101 MG/DL — HIGH (ref 70–99)
GLUCOSE BLDC GLUCOMTR-MCNC: 98 MG/DL — SIGNIFICANT CHANGE UP (ref 70–99)
GLUCOSE SERPL-MCNC: 100 MG/DL — HIGH (ref 70–99)
HCT VFR BLD CALC: 32.8 % — LOW (ref 37–47)
HGB BLD-MCNC: 10.9 G/DL — LOW (ref 12–16)
MCHC RBC-ENTMCNC: 33.2 G/DL — SIGNIFICANT CHANGE UP (ref 32–37)
MCHC RBC-ENTMCNC: 34.2 PG — HIGH (ref 27–31)
MCV RBC AUTO: 102.8 FL — HIGH (ref 81–99)
NRBC # BLD: 0 /100 WBCS — SIGNIFICANT CHANGE UP (ref 0–0)
PLATELET # BLD AUTO: 204 K/UL — SIGNIFICANT CHANGE UP (ref 130–400)
PMV BLD: 10.1 FL — SIGNIFICANT CHANGE UP (ref 7.4–10.4)
POTASSIUM SERPL-MCNC: 4.5 MMOL/L — SIGNIFICANT CHANGE UP (ref 3.5–5)
POTASSIUM SERPL-SCNC: 4.5 MMOL/L — SIGNIFICANT CHANGE UP (ref 3.5–5)
PROCALCITONIN SERPL-MCNC: 0.25 NG/ML — HIGH (ref 0.02–0.1)
PROT SERPL-MCNC: 6.4 G/DL — SIGNIFICANT CHANGE UP (ref 6–8)
RBC # BLD: 3.19 M/UL — LOW (ref 4.2–5.4)
RBC # FLD: 14.8 % — HIGH (ref 11.5–14.5)
SODIUM SERPL-SCNC: 138 MMOL/L — SIGNIFICANT CHANGE UP (ref 135–146)
SPECIMEN SOURCE: SIGNIFICANT CHANGE UP
SPECIMEN SOURCE: SIGNIFICANT CHANGE UP
WBC # BLD: 3.71 K/UL — LOW (ref 4.8–10.8)
WBC # FLD AUTO: 3.71 K/UL — LOW (ref 4.8–10.8)

## 2024-03-09 PROCEDURE — 99232 SBSQ HOSP IP/OBS MODERATE 35: CPT

## 2024-03-09 RX ORDER — TRAMADOL HYDROCHLORIDE 50 MG/1
25 TABLET ORAL EVERY 8 HOURS
Refills: 0 | Status: DISCONTINUED | OUTPATIENT
Start: 2024-03-09 | End: 2024-03-09

## 2024-03-09 RX ORDER — KETOROLAC TROMETHAMINE 30 MG/ML
15 SYRINGE (ML) INJECTION EVERY 8 HOURS
Refills: 0 | Status: DISCONTINUED | OUTPATIENT
Start: 2024-03-09 | End: 2024-03-09

## 2024-03-09 RX ORDER — TRAMADOL HYDROCHLORIDE 50 MG/1
50 TABLET ORAL EVERY 8 HOURS
Refills: 0 | Status: DISCONTINUED | OUTPATIENT
Start: 2024-03-09 | End: 2024-03-11

## 2024-03-09 RX ADMIN — Medication 1 MILLIGRAM(S): at 12:11

## 2024-03-09 RX ADMIN — GABAPENTIN 100 MILLIGRAM(S): 400 CAPSULE ORAL at 21:44

## 2024-03-09 RX ADMIN — Medication 2 PACKET(S): at 17:43

## 2024-03-09 RX ADMIN — GABAPENTIN 100 MILLIGRAM(S): 400 CAPSULE ORAL at 13:03

## 2024-03-09 RX ADMIN — GABAPENTIN 100 MILLIGRAM(S): 400 CAPSULE ORAL at 05:13

## 2024-03-09 RX ADMIN — PREGABALIN 1000 MICROGRAM(S): 225 CAPSULE ORAL at 12:11

## 2024-03-09 RX ADMIN — MAGNESIUM OXIDE 400 MG ORAL TABLET 400 MILLIGRAM(S): 241.3 TABLET ORAL at 17:43

## 2024-03-09 RX ADMIN — PANTOPRAZOLE SODIUM 40 MILLIGRAM(S): 20 TABLET, DELAYED RELEASE ORAL at 05:13

## 2024-03-09 RX ADMIN — TRAMADOL HYDROCHLORIDE 50 MILLIGRAM(S): 50 TABLET ORAL at 21:43

## 2024-03-09 RX ADMIN — TRAMADOL HYDROCHLORIDE 50 MILLIGRAM(S): 50 TABLET ORAL at 13:03

## 2024-03-09 RX ADMIN — MAGNESIUM OXIDE 400 MG ORAL TABLET 400 MILLIGRAM(S): 241.3 TABLET ORAL at 12:11

## 2024-03-09 RX ADMIN — POLYETHYLENE GLYCOL 3350 17 GRAM(S): 17 POWDER, FOR SOLUTION ORAL at 12:12

## 2024-03-09 RX ADMIN — Medication 1 TABLET(S): at 12:11

## 2024-03-09 RX ADMIN — Medication 2 PACKET(S): at 05:13

## 2024-03-09 RX ADMIN — Medication 100 MILLIGRAM(S): at 12:11

## 2024-03-09 RX ADMIN — MAGNESIUM OXIDE 400 MG ORAL TABLET 400 MILLIGRAM(S): 241.3 TABLET ORAL at 08:23

## 2024-03-09 RX ADMIN — TRAMADOL HYDROCHLORIDE 50 MILLIGRAM(S): 50 TABLET ORAL at 22:30

## 2024-03-09 RX ADMIN — PANTOPRAZOLE SODIUM 40 MILLIGRAM(S): 20 TABLET, DELAYED RELEASE ORAL at 17:43

## 2024-03-09 RX ADMIN — ESCITALOPRAM OXALATE 10 MILLIGRAM(S): 10 TABLET, FILM COATED ORAL at 12:12

## 2024-03-09 RX ADMIN — Medication 2 PACKET(S): at 12:13

## 2024-03-09 NOTE — PROGRESS NOTE ADULT - SUBJECTIVE AND OBJECTIVE BOX
AURELIA BLANCAS  43y  Female      Patient is a 43y old  Female who presents with a chief complaint of severe HAGMA w/ etoh abuse disorder (08 Mar 2024 14:34)      INTERVAL HPI/OVERNIGHT EVENTS:  Pt seen and examined. She c/o intermittent abd pain       Vital Signs Last 24 Hrs  T(C): 37.2 (09 Mar 2024 14:00), Max: 37.2 (09 Mar 2024 14:00)  T(F): 98.9 (09 Mar 2024 14:00), Max: 98.9 (09 Mar 2024 14:00)  HR: 89 (09 Mar 2024 14:00) (78 - 89)  BP: 110/58 (09 Mar 2024 14:00) (101/56 - 110/58)  BP(mean): --  RR: 16 (09 Mar 2024 14:00) (16 - 18)  SpO2: 94% (09 Mar 2024 14:00) (94% - 97%)    Parameters below as of 08 Mar 2024 20:25  Patient On (Oxygen Delivery Method): room air        PHYSICAL EXAM:  GENERAL: NAD, well-groomed, well-developed  NERVOUS SYSTEM:  Alert & Oriented X3,  PULM: Clear to auscultation bilaterally  CARDIAC: Regular rate and rhythm; No murmurs, rubs, or gallops  GI: Soft, non-distended,   EXTREMITIES:  2+ Peripheral Pulses,     Consultant(s) Notes Reviewed:  [x ] YES  [ ] NO  Care Discussed with Consultants/Other Providers [ x] YES  [ ] NO    LABS:                        10.9   3.71  )-----------( 204      ( 09 Mar 2024 04:30 )             32.8     03-09    138  |  99  |  6<L>  ----------------------------<  100<H>  4.5   |  29  |  0.9    Ca    8.7      09 Mar 2024 04:30    TPro  6.4  /  Alb  3.4<L>  /  TBili  0.5  /  DBili  x   /  AST  152<H>  /  ALT  108<H>  /  AlkPhos  77  03-09       CAPILLARY BLOOD GLUCOSE      POCT Blood Glucose.: 98 mg/dL (09 Mar 2024 07:37)  POCT Blood Glucose.: 101 mg/dL (09 Mar 2024 00:26)  POCT Blood Glucose.: 108 mg/dL (08 Mar 2024 21:13)  POCT Blood Glucose.: 121 mg/dL (08 Mar 2024 16:24)      RADIOLOGY & ADDITIONAL TESTS:    Imaging Personally Reviewed:  [ ] YES  [ ] NO    ASSESSMENT AND PLAN:      43 year old female with alcohol use disorder presents with abdominal pain, nausea/vomiting, decreased PO intake  found to have severe metabolic acidosis with HAGMA likely starvation ketosis/ alcoholic ketosis/ possible DKA.      1. Abdominal pain likely due to alcoholic ketoacidosis persistnet  * no evidence of DKA ashli with normal hgba1c   - Admitted to the ICU initially      -u/a:wnl          - procalcitonin elevated but no focus of infection. Repeat:pending (if elevated send blood cx)        - KUB:WNL   - Urine tox:WNL          - urine hcg:pending (was never sent)   - Was started on dextrose . now on LR at 100 ml/hr   - Increase PPI to bid   - pt still complaining of abdominal pain diffuse severe in nature (looks comfortable when I spoke to her), will trial tramadol  - US retro:WNL   - CT abdomen pelvis:  a) Severe hepatic steatosis, significantly increased from prior CT. Clinical correlation needed.  b) No CT evidence of acute abdominal pathology.  - Endocrine consult:  a) 3/6/23 TSH and Ft4 normal T3 remain low but improving , likely sick euthyroid   no need for any meds, can be monitor in 8-12 weeks with repeat tfts as outpatient by PCP   - GI consult:plan for EGD monday     2. Alcohol abuse complicated by thrombocytopenia  - CTH:  a)No acute intracranial hemorrhage or acute large territorial infarct.  b) Interval development of cerebral and cerebellar atrophy.  - HIT panel negative/ DIC negative  - CIWA protocol     3.  c/o blurry vision/ falls prior to admission  CT head showing cerebellar atrophy  seen by neuro, no further w/u  likely alcohol related    4. DVT/GI px

## 2024-03-10 PROCEDURE — 99232 SBSQ HOSP IP/OBS MODERATE 35: CPT

## 2024-03-10 RX ORDER — POLYETHYLENE GLYCOL 3350 17 G/17G
17 POWDER, FOR SOLUTION ORAL DAILY
Refills: 0 | Status: DISCONTINUED | OUTPATIENT
Start: 2024-03-10 | End: 2024-03-11

## 2024-03-10 RX ADMIN — PREGABALIN 1000 MICROGRAM(S): 225 CAPSULE ORAL at 11:39

## 2024-03-10 RX ADMIN — TRAMADOL HYDROCHLORIDE 50 MILLIGRAM(S): 50 TABLET ORAL at 21:21

## 2024-03-10 RX ADMIN — ESCITALOPRAM OXALATE 10 MILLIGRAM(S): 10 TABLET, FILM COATED ORAL at 11:39

## 2024-03-10 RX ADMIN — Medication 1 MILLIGRAM(S): at 11:39

## 2024-03-10 RX ADMIN — SENNA PLUS 2 TABLET(S): 8.6 TABLET ORAL at 21:22

## 2024-03-10 RX ADMIN — MIRTAZAPINE 7.5 MILLIGRAM(S): 45 TABLET, ORALLY DISINTEGRATING ORAL at 01:09

## 2024-03-10 RX ADMIN — Medication 2 PACKET(S): at 16:20

## 2024-03-10 RX ADMIN — Medication 2 PACKET(S): at 11:38

## 2024-03-10 RX ADMIN — GABAPENTIN 100 MILLIGRAM(S): 400 CAPSULE ORAL at 14:01

## 2024-03-10 RX ADMIN — TRAMADOL HYDROCHLORIDE 50 MILLIGRAM(S): 50 TABLET ORAL at 22:39

## 2024-03-10 RX ADMIN — GABAPENTIN 100 MILLIGRAM(S): 400 CAPSULE ORAL at 21:21

## 2024-03-10 RX ADMIN — PANTOPRAZOLE SODIUM 40 MILLIGRAM(S): 20 TABLET, DELAYED RELEASE ORAL at 05:55

## 2024-03-10 RX ADMIN — MAGNESIUM OXIDE 400 MG ORAL TABLET 400 MILLIGRAM(S): 241.3 TABLET ORAL at 08:39

## 2024-03-10 RX ADMIN — TRAMADOL HYDROCHLORIDE 50 MILLIGRAM(S): 50 TABLET ORAL at 08:39

## 2024-03-10 RX ADMIN — MAGNESIUM OXIDE 400 MG ORAL TABLET 400 MILLIGRAM(S): 241.3 TABLET ORAL at 11:39

## 2024-03-10 RX ADMIN — PANTOPRAZOLE SODIUM 40 MILLIGRAM(S): 20 TABLET, DELAYED RELEASE ORAL at 16:20

## 2024-03-10 RX ADMIN — GABAPENTIN 100 MILLIGRAM(S): 400 CAPSULE ORAL at 05:55

## 2024-03-10 RX ADMIN — MAGNESIUM OXIDE 400 MG ORAL TABLET 400 MILLIGRAM(S): 241.3 TABLET ORAL at 16:20

## 2024-03-10 RX ADMIN — Medication 1 TABLET(S): at 11:39

## 2024-03-10 RX ADMIN — Medication 100 MILLIGRAM(S): at 11:39

## 2024-03-10 RX ADMIN — MIRTAZAPINE 7.5 MILLIGRAM(S): 45 TABLET, ORALLY DISINTEGRATING ORAL at 21:21

## 2024-03-10 RX ADMIN — ONDANSETRON 4 MILLIGRAM(S): 8 TABLET, FILM COATED ORAL at 22:37

## 2024-03-10 NOTE — PROGRESS NOTE ADULT - SUBJECTIVE AND OBJECTIVE BOX
AURELIA BLANCAS  43y  Female      Patient is a 43y old  Female who presents with a chief complaint of severe HAGMA w/ etoh abuse disorder (09 Mar 2024 14:23)      INTERVAL HPI/OVERNIGHT EVENTS:  Pt seen and examined. Continues to c/o intermittent abd pain.     Vital Signs Last 24 Hrs  T(C): 36.2 (11 Mar 2024 05:00), Max: 36.7 (10 Mar 2024 14:00)  T(F): 97.2 (11 Mar 2024 05:00), Max: 98.1 (10 Mar 2024 14:00)  HR: 85 (11 Mar 2024 05:00) (72 - 89)  BP: 105/57 (11 Mar 2024 05:00) (104/57 - 121/76)  BP(mean): --  RR: 18 (11 Mar 2024 05:00) (16 - 18)  SpO2: 95% (11 Mar 2024 05:00) (95% - 100%)    Parameters below as of 11 Mar 2024 05:00  Patient On (Oxygen Delivery Method): room air        PHYSICAL EXAM:  GENERAL: NAD, well-groomed, well-developed  NERVOUS SYSTEM:  Alert & Oriented X3,  PULM: Clear to auscultation bilaterally  CARDIAC: Regular rate and rhythm; No murmurs, rubs, or gallops  GI: Soft, non-distended,   EXTREMITIES:  2+ Peripheral Pulses,       Consultant(s) Notes Reviewed:  [x ] YES  [ ] NO  Care Discussed with Consultants/Other Providers [ x] YES  [ ] NO    LABS:             No new labs           CAPILLARY BLOOD GLUCOSE          RADIOLOGY & ADDITIONAL TESTS:    No new imaging.      ASSESSMENT AND PLAN:    43 year old female with alcohol use disorder presents with abdominal pain, nausea/vomiting, decreased PO intake  found to have severe metabolic acidosis with HAGMA likely starvation ketosis/ alcoholic ketosis/ possible DKA.      1. Abdominal pain likely due to alcoholic ketoacidosis persistent  * no evidence of DKA ashli with normal hgba1c   - Admitted to the ICU initially      -u/a:wnl          - procalcitonin elevated but no focus of infection. Repeat: 0.5, repeat 0.25        - KUB:WNL   - Urine tox:WNL          - urine hcg: neg  - Was started on dextrose . now on LR at 100 ml/hr   - Increase PPI to bid   - pt still complaining of abdominal pain diffuse severe in nature (looks comfortable when I spoke to her), will trial tramadol  - US retro:WNL   - CT abdomen pelvis:  a) Severe hepatic steatosis, significantly increased from prior CT. Clinical correlation needed.  b) No CT evidence of acute abdominal pathology.  - Endocrine consult:  a) 3/6/23 TSH and Ft4 normal T3 remain low but improving , likely sick euthyroid   no need for any meds, can be monitor in 8-12 weeks with repeat tfts as outpatient by PCP   - GI consult:plan for EGD monday NPO at [facility]    2. Alcohol abuse complicated by thrombocytopenia  - CTH:  a)No acute intracranial hemorrhage or acute large territorial infarct.  b) Interval development of cerebral and cerebellar atrophy.  - HIT panel negative/ DIC negative  - CIWA protocol     3.  c/o blurry vision/ falls prior to admission  CT head showing cerebellar atrophy  seen by neuro, no further w/u  likely alcohol related    4. DVT/GI px

## 2024-03-10 NOTE — CHART NOTE - NSCHARTNOTEFT_GEN_A_CORE
pt c/o persistent diarrhea since this evening; pt started on high fiber diet and  miralax QD standing on 3/6 for constipation per chart review    Pepto-Bismol x 1 dose order at pt's request and miralax order changed to QD PRN for constipation    above d/w RN

## 2024-03-10 NOTE — PROGRESS NOTE ADULT - PROVIDER SPECIALTY LIST ADULT
Critical Care
Hospitalist
Critical Care
Hospitalist
Critical Care
Endocrinology
Hospitalist
Hospitalist
Addiction Medicine
Addiction Medicine

## 2024-03-10 NOTE — PROGRESS NOTE ADULT - REASON FOR ADMISSION
severe HAGMA w/ etoh abuse disorder

## 2024-03-11 ENCOUNTER — TRANSCRIPTION ENCOUNTER (OUTPATIENT)
Age: 44
End: 2024-03-11

## 2024-03-11 ENCOUNTER — RESULT REVIEW (OUTPATIENT)
Age: 44
End: 2024-03-11

## 2024-03-11 VITALS
RESPIRATION RATE: 12 BRPM | DIASTOLIC BLOOD PRESSURE: 65 MMHG | SYSTOLIC BLOOD PRESSURE: 106 MMHG | OXYGEN SATURATION: 96 % | HEART RATE: 63 BPM

## 2024-03-11 LAB
ANION GAP SERPL CALC-SCNC: 11 MMOL/L — SIGNIFICANT CHANGE UP (ref 7–14)
BUN SERPL-MCNC: 7 MG/DL — LOW (ref 10–20)
CALCIUM SERPL-MCNC: 9.1 MG/DL — SIGNIFICANT CHANGE UP (ref 8.4–10.5)
CHLORIDE SERPL-SCNC: 105 MMOL/L — SIGNIFICANT CHANGE UP (ref 98–110)
CO2 SERPL-SCNC: 24 MMOL/L — SIGNIFICANT CHANGE UP (ref 17–32)
CREAT SERPL-MCNC: 0.9 MG/DL — SIGNIFICANT CHANGE UP (ref 0.7–1.5)
EGFR: 82 ML/MIN/1.73M2 — SIGNIFICANT CHANGE UP
GLUCOSE SERPL-MCNC: 106 MG/DL — HIGH (ref 70–99)
HAV IGM SER-ACNC: SIGNIFICANT CHANGE UP
HBV CORE IGM SER-ACNC: SIGNIFICANT CHANGE UP
HBV SURFACE AG SER-ACNC: SIGNIFICANT CHANGE UP
HCT VFR BLD CALC: 34.4 % — LOW (ref 37–47)
HCV AB S/CO SERPL IA: 0.07 S/CO — SIGNIFICANT CHANGE UP (ref 0–0.99)
HCV AB SERPL-IMP: SIGNIFICANT CHANGE UP
HGB BLD-MCNC: 11.2 G/DL — LOW (ref 12–16)
MAGNESIUM SERPL-MCNC: 2.1 MG/DL — SIGNIFICANT CHANGE UP (ref 1.8–2.4)
MCHC RBC-ENTMCNC: 32.6 G/DL — SIGNIFICANT CHANGE UP (ref 32–37)
MCHC RBC-ENTMCNC: 33.2 PG — HIGH (ref 27–31)
MCV RBC AUTO: 102.1 FL — HIGH (ref 81–99)
NRBC # BLD: 0 /100 WBCS — SIGNIFICANT CHANGE UP (ref 0–0)
PHOSPHATE SERPL-MCNC: 4.3 MG/DL — SIGNIFICANT CHANGE UP (ref 2.1–4.9)
PLATELET # BLD AUTO: 288 K/UL — SIGNIFICANT CHANGE UP (ref 130–400)
PMV BLD: 10.1 FL — SIGNIFICANT CHANGE UP (ref 7.4–10.4)
POTASSIUM SERPL-MCNC: 4.6 MMOL/L — SIGNIFICANT CHANGE UP (ref 3.5–5)
POTASSIUM SERPL-SCNC: 4.6 MMOL/L — SIGNIFICANT CHANGE UP (ref 3.5–5)
RBC # BLD: 3.37 M/UL — LOW (ref 4.2–5.4)
RBC # FLD: 14.8 % — HIGH (ref 11.5–14.5)
SODIUM SERPL-SCNC: 140 MMOL/L — SIGNIFICANT CHANGE UP (ref 135–146)
WBC # BLD: 4.27 K/UL — LOW (ref 4.8–10.8)
WBC # FLD AUTO: 4.27 K/UL — LOW (ref 4.8–10.8)

## 2024-03-11 PROCEDURE — 88312 SPECIAL STAINS GROUP 1: CPT | Mod: 26

## 2024-03-11 PROCEDURE — 99239 HOSP IP/OBS DSCHRG MGMT >30: CPT

## 2024-03-11 PROCEDURE — 43239 EGD BIOPSY SINGLE/MULTIPLE: CPT

## 2024-03-11 PROCEDURE — 88305 TISSUE EXAM BY PATHOLOGIST: CPT | Mod: 26

## 2024-03-11 RX ORDER — MIRTAZAPINE 45 MG/1
7.5 TABLET, ORALLY DISINTEGRATING ORAL AT BEDTIME
Refills: 0 | Status: DISCONTINUED | OUTPATIENT
Start: 2024-03-11 | End: 2024-03-11

## 2024-03-11 RX ORDER — PANTOPRAZOLE SODIUM 20 MG/1
1 TABLET, DELAYED RELEASE ORAL
Qty: 30 | Refills: 0
Start: 2024-03-11 | End: 2024-04-09

## 2024-03-11 RX ORDER — ESCITALOPRAM OXALATE 10 MG/1
10 TABLET, FILM COATED ORAL DAILY
Refills: 0 | Status: DISCONTINUED | OUTPATIENT
Start: 2024-03-11 | End: 2024-03-11

## 2024-03-11 RX ORDER — HEPARIN SODIUM 5000 [USP'U]/ML
5000 INJECTION INTRAVENOUS; SUBCUTANEOUS EVERY 8 HOURS
Refills: 0 | Status: DISCONTINUED | OUTPATIENT
Start: 2024-03-11 | End: 2024-03-11

## 2024-03-11 RX ORDER — ACETAMINOPHEN 500 MG
650 TABLET ORAL ONCE
Refills: 0 | Status: DISCONTINUED | OUTPATIENT
Start: 2024-03-11 | End: 2024-03-11

## 2024-03-11 RX ORDER — PREGABALIN 225 MG/1
1000 CAPSULE ORAL DAILY
Refills: 0 | Status: DISCONTINUED | OUTPATIENT
Start: 2024-03-11 | End: 2024-03-11

## 2024-03-11 RX ORDER — TRAMADOL HYDROCHLORIDE 50 MG/1
50 TABLET ORAL EVERY 8 HOURS
Refills: 0 | Status: DISCONTINUED | OUTPATIENT
Start: 2024-03-11 | End: 2024-03-11

## 2024-03-11 RX ORDER — POLYETHYLENE GLYCOL 3350 17 G/17G
17 POWDER, FOR SOLUTION ORAL DAILY
Refills: 0 | Status: DISCONTINUED | OUTPATIENT
Start: 2024-03-11 | End: 2024-03-11

## 2024-03-11 RX ORDER — GABAPENTIN 400 MG/1
100 CAPSULE ORAL THREE TIMES A DAY
Refills: 0 | Status: DISCONTINUED | OUTPATIENT
Start: 2024-03-11 | End: 2024-03-11

## 2024-03-11 RX ORDER — SENNA PLUS 8.6 MG/1
2 TABLET ORAL AT BEDTIME
Refills: 0 | Status: DISCONTINUED | OUTPATIENT
Start: 2024-03-11 | End: 2024-03-11

## 2024-03-11 RX ORDER — THIAMINE MONONITRATE (VIT B1) 100 MG
1 TABLET ORAL
Qty: 30 | Refills: 0
Start: 2024-03-11 | End: 2024-04-09

## 2024-03-11 RX ORDER — THIAMINE MONONITRATE (VIT B1) 100 MG
100 TABLET ORAL DAILY
Refills: 0 | Status: DISCONTINUED | OUTPATIENT
Start: 2024-03-11 | End: 2024-03-11

## 2024-03-11 RX ORDER — MAGNESIUM OXIDE 400 MG ORAL TABLET 241.3 MG
400 TABLET ORAL
Refills: 0 | Status: DISCONTINUED | OUTPATIENT
Start: 2024-03-11 | End: 2024-03-11

## 2024-03-11 RX ORDER — MULTIVIT-MIN/FERROUS GLUCONATE 9 MG/15 ML
1 LIQUID (ML) ORAL DAILY
Refills: 0 | Status: DISCONTINUED | OUTPATIENT
Start: 2024-03-11 | End: 2024-03-11

## 2024-03-11 RX ORDER — CHLORHEXIDINE GLUCONATE 213 G/1000ML
1 SOLUTION TOPICAL
Refills: 0 | Status: DISCONTINUED | OUTPATIENT
Start: 2024-03-11 | End: 2024-03-11

## 2024-03-11 RX ORDER — FOLIC ACID 0.8 MG
1 TABLET ORAL
Qty: 30 | Refills: 0
Start: 2024-03-11 | End: 2024-04-09

## 2024-03-11 RX ORDER — FOLIC ACID 0.8 MG
1 TABLET ORAL DAILY
Refills: 0 | Status: DISCONTINUED | OUTPATIENT
Start: 2024-03-11 | End: 2024-03-11

## 2024-03-11 RX ADMIN — CHLORHEXIDINE GLUCONATE 1 APPLICATION(S): 213 SOLUTION TOPICAL at 05:00

## 2024-03-11 RX ADMIN — ESCITALOPRAM OXALATE 10 MILLIGRAM(S): 10 TABLET, FILM COATED ORAL at 15:56

## 2024-03-11 RX ADMIN — TRAMADOL HYDROCHLORIDE 50 MILLIGRAM(S): 50 TABLET ORAL at 16:03

## 2024-03-11 RX ADMIN — GABAPENTIN 100 MILLIGRAM(S): 400 CAPSULE ORAL at 15:56

## 2024-03-11 RX ADMIN — Medication 1 MILLIGRAM(S): at 15:55

## 2024-03-11 RX ADMIN — Medication 100 MILLIGRAM(S): at 15:56

## 2024-03-11 RX ADMIN — PREGABALIN 1000 MICROGRAM(S): 225 CAPSULE ORAL at 15:55

## 2024-03-11 RX ADMIN — Medication 1 APPLICATION(S): at 17:48

## 2024-03-11 RX ADMIN — Medication 1 TABLET(S): at 15:55

## 2024-03-11 NOTE — DISCHARGE NOTE NURSING/CASE MANAGEMENT/SOCIAL WORK - PATIENT PORTAL LINK FT
You can access the FollowMyHealth Patient Portal offered by [facility] by registering at the following website: [email or web address]. By joining [employer]’s FollowMyHealth portal, you will also be able to view your health information using other applications (apps) compatible with our system.

## 2024-03-11 NOTE — DISCHARGE NOTE PROVIDER - NSDCFUADDINST_GEN_ALL_CORE_FT
Please return to the ED for further evaluation if worsening pain, blister formation, sloughing off skin of right extremity. Please follow up with your PCP in 1 week.  Please return to the ED is worsening nausea, vomiting, abdominal pain, chest pain, SOB, or confusion.

## 2024-03-11 NOTE — DISCHARGE NOTE PROVIDER - CARE PROVIDERS DIRECT ADDRESSES
[email or web address]

## 2024-03-11 NOTE — DISCHARGE NOTE PROVIDER - CARE PROVIDER_API CALL
Italo Julio Cesar  Internal Medicine  [address] [place] [id number]  Phone: [phone]  Fax: [phone]  Follow Up Time: 1 week   Julio Cesar Puckett  Internal Medicine  [address] [place] [id number]  Phone: [phone]  Fax: [phone]  Follow Up Time: 1 week    GI [facility],   [address] [place] [id number]  Phone: [phone]  Fax: (   )    -  Follow Up Time: Routine
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
## 2024-03-11 NOTE — DISCHARGE NOTE PROVIDER - NSDCMRMEDTOKEN_GEN_ALL_CORE_FT
escitalopram 10 mg oral tablet: 1 tab(s) orally once a day  folic acid 1 mg oral tablet: 1 tab(s) orally once a day  gabapentin 100 mg oral capsule: 1 cap(s) orally 3 times a day  hydrOXYzine hydrochloride 50 mg oral tablet: 1 tab(s) orally once a day as needed for  anxiety  mirtazapine 7.5 mg oral tablet: 1 tab(s) orally once a day (at bedtime)  Multiple Vitamins with Minerals oral tablet: 1 tab(s) orally once a day  ondansetron 4 mg oral tablet: 1 tab(s) orally once a day as needed for  nausea  Protonix 40 mg oral delayed release tablet: 1 tab(s) orally once a day  simethicone 80 mg oral tablet, chewable: 1 tab(s) orally every 8 hours as needed for As needed for Gas  thiamine 100 mg oral tablet: 1 tab(s) orally once a day   escitalopram 10 mg oral tablet: 1 tab(s) orally once a day  folic acid 1 mg oral tablet: 1 tab(s) orally once a day  gabapentin 100 mg oral capsule: 1 cap(s) orally 3 times a day  hydrOXYzine hydrochloride 50 mg oral tablet: 1 tab(s) orally once a day as needed for  anxiety  mirtazapine 7.5 mg oral tablet: 1 tab(s) orally once a day (at bedtime)  Multiple Vitamins with Minerals oral tablet: 1 tab(s) orally once a day  Protonix 40 mg oral delayed release tablet: 1 tab(s) orally once a day before breakfast  thiamine 100 mg oral tablet: 1 tab(s) orally once a day   escitalopram 10 mg oral tablet: 1 tab(s) orally once a day  folic acid 1 mg oral tablet: 1 tab(s) orally once a day  gabapentin 100 mg oral capsule: 1 cap(s) orally 3 times a day  hydrOXYzine hydrochloride 50 mg oral tablet: 1 tab(s) orally once a day as needed for  anxiety  mirtazapine 7.5 mg oral tablet: 1 tab(s) orally once a day (at bedtime)  Multiple Vitamins with Minerals oral tablet: 1 tab(s) orally once a day  Non adhesive pad: Please apply over silvadene cream on affected area.  Protonix 40 mg oral delayed release tablet: 1 tab(s) orally once a day before breakfast  silver sulfADIAZINE 1% topical cream: Apply topically to affected area 2 times a day  thiamine 100 mg oral tablet: 1 tab(s) orally once a day

## 2024-03-11 NOTE — CHART NOTE - NSCHARTNOTEFT_GEN_A_CORE
Please refer to sunrise results section for EGD findings and recommendations   all discontinued via transfer orders reinitiated and Hospitalist MUST review ALL orders to assure they are accurate and correct

## 2024-03-11 NOTE — CHART NOTE - NSCHARTNOTEFT_GEN_A_CORE
called by RN to assess burn of right hand that occurred while pt took shower    pt reports sxs started after tested temp of water w/ right hand, with burning sensation but w/o any color changes of right hand at the time, reporting current erythema only noted after phlebotomy checked her arms for blood draw. Pt reports 2/10 pain at the time    R hand assessed w/ cherry red color noted of palmar and dorsal aspect of right hand, appearing inflamed and warm to touch. No ttp. Sensation  intact w/ full ROM. No blistering, no lesion, no sloughing appreciated on exam       will order silvadene at pt's request. Ice baths PRN   Above d/w daytime PA and RN called by RN to assess burn of right hand that occurred while pt took shower    pt reports sxs started after tested temp of water w/ right hand, with burning sensation but w/o any color changes of right hand at the time, reporting current erythema only noted after phlebotomy checked her arms for blood draw. Pt reports 2/10 pain at the time    R hand assessed w/ cherry red color noted of palmar and dorsal aspect of right hand, appearing inflamed and warm to touch. No TTP. Sensation intact w/ full ROM. No blistering,  no eschar, no open wounds, sloughing appreciated on exam       will order topical silvadene at pt's request to review. Ice baths PRN w/ small towel to be placed under right hand  above d/w RN who agrees to c/w monitoring    Above d/w daytime PA to be assessed by covering day attending

## 2024-03-11 NOTE — DISCHARGE NOTE PROVIDER - NSDCCPCAREPLAN_GEN_ALL_CORE_FT
PRINCIPAL DISCHARGE DIAGNOSIS  Diagnosis: Alcoholic ketoacidosis  Assessment and Plan of Treatment: You were admitted to the hospital for alcoholic ketoacidosis/hepatic steatosis. You were evaluated by the GI team and underwent an EGD on 3/11 showing small varices in the lower third of esophagus. You will need to take Protonix daily upon discharge and follow up with GI clinic outpatient. Recommend alcohol cessation.      SECONDARY DISCHARGE DIAGNOSES  Diagnosis: Superficial burn of right upper extremity  Assessment and Plan of Treatment: You will need to apply topical silvadene over right hand. Please return to the ED for evaluation if worsening pain, blister formation, sloughing off skin. Please follow up with your PCP in 1 week.     How Severe Are They?: mild Is This A New Presentation, Or A Follow-Up?: Skin Lesions

## 2024-03-11 NOTE — DISCHARGE NOTE PROVIDER - HOSPITAL COURSE
This is a 43 year old female with alcohol use disorder who presented with abdominal pain, nausea/vomiting, decreased PO intake  found to have severe metabolic acidosis with HAGMA likely starvation ketosis/ alcoholic ketosis/ possible DKA.      1. Abdominal pain likely due to alcoholic ketoacidosis persistent  * no evidence of DKA ashli with normal hgba1c   - Admitted to the ICU initially      -u/a:wnl          - procalcitonin elevated but no focus of infection. Repeat: 0.5, repeat 0.25        - KUB:WNL   - Urine tox:WNL          - urine hcg: neg  - Was started on dextrose, then LR at 100 ml/hr   - PPI BID   - US retro:WNL   - CT abdomen pelvis: a) Severe hepatic steatosis, significantly increased from prior CT. Clinical correlation needed. b) No CT evidence of acute abdominal pathology.  - Endocrine consult: 3/6/23 TSH and Ft4 normal T3 remain low but improving , likely sick euthyroid   no need for any meds, can be monitor in 8-12 weeks with repeat tfts as outpatient by PCP   - GI s/p EGD 3/11: Small varices in the lower third of the esophagus. Erythema in the antrum compatible with non-erosive gastritis. (Biopsy). Normal mucosa in the whole examined duodenum. (Biopsy). Awaiting pathology. Per GI, Await pathology results. Per GI, resume diet as tolerated, daily PO Pantoprazole 40 mg to be taken 30 mins before breakfast, Recommend repeat EGD in 1 year (surveillance of small esophageal varices). Follow-up with primary GI after discharge.    2. Alcohol abuse complicated by thrombocytopenia  - CTH: a)No acute intracranial hemorrhage or acute large territorial infarct. b) Interval development of cerebral and cerebellar atrophy.  - HIT panel negative/ DIC negative  - CIWA protocol     3.  c/o blurry vision/ falls prior to admission  -CT head showing cerebellar atrophy  -seen by neuro, no further w/u  -likely alcohol related      Patient s/p EGD 3/11, stable for discharge per . This is a 43 year old female with alcohol use disorder who presented with abdominal pain, nausea/vomiting, decreased PO intake  found to have severe metabolic acidosis with HAGMA likely starvation ketosis/ alcoholic ketosis/ possible DKA.      1. Abdominal pain likely due to alcoholic ketoacidosis persistent  * no evidence of DKA ashli with normal hgba1c   - Admitted to the ICU initially      -u/a:wnl          - procalcitonin elevated but no focus of infection. Repeat: 0.5, repeat 0.25        - KUB:WNL   - Urine tox:WNL          - urine hcg: neg  - Was started on dextrose, then LR at 100 ml/hr   - PPI BID   - US retro:WNL   - CT abdomen pelvis: a) Severe hepatic steatosis, significantly increased from prior CT. Clinical correlation needed. b) No CT evidence of acute abdominal pathology.  - Endocrine consult: 3/6/23 TSH and Ft4 normal T3 remain low but improving , likely sick euthyroid   no need for any meds, can be monitor in 8-12 weeks with repeat tfts as outpatient by PCP   - GI s/p EGD 3/11: Small varices in the lower third of the esophagus. Erythema in the antrum compatible with non-erosive gastritis. (Biopsy). Normal mucosa in the whole examined duodenum. (Biopsy). Awaiting pathology. Per GI, Await pathology results, resume diet as tolerated, daily PO Pantoprazole 40 mg to be taken 30 mins before breakfast, Recommend repeat EGD in 1 year (surveillance of small esophageal varices). Follow-up with primary GI after discharge.    2. Alcohol abuse complicated by thrombocytopenia  - CTH: a)No acute intracranial hemorrhage or acute large territorial infarct. b) Interval development of cerebral and cerebellar atrophy.  - HIT panel negative/ DIC negative  - CIWA protocol     3.  c/o blurry vision/ falls prior to admission  -CT head showing cerebellar atrophy  -seen by neuro, no further w/u  -likely alcohol related      Patient s/p EGD 3/11, stable for discharge per . This is a 43 year old female with alcohol use disorder who presented with abdominal pain, nausea/vomiting, decreased PO intake  found to have severe metabolic acidosis with HAGMA likely starvation ketosis/ alcoholic ketosis/ possible DKA.    1. Abdominal pain likely due to alcoholic ketoacidosis persistent  * no evidence of DKA ashli with normal hgba1c   - Admitted to the ICU initially      -u/a:wnl          - procalcitonin elevated but no focus of infection. Repeat: 0.5, repeat 0.25        - KUB:WNL   - Urine tox:WNL          - urine hcg: neg  - Was started on dextrose, then LR at 100 ml/hr   - PPI BID   - US retro:WNL   - CT abdomen pelvis: a) Severe hepatic steatosis, significantly increased from prior CT. Clinical correlation needed. b) No CT evidence of acute abdominal pathology.  - Endocrine consult: 3/6/23 TSH and Ft4 normal T3 remain low but improving , likely sick euthyroid   no need for any meds, can be monitor in 8-12 weeks with repeat tfts as outpatient by PCP   - GI s/p EGD 3/11: Small varices in the lower third of the esophagus. Erythema in the antrum compatible with non-erosive gastritis. (Biopsy). Normal mucosa in the whole examined duodenum. (Biopsy). Awaiting pathology. Per GI, Await pathology results, resume diet as tolerated, daily PO Pantoprazole 40 mg to be taken 30 mins before breakfast, Recommend repeat EGD in 1 year (surveillance of small esophageal varices). Follow-up with primary GI after discharge.    2. Alcohol abuse complicated by thrombocytopenia  - CTH: a)No acute intracranial hemorrhage or acute large territorial infarct. b) Interval development of cerebral and cerebellar atrophy.  - HIT panel negative/ DIC negative  - CIWA protocol     3.  c/o blurry vision/ falls prior to admission  -CT head showing cerebellar atrophy  -seen by neuro, no further w/u  -likely alcohol related    4. Accidental scald burn to right hand from hot shower  -Pt took a shower and while testing for the water to see if it is warm enough she held it too long and got scalding burn to right hand, she states due to neuropathy she did not realize how hot it had become, over the next day it became red and mildly swollen and painful. No blisters or open sores.   -Ordered Silvadene to be applied twice daily and advised pt f/u with PCP in 3-4 days to ensure burn is healing appropriately, if worsens, blister formation or signs of infection, advised pt to return to ED for further evaluation     Patient s/p EGD 3/11, stable for discharge per Dr. Causey.

## 2024-03-11 NOTE — DISCHARGE NOTE PROVIDER - PROVIDER TOKENS
PROVIDER:[TOKEN:[[id number]],FOLLOWUP:[1 week]] PROVIDER:[TOKEN:[[id number]],FOLLOWUP:[1 week]],FREE:[LAST:[[facility]],PHONE:[phone]],FAX:[(   )    -],ADDRESS:[[address] [place], [place] [address] [address]],FOLLOWUP:[Routine]]

## 2024-03-11 NOTE — CHART NOTE - NSCHARTNOTESELECT_GEN_ALL_CORE
EGD/Event Note
Event Note
GI Updates/Event Note
PA note
dg - transfer/Event Note
diarrhea
Event Note
ICU/Event Note

## 2024-03-11 NOTE — CHART NOTE - NSCHARTNOTEFT_GEN_A_CORE
PACU ANESTHESIA ADMISSION NOTE      Procedure:   Post op diagnosis:      ____  Intubated  TV:______       Rate: ______      FiO2: ______    _x___  Patent Airway    _x___  Full return of protective reflexes    _x___  Full recovery from anesthesia / back to baseline status    Vitals:  T(C): 36.6 (03-11-24 @ 12:56), Max: 36.8 (03-11-24 @ 09:43)  HR: 63 (03-11-24 @ 13:44) (62 - 87)  BP: 106/65 (03-11-24 @ 13:44) (94/53 - 108/62)  RR: 12 (03-11-24 @ 13:44) (12 - 18)  SpO2: 96% (03-11-24 @ 13:44) (93% - 100%)    Mental Status:  _x___ Awake   _____ Alert   _____ Drowsy   _____ Sedated    Nausea/Vomiting:  _x___  NO       ______Yes,   See Post - Op Orders         Pain Scale (0-10):  __0___    Treatment: _x___ None    ____ See Post - Op/PCA Orders    Post - Operative Fluids:   __x__ Oral   ____ See Post - Op Orders    Plan: Discharge:   _x___Home       _____Floor     _____Critical Care    _____  Other:_________________    Comments:  No anesthesia issues or complications noted.  Discharge when criteria met.

## 2024-03-14 LAB — SURGICAL PATHOLOGY STUDY: SIGNIFICANT CHANGE UP

## 2024-03-18 DIAGNOSIS — Z91.148 PATIENT'S OTHER NONCOMPLIANCE WITH MEDICATION REGIMEN FOR OTHER REASON: ICD-10-CM

## 2024-03-18 DIAGNOSIS — K76.0 FATTY (CHANGE OF) LIVER, NOT ELSEWHERE CLASSIFIED: ICD-10-CM

## 2024-03-18 DIAGNOSIS — E83.119 HEMOCHROMATOSIS, UNSPECIFIED: ICD-10-CM

## 2024-03-18 DIAGNOSIS — E53.8 DEFICIENCY OF OTHER SPECIFIED B GROUP VITAMINS: ICD-10-CM

## 2024-03-18 DIAGNOSIS — F17.200 NICOTINE DEPENDENCE, UNSPECIFIED, UNCOMPLICATED: ICD-10-CM

## 2024-03-18 DIAGNOSIS — I85.10 SECONDARY ESOPHAGEAL VARICES WITHOUT BLEEDING: ICD-10-CM

## 2024-03-18 DIAGNOSIS — D64.9 ANEMIA, UNSPECIFIED: ICD-10-CM

## 2024-03-18 DIAGNOSIS — E51.9 THIAMINE DEFICIENCY, UNSPECIFIED: ICD-10-CM

## 2024-03-18 DIAGNOSIS — E83.39 OTHER DISORDERS OF PHOSPHORUS METABOLISM: ICD-10-CM
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
## 2024-03-18 DIAGNOSIS — D69.6 THROMBOCYTOPENIA, UNSPECIFIED: ICD-10-CM

## 2024-03-18 DIAGNOSIS — E78.5 HYPERLIPIDEMIA, UNSPECIFIED: ICD-10-CM

## 2024-03-18 DIAGNOSIS — E87.29 OTHER ACIDOSIS: ICD-10-CM

## 2024-03-18 DIAGNOSIS — G31.9 DEGENERATIVE DISEASE OF NERVOUS SYSTEM, UNSPECIFIED: ICD-10-CM

## 2024-03-18 DIAGNOSIS — E07.81 SICK-EUTHYROID SYNDROME: ICD-10-CM

## 2024-03-18 DIAGNOSIS — E66.9 OBESITY, UNSPECIFIED: ICD-10-CM

## 2024-03-18 DIAGNOSIS — K70.0 ALCOHOLIC FATTY LIVER: ICD-10-CM

## 2024-03-18 DIAGNOSIS — E83.42 HYPOMAGNESEMIA: ICD-10-CM

## 2024-03-18 DIAGNOSIS — F10.239 ALCOHOL DEPENDENCE WITH WITHDRAWAL, UNSPECIFIED: ICD-10-CM

## 2024-03-18 DIAGNOSIS — Z88.8 ALLERGY STATUS TO OTHER DRUGS, MEDICAMENTS AND BIOLOGICAL SUBSTANCES: ICD-10-CM

## 2024-03-18 DIAGNOSIS — T23.061A BURN OF UNSPECIFIED DEGREE OF BACK OF RIGHT HAND, INITIAL ENCOUNTER: ICD-10-CM
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
## 2024-03-18 DIAGNOSIS — K29.70 GASTRITIS, UNSPECIFIED, WITHOUT BLEEDING: ICD-10-CM
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
## 2024-03-18 DIAGNOSIS — T31.0 BURNS INVOLVING LESS THAN 10% OF BODY SURFACE: ICD-10-CM

## 2024-03-18 DIAGNOSIS — I34.1 NONRHEUMATIC MITRAL (VALVE) PROLAPSE: ICD-10-CM

## 2024-04-08 NOTE — ED PROVIDER NOTE - HIV OFFER
Previously Declined (within the last year) For information on Fall & Injury Prevention, visit: [email or web address] OR  [email or web address] OR  https://www.cdc.gov/steadi/patient.html

## 2024-04-23 ENCOUNTER — EMERGENCY (EMERGENCY)
Facility: HOSPITAL | Age: 44
LOS: 0 days | Discharge: HOME | End: 2024-04-23
Attending: EMERGENCY MEDICINE
Payer: MEDICAID

## 2024-04-23 ENCOUNTER — EMERGENCY (EMERGENCY)
Facility: HOSPITAL | Age: 44
LOS: 0 days | Discharge: HOME | End: 2024-04-24
Attending: EMERGENCY MEDICINE
Payer: MEDICAID

## 2024-04-23 VITALS
SYSTOLIC BLOOD PRESSURE: 156 MMHG | TEMPERATURE: 99 F | WEIGHT: 220.02 LBS | HEIGHT: 60.1 IN | RESPIRATION RATE: 20 BRPM | OXYGEN SATURATION: 97 % | DIASTOLIC BLOOD PRESSURE: 90 MMHG | HEART RATE: 111 BPM

## 2024-04-23 VITALS
HEART RATE: 119 BPM | DIASTOLIC BLOOD PRESSURE: 79 MMHG | RESPIRATION RATE: 20 BRPM | HEIGHT: 60.1 IN | OXYGEN SATURATION: 98 % | SYSTOLIC BLOOD PRESSURE: 130 MMHG

## 2024-04-23 DIAGNOSIS — Z98.890 OTHER SPECIFIED POSTPROCEDURAL STATES: Chronic | ICD-10-CM
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
## 2024-04-23 DIAGNOSIS — Z88.5 ALLERGY STATUS TO NARCOTIC AGENT: ICD-10-CM

## 2024-04-23 DIAGNOSIS — R45.1 RESTLESSNESS AND AGITATION: ICD-10-CM

## 2024-04-23 DIAGNOSIS — Z98.891 HISTORY OF UTERINE SCAR FROM PREVIOUS SURGERY: Chronic | ICD-10-CM

## 2024-04-23 DIAGNOSIS — F10.129 ALCOHOL ABUSE WITH INTOXICATION, UNSPECIFIED: ICD-10-CM

## 2024-04-23 DIAGNOSIS — Z88.8 ALLERGY STATUS TO OTHER DRUGS, MEDICAMENTS AND BIOLOGICAL SUBSTANCES: ICD-10-CM

## 2024-04-23 LAB
APPEARANCE UR: CLEAR — SIGNIFICANT CHANGE UP
BASE EXCESS BLDV CALC-SCNC: -4.8 MMOL/L — LOW (ref -2–3)
BASOPHILS # BLD AUTO: 0.06 K/UL — SIGNIFICANT CHANGE UP (ref 0–0.2)
BASOPHILS NFR BLD AUTO: 1.1 % — HIGH (ref 0–1)
BILIRUB UR-MCNC: NEGATIVE — SIGNIFICANT CHANGE UP
CA-I SERPL-SCNC: 1.04 MMOL/L — LOW (ref 1.15–1.33)
COLOR SPEC: YELLOW — SIGNIFICANT CHANGE UP
DIFF PNL FLD: NEGATIVE — SIGNIFICANT CHANGE UP
EOSINOPHIL # BLD AUTO: 0.02 K/UL — SIGNIFICANT CHANGE UP (ref 0–0.7)
EOSINOPHIL NFR BLD AUTO: 0.4 % — SIGNIFICANT CHANGE UP (ref 0–8)
ETHANOL SERPL-MCNC: 424 MG/DL — HIGH
GAS PNL BLDV: 134 MMOL/L — LOW (ref 136–145)
GAS PNL BLDV: SIGNIFICANT CHANGE UP
GLUCOSE UR QL: NEGATIVE MG/DL — SIGNIFICANT CHANGE UP
HCG UR QL: NEGATIVE — SIGNIFICANT CHANGE UP
HCO3 BLDV-SCNC: 20 MMOL/L — LOW (ref 22–29)
HCT VFR BLD CALC: 36.5 % — LOW (ref 37–47)
HCT VFR BLDA CALC: 39 % — SIGNIFICANT CHANGE UP (ref 34.5–46.5)
HGB BLD CALC-MCNC: 13.1 G/DL — SIGNIFICANT CHANGE UP (ref 11.7–16.1)
HGB BLD-MCNC: 12.5 G/DL — SIGNIFICANT CHANGE UP (ref 12–16)
IMM GRANULOCYTES NFR BLD AUTO: 0.2 % — SIGNIFICANT CHANGE UP (ref 0.1–0.3)
KETONES UR-MCNC: ABNORMAL MG/DL
LACTATE BLDV-MCNC: 4.6 MMOL/L — CRITICAL HIGH (ref 0.5–2)
LEUKOCYTE ESTERASE UR-ACNC: ABNORMAL
LYMPHOCYTES # BLD AUTO: 2.07 K/UL — SIGNIFICANT CHANGE UP (ref 1.2–3.4)
LYMPHOCYTES # BLD AUTO: 39.1 % — SIGNIFICANT CHANGE UP (ref 20.5–51.1)
MCHC RBC-ENTMCNC: 34.2 G/DL — SIGNIFICANT CHANGE UP (ref 32–37)
MCHC RBC-ENTMCNC: 34.7 PG — HIGH (ref 27–31)
MCV RBC AUTO: 101.4 FL — HIGH (ref 81–99)
MONOCYTES # BLD AUTO: 0.83 K/UL — HIGH (ref 0.1–0.6)
MONOCYTES NFR BLD AUTO: 15.7 % — HIGH (ref 1.7–9.3)
NEUTROPHILS # BLD AUTO: 2.31 K/UL — SIGNIFICANT CHANGE UP (ref 1.4–6.5)
NEUTROPHILS NFR BLD AUTO: 43.5 % — SIGNIFICANT CHANGE UP (ref 42.2–75.2)
NITRITE UR-MCNC: NEGATIVE — SIGNIFICANT CHANGE UP
NRBC # BLD: 0 /100 WBCS — SIGNIFICANT CHANGE UP (ref 0–0)
PCO2 BLDV: 38 MMHG — LOW (ref 39–42)
PH BLDV: 7.34 — SIGNIFICANT CHANGE UP (ref 7.32–7.43)
PH UR: 6 — SIGNIFICANT CHANGE UP (ref 5–8)
PLATELET # BLD AUTO: 217 K/UL — SIGNIFICANT CHANGE UP (ref 130–400)
PMV BLD: 9 FL — SIGNIFICANT CHANGE UP (ref 7.4–10.4)
PO2 BLDV: 52 MMHG — HIGH (ref 25–45)
POTASSIUM BLDV-SCNC: 4 MMOL/L — SIGNIFICANT CHANGE UP (ref 3.5–5.1)
PROT UR-MCNC: SIGNIFICANT CHANGE UP MG/DL
RBC # BLD: 3.6 M/UL — LOW (ref 4.2–5.4)
RBC # FLD: 15.3 % — HIGH (ref 11.5–14.5)
SAO2 % BLDV: 77 % — SIGNIFICANT CHANGE UP (ref 67–88)
SP GR SPEC: 1 — SIGNIFICANT CHANGE UP (ref 1–1.03)
UROBILINOGEN FLD QL: 0.2 MG/DL — SIGNIFICANT CHANGE UP (ref 0.2–1)
WBC # BLD: 5.3 K/UL — SIGNIFICANT CHANGE UP (ref 4.8–10.8)
WBC # FLD AUTO: 5.3 K/UL — SIGNIFICANT CHANGE UP (ref 4.8–10.8)

## 2024-04-23 PROCEDURE — 80307 DRUG TEST PRSMV CHEM ANLYZR: CPT

## 2024-04-23 PROCEDURE — 81001 URINALYSIS AUTO W/SCOPE: CPT

## 2024-04-23 PROCEDURE — 85014 HEMATOCRIT: CPT

## 2024-04-23 PROCEDURE — 82330 ASSAY OF CALCIUM: CPT

## 2024-04-23 PROCEDURE — 80048 BASIC METABOLIC PNL TOTAL CA: CPT

## 2024-04-23 PROCEDURE — 84132 ASSAY OF SERUM POTASSIUM: CPT

## 2024-04-23 PROCEDURE — 99285 EMERGENCY DEPT VISIT HI MDM: CPT | Mod: 25

## 2024-04-23 PROCEDURE — 36415 COLL VENOUS BLD VENIPUNCTURE: CPT

## 2024-04-23 PROCEDURE — 93005 ELECTROCARDIOGRAM TRACING: CPT

## 2024-04-23 PROCEDURE — 99285 EMERGENCY DEPT VISIT HI MDM: CPT

## 2024-04-23 PROCEDURE — 85025 COMPLETE CBC W/AUTO DIFF WBC: CPT

## 2024-04-23 PROCEDURE — 99283 EMERGENCY DEPT VISIT LOW MDM: CPT

## 2024-04-23 PROCEDURE — 82803 BLOOD GASES ANY COMBINATION: CPT

## 2024-04-23 PROCEDURE — 93010 ELECTROCARDIOGRAM REPORT: CPT

## 2024-04-23 PROCEDURE — 85018 HEMOGLOBIN: CPT

## 2024-04-23 PROCEDURE — 96374 THER/PROPH/DIAG INJ IV PUSH: CPT

## 2024-04-23 PROCEDURE — 84295 ASSAY OF SERUM SODIUM: CPT

## 2024-04-23 PROCEDURE — 83605 ASSAY OF LACTIC ACID: CPT

## 2024-04-23 PROCEDURE — 81025 URINE PREGNANCY TEST: CPT

## 2024-04-23 RX ORDER — SODIUM CHLORIDE 9 MG/ML
2000 INJECTION INTRAMUSCULAR; INTRAVENOUS; SUBCUTANEOUS ONCE
Refills: 0 | Status: COMPLETED | OUTPATIENT
Start: 2024-04-23 | End: 2024-04-23

## 2024-04-23 NOTE — ED ADULT NURSE NOTE - OBJECTIVE STATEMENT
pt evicted from home &  [employer] sealed house & pt became irate & aggressive toward [facility] who called 911

## 2024-04-23 NOTE — ED PROVIDER NOTE - CLINICAL SUMMARY MEDICAL DECISION MAKING FREE TEXT BOX
44 yo woman w/ intoxication and agitation prior to presentation  In ED, refuses any care/interventions/assessment (will not allow for ECG, labs or XR)  Based on my exam, pt can refuse care as she is alert and oriented X 3, understands what we are offering in terms of assessment and demonstrates ability to understand what is going on.   AS pt is refusing care, can be discharged. Though she is angry about her eviction, she is not agitated and does not demonstrate a threat to herself at this time

## 2024-04-23 NOTE — ED ADULT TRIAGE NOTE - HOW PATIENT ADDRESSED, PROFILE
Writer called [facility] and spoke with ZELDA RN regarding IR AV GRAFT/FISTULA procedure scheduled for 10-08. You should arrive at the surgery admitting desk at 1100 for procedure 1300. You should not have anything to eat or drink after midnight. You do need to hold Warfarin 3 days, hold Viagra 24 hours, hold AM insulin for this procedure. You will need a ride to and from procedure. You will not be able to drive or make legal decisions for 24 hours after procedure. You should wear a loose fitting clothing. Patient denies fever, cough, and/or symptoms of covid. Patient denies exposure to any positive patients. Patient instructed to call if this changes prior to appointment. Patient has no questions at this time. If you have any questions or concerns prior to procedure you can call IR at [phone]. Patient verbalized understanding.    Pamela Murillo RN  
Dipti

## 2024-04-23 NOTE — ED ADULT NURSE NOTE - CHIEF COMPLAINT QUOTE
BIBA with [facility] assist for aggressive behavior  pt was being evicted from home &  [employer] sealed house & pt became irate & aggressive toward landlord who called 911  pt remaining aggressive in triage

## 2024-04-23 NOTE — ED ADULT NURSE NOTE - DISCHARGE DATE/TIME
Subjective:


Subjective:


Feels good, no complaints.





Objective:


Objective:


Per RN - plans for regular diet at lunch and then DC.


Vital Signs:





 Vital Signs








  Date Time  Temp Pulse Resp B/P (MAP) Pulse Ox O2 Delivery O2 Flow Rate FiO2


 


3/1/19 10:56 98.6 90 18 150/80 (103) 93 Room Air  





 98.6       








Labs:


 URINE CULTURE  Final  


        Final report





  URINE CULTURE RES 1  Final  


        No growth





PE:





GEN: NAD


LUNGS: CTAB


HEART: RRR


ABD: NABS, S/ND/NT


NEURO/PSYCH: A & O 3





A/P:


Back pain, vomiting - resolved





--


Improved w/ plans to advance diet and DC.


Can continue PPI.


She'd like to follow-up in the office w/ Dr. Huang.











JAYA VALENTIN Mar 1, 2019 11:16 23-Apr-2024 17:48

## 2024-04-23 NOTE — ED ADULT NURSE REASSESSMENT NOTE - NS ED NURSE REASSESS COMMENT FT1
Patient AOx3, steady gait, refusing intervention and treatments. MD made aware and discharged patient upon pt request.

## 2024-04-23 NOTE — ED PROVIDER NOTE - OBJECTIVE STATEMENT
42 yo woman w/ hx of AUD, w/d, gastritis, here w/ intoxication  [place] and [employer] secondary to patient being agitated  Pt states was supposed to evicted tomorrow but was evicted today instead and got angry at PD  + drinking today  STates that she has chronic, every day, constant CP  no other medicaal complaints    Pt refuses examination or assessment

## 2024-04-23 NOTE — ED PROVIDER NOTE - PATIENT PORTAL LINK FT
You can access the FollowMyHealth Patient Portal offered by [facility] by registering at the following website: [email or web address]. By joining [employer]’s FollowMyHealth portal, you will also be able to view your health information using other applications (apps) compatible with our system.

## 2024-04-23 NOTE — ED PROVIDER NOTE - CHIEF COMPLAINT
Called mom and let her know we received the refill request but patient is overdue for CPE. RN transferred mom to reception to schedule the appointment.    Refill request sent to Dr. Pierre for approval.  
Medication Requested: cetirizine 10 mg  Medication Last Refilled: 5/9/22  Medication Last Refilled By: Dr. Hess  Medication is currently on med list.  Last Physical Exam: 8/19/21  Upcoming Appts: none    Patient is overdue for CPE.     LVM for mom letting her know we received the refill request but patient is overdue for CPE. RN requesting mom return call to schedule appointment. Call back number provided.   
The patient is a 43y Female complaining of medical evaluation.

## 2024-04-23 NOTE — ED PROVIDER NOTE - PHYSICAL EXAMINATION
wd/wn  nc/at  eomi, perrl  rrr s1s2 wnl  aao X 3  stable gait  no tremulousness, no tongue fasciculations

## 2024-04-23 NOTE — ED ADULT TRIAGE NOTE - CHIEF COMPLAINT QUOTE
BIBA with [facility] assist for aggressive behavior  pt was being evicted from home &  [employer] sealed house & pt became irate & aggressive toward landlord who called 911  pt remaining aggressive in triage

## 2024-04-24 VITALS
DIASTOLIC BLOOD PRESSURE: 61 MMHG | OXYGEN SATURATION: 97 % | TEMPERATURE: 98 F | RESPIRATION RATE: 18 BRPM | SYSTOLIC BLOOD PRESSURE: 115 MMHG | HEART RATE: 95 BPM

## 2024-04-24 LAB
ANION GAP SERPL CALC-SCNC: 25 MMOL/L — HIGH (ref 7–14)
APAP SERPL-MCNC: <5 UG/ML — LOW (ref 10–30)
BACTERIA # UR AUTO: ABNORMAL /HPF
BUN SERPL-MCNC: 8 MG/DL — LOW (ref 10–20)
CALCIUM SERPL-MCNC: 8.8 MG/DL — SIGNIFICANT CHANGE UP (ref 8.4–10.5)
CHLORIDE SERPL-SCNC: 93 MMOL/L — LOW (ref 98–110)
CO2 SERPL-SCNC: 18 MMOL/L — SIGNIFICANT CHANGE UP (ref 17–32)
CREAT SERPL-MCNC: 0.6 MG/DL — LOW (ref 0.7–1.5)
EGFR: 116 ML/MIN/1.73M2 — SIGNIFICANT CHANGE UP
EPI CELLS # UR: PRESENT
GLUCOSE SERPL-MCNC: 75 MG/DL — SIGNIFICANT CHANGE UP (ref 70–99)
LACTATE SERPL-SCNC: 2.7 MMOL/L — HIGH (ref 0.7–2)
POTASSIUM SERPL-MCNC: 4.2 MMOL/L — SIGNIFICANT CHANGE UP (ref 3.5–5)
POTASSIUM SERPL-SCNC: 4.2 MMOL/L — SIGNIFICANT CHANGE UP (ref 3.5–5)
RBC CASTS # UR COMP ASSIST: 1 /HPF — SIGNIFICANT CHANGE UP (ref 0–4)
SALICYLATES SERPL-MCNC: <0.3 MG/DL — LOW (ref 4–30)
SODIUM SERPL-SCNC: 136 MMOL/L — SIGNIFICANT CHANGE UP (ref 135–146)
SQUAMOUS # UR AUTO: SIGNIFICANT CHANGE UP /HPF (ref 0–5)
WBC UR QL: 6 /HPF — HIGH (ref 0–5)

## 2024-04-24 RX ORDER — FAMOTIDINE 10 MG/ML
20 INJECTION INTRAVENOUS ONCE
Refills: 0 | Status: COMPLETED | OUTPATIENT
Start: 2024-04-24 | End: 2024-04-24

## 2024-04-24 RX ADMIN — SODIUM CHLORIDE 2000 MILLILITER(S): 9 INJECTION INTRAMUSCULAR; INTRAVENOUS; SUBCUTANEOUS at 01:08

## 2024-04-24 RX ADMIN — FAMOTIDINE 20 MILLIGRAM(S): 10 INJECTION INTRAVENOUS at 01:08

## 2024-04-24 NOTE — ED PROVIDER NOTE - PHYSICAL EXAMINATION
On physical exam patient is normocephalic atraumatic pupils equal round react light accommodation extraocular muscles intact oropharynx clear chest clear to auscultation bilaterally abdomen soft nontender nondistended bowel sounds positive no guarding or rebound extremities full range of motion pedal pulse 2+ radial pulse 2+ no focal deficits noted

## 2024-04-24 NOTE — ED PROVIDER NOTE - PATIENT PORTAL LINK FT
You can access the FollowMyHealth Patient Portal offered by [facility] by registering at the following website: [email or web address]. By joining [employer]’s FollowMyHealth portal, you will also be able to view your health information using other applications (apps) compatible with our system.

## 2024-04-24 NOTE — ED PROVIDER NOTE - CLINICAL SUMMARY MEDICAL DECISION MAKING FREE TEXT BOX
On physical exam patient is normocephalic atraumatic pupils equal round react light accommodation extraocular muscles intact oropharynx clear chest clear to auscultation bilaterally abdomen soft nontender nondistended bowel sounds positive no guarding or rebound extremities full range of motion pedal pulse 2+ radial pulse 2+ no focal deficits noted    Assessment plan patient presents for evaluation of intoxication states that she feels like she is "dehydrated" denies any trauma or falls patient has a history of self-reported "lactic acidosis" and is concerned inconsistent on knowing her pH and lactic acid levels we obtain labs given IV fluids patient found to be intoxicated alcohol level over 400 lactic acid of 4 pH 7.3 given IV fluids and observed here in the emergency department no signs of trauma  we obtained ekg per my independent evaluation not consistent with stemi, qt prolongation or arrtymia

## 2024-04-24 NOTE — ED PROVIDER NOTE - NSFOLLOWUPCLINICSTOKEN_GEN_ALL_ED_FT
Outreach attempts to coordination scheduling on the patient's Service to ENT  order in workqueue [id number] requested on 12/14/2023 have been conducted.  Unable to Contact   Please contact Yee if further coordination is needed.  
[id number]: || ||00\01||False;[id number]: || ||00\01||False;[id number]: || ||00\01||False;

## 2024-04-24 NOTE — ED PROVIDER NOTE - ATTENDING APP SHARED VISIT CONTRIBUTION OF CARE
I have personally performed a history and physical exam on this patient and personally directed the management of the patient. Patient is a 43-year-old female presents for evaluation of intoxication states that she has been drinking heavily over the past several hours but denies any coingestions she is not homicidal or suicidal no fevers no chills no vomiting denies any chest pain shortness of breath abdominal pain or back pain states that she is scheduled to go to a long-term rehab facility in the morning    On physical exam patient is normocephalic atraumatic pupils equal round react light accommodation extraocular muscles intact oropharynx clear chest clear to auscultation bilaterally abdomen soft nontender nondistended bowel sounds positive no guarding or rebound extremities full range of motion pedal pulse 2+ radial pulse 2+ no focal deficits noted    Assessment plan patient presents for evaluation of intoxication states that she feels like she is "dehydrated" denies any trauma or falls patient has a history of self-reported "lactic acidosis" and is concerned inconsistent on knowing her pH and lactic acid levels we obtain labs given IV fluids patient found to be intoxicated alcohol level over 400 lactic acid of 4 pH 7.3 given IV fluids and observed here in the emergency department no signs of trauma  we obtained ekg per my independent evaluation not consistent with stemi, qt prolongation or arrtymia

## 2024-04-24 NOTE — ED PROVIDER NOTE - OBJECTIVE STATEMENT
Patient is a 43-year-old female presents for evaluation of intoxication states that she has been drinking heavily over the past several hours but denies any coingestions she is not homicidal or suicidal no fevers no chills no vomiting denies any chest pain shortness of breath abdominal pain or back pain states that she is scheduled to go to a long-term rehab facility in the morning

## 2024-04-24 NOTE — ED ADULT NURSE REASSESSMENT NOTE - NS ED NURSE REASSESS COMMENT FT1
Pt given list of both homeless shelters and additional inpatient detox program per her request.    OK for medicare taxi per AND Sarah.

## 2024-04-24 NOTE — ED ADULT NURSE NOTE - NSICDXPASTSURGICALHX_GEN_ALL_CORE_FT
Last visit 9/6/19     Last refill on Fludrocortisone - 3 tabs daily   # 90 with 5 refills on 7/1/19     Ok to refill again and any extra refills ?    PAST SURGICAL HISTORY:  History of  section     S/P tendon repair 5th finger right hand

## 2024-04-24 NOTE — ED PROVIDER NOTE - NSFOLLOWUPINSTRUCTIONS_ED_ALL_ED_FT
Follow up with your primary care doctor in 1-2 days     Alcohol Use Disorder    WHAT YOU NEED TO KNOW:    Alcohol use disorder (AUD) is problem drinking. AUD includes alcohol abuse and alcohol dependence. Alcohol can damage your brain, heart, kidneys, lungs, and liver. Your risk of stroke is greater if you have 5 or more drinks each day. If you are pregnant, you and your baby are at risk for serious health problems. No amount of alcohol is safe during pregnancy.    DISCHARGE INSTRUCTIONS:    Call your local emergency number (911 in the [place]) if:     You have seizures.        Call your doctor if:     Your heart is beating faster than usual.      You have hallucinations.      You cannot remember what happens while you are drinking.      You are anxious and have nausea.      Your hands are shaky and you are sweating heavily.      You have questions or concerns about your condition or care.    Follow up with your healthcare provider as directed: Do not try to stop drinking on your own. Your healthcare provider may need to help you withdraw from alcohol safely. He or she may need to admit you to the hospital. You may also need any of the following:    Medicines to decrease your craving for alcohol      Support groups such as Alcoholics Anonymous       Therapy from a psychiatrist or psychologist       Admission to an inpatient facility for treatment for severe AUD    For support and more information:     Substance Abuse and Mental Health Services Administration  [address]  [place] [address]  Web Address: http://www.samhsa.gov      Alcoholics Anonymous  Web Address: http://www.aa.org           © Copyright [employer] 2019 All illustrations and images included in CareNotes are the copyrighted property of [employer] or [employer].

## 2024-04-24 NOTE — ED PROVIDER NOTE - NSFOLLOWUPCLINICS_GEN_ALL_ED_FT
[facility]  Detox  [address] [place] [address]  Phone: [phone]  Fax: [phone]    [facility]  Detox  [address] [place] [address]  Phone: [phone]  Fax: [phone]    [facility]  Detox Mgmt  [address] [place] [address]  Phone: [phone]  Fax:

## 2024-05-27 NOTE — PROVIDER CONTACT NOTE (OTHER) - SITUATION
pt c/o heartburn
7 = Among the most extremely ill patients – pathology drastically interferes in many life functions; may be hospitalized

## 2024-07-16 ENCOUNTER — APPOINTMENT (OUTPATIENT)
Dept: ENDOCRINOLOGY | Facility: CLINIC | Age: 44
End: 2024-07-16

## 2024-08-12 NOTE — ED ADULT NURSE NOTE - NSFALLRSKINDICTYPE_ED_ALL_ED
Medication: Hydrochlorthiazide   Last office visit date: 4/24/2024  Medication Refill Protocol Failed.  Failed criteria: Sodium. Sent to clinician to review.    Intoxication

## 2024-08-19 NOTE — ED PROVIDER NOTE - COVID-19 RESULT DATE/TIME
Clinic Care Coordination Contact  [facility]/Voicemail    Clinical Data: Care Coordinator Outreach    Outreach Documentation Number of Outreach Attempt   8/16/2024   2:10 PM 1   8/19/2024  11:44 AM 2       Left message on patient's voicemail with call back information and requested return call.    Plan: Care Coordinator will send unable to contact letter with care coordinator contact information via mail. Care Coordinator will do no further outreaches at this time.    TCM Episode created  UTC x 2      
Clinic Care Coordination Contact  [facility]/Voicemail    Clinical Data: Care Coordinator Outreach    Outreach Documentation Number of Outreach Attempt   8/16/2024   2:10 PM 1       Left message on patient's voicemail with call back information and requested return call.  Instructed patient/spouse to call the clinic to schedule patient's PCP follow up appointment for 7-10 days if able.    Plan:  Care Coordinator will try to reach patient again in 1-2 business days.    TCM Episode created  UTC x 1      
17-Apr-2021 10:32

## 2024-09-20 NOTE — PROGRESS NOTE ADULT - SUBJECTIVE AND OBJECTIVE BOX
Patient is awake and alert, asking for pain and nausea medicine      T(F): 98.6 (08-29-23 @ 03:11), Max: 98.6 (08-29-23 @ 03:11)  HR: 113 (08-29-23 @ 06:06)  BP: 136/80 (08-29-23 @ 06:06)  RR: 18 (08-29-23 @ 06:06)  SpO2: 97% (08-29-23 @ 06:06) (97% - 97%)    PHYSICAL EXAM:  GENERAL: NAD  HEAD:  Atraumatic, Normocephalic  EYES: EOMI, PERRLA, conjunctiva and sclera clear  NERVOUS SYSTEM:  Alert & Oriented X3, no focal deficits   CHEST/LUNG: Clear to percussion bilaterally; No rales, rhonchi, wheezing, or rubs  HEART: Regular rate and rhythm; No murmurs, rubs, or gallops  ABDOMEN: Soft, Nontender, Nondistended; Bowel sounds present  EXTREMITIES:  2+ Peripheral Pulses, No clubbing, cyanosis, or edema    LABS  08-29    139  |  99  |  5<L>  ----------------------------<  85  3.9   |  21  |  0.5<L>    Ca    8.4      29 Aug 2023 07:00  Phos  2.4     08-29  Mg     1.7     08-29    TPro  7.5  /  Alb  4.6  /  TBili  0.5  /  DBili  x   /  AST  87<H>  /  ALT  45<H>  /  AlkPhos  87  08-29                          14.1   7.43  )-----------( 306      ( 29 Aug 2023 03:57 )             40.3     PT/INR - ( 29 Aug 2023 03:57 )   PT: 11.10 sec;   INR: 0.97 ratio         PTT - ( 29 Aug 2023 03:57 )  PTT:37.3 sec    CARDIAC ENZYMES    Troponin T, Serum: <0.01 ng/mL (08-29-23 @ 03:57)    Alcohol, Blood (08.29.23 @ 03:57)   Alcohol, Blood: 277 mg/dL      RADIOLOGY  < from: Xray Chest 1 View-PORTABLE IMMEDIATE (08.29.23 @ 03:57) >  Impression:    No radiographic evidence of acute cardiopulmonary disease.    < end of copied text >    MEDICATIONS  (STANDING):  chlorhexidine 2% Cloths 1 Application(s) Topical <User Schedule>  dextrose 5% + sodium chloride 0.9%. 1000 milliLiter(s) (100 mL/Hr) IV Continuous <Continuous>  enoxaparin Injectable 40 milliGRAM(s) SubCutaneous every 12 hours  folic acid 1 milliGRAM(s) Oral daily  magnesium sulfate  IVPB 2 Gram(s) IV Intermittent every 2 hours  multivitamin 1 Tablet(s) Oral daily  ondansetron Injectable 4 milliGRAM(s) IV Push once  pantoprazole  Injectable 40 milliGRAM(s) IV Push daily    MEDICATIONS  (PRN):  LORazepam   Injectable 2 milliGRAM(s) IV Push every 2 hours PRN CIWA-Ar score increase by 2 points and a total score of 7 or less    
How Severe Are Your Warts?: moderate
Is This A New Presentation, Or A Follow-Up?: Wart
CC: 43F w/ alcohol withdrawal      SUBJECTIVE / OVERNIGHT EVENTS:  No acute events overnight. Patient seen and evaluated at bedside. No fever/chills.  she reports chronic gastritis and increased abdominal gas. no fever, halluciantion, tremor at time of exam    ROS:  no cp    MEDICATIONS  (STANDING):  chlorhexidine 2% Cloths 1 Application(s) Topical <User Schedule>  dextrose 5% + sodium chloride 0.9%. 1000 milliLiter(s) (100 mL/Hr) IV Continuous <Continuous>  enoxaparin Injectable 40 milliGRAM(s) SubCutaneous every 12 hours  famotidine    Tablet 20 milliGRAM(s) Oral daily  folic acid 1 milliGRAM(s) Oral daily  multivitamin 1 Tablet(s) Oral daily  ondansetron Injectable 4 milliGRAM(s) IV Push once  pantoprazole  Injectable 40 milliGRAM(s) IV Push daily  QUEtiapine 200 milliGRAM(s) Oral at bedtime  simethicone 80 milliGRAM(s) Chew three times a day    MEDICATIONS  (PRN):  acetaminophen     Tablet .. 650 milliGRAM(s) Oral every 6 hours PRN Temp greater or equal to 38.5C (101.3F), Severe Pain (7 - 10)  guaifenesin/dextromethorphan Oral Liquid 10 milliLiter(s) Oral every 6 hours PRN Cough  LORazepam     Tablet 2 milliGRAM(s) Oral every 2 hours PRN CIWA-Ar score  8 - 15  LORazepam     Tablet 4 milliGRAM(s) Oral every 1 hour PRN CIWA-Ar score GREATER THAN 15  LORazepam   Injectable 2 milliGRAM(s) IV Push every 2 hours PRN CIWA-Ar score increase by 2 points and a total score of 7 or less  ondansetron Injectable 4 milliGRAM(s) IV Push every 6 hours PRN Nausea and/or Vomiting      CAPILLARY BLOOD GLUCOSE        I&O's Summary    29 Aug 2023 07:01  -  30 Aug 2023 07:00  --------------------------------------------------------  IN: 1040 mL / OUT: 0 mL / NET: 1040 mL    Physical Exam  Vital Signs Last 24 Hrs  T(C): 36.6 (30 Aug 2023 04:23), Max: 36.9 (29 Aug 2023 17:21)  T(F): 97.9 (30 Aug 2023 04:23), Max: 98.4 (29 Aug 2023 17:21)  HR: 89 (30 Aug 2023 09:59) (89 - 104)  BP: 130/71 (30 Aug 2023 09:59) (120/72 - 134/79)  BP(mean): 91 (29 Aug 2023 16:00) (91 - 100)  RR: 20 (30 Aug 2023 04:23) (20 - 39)  SpO2: 95% (29 Aug 2023 17:21) (94% - 100%)    Parameters below as of 29 Aug 2023 17:21  Patient On (Oxygen Delivery Method): room air    GENERAL: NAD  HEAD:  Atraumatic, Normocephalic  EYES: EOMI, PERRL, conjunctiva and sclera clear  ENMT: MMM, no angular cheilitis appreciated  NECK: Supple, trachea midline  Lung: normal work of breathing, cta b/l  Cardiovascular: S1&S2+, rrr, no m/r/g appreciated  ABDOMEN: soft, nt  : No jamison catheter, no suprapubic pain to palpation  Neuro: Alert & follows commands, no flaccid paralysis in extremities appreciated  SKIN: warm and dry, no visible purulence in exposed areas    LABS:                        12.2   4.52  )-----------( 179      ( 30 Aug 2023 04:30 )             36.1     08-30    138  |  104  |  6<L>  ----------------------------<  106<H>  4.8   |  20  |  0.6<L>    Ca    9.3      30 Aug 2023 04:30  Phos  3.1     08-30  Mg     2.1     08-30    TPro  7.5  /  Alb  4.6  /  TBili  0.5  /  DBili  x   /  AST  87<H>  /  ALT  45<H>  /  AlkPhos  87  08-29    PT/INR - ( 29 Aug 2023 03:57 )   PT: 11.10 sec;   INR: 0.97 ratio         PTT - ( 29 Aug 2023 03:57 )  PTT:37.3 sec  CARDIAC MARKERS ( 29 Aug 2023 11:00 )  x     / <0.01 ng/mL / x     / x     / x      CARDIAC MARKERS ( 29 Aug 2023 03:57 )  x     / <0.01 ng/mL / x     / x     / x          Urinalysis Basic - ( 30 Aug 2023 04:30 )    Color: x / Appearance: x / SG: x / pH: x  Gluc: 106 mg/dL / Ketone: x  / Bili: x / Urobili: x   Blood: x / Protein: x / Nitrite: x   Leuk Esterase: x / RBC: x / WBC x   Sq Epi: x / Non Sq Epi: x / Bacteria: x          RADIOLOGY & ADDITIONAL TESTS:  Results Reviewed:   Imaging Personally Reviewed:  Electrocardiogram Personally Reviewed:    COORDINATION OF CARE:  Care Discussed with Consultants/Other Providers [Y/N]:  Prior or Outpatient Records Reviewed [Y/N]:  
    Pt interviewed, examined and EMR chart reviewed.    Follow up of Alcohol Dependency. Pt is on Ativan CIWA protocol. Pt is doing better.  Pt with complaint of some anxiety and tremors. Pt recieved 3 doses since CIWA initiated last evening.      REVIEW OF SYSTEMS:    Constitutional: No fever, weight loss or fatigue  ENT:  No difficulty hearing, tinnitus, vertigo; No sinus or throat pain  Neck: No pain or stiffness  Respiratory: No cough, wheezing, chills or hemoptysis  Cardiovascular: No chest pain, palpitations, shortness of breath, dizziness or leg swelling  Gastrointestinal: No abdominal or epigastric pain. No nausea, vomiting or hematemesis; No diarrhea or constipation. No melena or hematochezia.  Neurological: No headaches, memory loss, loss of strength, numbness or tremors  Musculoskeletal: No joint pain or swelling; No muscle, back or extremity pain      MEDICATIONS  (STANDING):  chlorhexidine 2% Cloths 1 Application(s) Topical <User Schedule>  dextrose 5% + sodium chloride 0.9%. 1000 milliLiter(s) (100 mL/Hr) IV Continuous <Continuous>  enoxaparin Injectable 40 milliGRAM(s) SubCutaneous every 12 hours  famotidine    Tablet 20 milliGRAM(s) Oral daily  folic acid 1 milliGRAM(s) Oral daily  multivitamin 1 Tablet(s) Oral daily  ondansetron Injectable 4 milliGRAM(s) IV Push once  pantoprazole  Injectable 40 milliGRAM(s) IV Push daily  QUEtiapine 200 milliGRAM(s) Oral at bedtime  simethicone 80 milliGRAM(s) Chew three times a day    MEDICATIONS  (PRN):  acetaminophen     Tablet .. 650 milliGRAM(s) Oral every 6 hours PRN Temp greater or equal to 38.5C (101.3F), Severe Pain (7 - 10)  guaifenesin/dextromethorphan Oral Liquid 10 milliLiter(s) Oral every 6 hours PRN Cough  LORazepam     Tablet 2 milliGRAM(s) Oral every 2 hours PRN CIWA-Ar score  8 - 15  LORazepam     Tablet 4 milliGRAM(s) Oral every 1 hour PRN CIWA-Ar score GREATER THAN 15  LORazepam   Injectable 2 milliGRAM(s) IV Push every 2 hours PRN CIWA-Ar score increase by 2 points and a total score of 7 or less  ondansetron Injectable 4 milliGRAM(s) IV Push every 6 hours PRN Nausea and/or Vomiting      Vital Signs Last 24 Hrs  T(C): 36.6 (30 Aug 2023 04:23), Max: 36.9 (29 Aug 2023 17:21)  T(F): 97.9 (30 Aug 2023 04:23), Max: 98.4 (29 Aug 2023 17:21)  HR: 89 (30 Aug 2023 09:59) (89 - 104)  BP: 130/71 (30 Aug 2023 09:59) (116/74 - 134/79)  BP(mean): 91 (29 Aug 2023 16:00) (90 - 100)  RR: 20 (30 Aug 2023 04:23) (20 - 39)  SpO2: 95% (29 Aug 2023 17:21) (94% - 100%)    Parameters below as of 29 Aug 2023 17:21  Patient On (Oxygen Delivery Method): room air        PHYSICAL EXAM:    Constitutional: NAD, well-groomed, well-developed  HEENT: PERRLA, EOMI, Normal Hearing, MMM  Neck: No LAD, No JVD  Back: Normal spine flexure, No CVA tenderness  Respiratory: CTAB/L  Cardiovascular: S1 and S2, RRR, no M/G/R  Gastrointestinal: BS+, soft, NT/ND  Extremities: No peripheral edema  Vascular: 2+ peripheral pulses  Neurological: A/O x 3, no focal deficits    LABS:                        12.2   4.52  )-----------( 179      ( 30 Aug 2023 04:30 )             36.1     08-30    138  |  104  |  6<L>  ----------------------------<  106<H>  4.8   |  20  |  0.6<L>    Ca    9.3      30 Aug 2023 04:30  Phos  3.1     08-30  Mg     2.1     08-30    TPro  7.5  /  Alb  4.6  /  TBili  0.5  /  DBili  x   /  AST  87<H>  /  ALT  45<H>  /  AlkPhos  87  08-29    PT/INR - ( 29 Aug 2023 03:57 )   PT: 11.10 sec;   INR: 0.97 ratio         PTT - ( 29 Aug 2023 03:57 )  PTT:37.3 sec  Urinalysis Basic - ( 30 Aug 2023 04:30 )    Color: x / Appearance: x / SG: x / pH: x  Gluc: 106 mg/dL / Ketone: x  / Bili: x / Urobili: x   Blood: x / Protein: x / Nitrite: x   Leuk Esterase: x / RBC: x / WBC x   Sq Epi: x / Non Sq Epi: x / Bacteria: x      Drug Screen Urine:  Alcohol Level  Alcohol, Blood: 277 mg/dL (08-29-23 @ 03:57)        RADIOLOGY & ADDITIONAL STUDIES:

## 2025-02-12 NOTE — PATIENT PROFILE ADULT - NSPROEXTENSIONSOFSELF_GEN_A_NUR
[facility] response sent to patient that an appointment is needed for further refills.     Viki Brock APNP   to DARIEL Brock Np  Nurse Msg Pool     2/12/25 12:49 PM  Pt needs appt for refills. If she is in dire need, I will send in a short term supply to get her through   none

## 2025-03-25 NOTE — ED ADULT TRIAGE NOTE - DIRECT TO ROOM CARE INITIATED:
01-Dec-2019 04:32 Render Post-Care Instructions In Note?: yes Detail Level: Detailed Consent: The patient's consent was obtained including but not limited to risks of crusting, scabbing, blistering, scarring, darker or lighter pigmentary change, recurrence, incomplete removal and infection. Post-Care Instructions: I reviewed with the patient in detail post-care instructions. Patient is to wear sunprotection, and avoid picking at any of the treated lesions. Pt may apply Vaseline to crusted or scabbing areas. Number Of Freeze-Thaw Cycles: 2 freeze-thaw cycles Duration Of Freeze Thaw-Cycle (Seconds): 6 Render Note In Bullet Format When Appropriate: No

## 2025-04-29 NOTE — ED PROVIDER NOTE - PMH
Cristiane Champion DME company      New home address: [address] [place] [id number]      Alcoholism    Anxiety    Benzodiazepine misuse    Esophagitis    Gastritis    GERD (gastroesophageal reflux disease)    Graves' disease    Heart murmur    History of Graves' disease    Hypercholesterolemia    Hyperlipidemia, unspecified hyperlipidemia type    MVP (mitral valve prolapse)    Nicotine dependence    Obesity

## 2025-05-30 NOTE — ED ADULT NURSE NOTE - NSICDXPASTMEDICALHX_GEN_ALL_CORE_FT
PAST MEDICAL HISTORY:  Alcoholism     Anxiety     Benzodiazepine misuse     Esophagitis     Fatty liver     Gastritis     GERD (gastroesophageal reflux disease)     Graves' disease     Heart murmur     Hemochromatosis     History of Graves' disease     Hypercholesterolemia     Hyperlipidemia, unspecified hyperlipidemia type     MVP (mitral valve prolapse)     Nicotine dependence     Obesity      Oral Minoxidil Counseling- I discussed with the patient the risks of oral minoxidil including but not limited to shortness of breath, swelling of the feet or ankles, dizziness, lightheadedness, unwanted hair growth and allergic reaction.  The patient verbalized understanding of the proper use and possible adverse effects of oral minoxidil.  All of the patient's questions and concerns were addressed.

## 2025-07-24 NOTE — CONSULT NOTE ADULT - ASSESSMENT
[employer] - Denied  Case ID: [id number]      Denial Reason: Plan Exclusion         Patient has the option to purchase out of pocket.    EPA will exit encounter.    # HAGMA , concern for DKA   - A1c 5% in setting of anemia, but SMBG are ok and recent admission glucose ok , less likely DKA, agree to stop insulin drip and monitor     #history of Grave's disease   - had Grave's disease now in remission since 2015 was followed by Dr Lemons and used methimazole , has been off for years   - TSH normal , Ft4 , TT4 and T3 low , possible sick euthyroid vs contrast effect, less likely central etiology  - she did receive IV contrast on admission   - repeat TSH ,Ft4 and TT3 in am